# Patient Record
Sex: MALE | Race: WHITE | NOT HISPANIC OR LATINO | Employment: FULL TIME | URBAN - METROPOLITAN AREA
[De-identification: names, ages, dates, MRNs, and addresses within clinical notes are randomized per-mention and may not be internally consistent; named-entity substitution may affect disease eponyms.]

---

## 2017-10-02 ENCOUNTER — TRANSCRIBE ORDERS (OUTPATIENT)
Dept: ADMINISTRATIVE | Facility: HOSPITAL | Age: 62
End: 2017-10-02

## 2017-10-02 DIAGNOSIS — R31.1 BENIGN ESSENTIAL MICROSCOPIC HEMATURIA: Primary | ICD-10-CM

## 2017-10-04 ENCOUNTER — HOSPITAL ENCOUNTER (OUTPATIENT)
Dept: ULTRASOUND IMAGING | Facility: HOSPITAL | Age: 62
Discharge: HOME | End: 2017-10-04
Payer: COMMERCIAL

## 2017-10-04 DIAGNOSIS — R31.1 BENIGN ESSENTIAL MICROSCOPIC HEMATURIA: ICD-10-CM

## 2017-10-04 PROCEDURE — 76770 US EXAM ABDO BACK WALL COMP: CPT

## 2017-10-31 ENCOUNTER — ANESTHESIA EVENT (OUTPATIENT)
Dept: PERIOP | Facility: HOSPITAL | Age: 62
End: 2017-10-31
Payer: COMMERCIAL

## 2017-11-09 RX ORDER — TADALAFIL 5 MG/1
5 TABLET ORAL DAILY PRN
COMMUNITY

## 2017-11-09 RX ORDER — AMMONIUM LACTATE 12 G/100G
LOTION TOPICAL 2 TIMES DAILY PRN
COMMUNITY

## 2017-11-09 RX ORDER — FUROSEMIDE 20 MG/1
40 TABLET ORAL 2 TIMES DAILY
COMMUNITY
End: 2021-01-22

## 2017-11-09 RX ORDER — FERROUS SULFATE 325(65) MG
325 TABLET ORAL EVERY OTHER DAY
COMMUNITY
End: 2021-06-15

## 2017-11-09 NOTE — PRE-PROCEDURE INSTRUCTIONS
Pre-Surgery Instructions:   Medication Instructions    ammonium lactate (AMLACTIN) 12 % lotion Patient was instructed by Physician and understands   ferrous sulfate 325 (65 Fe) mg tablet Patient was instructed by Physician and understands   furosemide (LASIX) 20 mg tablet Patient was instructed by Physician and understands   GARCINIA CAMBOGIA-CHROMIUM PO Patient was instructed by Physician and understands   Green Coffee Silva-Yerba Mate (GREEN COFFEE BEAN EXTRACT PO) Patient was instructed by Physician and understands   L-ARGININE-500 PO Patient was instructed by Physician and understands   metoprolol tartrate (LOPRESSOR) 12 5 mg tablet Patient was instructed by Physician and understands   Multiple Vitamins-Minerals (OCUVITE EXTRA PO) Patient was instructed by Physician and understands   patient supplied medication Patient was instructed by Physician and understands   patient supplied medication Patient was instructed by Physician and understands   RASPBERRY KETONES PO Patient was instructed by Physician and understands   rivaroxaban (XARELTO) 20 mg tablet Patient was instructed by Physician and understands   tadalafil (CIALIS) 5 MG tablet Patient was instructed by Physician and understands   Turmeric Curcumin 500 MG CAPS Patient was instructed by Physician and understands  Pt instructed to take metoprolol with a small sip of water the morning of surgery  [facility] preop instructions reviewed with pt  Pt to buy dial antibacterial soap

## 2017-11-13 ENCOUNTER — ANESTHESIA (OUTPATIENT)
Dept: PERIOP | Facility: HOSPITAL | Age: 62
End: 2017-11-13
Payer: COMMERCIAL

## 2017-11-13 ENCOUNTER — HOSPITAL ENCOUNTER (OUTPATIENT)
Facility: HOSPITAL | Age: 62
Setting detail: OUTPATIENT SURGERY
Discharge: HOME | End: 2017-11-13
Attending: UROLOGY | Admitting: UROLOGY
Payer: COMMERCIAL

## 2017-11-13 VITALS
DIASTOLIC BLOOD PRESSURE: 70 MMHG | SYSTOLIC BLOOD PRESSURE: 142 MMHG | TEMPERATURE: 97.7 F | RESPIRATION RATE: 16 BRPM | HEIGHT: 69 IN | WEIGHT: 280 LBS | BODY MASS INDEX: 41.47 KG/M2 | HEART RATE: 65 BPM | OXYGEN SATURATION: 100 %

## 2017-11-13 PROCEDURE — L8699 PROSTHETIC IMPLANT NOS: HCPCS | Performed by: UROLOGY

## 2017-11-13 DEVICE — IMPLANT URO PROSTATE UROLIFT: Type: IMPLANTABLE DEVICE | Site: PROSTATE | Status: FUNCTIONAL

## 2017-11-13 RX ORDER — SODIUM CHLORIDE 9 MG/ML
125 INJECTION, SOLUTION INTRAVENOUS CONTINUOUS
Status: DISCONTINUED | OUTPATIENT
Start: 2017-11-13 | End: 2017-11-13 | Stop reason: HOSPADM

## 2017-11-13 RX ORDER — CIPROFLOXACIN 500 MG/1
500 TABLET, FILM COATED ORAL ONCE
Status: COMPLETED | OUTPATIENT
Start: 2017-11-13 | End: 2017-11-13

## 2017-11-13 RX ORDER — FENTANYL CITRATE/PF 50 MCG/ML
50 SYRINGE (ML) INJECTION
Status: DISCONTINUED | OUTPATIENT
Start: 2017-11-13 | End: 2017-11-13 | Stop reason: HOSPADM

## 2017-11-13 RX ORDER — FENTANYL CITRATE 50 UG/ML
INJECTION, SOLUTION INTRAMUSCULAR; INTRAVENOUS AS NEEDED
Status: DISCONTINUED | OUTPATIENT
Start: 2017-11-13 | End: 2017-11-13 | Stop reason: SURG

## 2017-11-13 RX ORDER — PHENAZOPYRIDINE HYDROCHLORIDE 200 MG/1
200 TABLET, FILM COATED ORAL ONCE
Status: COMPLETED | OUTPATIENT
Start: 2017-11-13 | End: 2017-11-13

## 2017-11-13 RX ORDER — ACETAMINOPHEN 325 MG/1
650 TABLET ORAL EVERY 6 HOURS PRN
Status: DISCONTINUED | OUTPATIENT
Start: 2017-11-13 | End: 2017-11-13 | Stop reason: HOSPADM

## 2017-11-13 RX ORDER — ONDANSETRON 2 MG/ML
INJECTION INTRAMUSCULAR; INTRAVENOUS AS NEEDED
Status: DISCONTINUED | OUTPATIENT
Start: 2017-11-13 | End: 2017-11-13 | Stop reason: SURG

## 2017-11-13 RX ORDER — MEPERIDINE HYDROCHLORIDE 50 MG/ML
12.5 INJECTION INTRAMUSCULAR; INTRAVENOUS; SUBCUTANEOUS AS NEEDED
Status: DISCONTINUED | OUTPATIENT
Start: 2017-11-13 | End: 2017-11-13 | Stop reason: HOSPADM

## 2017-11-13 RX ORDER — CIPROFLOXACIN 2 MG/ML
400 INJECTION, SOLUTION INTRAVENOUS ONCE
Status: COMPLETED | OUTPATIENT
Start: 2017-11-13 | End: 2017-11-13

## 2017-11-13 RX ORDER — MIDAZOLAM HYDROCHLORIDE 1 MG/ML
INJECTION INTRAMUSCULAR; INTRAVENOUS AS NEEDED
Status: DISCONTINUED | OUTPATIENT
Start: 2017-11-13 | End: 2017-11-13 | Stop reason: SURG

## 2017-11-13 RX ORDER — OXYCODONE HYDROCHLORIDE AND ACETAMINOPHEN 5; 325 MG/1; MG/1
1 TABLET ORAL EVERY 4 HOURS PRN
Status: DISCONTINUED | OUTPATIENT
Start: 2017-11-13 | End: 2017-11-13 | Stop reason: HOSPADM

## 2017-11-13 RX ORDER — PROPOFOL 10 MG/ML
INJECTION, EMULSION INTRAVENOUS AS NEEDED
Status: DISCONTINUED | OUTPATIENT
Start: 2017-11-13 | End: 2017-11-13 | Stop reason: SURG

## 2017-11-13 RX ORDER — CIPROFLOXACIN 500 MG/1
500 TABLET, FILM COATED ORAL EVERY 12 HOURS SCHEDULED
Qty: 20 TABLET | Refills: 0 | Status: SHIPPED | OUTPATIENT
Start: 2017-11-13 | End: 2017-11-23

## 2017-11-13 RX ADMIN — PROPOFOL 200 MG: 10 INJECTION, EMULSION INTRAVENOUS at 09:47

## 2017-11-13 RX ADMIN — DEXAMETHASONE SODIUM PHOSPHATE 8 MG: 10 INJECTION INTRAMUSCULAR; INTRAVENOUS at 09:56

## 2017-11-13 RX ADMIN — SODIUM CHLORIDE: 0.9 INJECTION, SOLUTION INTRAVENOUS at 10:29

## 2017-11-13 RX ADMIN — MIDAZOLAM HYDROCHLORIDE 2 MG: 1 INJECTION, SOLUTION INTRAMUSCULAR; INTRAVENOUS at 09:43

## 2017-11-13 RX ADMIN — CIPROFLOXACIN: 2 INJECTION INTRAVENOUS at 09:43

## 2017-11-13 RX ADMIN — SODIUM CHLORIDE 125 ML/HR: 0.9 INJECTION, SOLUTION INTRAVENOUS at 08:24

## 2017-11-13 RX ADMIN — FENTANYL CITRATE 100 MCG: 50 INJECTION INTRAMUSCULAR; INTRAVENOUS at 10:16

## 2017-11-13 RX ADMIN — ONDANSETRON HYDROCHLORIDE 4 MG: 2 INJECTION, SOLUTION INTRAVENOUS at 10:11

## 2017-11-13 RX ADMIN — CIPROFLOXACIN 500 MG: 500 TABLET, FILM COATED ORAL at 11:41

## 2017-11-13 RX ADMIN — ACETAMINOPHEN 650 MG: 325 TABLET ORAL at 13:10

## 2017-11-13 RX ADMIN — PHENAZOPYRIDINE HYDROCHLORIDE 200 MG: 200 TABLET ORAL at 11:42

## 2017-11-13 NOTE — ANESTHESIA POSTPROCEDURE EVALUATION
Post-Op Assessment Note      CV Status:  Stable    Mental Status:  Alert and awake    Hydration Status:  Euvolemic    PONV Controlled:  Controlled    Airway Patency:  Patent    Post Op Vitals Reviewed: Yes          Staff: Anesthesiologist           /87 (11/13/17 1050)    Temp      Pulse 66 (11/13/17 1050)   Resp 21 (11/13/17 1050)    SpO2 98 % (11/13/17 1050)

## 2017-11-13 NOTE — ANESTHESIA PREPROCEDURE EVALUATION
Review of Systems/Medical History  Patient summary reviewed        Cardiovascular  Negative cardio ROS Hyperlipidemia, Hypertension , Dysrhythmias, atrial fibrillation,    Pulmonary  Negative pulmonary ROS Sleep apnea , ,        GI/Hepatic  Negative GI/hepatic ROS          Negative  ROS        Endo/Other  Negative endo/other ROS Arthritis     GYN  Negative gynecology ROS          Hematology  Negative hematology ROS      Musculoskeletal  Negative musculoskeletal ROS Obesity  morbid obesity,        Neurology  Negative neurology ROS      Psychology   Negative psychology ROS            Physical Exam    Airway    Mallampati score: III  TM Distance: >3 FB  Neck ROM: full     Dental   No notable dental hx     Cardiovascular  Comment: Negative ROS, Rhythm: regular, Rate: normal, Cardiovascular exam normal    Pulmonary  Pulmonary exam normal Breath sounds clear to auscultation,     Other Findings        Anesthesia Plan  ASA Score- 3       Anesthesia Type- general with ASA Monitors  Additional Monitors:   Airway Plan: LMA  Induction- intravenous  Informed Consent- Anesthetic plan and risks discussed with patient

## 2017-11-13 NOTE — OP NOTE
OPERATIVE REPORT    PATIENT NAME: Bertha Rodrigues    :  1955  MRN: [id number]  Pt Location: [facility] OR ROOM 01    SURGERY DATE:   2017  Surgeon(s) and Role:   Ana Brito DO - Primary    Preop Diagnosis:  Enlarged prostate with lower urinary tract symptoms (LUTS) [N40 1]  Post-Op Diagnosis Codes:    Enlarged prostate with lower urinary tract symptoms (LUTS) [N40 1]  Procedure(s):  CYSTOSCOPY WITH INSERTION UROLIFT    Specimen(s):  None    Estimated Blood Loss:   Minimal    Drains:  20 Mongolian Villafuerte catheter       Anesthesia Type:   General    Operative Indications:  BPH with lower urinary tract symptoms    Operative Findings:  Lateral lobe enlargement bilaterally    Complications:   None    Procedure and Technique:  Patient was identified  General anesthesia was administered  Time-out was taken  Genitals were prepped and draped  Rigid cystoscopy was performed  Enlarged prostate was noted  Bladder was slightly trabeculated  Urolift implants were placed bilaterally 1 5 cm distal to the bladder neck  Implants were placed proximally to the verumontanum bilaterally on the left and right sides  Cystoscopic examination was done  It was felt that there was need for an additional Urolift implant on the left side  This site was slightly proximal to the left bladder neck implant  This 5th implant was uneventfully placed with excellent result being accomplished thereafter  Patient tolerated the procedure well with good surgical outcome (cystoscopically )    [de-identified] Mongolian Villafuerte catheter was placed with 30 cc in the balloon  Patient went to recovery room in good postoperative condition having tolerated the procedure well  He is to follow up at the office tomorrow for Villafuerte catheter removal  In the interim he will remain on outpatient oral antibiotics       I was present for the entire procedure    Patient Disposition:  PACU     SIGNATURE: Ana Brito DO  DATE: 2017  TIME: 10:28 AM

## 2017-11-13 NOTE — DISCHARGE INSTRUCTIONS
Villafuerte Catheter Placement and Care   WHAT YOU NEED TO KNOW:   A Villafuerte catheter is a sterile tube that is inserted into your bladder to drain urine  It is also called an indwelling urinary catheter  The tip of the catheter has a small balloon filled with solution that holds the catheter inside your bladder  DISCHARGE INSTRUCTIONS:   Return to the emergency department if:   · Your catheter comes out  · You suddenly have material that looks like sand in the tubing or drainage bag  · No urine is draining into the bag and you have checked the system  · You have pain in your hip, back, pelvis, or lower abdomen  · You are confused or cannot think clearly  Contact your healthcare provider if:   · You have a fever  · You have bladder spasms for more than 1 day after the catheter is placed  · You see blood in the tubing or drainage bag  · You have a rash or itching where the catheter tube is secured to your skin  · Urine leaks from or around the catheter, tubing, or drainage bag  · The closed drainage system has accidently come open or apart  · You see a layer of crystals inside the tubing  · You have questions or concerns about your condition or care  Care for your Villafuerte catheter:   · Clean your genital area 2 times every day  Clean your catheter and the area around where it was inserted  Use soap and water  Clean your anal opening and catheter area after every bowel movement  · Secure the catheter tube  so you do not pull or move the catheter  This helps prevent pain and bladder spasms  Healthcare providers will show you how to use medical tape or a strap to secure the catheter tube to your body  · Keep a closed drainage system  Your Villafuerte catheter should always be attached to the drainage bag to form a closed system  Do not disconnect any part of the closed system unless you need to change the bag    Care for your drainage bag:   · Ask if a leg bag is right for you   A leg bag can be worn under your clothes  Ask your healthcare provider for more information about a leg bag  · Keep the drainage bag below the level of your waist   This helps stop urine from moving back up the tubing and into your bladder  Do not loop or kink the tubing  This can cause urine to back up and collect in your bladder  Do not let the drainage bag touch or lie on the floor  · Empty the drainage bag when needed  The weight of a full drainage bag can be painful  Empty the drainage bag every 3 to 6 hours or when it is ? full  · Clean and change the drainage bag as directed  Ask your healthcare provider how often you should change the drainage bag and what cleaning solution to use  Wear disposable gloves when you change the bag  Do not allow the end of the catheter or tubing to touch anything  Clean the ends with an alcohol pad before you reconnect them  What to do if problems develop:   · No urine is draining into the bag:      ¨ Check for kinks in the tubing and straighten them out  ¨ Check the tape or strap used to secure the catheter tube to your skin  Make sure it is not blocking the tube  ¨ Make sure you are not sitting or lying on the tubing  ¨ Make sure the urine bag is hanging below the level of your waist     · Urine leaks from or around the catheter, tubing, or drainage bag:  Check if the closed drainage system has accidently come open or apart  Clean the catheter and tubing ends with a new alcohol pad and reconnect them  Prevent an infection:   · Wash your hands often  Wash before and after you touch your catheter, tubing, or drainage bag  Use soap and water  Wear clean disposable gloves when you care for your catheter or disconnect the drainage bag  Wash your hands before you prepare or eat food  · Drink liquids as directed  Ask your healthcare provider how much liquid to drink each day and which liquids are best for you   Liquids will help flush your kidneys and bladder to help prevent infection  Follow up with your healthcare provider as directed:  Write down your questions so you remember to ask them during your visits  © 2017 [employer] Information is for End User's use only and may not be sold, redistributed or otherwise used for commercial purposes  All illustrations and images included in CareNotes® are the copyrighted property of [employer]  or [employer]  The above information is an  only  It is not intended as medical advice for individual conditions or treatments  Talk to your doctor, nurse or pharmacist before following any medical regimen to see if it is safe and effective for you  Urinary Leg Bag   WHAT YOU NEED TO KNOW:   What is a urinary leg bag? A urinary leg bag holds urine that drains from your catheter  It fits under your clothes and allows you to do your normal daily activities  How do I use a urinary leg bag? · Wash your hands  before and after you touch your catheter, tubing, or drainage bag  Use soap and water  This reduces the risk of infection  · Strap your leg bag to your thigh or calf  Make sure the straps are comfortable  The straps can cause problems with blood flow in your leg if they are too tight  · Clean the tip of the drainage tube with alcohol  before attaching it to your catheter  This helps prevent bacteria from getting into your catheter  · The connecting tube should not pull on your catheter  Skin breakdown can occur if there is constant pulling on the catheter  · Check the tube often to make sure it is not kinked or twisted  Blockage in the tube can cause urine to back up into your bladder  Your urine must flow straight through the tube into your leg bag  · Always keep the leg bag below your bladder  This prevents urine from the bag going back into your bladder, which may cause an infection      · Empty your leg bag when it is ½ full, or every 3 hours  A full bag may break or disconnect from the catheter  · Change to your bedside bag before you go to bed  Your bedside bag can hold more urine  Do not use your leg bag at night because it could become too full or break  · Clean your leg bag after every use  Fill the bag with 2 parts vinegar and 3 parts water  Let it soak for 20 minutes, then rinse and let dry  Follow your healthcare provider's instruction on replacing your leg bag with a new one  CARE AGREEMENT:   You have the right to help plan your care  Learn about your health condition and how it may be treated  Discuss treatment options with your caregivers to decide what care you want to receive  You always have the right to refuse treatment  The above information is an  only  It is not intended as medical advice for individual conditions or treatments  Talk to your doctor, nurse or pharmacist before following any medical regimen to see if it is safe and effective for you  © 2017 [employer]  Information is for End User's use only and may not be sold, redistributed or otherwise used for commercial purposes  All illustrations and images included in CareNotes® are the copyrighted property of A [employer]  or [employer]

## 2018-04-02 ENCOUNTER — HOSPITAL ENCOUNTER (OUTPATIENT)
Dept: RADIOLOGY | Facility: HOSPITAL | Age: 63
Discharge: HOME | End: 2018-04-02
Payer: COMMERCIAL

## 2018-04-02 ENCOUNTER — TRANSCRIBE ORDERS (OUTPATIENT)
Dept: ADMINISTRATIVE | Facility: HOSPITAL | Age: 63
End: 2018-04-02

## 2018-04-02 DIAGNOSIS — M25.552 LEFT HIP PAIN: ICD-10-CM

## 2018-04-02 DIAGNOSIS — M54.5 LOW BACK PAIN, UNSPECIFIED BACK PAIN LATERALITY, UNSPECIFIED CHRONICITY, WITH SCIATICA PRESENCE UNSPECIFIED: Primary | ICD-10-CM

## 2018-04-02 DIAGNOSIS — M54.5 LOW BACK PAIN, UNSPECIFIED BACK PAIN LATERALITY, UNSPECIFIED CHRONICITY, WITH SCIATICA PRESENCE UNSPECIFIED: ICD-10-CM

## 2018-04-02 PROCEDURE — 73502 X-RAY EXAM HIP UNI 2-3 VIEWS: CPT

## 2018-04-02 PROCEDURE — 72110 X-RAY EXAM L-2 SPINE 4/>VWS: CPT

## 2019-01-25 ENCOUNTER — TRANSCRIBE ORDERS (OUTPATIENT)
Dept: ADMINISTRATIVE | Facility: HOSPITAL | Age: 64
End: 2019-01-25

## 2019-01-25 ENCOUNTER — HOSPITAL ENCOUNTER (OUTPATIENT)
Dept: RADIOLOGY | Facility: HOSPITAL | Age: 64
Discharge: HOME | End: 2019-01-25
Payer: COMMERCIAL

## 2019-01-25 DIAGNOSIS — M65.321 TRIGGER INDEX FINGER OF RIGHT HAND: Primary | ICD-10-CM

## 2019-01-25 DIAGNOSIS — M65.321 TRIGGER INDEX FINGER OF RIGHT HAND: ICD-10-CM

## 2019-01-25 PROCEDURE — 73140 X-RAY EXAM OF FINGER(S): CPT

## 2019-03-19 ENCOUNTER — TRANSCRIBE ORDERS (OUTPATIENT)
Dept: ADMINISTRATIVE | Facility: HOSPITAL | Age: 64
End: 2019-03-19

## 2019-03-19 ENCOUNTER — HOSPITAL ENCOUNTER (OUTPATIENT)
Dept: RADIOLOGY | Facility: HOSPITAL | Age: 64
Discharge: HOME | End: 2019-03-19
Payer: COMMERCIAL

## 2019-03-19 DIAGNOSIS — M10.041 ACUTE IDIOPATHIC GOUT OF RIGHT HAND: Primary | ICD-10-CM

## 2019-03-19 DIAGNOSIS — M10.041 ACUTE IDIOPATHIC GOUT OF RIGHT HAND: ICD-10-CM

## 2019-03-19 PROCEDURE — 73130 X-RAY EXAM OF HAND: CPT

## 2019-04-09 ENCOUNTER — TRANSCRIBE ORDERS (OUTPATIENT)
Dept: PHYSICAL THERAPY | Facility: CLINIC | Age: 64
End: 2019-04-09

## 2019-04-09 ENCOUNTER — EVALUATION (OUTPATIENT)
Dept: PHYSICAL THERAPY | Facility: CLINIC | Age: 64
End: 2019-04-09
Payer: COMMERCIAL

## 2019-04-09 DIAGNOSIS — Z96.642 HISTORY OF TOTAL LEFT HIP ARTHROPLASTY: Primary | ICD-10-CM

## 2019-04-09 DIAGNOSIS — Z96.642 H/O TOTAL HIP ARTHROPLASTY, LEFT: Primary | ICD-10-CM

## 2019-04-09 PROCEDURE — 97116 GAIT TRAINING THERAPY: CPT | Performed by: PHYSICAL THERAPIST

## 2019-04-09 PROCEDURE — 97161 PT EVAL LOW COMPLEX 20 MIN: CPT | Performed by: PHYSICAL THERAPIST

## 2019-04-09 PROCEDURE — 97110 THERAPEUTIC EXERCISES: CPT | Performed by: PHYSICAL THERAPIST

## 2019-04-11 ENCOUNTER — OFFICE VISIT (OUTPATIENT)
Dept: PHYSICAL THERAPY | Facility: CLINIC | Age: 64
End: 2019-04-11
Payer: COMMERCIAL

## 2019-04-11 DIAGNOSIS — Z96.642 HISTORY OF TOTAL LEFT HIP ARTHROPLASTY: Primary | ICD-10-CM

## 2019-04-11 PROCEDURE — 97110 THERAPEUTIC EXERCISES: CPT

## 2019-04-11 PROCEDURE — 97530 THERAPEUTIC ACTIVITIES: CPT

## 2019-04-16 ENCOUNTER — OFFICE VISIT (OUTPATIENT)
Dept: PHYSICAL THERAPY | Facility: CLINIC | Age: 64
End: 2019-04-16
Payer: COMMERCIAL

## 2019-04-16 DIAGNOSIS — Z96.642 HISTORY OF TOTAL LEFT HIP ARTHROPLASTY: Primary | ICD-10-CM

## 2019-04-16 PROCEDURE — 97110 THERAPEUTIC EXERCISES: CPT | Performed by: PHYSICAL THERAPIST

## 2019-04-18 ENCOUNTER — OFFICE VISIT (OUTPATIENT)
Dept: PHYSICAL THERAPY | Facility: CLINIC | Age: 64
End: 2019-04-18
Payer: COMMERCIAL

## 2019-04-18 DIAGNOSIS — Z96.642 HISTORY OF TOTAL LEFT HIP ARTHROPLASTY: Primary | ICD-10-CM

## 2019-04-18 PROCEDURE — 97110 THERAPEUTIC EXERCISES: CPT

## 2019-04-23 ENCOUNTER — OFFICE VISIT (OUTPATIENT)
Dept: PHYSICAL THERAPY | Facility: CLINIC | Age: 64
End: 2019-04-23
Payer: COMMERCIAL

## 2019-04-23 DIAGNOSIS — Z96.642 HISTORY OF TOTAL LEFT HIP ARTHROPLASTY: Primary | ICD-10-CM

## 2019-04-23 PROCEDURE — 97110 THERAPEUTIC EXERCISES: CPT | Performed by: PHYSICAL THERAPIST

## 2019-04-25 ENCOUNTER — OFFICE VISIT (OUTPATIENT)
Dept: PHYSICAL THERAPY | Facility: CLINIC | Age: 64
End: 2019-04-25
Payer: COMMERCIAL

## 2019-04-25 DIAGNOSIS — Z96.642 HISTORY OF TOTAL LEFT HIP ARTHROPLASTY: Primary | ICD-10-CM

## 2019-04-25 PROCEDURE — 97110 THERAPEUTIC EXERCISES: CPT | Performed by: PHYSICAL THERAPIST

## 2019-04-30 ENCOUNTER — OFFICE VISIT (OUTPATIENT)
Dept: PHYSICAL THERAPY | Facility: CLINIC | Age: 64
End: 2019-04-30
Payer: COMMERCIAL

## 2019-04-30 DIAGNOSIS — Z96.642 HISTORY OF TOTAL LEFT HIP ARTHROPLASTY: Primary | ICD-10-CM

## 2019-04-30 PROCEDURE — 97110 THERAPEUTIC EXERCISES: CPT

## 2019-05-02 ENCOUNTER — APPOINTMENT (OUTPATIENT)
Dept: PHYSICAL THERAPY | Facility: CLINIC | Age: 64
End: 2019-05-02
Payer: COMMERCIAL

## 2019-05-06 ENCOUNTER — OFFICE VISIT (OUTPATIENT)
Dept: PHYSICAL THERAPY | Facility: CLINIC | Age: 64
End: 2019-05-06
Payer: COMMERCIAL

## 2019-05-06 DIAGNOSIS — Z96.642 HISTORY OF TOTAL LEFT HIP ARTHROPLASTY: Primary | ICD-10-CM

## 2019-05-06 PROCEDURE — 97110 THERAPEUTIC EXERCISES: CPT

## 2019-05-08 ENCOUNTER — APPOINTMENT (OUTPATIENT)
Dept: PHYSICAL THERAPY | Facility: CLINIC | Age: 64
End: 2019-05-08
Payer: COMMERCIAL

## 2019-05-13 ENCOUNTER — OFFICE VISIT (OUTPATIENT)
Dept: PHYSICAL THERAPY | Facility: CLINIC | Age: 64
End: 2019-05-13
Payer: COMMERCIAL

## 2019-05-13 DIAGNOSIS — Z96.642 HISTORY OF TOTAL LEFT HIP ARTHROPLASTY: Primary | ICD-10-CM

## 2019-05-13 PROCEDURE — 97110 THERAPEUTIC EXERCISES: CPT | Performed by: PHYSICAL THERAPIST

## 2019-05-15 ENCOUNTER — APPOINTMENT (OUTPATIENT)
Dept: PHYSICAL THERAPY | Facility: CLINIC | Age: 64
End: 2019-05-15
Payer: COMMERCIAL

## 2019-05-21 ENCOUNTER — OFFICE VISIT (OUTPATIENT)
Dept: PHYSICAL THERAPY | Facility: CLINIC | Age: 64
End: 2019-05-21
Payer: COMMERCIAL

## 2019-05-21 DIAGNOSIS — Z96.642 HISTORY OF TOTAL LEFT HIP ARTHROPLASTY: Primary | ICD-10-CM

## 2019-05-21 PROCEDURE — 97110 THERAPEUTIC EXERCISES: CPT

## 2019-05-21 PROCEDURE — 97112 NEUROMUSCULAR REEDUCATION: CPT

## 2019-05-22 ENCOUNTER — TRANSCRIBE ORDERS (OUTPATIENT)
Dept: PHYSICAL THERAPY | Facility: CLINIC | Age: 64
End: 2019-05-22

## 2019-05-22 DIAGNOSIS — Z96.642 PRESENCE OF LEFT ARTIFICIAL HIP JOINT: Primary | ICD-10-CM

## 2019-05-23 ENCOUNTER — APPOINTMENT (OUTPATIENT)
Dept: PHYSICAL THERAPY | Facility: CLINIC | Age: 64
End: 2019-05-23
Payer: COMMERCIAL

## 2019-05-28 ENCOUNTER — APPOINTMENT (OUTPATIENT)
Dept: PHYSICAL THERAPY | Facility: CLINIC | Age: 64
End: 2019-05-28
Payer: COMMERCIAL

## 2019-05-30 ENCOUNTER — APPOINTMENT (OUTPATIENT)
Dept: PHYSICAL THERAPY | Facility: CLINIC | Age: 64
End: 2019-05-30
Payer: COMMERCIAL

## 2019-06-07 ENCOUNTER — EVALUATION (OUTPATIENT)
Dept: PHYSICAL THERAPY | Facility: CLINIC | Age: 64
End: 2019-06-07
Payer: COMMERCIAL

## 2019-06-07 DIAGNOSIS — I89.0 LYMPHEDEMA: Primary | ICD-10-CM

## 2019-06-07 PROCEDURE — 97162 PT EVAL MOD COMPLEX 30 MIN: CPT | Performed by: PHYSICAL THERAPIST

## 2019-06-07 PROCEDURE — 97140 MANUAL THERAPY 1/> REGIONS: CPT | Performed by: PHYSICAL THERAPIST

## 2019-06-12 ENCOUNTER — TRANSCRIBE ORDERS (OUTPATIENT)
Dept: PHYSICAL THERAPY | Facility: CLINIC | Age: 64
End: 2019-06-12

## 2019-06-12 DIAGNOSIS — I89.0 OBLITERATION OF LYMPHATIC VESSEL: Primary | ICD-10-CM

## 2019-06-17 ENCOUNTER — OFFICE VISIT (OUTPATIENT)
Dept: PHYSICAL THERAPY | Facility: CLINIC | Age: 64
End: 2019-06-17
Payer: COMMERCIAL

## 2019-06-17 DIAGNOSIS — I89.0 LYMPHEDEMA: Primary | ICD-10-CM

## 2019-06-17 PROCEDURE — 97140 MANUAL THERAPY 1/> REGIONS: CPT | Performed by: PHYSICAL THERAPIST

## 2019-06-20 ENCOUNTER — OFFICE VISIT (OUTPATIENT)
Dept: PHYSICAL THERAPY | Facility: CLINIC | Age: 64
End: 2019-06-20
Payer: COMMERCIAL

## 2019-06-20 DIAGNOSIS — Z96.642 HISTORY OF TOTAL LEFT HIP ARTHROPLASTY: ICD-10-CM

## 2019-06-20 DIAGNOSIS — I89.0 LYMPHEDEMA: Primary | ICD-10-CM

## 2019-06-20 PROCEDURE — 97140 MANUAL THERAPY 1/> REGIONS: CPT | Performed by: PHYSICAL THERAPIST

## 2019-06-25 ENCOUNTER — APPOINTMENT (OUTPATIENT)
Dept: PHYSICAL THERAPY | Facility: CLINIC | Age: 64
End: 2019-06-25
Payer: COMMERCIAL

## 2019-06-25 ENCOUNTER — OFFICE VISIT (OUTPATIENT)
Dept: PHYSICAL THERAPY | Facility: CLINIC | Age: 64
End: 2019-06-25
Payer: COMMERCIAL

## 2019-06-25 DIAGNOSIS — I89.0 LYMPHEDEMA: Primary | ICD-10-CM

## 2019-06-25 PROCEDURE — 97110 THERAPEUTIC EXERCISES: CPT

## 2019-06-25 PROCEDURE — 97140 MANUAL THERAPY 1/> REGIONS: CPT | Performed by: PHYSICAL THERAPIST

## 2019-06-27 ENCOUNTER — OFFICE VISIT (OUTPATIENT)
Dept: PHYSICAL THERAPY | Facility: CLINIC | Age: 64
End: 2019-06-27
Payer: COMMERCIAL

## 2019-06-27 DIAGNOSIS — Z96.642 HISTORY OF TOTAL LEFT HIP ARTHROPLASTY: ICD-10-CM

## 2019-06-27 DIAGNOSIS — I89.0 LYMPHEDEMA: Primary | ICD-10-CM

## 2019-06-27 PROCEDURE — 97140 MANUAL THERAPY 1/> REGIONS: CPT | Performed by: PHYSICAL THERAPIST

## 2019-07-01 ENCOUNTER — OFFICE VISIT (OUTPATIENT)
Dept: PHYSICAL THERAPY | Facility: CLINIC | Age: 64
End: 2019-07-01
Payer: COMMERCIAL

## 2019-07-01 DIAGNOSIS — Z96.642 HISTORY OF TOTAL LEFT HIP ARTHROPLASTY: ICD-10-CM

## 2019-07-01 DIAGNOSIS — I89.0 LYMPHEDEMA: Primary | ICD-10-CM

## 2019-07-01 PROCEDURE — 97110 THERAPEUTIC EXERCISES: CPT | Performed by: PHYSICAL THERAPIST

## 2019-07-01 PROCEDURE — 97140 MANUAL THERAPY 1/> REGIONS: CPT | Performed by: PHYSICAL THERAPIST

## 2019-07-02 NOTE — PROGRESS NOTES
Daily Note     Today's date: 2019  Patient name: Emerson Huerta  : 1955  MRN: [id number]  Referring provider: Mariela Solis PA-C  Dx:   Encounter Diagnosis     ICD-10-CM    1  Lymphedema I89 0    2  History of total left hip arthroplasty Z96 642      Precautions: Falls, Recent THPs  Re-eval Date: 19    Date        Visit Count 6       FOTO        Pain In 0       Pain Out 0         Subjective: Patient reports he is feeling better, not doing his HEP for his THPs  Objective: See treatment diary below      Assessment: Tolerated treatment well  Patient demonstrated fatigue post treatment and would benefit from continued PT  Skin texture improved  No open areas noted at this time  Encouraged increased skin care left distal calf region  Plan: Continue per plan of care  Manual              MLD 25 mins 55 mins 55 mins 45 mins 55 mins 30 mins                      Manual Therapy:  MLD (Manual lymphatic drainage) to left LE with position modified for patient tolerance  Compression placed to bilateral LEs using short stretch bandages, white foam roll, 6 cm, 8 cm, 10 cm  Gait WFL with good foot clearance and no concerns for increased fall risk related to compression placement  Was noted with increased sway in static stand, no identified fall risk on initial observation, however, sway is increased  Exercise Diary              Aerobic    10 mins  Nustep L6  15 mins  Nustep  L6                    HEP      15 mins                                                           HEP: Black T-band exercises: Hip flexion, hip ABd, hip Extn, Mini squats, HR, TR  All completed 2 x 10      Modalities

## 2019-07-08 ENCOUNTER — OFFICE VISIT (OUTPATIENT)
Dept: PHYSICAL THERAPY | Facility: CLINIC | Age: 64
End: 2019-07-08
Payer: COMMERCIAL

## 2019-07-08 DIAGNOSIS — I89.0 LYMPHEDEMA: Primary | ICD-10-CM

## 2019-07-08 DIAGNOSIS — Z96.642 HISTORY OF TOTAL LEFT HIP ARTHROPLASTY: ICD-10-CM

## 2019-07-08 PROCEDURE — 97140 MANUAL THERAPY 1/> REGIONS: CPT | Performed by: PHYSICAL THERAPIST

## 2019-07-08 PROCEDURE — 97110 THERAPEUTIC EXERCISES: CPT | Performed by: PHYSICAL THERAPIST

## 2019-07-08 NOTE — PROGRESS NOTES
Daily Note     Today's date: 2019  Patient name: Daniel Lane  : 1955  MRN: [id number]  Referring provider: Carl Chavez PA-C  Dx:   Encounter Diagnosis     ICD-10-CM    1  Lymphedema I89 0    2  History of total left hip arthroplasty Z96 642      Precautions: Falls, Recent THPs  Re-eval Date: 19    Date       Visit Count 6 7      FOTO   *RE     Pain In 0 0      Pain Out 0 0        Subjective: I really don't feel good today  Let's keep it easy  Objective: See treatment diary below      Assessment: Tolerated treatment fair  Patient demonstrated fatigue post treatment and would benefit from continued PT   (+) cough during session  Plan: Continue per plan of care  Progress note during next visit  Manual              MLD 25 mins 55 mins 55 mins 45 mins 55 mins 30 mins 45 mins                     Manual Therapy:  MLD (Manual lymphatic drainage) to left LE with position modified for patient tolerance  Compression placed to bilateral LEs using short stretch bandages, white foam roll, 6 cm, 8 cm, 10 cm  Gait WFL with good foot clearance and no concerns for increased fall risk related to compression placement  Was noted with increased sway in static stand, no identified fall risk on initial observation, however, sway is increased  Exercise Diary              Aerobic    10 mins  Nustep L6  15 mins  Nustep  L6 15 mins  Nustep  L3                   HEP      15 mins                                                           HEP: Black T-band exercises: Hip flexion, hip ABd, hip Extn, Mini squats, HR, TR  All completed 2 x 10      Modalities

## 2019-07-10 ENCOUNTER — OFFICE VISIT (OUTPATIENT)
Dept: PHYSICAL THERAPY | Facility: CLINIC | Age: 64
End: 2019-07-10
Payer: COMMERCIAL

## 2019-07-10 DIAGNOSIS — Z96.642 HISTORY OF TOTAL LEFT HIP ARTHROPLASTY: ICD-10-CM

## 2019-07-10 DIAGNOSIS — I89.0 LYMPHEDEMA: Primary | ICD-10-CM

## 2019-07-10 PROCEDURE — 97140 MANUAL THERAPY 1/> REGIONS: CPT | Performed by: PHYSICAL THERAPIST

## 2019-07-10 NOTE — PROGRESS NOTES
Daily Note     Today's date: 7/10/2019  Patient name: Doylene Gitelman  : 1955  MRN: [id number]  Referring provider: Lorena Rodriguez PA-C  Dx:   Encounter Diagnosis     ICD-10-CM    1  Lymphedema I89 0    2  History of total left hip arthroplasty Z96 642                   Subjective: Patient reports he is feeling better, legs not as bad  Do still have itchy spots on left lateral ankle region,      Objective: See treatment diary below      Assessment: Tolerated treatment well  Patient demonstrated fatigue post treatment and would benefit from continued PT  Skin texture improved  Plan: Continue per plan of care  Manual              MLD 25 mins 55 mins 55 mins 45 mins 55 mins 30 mins 45 mins 55 mins                    Manual Therapy:  MLD (Manual lymphatic drainage) to left LE with position modified for patient tolerance  Compression placed to bilateral LEs using short stretch bandages, white foam roll, 6 cm, 8 cm, 10 cm  Gait WFL with good foot clearance and no concerns for increased fall risk related to compression placement  Was noted with increased sway in static stand, no identified fall risk on initial observation, however, sway is increased  Exercise Diary         Hold not feeling well     Aerobic    10 mins  Nustep L6  15 mins  Nustep  L6 15 mins  Nustep  L3                   HEP      15 mins                                                           HEP: Black T-band exercises: Hip flexion, hip ABd, hip Extn, Mini squats, HR, TR  All completed 2 x 10      Modalities

## 2019-07-16 ENCOUNTER — OFFICE VISIT (OUTPATIENT)
Dept: PHYSICAL THERAPY | Facility: CLINIC | Age: 64
End: 2019-07-16
Payer: COMMERCIAL

## 2019-07-16 DIAGNOSIS — I89.0 LYMPHEDEMA: Primary | ICD-10-CM

## 2019-07-16 DIAGNOSIS — Z96.642 HISTORY OF TOTAL LEFT HIP ARTHROPLASTY: ICD-10-CM

## 2019-07-16 PROCEDURE — 97140 MANUAL THERAPY 1/> REGIONS: CPT | Performed by: PHYSICAL THERAPIST

## 2019-07-16 PROCEDURE — 97110 THERAPEUTIC EXERCISES: CPT | Performed by: PHYSICAL THERAPIST

## 2019-07-16 NOTE — PROGRESS NOTES
Daily Note     Today's date: 2019  Patient name: Lissette Schultz  : 1955  MRN: [id number]  Referring provider: John Saucedo PA-C  Dx:   Encounter Diagnosis     ICD-10-CM    1  Lymphedema I89 0    2  History of total left hip arthroplasty Z96 642                   Subjective: Feeling okay, feeling better  Objective: See treatment diary below      Assessment: Tolerated treatment well  Patient demonstrated fatigue post treatment and would benefit from continued PT      Plan: Continue per plan of care  Precautions: Falls, Recent THPs  Re-eval Date: 19    Date 7/1 7/8 7/10 7/16    Visit Count 6 7 8 9    FOTO        Pain In 0 0  0    Pain Out 0 0  0      Manual              MLD 25 mins 55 mins 55 mins 45 mins 55 mins 30 mins 45 mins 55 mins 45 mins                   Manual Therapy:  MLD (Manual lymphatic drainage) to left LE with position modified for patient tolerance  Compression placed to bilateral LEs using short stretch bandages, white foam roll, 6 cm, 8 cm, 10 cm  Gait WFL with good foot clearance and no concerns for increased fall risk related to compression placement  Was noted with increased sway in static stand, no identified fall risk on initial observation, however, sway is increased  Exercise Diary         Hold not feeling well     Aerobic    10 mins  Nustep L6  15 mins  Nustep  L6 15 mins  Nustep  L3  15 mins  Nustep  L3                 HEP      15 mins                                                           HEP: Black T-band exercises: Hip flexion, hip ABd, hip Extn, Mini squats, HR, TR  All completed 2 x 10      Modalities

## 2019-08-13 NOTE — PROGRESS NOTES
Lydia Juarez will be discharged from outpatient physical therapy care due to expiration of plan of care  No objective measures updated, Lydia Juarez is not present at time of discharge

## 2019-10-26 ENCOUNTER — APPOINTMENT (EMERGENCY)
Dept: RADIOLOGY | Facility: HOSPITAL | Age: 64
DRG: 552 | End: 2019-10-26
Payer: COMMERCIAL

## 2019-10-26 ENCOUNTER — APPOINTMENT (EMERGENCY)
Dept: CT IMAGING | Facility: HOSPITAL | Age: 64
DRG: 552 | End: 2019-10-26
Payer: COMMERCIAL

## 2019-10-26 ENCOUNTER — HOSPITAL ENCOUNTER (INPATIENT)
Facility: HOSPITAL | Age: 64
LOS: 3 days | Discharge: HOME | DRG: 552 | End: 2019-10-29
Attending: EMERGENCY MEDICINE | Admitting: INTERNAL MEDICINE
Payer: COMMERCIAL

## 2019-10-26 DIAGNOSIS — M54.2 CERVICAL PAIN (NECK): ICD-10-CM

## 2019-10-26 DIAGNOSIS — I63.9 ACUTE CVA (CEREBROVASCULAR ACCIDENT) (HCC): Primary | ICD-10-CM

## 2019-10-26 DIAGNOSIS — R20.2 PARESTHESIAS: ICD-10-CM

## 2019-10-26 DIAGNOSIS — R29.898 WEAKNESS OF RIGHT UPPER EXTREMITY: ICD-10-CM

## 2019-10-26 PROBLEM — I48.91 ATRIAL FIBRILLATION (HCC): Status: ACTIVE | Noted: 2019-10-26

## 2019-10-26 PROBLEM — Z99.89 OBSTRUCTIVE SLEEP APNEA ON CPAP: Status: ACTIVE | Noted: 2019-10-26

## 2019-10-26 PROBLEM — G47.33 OBSTRUCTIVE SLEEP APNEA ON CPAP: Status: ACTIVE | Noted: 2019-10-26

## 2019-10-26 PROBLEM — E66.01 MORBID OBESITY WITH BMI OF 40.0-44.9, ADULT (HCC): Status: ACTIVE | Noted: 2019-10-26

## 2019-10-26 PROBLEM — D72.819 LEUKOPENIA: Status: ACTIVE | Noted: 2019-10-26

## 2019-10-26 PROBLEM — D69.6 THROMBOCYTOPENIA (HCC): Status: ACTIVE | Noted: 2019-10-26

## 2019-10-26 LAB
ANION GAP SERPL CALCULATED.3IONS-SCNC: 8 MMOL/L (ref 4–13)
APTT PPP: 32 SECONDS (ref 23–37)
BUN SERPL-MCNC: 30 MG/DL (ref 5–25)
CALCIUM SERPL-MCNC: 9.2 MG/DL (ref 8.3–10.1)
CHLORIDE SERPL-SCNC: 105 MMOL/L (ref 100–108)
CO2 SERPL-SCNC: 26 MMOL/L (ref 21–32)
CREAT SERPL-MCNC: 0.75 MG/DL (ref 0.6–1.3)
ERYTHROCYTE [DISTWIDTH] IN BLOOD BY AUTOMATED COUNT: 14.6 % (ref 11.6–15.1)
GFR SERPL CREATININE-BSD FRML MDRD: 96 ML/MIN/1.73SQ M
GLUCOSE SERPL-MCNC: 102 MG/DL (ref 65–140)
GLUCOSE SERPL-MCNC: 105 MG/DL (ref 65–140)
HCT VFR BLD AUTO: 42.3 % (ref 36.5–49.3)
HGB BLD-MCNC: 13.5 G/DL (ref 12–17)
INR PPP: 1.18 (ref 0.84–1.19)
MCH RBC QN AUTO: 30.3 PG (ref 26.8–34.3)
MCHC RBC AUTO-ENTMCNC: 31.9 G/DL (ref 31.4–37.4)
MCV RBC AUTO: 95 FL (ref 82–98)
PLATELET # BLD AUTO: 133 THOUSANDS/UL (ref 149–390)
PMV BLD AUTO: 10.6 FL (ref 8.9–12.7)
POTASSIUM SERPL-SCNC: 4.3 MMOL/L (ref 3.5–5.3)
PROTHROMBIN TIME: 15.1 SECONDS (ref 11.6–14.5)
RBC # BLD AUTO: 4.45 MILLION/UL (ref 3.88–5.62)
SODIUM SERPL-SCNC: 139 MMOL/L (ref 136–145)
TROPONIN I SERPL-MCNC: <0.02 NG/ML
WBC # BLD AUTO: 4.11 THOUSAND/UL (ref 4.31–10.16)

## 2019-10-26 PROCEDURE — 36415 COLL VENOUS BLD VENIPUNCTURE: CPT | Performed by: EMERGENCY MEDICINE

## 2019-10-26 PROCEDURE — 99285 EMERGENCY DEPT VISIT HI MDM: CPT

## 2019-10-26 PROCEDURE — 80048 BASIC METABOLIC PNL TOTAL CA: CPT | Performed by: EMERGENCY MEDICINE

## 2019-10-26 PROCEDURE — 70496 CT ANGIOGRAPHY HEAD: CPT

## 2019-10-26 PROCEDURE — 85730 THROMBOPLASTIN TIME PARTIAL: CPT | Performed by: EMERGENCY MEDICINE

## 2019-10-26 PROCEDURE — 93005 ELECTROCARDIOGRAM TRACING: CPT

## 2019-10-26 PROCEDURE — 82948 REAGENT STRIP/BLOOD GLUCOSE: CPT

## 2019-10-26 PROCEDURE — 85610 PROTHROMBIN TIME: CPT | Performed by: EMERGENCY MEDICINE

## 2019-10-26 PROCEDURE — 99222 1ST HOSP IP/OBS MODERATE 55: CPT | Performed by: INTERNAL MEDICINE

## 2019-10-26 PROCEDURE — 70498 CT ANGIOGRAPHY NECK: CPT

## 2019-10-26 PROCEDURE — 71045 X-RAY EXAM CHEST 1 VIEW: CPT

## 2019-10-26 PROCEDURE — 99291 CRITICAL CARE FIRST HOUR: CPT | Performed by: EMERGENCY MEDICINE

## 2019-10-26 PROCEDURE — 84484 ASSAY OF TROPONIN QUANT: CPT | Performed by: EMERGENCY MEDICINE

## 2019-10-26 PROCEDURE — 85027 COMPLETE CBC AUTOMATED: CPT | Performed by: EMERGENCY MEDICINE

## 2019-10-26 RX ORDER — GABAPENTIN 100 MG/1
100 CAPSULE ORAL 2 TIMES DAILY
COMMUNITY
Start: 2019-10-10 | End: 2020-12-15 | Stop reason: ALTCHOICE

## 2019-10-26 RX ORDER — OXYCODONE HYDROCHLORIDE 5 MG/1
5 TABLET ORAL EVERY 4 HOURS PRN
Status: DISCONTINUED | OUTPATIENT
Start: 2019-10-26 | End: 2019-10-29 | Stop reason: HOSPADM

## 2019-10-26 RX ORDER — FERROUS SULFATE 325(65) MG
325 TABLET ORAL
Status: DISCONTINUED | OUTPATIENT
Start: 2019-10-27 | End: 2019-10-29 | Stop reason: HOSPADM

## 2019-10-26 RX ORDER — ALLOPURINOL 100 MG/1
100 TABLET ORAL DAILY
Status: DISCONTINUED | OUTPATIENT
Start: 2019-10-27 | End: 2019-10-29 | Stop reason: HOSPADM

## 2019-10-26 RX ORDER — ACETAMINOPHEN 325 MG/1
650 TABLET ORAL EVERY 6 HOURS PRN
Status: DISCONTINUED | OUTPATIENT
Start: 2019-10-26 | End: 2019-10-29 | Stop reason: HOSPADM

## 2019-10-26 RX ORDER — OXYCODONE HYDROCHLORIDE 10 MG/1
10 TABLET ORAL EVERY 4 HOURS PRN
Status: DISCONTINUED | OUTPATIENT
Start: 2019-10-26 | End: 2019-10-29 | Stop reason: HOSPADM

## 2019-10-26 RX ORDER — ALLOPURINOL 100 MG/1
100 TABLET ORAL DAILY
Status: ON HOLD | COMMUNITY
Start: 2019-07-22 | End: 2021-01-29

## 2019-10-26 RX ORDER — GABAPENTIN 100 MG/1
100 CAPSULE ORAL 2 TIMES DAILY
Status: DISCONTINUED | OUTPATIENT
Start: 2019-10-26 | End: 2019-10-29 | Stop reason: HOSPADM

## 2019-10-26 RX ORDER — FENTANYL CITRATE 50 UG/ML
50 INJECTION, SOLUTION INTRAMUSCULAR; INTRAVENOUS ONCE
Status: COMPLETED | OUTPATIENT
Start: 2019-10-26 | End: 2019-10-26

## 2019-10-26 RX ORDER — ATORVASTATIN CALCIUM 40 MG/1
40 TABLET, FILM COATED ORAL EVERY EVENING
Status: DISCONTINUED | OUTPATIENT
Start: 2019-10-26 | End: 2019-10-29 | Stop reason: HOSPADM

## 2019-10-26 RX ADMIN — GABAPENTIN 100 MG: 100 CAPSULE ORAL at 20:55

## 2019-10-26 RX ADMIN — ATORVASTATIN CALCIUM 40 MG: 40 TABLET, FILM COATED ORAL at 20:55

## 2019-10-26 RX ADMIN — METOPROLOL TARTRATE 12.5 MG: 25 TABLET ORAL at 20:55

## 2019-10-26 RX ADMIN — FENTANYL CITRATE 50 MCG: 50 INJECTION INTRAMUSCULAR; INTRAVENOUS at 16:29

## 2019-10-26 NOTE — QUICK NOTE
Stroke Alert:  Time of neurology call: 15:06    Patient developed left sided weakness while in the bathroom  He was brought by EMS to ED, where his symptoms improved mildly  Patient has history of A-fib and is on Xeralto  tPA could not be given due to anticoagulation  CT and CTA showed no acute abnormality BP is 140/80  Plan is for admission with stroke work-up

## 2019-10-26 NOTE — LETTER
[facility] UNIT  [address] [place] [address]  Dept: [phone]    October 29, 2019     Patient: Venkat Donald   YOB: 1955   Date of Visit: 10/26/2019       To Whom it May Concern:    Melo Rodas is under my professional care  He was seen in the hospital from 10/26/2019   to 10/29/19  He may return to work on 10/30/19 without limitations  If you have any questions or concerns, please don't hesitate to call           Sincerely,          Damaris Izaguirre PA-C

## 2019-10-26 NOTE — ED PROCEDURE NOTE
PROCEDURE  CriticalCare Time  Performed by: Marisel Alicea DO  Authorized by:  Marisel Alicea DO     Critical care provider statement:     Critical care time (minutes):  33    Critical care time was exclusive of:  Separately billable procedures and treating other patients    Critical care was necessary to treat or prevent imminent or life-threatening deterioration of the following conditions:  CNS failure or compromise    Critical care was time spent personally by me on the following activities:  Obtaining history from patient or surrogate, development of treatment plan with patient or surrogate, discussions with consultants, evaluation of patient's response to treatment, examination of patient, ordering and performing treatments and interventions, ordering and review of laboratory studies, ordering and review of radiographic studies, re-evaluation of patient's condition and review of old charts    I assumed direction of critical care for this patient from another provider in my specialty: no           Marisel Alicea DO  10/26/19 4273

## 2019-10-27 ENCOUNTER — APPOINTMENT (INPATIENT)
Dept: NON INVASIVE DIAGNOSTICS | Facility: HOSPITAL | Age: 64
DRG: 552 | End: 2019-10-27
Payer: COMMERCIAL

## 2019-10-27 PROBLEM — R60.0 BILATERAL LOWER EXTREMITY EDEMA: Status: ACTIVE | Noted: 2019-10-27

## 2019-10-27 LAB
ALBUMIN SERPL BCP-MCNC: 3.1 G/DL (ref 3.5–5)
ALP SERPL-CCNC: 59 U/L (ref 46–116)
ALT SERPL W P-5'-P-CCNC: 19 U/L (ref 12–78)
ANION GAP SERPL CALCULATED.3IONS-SCNC: 7 MMOL/L (ref 4–13)
AST SERPL W P-5'-P-CCNC: 19 U/L (ref 5–45)
BASOPHILS # BLD AUTO: 0.03 THOUSANDS/ΜL (ref 0–0.1)
BASOPHILS NFR BLD AUTO: 1 % (ref 0–1)
BILIRUB SERPL-MCNC: 0.6 MG/DL (ref 0.2–1)
BUN SERPL-MCNC: 16 MG/DL (ref 5–25)
CALCIUM SERPL-MCNC: 8.6 MG/DL (ref 8.3–10.1)
CHLORIDE SERPL-SCNC: 107 MMOL/L (ref 100–108)
CHOLEST SERPL-MCNC: 164 MG/DL (ref 50–200)
CO2 SERPL-SCNC: 28 MMOL/L (ref 21–32)
CREAT SERPL-MCNC: 0.67 MG/DL (ref 0.6–1.3)
CRP SERPL HS-MCNC: 2.85 MG/L
EOSINOPHIL # BLD AUTO: 0.12 THOUSAND/ΜL (ref 0–0.61)
EOSINOPHIL NFR BLD AUTO: 2 % (ref 0–6)
ERYTHROCYTE [DISTWIDTH] IN BLOOD BY AUTOMATED COUNT: 14.6 % (ref 11.6–15.1)
EST. AVERAGE GLUCOSE BLD GHB EST-MCNC: 117 MG/DL
GFR SERPL CREATININE-BSD FRML MDRD: 101 ML/MIN/1.73SQ M
GLUCOSE SERPL-MCNC: 91 MG/DL (ref 65–140)
HBA1C MFR BLD: 5.7 % (ref 4.2–6.3)
HCT VFR BLD AUTO: 36.3 % (ref 36.5–49.3)
HCV AB SER QL: NORMAL
HCYS SERPL-SCNC: 6.8 UMOL/L (ref 5.3–14.2)
HDLC SERPL-MCNC: 55 MG/DL
HGB BLD-MCNC: 12 G/DL (ref 12–17)
IMM GRANULOCYTES # BLD AUTO: 0.01 THOUSAND/UL (ref 0–0.2)
IMM GRANULOCYTES NFR BLD AUTO: 0 % (ref 0–2)
LACTATE SERPL-SCNC: 0.8 MMOL/L (ref 0.5–2)
LDLC SERPL CALC-MCNC: 90 MG/DL (ref 0–100)
LYMPHOCYTES # BLD AUTO: 1.07 THOUSANDS/ΜL (ref 0.6–4.47)
LYMPHOCYTES NFR BLD AUTO: 20 % (ref 14–44)
MAGNESIUM SERPL-MCNC: 2 MG/DL (ref 1.6–2.6)
MCH RBC QN AUTO: 30.5 PG (ref 26.8–34.3)
MCHC RBC AUTO-ENTMCNC: 33.1 G/DL (ref 31.4–37.4)
MCV RBC AUTO: 92 FL (ref 82–98)
MONOCYTES # BLD AUTO: 0.37 THOUSAND/ΜL (ref 0.17–1.22)
MONOCYTES NFR BLD AUTO: 7 % (ref 4–12)
NEUTROPHILS # BLD AUTO: 3.79 THOUSANDS/ΜL (ref 1.85–7.62)
NEUTS SEG NFR BLD AUTO: 70 % (ref 43–75)
NRBC BLD AUTO-RTO: 0 /100 WBCS
PHOSPHATE SERPL-MCNC: 3 MG/DL (ref 2.3–4.1)
PLATELET # BLD AUTO: 133 THOUSANDS/UL (ref 149–390)
PMV BLD AUTO: 9.8 FL (ref 8.9–12.7)
POTASSIUM SERPL-SCNC: 4.1 MMOL/L (ref 3.5–5.3)
PROCALCITONIN SERPL-MCNC: <0.05 NG/ML
PROT SERPL-MCNC: 6.6 G/DL (ref 6.4–8.2)
RBC # BLD AUTO: 3.93 MILLION/UL (ref 3.88–5.62)
SODIUM SERPL-SCNC: 142 MMOL/L (ref 136–145)
TRIGL SERPL-MCNC: 93 MG/DL
TROPONIN I SERPL-MCNC: <0.02 NG/ML
TSH SERPL DL<=0.05 MIU/L-ACNC: 3.18 UIU/ML (ref 0.36–3.74)
WBC # BLD AUTO: 5.39 THOUSAND/UL (ref 4.31–10.16)

## 2019-10-27 PROCEDURE — 86803 HEPATITIS C AB TEST: CPT | Performed by: INTERNAL MEDICINE

## 2019-10-27 PROCEDURE — 93970 EXTREMITY STUDY: CPT

## 2019-10-27 PROCEDURE — 80061 LIPID PANEL: CPT | Performed by: INTERNAL MEDICINE

## 2019-10-27 PROCEDURE — G8980 MOBILITY D/C STATUS: HCPCS | Performed by: PHYSICAL THERAPIST

## 2019-10-27 PROCEDURE — 99232 SBSQ HOSP IP/OBS MODERATE 35: CPT | Performed by: INTERNAL MEDICINE

## 2019-10-27 PROCEDURE — 80053 COMPREHEN METABOLIC PANEL: CPT | Performed by: INTERNAL MEDICINE

## 2019-10-27 PROCEDURE — 86141 C-REACTIVE PROTEIN HS: CPT | Performed by: INTERNAL MEDICINE

## 2019-10-27 PROCEDURE — 85025 COMPLETE CBC W/AUTO DIFF WBC: CPT | Performed by: INTERNAL MEDICINE

## 2019-10-27 PROCEDURE — 83090 ASSAY OF HOMOCYSTEINE: CPT | Performed by: INTERNAL MEDICINE

## 2019-10-27 PROCEDURE — G8979 MOBILITY GOAL STATUS: HCPCS | Performed by: PHYSICAL THERAPIST

## 2019-10-27 PROCEDURE — 83735 ASSAY OF MAGNESIUM: CPT | Performed by: INTERNAL MEDICINE

## 2019-10-27 PROCEDURE — 84484 ASSAY OF TROPONIN QUANT: CPT | Performed by: INTERNAL MEDICINE

## 2019-10-27 PROCEDURE — 97162 PT EVAL MOD COMPLEX 30 MIN: CPT | Performed by: PHYSICAL THERAPIST

## 2019-10-27 PROCEDURE — G8978 MOBILITY CURRENT STATUS: HCPCS | Performed by: PHYSICAL THERAPIST

## 2019-10-27 PROCEDURE — 92610 EVALUATE SWALLOWING FUNCTION: CPT

## 2019-10-27 PROCEDURE — 83036 HEMOGLOBIN GLYCOSYLATED A1C: CPT | Performed by: INTERNAL MEDICINE

## 2019-10-27 PROCEDURE — 83605 ASSAY OF LACTIC ACID: CPT | Performed by: INTERNAL MEDICINE

## 2019-10-27 PROCEDURE — 84145 PROCALCITONIN (PCT): CPT | Performed by: INTERNAL MEDICINE

## 2019-10-27 PROCEDURE — 84443 ASSAY THYROID STIM HORMONE: CPT | Performed by: INTERNAL MEDICINE

## 2019-10-27 PROCEDURE — 84100 ASSAY OF PHOSPHORUS: CPT | Performed by: INTERNAL MEDICINE

## 2019-10-27 RX ADMIN — RIVAROXABAN 20 MG: 10 TABLET, FILM COATED ORAL at 09:38

## 2019-10-27 RX ADMIN — METOPROLOL TARTRATE 12.5 MG: 25 TABLET ORAL at 09:39

## 2019-10-27 RX ADMIN — GABAPENTIN 100 MG: 100 CAPSULE ORAL at 09:38

## 2019-10-27 RX ADMIN — ATORVASTATIN CALCIUM 40 MG: 40 TABLET, FILM COATED ORAL at 17:43

## 2019-10-27 RX ADMIN — ALLOPURINOL 100 MG: 100 TABLET ORAL at 09:38

## 2019-10-27 RX ADMIN — MULTIPLE VITAMINS W/ MINERALS TAB 1 TABLET: TAB at 09:39

## 2019-10-27 RX ADMIN — METOPROLOL TARTRATE 12.5 MG: 25 TABLET ORAL at 17:44

## 2019-10-27 RX ADMIN — FERROUS SULFATE TAB 325 MG (65 MG ELEMENTAL FE) 325 MG: 325 (65 FE) TAB at 07:52

## 2019-10-27 RX ADMIN — GABAPENTIN 100 MG: 100 CAPSULE ORAL at 17:44

## 2019-10-27 NOTE — PHYSICAL THERAPY NOTE
PHYSICAL THERAPY NOTE         9:36-9:55    Patient Name: Lynn TREVIÑO Date: 10/27/2019       10/27/19 1300   Home Living   Type of [address] Two level   Prior Function   Level of [place] Independent with ADLs and functional mobility   Lives With Spouse   ADL Assistance Independent   IADLs Independent   Falls in the last 6 months 0   Vocational Full time employment   Comments Works at retirement in [employer]   Family/Caregiver Present No   Cognition   Overall Cognitive Status WFL   Arousal/Participation Alert   Orientation Level Oriented X4   RUE Assessment   RUE Assessment WFL   LUE Assessment   LUE Assessment WFL   RLE Assessment   RLE Assessment WFL   LLE Assessment   LLE Assessment WFL   Coordination   Movements are Fluid and Coordinated 1   Sensation WFL   Bed Mobility   Supine to Sit 7  Independent   Sit to Supine 7  Independent   Transfers   Sit to Stand 7  Independent   Stand to Sit 7  Independent   Stand pivot 7  Independent   Ambulation/Elevation   Gait pattern WNL   Gait Assistance 7  Independent   Distance 300 ft   Balance   Static Sitting Normal   Dynamic Sitting Normal   Static Standing Normal   Dynamic Standing Normal   Ambulatory Normal   Endurance Deficit   Endurance Deficit No   Activity Tolerance   Activity Tolerance Patient tolerated treatment well   Assessment   Prognosis Excellent   Assessment Pt is 71 y o  male seen for PT evaluation on 10/27/19 s/p admit to [facility] on 10/26/19 w/ weakness or right UE  PT consulted to assess pt's functional mobility and d/c needs  Order placed for PT eval and tx  Performed at least 2 patient identifiers during session: Name and wristband  Comorbidities affecting pt's physical performance at time of assessment include: neck pain and UE weakness  LOF prior to admission was independent and working full time   Personal factors affecting pt at time of IE include: bilateral LE lymphedema, a-fib, ROBERT  Cary Ordonez Please find objective findings from PT assessment regarding body systems outlined above with impairments and limitations including weakness, impaired balance, decreased endurance, pain, decreased activity tolerance and fall risk, as well as mobility assessment  Pt's clinical presentation is currently unstable/unpredictable seen in pt's presentation of ongoing medical assessment  Given co-morbidities and presented presentation, mod level eval was completed  Pt to benefit from continued PT tx to address deficits as defined above and maximize level of functional independent mobility and consistency  From PT/mobility standpoint, recommendation at time of d/c would be outpatient PT in order to promote return to PLOF and independence  Goals   Patient Goals To go home and get back to work   Plan   Treatment/Interventions Endurance training; Therapeutic exercise   PT Frequency One time visit   Recommendation   Recommendation Outpatient PT   PT - OK to Discharge Yes  (When medically stable)     Pt without SCD's on when PT entered room  No SCD's applied, patient declines, patient prepping for shower  RN and PCA made aware, call bell in reach

## 2019-10-27 NOTE — ASSESSMENT & PLAN NOTE
· Continue anticoagulation with xarelto  · Continue PO metoprolol for heart rate control  · Outpatient follow-up with Cardiology

## 2019-10-27 NOTE — ASSESSMENT & PLAN NOTE
· Lifestyle modifications are recommended including weight loss, improving his diet, and increasing his amount of activity

## 2019-10-27 NOTE — ASSESSMENT & PLAN NOTE
· Mild thrombocytopenia  · Monitor for any signs of bleed  · Follow the platelet count  · Outpatient Hematology/Oncology evaluation

## 2019-10-27 NOTE — ASSESSMENT & PLAN NOTE
· No signs of infection  · Check a CBC with differential on 10/27/2019 to evaluate the neutrophil count

## 2019-10-27 NOTE — ASSESSMENT & PLAN NOTE
· Initiate the stroke pathway  · No evidence of hemodynamic significant stenosis, aneurysm, or dissection on CTA of the head and neck with and without contrast  · Concern for cervical spine pathology  · Check an MRI of the brain without contrast and MRI of the cervical spine without contrast  · Check a transthoracic echocardiogram  · Continue xarelto 20 mg PO Qdaily  · Initiate high-intensity statin therapy with atorvastatin 40 mg PO Qdaily with dinner  · Consult Neurology  · PT/OT

## 2019-10-27 NOTE — ASSESSMENT & PLAN NOTE
· Check an MRI of the cervical spine without contrast in the setting of bilateral upper extremity weakness and paresthesias  · PT/OT

## 2019-10-27 NOTE — PROGRESS NOTES
Progress Note - Ibis Gave 1955, 71 y o  male MRN: [id number]    Unit/Bed#: 418-01 Encounter: [id number]    Primary Care Provider: Francisco Marie MD   Date and time admitted to hospital: 10/26/2019  3:16 PM        * Weakness of right upper extremity  Assessment & Plan  · Continue the stroke pathway  · No evidence of hemodynamic significant stenosis, aneurysm, or dissection on CTA of the head and neck with and without contrast  · Concern for cervical spine pathology with the feeling of almost total body paralysis initially after the onset of severe cervical neck pain  · Check an MRI of the brain without contrast and MRI of the cervical spine without contrast  · Check a transthoracic echocardiogram  · Continue xarelto 20 mg PO Qdaily  · The patient was initiated on high-intensity statin therapy with atorvastatin 40 mg PO Qdaily with dinner  · Consult Neurology  · PT/OT    Cervical pain (neck)  Assessment & Plan  · Check an MRI of the cervical spine without contrast in the setting of bilateral upper extremity weakness and paresthesias  · PT/OT    Paresthesias  Assessment & Plan  · Continue the stroke pathway  · No evidence of hemodynamic significant stenosis, aneurysm, or dissection on CTA of the head and neck with and without contrast  · Concern for cervical spine pathology with the feeling of almost total body paralysis initially after the onset of severe cervical neck pain  · Check an MRI of the brain without contrast and MRI of the cervical spine without contrast  · Check a transthoracic echocardiogram  · Continue xarelto 20 mg PO Qdaily  · The patient was initiated on high-intensity statin therapy with atorvastatin 40 mg PO Qdaily with dinner  · Consult Neurology  · PT/OT    Bilateral lower extremity edema  Assessment & Plan  · Chronic lymphedema, which has worsened last 1-2 weeks  · Check a transthoracic echocardiogram  · Check a venous duplex of the bilateral lower extremities  · Continue outpatient treatment with the lymphedema clinic    Thrombocytopenia ([facility] [address] )  Assessment & Plan  · Mild thrombocytopenia  · Monitor for any signs of bleed  · Follow the platelet count  · Outpatient Hematology/Oncology evaluation    Morbid obesity with BMI of 40 0-44 9, adult (HCC)  Assessment & Plan  · Lifestyle modifications are recommended including weight loss, improving his diet, and increasing his amount of activity  Leukopenia  Assessment & Plan  · The leukopenia has resolved  · No signs of infection  · Follow the CBC    Obstructive sleep apnea on CPAP  Assessment & Plan  · Continue CPAP QHS and anytime while sleeping      Atrial fibrillation (HCC)  Assessment & Plan  · Continue anticoagulation with xarelto  · Continue PO metoprolol for heart rate control  · Outpatient follow-up with Cardiology      VTE Pharmacologic Prophylaxis:   Pharmacologic: Rivaroxaban (Xarelto)  Mechanical VTE Prophylaxis in Place: Yes    Patient Centered Rounds: I have performed bedside rounds with nursing staff today  Time Spent for Care: 20 minutes  More than 50% of total time spent on counseling and coordination of care as described above  Current Length of Stay: 1 day(s)    Current Patient Status: Inpatient   Certification Statement: The patient will continue to require additional inpatient hospital stay due to the need for an MRI of the brain, for an MRI of the cervical spine, for a transthoracic echocardiogram, and for a Neurology consultation  Code Status: Level 1 - Full Code      Subjective: The patient was seen and examined  The patient is doing better  The patient still complains of mild right upper extremity weakness  The cervical neck pain has resolved  The paresthesias have resolved  No chest pain  No shortness of breath  No abdominal pain  No nausea or vomiting        Objective:     Vitals:   Temp (24hrs), Av 3 °F (36 8 °C), Min:98 °F (36 7 °C), Max:98 6 °F (37 °C)    Temp:  [98 °F (36 7 °C)-98 6 °F (37 °C)] 98 3 °F (36 8 °C)  HR:  [] 84  Resp:  [13-22] 18  BP: (116-161)/() 131/81  SpO2:  [93 %-100 %] 96 %  Body mass index is 44 7 kg/m²  Input and Output Summary (last 24 hours): Intake/Output Summary (Last 24 hours) at 10/27/2019 1203  Last data filed at 10/27/2019 0957  Gross per 24 hour   Intake 480 ml   Output    Net 480 ml       Physical Exam:     Physical Exam  General:  NAD, awake, alert, follows commands  HEENT:  NC/AT, mucous membranes moist  Neck:  Supple, No JVP elevation  CV:  + S1, + S2, RRR  Pulm:  Lung fields are CTA bilaterally  Abd:  Soft, Non-tender, Non-distended  Ext:  No clubbing/cyanosis/edema  Skin:  No rashes  Neuro:  Decreased muscle strength involving the right upper extremity      Additional Data:    Labs:    Results from last 7 days   Lab Units 10/27/19  0649   WBC Thousand/uL 5 39   HEMOGLOBIN g/dL 12 0   HEMATOCRIT % 36 3*   PLATELETS Thousands/uL 133*   NEUTROS PCT % 70   LYMPHS PCT % 20   MONOS PCT % 7   EOS PCT % 2     Results from last 7 days   Lab Units 10/27/19  0649   SODIUM mmol/L 142   POTASSIUM mmol/L 4 1   CHLORIDE mmol/L 107   CO2 mmol/L 28   BUN mg/dL 16   CREATININE mg/dL 0 67   ANION GAP mmol/L 7   CALCIUM mg/dL 8 6   ALBUMIN g/dL 3 1*   TOTAL BILIRUBIN mg/dL 0 60   ALK PHOS U/L 59   ALT U/L 19   AST U/L 19   GLUCOSE RANDOM mg/dL 91     Results from last 7 days   Lab Units 10/26/19  1522   INR  1 18     Results from last 7 days   Lab Units 10/26/19  1617   POC GLUCOSE mg/dl 102         Results from last 7 days   Lab Units 10/27/19  0649   LACTIC ACID mmol/L 0 8           * I Have Reviewed All Lab Data Listed Above  * Additional Pertinent Lab Tests Reviewed:  [address] Admission Reviewed      Recent Cultures (last 7 days):           Last 24 Hours Medication List:     Current Facility-Administered Medications:  acetaminophen 650 mg Oral Q6H PRN Hernandez Ort, DO   allopurinol 100 mg Oral Daily Hernandez Ort, DO atorvastatin 40 mg Oral QPM [facility], [facility]   ferrous sulfate 325 mg Oral Daily With Breakfast Baptist Keith, DO   gabapentin 100 mg Oral BID Temple Ledyard, DO   iohexol 100 mL Intravenous Once in [address], DO   metoprolol tartrate 12 5 mg Oral BID Temple Keith, DO   multivitamin-minerals 1 tablet Oral Daily [facility], DO   oxyCODONE 5 mg Oral Q4H PRN Temple Ledyard, DO   Or       oxyCODONE 10 mg Oral Q4H PRN Temple Keith, DO   pneumococcal 23-valent polysaccharide vaccine 0 5 mL Subcutaneous Prior to discharge Baptist Keith, DO   rivaroxaban 20 mg Oral Daily Temple Keith, DO        Today, Patient Was Seen By: Emerosn Parker,     ** Please Note: Dictation voice to text software may have been used in the creation of this document   **

## 2019-10-27 NOTE — RESPIRATORY THERAPY NOTE
Patient stated he does not use his PAP therapy at home and he is declining to use it here at the hospital, his nurse was informed

## 2019-10-27 NOTE — ASSESSMENT & PLAN NOTE
· Admit to med/surg level of care  · Initiate the stroke pathway  · No evidence of hemodynamic significant stenosis, aneurysm, or dissection on CTA of the head and neck with and without contrast  · Concern for cervical spine pathology  · Check an MRI of the brain without contrast and MRI of the cervical spine without contrast  · Check a transthoracic echocardiogram  · Continue xarelto 20 mg PO Qdaily  · Initiate high-intensity statin therapy with atorvastatin 40 mg PO Qdaily with dinner  · Consult Neurology  · PT/OT

## 2019-10-27 NOTE — SPEECH THERAPY NOTE
Speech Language/Pathology  Speech/Language Pathology  Assessment    Patient Name: Chirag STERN Date: 10/27/2019     Problem List  Principal Problem:    Weakness of right upper extremity  Active Problems:    Atrial fibrillation (HCC)    Obstructive sleep apnea on CPAP    Leukopenia    Paresthesias    Cervical pain (neck)    Morbid obesity with BMI of 40 0-44 9, adult (HCC)    Thrombocytopenia (HCC)    Bilateral lower extremity edema    Past Medical History  Past Medical History:   Diagnosis Date    A-fib ([address] )     Arthritis     CPAP (continuous positive airway pressure) dependence     Irregular heart beat     Lymphedema     Sleep apnea     Urinary frequency     Wears glasses     Wears partial dentures     lower partial     Past Surgical History  Past Surgical History:   Procedure Laterality Date    ABLATION SAPHENOUS VEIN W/ RFA      COLONOSCOPY      MN CYSTOURETHRO W/IMPLANT N/A 11/13/2017    Procedure: CYSTOSCOPY WITH INSERTION UROLIFT;  Surgeon: Homer Whitlock DO;  Location: [facility];  Service: Urology    TONSILLECTOMY              Bedside Swallow Evaluation:    Summary:  Pt presents w/ WFL oral and pharyngeal stages of swallowing  No overt s/s of aspiration were noted across all trials of solids and liquids provided  No oral residuals, mastication is noted to be WFL, and overall strength and coordination of swallowing is felt to be [place]  No further dysphagia tx is warranted at this time  Reviewed s/s of aspiration to continue to monitor for as well as general safe swallowing strategies  Dysphagia evaluation orders were placed after pt admitted to acute care facility due to weakness      Per MD notes on 10/27/19- * Weakness of right upper extremity  Assessment & Plan  · Continue the stroke pathway  · No evidence of hemodynamic significant stenosis, aneurysm, or dissection on CTA of the head and neck with and without contrast  · Concern for cervical spine pathology with the feeling of almost total body paralysis initially after the onset of severe cervical neck pain  · Check an MRI of the brain without contrast and MRI of the cervical spine without contrast  · Check a transthoracic echocardiogram  · Continue xarelto 20 mg PO Qdaily  · The patient was initiated on high-intensity statin therapy with atorvastatin 40 mg PO Qdaily with dinner  · Consult Neurology  PT/OT    Recommendations:  Diet: Regular texture  Liquid:thin liquids  Meds:As best tolerated  Supervision:Distant  Positioning:Upright  Strategies: Pt to take PO/Meds only when fully alert and upright  Aspiration precautions      Therapy Prognosis: Excellent  Frequency:No further dysphagia therapy is warranted  Eval only, No f/u tx indicated  Patient's goal: None stated  Reason for consult:  R/o aspiration  Determine safest and least restrictive diet  New neuro event      Current diet:Regular/thin liquids    Premorbid diet: Regular texture/thin liquids    Previous VBS:None noted in EPIC    O2 requirement:N/A    Voice/Speech:WFL    Social:WFL    Follows commands:WFL                          Cognitive Status:WFL    Oral [facility] exam:  Dentition:upper and lower partial noted, worn without difficulty  Labial strength and ROM:WFL  Lingual strength and ROM:WFL  Mandibular strength and ROM:WFL  Velum:WFL  Secretion management:WFL  Volitional cough:WFL  Volitional swallow:WFL      Items administered:  Puree, hard solid, thin liquids  Liquids were taken by straw  Oral stage:  Lip closure:WFL  Mastication:WFL  Bolus formation:WFL  Bolus control:WFL  Transfer:WFL, timely  Oral residue:None noted  Pocketing:None noted  Pharyngeal stage:  Swallow promptness:WFL  Laryngeal rise:WFL  Wet voice:Not noted  Throat clear:not noted  Cough:not noted  Secondary swallows:none noted  Audible swallows:none noted    No overt s/s aspiration    Esophageal stage:  No s/s reported      Results d/w:  Pt, nursing    Goal(s):  Pt will tolerate least restrictive diet w/out s/s aspiration or oral/pharyngeal difficulties

## 2019-10-27 NOTE — ED PROCEDURE NOTE
PROCEDURE  ECG 12 Lead Documentation Only  Date/Time: 10/26/2019 3:48 PM  Performed by: Smita Deras DO  Authorized by:  Smita Deras DO     Indications / Diagnosis:  Stroke  ECG reviewed by me, the ED Provider: yes    Patient location:  ED  Interpretation:     Interpretation: abnormal    Rate:     ECG rate:  95    ECG rate assessment: normal    Rhythm:     Rhythm: sinus rhythm    Ectopy:     Ectopy: none    Conduction:     Conduction: abnormal      Abnormal conduction: bifascicular block    ST segments:     ST segments:  Non-specific  T waves:     T waves: non-specific           Smita Dersa DO  10/26/19 2039

## 2019-10-27 NOTE — ED PROVIDER NOTES
History  Chief Complaint   Patient presents with    CVA/TIA-like Symptoms     Patient went to the bathroom and while on the toilet he started having bilateral weakness, espcially on the left  He couldnt use his hand or his leg on that side  Patient presents via EMS as a pre-hospital stroke alert after he had sudden onset posterior neck and bilateral shoulder pain leading to left-sided paralysis while he was sitting on a toilet in the bathroom at a restaurant prior to arrival   On route to the ED, he began to regain some movement of his finger tips and toes but still had significant weakness  Upon arrival to the ED, continued to complain of neck pain  He had no speech deficits or other focal deficits upon arrival   He denies h/o stroke or similar symptoms  He went straight to CT for CT/CTA of the head and neck  He denies any other preceding symptoms  He was feeling in his normal state of health at the restaurant  Denies preceding f/c, HA, LH/dizziness, diaphoresis, CP, SOB, abdominal pain, n/v/d  12 system ROS o/w negative                   History provided by:  Medical records, EMS personnel and patient  CVA/TIA-like Symptoms   Presenting symptoms: weakness (LUE, LLE)    Presenting symptoms: no change in level of consciousness, no confusion, no headaches, no disturbances in coordination, no language symptoms, no loss of balance, no focal sensory loss and no visual change    Last known well instant: immediately prior to symptim onset, prior to arrival   Onset quality:  Sudden  Duration:  30 minutes  Timing:  Constant  Progression:  Improving  Similar to previous episodes: no    Associated symptoms: neck pain (posterior)    Associated symptoms: no chest pain, no trouble swallowing, no dizziness, no facial pain, no fall, no fever, no hearing loss, no bladder incontinence, no nausea, no paresthesias, no seizures, no tinnitus, no vertigo and no vomiting        Prior to Admission Medications   Prescriptions Last Dose Informant Patient Reported? Taking?    CoenzymeQ10-Isoleucine-Glycine (CO Q-10) 100-50-25 MG TB24 10/26/2019 at 0330  Yes Yes   Sig: Co Q-10   GARCINIA CAMBOGIA-CHROMIUM PO 10/26/2019 at 0330 Self Yes Yes   Sig: Take 750 mg by mouth 2 (two) times a day    Green Coffee Silva-Yerba Mate (GREEN COFFEE BEAN EXTRACT PO) 10/26/2019 at 0330 Self Yes Yes   Sig: Take 800 mg by mouth daily    L-ARGININE-500 PO 10/26/2019 at 0330 Self Yes Yes   Sig: Take 500 mg by mouth 2 (two) times a day    Multiple Vitamins-Minerals (OCUVITE EXTRA PO) 10/26/2019 at 0330 Self Yes Yes   Sig: Take 1 tablet by mouth daily     RASPBERRY KETONES PO 10/26/2019 at 0330 Self Yes Yes   Sig: Take 100 mg by mouth 2 (two) times a day   Turmeric Curcumin 500 MG CAPS 10/26/2019 at 0330 Self Yes Yes   Sig: Take 500 mg by mouth daily   allopurinol (ZYLOPRIM) 100 mg tablet 10/26/2019 at 0330  Yes Yes   Sig: Take 100 mg by mouth daily   ammonium lactate (AMLACTIN) 12 % lotion 10/26/2019 at 0300 Self Yes Yes   Sig: Apply topically 2 (two) times a day as needed for dry skin   ferrous sulfate 325 (65 Fe) mg tablet 10/26/2019 at 0330 Self Yes Yes   Sig: Take 325 mg by mouth every other day   furosemide (LASIX) 20 mg tablet 10/26/2019 at 0400 Self Yes Yes   Sig: Take 20 mg by mouth daily   gabapentin (NEURONTIN) 100 mg capsule 10/26/2019 at 0330  Yes Yes   Sig: Take 100 mg by mouth 2 (two) times a day   metoprolol tartrate (LOPRESSOR) 12 5 mg tablet 10/26/2019 at 0330 Self Yes Yes   Sig: Take 12 5 mg by mouth 2 (two) times a day   patient supplied medication 10/26/2019 at 0330 Self Yes Yes   Sig: Take 1 each by mouth 2 (two) times a day   patient supplied medication 10/25/2019 at 2100 Self Yes Yes   Sig: Take 1 each by mouth 2 (two) times a day   rivaroxaban (XARELTO) 20 mg tablet 10/25/2019 at 2100 Self Yes Yes   Sig: Take 20 mg by mouth daily   tadalafil (CIALIS) 5 MG tablet 10/25/2019 at 2100 Self Yes Yes   Sig: Take 5 mg by mouth daily as needed for erectile dysfunction      Facility-Administered Medications: None       Past Medical History:   Diagnosis Date    A-fib ([address] )     Arthritis     CPAP (continuous positive airway pressure) dependence     Irregular heart beat     Lymphedema     Sleep apnea     Urinary frequency     Wears glasses     Wears partial dentures     lower partial       Past Surgical History:   Procedure Laterality Date    ABLATION SAPHENOUS VEIN W/ RFA      COLONOSCOPY      DC CYSTOURETHRO W/IMPLANT N/A 11/13/2017    Procedure: CYSTOSCOPY WITH INSERTION UROLIFT;  Surgeon: Dayanna Bautista DO;  Location: [facility];  Service: Urology    TONSILLECTOMY         History reviewed  No pertinent family history  I have reviewed and agree with the history as documented  Social History     Tobacco Use    Smoking status: Never Smoker    Smokeless tobacco: Never Used   Substance Use Topics    Alcohol use: Yes     Alcohol/week: 4 0 standard drinks     Types: 4 Cans of beer per week     Frequency: 2-3 times a week     Binge frequency: Never     Comment: socially    Drug use: No        Review of Systems   Constitutional: Negative for chills, diaphoresis and fever  HENT: Negative for hearing loss, rhinorrhea, sore throat, tinnitus and trouble swallowing  Respiratory: Negative for cough, chest tightness, shortness of breath and wheezing  Cardiovascular: Negative for chest pain, palpitations and leg swelling  Gastrointestinal: Negative for abdominal distention, abdominal pain, constipation, diarrhea, nausea and vomiting  Genitourinary: Negative for bladder incontinence, difficulty urinating, dysuria, flank pain, frequency, hematuria and urgency  Musculoskeletal: Positive for arthralgias (bilateral posterior shoulder pain), gait problem (due to sudden left sided paralysis) and neck pain (posterior)  Negative for back pain, myalgias and neck stiffness  Skin: Negative for pallor and rash     Neurological: Positive for weakness (LUE, LLE)  Negative for dizziness, vertigo, tremors, seizures, syncope, facial asymmetry, speech difficulty, light-headedness, numbness, headaches, disturbances in coordination, paresthesias and loss of balance  Hematological: Negative for adenopathy  Psychiatric/Behavioral: Negative for confusion  The patient is not nervous/anxious  All other systems reviewed and are negative  Physical Exam  Physical Exam   Constitutional: He is oriented to person, place, and time  He appears well-developed and well-nourished  No distress  HENT:   Head: Normocephalic and atraumatic  Right Ear: External ear normal    Left Ear: External ear normal    Nose: Nose normal    Mouth/Throat: Oropharynx is clear and moist  No oropharyngeal exudate  Eyes: Pupils are equal, round, and reactive to light  Conjunctivae and EOM are normal  No scleral icterus  Neck: Normal range of motion  Neck supple  Cardiovascular: Normal rate and regular rhythm  No murmur heard  Pulmonary/Chest: Effort normal and breath sounds normal  No respiratory distress  He exhibits no tenderness  Abdominal: Soft  Bowel sounds are normal  He exhibits no distension  There is no tenderness  Musculoskeletal: He exhibits no edema or tenderness  Able to wiggle left fingers and toes and able to contract larger, proximal muscles but unable to raise LUE or LLE off of bed  Lymphadenopathy:     He has no cervical adenopathy  Neurological: He is alert and oriented to person, place, and time  He has normal reflexes  He displays normal reflexes  No cranial nerve deficit or sensory deficit  He exhibits abnormal muscle tone (LUE, LLE)  Coordination (LUE, LLE) abnormal    Skin: Skin is warm and dry  No rash noted  He is not diaphoretic  No erythema  No pallor  Psychiatric: He has a normal mood and affect  His behavior is normal  Thought content normal    Vitals reviewed        Vital Signs  ED Triage Vitals   Temperature Pulse Respirations Blood Pressure SpO2   10/26/19 1734 10/26/19 1530 10/26/19 1530 10/26/19 1530 10/26/19 1500   98 6 °F (37 °C) 103 22 142/68 100 %      Temp Source Heart Rate Source Patient Position - Orthostatic VS BP Location FiO2 (%)   10/26/19 1734 10/26/19 1530 10/26/19 1530 10/26/19 1542 --   Oral Monitor Sitting Left arm       Pain Score       10/26/19 1542       8           Vitals:    10/27/19 0028 10/27/19 0223 10/27/19 0735 10/27/19 1528   BP: 124/74 135/85 131/81 137/83   Pulse: 89 86 84 89   Patient Position - Orthostatic VS: Lying Lying  Lying         Visual Acuity  Visual Acuity      Most Recent Value   L Pupil Size (mm)  3   R Pupil Size (mm)  3   L Pupil Shape  Round   R Pupil Shape  Round          ED Medications  Medications   iohexol (OMNIPAQUE) 350 MG/ML injection (SINGLE-DOSE) 100 mL (has no administration in time range)   pneumococcal 23-valent polysaccharide vaccine (PNEUMOVAX-23) injection 0 5 mL (has no administration in time range)   atorvastatin (LIPITOR) tablet 40 mg (40 mg Oral Given 10/26/19 2055)   allopurinol (ZYLOPRIM) tablet 100 mg (100 mg Oral Given 10/27/19 0938)   ferrous sulfate tablet 325 mg (325 mg Oral Given 10/27/19 0752)   gabapentin (NEURONTIN) capsule 100 mg (100 mg Oral Given 10/27/19 0938)   metoprolol tartrate (LOPRESSOR) partial tablet 12 5 mg (12 5 mg Oral Given 10/27/19 0939)   multivitamin-minerals (CENTRUM) tablet 1 tablet (1 tablet Oral Given 10/27/19 0939)   rivaroxaban (XARELTO) tablet 20 mg (20 mg Oral Given 10/27/19 9824)   acetaminophen (TYLENOL) tablet 650 mg (has no administration in time range)   oxyCODONE (ROXICODONE) IR tablet 5 mg (has no administration in time range)     Or   oxyCODONE (ROXICODONE) immediate release tablet 10 mg (has no administration in time range)   fentanyl citrate (PF) 100 MCG/2ML 50 mcg (50 mcg Intravenous Given 10/26/19 1629)       Diagnostic Studies  Results Reviewed     Procedure Component Value Units Date/Time    Fingerstick Glucose (POCT) [[id number]]  (Normal) Collected:  10/26/19 1617    Lab Status:  Final result Updated:  10/26/19 1619     POC Glucose 102 mg/dl     Troponin I [[id number]]  (Normal) Collected:  10/26/19 1522    Lab Status:  Final result Specimen:  Blood from Arm, Right Updated:  10/26/19 1547     Troponin I <0 02 ng/mL     Basic metabolic panel [[id number]]  (Abnormal) Collected:  10/26/19 1522    Lab Status:  Final result Specimen:  Blood from Arm, Right Updated:  10/26/19 1543     Sodium 139 mmol/L      Potassium 4 3 mmol/L      Chloride 105 mmol/L      CO2 26 mmol/L      ANION GAP 8 mmol/L      BUN 30 mg/dL      Creatinine 0 75 mg/dL      Glucose 105 mg/dL      Calcium 9 2 mg/dL      eGFR 96 ml/min/1 73sq m     Narrative:       [facility] guidelines for Chronic Kidney Disease (CKD):     Stage 1 with normal or high GFR (GFR > 90 mL/min/1 73 square meters)    Stage 2 Mild CKD (GFR = 60-89 mL/min/1 73 square meters)    Stage 3A Moderate CKD (GFR = 45-59 mL/min/1 73 square meters)    Stage 3B Moderate CKD (GFR = 30-44 mL/min/1 73 square meters)    Stage 4 Severe CKD (GFR = 15-29 mL/min/1 73 square meters)    Stage 5 End Stage CKD (GFR <15 mL/min/1 73 square meters)  Note: GFR calculation is accurate only with a steady state creatinine    Protime-INR [[id number]]  (Abnormal) Collected:  10/26/19 1522    Lab Status:  Final result Specimen:  Blood from Arm, Right Updated:  10/26/19 1540     Protime 15 1 seconds      INR 1 18    APTT [[id number]]  (Normal) Collected:  10/26/19 1522    Lab Status:  Final result Specimen:  Blood from Arm, Right Updated:  10/26/19 1540     PTT 32 seconds     CBC and Platelet [[id number]]  (Abnormal) Collected:  10/26/19 1522    Lab Status:  Final result Specimen:  Blood from Arm, Right Updated:  10/26/19 1529     WBC 4 11 Thousand/uL      RBC 4 45 Million/uL      Hemoglobin 13 5 g/dL      Hematocrit 42 3 %      MCV 95 fL      MCH 30 3 pg      MCHC 31 9 g/dL      RDW 14 6 % Platelets 563 Thousands/uL      MPV 10 6 fL                  X-ray chest 1 view portable   Final Result by Dominick Mckeon MD (10/27 1010)      No acute cardiopulmonary disease  Findings are stable            Workstation performed: [id number]         CTA stroke alert (head/neck)   Final Result by Frank Cornejo MD (10/26 9954)      No evidence of hemodynamic significant stenosis, aneurysm or dissection  Findings were directly discussed with DUSTIN RIOJAS on 10/26/2019 3:36 PM                      Workstation performed: [id number]         CT stroke alert brain   Final Result by Frank Cornejo MD (10/26 8285)      No acute intracranial abnormality  Microangiopathic changes  Findings were directly discussed with DUSTIN RIOJAS on 10/26/2019 3:28 PM       Workstation performed: [id number]         MRI Inpatient Order    (Results Pending)   VAS lower limb venous duplex study, complete bilateral    (Results Pending)              Procedures  CriticalCare Time  Performed by: Ranjit Aguillon DO  Authorized by:  Ranjit Aguillon DO     Critical care provider statement:     Critical care time (minutes):  35    Critical care time was exclusive of:  Separately billable procedures and treating other patients    Critical care was necessary to treat or prevent imminent or life-threatening deterioration of the following conditions:  CNS failure or compromise    Critical care was time spent personally by me on the following activities:  Obtaining history from patient or surrogate, development of treatment plan with patient or surrogate, discussions with consultants, evaluation of patient's response to treatment, examination of patient, ordering and performing treatments and interventions, ordering and review of laboratory studies, ordering and review of radiographic studies, re-evaluation of patient's condition and review of old charts    I assumed direction of critical care for this patient from another provider in my specialty: no             ED Course  ED Course as of Oct 27 1735   Sat Oct 26, 2019   1510 Case d/w Dr Ozzy Mascorro  Awaiting patient arrival  Will discuss after CT/CTA is read by radiology  1535 Case d/w Radiology - CT/CTA are negative for ischemia, hemorrhage as well as soft-tissue and osseous causes of patient's symptoms and pain  1544 Case d/w Dr Ozzy Mascorro - not a tPA condidate  Would control BP as needed, admit to stroke pathway for MRI  HEART Risk Score      Most Recent Value   History  0 Filed at: 10/26/2019 1600   ECG  1 Filed at: 10/26/2019 1600   Age  2 Filed at: 10/26/2019 1600   Risk Factors  1 Filed at: 10/26/2019 1600   Troponin  0 Filed at: 10/26/2019 1600   Heart Score Risk Calculator   History  0 Filed at: 10/26/2019 1600   ECG  1 Filed at: 10/26/2019 1600   Age  2 Filed at: 10/26/2019 1600   Risk Factors  1 Filed at: 10/26/2019 1600   Troponin  0 Filed at: 10/26/2019 1600   HEART Score  4 Filed at: 10/26/2019 1600   HEART Score  4 Filed at: 10/26/2019 1600          Stroke Assessment     Row Name 10/26/19 1515 10/26/19 1546          NIH Stroke Scale    Interval     Repeat for symptoms change     Level of Consciousness (1a )  0  0     LOC Questions (1b )  0  0     LOC Commands (1c )  0  0     Best Gaze (2 )  0  0     Visual (3 )  0  0     Facial Palsy (4 )  0  0     Motor Arm, Left (5a )  2  0     Motor Arm, Right (5b )  0  0     Motor Leg, Left (6a )  2  1     Motor Leg, Right (6b )  0  0     Limb Ataxia (7 )  2  0     Sensory (8 )  0  0     Best Language (9 )  0  0     Dysarthria (10 )  0  0     Extinction and Inattention (11 ) (Formerly Neglect)  0  0     Total  6  1         First Filed Value   TPA Decision  Patient not a TPA candidate     Patient is not a candidate options  -- [Symptoms improving spontaneously ]                        MDM  Number of Diagnoses or Management Options  Acute CVA (cerebrovascular accident) [facility]):   Diagnosis management comments: DDx:  Left upper and lower extremity weakness - TIA/CVA, metabolic derangement, less likely complex migraine  A/P: Will check CT/CTA head and neck, cardiac w/u, consult Neurology, reeval for deterioration, admit  Patient is not a tPA candidate due to improving symptoms  Amount and/or Complexity of Data Reviewed  Clinical lab tests: reviewed and ordered  Tests in the radiology section of CPT®: reviewed and ordered  Obtain history from someone other than the patient: yes (EMS)  Review and summarize past medical records: yes        Disposition  Final diagnoses:   Acute CVA (cerebrovascular accident) [facility])     Time reflects when diagnosis was documented in both MDM as applicable and the Disposition within this note     Time User Action Codes Description Comment    10/26/2019  4:13 PM Wale Womack, [address] [I63 9] Acute CVA (cerebrovascular accident) ([facility] [address] )     10/26/2019  7:05 PM La Durbin Add [R29 898] Weakness of right upper extremity     10/26/2019  7:05 PM Jeffrey Wisdom Add [M54 2] Cervical pain (neck)     10/26/2019  7:05 PM Jeffrey Wisdom Add [R20 2] Paresthesias       ED Disposition     ED Disposition Condition Date/Time Comment    Admit Stable Sat Oct 26, 2019  4:13 PM Case was discussed with Dr Sherri Plascencia and the patient's admission status was agreed to be Admission Status: inpatient status to the service of Dr Sherri Plascencia            Follow-up Information    None         Current Discharge Medication List      CONTINUE these medications which have NOT CHANGED    Details   allopurinol (ZYLOPRIM) 100 mg tablet Take 100 mg by mouth daily      ammonium lactate (AMLACTIN) 12 % lotion Apply topically 2 (two) times a day as needed for dry skin      CoenzymeQ10-Isoleucine-Glycine (CO Q-10) 100-50-25 MG TB24 Co Q-10      ferrous sulfate 325 (65 Fe) mg tablet Take 325 mg by mouth every other day      furosemide (LASIX) 20 mg tablet Take 20 mg by mouth daily      gabapentin (NEURONTIN) 100 mg capsule Take 100 mg by mouth 2 (two) times a day GARCINIA CAMBOGIA-CHROMIUM PO Take 750 mg by mouth 2 (two) times a day       Green Coffee Silva-Yerba Mate (GREEN COFFEE BEAN EXTRACT PO) Take 800 mg by mouth daily       L-ARGININE-500 PO Take 500 mg by mouth 2 (two) times a day       metoprolol tartrate (LOPRESSOR) 12 5 mg tablet Take 12 5 mg by mouth 2 (two) times a day      Multiple Vitamins-Minerals (OCUVITE EXTRA PO) Take 1 tablet by mouth daily        ! ! patient supplied medication Take 1 each by mouth 2 (two) times a day      !! patient supplied medication Take 1 each by mouth 2 (two) times a day      RASPBERRY KETONES PO Take 100 mg by mouth 2 (two) times a day      rivaroxaban (XARELTO) 20 mg tablet Take 20 mg by mouth daily      tadalafil (CIALIS) 5 MG tablet Take 5 mg by mouth daily as needed for erectile dysfunction      Turmeric Curcumin 500 MG CAPS Take 500 mg by mouth daily       ! ! - Potential duplicate medications found  Please discuss with provider  No discharge procedures on file      ED Provider  Electronically Signed by           Rajesh Corona DO  10/27/19 2511

## 2019-10-27 NOTE — H&P
H&P- [facility] 1955, 71 y o  male MRN: [id number]    Unit/Bed#: 418-01 Encounter: [id number]    Primary Care Provider: Moisés Chaparro MD   Date and time admitted to hospital: 10/26/2019  3:16 PM        * Weakness of right upper extremity  Assessment & Plan  · Admit to med/surg level of care  · Initiate the stroke pathway  · No evidence of hemodynamic significant stenosis, aneurysm, or dissection on CTA of the head and neck with and without contrast  · Concern for cervical spine pathology  · Check an MRI of the brain without contrast and MRI of the cervical spine without contrast  · Check a transthoracic echocardiogram  · Continue xarelto 20 mg PO Qdaily  · Initiate high-intensity statin therapy with atorvastatin 40 mg PO Qdaily with dinner  · Consult Neurology  · PT/OT    Cervical pain (neck)  Assessment & Plan  · Check an MRI of the cervical spine without contrast in the setting of bilateral upper extremity weakness and paresthesias  · PT/OT    Paresthesias  Assessment & Plan  · Initiate the stroke pathway  · No evidence of hemodynamic significant stenosis, aneurysm, or dissection on CTA of the head and neck with and without contrast  · Concern for cervical spine pathology  · Check an MRI of the brain without contrast and MRI of the cervical spine without contrast  · Check a transthoracic echocardiogram  · Continue xarelto 20 mg PO Qdaily  · Initiate high-intensity statin therapy with atorvastatin 40 mg PO Qdaily with dinner  · Consult Neurology  · PT/OT    Thrombocytopenia ([address] )  Assessment & Plan  · Monitor for any signs of bleed  · Follow the platelet count    Morbid obesity with BMI of 40 0-44 9, adult (HCC)  Assessment & Plan  · Lifestyle modifications are recommended including weight loss, improving his diet, and increasing his amount of activity        Leukopenia  Assessment & Plan  · No signs of infection  · Check a CBC with differential on 10/27/2019 to evaluate the neutrophil count    Obstructive sleep apnea on CPAP  Assessment & Plan  · Continue CPAP QHS and anytime while sleeping      Atrial fibrillation (HCC)  Assessment & Plan  · Continue anticoagulation with xarelto  · Continue PO metoprolol for heart rate control  · Outpatient follow-up with Cardiology        VTE Prophylaxis: Rivaroxaban (Xarelto)  / sequential compression device   Code Status: Level 1-Full Code    Anticipated Length of Stay:  Patient will be admitted on an Inpatient basis with an anticipated length of stay of greater than 2 midnights  Justification for Hospital Stay:  The patient requires the stroke pathway, an MRI of the brain, an MRI of the cervical spine, and a Neurology consultation  Total Time for Visit, including Counseling / Coordination of Care: 45 minutes  Greater than 50% of this total time spent on direct patient counseling and coordination of care  Chief Complaint:  Cervical neck pain, right upper extremity weakness, and paresthesias    History of Present Illness:    Araseli Hampton is a 71 y o  male who presents to the emergency department with the complaint of severe cervical neck pain, right upper extremity weakness, and generalized paresthesias  The patient was having a bowel movement this morning and developed severe cervical neck pain  He then developed generalized paresthesias involving all four extremities  The patient described the feeling of almost total body paralysis after the onset of the severe cervical neck pain  The total body paralysis then resolved spontaneously  The patient then experienced right upper extremity weakness  Nothing seemed to improve his symptoms  No fevers or chills  No chest pain  No shortness of breath  No bowel or bladder incontinence  Review of Systems:    Review of Systems:  Per HPI, all other systems have been reviewed and were negative      Past Medical and Surgical History:     Past Medical History:   Diagnosis Date    [facility]     Arthritis     CPAP (continuous positive airway pressure) dependence     Irregular heart beat     Lymphedema     Sleep apnea     Urinary frequency     Wears glasses     Wears partial dentures     lower partial       Past Surgical History:   Procedure Laterality Date    ABLATION SAPHENOUS VEIN W/ RFA      COLONOSCOPY      NM CYSTOURETHRO W/IMPLANT N/A 11/13/2017    Procedure: CYSTOSCOPY WITH INSERTION UROLIFT;  Surgeon: Alejandra Quiroz DO;  Location: [facility];  Service: Urology    TONSILLECTOMY         Meds/Allergies:    Prior to Admission medications    Medication Sig Start Date End Date Taking?  Authorizing Provider   allopurinol (ZYLOPRIM) 100 mg tablet Take 100 mg by mouth daily 7/22/19 7/21/20 Yes Historical Provider, MD   ammonium lactate (AMLACTIN) 12 % lotion Apply topically 2 (two) times a day as needed for dry skin   Yes Historical Provider, MD   CoenzymeQ10-Isoleucine-Glycine (CO Q-10) 100-50-25 MG TB24 Co Q-10   Yes Historical Provider, MD   ferrous sulfate 325 (65 Fe) mg tablet Take 325 mg by mouth every other day   Yes Historical Provider, MD   furosemide (LASIX) 20 mg tablet Take 20 mg by mouth daily   Yes Historical Provider, MD   gabapentin (NEURONTIN) 100 mg capsule Take 100 mg by mouth 2 (two) times a day 10/10/19 1/8/20 Yes Historical Provider, MD   GARCINIA CAMBOGIA-CHROMIUM PO Take 750 mg by mouth 2 (two) times a day    Yes Historical Provider, MD Karissa Harden Coffee Silva-Yerba Mate (GREEN COFFEE BEAN EXTRACT PO) Take 800 mg by mouth daily    Yes Historical Provider, MD   L-ARGININE-500 PO Take 500 mg by mouth 2 (two) times a day    Yes Historical Provider, MD   metoprolol tartrate (LOPRESSOR) 12 5 mg tablet Take 12 5 mg by mouth 2 (two) times a day   Yes Historical Provider, MD   Multiple Vitamins-Minerals (OCUVITE EXTRA PO) Take 1 tablet by mouth daily     Yes Historical Provider, MD   patient supplied medication Take 1 each by mouth 2 (two) times a day   Yes Historical Provider, MD patient supplied medication Take 1 each by mouth 2 (two) times a day   Yes Historical Provider, MD   RASPBERRY KETONES PO Take 100 mg by mouth 2 (two) times a day   Yes Historical Provider, MD   rivaroxaban (XARELTO) 20 mg tablet Take 20 mg by mouth daily   Yes Historical Provider, MD   tadalafil (CIALIS) 5 MG tablet Take 5 mg by mouth daily as needed for erectile dysfunction   Yes Historical Provider, MD   Turmeric Curcumin 500 MG CAPS Take 500 mg by mouth daily   Yes Historical Provider, MD     I have reviewed home medications with patient personally  Allergies:    Allergies   Allergen Reactions    Penicillins Anaphylaxis    Sulfa Antibiotics Anaphylaxis       Social History:     Marital Status: /Civil Union     Substance Use History:   Social History     Substance and Sexual Activity   Alcohol Use Yes    Alcohol/week: 4 0 standard drinks    Types: 4 Cans of beer per week    Frequency: 2-3 times a week    Binge frequency: Never    Comment: socially     Social History     Tobacco Use   Smoking Status Never Smoker   Smokeless Tobacco Never Used     Social History     Substance and Sexual Activity   Drug Use No       Family History:    non-contributory    Physical Exam:     Vitals:   Blood Pressure: 137/79 (10/26/19 2053)  Pulse: 103 (10/26/19 2053)  Temperature: 98 3 °F (36 8 °C) (10/26/19 1931)  Temp Source: Oral (10/26/19 1931)  Respirations: 18 (10/26/19 2025)  Height: 5' 9" (175 3 cm) (10/26/19 1734)  Weight - Scale: (!) 137 kg (302 lb 11 1 oz) (10/26/19 1734)  SpO2: 95 % (10/26/19 2053)    Physical Exam  General:  NAD, awake, alert, follows commands  HEENT:  NC/AT, mucous membranes moist  Neck:  Supple, No JVP elevation  CV:  + S1, + S2, RRR  Pulm:  Lung fields are CTA bilaterally  Abd:  Soft, Non-tender, Non-distended  Ext:  No clubbing/cyanosis/edema  Skin:  No rashes  Neuro:  + Decreased muscle strength of the right upper extremity      Additional Data:     Lab Results: I have personally reviewed pertinent reports  Results from last 7 days   Lab Units 10/26/19  1522   WBC Thousand/uL 4 11*   HEMOGLOBIN g/dL 13 5   HEMATOCRIT % 42 3   PLATELETS Thousands/uL 133*     Results from last 7 days   Lab Units 10/26/19  1522   SODIUM mmol/L 139   POTASSIUM mmol/L 4 3   CHLORIDE mmol/L 105   CO2 mmol/L 26   BUN mg/dL 30*   CREATININE mg/dL 0 75   ANION GAP mmol/L 8   CALCIUM mg/dL 9 2   GLUCOSE RANDOM mg/dL 105     Results from last 7 days   Lab Units 10/26/19  1522   INR  1 18     Results from last 7 days   Lab Units 10/26/19  1617   POC GLUCOSE mg/dl 102               Imaging: I have personally reviewed pertinent reports  CTA stroke alert (head/neck)   Final Result by Julien Chu MD (10/26 8511)      No evidence of hemodynamic significant stenosis, aneurysm or dissection  Findings were directly discussed with DUSTIN RIOJAS on 10/26/2019 3:36 PM                      Workstation performed: [id number]         CT stroke alert brain   Final Result by Julien Chu MD (10/26 2898)      No acute intracranial abnormality  Microangiopathic changes  Findings were directly discussed with DUSTIN RIOJAS on 10/26/2019 3:28 PM       Workstation performed: [id number]         X-ray chest 1 view portable    (Results Pending)   MRI Inpatient Order    (Results Pending)       EKG, Pathology, and Other Studies Reviewed on Admission:   · EKG (10/26/2019):  Normal sinus rhythm with a heart rate of 95 bpm    Allscripts / Epic Records Reviewed: Yes     ** Please Note: This note has been constructed using a voice recognition system   **

## 2019-10-27 NOTE — UTILIZATION REVIEW
Initial Clinical Review    Admission: Date/Time/Statement: Inpatient Admission Orders (From admission, onward)     Ordered        10/26/19 1614  Inpatient Admission  Once                   Orders Placed This Encounter   Procedures    Inpatient Admission     Standing Status:   Standing     Number of Occurrences:   1     Order Specific Question:   Admitting Physician     Answer:   Nikhil Hernandez     Order Specific Question:   Level of Care     Answer:   Med Surg [16]     Order Specific Question:   Estimated length of stay     Answer:   More than 2 Midnights     Order Specific Question:   Certification     Answer:   I certify that inpatient services are medically necessary for this patient for a duration of greater than two midnights  See H&P and MD Progress Notes for additional information about the patient's course of treatment  ED Arrival Information     Expected Arrival Acuity Means of Arrival Escorted By Service Admission Type    - 10/26/2019 15:16 Emergent [address] [facility] General Medicine Emergency    Arrival Complaint    stroke like symptoms        Chief Complaint   Patient presents with    CVA/TIA-like Symptoms     Patient went to the bathroom and while on the toilet he started having bilateral weakness, espcially on the left  He couldnt use his hand or his leg on that side  Assessment/Plan: 71year old male to the ED from home via EMS with complaints left sided weakness prior to his arrival to the ED  ADmitted to inpatient for  Upper extremity weakness, cervical neck pain  Prior to calling 911, patient had sudden onset posterior neck and bilateral shoulder pain, leading to left sided paralysis  In the ambulance, symptoms subside a little and is able to move fingers tips and toes, but continues with significant weakness  Stroke alert was called on arrival to the ED  NIHSS in ED is 6 down to 1   THe paralysis has subsided, he continues with right sided upper extremity weakness  Per neuro call:  Patient has history of A-fib and is on Xeralto  tPA could not be given due to anticoagulation  CT and CTA showed no acute abnormality BP is 140/80  Neuro consult  PT/OT, continue with xarelto  Check MRI of cervical neck  ED Triage Vitals   Temperature Pulse Respirations Blood Pressure SpO2   10/26/19 1734 10/26/19 1530 10/26/19 1530 10/26/19 1530 10/26/19 1500   98 6 °F (37 °C) 103 22 142/68 100 %      Temp Source Heart Rate Source Patient Position - Orthostatic VS BP Location FiO2 (%)   10/26/19 1734 10/26/19 1530 10/26/19 1530 10/26/19 1542 --   Oral Monitor Sitting Left arm       Pain Score       10/26/19 1542       8        Wt Readings from Last 1 Encounters:   10/26/19 (!) 137 kg (302 lb 11 1 oz)     Additional Vital Signs:  Date/Time Temp Pulse Resp BP MAP (mmHg) SpO2 O2 Device Patient Position - Orthostatic VS   10/27/19 0753       None (Room air)    10/27/19 07:35:10 98 3 °F (36 8 °C) 84 18 131/81 98 96 %     10/27/19 02:23:53  86 18 135/85 102 93 % None (Room air) Lying   10/27/19 00:28:30  89 18 124/74 91 95 % None (Room air) Lying   10/26/19 22:26:40 98 °F (36 7 °C) 84 18 119/71 87 96 % None (Room air) Lying   10/26/19 20:53:21  103  137/79 98 95 %     10/26/19 20:25:06  103 18 125/73 90 96 % None (Room air) Lying   10/26/19 19:31:54 98 3 °F (36 8 °C) 97 18 148/85 106 94 % None (Room air) Lying   10/26/19 18:37:20  93 18 147/83 104 96 % None (Room air) Lying   10/26/19 17:34:18 98 6 °F (37 °C) 97 18 161/107Abnormal  125 96 % None (Room air) Lying   10/26/19 1700  96 17 141/70  99 %  Sitting   10/26/19 1630  96 17 116/70  100 %  Sitting   10/26/19 1615  98 22 154/122Abnormal   100 %  Sitting   10/26/19 1600  95 22 138/63  99 % None (Room air) Sitting   10/26/19 15:53:47  95 13 157/70         PErtinent tests:      CXR 10/27:No acute cardiopulmonary disease    CTA stroke alert 10/26:No evidence of hemodynamic significant stenosis, aneurysm or dissection    EKG 10/26 @1548:  Interpretation:     Interpretation: abnormal    Rate:     ECG rate:  95    ECG rate assessment: normal    Rhythm:     Rhythm: sinus rhythm    Ectopy:     Ectopy: none    Conduction:     Conduction: abnormal      Abnormal conduction: bifascicular block    ST segments:     ST segments:  Non-specific  T waves:     T waves: non-specific    Results from last 7 days   Lab Units 10/27/19  0649 10/26/19  1522   WBC Thousand/uL 5 39 4 11*   HEMOGLOBIN g/dL 12 0 13 5   HEMATOCRIT % 36 3* 42 3   PLATELETS Thousands/uL 133* 133*   NEUTROS ABS Thousands/µL 3 79  --          Results from last 7 days   Lab Units 10/27/19  0649 10/26/19  1522   SODIUM mmol/L 142 139   POTASSIUM mmol/L 4 1 4 3   CHLORIDE mmol/L 107 105   CO2 mmol/L 28 26   ANION GAP mmol/L 7 8   BUN mg/dL 16 30*   CREATININE mg/dL 0 67 0 75   EGFR ml/min/1 73sq m 101 96   CALCIUM mg/dL 8 6 9 2   MAGNESIUM mg/dL 2 0  --    PHOSPHORUS mg/dL 3 0  --      Results from last 7 days   Lab Units 10/27/19  0649   AST U/L 19   ALT U/L 19   ALK PHOS U/L 59   TOTAL PROTEIN g/dL 6 6   ALBUMIN g/dL 3 1*   TOTAL BILIRUBIN mg/dL 0 60     Results from last 7 days   Lab Units 10/26/19  1617   POC GLUCOSE mg/dl 102     Results from last 7 days   Lab Units 10/27/19  0649 10/26/19  1522   GLUCOSE RANDOM mg/dL 91 105     Results from last 7 days   Lab Units 10/27/19  0654 10/26/19  1522   TROPONIN I ng/mL <0 02 <0 02         Results from last 7 days   Lab Units 10/26/19  1522   PROTIME seconds 15 1*   INR  1 18   PTT seconds 32     Results from last 7 days   Lab Units 10/27/19  0649   TSH 3RD GENERATON uIU/mL 3 181         Results from last 7 days   Lab Units 10/27/19  0649   LACTIC ACID mmol/L 0 8     ED Treatment:   Medication Administration from 10/26/2019 1502 to 10/26/2019 1725       Date/Time Order Dose Route Action     10/26/2019 1629 fentanyl citrate (PF) 100 MCG/2ML 50 mcg 50 mcg Intravenous Given        Past Medical History:   Diagnosis Date    A-fib (HCC)     Arthritis     CPAP (continuous positive airway pressure) dependence     Irregular heart beat     Lymphedema     Sleep apnea     Urinary frequency     Wears glasses     Wears partial dentures     lower partial     Admitting Diagnosis: Stroke-like symptoms [R29 90]  [facility] [I63 9]  Age/Sex: 71 y o  male  Admission Orders:  NIH SS every 24 hours  Neuro checks Every 1 hour x 4 hours, then every 2 hours x 8 hours, then every 4 hours x 72 hours  MRI  BMP, cbc, hepatitis c, hgb a1c, high sensitivity crp, homocysteine, procal  ECHO  Medications:  allopurinol 100 mg Oral Daily   atorvastatin 40 mg Oral QPM   ferrous sulfate 325 mg Oral Daily With Breakfast   gabapentin 100 mg Oral BID   metoprolol tartrate 12 5 mg Oral BID   multivitamin-minerals 1 tablet Oral Daily   rivaroxaban 20 mg Oral Daily          acetaminophen 650 mg Oral Q6H PRN   iohexol 100 mL Intravenous Once in imaging   oxyCODONE 5 mg Oral Q4H PRN   Or      oxyCODONE 10 mg Oral Q4H PRN   pneumococcal 23-valent polysaccharide vaccine 0 5 mL Subcutaneous Prior to discharge       IP CONSULT TO NEUROLOGY  IP CONSULT TO NEUROLOGY  IP CONSULT TO CASE MANAGEMENT  IP CONSULT TO NUTRITION SERVICES    Network Utilization Review Department  [email or web address]  ATTENTION: Please call with any questions or concerns to [phone] and carefully listen to the prompts so that you are directed to the right person  All voicemails are confidential   Kye Carver all requests for admission clinical reviews, approved or denied determinations and any other requests to dedicated fax number below belonging to the campus where the patient is receiving treatment    FACILITY NAME UR FAX NUMBER   ADMISSION DENIALS (Administrative/Medical Necessity) [address] [facility] (Maternity/NICU/Pediatrics) [facility] [address] [phone] [facility] [phone]   [address] [facility] [phone]   [facility] [address] [phone]

## 2019-10-27 NOTE — ASSESSMENT & PLAN NOTE
· Continue the stroke pathway  · No evidence of hemodynamic significant stenosis, aneurysm, or dissection on CTA of the head and neck with and without contrast  · Concern for cervical spine pathology with the feeling of almost total body paralysis initially after the onset of severe cervical neck pain  · Check an MRI of the brain without contrast and MRI of the cervical spine without contrast  · Check a transthoracic echocardiogram  · Continue xarelto 20 mg PO Qdaily  · The patient was initiated on high-intensity statin therapy with atorvastatin 40 mg PO Qdaily with dinner  · Consult Neurology  · PT/OT

## 2019-10-27 NOTE — ASSESSMENT & PLAN NOTE
· Chronic lymphedema, which has worsened last 1-2 weeks  · Check a transthoracic echocardiogram  · Check a venous duplex of the bilateral lower extremities  · Continue outpatient treatment with the lymphedema clinic

## 2019-10-28 ENCOUNTER — APPOINTMENT (INPATIENT)
Dept: MRI IMAGING | Facility: HOSPITAL | Age: 64
DRG: 552 | End: 2019-10-28
Payer: COMMERCIAL

## 2019-10-28 ENCOUNTER — APPOINTMENT (INPATIENT)
Dept: NON INVASIVE DIAGNOSTICS | Facility: HOSPITAL | Age: 64
DRG: 552 | End: 2019-10-28
Payer: COMMERCIAL

## 2019-10-28 LAB
ANION GAP SERPL CALCULATED.3IONS-SCNC: 7 MMOL/L (ref 4–13)
ATRIAL RATE: 94 BPM
BASOPHILS # BLD AUTO: 0.02 THOUSANDS/ΜL (ref 0–0.1)
BASOPHILS NFR BLD AUTO: 0 % (ref 0–1)
BUN SERPL-MCNC: 14 MG/DL (ref 5–25)
CALCIUM SERPL-MCNC: 8.5 MG/DL (ref 8.3–10.1)
CHLORIDE SERPL-SCNC: 107 MMOL/L (ref 100–108)
CO2 SERPL-SCNC: 26 MMOL/L (ref 21–32)
CREAT SERPL-MCNC: 0.7 MG/DL (ref 0.6–1.3)
EOSINOPHIL # BLD AUTO: 0.11 THOUSAND/ΜL (ref 0–0.61)
EOSINOPHIL NFR BLD AUTO: 2 % (ref 0–6)
ERYTHROCYTE [DISTWIDTH] IN BLOOD BY AUTOMATED COUNT: 14.7 % (ref 11.6–15.1)
GFR SERPL CREATININE-BSD FRML MDRD: 99 ML/MIN/1.73SQ M
GLUCOSE SERPL-MCNC: 90 MG/DL (ref 65–140)
HCT VFR BLD AUTO: 36 % (ref 36.5–49.3)
HGB BLD-MCNC: 11.9 G/DL (ref 12–17)
IMM GRANULOCYTES # BLD AUTO: 0.01 THOUSAND/UL (ref 0–0.2)
IMM GRANULOCYTES NFR BLD AUTO: 0 % (ref 0–2)
LYMPHOCYTES # BLD AUTO: 1.06 THOUSANDS/ΜL (ref 0.6–4.47)
LYMPHOCYTES NFR BLD AUTO: 23 % (ref 14–44)
MCH RBC QN AUTO: 30.8 PG (ref 26.8–34.3)
MCHC RBC AUTO-ENTMCNC: 33.1 G/DL (ref 31.4–37.4)
MCV RBC AUTO: 93 FL (ref 82–98)
MONOCYTES # BLD AUTO: 0.42 THOUSAND/ΜL (ref 0.17–1.22)
MONOCYTES NFR BLD AUTO: 9 % (ref 4–12)
NEUTROPHILS # BLD AUTO: 2.92 THOUSANDS/ΜL (ref 1.85–7.62)
NEUTS SEG NFR BLD AUTO: 66 % (ref 43–75)
NRBC BLD AUTO-RTO: 0 /100 WBCS
PLATELET # BLD AUTO: 126 THOUSANDS/UL (ref 149–390)
PMV BLD AUTO: 10.4 FL (ref 8.9–12.7)
POTASSIUM SERPL-SCNC: 4 MMOL/L (ref 3.5–5.3)
QRS AXIS: -65 DEGREES
QRSD INTERVAL: 150 MS
QT INTERVAL: 340 MS
QTC INTERVAL: 427 MS
RBC # BLD AUTO: 3.86 MILLION/UL (ref 3.88–5.62)
SODIUM SERPL-SCNC: 140 MMOL/L (ref 136–145)
T WAVE AXIS: 0 DEGREES
VENTRICULAR RATE: 95 BPM
WBC # BLD AUTO: 4.54 THOUSAND/UL (ref 4.31–10.16)

## 2019-10-28 PROCEDURE — 99232 SBSQ HOSP IP/OBS MODERATE 35: CPT | Performed by: PHYSICIAN ASSISTANT

## 2019-10-28 PROCEDURE — 93306 TTE W/DOPPLER COMPLETE: CPT

## 2019-10-28 PROCEDURE — 80048 BASIC METABOLIC PNL TOTAL CA: CPT | Performed by: INTERNAL MEDICINE

## 2019-10-28 PROCEDURE — G0425 INPT/ED TELECONSULT30: HCPCS | Performed by: PSYCHIATRY & NEUROLOGY

## 2019-10-28 PROCEDURE — 70551 MRI BRAIN STEM W/O DYE: CPT

## 2019-10-28 PROCEDURE — 85025 COMPLETE CBC W/AUTO DIFF WBC: CPT | Performed by: INTERNAL MEDICINE

## 2019-10-28 PROCEDURE — 93010 ELECTROCARDIOGRAM REPORT: CPT | Performed by: INTERNAL MEDICINE

## 2019-10-28 PROCEDURE — 94760 N-INVAS EAR/PLS OXIMETRY 1: CPT

## 2019-10-28 PROCEDURE — 93306 TTE W/DOPPLER COMPLETE: CPT | Performed by: INTERNAL MEDICINE

## 2019-10-28 RX ADMIN — GABAPENTIN 100 MG: 100 CAPSULE ORAL at 08:13

## 2019-10-28 RX ADMIN — RIVAROXABAN 20 MG: 10 TABLET, FILM COATED ORAL at 08:13

## 2019-10-28 RX ADMIN — METOPROLOL TARTRATE 12.5 MG: 25 TABLET ORAL at 08:12

## 2019-10-28 RX ADMIN — ALLOPURINOL 100 MG: 100 TABLET ORAL at 08:13

## 2019-10-28 RX ADMIN — MULTIPLE VITAMINS W/ MINERALS TAB 1 TABLET: TAB at 08:13

## 2019-10-28 RX ADMIN — FERROUS SULFATE TAB 325 MG (65 MG ELEMENTAL FE) 325 MG: 325 (65 FE) TAB at 08:13

## 2019-10-28 RX ADMIN — PERFLUTREN 3 ML/MIN: 6.52 INJECTION, SUSPENSION INTRAVENOUS at 09:28

## 2019-10-28 RX ADMIN — ATORVASTATIN CALCIUM 40 MG: 40 TABLET, FILM COATED ORAL at 17:17

## 2019-10-28 RX ADMIN — GABAPENTIN 100 MG: 100 CAPSULE ORAL at 17:17

## 2019-10-28 RX ADMIN — METOPROLOL TARTRATE 12.5 MG: 25 TABLET ORAL at 17:17

## 2019-10-28 NOTE — PLAN OF CARE
Patient presents with 45#/18% weight gain since 4/22/19 and 26#/9% weight gain since 8/29/19  Recommend sodium restricted diet  RD provided Low Sodium diet education  Problem: Nutrition/Hydration-ADULT  Goal: Nutrient/Hydration intake appropriate for improving, restoring or maintaining nutritional needs  Description  Monitor and assess patient's nutrition/hydration status for malnutrition  Collaborate with interdisciplinary team and initiate plan and interventions as ordered  Monitor patient's weight and dietary intake as ordered or per policy  Utilize nutrition screening tool and intervene as necessary  Determine patient's food preferences and provide high-protein, high-caloric foods as appropriate       INTERVENTIONS:  - Monitor oral intake, urinary output, labs, and treatment plans  - Assess nutrition and hydration status and recommend course of action  - Evaluate amount of meals eaten  - Assist patient with eating if necessary   - Allow adequate time for meals  - Recommend/ encourage appropriate diets, oral nutritional supplements, and vitamin/mineral supplements  - Order, calculate, and assess calorie counts as needed  - Recommend, monitor, and adjust tube feedings and TPN/PPN based on assessed needs  - Assess need for intravenous fluids  - Provide specific nutrition/hydration education as appropriate  - Include patient/family/caregiver in decisions related to nutrition  Outcome: Progressing

## 2019-10-28 NOTE — SOCIAL WORK
I measured the patients abd Girth and it is 60 cm at the biggest point  The MRI tech at [facility] is going to check to see if the patient is able to fit into the MRI and call me back

## 2019-10-28 NOTE — OCCUPATIONAL THERAPY NOTE
Occupational Therapy         Patient Name: Rolf Marsh  CDGSZ'S Date: 10/28/2019    Order received and chart review performed; per PT evaluation, pt is performing at Independent level with self-care tasks as well as functional mobility with no device; pt does not require skilled OT needs at this time; will d/c OT orders at this time

## 2019-10-28 NOTE — RESPIRATORY THERAPY NOTE
oxygen therapy started     Patient is a known cpap user that is not willing to use the cpap therapy, he does not use it at home either  The nurse called stating that patient's oxygen level keeps dropping  I placed patient on 2 lpm oxygen therapy via nasal cannula   PA has been notified via tiger text

## 2019-10-28 NOTE — SOCIAL WORK
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, pt has a rx plan at [facility], pt denies smoking and states he drinks alcohol on occasion, pt has a walker, cane and cpap , pt drives and is independent, pt's wife will transport the pt pt home, pt not for d/c today as discussed at care coordination rounds today, pt needs mri and we need to find a facility that has a mri that can accomondate the pt's size, cm will continue to follow , pt dept is recommending outpt therapy,  Patient/caregiver received discharge checklist   Content reviewed  Patient/caregiver encouraged to participate in discharge plan of care prior to discharge home  CM reviewed d/c planning process including the following: identifying help at home, patient preference for d/c planning needs, availability of treatment team to discuss questions or concerns patient and/or family may have regarding understanding medications and recognizing signs and symptoms once discharged  CM also encouraged patient to follow up with all recommended appointments after discharge  Patient advised of importance for patient and family to participate in managing patients medical well being

## 2019-10-28 NOTE — TELEMEDICINE
TeleConsultation - Neurology   Armand uYng 71 y o  male MRN: [id number]   Encounter: [id number]      REQUIRED DOCUMENTATION:     1  This service was provided via Telemedicine  2  Provider located at home  3  TeleMed provider: Zeinab Sheppard MD   4  Identify all parties in room with patient during tele consult:  RN  5  After connecting through [employer], patient was identified by name and date of birth and assistant checked wristband  Patient was then informed that this was a Telemedicine visit and that the exam was being conducted confidentially over secure lines  My office door was closed  No one else was in the room  Patient acknowledged consent and understanding of privacy and security of the Telemedicine visit, and gave us permission to have the assistant stay in the room in order to assist with the history and to conduct the exam   I informed the patient that I have reviewed their record in Epic and presented the opportunity for them to ask any questions regarding the visit today  The patient agreed to participate  Assessment/Plan   Assessment:  Sudden onset neck pain followed by transient neurological deficits:  No clear etiology, but cervical disc herniation, vascular disease (such as stroke or spasm) and muscle spasms were considered and worked-up    Plan:  Patient is currently back to baseline  CTA is normal without evidence of dissection  MRI brain done, and is normal  Agree with obtaining MRI cervical spine without contrast    If patient is cleared by PT, then imaging can be done outpatient as he appears to have no neuro deficits at this time ,      History of Present Illness     Reason for Consult / Principal Problem: Neck pain, weakness  Hx and PE limited by: Exam done through a tablet and not through a tele-monitor, which limits the examination  HPI: Armand Yung is a 71 y o  male who was in his normal state of health on Saturday   He was having a bowel movement and as he leaned forward to grab a tissue he felt a sudden and severe neck pain radiating to shoulders  He managed to walk to the living room  He opened his front door, and called 911  Immediately after, he developed complete weakness of arms and legs  He said he was not able to move any extremity  Symptoms improved spontaneously but he continued with right arm weakness for sometime  At this time, he only has mild discomfort in the neck, but there is no weakness, numbness, incontinence or any other neuro deficits  Patient never had similar symptoms  He has history of A-fib and is on xeralto  Inpatient consult to Neurology  Consult performed by: Jovita Miller MD  Consult ordered by: Ryan Zavaleta DO          Review of Systems  Complete ROS was done and is negative other than what is mentioned in HPI    Historical Information   Past Medical History:   Diagnosis Date    A-fib ([facility] [address] )     Arthritis     CPAP (continuous positive airway pressure) dependence     Irregular heart beat     Lymphedema     Sleep apnea     Urinary frequency     Wears glasses     Wears partial dentures     lower partial     Past Surgical History:   Procedure Laterality Date    ABLATION SAPHENOUS VEIN W/ RFA      COLONOSCOPY      LA CYSTOURETHRO W/IMPLANT N/A 11/13/2017    Procedure: CYSTOSCOPY WITH INSERTION UROLIFT;  Surgeon: Ti Go DO;  Location: [facility];  Service: Urology    TONSILLECTOMY       Social History   Social History     Substance and Sexual Activity   Alcohol Use Yes    Alcohol/week: 4 0 standard drinks    Types: 4 Cans of beer per week    Frequency: 2-3 times a week    Binge frequency: Never    Comment: socially     Social History     Substance and Sexual Activity   Drug Use No     Social History     Tobacco Use   Smoking Status Never Smoker   Smokeless Tobacco Never Used     Family History: non-contributory    Review of previous medical records was completed       Meds/Allergies   current meds:   Current Facility-Administered Medications   Medication Dose Route Frequency    acetaminophen (TYLENOL) tablet 650 mg  650 mg Oral Q6H PRN    allopurinol (ZYLOPRIM) tablet 100 mg  100 mg Oral Daily    atorvastatin (LIPITOR) tablet 40 mg  40 mg Oral QPM    ferrous sulfate tablet 325 mg  325 mg Oral Daily With Breakfast    gabapentin (NEURONTIN) capsule 100 mg  100 mg Oral BID    iohexol (OMNIPAQUE) 350 MG/ML injection (SINGLE-DOSE) 100 mL  100 mL Intravenous Once in imaging    metoprolol tartrate (LOPRESSOR) partial tablet 12 5 mg  12 5 mg Oral BID    multivitamin-minerals (CENTRUM) tablet 1 tablet  1 tablet Oral Daily    oxyCODONE (ROXICODONE) IR tablet 5 mg  5 mg Oral Q4H PRN    Or    oxyCODONE (ROXICODONE) immediate release tablet 10 mg  10 mg Oral Q4H PRN    pneumococcal 23-valent polysaccharide vaccine (PNEUMOVAX-23) injection 0 5 mL  0 5 mL Subcutaneous Prior to discharge    rivaroxaban (XARELTO) tablet 20 mg  20 mg Oral Daily       Allergies   Allergen Reactions    Penicillins Anaphylaxis    Sulfa Antibiotics Anaphylaxis       Objective   Vitals:Blood pressure 140/84, pulse 89, temperature 98 1 °F (36 7 °C), temperature source Oral, resp  rate 18, height 5' 9" (1 753 m), weight (!) 137 kg (302 lb 11 1 oz), SpO2 97 %  ,Body mass index is 44 7 kg/m²  Intake/Output Summary (Last 24 hours) at 10/28/2019 1119  Last data filed at 10/27/2019 1723  Gross per 24 hour   Intake 180 ml   Output    Net 180 ml       Invasive Devices: Invasive Devices     Peripheral Intravenous Line            Peripheral IV 10/26/19 Left Hand 1 day    Peripheral IV 10/26/19 Right Antecubital 1 day                Physical Exam NAD  Skin: lower extremities edema with venous stasis  HEENT: ATNC  Chest: breathing comfortably on room air  GI: no apparent distension  Neurologic Exam Alert and oriented for self, place and time  Memory is intact to recent and remote events  Language is intact to comprehension and expression  No neglect  Speech is normal  EOMI  Pupils are symmetric  Visual field appears intact  Face is symmetric  Tongue is midline  Able to move all extremities against gravity  No dysmetria noted  Negative Babinski bilaterally  Lab Results:   CBC:   Results from last 7 days   Lab Units 10/28/19  0435 10/27/19  0649 10/26/19  1522   WBC Thousand/uL 4 54 5 39 4 11*   RBC Million/uL 3 86* 3 93 4 45   HEMOGLOBIN g/dL 11 9* 12 0 13 5   HEMATOCRIT % 36 0* 36 3* 42 3   MCV fL 93 92 95   PLATELETS Thousands/uL 126* 133* 133*   , BMP/CMP:   Results from last 7 days   Lab Units 10/28/19  0435 10/27/19  0649 10/26/19  1522   SODIUM mmol/L 140 142 139   POTASSIUM mmol/L 4 0 4 1 4 3   CHLORIDE mmol/L 107 107 105   CO2 mmol/L 26 28 26   BUN mg/dL 14 16 30*   CREATININE mg/dL 0 70 0 67 0 75   CALCIUM mg/dL 8 5 8 6 9 2   AST U/L  --  19  --    ALT U/L  --  19  --    ALK PHOS U/L  --  59  --    EGFR ml/min/1 73sq m 99 101 96     Imaging Studies: I have personally reviewed pertinent films in PACS  EKG, Pathology, and Other Studies: I have personally reviewed pertinent reports

## 2019-10-28 NOTE — SOCIAL WORK
Pt needs a cervical MRI   I will have the aide measure is abd girth to see if he can fit in the larger MRI at [facility]  There are no open MRI's in network

## 2019-10-28 NOTE — NURSING NOTE
Patient noted to be destating to 72% on room air  Patient was woken up and oxygen came up to 95%  Patient refused to be have oxygen applied via NC  Respiratory notified

## 2019-10-29 ENCOUNTER — APPOINTMENT (INPATIENT)
Dept: MRI IMAGING | Facility: HOSPITAL | Age: 64
DRG: 552 | End: 2019-10-29
Payer: COMMERCIAL

## 2019-10-29 VITALS
WEIGHT: 302 LBS | OXYGEN SATURATION: 94 % | TEMPERATURE: 98.7 F | HEART RATE: 88 BPM | HEIGHT: 69 IN | DIASTOLIC BLOOD PRESSURE: 82 MMHG | SYSTOLIC BLOOD PRESSURE: 123 MMHG | RESPIRATION RATE: 18 BRPM | BODY MASS INDEX: 44.73 KG/M2

## 2019-10-29 PROCEDURE — 72141 MRI NECK SPINE W/O DYE: CPT

## 2019-10-29 PROCEDURE — 99239 HOSP IP/OBS DSCHRG MGMT >30: CPT | Performed by: PHYSICIAN ASSISTANT

## 2019-10-29 PROCEDURE — 93970 EXTREMITY STUDY: CPT | Performed by: SURGERY

## 2019-10-29 RX ADMIN — GABAPENTIN 100 MG: 100 CAPSULE ORAL at 17:17

## 2019-10-29 RX ADMIN — METOPROLOL TARTRATE 12.5 MG: 25 TABLET ORAL at 08:28

## 2019-10-29 RX ADMIN — MULTIPLE VITAMINS W/ MINERALS TAB 1 TABLET: TAB at 08:28

## 2019-10-29 RX ADMIN — ALLOPURINOL 100 MG: 100 TABLET ORAL at 08:28

## 2019-10-29 RX ADMIN — ATORVASTATIN CALCIUM 40 MG: 40 TABLET, FILM COATED ORAL at 17:17

## 2019-10-29 RX ADMIN — OXYCODONE HYDROCHLORIDE 5 MG: 5 TABLET ORAL at 10:24

## 2019-10-29 RX ADMIN — FERROUS SULFATE TAB 325 MG (65 MG ELEMENTAL FE) 325 MG: 325 (65 FE) TAB at 08:28

## 2019-10-29 RX ADMIN — RIVAROXABAN 20 MG: 10 TABLET, FILM COATED ORAL at 08:28

## 2019-10-29 RX ADMIN — METOPROLOL TARTRATE 12.5 MG: 25 TABLET ORAL at 17:18

## 2019-10-29 RX ADMIN — GABAPENTIN 100 MG: 100 CAPSULE ORAL at 08:28

## 2019-10-29 NOTE — ASSESSMENT & PLAN NOTE
· Chronic lymphedema, which has worsened last 1-2 weeks  ·  echocardiogram - unremarkable  · Venous duplex of the bilateral lower extremities - enlarged lymph node of the Left groin    · Continue outpatient treatment with the lymphedema clinic

## 2019-10-29 NOTE — SOCIAL WORK
Pt is being discharged today  Follow up appointments reviewed with good understanding  Case Management reviewed discharge planning process including the following: identifying help that is needed at home, pt's preference for discharge needs and Meds at [facility]  Reviewed with Pt that any member of the healthcare team can answer questions regarding : medications, jmportance of recognizing  Signs and symptoms of any  medical problems  Case Management also encouraged pt to follow up with all recommended appointments after discharge

## 2019-10-29 NOTE — ASSESSMENT & PLAN NOTE
· Continue the stroke pathway  · No evidence of hemodynamic significant stenosis, aneurysm, or dissection on CTA of the head and neck with and without contrast  · Concern for cervical spine pathology with the feeling of almost total body paralysis initially after the onset of severe cervical neck pain  · MRI of the brain without contrast - unremarkable  · MRI of the cervical spine without contrast - unable to obtain at this campus due to abdominal girth  Currently attempting to obtain at other campuses  · Echocardiogram - unremarkable  · Continue xarelto 20 mg PO Qdaily  · The patient was initiated on high-intensity statin therapy with atorvastatin 40 mg PO Qdaily with dinner  · Neurology consult pending    · PT/OT

## 2019-10-29 NOTE — UTILIZATION REVIEW
Continued Stay Review    Date:  10/28/2019                       Current Patient Class: IP Current Level of Care: [place]    HPI:64 y o  male initially admitted on 10/26/2019  To IP LOC     Assessment/Plan: Admitted with sudden neck pain followed by transient neurological deficits:  No clear etiology, but cervical disc herniation, vascular disease (such as stroke or spasm) and muscle spasms were considered and worked-up  CTA w/o dissection and MRI Brain  Nl  Concern for cervical spine pathology with the feeling of almost total body paralysis initially after the onset of severe cervical neck pain  Neuro recommended  MRI cervical spine  Unable to be done @ [facility] due to abdominal girth     [facility] but will return to [facility] after study    Pertinent Labs/Diagnostic Results:   Results from last 7 days   Lab Units 10/28/19  0435 10/27/19  0649 10/26/19  1522   WBC Thousand/uL 4 54 5 39 4 11*   HEMOGLOBIN g/dL 11 9* 12 0 13 5   HEMATOCRIT % 36 0* 36 3* 42 3   PLATELETS Thousands/uL 126* 133* 133*   NEUTROS ABS Thousands/µL 2 92 3 79  --      Results from last 7 days   Lab Units 10/28/19  0435 10/27/19  0649 10/26/19  1522   SODIUM mmol/L 140 142 139   POTASSIUM mmol/L 4 0 4 1 4 3   CHLORIDE mmol/L 107 107 105   CO2 mmol/L 26 28 26   ANION GAP mmol/L 7 7 8   BUN mg/dL 14 16 30*   CREATININE mg/dL 0 70 0 67 0 75   EGFR ml/min/1 73sq m 99 101 96   CALCIUM mg/dL 8 5 8 6 9 2   MAGNESIUM mg/dL  --  2 0  --    PHOSPHORUS mg/dL  --  3 0  --      Results from last 7 days   Lab Units 10/27/19  0649   AST U/L 19   ALT U/L 19   ALK PHOS U/L 59   TOTAL PROTEIN g/dL 6 6   ALBUMIN g/dL 3 1*   TOTAL BILIRUBIN mg/dL 0 60     Results from last 7 days   Lab Units 10/26/19  1617   POC GLUCOSE mg/dl 102     Results from last 7 days   Lab Units 10/28/19  0435 10/27/19  0649 10/26/19  1522   GLUCOSE RANDOM mg/dL 90 91 105     Results from last 7 days   Lab Units 10/27/19  0649   HEMOGLOBIN A1C % 5 7   EAG mg/dl 117     Results from last 7 days   Lab Units 10/27/19  0654 10/26/19  1522   TROPONIN I ng/mL <0 02 <0 02     Results from last 7 days   Lab Units 10/26/19  1522   PROTIME seconds 15 1*   INR  1 18   PTT seconds 32     Results from last 7 days   Lab Units 10/27/19  0649   TSH 3RD GENERATON uIU/mL 3 181     Results from last 7 days   Lab Units 10/27/19  0649   PROCALCITONIN ng/ml <0 05     Results from last 7 days   Lab Units 10/27/19  0649   LACTIC ACID mmol/L 0 8     Results from last 7 days   Lab Units 10/27/19  0649   HEP C AB  Non-reactive     Vital Signs:   10/29/19 07:58:16  98 7 °F (37 1 °C) 88 18 173/101 94 %     10/29/19 03:51:30  98 7 °F (37 1 °C) 83  124/76 93 %     10/28/19 23:11:31  98 3 °F (36 8 °C) 81 16 118/74 97 % None (Room air) Lying       GCS = 15    Medications:   Medications:  allopurinol 100 mg Oral Daily   atorvastatin 40 mg Oral QPM   ferrous sulfate 325 mg Oral Daily With Breakfast   gabapentin 100 mg Oral BID   metoprolol tartrate 12 5 mg Oral BID   multivitamin-minerals 1 tablet Oral Daily   rivaroxaban 20 mg Oral Daily       acetaminophen 650 mg Oral Q6H PRN   iohexol 100 mL Intravenous Once in imaging   oxyCODONE 5 mg Oral Q4H PRN   X 1  10/29   Or      oxyCODONE 10 mg Oral Q4H PRN   pneumococcal 23-valent polysaccharide vaccine 0 5 mL Subcutaneous Prior to discharge       Discharge Plan: TBD    Network Utilization Review Department  [email or web address]  ATTENTION: Please call with any questions or concerns to [phone] and carefully listen to the prompts so that you are directed to the right person  All voicemails are confidential   Elis Falcon all requests for admission clinical reviews, approved or denied determinations and any other requests to dedicated fax number below belonging to the campus where the patient is receiving treatment    FACILITY NAME UR FAX NUMBER   ADMISSION DENIALS (Administrative/Medical Necessity) [phone]   PARENT CHILD HEALTH (Maternity/NICU/Pediatrics) Dg [phone]   Eulalia Fox [phone]   Jeff Cobb [phone]   [address] [address] [phone]

## 2019-10-29 NOTE — ASSESSMENT & PLAN NOTE
· Continue the stroke pathway  · No evidence of hemodynamic significant stenosis, aneurysm, or dissection on CTA of the head and neck with and without contrast  · Concern for cervical spine pathology with the feeling of almost total body paralysis initially after the onset of severe cervical neck pain  · MRI of the brain without contrast - unremarkable  · MRI of the cervical spine without contrast - essentially unremarkable with no acute findings to suggest cord compression, although evaluation of cord was limited  · Echocardiogram - unremarkable  · Discussed with neurology  Outpatient follow up appropriate  · Continue xarelto 20 mg PO Qdaily  · The patient was initiated on high-intensity statin therapy with atorvastatin 40 mg PO Qdaily with dinner  · PT/OT - stable

## 2019-10-29 NOTE — DISCHARGE SUMMARY
Discharge- Cornelio Plummer 1955, 71 y o  male MRN: [id number]    Unit/Bed#: 956-67 Encounter: [id number]    Primary Care Provider: Gus Castañeda MD   Date and time admitted to hospital: 10/26/2019  3:16 PM        * Weakness of right upper extremity  Assessment & Plan  · Continue the stroke pathway  · No evidence of hemodynamic significant stenosis, aneurysm, or dissection on CTA of the head and neck with and without contrast  · Concern for cervical spine pathology with the feeling of almost total body paralysis initially after the onset of severe cervical neck pain  · MRI of the brain without contrast - unremarkable  · MRI of the cervical spine without contrast - essentially unremarkable with no acute findings to suggest cord compression, although evaluation of cord was limited  · Echocardiogram - unremarkable  · Discussed with neurology  Outpatient follow up appropriate  · Continue xarelto 20 mg PO Qdaily  · The patient was initiated on high-intensity statin therapy with atorvastatin 40 mg PO Qdaily with dinner  · PT/OT - stable  Bilateral lower extremity edema  Assessment & Plan  · Chronic lymphedema, which has worsened last 1-2 weeks  ·  echocardiogram - unremarkable  · Venous duplex of the bilateral lower extremities - enlarged lymph node of the Left groin  · Continue outpatient treatment with the lymphedema clinic    Thrombocytopenia ([facility] [address] )  Assessment & Plan  · Mild thrombocytopenia  · Monitor for any signs of bleed  · Follow the platelet count  · Outpatient Hematology/Oncology evaluation    Morbid obesity with BMI of 40 0-44 9, adult (HCC)  Assessment & Plan  · Lifestyle modifications are recommended including weight loss, improving his diet, and increasing his amount of activity  Cervical pain (neck)  Assessment & Plan  · MRI of the c-spine unremarkable  · PT/OT    Paresthesias  Assessment & Plan  · See plan for "weakness of the RUE"      Leukopenia  Assessment & Plan  · The leukopenia has resolved  · No signs of infection  · Follow the CBC    Obstructive sleep apnea on CPAP  Assessment & Plan  · Continue CPAP QHS and anytime while sleeping      Atrial fibrillation (HCC)  Assessment & Plan  · Continue anticoagulation with xarelto  · Continue PO metoprolol for heart rate control  · Outpatient follow-up with Cardiology      Discharging Physician / Practitioner: Mignon Lobato PA-C  PCP: Michael Strickland MD  Admission Date:   Admission Orders (From admission, onward)     Ordered        10/26/19 1614  Inpatient Admission  Once                   Discharge Date: 10/29/19    Resolved Problems  Date Reviewed: 10/29/2019    None          Consultations During Hospital Stay:  · neurology    Procedures Performed:   · none    Significant Findings / Test Results:   X-ray Chest 1 View Portable  Result Date: 10/27/2019  Impression: No acute cardiopulmonary disease  Findings are stable Workstation performed: [id number]     Mri Brain Wo Contrast  Result Date: 10/28/2019  Impression: Mild chronic microvascular ischemic disease No acute ischemic disease Age-related atrophy Workstation performed: [id number]     Mri Cervical Spine Wo Contrast  Result Date: 10/29/2019  Impression: Straightening of the cervical spine and lower cervical degenerative discogenic disease with endplate osteophytic ridging and uncovertebral hypertrophy resulting in mild spinal stenosis and foraminal narrowing at C5-C6 and C6-C7  Limited evaluation of the cervical cord  No cord compression or cord signal abnormality  Workstation performed: [id number]     Ct Stroke Alert Brain  Result Date: 10/26/2019  Impression: No acute intracranial abnormality  Microangiopathic changes  Findings were directly discussed with DUSTIN RIOJAS on 10/26/2019 3:28 PM  Workstation performed: [id number]     Cta Stroke Alert (head/neck)  Result Date: 10/26/2019  Impression: No evidence of hemodynamic significant stenosis, aneurysm or dissection   Findings were directly discussed with DUSTIN RIOJAS on 10/26/2019 3:36 PM  Workstation performed: [id number]         Incidental Findings:   · none    Test Results Pending at Discharge (will require follow up):   · none     Outpatient Tests Requested:  · none    Complications:  Patient's abdominal girth was too excessive to support MRI of the cervical spine at this campus, which delayed his hospital discharge by 1 day due to need for inpatient MRI at another campus  Reason for Admission: sudden "paralysis" of all 4 extremities with severe neck pain  Hospital Course: Pito Ocasio is a 71 y o  male patient who originally presented to the hospital on 10/26/2019 due to CVA/TIA-like Symptoms (Patient went to the bathroom and while on the toilet he started having bilateral weakness, espcially on the left  He couldnt use his hand or his leg on that side )    Essentially the patient was admitted in the stroke pathway  He had a CTA of the head and neck, MRI of the brain and MRI of the c-spine performed, all negative for acute stroke or cord compression  He also had a neurology consultation performed  Overall his symptoms resolved at the time of discharge  He will need close outpatient follow up with neurology, however, no further outpatient testing is recommended at this time  Please see above list of diagnoses and related plan for additional information  Condition at Discharge: good       Discharge Day Visit / Exam:   Subjective:    Vitals: Blood Pressure: 123/82 (10/29/19 1716)  Pulse: 88 (10/29/19 0758)  Temperature: 98 7 °F (37 1 °C) (10/29/19 0758)  Temp Source: Oral (10/28/19 2311)  Respirations: 18 (10/29/19 0758)  Height: 5' 9" (175 3 cm) (10/28/19 1255)  Weight - Scale: (!) 137 kg (302 lb) (10/28/19 1254)  SpO2: 94 % (10/29/19 0758)  Exam:   Physical Exam   Constitutional: He is oriented to person, place, and time  He appears well-developed and well-nourished  No distress  HENT:   Head: Normocephalic  Mouth/Throat: Oropharynx is clear and moist    Neck: Neck supple  Cardiovascular: Normal rate and regular rhythm  No murmur heard  Pulmonary/Chest: Effort normal and breath sounds normal    Abdominal: Soft  Bowel sounds are normal    Musculoskeletal: He exhibits no edema  Neurological: He is alert and oriented to person, place, and time  He displays normal reflexes  No cranial nerve deficit  Coordination normal    Skin: Skin is warm and dry  He is not diaphoretic  Psychiatric: He has a normal mood and affect  His behavior is normal  Thought content normal    Nursing note and vitals reviewed  Discharge instructions/Information to patient and family:   See after visit summary for information provided to patient and family  Provisions for Follow-Up Care:  See after visit summary for information related to follow-up care and any pertinent home health orders  Disposition:   Home        Planned Readmission: no     Discharge Statement:  I spent 45 minutes discharging the patient  This time was spent on the day of discharge  I had direct contact with the patient on the day of discharge  Greater than 50% of the total time was spent examining patient, answering all patient questions, arranging and discussing plan of care with patient as well as directly providing post-discharge instructions  Additional time then spent on discharge activities  Discharge Medications:  See after visit summary for reconciled discharge medications provided to patient and family        ** Please Note: This note has been constructed using a voice recognition system **

## 2019-10-29 NOTE — PLAN OF CARE
Problem: Potential for Falls  Goal: Patient will remain free of falls  Description  INTERVENTIONS:  - Assess patient frequently for physical needs  -  Identify cognitive and physical deficits and behaviors that affect risk of falls    -  [place] fall precautions as indicated by assessment   - Educate patient/family on patient safety including physical limitations  - Instruct patient to call for assistance with activity based on assessment  - Modify environment to reduce risk of injury  - Consider OT/PT consult to assist with strengthening/mobility  Outcome: Progressing     Problem: PAIN - ADULT  Goal: Verbalizes/displays adequate comfort level or baseline comfort level  Description  Interventions:  - Encourage patient to monitor pain and request assistance  - Assess pain using appropriate pain scale  - Administer analgesics based on type and severity of pain and evaluate response  - Implement non-pharmacological measures as appropriate and evaluate response  - Consider cultural and social influences on pain and pain management  - Notify physician/advanced practitioner if interventions unsuccessful or patient reports new pain  Outcome: Progressing     Problem: SAFETY ADULT  Goal: Maintain or return to baseline ADL function  Description  INTERVENTIONS:  -  Assess patient's ability to carry out ADLs; assess patient's baseline for ADL function and identify physical deficits which impact ability to perform ADLs (bathing, care of mouth/teeth, toileting, grooming, dressing, etc )  - Assess/evaluate cause of self-care deficits   - Assess range of motion  - Assess patient's mobility; develop plan if impaired  - Assess patient's need for assistive devices and provide as appropriate  - Encourage maximum independence but intervene and supervise when necessary  - Involve family in performance of ADLs  - Assess for home care needs following discharge   - Consider OT consult to assist with ADL evaluation and planning for discharge  - Provide patient education as appropriate  Outcome: Progressing  Goal: Maintain or return mobility status to optimal level  Description  INTERVENTIONS:  - Assess patient's baseline mobility status (ambulation, transfers, stairs, etc )    - Identify cognitive and physical deficits and behaviors that affect mobility  - Identify mobility aids required to assist with transfers and/or ambulation (gait belt, sit-to-stand, lift, walker, cane, etc )  - [place] fall precautions as indicated by assessment  - Record patient progress and toleration of activity level on Mobility SBAR; progress patient to next Phase/Stage  - Instruct patient to call for assistance with activity based on assessment  - Consider rehabilitation consult to assist with strengthening/weightbearing, etc   Outcome: Progressing     Problem: DISCHARGE PLANNING  Goal: Discharge to home or other facility with appropriate resources  Description  INTERVENTIONS:  - Identify barriers to discharge w/patient and caregiver  - Arrange for needed discharge resources and transportation as appropriate  - Identify discharge learning needs (meds, wound care, etc )    - Refer to Case Management Department for coordinating discharge planning if the patient needs post-hospital services based on physician/advanced practitioner order or complex needs related to functional status, cognitive ability, or social support system   Outcome: Progressing     Problem: Knowledge Deficit  Goal: Patient/family/caregiver demonstrates understanding of disease process, treatment plan, medications, and discharge instructions  Description  Complete learning assessment and assess knowledge base    Interventions:  - Provide teaching at level of understanding  - Provide teaching via preferred learning methods  Outcome: Progressing     Problem: Neurological Deficit  Goal: Neurological status is stable or improving  Description  Interventions:  - Monitor and assess patient's level of consciousness, motor function, sensory function, and level of assistance needed for ADLs  - Monitor and report changes from baseline  Collaborate with interdisciplinary team to initiate plan and implement interventions as ordered  - Provide and maintain a safe environment  - Consider seizure precautions  - Consider fall precautions  - Consider aspiration precautions  - Consider bleeding precautions  Outcome: Progressing     Problem: Nutrition/Hydration-ADULT  Goal: Nutrient/Hydration intake appropriate for improving, restoring or maintaining nutritional needs  Description  Monitor and assess patient's nutrition/hydration status for malnutrition  Collaborate with interdisciplinary team and initiate plan and interventions as ordered  Monitor patient's weight and dietary intake as ordered or per policy  Utilize nutrition screening tool and intervene as necessary  Determine patient's food preferences and provide high-protein, high-caloric foods as appropriate       INTERVENTIONS:  - Monitor oral intake, urinary output, labs, and treatment plans  - Assess nutrition and hydration status and recommend course of action  - Evaluate amount of meals eaten  - Assist patient with eating if necessary   - Allow adequate time for meals  - Recommend/ encourage appropriate diets, oral nutritional supplements, and vitamin/mineral supplements  - Order, calculate, and assess calorie counts as needed  - Recommend, monitor, and adjust tube feedings and TPN/PPN based on assessed needs  - Assess need for intravenous fluids  - Provide specific nutrition/hydration education as appropriate  - Include patient/family/caregiver in decisions related to nutrition  Outcome: Progressing     Problem: DISCHARGE PLANNING - CARE MANAGEMENT  Goal: Discharge to post-acute care or home with appropriate resources  Description  INTERVENTIONS:  - Conduct assessment to determine patient/family and health care team treatment goals, and need for post-acute services based on payer coverage, community resources, and patient preferences, and barriers to discharge  - Address psychosocial, clinical, and financial barriers to discharge as identified in assessment in conjunction with the patient/family and health care team  - Arrange appropriate level of post-acute services according to patient's   needs and preference and payer coverage in collaboration with the physician and health care team  - Communicate with and update the patient/family, physician, and health care team regarding progress on the discharge plan  - Arrange appropriate transportation to post-acute venues    Pt's goal is to return home   Outcome: Progressing

## 2019-10-29 NOTE — SOCIAL WORK
Pt is going to [facility] for a MRI of cervical spine  Pt is scheduled for MRI of Cervical spine at 1230 pm  [facility] will pick the patient at 11:30am to transport BLS to [facility]  [facility] ambulance understand they have to bring the patient back after procedure which will take approx 30 minutes

## 2019-10-29 NOTE — PROGRESS NOTES
Progress Note - Ibis Gave 1955, 71 y o  male MRN: [id number]    Unit/Bed#: 497-72 Encounter: [id number]    Primary Care Provider: Francisco Marie MD   Date and time admitted to hospital: 10/26/2019  3:16 PM        Bilateral lower extremity edema  Assessment & Plan  · Chronic lymphedema, which has worsened last 1-2 weeks  ·  echocardiogram - unremarkable  · Venous duplex of the bilateral lower extremities - enlarged lymph node of the Left groin  · Continue outpatient treatment with the lymphedema clinic    Thrombocytopenia ([facility] [address] )  Assessment & Plan  · Mild thrombocytopenia  · Monitor for any signs of bleed  · Follow the platelet count  · Outpatient Hematology/Oncology evaluation    Morbid obesity with BMI of 40 0-44 9, adult (HCC)  Assessment & Plan  · Lifestyle modifications are recommended including weight loss, improving his diet, and increasing his amount of activity  Cervical pain (neck)  Assessment & Plan  · Check an MRI of the cervical spine without contrast in the setting of bilateral upper extremity weakness and paresthesias  · PT/OT    Paresthesias  Assessment & Plan  · See plan for "weakness of the RUE"      Leukopenia  Assessment & Plan  · The leukopenia has resolved  · No signs of infection  · Follow the CBC    Obstructive sleep apnea on CPAP  Assessment & Plan  · Continue CPAP QHS and anytime while sleeping      Atrial fibrillation (HCC)  Assessment & Plan  · Continue anticoagulation with xarelto  · Continue PO metoprolol for heart rate control  · Outpatient follow-up with Cardiology    * Weakness of right upper extremity  Assessment & Plan  · Continue the stroke pathway  · No evidence of hemodynamic significant stenosis, aneurysm, or dissection on CTA of the head and neck with and without contrast  · Concern for cervical spine pathology with the feeling of almost total body paralysis initially after the onset of severe cervical neck pain  · MRI of the brain without contrast - unremarkable  · MRI of the cervical spine without contrast - unable to obtain at this campus due to abdominal girth  Currently attempting to obtain at other campuses  · Echocardiogram - unremarkable  · Continue xarelto 20 mg PO Qdaily  · The patient was initiated on high-intensity statin therapy with atorvastatin 40 mg PO Qdaily with dinner  · Neurology consult pending  · PT/OT        VTE Pharmacologic Prophylaxis:   Pharmacologic: Rivaroxaban (Xarelto)  Mechanical VTE Prophylaxis in Place: Yes    Time Spent for Care: 30 minutes  More than 50% of total time spent on counseling and coordination of care as described above  Current Length of Stay: 2 day(s)    Current Patient Status: Inpatient   Certification Statement: The patient will continue to require additional inpatient hospital stay due to need for MRI c-spine  Discharge Plan / Estimated Discharge Date: TBD      Code Status: Level 1 - Full Code      Subjective:   Patient feels well today  No HA or neck pain  No further UE or LE weakness  Objective:   Vitals:   Temp (24hrs), Av 4 °F (36 9 °C), Min:98 1 °F (36 7 °C), Max:98 5 °F (36 9 °C)    Temp:  [98 1 °F (36 7 °C)-98 5 °F (36 9 °C)] 98 3 °F (36 8 °C)  HR:  [88-99] 90  Resp:  [16-18] 18  BP: (122-166)/(55-95) 122/55  SpO2:  [72 %-98 %] 94 %  Body mass index is 44 6 kg/m²  Input and Output Summary (last 24 hours): Intake/Output Summary (Last 24 hours) at 10/28/2019 2041  Last data filed at 10/28/2019 1811  Gross per 24 hour   Intake 480 ml   Output    Net 480 ml         Physical Exam:   Physical Exam   Constitutional: He is oriented to person, place, and time  He appears well-developed and well-nourished  Obese  HENT:   Head: Normocephalic  Mouth/Throat: Oropharynx is clear and moist    Neck: Neck supple  Cardiovascular: Normal rate and regular rhythm  No murmur heard  Pulmonary/Chest: Effort normal and breath sounds normal  No respiratory distress  He has no wheezes  Abdominal: Soft  Bowel sounds are normal  There is no tenderness  Musculoskeletal: He exhibits edema (1+ BLE)  Neurological: He is alert and oriented to person, place, and time  No cranial nerve deficit or sensory deficit  He exhibits normal muscle tone  Coordination normal    Skin: Skin is warm and dry  Psychiatric: He has a normal mood and affect  His behavior is normal    Nursing note and vitals reviewed  Additional Data:   Labs:    Results from last 7 days   Lab Units 10/28/19  0435   WBC Thousand/uL 4 54   HEMOGLOBIN g/dL 11 9*   HEMATOCRIT % 36 0*   PLATELETS Thousands/uL 126*   NEUTROS PCT % 66   LYMPHS PCT % 23   MONOS PCT % 9   EOS PCT % 2     Results from last 7 days   Lab Units 10/28/19  0435 10/27/19  0649   POTASSIUM mmol/L 4 0 4 1   CHLORIDE mmol/L 107 107   CO2 mmol/L 26 28   BUN mg/dL 14 16   CREATININE mg/dL 0 70 0 67   CALCIUM mg/dL 8 5 8 6   ALK PHOS U/L  --  59   ALT U/L  --  19   AST U/L  --  19     Results from last 7 days   Lab Units 10/26/19  1522   INR  1 18       * I Have Reviewed All Lab Data Listed Above  * Additional Pertinent Lab Tests Reviewed: All Labs Within Last 24 Hours Reviewed    Imaging:  Imaging Reports Reviewed Today Include: no new imaging to review          Recent Cultures (last 7 days):           Last 24 Hours Medication List:     Current Facility-Administered Medications:  acetaminophen 650 mg Oral Q6H PRN Marin Rolls, DO   allopurinol 100 mg Oral Daily Marin Rolls, DO   atorvastatin 40 mg Oral QPM Tremayne Durbin, DO   ferrous sulfate 325 mg Oral Daily With Breakfast Marin Rolls, DO   gabapentin 100 mg Oral BID Marin Rolls, DO   iohexol 100 mL Intravenous Once in [address], DO   metoprolol tartrate 12 5 mg Oral BID Marin Rolls, DO   multivitamin-minerals 1 tablet Oral Daily Marin Rolls, DO   oxyCODONE 5 mg Oral Q4H PRN Marin Rolls, DO   Or       oxyCODONE 10 mg Oral Q4H PRN Marin Rolls, DO pneumococcal 23-valent polysaccharide vaccine 0 5 mL Subcutaneous Prior to discharge Rodrigue Hill DO   rivaroxaban 20 mg Oral Daily Rodrigue Hill,         Today, Patient Was Seen By: Vane Nieves PA-C    ** Please Note: Dragon 360 Dictation voice to text software may have been used in the creation of this document   **

## 2019-10-30 NOTE — UTILIZATION REVIEW
Notification of Discharge  This is a Notification of Discharge from our facility [address]  Please be advised that this patient has been discharge from our facility  Below you will find the admission and discharge date and time including the patients disposition  PRESENTATION DATE: 10/26/2019  3:16 PM  OBS ADMISSION DATE:   IP ADMISSION DATE: 10/26/19 1614   DISCHARGE DATE: 10/29/2019  6:00 PM  DISPOSITION: Home/Self Care Home/Self Care   Admission Orders listed below:  Admission Orders (From admission, onward)     Ordered        10/26/19 1614  Inpatient Admission  Once                   Please contact the UR Department if additional information is required to close this patient's authorization/case  Network Utilization Review Department  [email or web address]  Main: [phone]  ATTENTION: Please call with any questions or concerns to [phone] and carefully listen to the prompts so that you are directed to the right person  All voicemails are confidential   Cheryl Mcdonald all requests for admission clinical reviews, approved or denied determinations and any other requests to dedicated fax number below belonging to the campus where the patient is receiving treatment    FACILITY NAME UR FAX NUMBER   ADMISSION DENIALS (Administrative/Medical Necessity) [address] [facility] (Maternity/NICU/Pediatrics) [phone]   [facility] [address] [facility] [address] [facility] [phone]   [address] [facility] [address] [facility] [phone]   Marlen Shutter [address] [phone]

## 2019-11-05 ENCOUNTER — EVALUATION (OUTPATIENT)
Dept: PHYSICAL THERAPY | Facility: CLINIC | Age: 64
End: 2019-11-05
Payer: COMMERCIAL

## 2019-11-05 DIAGNOSIS — I89.0 LYMPHEDEMA: Primary | ICD-10-CM

## 2019-11-05 PROCEDURE — 97162 PT EVAL MOD COMPLEX 30 MIN: CPT | Performed by: PHYSICAL THERAPIST

## 2019-11-05 PROCEDURE — 97140 MANUAL THERAPY 1/> REGIONS: CPT | Performed by: PHYSICAL THERAPIST

## 2019-11-05 NOTE — LETTER
2019    Ever Ingram MD  [address]    Patient: Prabhu Peraza   YOB: 1955   Date of Visit: 2019     Encounter Diagnosis     ICD-10-CM    1  Lymphedema I89 0        Dear Dr Tarah Nixon: Thank you for your recent referral of Prabhu Peraza  Please review the attached evaluation summary from [facility] recent visit  Please verify that you agree with the plan of care by signing the attached order  If you have any questions or concerns, please do not hesitate to call  I sincerely appreciate the opportunity to share in the care of one of your patients and hope to have another opportunity to work with you in the near future  Sincerely,    Kateryna Palm      Referring Provider:      I certify that I have read the below Plan of Care and certify the need for these services furnished under this plan of treatment while under my care  Ever Ingram MD  [address]  VIA Facsimile: [phone]          PT Evaluation     Today's date: 2019  Patient name: Prabhu Peraza  : 1955  MRN: [id number]  Referring provider: Fadia Samayoa MD  Dx:   Encounter Diagnosis     ICD-10-CM    1  Lymphedema I89 0        Assessment  Assessment details: Patient presents to OPPT with s/s consistent with bilateral LE lymphedema  PT interventions to include CDT (complete decongestive therapy) including MLD (manual lymphatic drainage) and compression using short stretch bandages as able  PT to further provide extensive patient education on self bandaging, self MLD techniques, and skin care  Patient will transition patient to long term maintenance with compression plan for both morning and evening wear once optimal decongestive and volume reduction of limb has been achieved  Thank you for this referral and the opportunity to participate in the care of this patient      Impairments: abnormal gait, abnormal or restricted ROM, abnormal movement and activity intolerance  Understanding of Dx/Px/POC: good   Prognosis: good    Goals  STGs to be achieved in 2 - 4 weeks  Demonstrate at least 75% compliance with self MLD  Demonstrate at least 75% compliance with self compression  Demonstrate at least 75% compliance with self skin and nail inspection  Free from s/s of infection  Demonstrate understanding of importance of compliance with POC    LTGs to be achieved in 4 - 6 weeks  Demonstrate at least 100% compliance with self MLD  Demonstrate at least 100% compliance with self compression  Demonstrate at least 100% compliance with self skin and nail inspection  Free from s/s of infection  Demonstrate understanding of day/night compression wearing schedule  Obtain alternative compression to ensure independence with self management      Plan  Plan details: Diagnosis of lymphedema - prior patient, likely resolve well  Anticipate heavy manual requirements given diagnosis  Requesting 8 sessions with units as follows:  20 manual  12 there ex  Patient would benefit from: skilled physical therapy  Other planned modality interventions: Modalities prn for symptom management  Planned therapy interventions: manual therapy, orthotic fitting/training, therapeutic exercise and home exercise program  Frequency: 2x week  Duration in weeks: 4  Plan of Care beginning date: 11/5/2019  Plan of Care expiration date: 12/5/2019  Treatment plan discussed with: patient        Subjective Evaluation    History of Present Illness  Date of onset: 10/26/2019  Date of surgery: 4/2/2019  Mechanism of injury: surgery  Mechanism of injury: Patient reports he had a recent increased in swelling since hospital admission for a suspected CVA  CVA was ruled out and it was thought instead that his symptoms were related to his cervical spine  Presents with increased bilateral LEs edema since admission and discharge    Quality of life: good    Pain  No pain reported      Diagnostic Tests  MRI studies: normal  Treatments  Previous treatment: physical therapy  Current treatment: physical therapy  Patient Goals  Patient goals for therapy: decreased edema and independence with ADLs/IADLs          Objective     Lymphedema Evaluation     Medical Considerations:  NEG Cardiac  NEG Pulmonary  NEG Kidney  NEG Liver  NEG Current Fever/Infection  NEG Current/Recent Wounds  NEG Current/Recent Treatments/Interventions/Port Access/PICC Line  NEG Current/Recent/History of DVT or Clotting issues    ROM Considerations:  WFL to allow for tolerating of short stretch bandage    Strength Considerations:  WFL to allow for tolerating of short stretch bandage    Gait Considerations:  WFL to allow for tolerating of short stretch bandage  Steady with no SPC      Skin Considerations/Scars:  Patient presents with skin inspection as follows:  Hemosiderin staining/skin discoloration - POS  Finger/Toe Nails - NEG  Open Wounds - NEG  S/S infection - NEG  Firm/Sponge/Hard - POS  [place]au [place] - NEG  Weeping - NEG    Girth:    LE girth measurements (cm)      Right           Left   Forefoot             25             26          Metatarsals             26  28  Malleolus              30  31 5  +8 cm                           32 5  32  +16 cm                          36 5  36  +24 cm                                   46  38  +32                                          51 5                 53   Knee joint line              56  52 5    Malleoli to Fib head length - 40 cm

## 2019-11-05 NOTE — PROGRESS NOTES
PT Evaluation     Today's date: 2019  Patient name: Prabhu Peraza  : 1955  MRN: [id number]  Referring provider: Fadia Samayoa MD  Dx:   Encounter Diagnosis     ICD-10-CM    1  Lymphedema I89 0        Assessment  Assessment details: Patient presents to OPPT with s/s consistent with bilateral LE lymphedema  PT interventions to include CDT (complete decongestive therapy) including MLD (manual lymphatic drainage) and compression using short stretch bandages as able  PT to further provide extensive patient education on self bandaging, self MLD techniques, and skin care  Patient will transition patient to long term maintenance with compression plan for both morning and evening wear once optimal decongestive and volume reduction of limb has been achieved  Thank you for this referral and the opportunity to participate in the care of this patient  Impairments: abnormal gait, abnormal or restricted ROM, abnormal movement and activity intolerance  Understanding of Dx/Px/POC: good   Prognosis: good    Goals  STGs to be achieved in 2 - 4 weeks  Demonstrate at least 75% compliance with self MLD  Demonstrate at least 75% compliance with self compression  Demonstrate at least 75% compliance with self skin and nail inspection  Free from s/s of infection  Demonstrate understanding of importance of compliance with POC    LTGs to be achieved in 4 - 6 weeks  Demonstrate at least 100% compliance with self MLD  Demonstrate at least 100% compliance with self compression  Demonstrate at least 100% compliance with self skin and nail inspection  Free from s/s of infection  Demonstrate understanding of day/night compression wearing schedule  Obtain alternative compression to ensure independence with self management      Plan  Plan details: Diagnosis of lymphedema - prior patient, likely resolve well    Anticipate heavy manual requirements given diagnosis  Requesting 8 sessions with units as follows:  20 manual  12 there ex  Patient would benefit from: skilled physical therapy  Other planned modality interventions: Modalities prn for symptom management  Planned therapy interventions: manual therapy, orthotic fitting/training, therapeutic exercise and home exercise program  Frequency: 2x week  Duration in weeks: 4  Plan of Care beginning date: 11/5/2019  Plan of Care expiration date: 12/5/2019  Treatment plan discussed with: patient        Subjective Evaluation    History of Present Illness  Date of onset: 10/26/2019  Date of surgery: 4/2/2019  Mechanism of injury: surgery  Mechanism of injury: Patient reports he had a recent increased in swelling since hospital admission for a suspected CVA  CVA was ruled out and it was thought instead that his symptoms were related to his cervical spine  Presents with increased bilateral LEs edema since admission and discharge  Quality of life: good    Pain  No pain reported      Diagnostic Tests  MRI studies: normal  Treatments  Previous treatment: physical therapy  Current treatment: physical therapy  Patient Goals  Patient goals for therapy: decreased edema and independence with ADLs/IADLs          Objective     Lymphedema Evaluation     Medical Considerations:  NEG Cardiac  NEG Pulmonary  NEG Kidney  NEG Liver  NEG Current Fever/Infection  NEG Current/Recent Wounds  NEG Current/Recent Treatments/Interventions/Port Access/PICC Line  NEG Current/Recent/History of DVT or Clotting issues    ROM Considerations:  WFL to allow for tolerating of short stretch bandage    Strength Considerations:  WFL to allow for tolerating of short stretch bandage    Gait Considerations:  WFL to allow for tolerating of short stretch bandage  Steady with no SPC      Skin Considerations/Scars:  Patient presents with skin inspection as follows:  Hemosiderin staining/skin discoloration - POS  Finger/Toe Nails - NEG  Open Wounds - NEG  S/S infection - NEG  Firm/Sponge/Hard - POS  Peau D' Orange - NEG  Weeping - NEG    Girth:    LE girth measurements (cm)      Right           Left   Forefoot             25             26          Metatarsals             26  28  Malleolus              30  31 5  +8 cm                           32 5  32  +16 cm                          36 5  36  +24 cm                                   46  38  +32                                          51 5                 53   Knee joint line              56  52 5    Malleoli to Fib head length - 40 cm

## 2019-11-05 NOTE — LETTER
2019    Marco Jimenez MD  [address]    Patient: Domi January   YOB: 1955   Date of Visit: 2019     Encounter Diagnosis     ICD-10-CM    1  Lymphedema I89 0        Dear Dr Constance Cervantes: Thank you for your recent referral of Domi Rubin  Please review the attached evaluation summary from [facility] recent visit  Please verify that you agree with the plan of care by signing the attached order  If you have any questions or concerns, please do not hesitate to call  I sincerely appreciate the opportunity to share in the care of one of your patients and hope to have another opportunity to work with you in the near future  Sincerely,    Zhang Mccain      Referring Provider:      I certify that I have read the below Plan of Care and certify the need for these services furnished under this plan of treatment while under my care  MD Martin Chinchilla Dr  VIA Facsimile: [phone]          PT Evaluation     Today's date: 2019  Patient name: Domi January  : 1955  MRN: [id number]  Referring provider: Salvador Ford MD  Dx:   Encounter Diagnosis     ICD-10-CM    1  Lymphedema I89 0        Assessment  Assessment details: Patient presents to OPPT with s/s consistent with bilateral LE lymphedema  PT interventions to include CDT (complete decongestive therapy) including MLD (manual lymphatic drainage) and compression using short stretch bandages as able  PT to further provide extensive patient education on self bandaging, self MLD techniques, and skin care  Patient will transition patient to long term maintenance with compression plan for both morning and evening wear once optimal decongestive and volume reduction of limb has been achieved  Thank you for this referral and the opportunity to participate in the care of this patient      Impairments: abnormal gait, abnormal or restricted ROM, abnormal movement and activity intolerance  Understanding of Dx/Px/POC: good   Prognosis: good    Goals  STGs to be achieved in 2 - 4 weeks  Demonstrate at least 75% compliance with self MLD  Demonstrate at least 75% compliance with self compression  Demonstrate at least 75% compliance with self skin and nail inspection  Free from s/s of infection  Demonstrate understanding of importance of compliance with POC    LTGs to be achieved in 4 - 6 weeks  Demonstrate at least 100% compliance with self MLD  Demonstrate at least 100% compliance with self compression  Demonstrate at least 100% compliance with self skin and nail inspection  Free from s/s of infection  Demonstrate understanding of day/night compression wearing schedule  Obtain alternative compression to ensure independence with self management      Plan  Patient would benefit from: skilled physical therapy  Other planned modality interventions: Modalities prn for symptom management  Planned therapy interventions: manual therapy, orthotic fitting/training, therapeutic exercise and home exercise program  Frequency: 2x week  Duration in weeks: 4  Plan of Care beginning date: 11/5/2019  Plan of Care expiration date: 12/5/2019  Treatment plan discussed with: patient        Subjective Evaluation    History of Present Illness  Date of onset: 10/26/2019  Date of surgery: 4/2/2019  Mechanism of injury: surgery  Mechanism of injury: Patient reports he had a recent increased in swelling since hospital admission for a suspected CVA  CVA was ruled out and it was thought instead that his symptoms were related to his cervical spine  Presents with increased bilateral LEs edema since admission and discharge    Quality of life: good    Pain  No pain reported      Diagnostic Tests  MRI studies: normal  Treatments  Previous treatment: physical therapy  Current treatment: physical therapy  Patient Goals  Patient goals for therapy: decreased edema and independence with ADLs/IADLs          Objective     Lymphedema Evaluation     Medical Considerations:  NEG Cardiac  NEG Pulmonary  NEG Kidney  NEG Liver  NEG Current Fever/Infection  NEG Current/Recent Wounds  NEG Current/Recent Treatments/Interventions/Port Access/PICC Line  NEG Current/Recent/History of DVT or Clotting issues    ROM Considerations:  WFL to allow for tolerating of short stretch bandage    Strength Considerations:  WFL to allow for tolerating of short stretch bandage    Gait Considerations:  WFL to allow for tolerating of short stretch bandage  Steady with no SPC      Skin Considerations/Scars:  Patient presents with skin inspection as follows:  Hemosiderin staining/skin discoloration - POS  Finger/Toe Nails - NEG  Open Wounds - NEG  S/S infection - NEG  Firm/Sponge/Hard - POS  Peau D' Orange - NEG  Weeping - NEG    Girth:    LE girth measurements (cm)      Right           Left   Forefoot             25             26          Metatarsals             26  28  Malleolus              30  31 5  +8 cm                           32 5  32  +16 cm                          36 5  36  +24 cm                                   46  38  +32                                          51 5                 53   Knee joint line              56  52 5    Malleoli to Fib head length - 40 cm

## 2019-11-07 ENCOUNTER — OFFICE VISIT (OUTPATIENT)
Dept: PHYSICAL THERAPY | Facility: CLINIC | Age: 64
End: 2019-11-07
Payer: COMMERCIAL

## 2019-11-07 DIAGNOSIS — I89.0 LYMPHEDEMA: Primary | ICD-10-CM

## 2019-11-07 PROCEDURE — 97140 MANUAL THERAPY 1/> REGIONS: CPT | Performed by: PHYSICAL THERAPIST

## 2019-11-07 PROCEDURE — 97110 THERAPEUTIC EXERCISES: CPT | Performed by: PHYSICAL THERAPIST

## 2019-11-07 NOTE — PROGRESS NOTES
Daily Note     Today's date: 2019  Patient name: Teresita Fan  : 1955  MRN: [id number]  Referring provider: Arlene Juarez MD  Dx:   Encounter Diagnosis     ICD-10-CM    1  Lymphedema I89 0                   Subjective: I'm doing great  My leg isn't as itchy, but it's still dry  Objective: See treatment diary below      Assessment: Tolerated treatment well  Patient demonstrated fatigue post treatment and would benefit from continued PT  Ongoing redness left LE, improved right LE coloration noted this date  Exudate noted on inner liner, no clear location of open wound upon visual inspection  Plan: Continue per plan of care           Precautions:  Falls, THR   Re-eval Date: 19    Date       Visit Count 1 2      FOTO        Pain In 0 0      Pain Out 0 0              Manual         MLD  45 mins                  Exercise Diary         Aerobic  10 mins   Nustep  L3      LE lymphedema TE   *                                                 Modalities

## 2019-11-11 ENCOUNTER — TRANSCRIBE ORDERS (OUTPATIENT)
Dept: PHYSICAL THERAPY | Facility: CLINIC | Age: 64
End: 2019-11-11

## 2019-11-11 DIAGNOSIS — I89.0 LYMPHEDEMA PRAECOX: Primary | ICD-10-CM

## 2019-11-14 ENCOUNTER — OFFICE VISIT (OUTPATIENT)
Dept: PHYSICAL THERAPY | Facility: CLINIC | Age: 64
End: 2019-11-14
Payer: COMMERCIAL

## 2019-11-14 DIAGNOSIS — I89.0 LYMPHEDEMA: Primary | ICD-10-CM

## 2019-11-14 PROCEDURE — 97140 MANUAL THERAPY 1/> REGIONS: CPT | Performed by: PHYSICAL THERAPIST

## 2019-11-14 NOTE — PROGRESS NOTES
Daily Note     Today's date: 2019  Patient name: Jennifer Burr  : 1955  MRN: [id number]  Referring provider: Chiqui Gotti MD  Dx:   Encounter Diagnosis     ICD-10-CM    1  Lymphedema I89 0                   Subjective: My legs are feeling better, but my left leg still has itchy skin  Also, I'm seeing the podiatrist for a possible injection  Objective: See treatment diary below      Assessment: Tolerated treatment well  Patient would benefit from continued PT and has improving tissue texture and volume in right LE, left remains limited with ongoing increased volume  Plan: Continue per plan of care           Precautions:  Falls, THR   Re-eval Date: 19    Date      Visit Count 1 2 3     FOTO        Pain In 0 0 0     Pain Out 0 0 0             Manual         MLD  45 mins 55 mins                 Exercise Diary         Aerobic  10 mins   Nustep  L3      LE lymphedema TE    Reviewed verbally, encouraged return to wellness program/gym                                                 Modalities

## 2019-11-21 ENCOUNTER — APPOINTMENT (OUTPATIENT)
Dept: PHYSICAL THERAPY | Facility: CLINIC | Age: 64
End: 2019-11-21
Payer: COMMERCIAL

## 2019-11-22 ENCOUNTER — OFFICE VISIT (OUTPATIENT)
Dept: PHYSICAL THERAPY | Facility: CLINIC | Age: 64
End: 2019-11-22
Payer: COMMERCIAL

## 2019-11-22 DIAGNOSIS — I89.0 LYMPHEDEMA: Primary | ICD-10-CM

## 2019-11-22 PROCEDURE — 97140 MANUAL THERAPY 1/> REGIONS: CPT | Performed by: PHYSICAL THERAPIST

## 2019-11-22 NOTE — PROGRESS NOTES
Daily Note     Today's date: 2019  Patient name: Asa Norwood  : 1955  MRN: [id number]  Referring provider: Ancelmo Fleming MD  Dx:   Encounter Diagnosis     ICD-10-CM    1  Lymphedema I89 0                   Subjective: Patient presents with much improved sensation in LEs  Reports "I got a shot and it's feeling better "  (Referring to foot)  Objective: See treatment diary below      Assessment: Tolerated treatment well  Patient demonstrated fatigue post treatment and would benefit from continued PT  Patient presents with much improved tissue texture and edema  No weeping noted at this point  Healed areas noted  Plan: Continue per plan of care  Adjusted shower/hot tub/lotion use at home  Patient now uses hot tub, shower, and then applies topical lotion  Plan for follow up appointment in 3 weeks to ensure proper fit of compression and good reduction  At this point, patient will be due to updated compression garments          Precautions:  Falls, THR   Re-eval Date: 19    Date     Visit Count 1 2 3 4    FOTO        Pain In 0 0 0 0    Pain Out 0 0 0 0            Manual         MLD  45 mins 55 mins 35 mins                Exercise Diary         Aerobic  10 mins   Nustep  L3      LE lymphedema TE    Reviewed verbally, encouraged return to wellness program/gym                                                 Modalities

## 2019-12-09 ENCOUNTER — OFFICE VISIT (OUTPATIENT)
Dept: PHYSICAL THERAPY | Facility: CLINIC | Age: 64
End: 2019-12-09
Payer: COMMERCIAL

## 2019-12-09 DIAGNOSIS — I89.0 LYMPHEDEMA: Primary | ICD-10-CM

## 2019-12-09 PROCEDURE — 97110 THERAPEUTIC EXERCISES: CPT | Performed by: PHYSICAL THERAPIST

## 2019-12-09 PROCEDURE — 97140 MANUAL THERAPY 1/> REGIONS: CPT | Performed by: PHYSICAL THERAPIST

## 2019-12-09 NOTE — LETTER
2019    Kameron Chavez MD  [address]    Patient: Jennifer Burr   YOB: 1955   Date of Visit: 2019     Encounter Diagnosis     ICD-10-CM    1  Lymphedema I89 0        Dear Dr Dahlia Ross: Thank you for your recent referral of Jennifer Burr  Please review the attached evaluation summary from [facility] recent visit  Please verify that you agree with the plan of care by signing the attached order  If you have any questions or concerns, please do not hesitate to call  I sincerely appreciate the opportunity to share in the care of one of your patients and hope to have another opportunity to work with you in the near future  Sincerely,    Manuel Lindseymsted      Referring Provider:      I certify that I have read the below Plan of Care and certify the need for these services furnished under this plan of treatment while under my care  Kameron Chavez MD  [address] [facility]: [phone]          PT Re-Evaluation     Today's date: 2019  Patient name: Jennifer Burr  : 1955  MRN: [id number]  Referring provider: Chiqui Gotti MD  Dx:   Encounter Diagnosis     ICD-10-CM    1  Lymphedema I89 0        Assessment  Assessment details: Patient presents to OPPT with s/s consistent with bilateral LE lymphedema  Patient has made good progress thus far  Good volume reduction, improved skin texture  Patient overall doing well with maintenance of volume  Upcoming contact with [employer] to order new compression on patient's behalf  Patient to call with details for call to DME company  Discharge at this time    Impairments: abnormal gait, abnormal or restricted ROM, abnormal movement and activity intolerance  Understanding of Dx/Px/POC: good   Prognosis: good    Goals  STGs to be achieved in 2 - 4 weeks - MET as noted  Demonstrate at least 75% compliance with self MLD  Demonstrate at least 75% compliance with self compression  Demonstrate at least 75% compliance with self skin and nail inspection  Free from s/s of infection  Demonstrate understanding of importance of compliance with POC    LTGs to be achieved in 4 - 6 weeks - MET as noted  Demonstrate at least 100% compliance with self MLD  Demonstrate at least 100% compliance with self compression  Demonstrate at least 100% compliance with self skin and nail inspection  Free from s/s of infection  Demonstrate understanding of day/night compression wearing schedule  Obtain alternative compression to ensure independence with self management      Plan  Plan details: Discharge this date, 2 MT, 1 TE review for self stretch required for today's session  Patient would benefit from: skilled physical therapy  Other planned modality interventions: Modalities prn for symptom management  Frequency: 1x week  Duration in visits: 1  Duration in weeks: 1  Plan of Care beginning date: 12/9/2019  Plan of Care expiration date: 12/9/2019  Treatment plan discussed with: patient        Subjective Evaluation    History of Present Illness  Date of onset: 10/26/2019  Date of surgery: 4/2/2019  Mechanism of injury: surgery  Mechanism of injury: UPDATE:    Patient reports doing well, feet still uncomfortable, but doing okay with volume and skin texture  Patient reports he had a recent increased in swelling since hospital admission for a suspected CVA  CVA was ruled out and it was thought instead that his symptoms were related to his cervical spine  Presents with increased bilateral LEs edema since admission and discharge    Quality of life: good    Pain  No pain reported      Diagnostic Tests  MRI studies: normal  Treatments  Previous treatment: physical therapy  Current treatment: physical therapy  Patient Goals  Patient goals for therapy: decreased edema and independence with ADLs/IADLs          Objective     Lymphedema Evaluation     Medical Considerations:  NEG Cardiac  NEG Pulmonary  NEG Kidney  NEG Liver  NEG Current Fever/Infection  NEG Current/Recent Wounds  NEG Current/Recent Treatments/Interventions/Port Access/PICC Line  NEG Current/Recent/History of DVT or Clotting issues    ROM Considerations:  WFL to allow for tolerating of short stretch bandage    Strength Considerations:  WFL to allow for tolerating of short stretch bandage    Gait Considerations:  WFL to allow for tolerating of short stretch bandage  Steady with no SPC      Skin Considerations/Scars:  Patient presents with skin inspection as follows:  Hemosiderin staining/skin discoloration - POS  Finger/Toe Nails - NEG  Open Wounds - NEG  S/S infection - NEG  Firm/Sponge/Hard - POS  Peau D' Orange - NEG  Weeping - NEG    Girth:    LE girth measurements (cm)      Right           Left   Forefoot             25             25          Metatarsals             26  27  Malleolus              30  31  +8 cm                           32  32  +16 cm                          36  36  +24 cm                                   44  38  +32                                          51                 52   Knee joint line              54  52    Malleoli to Fib head length - 40 cm

## 2019-12-09 NOTE — PROGRESS NOTES
PT Re-Evaluation     Today's date: 2019  Patient name: Elena Lucero  : 1955  MRN: [id number]  Referring provider: Jaclyn Villatoro MD  Dx:   Encounter Diagnosis     ICD-10-CM    1  Lymphedema I89 0        Assessment  Assessment details: Patient presents to OPPT with s/s consistent with bilateral LE lymphedema  Patient has made good progress thus far  Good volume reduction, improved skin texture  Patient overall doing well with maintenance of volume  Upcoming contact with [facility] to order new compression on patient's behalf  Patient to call with details for call to DME company  Discharge at this time    Impairments: abnormal gait, abnormal or restricted ROM, abnormal movement and activity intolerance  Understanding of Dx/Px/POC: good   Prognosis: good    Goals  STGs to be achieved in 2 - 4 weeks - MET as noted  Demonstrate at least 75% compliance with self MLD  Demonstrate at least 75% compliance with self compression  Demonstrate at least 75% compliance with self skin and nail inspection  Free from s/s of infection  Demonstrate understanding of importance of compliance with POC    LTGs to be achieved in 4 - 6 weeks - MET as noted  Demonstrate at least 100% compliance with self MLD  Demonstrate at least 100% compliance with self compression  Demonstrate at least 100% compliance with self skin and nail inspection  Free from s/s of infection  Demonstrate understanding of day/night compression wearing schedule  Obtain alternative compression to ensure independence with self management      Plan  Plan details: Discharge this date, 2 MT, 1 TE review for self stretch required for today's session  Patient would benefit from: skilled physical therapy  Other planned modality interventions: Modalities prn for symptom management  Frequency: 1x week  Duration in visits: 1  Duration in weeks: 1  Plan of Care beginning date: 2019  Plan of Care expiration date: 2019  Treatment plan discussed with: patient        Subjective Evaluation    History of Present Illness  Date of onset: 10/26/2019  Date of surgery: 4/2/2019  Mechanism of injury: surgery  Mechanism of injury: UPDATE:    Patient reports doing well, feet still uncomfortable, but doing okay with volume and skin texture  Patient reports he had a recent increased in swelling since hospital admission for a suspected CVA  CVA was ruled out and it was thought instead that his symptoms were related to his cervical spine  Presents with increased bilateral LEs edema since admission and discharge  Quality of life: good    Pain  No pain reported      Diagnostic Tests  MRI studies: normal  Treatments  Previous treatment: physical therapy  Current treatment: physical therapy  Patient Goals  Patient goals for therapy: decreased edema and independence with ADLs/IADLs          Objective     Lymphedema Evaluation     Medical Considerations:  NEG Cardiac  NEG Pulmonary  NEG Kidney  NEG Liver  NEG Current Fever/Infection  NEG Current/Recent Wounds  NEG Current/Recent Treatments/Interventions/Port Access/PICC Line  NEG Current/Recent/History of DVT or Clotting issues    ROM Considerations:  WFL to allow for tolerating of short stretch bandage    Strength Considerations:  WFL to allow for tolerating of short stretch bandage    Gait Considerations:  WFL to allow for tolerating of short stretch bandage  Steady with no SPC      Skin Considerations/Scars:  Patient presents with skin inspection as follows:  Hemosiderin staining/skin discoloration - POS  Finger/Toe Nails - NEG  Open Wounds - NEG  S/S infection - NEG  Firm/Sponge/Hard - POS  Peau D' Orange - NEG  Weeping - NEG    Girth:    LE girth measurements (cm)      Right           Left   Forefoot             25             25          Metatarsals             26  27  Malleolus              30  31  +8 cm                           32  32  +16 cm                          36  36  +24 cm 44  38  +32                                          51                 52   Knee joint line              54  52    Malleoli to Fib head length - 40 cm

## 2019-12-18 ENCOUNTER — TRANSCRIBE ORDERS (OUTPATIENT)
Dept: ADMINISTRATIVE | Facility: HOSPITAL | Age: 64
End: 2019-12-18

## 2019-12-18 ENCOUNTER — HOSPITAL ENCOUNTER (OUTPATIENT)
Dept: RADIOLOGY | Facility: HOSPITAL | Age: 64
Discharge: HOME | End: 2019-12-18
Attending: PODIATRIST
Payer: COMMERCIAL

## 2019-12-18 ENCOUNTER — TRANSCRIBE ORDERS (OUTPATIENT)
Dept: PHYSICAL THERAPY | Facility: CLINIC | Age: 64
End: 2019-12-18

## 2019-12-18 DIAGNOSIS — M54.30 SCIATICA, UNSPECIFIED LATERALITY: ICD-10-CM

## 2019-12-18 DIAGNOSIS — I89.0 OBLITERATION OF LYMPHATIC VESSEL: Primary | ICD-10-CM

## 2019-12-18 DIAGNOSIS — M54.30 SCIATICA, UNSPECIFIED LATERALITY: Primary | ICD-10-CM

## 2019-12-18 PROCEDURE — 72110 X-RAY EXAM L-2 SPINE 4/>VWS: CPT

## 2020-03-19 ENCOUNTER — EVALUATION (OUTPATIENT)
Dept: PHYSICAL THERAPY | Facility: CLINIC | Age: 65
End: 2020-03-19
Payer: COMMERCIAL

## 2020-03-19 DIAGNOSIS — I89.0 LYMPHEDEMA: Primary | ICD-10-CM

## 2020-03-19 PROCEDURE — 97162 PT EVAL MOD COMPLEX 30 MIN: CPT | Performed by: PHYSICAL THERAPIST

## 2020-03-19 PROCEDURE — 97140 MANUAL THERAPY 1/> REGIONS: CPT | Performed by: PHYSICAL THERAPIST

## 2020-03-19 NOTE — LETTER
2020    Ozzy Hardy MD  [address] [place] [address]    Patient: Pito Ocasio   YOB: 1955   Date of Visit: 3/19/2020     Encounter Diagnosis     ICD-10-CM    1  Lymphedema I89 0        Dear Dr hSola Choi: Thank you for your recent referral of Pito Ocasio  Please review the attached evaluation summary from [facility] recent visit  Please verify that you agree with the plan of care by signing the attached order  If you have any questions or concerns, please do not hesitate to call  I sincerely appreciate the opportunity to share in the care of one of your patients and hope to have another opportunity to work with you in the near future  Sincerely,    Elias Nicholson      Referring Provider:      I certify that I have read the below Plan of Care and certify the need for these services furnished under this plan of treatment while under my care  Ozzy Hardy MD  [address]: [phone]          PT Evaluation     Today's date: 3/19/2020  Patient name: Pito Ocasio  : 1955  MRN: [id number]  Referring provider: Austin Linares MD  Dx:   Encounter Diagnosis     ICD-10-CM    1  Lymphedema I89 0                   Assessment  Assessment details: Patient presents to OPPT with s/s consistent with bilateral LE lymphedema  PT interventions to include CDT (complete decongestive therapy) including MLD (manual lymphatic drainage) and compression using short stretch bandages as able  PT to further provide extensive patient education on self bandaging, self MLD techniques, and skin care  Patient will transition patient to long term maintenance with compression plan for both morning and evening wear once optimal decongestive and volume reduction of limb has been achieved  Thank you for this referral and the opportunity to participate in the care of this patient      Impairments: abnormal or restricted ROM, activity intolerance, impaired balance, impaired physical strength and safety issue  Understanding of Dx/Px/POC: good   Prognosis: good    Goals  STGs to be achieved in 2 - 4 weeks  Demonstrate at least 75% compliance with self MLD  Demonstrate at least 75% compliance with self compression  Demonstrate at least 75% compliance with self skin and nail inspection  Free from s/s of infection  Demonstrate understanding of importance of compliance with POC    LTGs to be achieved in 4 - 6 weeks  Demonstrate at least 100% compliance with self MLD  Demonstrate at least 100% compliance with self compression  Demonstrate at least 100% compliance with self skin and nail inspection  Free from s/s of infection  Demonstrate understanding of day/night compression wearing schedule  Obtain alternative compression to ensure independence with self management      Plan  Patient would benefit from: skilled physical therapy  Other planned modality interventions: modalities prn for symptom management  Planned therapy interventions: manual therapy, neuromuscular re-education, therapeutic exercise and home exercise program  Frequency: 2x week  Duration in visits: 8  Duration in weeks: 4  Plan of Care beginning date: 3/19/2020  Plan of Care expiration date: 4/18/2020  Treatment plan discussed with: patient and PTA        Subjective Evaluation    History of Present Illness  Date of onset: 3/6/2020  Mechanism of injury: Patient reports bilateral foot swelling, saw podiatrist and was referred to PCP  Patient saw PCP and had bilateral foot swelling  Also with increased thigh edema since last episode of care  She increased diuretic and patient has follow up upcoming  He presents with ongoing pain in feet, however, improved since last episode of care with addition of gabapentin and tramadol  Of note:  Patient has started with diet in the last few weeks to include more fruit and vegetables, intermittent protein bars    Limiting salt intake    Quality of life: good    Pain  Current pain ratin  At best pain ratin  At worst pain ratin  Location: Bilateral feet  Quality: burning  Relieving factors: medications  Aggravating factors: standing, walking and stair climbing  Progression: improved    Treatments  Previous treatment: physical therapy and injection treatment  Current treatment: medication and physical therapy  Patient Goals  Patient goals for therapy: decreased edema, increased motion, independence with ADLs/IADLs and increased strength          Objective  Lymphedema Evaluation    Medical Considerations:  NEG Cardiac  NEG Pulmonary  NEG Kidney  NEG Liver  NEG Current Fever/Infection  NEG Current/Recent Wounds  NEG Current/Recent Treatments/Interventions/Port Access/PICC Line  NEG Current/Recent/History of DVT or Clotting issues    ROM Considerations:  ROM WFL, h/o THR    Strength Considerations:  Grossly WFL bilateral LEs    Gait Considerations:  WFL bilateral LEs    Skin Considerations/Scars:  Patient presents with skin inspection as follows:  Hemosiderin staining/skin discoloration - POS  Finger/Toe Nails - NEG  Open Wounds - NEG  S/S infection - NEG  Firm/Sponge/Hard - POS  Peau D' Orange - POS  Weeping - POS    Girth:    LE girth measurements (cm)      Right           Left   Forefoot             25 5  28                           Metatarsals             25  30  Malleolus   26  31 5  +4               28 5  29  +8 cm    32  32  +12                           34         35  +16 cm    38  36  +20                           41 5  41  +24 cm    45  46  +28                                   46 5  50  +32                                          51 5                 53 5              Knee joint line              60  58  +36    52 5  52  +40    64  62  +44    68 5  68  +48    70 5  69  +52    74  71  +56    75 5  74 5  +60    75  77  +64    76  79  +68    79  80    Malleoli to Fib head length - 36 cm  To trochanter - 80 cm Precautions: Falls, peripheral neuropathy  Re-eval Date: 4/18/2020    Date 3/19       Visit Count 1       FOTO        Pain In See IE       Pain Out See IE           Manual                             Exercise Diary         Lymph TE LE                                            Modalities

## 2020-03-19 NOTE — PROGRESS NOTES
PT Evaluation     Today's date: 3/19/2020  Patient name: Gia Dumont  : 1955  MRN: [id number]  Referring provider: Macario Parry MD  Dx:   Encounter Diagnosis     ICD-10-CM    1  Lymphedema I89 0                   Assessment  Assessment details: Patient presents to OPPT with s/s consistent with bilateral LE lymphedema  PT interventions to include CDT (complete decongestive therapy) including MLD (manual lymphatic drainage) and compression using short stretch bandages as able  PT to further provide extensive patient education on self bandaging, self MLD techniques, and skin care  Patient will transition patient to long term maintenance with compression plan for both morning and evening wear once optimal decongestive and volume reduction of limb has been achieved  Thank you for this referral and the opportunity to participate in the care of this patient      Impairments: abnormal or restricted ROM, activity intolerance, impaired balance, impaired physical strength and safety issue  Understanding of Dx/Px/POC: good   Prognosis: good    Goals  STGs to be achieved in 2 - 4 weeks  Demonstrate at least 75% compliance with self MLD  Demonstrate at least 75% compliance with self compression  Demonstrate at least 75% compliance with self skin and nail inspection  Free from s/s of infection  Demonstrate understanding of importance of compliance with POC    LTGs to be achieved in 4 - 6 weeks  Demonstrate at least 100% compliance with self MLD  Demonstrate at least 100% compliance with self compression  Demonstrate at least 100% compliance with self skin and nail inspection  Free from s/s of infection  Demonstrate understanding of day/night compression wearing schedule  Obtain alternative compression to ensure independence with self management      Plan  Patient would benefit from: skilled physical therapy  Other planned modality interventions: modalities prn for symptom management  Planned therapy interventions: manual therapy, neuromuscular re-education, therapeutic exercise and home exercise program  Frequency: 2x week  Duration in visits: 8  Duration in weeks: 4  Plan of Care beginning date: 3/19/2020  Plan of Care expiration date: 2020  Treatment plan discussed with: patient and PTA        Subjective Evaluation    History of Present Illness  Date of onset: 3/6/2020  Mechanism of injury: Patient reports bilateral foot swelling, saw podiatrist and was referred to PCP  Patient saw PCP and had bilateral foot swelling  Also with increased thigh edema since last episode of care  She increased diuretic and patient has follow up upcoming  He presents with ongoing pain in feet, however, improved since last episode of care with addition of gabapentin and tramadol  Of note:  Patient has started with diet in the last few weeks to include more fruit and vegetables, intermittent protein bars  Limiting salt intake    Quality of life: good    Pain  Current pain ratin  At best pain ratin  At worst pain ratin  Location: Bilateral feet  Quality: burning  Relieving factors: medications  Aggravating factors: standing, walking and stair climbing  Progression: improved    Treatments  Previous treatment: physical therapy and injection treatment  Current treatment: medication and physical therapy  Patient Goals  Patient goals for therapy: decreased edema, increased motion, independence with ADLs/IADLs and increased strength          Objective  Lymphedema Evaluation    Medical Considerations:  NEG Cardiac  NEG Pulmonary  NEG Kidney  NEG Liver  NEG Current Fever/Infection  NEG Current/Recent Wounds  NEG Current/Recent Treatments/Interventions/Port Access/PICC Line  NEG Current/Recent/History of DVT or Clotting issues    ROM Considerations:  ROM WFL, h/o THR    Strength Considerations:  Grossly WFL bilateral LEs    Gait Considerations:  WFL bilateral LEs    Skin Considerations/Scars:  Patient presents with skin inspection as follows:  Hemosiderin staining/skin discoloration - POS  Finger/Toe Nails - NEG  Open Wounds - NEG  S/S infection - NEG  Firm/Sponge/Hard - POS  Peau D' Orange - POS  Weeping - POS    Girth:    LE girth measurements (cm)      Right           Left   Forefoot             25 5  28                           Metatarsals             25  30  Malleolus   26  31 5  +4               28 5  29  +8 cm    32  32  +12                           34         35  +16 cm    38  36  +20                           41 5  41  +24 cm    45  46  +28                                   46 5  50  +32                                          51 5                 53 5              Knee joint line              60  58  +36    52 5  52  +40    64  62  +44    68 5  68  +48    70 5  69  +52    74  71  +56    75 5  74 5  +60    75  77  +64    76  79  +68    79  80    Malleoli to Fib head length - 36 cm  To trochanter - 80 cm         Precautions: Falls, peripheral neuropathy  Re-eval Date: 4/18/2020    Date 3/19       Visit Count 1       FOTO        Pain In See IE       Pain Out See IE           Manual                             Exercise Diary         Lymph TE LE                                            Modalities

## 2020-03-24 ENCOUNTER — APPOINTMENT (OUTPATIENT)
Dept: PHYSICAL THERAPY | Facility: CLINIC | Age: 65
End: 2020-03-24
Payer: COMMERCIAL

## 2020-03-26 ENCOUNTER — EVALUATION (OUTPATIENT)
Dept: PHYSICAL THERAPY | Facility: CLINIC | Age: 65
End: 2020-03-26
Payer: COMMERCIAL

## 2020-03-26 ENCOUNTER — APPOINTMENT (OUTPATIENT)
Dept: PHYSICAL THERAPY | Facility: CLINIC | Age: 65
End: 2020-03-26
Payer: COMMERCIAL

## 2020-03-26 DIAGNOSIS — I89.0 LYMPHEDEMA: Primary | ICD-10-CM

## 2020-03-26 PROCEDURE — 97140 MANUAL THERAPY 1/> REGIONS: CPT | Performed by: PHYSICAL THERAPIST

## 2020-03-27 ENCOUNTER — TRANSCRIBE ORDERS (OUTPATIENT)
Dept: PHYSICAL THERAPY | Facility: CLINIC | Age: 65
End: 2020-03-27

## 2020-03-27 DIAGNOSIS — I89.0 OBLITERATION OF LYMPHATIC VESSEL: Primary | ICD-10-CM

## 2020-03-27 NOTE — PROGRESS NOTES
Daily Note     Today's date: 3/26/2020  Patient name: Unk Mcburney  : 1955  MRN: [id number]  Referring provider: Franklin Jain MD  Dx:   Encounter Diagnosis     ICD-10-CM    1  Lymphedema I89 0                   Subjective: Patient reports he was tested for covid-19 as a precaution  PT spoke with primary care physician's office and he was indeed negative on his testing  Clerical staff provided with information to forward negative results  Objective: See treatment diary below      Assessment: Tolerated treatment well  Patient demonstrated fatigue post treatment and would benefit from continued PT  Discussed options for upper thigh compression  Plan: Continue per plan of care  Precautions: Falls, peripheral neuropathy  Re-eval Date: 2020    Date 3/19 3/26      Visit Count 1 2      FOTO        Pain In See IE 0      Pain Out See IE 0          Manual           55 mins              Compression placed using bilateral lower LE circaid, defers full leg compression due to knee/hip discomfort and needing to drive      Exercise Diary         Lymph TE LE                                            Modalities

## 2020-03-31 ENCOUNTER — APPOINTMENT (OUTPATIENT)
Dept: PHYSICAL THERAPY | Facility: CLINIC | Age: 65
End: 2020-03-31
Payer: COMMERCIAL

## 2020-04-02 ENCOUNTER — APPOINTMENT (OUTPATIENT)
Dept: PHYSICAL THERAPY | Facility: CLINIC | Age: 65
End: 2020-04-02
Payer: COMMERCIAL

## 2020-04-06 ENCOUNTER — OFFICE VISIT (OUTPATIENT)
Dept: PHYSICAL THERAPY | Facility: CLINIC | Age: 65
End: 2020-04-06
Payer: COMMERCIAL

## 2020-04-06 DIAGNOSIS — I89.0 LYMPHEDEMA: Primary | ICD-10-CM

## 2020-04-06 PROCEDURE — 97140 MANUAL THERAPY 1/> REGIONS: CPT | Performed by: PHYSICAL THERAPIST

## 2020-04-09 ENCOUNTER — APPOINTMENT (OUTPATIENT)
Dept: PHYSICAL THERAPY | Facility: CLINIC | Age: 65
End: 2020-04-09
Payer: COMMERCIAL

## 2020-04-13 ENCOUNTER — APPOINTMENT (OUTPATIENT)
Dept: PHYSICAL THERAPY | Facility: CLINIC | Age: 65
End: 2020-04-13
Payer: COMMERCIAL

## 2020-04-14 ENCOUNTER — OFFICE VISIT (OUTPATIENT)
Dept: PHYSICAL THERAPY | Facility: CLINIC | Age: 65
End: 2020-04-14
Payer: COMMERCIAL

## 2020-04-14 DIAGNOSIS — I89.0 LYMPHEDEMA: Primary | ICD-10-CM

## 2020-04-14 PROCEDURE — 97140 MANUAL THERAPY 1/> REGIONS: CPT | Performed by: PHYSICAL THERAPIST

## 2020-04-21 ENCOUNTER — APPOINTMENT (OUTPATIENT)
Dept: PHYSICAL THERAPY | Facility: CLINIC | Age: 65
End: 2020-04-21
Payer: COMMERCIAL

## 2020-04-22 ENCOUNTER — APPOINTMENT (OUTPATIENT)
Dept: PHYSICAL THERAPY | Facility: CLINIC | Age: 65
End: 2020-04-22
Payer: COMMERCIAL

## 2020-04-23 ENCOUNTER — OFFICE VISIT (OUTPATIENT)
Dept: PHYSICAL THERAPY | Facility: CLINIC | Age: 65
End: 2020-04-23
Payer: COMMERCIAL

## 2020-04-23 DIAGNOSIS — I89.0 LYMPHEDEMA: Primary | ICD-10-CM

## 2020-04-23 PROCEDURE — 97140 MANUAL THERAPY 1/> REGIONS: CPT | Performed by: PHYSICAL THERAPIST

## 2020-04-24 ENCOUNTER — TRANSCRIBE ORDERS (OUTPATIENT)
Dept: PHYSICAL THERAPY | Facility: CLINIC | Age: 65
End: 2020-04-24

## 2020-04-24 DIAGNOSIS — I89.0 LYMPHEDEMA: Primary | ICD-10-CM

## 2020-05-04 ENCOUNTER — APPOINTMENT (OUTPATIENT)
Dept: PHYSICAL THERAPY | Facility: CLINIC | Age: 65
End: 2020-05-04
Payer: COMMERCIAL

## 2020-05-07 ENCOUNTER — OFFICE VISIT (OUTPATIENT)
Dept: PHYSICAL THERAPY | Facility: CLINIC | Age: 65
End: 2020-05-07
Payer: COMMERCIAL

## 2020-05-07 DIAGNOSIS — I89.0 LYMPHEDEMA: Primary | ICD-10-CM

## 2020-05-07 PROCEDURE — 97140 MANUAL THERAPY 1/> REGIONS: CPT | Performed by: PHYSICAL THERAPIST

## 2020-05-19 ENCOUNTER — OFFICE VISIT (OUTPATIENT)
Dept: PHYSICAL THERAPY | Facility: CLINIC | Age: 65
End: 2020-05-19
Payer: COMMERCIAL

## 2020-05-19 DIAGNOSIS — I89.0 LYMPHEDEMA: Primary | ICD-10-CM

## 2020-05-19 PROCEDURE — 97140 MANUAL THERAPY 1/> REGIONS: CPT | Performed by: PHYSICAL THERAPIST

## 2020-05-26 ENCOUNTER — OFFICE VISIT (OUTPATIENT)
Dept: PHYSICAL THERAPY | Facility: CLINIC | Age: 65
End: 2020-05-26
Payer: COMMERCIAL

## 2020-05-26 DIAGNOSIS — I89.0 LYMPHEDEMA: Primary | ICD-10-CM

## 2020-05-26 PROCEDURE — 97140 MANUAL THERAPY 1/> REGIONS: CPT | Performed by: PHYSICAL THERAPIST

## 2020-05-29 ENCOUNTER — TRANSCRIBE ORDERS (OUTPATIENT)
Dept: PHYSICAL THERAPY | Facility: CLINIC | Age: 65
End: 2020-05-29

## 2020-05-29 DIAGNOSIS — I89.0 LYMPHEDEMA: Primary | ICD-10-CM

## 2020-06-04 ENCOUNTER — OFFICE VISIT (OUTPATIENT)
Dept: PHYSICAL THERAPY | Facility: CLINIC | Age: 65
End: 2020-06-04
Payer: COMMERCIAL

## 2020-06-04 DIAGNOSIS — I89.0 LYMPHEDEMA: Primary | ICD-10-CM

## 2020-06-04 PROCEDURE — 97140 MANUAL THERAPY 1/> REGIONS: CPT | Performed by: PHYSICAL THERAPIST

## 2020-06-12 ENCOUNTER — OFFICE VISIT (OUTPATIENT)
Dept: PHYSICAL THERAPY | Facility: CLINIC | Age: 65
End: 2020-06-12
Payer: COMMERCIAL

## 2020-06-12 DIAGNOSIS — I89.0 LYMPHEDEMA: Primary | ICD-10-CM

## 2020-06-12 PROCEDURE — 97140 MANUAL THERAPY 1/> REGIONS: CPT | Performed by: PHYSICAL THERAPIST

## 2020-06-18 ENCOUNTER — APPOINTMENT (OUTPATIENT)
Dept: PHYSICAL THERAPY | Facility: CLINIC | Age: 65
End: 2020-06-18
Payer: COMMERCIAL

## 2020-06-25 ENCOUNTER — TRANSCRIBE ORDERS (OUTPATIENT)
Dept: PHYSICAL THERAPY | Facility: CLINIC | Age: 65
End: 2020-06-25

## 2020-06-25 ENCOUNTER — OFFICE VISIT (OUTPATIENT)
Dept: PHYSICAL THERAPY | Facility: CLINIC | Age: 65
End: 2020-06-25
Payer: COMMERCIAL

## 2020-06-25 DIAGNOSIS — I89.0 LYMPHEDEMA: Primary | ICD-10-CM

## 2020-06-25 PROCEDURE — 97140 MANUAL THERAPY 1/> REGIONS: CPT | Performed by: PHYSICAL THERAPIST

## 2020-07-01 ENCOUNTER — APPOINTMENT (OUTPATIENT)
Dept: PHYSICAL THERAPY | Facility: CLINIC | Age: 65
End: 2020-07-01
Payer: COMMERCIAL

## 2020-07-07 ENCOUNTER — OFFICE VISIT (OUTPATIENT)
Dept: PHYSICAL THERAPY | Facility: CLINIC | Age: 65
End: 2020-07-07
Payer: COMMERCIAL

## 2020-07-07 DIAGNOSIS — I89.0 LYMPHEDEMA: Primary | ICD-10-CM

## 2020-07-07 PROCEDURE — 97140 MANUAL THERAPY 1/> REGIONS: CPT | Performed by: PHYSICAL THERAPIST

## 2020-07-07 NOTE — PROGRESS NOTES
Daily Note     Today's date: 2020  Patient name: Niurka Lagunas  : 1955  MRN: [id number]  Referring provider: Demi Rodrigez MD  Dx:   Encounter Diagnosis     ICD-10-CM    1  Lymphedema I89 0                   Subjective:  Patient reports he got a call from the DME rep, pending return call regarding available upper leg compression  Objective: See treatment diary below      Assessment: Tolerated treatment well  Patient would benefit from continued PT and continues with increased limb volume after prolonged working shifts and long duration of shifts  Patient reports having very few days off per month and working up to 16 hour shifts  Has not been able to get DME rep to provide upper leg compression options available through his company at this point  PT will contact rep on patient's behalf for further information  If no options are available, will return to OTC options as a possiblility  Will continue to follow  At this point, no weeping, no open areas noted  Does have small (quarter sized) abrasion on medial left calf  Pt unclear of etiology  At present, no s/s of infection  Does continue with difficulty maintaining volumes with present work shifts  Will continue to follow  Plan: Continue per plan of care  Precautions: Falls, peripheral neuropathy  Re-eval Date:2020    Date       Visit Count 11 12      FOTO        Pain In 0 3      Pain Out 0 0          Manual          55 mins 55 mins              Compression placed using bilateral lower LE circaid, defers full leg compression due to knee/hip discomfort and needing to drive  Exercise Diary         Lymph TE LE                HEP 5 mins                       General LE strengthening - blue t-band for ankle TE, general hip strengthening included  Gentle self stretch      Modalities

## 2020-07-14 ENCOUNTER — OFFICE VISIT (OUTPATIENT)
Dept: PHYSICAL THERAPY | Facility: CLINIC | Age: 65
End: 2020-07-14
Payer: COMMERCIAL

## 2020-07-14 DIAGNOSIS — I89.0 LYMPHEDEMA: Primary | ICD-10-CM

## 2020-07-14 PROCEDURE — 97140 MANUAL THERAPY 1/> REGIONS: CPT | Performed by: PHYSICAL THERAPIST

## 2020-07-14 NOTE — PROGRESS NOTES
Daily Note     Today's date: 2020  Patient name: Akira Davila  : 1955  MRN: [id number]  Referring provider: Garfield Pleitez MD  Dx:   Encounter Diagnosis     ICD-10-CM    1  Lymphedema I89 0                   Subjective: Patient reports his right leg is a little sore  Increased TTP with area of abrasion noted posterior right calf  Patient does use hot tub regularly  Recently had new steps installed for ease of entering  Previously, patient reports he was having difficulty entering and exiting  Does have appointment with vascular tomorrow for follow up  Patient notes he does take anticoagulation med daily  Objective: See treatment diary below      Assessment: Tolerated treatment well  Patient would benefit from continued PT  Patient continues with ongoing edema from increased standing  Notes increased difficulty with bending right knee to get in/out of car, tightness and discomfort experienced  Also notes he feels like his thigh is more congested  Patient's DME rep contacted regarded compression and is "looking into options that may be covered"  Rep educated regarding specific recommendations as patient will not be able to tolerate or effectively perform job requirements using thigh length velcro alternative  May need custom compression, however, DME rep to contact either patient or PT with updates regarding coverage  Plan: Continue per plan of care  Precautions: Falls, peripheral neuropathy  Re-eval Date:2020    Date      Visit Count 11 12 13     FOTO        Pain In 0 3 7     Pain Out 0 0 3         Manual          55 mins 55 mins 55 mins             Compression placed using bilateral lower LE circaid, defers full leg compression due to knee/hip discomfort and needing to drive  Exercise Diary         Lymph TE LE                HEP 5 mins                       General LE strengthening - blue t-band for ankle TE, general hip strengthening included    Gentle self stretch      Modalities

## 2020-07-23 ENCOUNTER — APPOINTMENT (OUTPATIENT)
Dept: PHYSICAL THERAPY | Facility: CLINIC | Age: 65
End: 2020-07-23
Payer: COMMERCIAL

## 2020-07-28 ENCOUNTER — OFFICE VISIT (OUTPATIENT)
Dept: PHYSICAL THERAPY | Facility: CLINIC | Age: 65
End: 2020-07-28
Payer: COMMERCIAL

## 2020-07-28 DIAGNOSIS — I89.0 LYMPHEDEMA: Primary | ICD-10-CM

## 2020-07-28 PROCEDURE — 97140 MANUAL THERAPY 1/> REGIONS: CPT | Performed by: PHYSICAL THERAPIST

## 2020-07-28 NOTE — LETTER
2020    Cordelia Walker MD  [address] [facility] 1  [address] [facility] [address]    Patient: Casa Gomez   YOB: 1955   Date of Visit: 2020     Encounter Diagnosis     ICD-10-CM    1  Lymphedema I89 0        Dear Dr Harry Saeed: Thank you for your recent referral of Duwaine Pool  Please review the attached evaluation summary from [facility] recent visit  Please verify that you agree with the plan of care by signing the attached order  If you have any questions or concerns, please do not hesitate to call  I sincerely appreciate the opportunity to share in the care of one of your patients and hope to have another opportunity to work with you in the near future  Sincerely,    Srinivasan Moore      Referring Provider:      I certify that I have read the below Plan of Care and certify the need for these services furnished under this plan of treatment while under my care  Cordelia Walker MD  [address] [address]  [address] [place] [address] Rd: [phone]          PT Re-Evaluation     Today's date: 2020  Patient name: Casa Gomez  : 1955  MRN: [id number]  Referring provider: Therese Mcallister MD  Dx:   Encounter Diagnosis     ICD-10-CM    1  Lymphedema I89 0                   Assessment  Assessment details: Patient presents to OPPT with s/s consistent with bilateral LE lymphedema  PT interventions to included CDT (complete decongestive therapy) including MLD (manual lymphatic drainage) and compression using short stretch bandages as able  PT further provided extensive patient education on self bandaging, self MLD techniques, and skin care  Patient has been having difficulty with independence with compression with onset of back, hip, and knee pain  Patient with known orthopedic issues that have been exacerbated by prolonged standing at work (up to 16 hour shifts) that has continued since last assessment period    He has had new onset of right calf pain and was seen by his cardiologist/vascular who recommended a non-urgent doppler given patient is anti-coagulated  With increased hours during the covid pandemic, he has had very few days off allowing attendance to PT sessions  He did receive his newest set of velcro compression garments and measurements for knee length compression pants have been taken and sent to DME rep  Ultimately, patient will transition patient to long term maintenance with compression plan for both morning and evening wear once optimal decongestive and volume reduction of limb has been achieved  Prolonged duration of sessions due to increased time of standing/walking at work  Patient continues to struggle maintaining during these grueling days  Thank you for this referral and the opportunity to participate in the care of this patient      Impairments: abnormal or restricted ROM, activity intolerance, impaired balance, impaired physical strength and safety issue  Understanding of Dx/Px/POC: good   Prognosis: good    Goals  STGs to be achieved in 2 - 4 weeks  Demonstrate at least 75% compliance with self MLD - MET  Demonstrate at least 75% compliance with self compression - MET  Demonstrate at least 75% compliance with self skin and nail inspection - MET  Free from s/s of infection - MET  Demonstrate understanding of importance of compliance with POC - MET    LTGs to be achieved in 4 - 6 weeks - ONGOING  Demonstrate at least 100% compliance with self MLD  Demonstrate at least 100% compliance with self compression  Demonstrate at least 100% compliance with self skin and nail inspection - MET  Free from s/s of infection - MET  Demonstrate understanding of day/night compression wearing schedule - MET  Obtain alternative compression to ensure independence with self management - Partially MET      Plan  Patient would benefit from: skilled physical therapy  Other planned modality interventions: modalities prn for symptom management  Planned therapy interventions: manual therapy, neuromuscular re-education, therapeutic exercise and home exercise program  Frequency: 2x week  Duration in visits: 8  Duration in weeks: 4  Plan of Care beginning date: 2020  Plan of Care expiration date: 2020  Treatment plan discussed with: patient and PTA        Subjective Evaluation    History of Present Illness  Date of onset: 3/6/2020  Mechanism of injury: UPDATE:  Patient reports he has been working long hours  Reports ongoing overtime work  He has been standing for long periods of time  LEs getting tired/sore  Swelling ongoing, foot pain improving overall  Feels like swelling is continuing to progress in right LE since last week  Patient reports bilateral foot swelling, saw podiatrist and was referred to PCP  Patient saw PCP and had bilateral foot swelling  Also with increased thigh edema since last episode of care  She increased diuretic and patient has follow up upcoming  He presents with ongoing pain in feet, however, improved since last episode of care with addition of gabapentin and tramadol  Of note:  Patient has started with diet in the last few weeks to include more fruit and vegetables, intermittent protein bars  Limiting salt intake    Quality of life: good    Pain  Current pain ratin  At best pain ratin  At worst pain ratin  Location: Bilateral feet  Quality: burning  Relieving factors: medications  Aggravating factors: standing, walking and stair climbing  Progression: improved    Treatments  Previous treatment: physical therapy and injection treatment  Current treatment: medication and physical therapy  Patient Goals  Patient goals for therapy: decreased edema, increased motion, independence with ADLs/IADLs and increased strength          Objective  Lymphedema Evaluation    Medical Considerations:  NEG Cardiac  NEG Pulmonary  NEG Kidney  NEG Liver  NEG Current Fever/Infection  NEG Current/Recent Wounds  NEG Current/Recent Treatments/Interventions/Port Access/PICC Line  NEG Current/Recent/History of DVT or Clotting issues    ROM Considerations:  ROM WFL, h/o THR    Strength Considerations:  Grossly WFL bilateral LEs    Gait Considerations:  WFL bilateral LEs    Skin Considerations/Scars:  Patient presents with skin inspection as follows:  Hemosiderin staining/skin discoloration - POS  Finger/Toe Nails - NEG  Open Wounds - NEG  S/S infection - NEG  Firm/Sponge/Hard - POS, but improving from [facility]  [place] - NEG  Weeping - POS - resolved from [facility]    Girth:    LE girth measurements (cm) - Current      Right           Left   Forefoot             26  26                           Metatarsals             28  28  Malleolus   32  32  +4               29  30  +8 cm    31 5  30  +12                           34         32  +16 cm   36  37 5  +20                           41 5  45  +24 cm   45  46  +28                                   48  49  +32                                          52                    53              Knee joint line              57  59  +36    52 5  53  +40    62  56  +44    68  67  +48    71 5  69 5  +52    72  74  +56    74  74  +60    75  78  +64    75 5  79  +68    79  80    Malleoli to Fib head length - 36 cm  To trochanter - 80 cm    Compression measurements for knee length compreshort - [facility] from [address] - contact Jennifer Derianas - [phone]      LE girth measurements (cm)      Right           Left   Forefoot             26  26                           Metatarsals             25  28  Malleolus   32  29 5  +4               30  30  +8 cm    31 5  32  +12                           34         35  +16 cm   36  36  +20                           40  40  +24 cm   44  44  +28                                   46  48  +32                                          51                 52 5             Knee joint line 60  57 5  +36    52  52  +40    64  62  +44    68 5  68  +48    70  69  +52    73  70 5  +56    73  73  +60    75  75  +64    75 5  77  +68    79  79           Daily Note     Today's date: 2020  Patient name: Lola Hoffman  : 1955  MRN: [id number]  Referring provider: Jose Rose MD  Dx:   Encounter Diagnosis     ICD-10-CM    1  Lymphedema I89 0        Start Time: 1430  Stop Time: 1530  Total time in clinic (min): 60 minutes    Subjective: See RE      Objective: See treatment diary below      Assessment: See RE      Plan: See RE     Precautions: Falls, peripheral neuropathy  Re-eval Date:2020    Date     Visit Count 11 12 13 14    FOTO        Pain In 0 3 7 See RE    Pain Out 0 0 3 See RE        Manual          55 mins 55 mins 55 mins 55 mins            Compression placed using bilateral lower LE circaid, defers full leg compression due to knee/hip discomfort and needing to drive  Exercise Diary         Lymph TE LE                HEP 5 mins                       General LE strengthening - blue t-band for ankle TE, general hip strengthening included  Gentle self stretch      Modalities

## 2020-07-28 NOTE — PROGRESS NOTES
PT Re-Evaluation     Today's date: 2020  Patient name: Norma Olvera  : 1955  MRN: [id number]  Referring provider: Carl Fiore MD  Dx:   Encounter Diagnosis     ICD-10-CM    1  Lymphedema I89 0                   Assessment  Assessment details: Patient presents to OPPT with s/s consistent with bilateral LE lymphedema  PT interventions to included CDT (complete decongestive therapy) including MLD (manual lymphatic drainage) and compression using short stretch bandages as able  PT further provided extensive patient education on self bandaging, self MLD techniques, and skin care  Patient has been having difficulty with independence with compression with onset of back, hip, and knee pain  Patient with known orthopedic issues that have been exacerbated by prolonged standing at work (up to 16 hour shifts) that has continued since last assessment period  He has had new onset of right calf pain and was seen by his cardiologist/vascular who recommended a non-urgent doppler given patient is anti-coagulated  With increased hours during the covid pandemic, he has had very few days off allowing attendance to PT sessions  He did receive his newest set of velcro compression garments and measurements for knee length compression pants have been taken and sent to DME rep  Ultimately, patient will transition patient to long term maintenance with compression plan for both morning and evening wear once optimal decongestive and volume reduction of limb has been achieved  Prolonged duration of sessions due to increased time of standing/walking at work  Patient continues to struggle maintaining during these grueling days  Thank you for this referral and the opportunity to participate in the care of this patient      Impairments: abnormal or restricted ROM, activity intolerance, impaired balance, impaired physical strength and safety issue  Understanding of Dx/Px/POC: good   Prognosis: good    Goals  STGs to be achieved in 2 - 4 weeks  Demonstrate at least 75% compliance with self MLD - MET  Demonstrate at least 75% compliance with self compression - MET  Demonstrate at least 75% compliance with self skin and nail inspection - MET  Free from s/s of infection - MET  Demonstrate understanding of importance of compliance with POC - MET    LTGs to be achieved in 4 - 6 weeks - ONGOING  Demonstrate at least 100% compliance with self MLD  Demonstrate at least 100% compliance with self compression  Demonstrate at least 100% compliance with self skin and nail inspection - MET  Free from s/s of infection - MET  Demonstrate understanding of day/night compression wearing schedule - MET  Obtain alternative compression to ensure independence with self management - Partially MET      Plan  Patient would benefit from: skilled physical therapy  Other planned modality interventions: modalities prn for symptom management  Planned therapy interventions: manual therapy, neuromuscular re-education, therapeutic exercise and home exercise program  Frequency: 2x week  Duration in visits: 8  Duration in weeks: 4  Plan of Care beginning date: 7/29/2020  Plan of Care expiration date: 8/28/2020  Treatment plan discussed with: patient and PTA        Subjective Evaluation    History of Present Illness  Date of onset: 3/6/2020  Mechanism of injury: UPDATE:  Patient reports he has been working long hours  Reports ongoing overtime work  He has been standing for long periods of time  LEs getting tired/sore  Swelling ongoing, foot pain improving overall  Feels like swelling is continuing to progress in right LE since last week  Patient reports bilateral foot swelling, saw podiatrist and was referred to PCP  Patient saw PCP and had bilateral foot swelling  Also with increased thigh edema since last episode of care  She increased diuretic and patient has follow up upcoming    He presents with ongoing pain in feet, however, improved since last episode of care with addition of gabapentin and tramadol  Of note:  Patient has started with diet in the last few weeks to include more fruit and vegetables, intermittent protein bars  Limiting salt intake    Quality of life: good    Pain  Current pain ratin  At best pain ratin  At worst pain ratin  Location: Bilateral feet  Quality: burning  Relieving factors: medications  Aggravating factors: standing, walking and stair climbing  Progression: improved    Treatments  Previous treatment: physical therapy and injection treatment  Current treatment: medication and physical therapy  Patient Goals  Patient goals for therapy: decreased edema, increased motion, independence with ADLs/IADLs and increased strength          Objective  Lymphedema Evaluation    Medical Considerations:  NEG Cardiac  NEG Pulmonary  NEG Kidney  NEG Liver  NEG Current Fever/Infection  NEG Current/Recent Wounds  NEG Current/Recent Treatments/Interventions/Port Access/PICC Line  NEG Current/Recent/History of DVT or Clotting issues    ROM Considerations:  ROM WFL, h/o THR    Strength Considerations:  Grossly WFL bilateral LEs    Gait Considerations:  WFL bilateral LEs    Skin Considerations/Scars:  Patient presents with skin inspection as follows:  Hemosiderin staining/skin discoloration - POS  Finger/Toe Nails - NEG  Open Wounds - NEG  S/S infection - NEG  Firm/Sponge/Hard - POS, but improving from [facility]  [place] - NEG  Weeping - POS - resolved from [facility]    Girth:    LE girth measurements (cm) - Current      Right           Left   Forefoot             26  26                           Metatarsals             28  28  Malleolus   32  32  +4               29  30  +8 cm    31 5  30  +12                           34         32  +16 cm   36  37 5  +20                           41 5  45  +24 cm   45  46  +28                                   48  49  +32                                          52                    53              Knee joint line              57  59  +36    52 5  53  +40    62  56  +44    68  67  +48    71 5  69 5  +52    72  74  +56    74  74  +60    75  78  +64    75 5  79  +68    79  80    Malleoli to Fib head length - 36 cm  To trochanter - 80 cm    Compression measurements for knee length compreshort - [facility] from [address] - contact Kelly Magallanes - [phone]      LE girth measurements (cm)      Right           Left   Forefoot             26  26                           Metatarsals             25  28  Malleolus   32  29 5  +4               30  30  +8 cm    31 5  32  +12                           34         35  +16 cm   36  36  +20                           40  40  +24 cm   44  44  +28                                   46  48  +32                                          51                 52 5             Knee joint line              60  57 5  +36    52  52  +40    64  62  +44    68 5  68  +48    70  69  +52    73  70 5  +56    73  73  +60    75  75  +64    75 5  77  +68    79  79

## 2020-07-29 NOTE — PROGRESS NOTES
Daily Note     Today's date: 2020  Patient name: Tae Fang  : 1955  MRN: [id number]  Referring provider: Martha Mcclain MD  Dx:   Encounter Diagnosis     ICD-10-CM    1  Lymphedema I89 0        Start Time: 1430  Stop Time: 1530  Total time in clinic (min): 60 minutes    Subjective: See RE      Objective: See treatment diary below      Assessment: See RE      Plan: See RE     Precautions: Falls, peripheral neuropathy  Re-eval Date:2020    Date     Visit Count 11 12 13 14    FOTO        Pain In 0 3 7 See RE    Pain Out 0 0 3 See RE        Manual          55 mins 55 mins 55 mins 55 mins            Compression placed using bilateral lower LE circaid, defers full leg compression due to knee/hip discomfort and needing to drive  Exercise Diary         Lymph TE LE                HEP 5 mins                       General LE strengthening - blue t-band for ankle TE, general hip strengthening included  Gentle self stretch      Modalities

## 2020-08-04 ENCOUNTER — OFFICE VISIT (OUTPATIENT)
Dept: PHYSICAL THERAPY | Facility: CLINIC | Age: 65
End: 2020-08-04
Payer: COMMERCIAL

## 2020-08-04 DIAGNOSIS — I89.0 LYMPHEDEMA: Primary | ICD-10-CM

## 2020-08-04 PROCEDURE — 97140 MANUAL THERAPY 1/> REGIONS: CPT

## 2020-08-04 NOTE — PROGRESS NOTES
Daily Note     Today's date: 2020  Patient name: Karo Mckeon  : 1955  MRN: [id number]  Referring provider: Fermin Case MD  Dx:   Encounter Diagnosis     ICD-10-CM    1  Lymphedema  I89 0                   Subjective:  I am going tmrw for doppler to R/O blood clots BL LE  No pain today in my legs  Dr r/o 2* increase edema BL LE      Objective: See treatment diary below      Assessment: Tolerated treatment well  Pt demonstrates BL LE edema  L>R today  Good tolerance with MLD/with circaid application to BL LE  Patient would benefit from continued PT      Plan: Continue per plan of care  Precautions: Falls, peripheral neuropathy  Re-eval Date:2020    Date    Visit Count 11 12 13 14 15   FOTO        Pain In 0 3 7 See RE    Pain Out 0 0 3 See RE        Manual          55 mins 55 mins 55 mins 55 mins 55 mins           Compression placed using bilateral lower LE circaid, defers full leg compression due to knee/hip discomfort and needing to drive  Exercise Diary         Lymph TE LE                HEP 5 mins                       General LE strengthening - blue t-band for ankle TE, general hip strengthening included  Gentle self stretch      Modalities

## 2020-08-05 ENCOUNTER — TRANSCRIBE ORDERS (OUTPATIENT)
Dept: VASCULAR SURGERY | Facility: CLINIC | Age: 65
End: 2020-08-05

## 2020-08-05 DIAGNOSIS — R60.0 BILATERAL LOWER EXTREMITY EDEMA: Primary | ICD-10-CM

## 2020-08-06 ENCOUNTER — TRANSCRIBE ORDERS (OUTPATIENT)
Dept: PHYSICAL THERAPY | Facility: CLINIC | Age: 65
End: 2020-08-06

## 2020-08-06 DIAGNOSIS — I89.0 OBLITERATION OF LYMPHATIC VESSEL: Primary | ICD-10-CM

## 2020-08-07 ENCOUNTER — APPOINTMENT (OUTPATIENT)
Dept: PHYSICAL THERAPY | Facility: CLINIC | Age: 65
End: 2020-08-07
Payer: COMMERCIAL

## 2020-08-10 ENCOUNTER — OFFICE VISIT (OUTPATIENT)
Dept: PHYSICAL THERAPY | Facility: CLINIC | Age: 65
End: 2020-08-10
Payer: COMMERCIAL

## 2020-08-10 DIAGNOSIS — I89.0 LYMPHEDEMA: Primary | ICD-10-CM

## 2020-08-10 PROCEDURE — 97140 MANUAL THERAPY 1/> REGIONS: CPT | Performed by: PHYSICAL THERAPIST

## 2020-08-10 NOTE — PROGRESS NOTES
Daily Note     Today's date: 8/10/2020  Patient name: Tae Fang  : 1955  MRN: [id number]  Referring provider: Martha Mcclain MD  Dx:   Encounter Diagnosis     ICD-10-CM    1  Lymphedema  I89 0                   Subjective: Reports he is doing okay  Had off today, legs are feeling better not being on them for >10 hours  Objective: See treatment diary below      Assessment: Tolerated treatment well  Patient demonstrated fatigue post treatment and would benefit from continued PT  Patient with improved pain in right LE  Ongoing increase in volume proximally  Awaiting further information from DME rep for compression shorts  PT reached out this date  Plan: Continue per plan of care  Precautions: Falls, peripheral neuropathy  Re-eval Date:2020    Date 8/10       Visit Count 16       FOTO        Pain In 0       Pain Out 0           Manual          55 mins               Compression placed using bilateral lower LE circaid, defers full leg compression due to knee/hip discomfort and needing to drive  Exercise Diary         Lymph TE LE                HEP 5 mins                       General LE strengthening - blue t-band for ankle TE, general hip strengthening included  Gentle self stretch      Modalities

## 2020-08-11 ENCOUNTER — APPOINTMENT (OUTPATIENT)
Dept: PHYSICAL THERAPY | Facility: CLINIC | Age: 65
End: 2020-08-11
Payer: COMMERCIAL

## 2020-08-13 ENCOUNTER — OFFICE VISIT (OUTPATIENT)
Dept: PHYSICAL THERAPY | Facility: CLINIC | Age: 65
End: 2020-08-13
Payer: COMMERCIAL

## 2020-08-13 DIAGNOSIS — I89.0 LYMPHEDEMA: Primary | ICD-10-CM

## 2020-08-13 PROCEDURE — 97140 MANUAL THERAPY 1/> REGIONS: CPT | Performed by: PHYSICAL THERAPIST

## 2020-08-13 NOTE — PROGRESS NOTES
Daily Note     Today's date: 2020  Patient name: Michelle Good  : 1955  MRN: [id number]  Referring provider: Albert Perea MD  Dx:   Encounter Diagnosis     ICD-10-CM    1  Lymphedema  I89 0                   Subjective: I think I'm doing better being off my feet a little more, but when I work long shifts, I'm still really swollen up my legs  I see the new vascular mabel this week  Objective: See treatment diary below      Assessment: Tolerated treatment well  Patient demonstrated fatigue post treatment and would benefit from continued PT  Patient with ongoing edema proximally, improving distally  Open areas resolving  Plan: Continue per plan of care  Precautions: Falls, peripheral neuropathy  Re-eval Date:2020    Date 8/10 8/13      Visit Count 16 17      FOTO        Pain In 0 0      Pain Out 0 0          Manual          55 mins 55 mins              Compression placed using bilateral lower LE circaid, defers full leg compression due to knee/hip discomfort and needing to drive  Exercise Diary         Lymph TE LE                HEP 5 mins                       General LE strengthening - blue t-band for ankle TE, general hip strengthening included  Gentle self stretch      Modalities

## 2020-08-14 ENCOUNTER — CONSULT (OUTPATIENT)
Dept: VASCULAR SURGERY | Facility: CLINIC | Age: 65
End: 2020-08-14
Payer: COMMERCIAL

## 2020-08-14 VITALS
BODY MASS INDEX: 43.84 KG/M2 | HEART RATE: 83 BPM | HEIGHT: 69 IN | DIASTOLIC BLOOD PRESSURE: 76 MMHG | WEIGHT: 296 LBS | SYSTOLIC BLOOD PRESSURE: 118 MMHG | TEMPERATURE: 98.3 F | RESPIRATION RATE: 20 BRPM

## 2020-08-14 DIAGNOSIS — I48.91 ATRIAL FIBRILLATION, UNSPECIFIED TYPE (HCC): ICD-10-CM

## 2020-08-14 DIAGNOSIS — E66.01 MORBID OBESITY WITH BMI OF 40.0-44.9, ADULT (HCC): ICD-10-CM

## 2020-08-14 DIAGNOSIS — I89.0 LYMPHEDEMA: Primary | ICD-10-CM

## 2020-08-14 DIAGNOSIS — R60.0 BILATERAL LOWER EXTREMITY EDEMA: ICD-10-CM

## 2020-08-14 DIAGNOSIS — I87.2 VENOUS INSUFFICIENCY OF BOTH LOWER EXTREMITIES: ICD-10-CM

## 2020-08-14 PROCEDURE — 99204 OFFICE O/P NEW MOD 45 MIN: CPT | Performed by: PHYSICIAN ASSISTANT

## 2020-08-14 NOTE — ASSESSMENT & PLAN NOTE
-continue to encourage weight management, lifestyle modifications and diet modifications for prevention of vascular disease and adjuvant therapy of chronic venous insufficiency and lymphedema  -continue follow-up and management with PCP

## 2020-08-14 NOTE — ASSESSMENT & PLAN NOTE
-stable, rate controlled  -continue current medical regimen with medication modifications per Cardiology  -continue anticoagulation therapy with Xarelto  -management and follow-up per Cardiology

## 2020-08-14 NOTE — PATIENT INSTRUCTIONS
Lymphedema   WHAT YOU NEED TO KNOW:   What do I need to know about lymphedema? The lymphatic system contains fluid, vessels, tissue, and organs  This system removes and carries fluid throughout the body  It also helps the body fight infection  Lymphedema is the buildup of lymph fluid in fat tissue under your skin  The buildup causes the area to swell  Lymphedema can happen any time that lymphatic vessels are blocked or damaged  What increases your risk for lymphedema? · Surgery to remove lymph nodes in the underarm, groin, pelvis, or neck    · Radiation to lymph nodes in the underarm, groin, pelvis, or neck    · Surgery to the chest, head, neck, or pelvis    · Being overweight or obese    · A history of venous insufficiency    · Infection or injury    · A history of diseases that affect the lymphatic system such as [place] disease  What are the signs and symptoms of lymphedema? Signs and symptoms may happen where lymph nodes were removed, or in the arm, leg, chest, or underarm  · Swelling or itching    · Pain, burning, or aching    · Tight, hard, or red skin    · Hair loss    · Heaviness or fullness    · Numbness or tingling    · Stiffness  How is lymphedema diagnosed? Your healthcare provider will examine areas of swelling and ask about your symptoms  Tell him if you have ever had cancer, radiation treatment, or surgery  You may need an ultrasound, MRI, CT, or nuclear scan  These tests take pictures of the lymphatic system  They may show areas of fluid buildup  You may be given dye to help the lymphatic system show up better in pictures  Tell the healthcare provider if you have ever had an allergic reaction to contrast liquid  Do not enter the MRI room with anything metal  Metal can cause serious injury  Tell the healthcare provider if you have any metal in or on your body  You may need other tests to check for other causes of swelling  How is lymphedema treated? There is no cure for lymphedema   Treatment can relieve symptoms and prevent lymphedema from getting worse  You may need any of the following:  · Therapeutic massage  helps move lymphatic fluid through your body  It is done by a specialist, who may also teach you how to perform the massage yourself  · Compression devices  are sleeves, gloves, boots, or bandages  These devices use pressure to help move lymphatic fluid and prevent fluid buildup  · Physical therapy  may help decrease swelling and pain  It may also help improve strength  · Surgery  may be needed if other treatments do not work  Surgery may be done to remove tissue, drain fluid, or create a path for lymphatic fluid to flow  How can I manage my symptoms? · Elevate  your arm or leg above the level of your heart as often as you can  This will help decrease swelling and pain  Prop your arm or leg on pillows or blankets to keep it elevated comfortably  · Wear compression socks, sleeves, or bandages  as directed  Compression devices must be fitted by a healthcare provider  Compression devices may need to be replaced every 3 to 6 months  · Exercise  can help you maintain or regain function of your arm or leg  Ask your healthcare provider what type of exercise to do and how often to do it  Start slow, take breaks, and gradually do more each day  Do not do vigorous, repeated exercises  Watch for changes in your arm or leg during exercise  Stop and rest if you have swelling, increased pain, or heaviness  Elevate your arm or leg above the level of your heart  · Change your position often  to help move lymphatic fluid through your body  Do not sit or  one position for more than 30 minutes  Do not cross your legs when you sit  These actions can cause lymphatic fluid to buildup  · Maintain a healthy weight  Ask your healthcare provider what you should weigh  Weight loss may improve your symptoms   If you need to lose weight, your healthcare provider can help you create a weight loss program   How do I care for my skin and help prevent infection? A skin infection can make lymphedema worse  Do the following to decrease your risk for a skin infection in your arm or leg with lymphedema:  · Wash your skin gently and dry it well  Use a mild soap to wash your skin  Gently pat your skin dry after you bathe  Apply a mild cream or lotion to moisturize your skin and prevent dryness or cracking  Keep your feet clean and dry  · Protect your skin from injury  Wear gloves when you garden and wash dishes  Cut your nails straight across to prevent injury to your fingers and toes  Use sunscreen and insect repellant to avoid burns and punctures  Wear slippers in the house  Wear shoes when you go outside  · Check your skin every day for signs of infection  Signs of infection include redness, swelling, increased heat, or pus  You may also have a fever or chills  · Care for cuts, scratches or burns  Apply antibiotic ointment to cuts and other small breaks in the skin  Apply a cold pack or cold water to a burn for 15 minutes  Then wash it with soap and water  Cover scratches, cuts, or burns with a clean, dry gauze or bandage as directed  Keep the area clean and dry  · Tell healthcare providers that you have lymphedema  Tell them not to do, IVs, blood draws, and blood pressure readings in the arm or leg with lymphedema  If there is lymphedema in both arms, ask them to take your blood pressure on your leg  Do not allow flu shots or vaccinations in your arm with lymphedema  When should I contact my healthcare provider? · You have a fever or chills  · You have an open area of skin that looks red or swollen, or drains pus  · Your symptoms, such as swelling or pain, get worse  · Your arms or legs feel heavy, or you cannot move them  · Your skin becomes hard, thick, or rough       · You have a skin wound that will not heal      · Your shoes, clothes, or jewelry feel tighter  · You have questions or concerns about your condition or care  CARE AGREEMENT:   You have the right to help plan your care  Learn about your health condition and how it may be treated  Discuss treatment options with your caregivers to decide what care you want to receive  You always have the right to refuse treatment  The above information is an  only  It is not intended as medical advice for individual conditions or treatments  Talk to your doctor, nurse or pharmacist before following any medical regimen to see if it is safe and effective for you  © 2017 [facility] Information is for End User's use only and may not be sold, redistributed or otherwise used for commercial purposes  All illustrations and images included in CareNotes® are the copyrighted property of A D A M , Inc  or Simone Scott  -recommend continuing conservative therapy to include daily use of compression stockings, leg elevation, low-salt diet, continued aerobic activity, weight management and lotion to her skin to promote healthy skin  -place your compression stockings on in the morning and remove prior to bed  -recommend using her compression pumps twice a day for swelling relief  -continue physical therapy and treatment modalities as directed for lymphedema management  -will obtain repeat right lower extremity venous reflux study to re-evaluate your previously treated veins to see if they have opened back up   -return to office with surgeon after venous reflux study for re-evaluation  -please contact the office with new symptoms, worsening edema or development of leg wounds

## 2020-08-14 NOTE — ASSESSMENT & PLAN NOTE
Secondary BLE lymphedema  -continue lymphedema therapy with physical therapy  -treatment modalities as directed by physical therapy  -continue compression, elevation and prevention of skin breakdown  -see plan as outlined

## 2020-08-14 NOTE — PROGRESS NOTES
Assessment/Plan:    Venous insufficiency of both lower extremities  69-year-old male nonsmoker with morbid obesity, ROBERT, PAF on Xarelto and chronic venous insufficiency with venous hypertension and hx severe stasis dermatitis, s/p bilateral GSV, SSV and accessory vein ablation (5/21/18, 11/15/18,12/14/18) and  foam sclerotherapy by Dr Patricia Cunningham @ [facility] who presents the office on referral from his PCP and request for evaluation related to his chronic venous insufficiency with secondary lymphedema  +recurrent right lower extremity symptoms but no recurrent venous ulcerations  No vascular imaging for review  -given recurrent RLE symptoms, will obtain updated LEVDR for re-evaluation  However, it appears the patient has had ablation to all his superficial veins on several separate occasions    Will reassess for recanalization   -continue conservative management to include daily use of compression, lower extremity elevation, low-sodium diet, weight management, aerobic activity, skin moisturization of compression problems  -continue lymphedema therapy with physical therapy including treatment modalities  -return to office with surgeon with [facility] for reassessment  -instructed to contact the office with new questions, concerns or new wounds    Lymphedema  Secondary BLE lymphedema  -continue lymphedema therapy with physical therapy  -treatment modalities as directed by physical therapy  -continue compression, elevation and prevention of skin breakdown  -see plan as outlined    Morbid obesity with BMI of 40 0-44 9, adult ([address] )  -continue to encourage weight management, lifestyle modifications and diet modifications for prevention of vascular disease and adjuvant therapy of chronic venous insufficiency and lymphedema  -continue follow-up and management with PCP    Atrial fibrillation ([address] )  -stable, rate controlled  -continue current medical regimen with medication modifications per Cardiology  -continue anticoagulation therapy with Xarelto  -management and follow-up per Cardiology       Diagnoses and all orders for this visit:    Lymphedema  -     VAS reflux lower limb venous duplex study with reflux assesment, unilateral; Future    Venous insufficiency of both lower extremities  -     VAS reflux lower limb venous duplex study with reflux assesment, unilateral; Future    At[facility] fibrillation, unspecified type [facility] )    Morbid obesity with BMI of 40 0-44 9, adult (HCC)    Bilateral lower extremity edema  -     Ambulatory referral to Vascular Surgery          Subjective:      Patient ID: Katina Stuart is a 71 y o  male  Pateint is new to our practice and was referred by Dr Yves Casanova for lymphedemia  Pt states that he has BLE swelling for years  Pt denies pain, weeping legs, or wounds  Pt uses compression wraps and goes to the Lymphedema clinic twice a week  Pt uses a compression pump about once a week  74-year-old male nonsmoker with morbid obesity, ROBERT, PAF on Xarelto and chronic venous insufficiency with venous hypertension and hx severe stasis dermatitis, s/p bilateral GSV, SSV and accessory vein ablation (5/21/18, 11/15/18,12/14/18) and  foam sclerotherapy by Dr Gogo Steinberg @ [facility] THE [facility] who presents the office on referral from his PCP and request for evaluation related to his chronic venous insufficiency with secondary lymphedema  As noted, the patient has undergone multiple venous interventions of bilateral lower extremity on several occasions for superficial venous incompetence with resultant severe stasis dermatitis no ulcerations  Patient denies recurrent venous ulcerations or wounds and all his prior wounds had healed after surgical intervention  He denies prior history DVT, PE, hypercoagulable disorder, superficial thrombophlebitis or bleeding varicosities  Recently, the patient had worked 14+ hour days at the present secondary to lock down related to COVID-19 pandemic    He reports developing recurrent right lower extremity heaviness, aching and bilateral lower extremity edema despite utilizing his circade wraps  The patient has been now going to lymphedema therapy with significant improvement in his symptoms  Patient does have compression pumps at home but fails to utilize them on a regular basis secondary to his work schedule  He utilizes pumps approximately once a week  Patient is requesting to transfer his vascular care to our practice  Due to his increasing symptoms, LEV was obtained at Dr Dotty Brothers office on 8/5/2020 but no report available for review despite attempts to obtain report  The following portions of the patient's history were reviewed and updated as appropriate: allergies, current medications, past family history, past medical history, past social history, past surgical history and problem list     Review of Systems   Constitutional: Negative  HENT: Negative  Eyes: Negative  Respiratory: Negative  Cardiovascular: Positive for leg swelling  Gastrointestinal: Negative  Endocrine: Negative  Genitourinary: Negative  Musculoskeletal: Negative  Skin: Positive for color change  Allergic/Immunologic: Negative  Neurological: Negative  Hematological: Negative  Psychiatric/Behavioral: Negative  I have reviewed and made appropriate changes to the review of systems input by the medical assistant      Vitals:    08/14/20 1531   BP: 118/76   BP Location: Left arm   Patient Position: Sitting   Pulse: 83   Resp: 20   Temp: 98 3 °F (36 8 °C)   TempSrc: Temporal   Weight: 134 kg (296 lb)   Height: 5' 9" (1 753 m)       Patient Active Problem List   Diagnosis    Atrial fibrillation (HCC)    Obstructive sleep apnea on CPAP    Leukopenia    Weakness of right upper extremity    Paresthesias    Cervical pain (neck)    Morbid obesity with BMI of 40 0-44 9, adult (HCC)    Thrombocytopenia (HCC)    Bilateral lower extremity edema    Lymphedema    Obesity, morbid ([facility] )    Venous insufficiency of both lower extremities       Past Surgical History:   Procedure Laterality Date    ABLATION SAPHENOUS VEIN W/ RFA      COLONOSCOPY      MN CYSTOURETHRO W/IMPLANT N/A 11/13/2017    Procedure: CYSTOSCOPY WITH INSERTION UROLIFT;  Surgeon: Mamta Infante DO;  Location: [facility];  Service: Urology    TONSILLECTOMY         History reviewed  No pertinent family history  Social History     Socioeconomic History    Marital status: /Civil Union     Spouse name: Not on file    Number of children: Not on file    Years of education: Not on file    Highest education level: Not on file   Occupational History    Not on file   Social Needs    Financial resource strain: Not on file    Food insecurity     Worry: Not on file     Inability: Not on file   [employer] needs     Medical: Not on file     Non-medical: Not on file   Tobacco Use    Smoking status: Never Smoker    Smokeless tobacco: Never Used   Substance and Sexual Activity    Alcohol use:  Yes     Alcohol/week: 4 0 standard drinks     Types: 4 Cans of beer per week     Frequency: 2-3 times a week     Binge frequency: Never     Comment: socially    Drug use: No    Sexual activity: Not on file   Lifestyle    Physical activity     Days per week: Not on file     Minutes per session: Not on file    Stress: Not on file   Relationships    Social connections     Talks on phone: Not on file     Gets together: Not on file     Attends Restorationism service: Not on file     Active member of club or organization: Not on file     Attends meetings of clubs or organizations: Not on file     Relationship status: Not on file    Intimate partner violence     Fear of current or ex partner: Not on file     Emotionally abused: Not on file     Physically abused: Not on file     Forced sexual activity: Not on file   Other Topics Concern    Not on file   Social History Narrative    Not on file       Allergies   Allergen Reactions  Penicillins Anaphylaxis    Sulfa Antibiotics Anaphylaxis         Current Outpatient Medications:     allopurinol (ZYLOPRIM) 100 mg tablet, Take 100 mg by mouth daily, Disp: , Rfl:     ammonium lactate (AMLACTIN) 12 % lotion, Apply topically 2 (two) times a day as needed for dry skin, Disp: , Rfl:     CoenzymeQ10-Isoleucine-Glycine (CO Q-10) 100-50-25 MG TB24, Co Q-10, Disp: , Rfl:     ferrous sulfate 325 (65 Fe) mg tablet, Take 325 mg by mouth every other day, Disp: , Rfl:     furosemide (LASIX) 20 mg tablet, Take 20 mg by mouth daily, Disp: , Rfl:     gabapentin (NEURONTIN) 100 mg capsule, Take 100 mg by mouth 2 (two) times a day, Disp: , Rfl:     GARCINIA CAMBOGIA-CHROMIUM PO, Take 750 mg by mouth 2 (two) times a day , Disp: , Rfl:     Green Coffee Silva-Yerba Mate (GREEN COFFEE BEAN EXTRACT PO), Take 800 mg by mouth daily , Disp: , Rfl:     L-ARGININE-500 PO, Take 500 mg by mouth 2 (two) times a day , Disp: , Rfl:     metoprolol tartrate (LOPRESSOR) 12 5 mg tablet, Take 12 5 mg by mouth 2 (two) times a day, Disp: , Rfl:     Multiple Vitamins-Minerals (OCUVITE EXTRA PO), Take 1 tablet by mouth daily  , Disp: , Rfl:     patient supplied medication, Take 1 each by mouth 2 (two) times a day, Disp: , Rfl:     patient supplied medication, Take 1 each by mouth 2 (two) times a day, Disp: , Rfl:     RASPBERRY KETONES PO, Take 100 mg by mouth 2 (two) times a day, Disp: , Rfl:     rivaroxaban (XARELTO) 20 mg tablet, Take 20 mg by mouth daily, Disp: , Rfl:     tadalafil (CIALIS) 5 MG tablet, Take 5 mg by mouth daily as needed for erectile dysfunction, Disp: , Rfl:     Turmeric Curcumin 500 MG CAPS, Take 500 mg by mouth daily, Disp: , Rfl:     Objective:  Imaging studies:  No recent vascular imaging studies available for review    Venous reflux performed at Dr Ari Steward office 8/5/2020 not available for review    /76 (BP Location: Left arm, Patient Position: Sitting)   Pulse 83   Temp 98 3 °F (36 8 °C) (Temporal)   Resp 20   Ht 5' 9" (1 753 m)   Wt 134 kg (296 lb)   BMI 43 71 kg/m²          Physical Exam  Vitals signs and nursing note reviewed  Constitutional:       General: He is not in acute distress  Appearance: He is well-developed  He is obese  He is not ill-appearing or toxic-appearing  HENT:      Head: Normocephalic and atraumatic  Eyes:      General: No scleral icterus  Conjunctiva/sclera: Conjunctivae normal       Pupils: Pupils are equal, round, and reactive to light  Neck:      Musculoskeletal: Normal range of motion and neck supple  Thyroid: No thyromegaly  Vascular: No carotid bruit or JVD  Trachea: No tracheal deviation  Cardiovascular:      Rate and Rhythm: Normal rate and regular rhythm  Pulses:           Carotid pulses are 2+ on the right side and 2+ on the left side  Radial pulses are 2+ on the right side and 2+ on the left side  Dorsalis pedis pulses are 2+ on the right side and 2+ on the left side  Heart sounds: S1 normal and S2 normal  No murmur  No friction rub  No gallop  No S3 sounds  Comments: Bilateral lower extremities warm, pink, motor and sensory intact and well perfused without cyanosis, pallor, rubor or tissue loss  Hemosiderin staining and scarring of bilateral lower legs without venous ulcerations  +lipodermatosclerosis  Pulmonary:      Effort: No respiratory distress  Breath sounds: Normal breath sounds  No stridor  No wheezing, rhonchi or rales  Abdominal:      General: Bowel sounds are normal  There is no distension or abdominal bruit  Palpations: Abdomen is soft  There is no mass or pulsatile mass  Tenderness: There is no abdominal tenderness  There is no rebound  Musculoskeletal: Normal range of motion  General: No deformity  Right lower le+ Edema present  Left lower le+ Edema present  Skin:     General: Skin is warm and dry  Coloration: Skin is not pale  Findings: No erythema or lesion  Neurological:      General: No focal deficit present  Mental Status: He is alert and oriented to person, place, and time  Psychiatric:         Mood and Affect: Mood normal          Thought Content:  Thought content normal

## 2020-08-17 ENCOUNTER — OFFICE VISIT (OUTPATIENT)
Dept: PHYSICAL THERAPY | Facility: CLINIC | Age: 65
End: 2020-08-17
Payer: COMMERCIAL

## 2020-08-17 DIAGNOSIS — I89.0 LYMPHEDEMA: Primary | ICD-10-CM

## 2020-08-17 PROCEDURE — 97140 MANUAL THERAPY 1/> REGIONS: CPT | Performed by: PHYSICAL THERAPIST

## 2020-08-17 NOTE — PROGRESS NOTES
Daily Note     Today's date: 2020  Patient name: Jennifer Augustin  : 1955  MRN: [id number]  Referring provider: Derrell Herrera MD  Dx:   Encounter Diagnosis     ICD-10-CM    1  Lymphedema  I89 0                   Subjective: Patient reports decreased work hours are doing his legs well  A little less soreness, tightness  Objective: See treatment diary below      Assessment: Tolerated treatment well  Patient demonstrated fatigue post treatment and would benefit from continued PT  Follow up with vascular, more testing  Plan: Continue per plan of care  Precautions: Falls, peripheral neuropathy  Re-eval Date:2020    Date 8/10 8/13 8/17     Visit Count 16 17 18     FOTO        Pain In 0 0 0     Pain Out 0 0 0         Manual          55 mins 55 mins 55 mins             Compression placed using bilateral lower LE circaid, defers full leg compression due to knee/hip discomfort and needing to drive  Exercise Diary         Lymph TE LE                HEP 5 mins                       General LE strengthening - blue t-band for ankle TE, general hip strengthening included  Gentle self stretch      Modalities

## 2020-08-21 ENCOUNTER — OFFICE VISIT (OUTPATIENT)
Dept: PHYSICAL THERAPY | Facility: CLINIC | Age: 65
End: 2020-08-21
Payer: COMMERCIAL

## 2020-08-21 DIAGNOSIS — I89.0 LYMPHEDEMA: Primary | ICD-10-CM

## 2020-08-21 PROCEDURE — 97140 MANUAL THERAPY 1/> REGIONS: CPT

## 2020-08-21 NOTE — PROGRESS NOTES
Daily Note     Today's date: 2020  Patient name: Mary Monroe  : 1955  MRN: [id number]  Referring provider: Preston August MD  Dx:   Encounter Diagnosis     ICD-10-CM    1  Lymphedema  I89 0                   Subjective: My legs are more swollen today because I didn't have my compression on yesterday as I had surgery on my left hand      Objective: See treatment diary below      Assessment: Tolerated treatment well  Demonstrates increase edema today 1* mid anterior shin region and R b/l malleoli area today  Good tolerance/response with MLD  BL circaids applied 2 layer end rx session  No visible drainage this date  Patient would benefit from continued PT      Plan: Continue per plan of care  Precautions: Falls, peripheral neuropathy  Re-eval Date:2020    Date 8/10 8/13 8/17 8/21    Visit Count 16 17 18 19    FOTO        Pain In 0 0 0 0    Pain Out 0 0 0 0        Manual          55 mins 55 mins 55 mins 55 min            Compression placed using bilateral lower LE circaid, defers full leg compression due to knee/hip discomfort and needing to drive  Exercise Diary         Lymph TE LE                HEP 5 mins                       General LE strengthening - blue t-band for ankle TE, general hip strengthening included  Gentle self stretch      Modalities

## 2020-08-25 ENCOUNTER — OFFICE VISIT (OUTPATIENT)
Dept: CARDIOLOGY CLINIC | Facility: CLINIC | Age: 65
End: 2020-08-25
Payer: COMMERCIAL

## 2020-08-25 VITALS
WEIGHT: 294 LBS | SYSTOLIC BLOOD PRESSURE: 118 MMHG | DIASTOLIC BLOOD PRESSURE: 70 MMHG | TEMPERATURE: 98.2 F | HEIGHT: 69 IN | BODY MASS INDEX: 43.55 KG/M2

## 2020-08-25 DIAGNOSIS — I48.11 LONGSTANDING PERSISTENT ATRIAL FIBRILLATION (HCC): Primary | ICD-10-CM

## 2020-08-25 DIAGNOSIS — I50.32 CHRONIC DIASTOLIC HEART FAILURE (HCC): ICD-10-CM

## 2020-08-25 DIAGNOSIS — R94.31 ABNORMAL ECG: ICD-10-CM

## 2020-08-25 DIAGNOSIS — E66.01 OBESITY, MORBID (HCC): ICD-10-CM

## 2020-08-25 DIAGNOSIS — Z99.89 OBSTRUCTIVE SLEEP APNEA ON CPAP: ICD-10-CM

## 2020-08-25 DIAGNOSIS — G47.33 OBSTRUCTIVE SLEEP APNEA ON CPAP: ICD-10-CM

## 2020-08-25 PROCEDURE — 99204 OFFICE O/P NEW MOD 45 MIN: CPT | Performed by: INTERNAL MEDICINE

## 2020-08-25 PROCEDURE — 93000 ELECTROCARDIOGRAM COMPLETE: CPT | Performed by: INTERNAL MEDICINE

## 2020-08-25 RX ORDER — TRAMADOL HYDROCHLORIDE 50 MG/1
50 TABLET ORAL EVERY 6 HOURS PRN
COMMUNITY

## 2020-08-25 RX ORDER — HYDROCORTISONE ACETATE 0.5 %
CREAM (GRAM) TOPICAL
COMMUNITY

## 2020-08-25 NOTE — PROGRESS NOTES
Cardiology Office Note  MD Lesly Lima MD Leandro Combs, DO, MD Rajan Justin DO, Tawny Dai DO, [facility] - [place]  ----------------------------------------------------------------  [address] [place] He [age] y o  male MRN: [id number]  Unit/Bed#:  Encounter: [id number]      History of Present Illness: It was a pleasure to see Christin Hogan in the office today for initial CV evaluation  He is here to establish care with regard to his atrial fibrillation, morbid obesity and pulmonary hypertension with chronic venous insufficiency  The patient has a known history of obstructive sleep apnea was noncompliant with his CPAP therapy  Over the course of many years he has been having lower extremity edema which has been believe secondary to lymphedema with chronic venous insufficiency  He has had venous ablation is in the past for his reflux disease  Recently in October 2019, he was found have pulmonary hypertension with pulmonary artery systolic pressure is estimated at 50 mm Hg  He denies any chest pain, pressure, tightness or squeezing  But has noted that he has had increased fatigue and some dyspnea on exertion  He notes that doing basic activity sometimes he becomes more dyspneic  Does admit to some weight gain, but does not know exactly how much she gain in the recent past   He denies lightheadedness, dizziness or palpitations  Denies orthopnea or paroxysmal nocturnal dyspnea  Previously, he has followed up with [facility] for his atrial fibrillation  He reports that many years ago, he underwent ELAINE guided cardioversion, but was unable to restore sinus rhythm  Review of Systems:  Review of Systems   Constitution: Positive for malaise/fatigue  Negative for decreased appetite, fever, weight gain and weight loss  HENT: Negative for congestion and sore throat      Eyes: Negative for visual disturbance  Cardiovascular: Positive for dyspnea on exertion  Negative for chest pain, leg swelling, near-syncope and palpitations  Respiratory: Positive for shortness of breath  Negative for cough  Hematologic/Lymphatic: Negative for bleeding problem  Skin: Negative for rash  Musculoskeletal: Negative for myalgias and neck pain  Gastrointestinal: Negative for abdominal pain and nausea  Neurological: Negative for light-headedness and weakness  Psychiatric/Behavioral: Negative for depression  Past Medical History:   Diagnosis Date    A-fib ([address] )     Arthritis     CPAP (continuous positive airway pressure) dependence     Irregular heart beat     Lymphedema     Sleep apnea     Urinary frequency     Wears glasses     Wears partial dentures     lower partial       Past Surgical History:   Procedure Laterality Date    ABLATION SAPHENOUS VEIN W/ RFA      COLONOSCOPY      CA CYSTOURETHRO W/IMPLANT N/A 11/13/2017    Procedure: CYSTOSCOPY WITH INSERTION UROLIFT;  Surgeon: Sury Todd DO;  Location: [facility];  Service: Urology    TONSILLECTOMY         Social History     Socioeconomic History    Marital status: /Civil Union     Spouse name: None    Number of children: None    Years of education: None    Highest education level: None   Occupational History    None   Social Needs    Financial resource strain: None    Food insecurity     Worry: None     Inability: None    Transportation needs     Medical: None     Non-medical: None   Tobacco Use    Smoking status: Never Smoker    Smokeless tobacco: Never Used   Substance and Sexual Activity    Alcohol use:  Yes     Alcohol/week: 4 0 standard drinks     Types: 4 Cans of beer per week     Frequency: 2-3 times a week     Binge frequency: Never     Comment: socially    Drug use: No    Sexual activity: None   Lifestyle    Physical activity     Days per week: None     Minutes per session: None    Stress: None Relationships    Social connections     Talks on phone: None     Gets together: None     Attends Anabaptism service: None     Active member of club or organization: None     Attends meetings of clubs or organizations: None     Relationship status: None    Intimate partner violence     Fear of current or ex partner: None     Emotionally abused: None     Physically abused: None     Forced sexual activity: None   Other Topics Concern    None   Social History Narrative    None       History reviewed  No pertinent family history      Allergies   Allergen Reactions    Penicillins Anaphylaxis    Sulfa Antibiotics Anaphylaxis         Current Outpatient Medications:     Acetaminophen (TYLENOL EXTRA STRENGTH PO), Take by mouth, Disp: , Rfl:     allopurinol (ZYLOPRIM) 100 mg tablet, Take 100 mg by mouth daily, Disp: , Rfl:     ammonium lactate (AMLACTIN) 12 % lotion, Apply topically 2 (two) times a day as needed for dry skin, Disp: , Rfl:     CoenzymeQ10-Isoleucine-Glycine (CO Q-10) 100-50-25 MG TB24, Co Q-10, Disp: , Rfl:     ferrous sulfate 325 (65 Fe) mg tablet, Take 325 mg by mouth every other day, Disp: , Rfl:     furosemide (LASIX) 20 mg tablet, Take 20 mg by mouth daily, Disp: , Rfl:     gabapentin (NEURONTIN) 100 mg capsule, Take 100 mg by mouth 2 (two) times a day, Disp: , Rfl:     GARCINIA CAMBOGIA-CHROMIUM PO, Take 750 mg by mouth 2 (two) times a day , Disp: , Rfl:     Glucosamine-Chondroit-Vit C-Mn (Glucosamine 1500 Complex) CAPS, Take by mouth, Disp: , Rfl:     Green Coffee Silva-Yerba Mate (GREEN COFFEE BEAN EXTRACT PO), Take 800 mg by mouth daily , Disp: , Rfl:     L-ARGININE-500 PO, Take 500 mg by mouth 2 (two) times a day , Disp: , Rfl:     metoprolol tartrate (LOPRESSOR) 12 5 mg tablet, Take 12 5 mg by mouth 2 (two) times a day, Disp: , Rfl:     Multiple Vitamins-Minerals (OCUVITE EXTRA PO), Take 1 tablet by mouth daily  , Disp: , Rfl:     patient supplied medication, Take 1 each by mouth 2 (two) times a day, Disp: , Rfl:     patient supplied medication, Take 1 each by mouth 2 (two) times a day, Disp: , Rfl:     RASPBERRY KETONES PO, Take 100 mg by mouth 2 (two) times a day, Disp: , Rfl:     rivaroxaban (XARELTO) 20 mg tablet, Take 20 mg by mouth daily, Disp: , Rfl:     tadalafil (CIALIS) 5 MG tablet, Take 5 mg by mouth daily as needed for erectile dysfunction, Disp: , Rfl:     traMADol (ULTRAM) 50 mg tablet, Take 50 mg by mouth every 6 (six) hours as needed for moderate pain, Disp: , Rfl:     Turmeric Curcumin 500 MG CAPS, Take 500 mg by mouth daily, Disp: , Rfl:     VITAMIN D PO, Take by mouth, Disp: , Rfl:     Vitals:    08/25/20 1516   BP: 118/70   BP Location: Right arm   Patient Position: Sitting   Cuff Size: Large   Temp: 98 2 °F (36 8 °C)   Weight: 133 kg (294 lb)   Height: 5' 9" (1 753 m)       PHYSICAL EXAMINATION:  Gen: Awake, Alert, NAD    HEENT: AT/NC, Anicteric, mmm  Neck: Supple, No elevated JVP  Resp: CTA bilaterally no w/r/r  CV:  Irregularly irregular +S1, S2, No m/r/g  Abd: Soft, NT/ND + BS  Ext: warm, bilateral lower extremities wrapped with +2 pitting edema bilaterally/lymphedema  Neuro: Follows commands, moves all extermities  Psych: Appropriate affect    --------------------------------------------------------------------------------  TREADMILL STRESS  No results found for this or any previous visit    --------------------------------------------------------------------------------  NUCLEAR STRESS TEST: No results found for this or any previous visit    No results found for this or any previous visit     --------------------------------------------------------------------------------  CATH:  No results found for this or any previous visit   --------------------------------------------------------------------------------  ECHO:   Results for orders placed during the hospital encounter of 10/26/19   Echo complete with contrast if indicated    [facility] [address] [facility] [address]  [phone]    Transthoracic Echocardiogram  2D, M-mode, Doppler, and Color Doppler    Study date:  28-Oct-2019    Patient: Joelle Bettencourt  MR number: [id number]  Account number: [de-identified]  : 1955  Age: 71 years  Gender: Male  Status: Inpatient  Location: Bedside  Height: 69 in  Weight: 302 lb  BP: 141/ 88 mmHg    Indications: left sided paralysis    Diagnoses: 56 - CVA    Sonographer:  Geeta Queen RDCS, CCT  Referring Physician:  Yamileth Diane DO  Group:  [facility]  Interpreting Physician:  Samm Yi DO    SUMMARY    LEFT VENTRICLE:  Systolic function was normal  Ejection fraction was estimated to be 55 %  This study was inadequate for the evaluation of regional wall motion  Wall thickness was mildly increased  RIGHT VENTRICLE:  The ventricle was dilated  Systolic function was normal     MITRAL VALVE:  There was mild regurgitation  TRICUSPID VALVE:  There was mild regurgitation  Estimated peak PA pressure was 50 mmHg  HISTORY: PRIOR HISTORY: Patient has no history of cardiovascular disease  PROCEDURE: The procedure was performed at the bedside  This was a routine study  The transthoracic approach was used  The study included complete 2D imaging, M-mode, complete spectral Doppler, and color Doppler  The heart rate was 80 bpm,  at the start of the study  Intravenous contrast (Definity solution [1 3 ml Definity/8 7ml normal saline solution], 3 ml) was administered to opacify the left ventricle  Echocardiographic views were limited due to poor acoustic window  availability  This was a technically difficult study  LEFT VENTRICLE: Size was normal  Systolic function was normal  Ejection fraction was estimated to be 55 %  This study was inadequate for the evaluation of regional wall motion  Wall thickness was mildly increased   DOPPLER: The study was  not technically sufficient to allow evaluation of LV diastolic function  RIGHT VENTRICLE: The ventricle was dilated  Systolic function was normal     LEFT ATRIUM: Size was normal     RIGHT ATRIUM: Size was normal     MITRAL VALVE: Valve structure was normal  There was normal leaflet separation  DOPPLER: The transmitral velocity was within the normal range  There was no evidence for stenosis  There was mild regurgitation  AORTIC VALVE: The valve was trileaflet  Leaflets exhibited normal thickness and normal cuspal separation  DOPPLER: Transaortic velocity was within the normal range  There was no evidence for stenosis  There was no regurgitation  TRICUSPID VALVE: The valve structure was normal  There was normal leaflet separation  DOPPLER: The transtricuspid velocity was within the normal range  There was no evidence for stenosis  There was mild regurgitation  Estimated peak PA  pressure was 50 mmHg  PULMONIC VALVE: Leaflets exhibited normal thickness, no calcification, and normal cuspal separation  DOPPLER: The transpulmonic velocity was within the normal range  There was no regurgitation  PERICARDIUM: There was no pericardial effusion  The pericardium was normal in appearance  AORTA: The root exhibited normal size  SYSTEMIC VEINS: IVC: The inferior vena cava was not well visualized      SYSTEM MEASUREMENT TABLES    2D  %FS: 34 72 %  Ao Diam: 2 87 cm  EDV(Teich): 143 23 ml  EF(Teich): 63 36 %  ESV(Teich): 52 47 ml  IVSd: 1 15 cm  LA Diam: 4 16 cm  LVIDd: 5 43 cm  LVIDs: 3 55 cm  LVPWd: 1 04 cm  RWT: 0 38  SV(Teich): 90 75 ml    CW  AV Vmax: 1 34 m/s  AV maxP 13 mmHg  RAP: 0 mmHg  TR Vmax: 3 26 m/s  TR maxP 5 mmHg    PW  E' Sept: 0 09 m/s  MV E Terrance: 1 03 m/s  RVSP: 42 5 mmHg    [employer] Accredited Echocardiography Laboratory    Prepared and electronically signed by    Spencer Lane DO  Signed 28-Oct-2019 10:37:38       No results found for this or any previous visit   --------------------------------------------------------------------------------  HOLTER  No results found for this or any previous visit  No results found for this or any previous visit   --------------------------------------------------------------------------------  CAROTIDS  No results found for this or any previous visit    --------------------------------------------------------------------------------  ECGs:  Results for orders placed or performed in visit on 08/25/20   POCT ECG    Impression    Atrial fibrillation 91 beats per minute RBBB with left anterior fascicular block        Lab Results   Component Value Date    WBC 4 54 10/28/2019    HGB 11 9 (L) 10/28/2019    HCT 36 0 (L) 10/28/2019    MCV 93 10/28/2019     (L) 10/28/2019      Lab Results   Component Value Date    SODIUM 140 10/28/2019    K 4 0 10/28/2019     10/28/2019    CO2 26 10/28/2019    BUN 14 10/28/2019    CREATININE 0 70 10/28/2019    GLUC 90 10/28/2019    CALCIUM 8 5 10/28/2019      Lab Results   Component Value Date    HGBA1C 5 7 10/27/2019      No results found for: CHOL  Lab Results   Component Value Date    HDL 55 10/27/2019     Lab Results   Component Value Date    LDLCALC 90 10/27/2019     Lab Results   Component Value Date    TRIG 93 10/27/2019     No results found for: [place], [place]   Lab Results   Component Value Date    INR 1 18 10/26/2019    PROTIME 15 1 (H) 10/26/2019        1  Longstanding persistent atrial fibrillation (HCC)  -     NM myocardial perfusion spect (rx stress and/or rest); Future; Expected date: 08/25/2020  -     Holter monitor - 48 hour; Future; Expected date: 08/25/2020  -     NT-BNP PRO; Future  -     POCT ECG    2  Chronic [facility] )    3  Abnormal ECG  -     NM myocardial perfusion spect (rx stress and/or rest); Future; Expected date: 08/25/2020  -     Basic metabolic panel    4  Obesity, morbid ([facility] )    5   Obstructive sleep apnea on CPAP        IMPRESSION:  Persistent atrial fibrillation on Xarelto  LVEF 55%, mild LVH, mild RV dilatation, mild MR/TR with PASP 50 mmHg, October 2019  History of bilateral venous insufficiency status post vein ablation  Chronic diastolic heart failure  Abnormal ECG with Bifascicular block  Moderate pulmonary hypertension  Lymphedema  Morbid obesity  ROBERT refusing CPAP therapy    PLAN:  It was a pleasure to see Kristie Elliott in the office today for initial CV evaluation  He is here today due to progressive shortness of breath and his history of persistent atrial fibrillation  At this time, I believe that his symptoms of fatigue and shortness of breath or related to his pulmonary hypertension and diastolic dysfunction that is likely in part from his atrial fibrillation  Currently, he is rate controlled in the office today  He has bilateral lower extremity edema with some mild symptoms of heart failure, but his lung exam is clear  He has plan for echocardiogram within the next week  Blood pressure is currently stable in the office today  He has established care with vascular surgery regarding his chronic venous insufficiency  Based on his clinical presentation, I have the following recommendations:    1  Regarding his fatigue with dyspnea on exertion and abnormal ECG in the face of atrial fibrillation, I would like to check a pharmacologic nuclear stress test to assess for any evidence of underlying myocardial ischemia  Would not perform this as an exercise test due to his atrial fibrillation to avoid sending him into a rapid ventricular response  2  Check repeat 2D echocardiogram to assess his cardiac structure and function to assess for any abnormality in his left and right ventricular function or progression of his pulmonary hypertension  3  We will obtain a 48 hour Holter monitor to assess his heart rates on his current AV starla regimen  4  Continue full anticoagulation with Xarelto  5   With his symptoms of shortness of breath, I have increased his Lasix to 40 mg in the morning and 20 mg at night for the next 3 days to see if this helps with the symptoms  6  We will check repeat labs within the week including an NT proBNP and BMP  7  Based on the results of his testing, will discuss additional therapies for his atrial fibrillation and pulmonary hypertension including modification of his blood pressure medications  8  As always, I have recommended a heart healthy diet low in sodium and exercise regimen  9  We will follow up with him after testing  As always, please do not hesitate to call with any questions  Portions of the record may have been created with voice recognition software  Occasional wrong word or "sound a like" substitutions may have occurred due to the inherent limitations of voice recognition software  Read the chart carefully and recognize, using context, where substitutions have occurred          Signed: Jacoby Rubalcava DO, [facility] - [place]

## 2020-08-26 ENCOUNTER — OFFICE VISIT (OUTPATIENT)
Dept: PHYSICAL THERAPY | Facility: CLINIC | Age: 65
End: 2020-08-26
Payer: COMMERCIAL

## 2020-08-26 DIAGNOSIS — I89.0 LYMPHEDEMA: Primary | ICD-10-CM

## 2020-08-26 PROCEDURE — 97140 MANUAL THERAPY 1/> REGIONS: CPT | Performed by: PHYSICAL THERAPIST

## 2020-08-26 NOTE — PROGRESS NOTES
Daily Note     Today's date: 2020  Patient name: Sheyla Morales  : 1955  MRN: [id number]  Referring provider: Gurmeet Enriquez MD  Dx:   Encounter Diagnosis     ICD-10-CM    1  Lymphedema  I89 0                   Subjective: Patient reports his legs are feeling better, but he is having trouble with donning compression with recent left wrist surgery  He has followed up with vascular, no clots  He does have follow up testing for cardiology as well  Pending PT for his wrist upcoming  Objective: See treatment diary below      Assessment: Tolerated treatment well  Patient would benefit from continued PT  Plan for re-evaluation and possible discharge next session  Plan: Continue per plan of care  Precautions: Falls, peripheral neuropathy  Re-eval Date:2020    Date 8/10 8/13 8/17 8/21 8/26   Visit Count 16 17 18 19 20   FOTO        Pain In 0 0 0 0 0   Pain Out 0 0 0 0 0       Manual          55 mins 55 mins 55 mins 55 min 55 mins           Compression placed using bilateral lower LE circaid, defers full leg compression due to knee/hip discomfort and needing to drive  Exercise Diary         Lymph TE LE                HEP 5 mins                       General LE strengthening - blue t-band for ankle TE, general hip strengthening included  Gentle self stretch      Modalities

## 2020-09-01 ENCOUNTER — OFFICE VISIT (OUTPATIENT)
Dept: PHYSICAL THERAPY | Facility: CLINIC | Age: 65
End: 2020-09-01
Payer: COMMERCIAL

## 2020-09-01 DIAGNOSIS — I89.0 LYMPHEDEMA: Primary | ICD-10-CM

## 2020-09-01 PROCEDURE — 97140 MANUAL THERAPY 1/> REGIONS: CPT | Performed by: PHYSICAL THERAPIST

## 2020-09-01 NOTE — LETTER
2020    Chandrakant Vargas MD  [address] [facility] 1  [address] [place] [address]    Patient: Ahsan Rogers   YOB: 1955   Date of Visit: 2020     Encounter Diagnosis     ICD-10-CM    1  Lymphedema  I89 0        Dear Dr Hilton Garcia: Thank you for your recent referral of Ahsan Rogers  Please review the attached evaluation summary from [facility] recent visit  Please verify that you agree with the plan of care by signing the attached order  If you have any questions or concerns, please do not hesitate to call  I sincerely appreciate the opportunity to share in the care of one of your patients and hope to have another opportunity to work with you in the near future  Sincerely,    Coby Baker      Referring Provider:      I certify that I have read the below Plan of Care and certify the need for these services furnished under this plan of treatment while under my care  Chandrakant Vargas MD  [address] [address]  [address] [place] [address] Rd: [phone]          PT Discharge    Today's date: 2020  Patient name: Ahsan Rogers  : 1955  MRN: [id number]  Referring provider: Carlos A Dave MD  Dx:   Encounter Diagnosis     ICD-10-CM    1  Lymphedema  I89 0                   Assessment  Assessment details: Patient presents to OPPT with s/s consistent with bilateral LE lymphedema  PT interventions to included CDT (complete decongestive therapy) including MLD (manual lymphatic drainage) and compression using short stretch bandages as able  He has made good progress with PT thus far  Decrease in hours has significantly improved his ability to return to his normal maintenance program   Additional compression obtained to promote improved LE volume control during  Patient will be placed on hold with improved maintenance carryover as well as improved compression schedule, improved compression gradient    Volume reduced and overall compliance to program improved  No further appointments scheduled at this time  Impairments: abnormal or restricted ROM, activity intolerance, impaired balance, impaired physical strength and safety issue  Understanding of Dx/Px/POC: good   Prognosis: good    Goals  STGs to be achieved in 2 - 4 weeks  Demonstrate at least 75% compliance with self MLD - MET  Demonstrate at least 75% compliance with self compression - MET  Demonstrate at least 75% compliance with self skin and nail inspection - MET  Free from s/s of infection - MET  Demonstrate understanding of importance of compliance with POC - MET    LTGs to be achieved in 4 - 6 weeks - ONGOING  Demonstrate at least 100% compliance with self MLD - MET  Demonstrate at least 100% compliance with self compression - MET  Demonstrate at least 100% compliance with self skin and nail inspection - MET  Free from s/s of infection - MET  Demonstrate understanding of day/night compression wearing schedule - MET  Obtain alternative compression to ensure independence with self management - MET      Plan  Frequency: 1x week  Duration in visits: 1  Duration in weeks: 1  Plan of Care beginning date: 9/1/2020  Plan of Care expiration date: 9/1/2020  Treatment plan discussed with: patient        Subjective Evaluation    History of Present Illness  Date of onset: 3/6/2020  Mechanism of injury: UPDATE:  Patient reports he has been better with decreased working hours, shorts came in and he was able to don them despite wrist surgery  Pending follow up and treatment for hand/wrist   Patient remains out of work at this point  No open wounds noted at this point  Compression gradient appropriate this date  Good initial response to compression shorts for daily wear  Patient reports bilateral foot swelling, saw podiatrist and was referred to PCP  Patient saw PCP and had bilateral foot swelling  Also with increased thigh edema since last episode of care    She increased diuretic and patient has follow up upcoming  He presents with ongoing pain in feet, however, improved since last episode of care with addition of gabapentin and tramadol  Of note:  Patient has started with diet in the last few weeks to include more fruit and vegetables, intermittent protein bars  Limiting salt intake    Quality of life: good    Pain  Current pain ratin  At best pain ratin  At worst pain ratin  Location: Bilateral feet  Quality: burning  Relieving factors: medications  Aggravating factors: standing, walking and stair climbing  Progression: improved    Treatments  Previous treatment: physical therapy and injection treatment  Current treatment: medication and physical therapy  Patient Goals  Patient goals for therapy: decreased edema and increased strength          Objective  Lymphedema Evaluation    Medical Considerations:  NEG Cardiac - following with new cardiologist 2/2 [facility]  NEG Pulmonary  NEG Kidney  NEG Liver  NEG Current Fever/Infection  NEG Current/Recent Wounds  NEG Current/Recent Treatments/Interventions/Port Access/PICC Line  NEG Current/Recent/History of DVT or Clotting issues    ROM Considerations:  ROM WFL, h/o THR    Strength Considerations:  Grossly WFL bilateral LEs    Gait Considerations:  WFL bilateral LEs    Skin Considerations/Scars:  Patient presents with skin inspection as follows:  Hemosiderin staining/skin discoloration - POS  Finger/Toe Nails - NEG  Open Wounds - NEG  S/S infection - NEG  Firm/Sponge/Hard - POS, but improving from [facility]  [place] - NEG  Weeping - POS - resolved from [facility]    Girth:    LE girth measurements (cm) - Current      Right           Left   Forefoot         25  26                         Metatarsals             27  28  Malleolus   28  30  +4               28  29  +8 cm    30  30  +12                           31         32  +16 cm    36  37 5  +20                           40  41  +24 cm    43  43  +28 47  48  +32                                          51                    51              Knee joint line              51  54  +36    52 5  53  +40    62  56  +44    68  67  +48    71 5  69 5  +52    72  74  +56    74  74  +60    75  78  +64    75 5  79  +68    79  80    Malleoli to Fib head length - 36 cm  To trochanter - 80 cm    Compression measurements for knee length compreshort - [facility] from [address] - contact Carter Se - [phone]      LE girth measurements (cm)      Right           Left   Forefoot             26  26                           Metatarsals             25  28  Malleolus   32  29 5  +4               30  30  +8 cm    31 5  32  +12                           34         35  +16 cm    36  36  +20                           40  40  +24 cm    44  44  +28                                   46  48  +32                                          51                 52 5             Knee joint line              60  57 5  +36    52  52  +40    64  62  +44    68 5  68  +48    70  69  +52    73  70 5  +56    73  73  +60    75  75  +64    75 5  77  +68    79  79     Precautions: Falls, peripheral neuropathy  Re-eval Date:9/1/2020    Date 9/1       Visit Count 21       FOTO        Pain In 0       Pain Out 0           Manual          55 mins               Compression placed using bilateral lower LE circaid, defers full leg compression due to knee/hip discomfort and needing to drive  Exercise Diary         Lymph TE LE                HEP 5 mins                       General LE strengthening - blue t-band for ankle TE, general hip strengthening included  Gentle self stretch      Modalities

## 2020-09-01 NOTE — PROGRESS NOTES
PT Discharge    Today's date: 2020  Patient name: Karo Mckeon  : 1955  MRN: [id number]  Referring provider: Fermin Case MD  Dx:   Encounter Diagnosis     ICD-10-CM    1  Lymphedema  I89 0                   Assessment  Assessment details: Patient presents to OPPT with s/s consistent with bilateral LE lymphedema  PT interventions to included CDT (complete decongestive therapy) including MLD (manual lymphatic drainage) and compression using short stretch bandages as able  He has made good progress with PT thus far  Decrease in hours has significantly improved his ability to return to his normal maintenance program   Additional compression obtained to promote improved LE volume control during  Patient will be placed on hold with improved maintenance carryover as well as improved compression schedule, improved compression gradient  Volume reduced and overall compliance to program improved  No further appointments scheduled at this time        Impairments: abnormal or restricted ROM, activity intolerance, impaired balance, impaired physical strength and safety issue  Understanding of Dx/Px/POC: good   Prognosis: good    Goals  STGs to be achieved in 2 - 4 weeks  Demonstrate at least 75% compliance with self MLD - MET  Demonstrate at least 75% compliance with self compression - MET  Demonstrate at least 75% compliance with self skin and nail inspection - MET  Free from s/s of infection - MET  Demonstrate understanding of importance of compliance with POC - MET    LTGs to be achieved in 4 - 6 weeks - ONGOING  Demonstrate at least 100% compliance with self MLD - MET  Demonstrate at least 100% compliance with self compression - MET  Demonstrate at least 100% compliance with self skin and nail inspection - MET  Free from s/s of infection - MET  Demonstrate understanding of day/night compression wearing schedule - MET  Obtain alternative compression to ensure independence with self management - MET      Plan  Frequency: 1x week  Duration in visits: 1  Duration in weeks: 1  Plan of Care beginning date: 2020  Plan of Care expiration date: 2020  Treatment plan discussed with: patient        Subjective Evaluation    History of Present Illness  Date of onset: 3/6/2020  Mechanism of injury: UPDATE:  Patient reports he has been better with decreased working hours, shorts came in and he was able to don them despite wrist surgery  Pending follow up and treatment for hand/wrist   Patient remains out of work at this point  No open wounds noted at this point  Compression gradient appropriate this date  Good initial response to compression shorts for daily wear  Patient reports bilateral foot swelling, saw podiatrist and was referred to PCP  Patient saw PCP and had bilateral foot swelling  Also with increased thigh edema since last episode of care  She increased diuretic and patient has follow up upcoming  He presents with ongoing pain in feet, however, improved since last episode of care with addition of gabapentin and tramadol  Of note:  Patient has started with diet in the last few weeks to include more fruit and vegetables, intermittent protein bars  Limiting salt intake    Quality of life: good    Pain  Current pain ratin  At best pain ratin  At worst pain ratin  Location: Bilateral feet  Quality: burning  Relieving factors: medications  Aggravating factors: standing, walking and stair climbing  Progression: improved    Treatments  Previous treatment: physical therapy and injection treatment  Current treatment: medication and physical therapy  Patient Goals  Patient goals for therapy: decreased edema and increased strength          Objective  Lymphedema Evaluation    Medical Considerations:  NEG Cardiac - following with new cardiologist 2/2 [facility]  NEG Pulmonary  NEG Kidney  NEG Liver  NEG Current Fever/Infection  NEG Current/Recent Wounds  NEG Current/Recent Treatments/Interventions/Port Access/PICC Line  NEG Current/Recent/History of DVT or Clotting issues    ROM Considerations:  ROM WFL, h/o THR    Strength Considerations:  Grossly WFL bilateral LEs    Gait Considerations:  WFL bilateral LEs    Skin Considerations/Scars:  Patient presents with skin inspection as follows:  Hemosiderin staining/skin discoloration - POS  Finger/Toe Nails - NEG  Open Wounds - NEG  S/S infection - NEG  Firm/Sponge/Hard - POS, but improving from [facility]  [place] - NEG  Weeping - POS - resolved from [facility]    Girth:    LE girth measurements (cm) - Current      Right           Left   Forefoot         25  26                         Metatarsals             27  28  Malleolus   28  30  +4               28  29  +8 cm    30  30  +12                           31         32  +16 cm    36  37 5  +20                           40  41  +24 cm    43  43  +28                                   47  48  +32                                          51                    51              Knee joint line              51  54  +36    52 5  53  +40    62  56  +44    68  67  +48    71 5  69 5  +52    72  74  +56    74  74  +60    75  78  +64    75 5  79  +68    79  80    Malleoli to Fib head length - 36 cm  To trochanter - 80 cm    Compression measurements for knee length compreshort - [facility] from [address] - contact Flora Crawley - [phone]      LE girth measurements (cm)      Right           Left   Forefoot             26  26                           Metatarsals             25  28  Malleolus   32  29 5  +4               30  30  +8 cm    31 5  32  +12                           34         35  +16 cm    36  36  +20                           40  40  +24 cm    44  44  +28                                   46  48  +32                                          51                 52 5             Knee joint line 60  57 5  +36    52  52  +40    64  62  +44    68 5  68  +48    70  69  +52    73  70 5  +56    73  73  +60    75  75  +64    75 5  77  +68    79  79     Precautions: Falls, peripheral neuropathy  Re-eval Date:9/1/2020    Date 9/1       Visit Count 21       FOTO        Pain In 0       Pain Out 0           Manual          55 mins               Compression placed using bilateral lower LE circaid, defers full leg compression due to knee/hip discomfort and needing to drive  Exercise Diary         Lymph TE LE                HEP 5 mins                       General LE strengthening - blue t-band for ankle TE, general hip strengthening included  Gentle self stretch      Modalities

## 2020-09-02 ENCOUNTER — TRANSCRIBE ORDERS (OUTPATIENT)
Dept: PHYSICAL THERAPY | Facility: CLINIC | Age: 65
End: 2020-09-02

## 2020-09-02 DIAGNOSIS — I89.0 OBLITERATION OF LYMPHATIC VESSEL: Primary | ICD-10-CM

## 2020-09-03 ENCOUNTER — TRANSCRIBE ORDERS (OUTPATIENT)
Dept: PHYSICAL THERAPY | Facility: CLINIC | Age: 65
End: 2020-09-03

## 2020-09-03 ENCOUNTER — EVALUATION (OUTPATIENT)
Dept: PHYSICAL THERAPY | Facility: CLINIC | Age: 65
End: 2020-09-03
Payer: COMMERCIAL

## 2020-09-03 DIAGNOSIS — G56.02 LEFT CARPAL TUNNEL SYNDROME: Primary | ICD-10-CM

## 2020-09-03 PROCEDURE — 97140 MANUAL THERAPY 1/> REGIONS: CPT | Performed by: PHYSICAL THERAPIST

## 2020-09-03 PROCEDURE — 97161 PT EVAL LOW COMPLEX 20 MIN: CPT | Performed by: PHYSICAL THERAPIST

## 2020-09-03 PROCEDURE — 97035 APP MDLTY 1+ULTRASOUND EA 15: CPT | Performed by: PHYSICAL THERAPIST

## 2020-09-03 PROCEDURE — 97112 NEUROMUSCULAR REEDUCATION: CPT | Performed by: PHYSICAL THERAPIST

## 2020-09-03 NOTE — PROGRESS NOTES
PT Evaluation     Today's date: 9/3/2020  Patient name: Kalpana El  : 1955  MRN: [id number]  Referring provider: Prince Strickland DO  Dx:   Encounter Diagnosis     ICD-10-CM    1  Left carpal tunnel syndrome  G56 02                   Assessment  Assessment details: Pt is a 75 YO male presenting to PT with pain, decreased AROM, strength and tolerance to activity  Pt would benefit from skilled intervention to address these issues and maximize overall function  Occupation- - pt will RTW  Dominant- Right; Involved- Left    Goals  ST  Decrease pain to 3-4 in 4 weeks            2  Decrease swelling in hand and wrist            3  Increase AROM to composite fist and extension in 4 weeks            4  Maintain clean wound and promote healing            5   Provide orthotic for protection, compression for swelling  LT  Increase functional motion and strength for independence with ADL and self care by DC            2   Ability to RTW and recreational activity by DC    Plan  Patient would benefit from: skilled speech therapy  Planned modality interventions: thermotherapy: hydrocollator packs, cryotherapy and ultrasound  Planned therapy interventions: manual therapy, neuromuscular re-education, therapeutic activities, stretching, strengthening, therapeutic exercise, home exercise program, orthotic management and training and self care  Frequency: 2x week  Duration in weeks: 4  Treatment plan discussed with: patient        Subjective Evaluation    History of Present Illness  Date of surgery: 2020  Mechanism of injury: Several months of loss of sensation, strength in left hand and fingers  Pain  Current pain ratin  At best pain ratin  At worst pain rating: 3  Location: left wrist    Hand dominance: right    Treatments  Current treatment: physical therapy  Patient Goals  Patient goals for therapy: decreased edema, decreased pain, increased motion, increased strength, independence with ADLs/IADLs, return to sport/leisure activities and return to work          Objective     General Comments:      Wrist/Hand Comments  AROM left S/P- 60/90;  Wrist E/F- 70/50                   Digits-Loose composite fist/extension                   Thumb MP- 0/50; IP 0/60  Circumference at Wrist- 18 0 cm; MPs- 22 0 cm; LF P1- 7 2 cm  Sensation- 3 61 T, IF, MF 1/2 RF             Precautions: no lifting, heavy grasp 4 weeks post surgery      Manuals 9/3            STM 15                         tubigrip 2                         Neuro Re-Ed             HP/Pulsed Biph 15            MNGE 2/3                                                                             Ther Ex                                                                                                                     Ther Activity                                                                              Modalities              12            CP 15

## 2020-09-04 LAB
BNP SERPL-MCNC: 120 PG/ML
BUN SERPL-MCNC: 24 MG/DL (ref 7–25)
BUN/CREAT SERPL: NORMAL (CALC) (ref 6–22)
CALCIUM SERPL-MCNC: 9.2 MG/DL (ref 8.6–10.3)
CHLORIDE SERPL-SCNC: 103 MMOL/L (ref 98–110)
CO2 SERPL-SCNC: 30 MMOL/L (ref 20–32)
CREAT SERPL-MCNC: 0.89 MG/DL (ref 0.7–1.25)
GLUCOSE SERPL-MCNC: 87 MG/DL (ref 65–99)
POTASSIUM SERPL-SCNC: 4.6 MMOL/L (ref 3.5–5.3)
SL AMB EGFR AFRICAN AMERICAN: 103 ML/MIN/1.73M2
SL AMB EGFR NON AFRICAN AMERICAN: 89 ML/MIN/1.73M2
SODIUM SERPL-SCNC: 139 MMOL/L (ref 135–146)

## 2020-09-08 ENCOUNTER — OFFICE VISIT (OUTPATIENT)
Dept: PHYSICAL THERAPY | Facility: CLINIC | Age: 65
End: 2020-09-08
Payer: COMMERCIAL

## 2020-09-08 DIAGNOSIS — G56.02 LEFT CARPAL TUNNEL SYNDROME: Primary | ICD-10-CM

## 2020-09-08 PROCEDURE — 97140 MANUAL THERAPY 1/> REGIONS: CPT | Performed by: PHYSICAL THERAPIST

## 2020-09-08 PROCEDURE — 97112 NEUROMUSCULAR REEDUCATION: CPT | Performed by: PHYSICAL THERAPIST

## 2020-09-08 PROCEDURE — 97035 APP MDLTY 1+ULTRASOUND EA 15: CPT | Performed by: PHYSICAL THERAPIST

## 2020-09-08 NOTE — PROGRESS NOTES
Daily Note     Today's date: 2020  Patient name: Lola Hoffman  : 1955  MRN: [id number]  Referring provider: Margot Marcelino DO  Dx:   Encounter Diagnosis     ICD-10-CM    1  Left carpal tunnel syndrome  G56 02                   Subjective: pt notes his hand is feeling better; he remains careful with activity      Objective: See treatment diary below    AROM left S/P- 60/90; Wrist E/F- 70/50                   Digits-Loose composite fist/extension                   Thumb MP- 0/50; IP 0/60  Circumference at Wrist- 18 0 cm; MPs- 22 0 cm; LF P1- 7 2 cm  Sensation- 3 61 T, IF, MF 1/2 RF    Assessment: Tolerated treatment well  Patient would benefit from continued PT      Plan: Continue per plan of care        Precautions: no lifting, heavy grasp 4 weeks post surgery      Manuals 9/3 9/8           STM 15 15                        tubigrip 2 2                        Neuro Re-Ed             HP/Pulsed Biph 15 15           MNGE /3 2/3                                                                            Ther Ex                                                                                                                     Ther Activity                                                                              Modalities             US 12 12           CP 15 15

## 2020-09-10 ENCOUNTER — OFFICE VISIT (OUTPATIENT)
Dept: PHYSICAL THERAPY | Facility: CLINIC | Age: 65
End: 2020-09-10
Payer: COMMERCIAL

## 2020-09-10 ENCOUNTER — HOSPITAL ENCOUNTER (OUTPATIENT)
Dept: NON INVASIVE DIAGNOSTICS | Facility: CLINIC | Age: 65
Discharge: HOME | End: 2020-09-10
Payer: COMMERCIAL

## 2020-09-10 DIAGNOSIS — I87.2 VENOUS INSUFFICIENCY OF BOTH LOWER EXTREMITIES: ICD-10-CM

## 2020-09-10 DIAGNOSIS — G56.02 LEFT CARPAL TUNNEL SYNDROME: Primary | ICD-10-CM

## 2020-09-10 DIAGNOSIS — I89.0 LYMPHEDEMA: ICD-10-CM

## 2020-09-10 PROCEDURE — 93971 EXTREMITY STUDY: CPT | Performed by: SURGERY

## 2020-09-10 PROCEDURE — 97035 APP MDLTY 1+ULTRASOUND EA 15: CPT

## 2020-09-10 PROCEDURE — 93971 EXTREMITY STUDY: CPT

## 2020-09-10 PROCEDURE — 97140 MANUAL THERAPY 1/> REGIONS: CPT

## 2020-09-10 PROCEDURE — 97110 THERAPEUTIC EXERCISES: CPT

## 2020-09-10 PROCEDURE — 97112 NEUROMUSCULAR REEDUCATION: CPT

## 2020-09-10 NOTE — PROGRESS NOTES
Daily Note     Today's date: 9/10/2020  Patient name: Deepika Melchor  : 1955  MRN: [id number]  Referring provider: Audrey Blum DO  Dx:   Encounter Diagnosis     ICD-10-CM    1  Left carpal tunnel syndrome  G56 02                   Subjective: Pt reports improved symptoms with his CT hand  Pt using it at work and being very cautious  Objective: See treatment diary below    ROM left S/P- 60/90; Wrist E/F- 70/50                   Digits-Loose composite fist/extension                   Thumb MP- 0/50; IP 0/60  Circumference at Wrist- 18 0 cm; MPs- 22 0 cm; LF P1- 7 2 cm  Sensation- 3 61 T, IF, MF 1/2 RF      Assessment: Tolerated treatment well today  Pt compliant with HEP  Wound incision looking good  Plan: Progress treatment as tolerated         Precautions: no lifting, heavy grasp 4 weeks post surgery      Manuals 9/3 9/8 9/9          STM 15 15 15                       tubigrip 2 2                        Neuro Re-Ed             HP/Pulsed Biph 15 15 15          MNGE /3 2/3 2/3                                                                           Ther Ex                                                                                                                     Ther Activity                                                                              Modalities             US 12 12 12          CP 15 15 15

## 2020-09-14 ENCOUNTER — APPOINTMENT (OUTPATIENT)
Dept: PHYSICAL THERAPY | Facility: CLINIC | Age: 65
End: 2020-09-14
Payer: COMMERCIAL

## 2020-09-16 ENCOUNTER — APPOINTMENT (OUTPATIENT)
Dept: PHYSICAL THERAPY | Facility: CLINIC | Age: 65
End: 2020-09-16
Payer: COMMERCIAL

## 2020-09-17 ENCOUNTER — OFFICE VISIT (OUTPATIENT)
Dept: PHYSICAL THERAPY | Facility: CLINIC | Age: 65
End: 2020-09-17
Payer: COMMERCIAL

## 2020-09-17 ENCOUNTER — TELEPHONE (OUTPATIENT)
Dept: ADMINISTRATIVE | Facility: HOSPITAL | Age: 65
End: 2020-09-17

## 2020-09-17 DIAGNOSIS — G56.02 LEFT CARPAL TUNNEL SYNDROME: Primary | ICD-10-CM

## 2020-09-17 PROCEDURE — 97140 MANUAL THERAPY 1/> REGIONS: CPT

## 2020-09-17 PROCEDURE — 97112 NEUROMUSCULAR REEDUCATION: CPT

## 2020-09-17 PROCEDURE — 97110 THERAPEUTIC EXERCISES: CPT

## 2020-09-17 PROCEDURE — 97035 APP MDLTY 1+ULTRASOUND EA 15: CPT

## 2020-09-17 NOTE — PROGRESS NOTES
Daily Note     Today's date: 2020  Patient name: Casa Gomez  : 1955  MRN: [id number]  Referring provider: Gema Gonzalez DO  Dx:   Encounter Diagnosis     ICD-10-CM    1  Left carpal tunnel syndrome  G56 02                   Subjective: Pt reports his hand has been improving with his MNGE  Objective: See treatment diary below    ROM left S/P- 60/90; Wrist E/F- 70/50                   Digits-Loose composite fist/extension                   Thumb MP- 0/50; IP 0/60  Circumference at Wrist- 18 0 cm; MPs- 22 0 cm; LF P1- 7 2 cm  Sensation- 3 61 T, IF, MF 1/2 RF    Assessment: Tolerated treatment well today  Continue with modalities  Pt to begin gentle  and pinch next week if symptoms dissipate  Plan: Progress treatment as tolerated         Precautions: no lifting, heavy grasp 4 weeks post surgery      Manuals 9/3 9/8 9/9 9/17         STM 15 15 15 15                      tubigrip 2 2                        Neuro Re-Ed             HP/Pulsed Biph 15 15 15 15         MNGE 2/3 2/3 2/3 2/3                                                                          Ther Ex                                                                                                                     Ther Activity                                                                              Modalities             US 12 12 12 12         CP 15 15 15 15

## 2020-09-18 ENCOUNTER — OFFICE VISIT (OUTPATIENT)
Dept: VASCULAR SURGERY | Facility: CLINIC | Age: 65
End: 2020-09-18
Payer: COMMERCIAL

## 2020-09-18 VITALS
HEART RATE: 86 BPM | TEMPERATURE: 97.6 F | DIASTOLIC BLOOD PRESSURE: 70 MMHG | WEIGHT: 308 LBS | BODY MASS INDEX: 45.62 KG/M2 | SYSTOLIC BLOOD PRESSURE: 108 MMHG | HEIGHT: 69 IN

## 2020-09-18 DIAGNOSIS — I89.0 LYMPHEDEMA: ICD-10-CM

## 2020-09-18 DIAGNOSIS — I87.2 VENOUS INSUFFICIENCY OF BOTH LOWER EXTREMITIES: Primary | ICD-10-CM

## 2020-09-18 DIAGNOSIS — R60.0 BILATERAL LOWER EXTREMITY EDEMA: ICD-10-CM

## 2020-09-18 PROCEDURE — 99214 OFFICE O/P EST MOD 30 MIN: CPT | Performed by: SURGERY

## 2020-09-18 NOTE — ASSESSMENT & PLAN NOTE
Unfortunate patient not only has lymphedema but he has superimposed chronic venous insufficiency  He has had both greater saphenous veins as well as small saphenous veins intervened upon by Dr Colt Rios at [facility]  His most recent venous reflux study does not suggest any deep venous incompetence  The previously ablated veins appear close  He does have bilateral deep venous insufficiency  Unfortunately there does not exist a reasonable/durable intervention with respect to the deep venous system and compression therapy is the cornerstone of treatment  We discussed the importance of weight loss as excess central weight will further impede venous return and lead to worsening edema

## 2020-09-18 NOTE — PROGRESS NOTES
Assessment/Plan:    Lymphedema  We discussed the pathophysiology of lymphedema  Patient on chronic diuretic therapy  If diuretics were initially prescribed for his lymphedema may be prudent to discontinue as there is some dated that suggests diuretics may worsen lymphedema  We discussed the importance of meticulous skin hygiene and compression therapy  He should resume his lymphedema treatments whenever feasible  Venous insufficiency of both lower extremities  Unfortunate patient not only has lymphedema but he has superimposed chronic venous insufficiency  He has had both greater saphenous veins as well as small saphenous veins intervened upon by Dr Hernando Whittington at [facility]  His most recent venous reflux study does not suggest any deep venous incompetence  The previously ablated veins appear close  He does have bilateral deep venous insufficiency  Unfortunately there does not exist a reasonable/durable intervention with respect to the deep venous system and compression therapy is the cornerstone of treatment  We discussed the importance of weight loss as excess central weight will further impede venous return and lead to worsening edema  Diagnoses and all orders for this visit:    Venous insufficiency of both lower extremities    Bilateral lower extremity edema    Lymphedema          Subjective:      Patient ID: Christin Hogan is a 76 y o  male  Patient is here to Pj done on 9/10/20  Patient has Hx of chronic venous insufficiency, Bilateral GSV, SSV and ablation (5/21/18, 11/15/18, 2/14/18) and Foam sclerotherapy by Dr Hernando Whittington @[facility]  Patient C/o of BL LE lymphedema  R>L  Patient does have some darkening on BL calves  Patient does wear compression and patient did go to lymphedema therapy however is no longer going  Patient currently takes Ifeanyi Mcallister returns to the office to review results of his venous reflux study  He has chronic venous insufficiency as well as lymphedema    He works in a [employer] and is on his feet most of the day  During this [place] pandemic he has worked longer hours and has noted that bilateral leg swelling has worsened (with right leg worse than left leg)  The following portions of the patient's history were reviewed and updated as appropriate: allergies, current medications, past family history, past medical history, past social history, past surgical history and problem list     Review of Systems   Constitutional: Negative  Negative for chills and fever  HENT: Negative  Eyes: Negative  Respiratory: Negative  Negative for chest tightness and shortness of breath  Cardiovascular: Positive for leg swelling (R>L)  Negative for chest pain  Gastrointestinal: Negative  Endocrine: Negative  Genitourinary: Negative  Musculoskeletal: Negative  Skin: Positive for color change (Darkening of the BL calves )  Negative for wound  Allergic/Immunologic: Negative  Neurological: Negative  Hematological: Negative  Psychiatric/Behavioral: Negative  I have personally reviewed the ROS entered by MA and agree as documented  Objective:      /70 (BP Location: Right arm, Patient Position: Sitting, Cuff Size: Standard)   Pulse 86   Temp 97 6 °F (36 4 °C) (Tympanic)   Ht 5' 9" (1 753 m)   Wt (!) 140 kg (308 lb)   BMI 45 48 kg/m²          Physical Exam  Constitutional:       Appearance: Normal appearance  He is obese  He is not ill-appearing or diaphoretic  HENT:      Head: Normocephalic and atraumatic  Right Ear: External ear normal       Left Ear: External ear normal    Eyes:      Extraocular Movements: Extraocular movements intact  Neck:      Musculoskeletal: Normal range of motion  Cardiovascular:      Rate and Rhythm: Normal rate  Pulmonary:      Effort: Pulmonary effort is normal    Musculoskeletal:         General: Swelling present  Skin:     Comments: Chronic venous stasis skin changes    There is scattered varicosities along bilateral lower legs  Neurological:      Mental Status: He is alert  Psychiatric:         Mood and Affect: Mood normal          Behavior: Behavior normal          Thought Content: Thought content normal          Judgment: Judgment normal        venous reflux study:CONCLUSION:     Impression:  RIGHT LIMB:  Deep venous incompetence is noted in the common femoral artery, superficial  femoral artery and popliteal arteries  The great saphenous vein is ablated and residually incompetent distally  Reflux noted in the ablated AASV communicating with varicosities in the lower  extremity  The small saphenous vein is ablated and incompetent residually  There is no evidence of incompetent perforators in the thigh or calf  There is no evidence of deep vein thrombosis in the CFV, the proximal PFV, the  femoral vein and the popliteal vein  LEFT LIMB:  Deep venous incompetence is noted in the common femoral artery, superficial  femoral artery and popliteal arteries  The great saphenous vein is ablated, reflux noted in the tortuous ablated AASV  communicating with varicosities in the thigh and calf  The great saphenous vein remains within the saphenous compartment in the thigh  The small saphenous vein is ablated and residually incompetent communicating  with posterior varicosities in the calf  There is an incompetent  in the calf  There is no evidence of deep vein thrombosis in the CFV, the proximal PFV, the  femoral vein and the popliteal vein

## 2020-09-18 NOTE — ASSESSMENT & PLAN NOTE
We discussed the pathophysiology of lymphedema  Patient on chronic diuretic therapy  If diuretics were initially prescribed for his lymphedema may be prudent to discontinue as there is some dated that suggests diuretics may worsen lymphedema  We discussed the importance of meticulous skin hygiene and compression therapy  He should resume his lymphedema treatments whenever feasible

## 2020-09-21 ENCOUNTER — HOSPITAL ENCOUNTER (OUTPATIENT)
Dept: NUCLEAR MEDICINE | Facility: HOSPITAL | Age: 65
Discharge: HOME | End: 2020-09-21
Payer: COMMERCIAL

## 2020-09-21 ENCOUNTER — HOSPITAL ENCOUNTER (OUTPATIENT)
Dept: NON INVASIVE DIAGNOSTICS | Facility: HOSPITAL | Age: 65
Discharge: HOME | End: 2020-09-21
Payer: COMMERCIAL

## 2020-09-21 DIAGNOSIS — R94.31 ABNORMAL ECG: ICD-10-CM

## 2020-09-21 DIAGNOSIS — I48.11 LONGSTANDING PERSISTENT ATRIAL FIBRILLATION (HCC): ICD-10-CM

## 2020-09-21 LAB
CHEST PAIN STATEMENT: NORMAL
MAX DIASTOLIC BP: 80 MMHG
MAX HEART RATE: 125 BPM
MAX PREDICTED HEART RATE: 154 BPM
MAX. SYSTOLIC BP: 130 MMHG
PROTOCOL NAME: NORMAL
REASON FOR TERMINATION: NORMAL
TARGET HR FORMULA: NORMAL
TEST INDICATION: NORMAL
TIME IN EXERCISE PHASE: NORMAL

## 2020-09-21 PROCEDURE — 78452 HT MUSCLE IMAGE SPECT MULT: CPT | Performed by: INTERNAL MEDICINE

## 2020-09-21 PROCEDURE — 93017 CV STRESS TEST TRACING ONLY: CPT

## 2020-09-21 PROCEDURE — 93226 XTRNL ECG REC<48 HR SCAN A/R: CPT

## 2020-09-21 PROCEDURE — 93225 XTRNL ECG REC<48 HRS REC: CPT

## 2020-09-21 PROCEDURE — 93018 CV STRESS TEST I&R ONLY: CPT | Performed by: INTERNAL MEDICINE

## 2020-09-21 PROCEDURE — A9502 TC99M TETROFOSMIN: HCPCS

## 2020-09-21 PROCEDURE — 93016 CV STRESS TEST SUPVJ ONLY: CPT | Performed by: INTERNAL MEDICINE

## 2020-09-21 PROCEDURE — 78452 HT MUSCLE IMAGE SPECT MULT: CPT

## 2020-09-21 PROCEDURE — 93224 XTRNL ECG REC UP TO 48 HRS: CPT | Performed by: INTERNAL MEDICINE

## 2020-09-21 PROCEDURE — G1004 CDSM NDSC: HCPCS

## 2020-09-21 RX ADMIN — REGADENOSON 0.4 MG: 0.08 INJECTION, SOLUTION INTRAVENOUS at 09:40

## 2020-09-22 ENCOUNTER — APPOINTMENT (OUTPATIENT)
Dept: PHYSICAL THERAPY | Facility: CLINIC | Age: 65
End: 2020-09-22
Payer: COMMERCIAL

## 2020-09-23 ENCOUNTER — OFFICE VISIT (OUTPATIENT)
Dept: PHYSICAL THERAPY | Facility: CLINIC | Age: 65
End: 2020-09-23
Payer: COMMERCIAL

## 2020-09-24 ENCOUNTER — APPOINTMENT (OUTPATIENT)
Dept: PHYSICAL THERAPY | Facility: CLINIC | Age: 65
End: 2020-09-24
Payer: COMMERCIAL

## 2020-09-25 ENCOUNTER — OFFICE VISIT (OUTPATIENT)
Dept: PHYSICAL THERAPY | Facility: CLINIC | Age: 65
End: 2020-09-25
Payer: COMMERCIAL

## 2020-09-25 DIAGNOSIS — G56.02 LEFT CARPAL TUNNEL SYNDROME: Primary | ICD-10-CM

## 2020-09-25 PROCEDURE — 97112 NEUROMUSCULAR REEDUCATION: CPT

## 2020-09-25 PROCEDURE — 97035 APP MDLTY 1+ULTRASOUND EA 15: CPT

## 2020-09-25 PROCEDURE — 97140 MANUAL THERAPY 1/> REGIONS: CPT

## 2020-09-25 PROCEDURE — 97110 THERAPEUTIC EXERCISES: CPT

## 2020-09-25 NOTE — PROGRESS NOTES
Daily Note     Today's date: 2020  Patient name: Alexis Agarwal  : 1955  MRN: [id number]  Referring provider: Cornelia Garcia DO  Dx:   Encounter Diagnosis     ICD-10-CM    1  Left carpal tunnel syndrome  G56 02                   Subjective: Pt reports compliancy with HEP  Minimal complaints      Objective: See treatment diary below    ROM left S/P- 60/90; Wrist E/F- 75/65                   Digits-Loose composite fist/extension                   Thumb MP- 0/50; IP 0/60    Strength 30/40  3jc  key   Circumference at Wrist- 17 9  cm; MPs- 22 0 cm; LF P1- 7 2 cm  Sensation- 3 61 T, IF, MF 1/2 RF      Assessment: Tolerated treatment well today  Pt initiated with  and pinch activities today        Plan: Continue with current program     Precautions: no lifting, heavy grasp 4 weeks post surgery      Manuals 9/3 9/8 9/9 9/17 9/25        STM 15 15 15 15 15                     tubigrip 2 2                        Neuro Re-Ed             HP/Pulsed Biph 15 15 15 15 15        MNGE 2/3 2/3 2/3 2/3 2/3                                                                         Ther Ex             TP      Y 2'          5finge     Y 2/10         Chris     20 2/10        Wily     III 2/10        DB E/F     4 2/10        flexbar     R 2/10        S/P     1# 2/10                     Ther Activity                                                                              Modalities              12 12 12 12 12        CP 15 15 15 15 15

## 2020-09-28 ENCOUNTER — OFFICE VISIT (OUTPATIENT)
Dept: PHYSICAL THERAPY | Facility: CLINIC | Age: 65
End: 2020-09-28
Payer: COMMERCIAL

## 2020-09-28 ENCOUNTER — TRANSCRIBE ORDERS (OUTPATIENT)
Dept: PHYSICAL THERAPY | Facility: CLINIC | Age: 65
End: 2020-09-28

## 2020-09-28 DIAGNOSIS — G56.02 CARPAL TUNNEL SYNDROME ON LEFT: Primary | ICD-10-CM

## 2020-09-28 DIAGNOSIS — G56.02 LEFT CARPAL TUNNEL SYNDROME: Primary | ICD-10-CM

## 2020-09-28 PROCEDURE — 97110 THERAPEUTIC EXERCISES: CPT | Performed by: PHYSICAL THERAPIST

## 2020-09-28 PROCEDURE — 97035 APP MDLTY 1+ULTRASOUND EA 15: CPT | Performed by: PHYSICAL THERAPIST

## 2020-09-28 PROCEDURE — 97112 NEUROMUSCULAR REEDUCATION: CPT | Performed by: PHYSICAL THERAPIST

## 2020-09-28 PROCEDURE — 97140 MANUAL THERAPY 1/> REGIONS: CPT | Performed by: PHYSICAL THERAPIST

## 2020-09-28 NOTE — PROGRESS NOTES
PT Re-Evaluation     Today's date: 2020  Patient name: Karo Mckeon  : 1955  MRN: [id number]  Referring provider: Ashish Lopez DO  Dx:   Encounter Diagnosis     ICD-10-CM    1  Left carpal tunnel syndrome  G56 02                   Assessment  Assessment details: Pt is a 77 YO male presenting to PT with pain, decreased AROM, strength and tolerance to activity  Pt would benefit from skilled intervention to address these issues and maximize overall function  Occupation- - pt has RTW  Dominant- Right; Involved- Left  Pt progressing with increased motion and strength and decreased scar and swelling  Goals  ST  Decrease pain to 3-4 in 4 weeks            2  Decrease swelling in hand and wrist            3  Increase AROM to composite fist and extension in 4 weeks            4  Maintain clean wound and promote healing            5   Provide orthotic for protection, compression for swelling  LT  Increase functional motion and strength for independence with ADL and self care by DC            2   Ability to RTW and recreational activity by DC    Plan  Patient would benefit from: skilled speech therapy  Planned modality interventions: thermotherapy: hydrocollator packs, cryotherapy and ultrasound  Planned therapy interventions: manual therapy, neuromuscular re-education, therapeutic activities, stretching, strengthening, therapeutic exercise, home exercise program, orthotic management and training and self care  Frequency: 2x week  Duration in weeks: 4  Treatment plan discussed with: patient        Subjective Evaluation    History of Present Illness  Date of surgery: 2020  Mechanism of injury: Several months of loss of sensation, strength in left hand and fingers  Pain  Current pain ratin  At best pain ratin  At worst pain ratin  Location: left wrist    Hand dominance: right    Treatments  Current treatment: physical therapy  Patient Goals  Patient goals for therapy: decreased edema, decreased pain, increased motion, increased strength, independence with ADLs/IADLs, return to sport/leisure activities and return to work          Objective     General Comments:      Wrist/Hand Comments  ROM left S/P- 60/90;  Wrist E/F- 75/65                   Digits-Loose composite fist/extension                   Thumb MP- 0/50; IP 0/60  Strength-  II- 30/40; 3JC-  9/9; Key- 12/12  Circumference at Wrist- 17 9  cm; MPs- 22 0 cm; LF P1- 7 2 cm  Sensation- 3 61 T, IF, MF 1/2 RF                Precautions: progress within safe lifting ability    Manuals 9/3 9/8 9/9 9/17 9/25  9/28           STM 15 15 15 15 15 15                                   tubigrip 2 2                                           Neuro Re-Ed                       HP/Pulsed Biph 15 15 15 15 15  15           MNGE 2/3 2/3 2/3 2/3 2/3  2/3                                                                                                                                   Ther Ex                       TP          Y 2'  Y 2'             5finge         Y 2/10  Y 2/10            Chris         20 2/10  20 2/10           Wily         III 2/10  III 2/10           DB E/F         4 2/10  4 2/10           flexbar         R 2/10  R 2/10           S/P         1# 2/10  1 2/10           Zottman's       4 2/10                                                                     Ther Activity                                                                                                                                               Modalities                       US 12 12 12 12 12  12           CP 15 15 15 15 15  15

## 2020-09-29 ENCOUNTER — OFFICE VISIT (OUTPATIENT)
Dept: CARDIOLOGY CLINIC | Facility: CLINIC | Age: 65
End: 2020-09-29
Payer: COMMERCIAL

## 2020-09-29 VITALS
DIASTOLIC BLOOD PRESSURE: 60 MMHG | WEIGHT: 303.4 LBS | HEART RATE: 83 BPM | TEMPERATURE: 98.5 F | BODY MASS INDEX: 44.94 KG/M2 | HEIGHT: 69 IN | SYSTOLIC BLOOD PRESSURE: 114 MMHG

## 2020-09-29 DIAGNOSIS — R94.31 ABNORMAL ECG: ICD-10-CM

## 2020-09-29 DIAGNOSIS — G47.33 OBSTRUCTIVE SLEEP APNEA ON CPAP: ICD-10-CM

## 2020-09-29 DIAGNOSIS — E66.01 OBESITY, MORBID (HCC): ICD-10-CM

## 2020-09-29 DIAGNOSIS — Z99.89 OBSTRUCTIVE SLEEP APNEA ON CPAP: ICD-10-CM

## 2020-09-29 DIAGNOSIS — I50.32 CHRONIC DIASTOLIC HEART FAILURE (HCC): ICD-10-CM

## 2020-09-29 DIAGNOSIS — I48.11 LONGSTANDING PERSISTENT ATRIAL FIBRILLATION (HCC): Primary | ICD-10-CM

## 2020-09-29 PROCEDURE — 99214 OFFICE O/P EST MOD 30 MIN: CPT | Performed by: INTERNAL MEDICINE

## 2020-09-29 RX ORDER — CEPHALEXIN 500 MG/1
500 CAPSULE ORAL EVERY 6 HOURS SCHEDULED
COMMUNITY
End: 2020-12-15 | Stop reason: ALTCHOICE

## 2020-09-29 RX ORDER — PREGABALIN 100 MG/1
100 CAPSULE ORAL 3 TIMES DAILY
Status: ON HOLD | COMMUNITY
End: 2021-01-29 | Stop reason: SDUPTHER

## 2020-09-29 NOTE — PROGRESS NOTES
Cardiology Office Note  MD Janelle Guzmán MD Susette Andes, DO, [address] [facility] MD Ayed Sr DO, Phoebe Henriquez DO, [facility] [place]  ----------------------------------------------------------------  [address] [place] He [age] y o  male MRN: [id number]  Unit/Bed#:  Encounter: [id number]      History of Present Illness: It was a pleasure to see Elizabeth Rodriguez in the office today for follow-up CV evaluation  He has a longstanding history of atrial fibrillation with prior failed ELAINE guided cardioversion, history of morbid obesity and pulmonary hypertension with chronic venous insufficiency/lymphedema  The patient has a known history of obstructive sleep apnea was noncompliant with his CPAP therapy  Over the course of many years he has been having lower extremity edema which has been believe secondary to lymphedema with chronic venous insufficiency  He has had venous ablation is in the past for his reflux disease  Recently in October 2019, he was found have pulmonary hypertension with pulmonary artery systolic pressure is estimated at 50 mm Hg  We had initially seen him in late August 2020 due to increased fatigue and dyspnea on exertion  He has become somewhat short of breath with basic activity  Previously, he had been managed for his atrial fibrillation at [facility]  At our last encounter, I ordered initial testing to assess his left ventricular function with a 2D echocardiogram and pulmonary pressures  We had also checked a pharmacologic nuclear stress test and 48 hour Holter monitor  Denies any chest pain, pressure, tightness or squeezing  Denies lightheadedness, dizziness or palpitations  Admits to chronic lower extremity swelling with lymphedema  Denies orthopnea or paroxysmal nocturnal dyspnea  Review of Systems:  Review of Systems   Constitution: Positive for malaise/fatigue  Negative for decreased appetite, fever, weight gain and weight loss  HENT: Negative for congestion and sore throat  Eyes: Negative for visual disturbance  Cardiovascular: Positive for dyspnea on exertion  Negative for chest pain, leg swelling, near-syncope and palpitations  Respiratory: Positive for shortness of breath  Negative for cough  Hematologic/Lymphatic: Negative for bleeding problem  Skin: Negative for rash  Musculoskeletal: Negative for myalgias and neck pain  Gastrointestinal: Negative for abdominal pain and nausea  Neurological: Negative for light-headedness and weakness  Psychiatric/Behavioral: Negative for depression  Past Medical History:   Diagnosis Date    A-fib ([address] )     Arthritis     CPAP (continuous positive airway pressure) dependence     Irregular heart beat     Lymphedema     Sleep apnea     Urinary frequency     Wears glasses     Wears partial dentures     lower partial       Past Surgical History:   Procedure Laterality Date    ABLATION SAPHENOUS VEIN W/ RFA      COLONOSCOPY      IA CYSTOURETHRO W/IMPLANT N/A 11/13/2017    Procedure: CYSTOSCOPY WITH INSERTION UROLIFT;  Surgeon: Saranya Arellano DO;  Location: [facility] OR;  Service: Urology    TONSILLECTOMY         Social History     Socioeconomic History    Marital status: /Civil Union     Spouse name: None    Number of children: None    Years of education: None    Highest education level: None   Occupational History    None   Social Needs    Financial resource strain: None    Food insecurity     Worry: None     Inability: None    Transportation needs     Medical: None     Non-medical: None   Tobacco Use    Smoking status: Never Smoker    Smokeless tobacco: Never Used   Substance and Sexual Activity    Alcohol use:  Yes     Alcohol/week: 4 0 standard drinks     Types: 4 Cans of beer per week     Frequency: 2-3 times a week     Binge frequency: Never     Comment: socially    Drug use: No    Sexual activity: None   Lifestyle    Physical activity     Days per week: None     Minutes per session: None    Stress: None   Relationships    Social connections     Talks on phone: None     Gets together: None     Attends Christian service: None     Active member of club or organization: None     Attends meetings of clubs or organizations: None     Relationship status: None    Intimate partner violence     Fear of current or ex partner: None     Emotionally abused: None     Physically abused: None     Forced sexual activity: None   Other Topics Concern    None   Social History Narrative    None       History reviewed  No pertinent family history      Allergies   Allergen Reactions    Penicillins Anaphylaxis    Sulfa Antibiotics Anaphylaxis         Current Outpatient Medications:     Acetaminophen (TYLENOL EXTRA STRENGTH PO), Take by mouth, Disp: , Rfl:     allopurinol (ZYLOPRIM) 100 mg tablet, Take 100 mg by mouth daily, Disp: , Rfl:     ammonium lactate (AMLACTIN) 12 % lotion, Apply topically 2 (two) times a day as needed for dry skin, Disp: , Rfl:     cephalexin (KEFLEX) 500 mg capsule, Take 500 mg by mouth every 6 (six) hours, Disp: , Rfl:     CoenzymeQ10-Isoleucine-Glycine (CO Q-10) 100-50-25 MG TB24, Co Q-10, Disp: , Rfl:     ferrous sulfate 325 (65 Fe) mg tablet, Take 325 mg by mouth every other day, Disp: , Rfl:     furosemide (LASIX) 20 mg tablet, Take 20 mg by mouth daily, Disp: , Rfl:     gabapentin (NEURONTIN) 100 mg capsule, Take 100 mg by mouth 2 (two) times a day, Disp: , Rfl:     GARCINIA CAMBOGIA-CHROMIUM PO, Take 750 mg by mouth 2 (two) times a day , Disp: , Rfl:     Glucosamine-Chondroit-Vit C-Mn (Glucosamine 1500 Complex) CAPS, Take by mouth, Disp: , Rfl:     Green Coffee Silva-Yerba Mate (GREEN COFFEE BEAN EXTRACT PO), Take 800 mg by mouth daily , Disp: , Rfl:     L-ARGININE-500 PO, Take 500 mg by mouth 2 (two) times a day , Disp: , Rfl:     Multiple Vitamins-Minerals (OCUVITE EXTRA PO), Take 1 tablet by mouth daily  , Disp: , Rfl:     patient supplied medication, Take 1 each by mouth 2 (two) times a day, Disp: , Rfl:     patient supplied medication, Take 1 each by mouth 2 (two) times a day, Disp: , Rfl:     pregabalin (LYRICA) 100 mg capsule, Take 100 mg by mouth 3 (three) times a day, Disp: , Rfl:     RASPBERRY KETONES PO, Take 100 mg by mouth 2 (two) times a day, Disp: , Rfl:     rivaroxaban (XARELTO) 20 mg tablet, Take 20 mg by mouth daily, Disp: , Rfl:     tadalafil (CIALIS) 5 MG tablet, Take 5 mg by mouth daily as needed for erectile dysfunction, Disp: , Rfl:     traMADol (ULTRAM) 50 mg tablet, Take 50 mg by mouth every 6 (six) hours as needed for moderate pain, Disp: , Rfl:     Turmeric Curcumin 500 MG CAPS, Take 500 mg by mouth daily, Disp: , Rfl:     VITAMIN D PO, Take by mouth, Disp: , Rfl:     metoprolol tartrate (LOPRESSOR) 25 mg tablet, Take 1 tablet (25 mg total) by mouth every 12 (twelve) hours, Disp: 180 tablet, Rfl: 2    Vitals:    09/29/20 1138   BP: 114/60   Pulse: 83   Temp: 98 5 °F (36 9 °C)   Weight: (!) 138 kg (303 lb 6 4 oz)   Height: 5' 9" (1 753 m)       PHYSICAL EXAMINATION:  Gen: Awake, Alert, NAD    HEENT: AT/NC, Anicteric, mmm  Neck: Supple, No elevated JVP  Resp: CTA bilaterally no w/r/r  CV:  Irregularly irregular +S1, S2, No m/r/g  Abd: Soft, NT/ND + BS  Ext: warm, bilateral lower extremities wrapped with +2 pitting edema bilaterally/lymphedema  Neuro: Follows commands, moves all extermities  Psych: Appropriate affect    --------------------------------------------------------------------------------  TREADMILL STRESS  No results found for this or any previous visit    --------------------------------------------------------------------------------  NUCLEAR STRESS TEST: No results found for this or any previous visit    No results found for this or any previous visit     --------------------------------------------------------------------------------  CATH:  No results found for this or any previous visit   --------------------------------------------------------------------------------  ECHO:   Results for orders placed during the hospital encounter of 10/26/19   Echo complete with contrast if indicated    [facility] [address] [facility] [address] [facility] [address] [place] [address]  [phone]    Transthoracic Echocardiogram  2D, M-mode, Doppler, and Color Doppler    Study date:  28-Oct-2019    Patient: Zhang Bradford  MR number: [id number]  Account number: [de-identified]  : 1955  Age: 71 years  Gender: Male  Status: Inpatient  Location: Bedside  Height: 69 in  Weight: 302 lb  BP: 141/ 88 mmHg    Indications: left sided paralysis    Diagnoses: 56 - CVA    Sonographer:  Julio Cesar Burton RD, CCT  Referring Physician:  Giovanny Pendleton DO  Group:  [facility]  Interpreting Physician:  Miriam Navarro DO    SUMMARY    LEFT VENTRICLE:  Systolic function was normal  Ejection fraction was estimated to be 55 %  This study was inadequate for the evaluation of regional wall motion  Wall thickness was mildly increased  RIGHT VENTRICLE:  The ventricle was dilated  Systolic function was normal     MITRAL VALVE:  There was mild regurgitation  TRICUSPID VALVE:  There was mild regurgitation  Estimated peak PA pressure was 50 mmHg  HISTORY: PRIOR HISTORY: Patient has no history of cardiovascular disease  PROCEDURE: The procedure was performed at the bedside  This was a routine study  The transthoracic approach was used  The study included complete 2D imaging, M-mode, complete spectral Doppler, and color Doppler  The heart rate was 80 bpm,  at the start of the study  Intravenous contrast (Definity solution [1 3 ml Definity/8 7ml normal saline solution], 3 ml) was administered to opacify the left ventricle   Echocardiographic views were limited due to poor acoustic window  availability  This was a technically difficult study  LEFT VENTRICLE: Size was normal  Systolic function was normal  Ejection fraction was estimated to be 55 %  This study was inadequate for the evaluation of regional wall motion  Wall thickness was mildly increased  DOPPLER: The study was  not technically sufficient to allow evaluation of LV diastolic function  RIGHT VENTRICLE: The ventricle was dilated  Systolic function was normal     LEFT ATRIUM: Size was normal     RIGHT ATRIUM: Size was normal     MITRAL VALVE: Valve structure was normal  There was normal leaflet separation  DOPPLER: The transmitral velocity was within the normal range  There was no evidence for stenosis  There was mild regurgitation  AORTIC VALVE: The valve was trileaflet  Leaflets exhibited normal thickness and normal cuspal separation  DOPPLER: Transaortic velocity was within the normal range  There was no evidence for stenosis  There was no regurgitation  TRICUSPID VALVE: The valve structure was normal  There was normal leaflet separation  DOPPLER: The transtricuspid velocity was within the normal range  There was no evidence for stenosis  There was mild regurgitation  Estimated peak PA  pressure was 50 mmHg  PULMONIC VALVE: Leaflets exhibited normal thickness, no calcification, and normal cuspal separation  DOPPLER: The transpulmonic velocity was within the normal range  There was no regurgitation  PERICARDIUM: There was no pericardial effusion  The pericardium was normal in appearance  AORTA: The root exhibited normal size  SYSTEMIC VEINS: IVC: The inferior vena cava was not well visualized      SYSTEM MEASUREMENT TABLES    2D  %FS: 34 72 %  Ao Diam: 2 87 cm  EDV(Teich): 143 23 ml  EF(Teich): 63 36 %  ESV(Teich): 52 47 ml  IVSd: 1 15 cm  LA Diam: 4 16 cm  LVIDd: 5 43 cm  LVIDs: 3 55 cm  LVPWd: 1 04 cm  RWT: 0 38  SV(Teich): 90 75 ml    CW  AV Vmax: 1 34 m/s  AV maxPG: 7 13 mmHg  RAP: 0 mmHg  TR Vmax: 3 26 m/s  TR maxP 5 mmHg    PW  E' Sept: 0 09 m/s  MV E Terrance: 1 03 m/s  RVSP: 42 5 mmHg    [facility]    Prepared and electronically signed by    Hakeem Bar DO  Signed 28-Oct-2019 10:37:38       No results found for this or any previous visit   --------------------------------------------------------------------------------  HOLTER  No results found for this or any previous visit  No results found for this or any previous visit   --------------------------------------------------------------------------------  CAROTIDS  No results found for this or any previous visit    --------------------------------------------------------------------------------  ECGs:  No results found for this visit on 20  Lab Results   Component Value Date    WBC 4 54 10/28/2019    HGB 11 9 (L) 10/28/2019    HCT 36 0 (L) 10/28/2019    MCV 93 10/28/2019     (L) 10/28/2019      Lab Results   Component Value Date    SODIUM 139 2020    K 4 6 2020     2020    CO2 30 2020    BUN 24 2020    CREATININE 0 89 2020    GLUC 87 2020    CALCIUM 9 2 2020      Lab Results   Component Value Date    HGBA1C 5 7 10/27/2019      No results found for: CHOL  Lab Results   Component Value Date    HDL 55 10/27/2019     Lab Results   Component Value Date    LDLCALC 90 10/27/2019     Lab Results   Component Value Date    TRIG 93 10/27/2019     No results found for: [place], [place]   Lab Results   Component Value Date    INR 1 18 10/26/2019    PROTIME 15 1 (H) 10/26/2019        1  Longstanding persistent atrial fibrillation (HCC)  -     metoprolol tartrate (LOPRESSOR) 25 mg tablet; Take 1 tablet (25 mg total) by mouth every 12 (twelve) hours  -     Ambulatory referral to Cardiac Electrophysiology;  Future        IMPRESSION:  · Persistent atrial fibrillation on Xarelto  · LVEF 55%, mild LVH, mild RV dilatation, mild MR/TR with PASP 50 mmHg, October 2019  · History of bilateral venous insufficiency status post LE vein ablation  · Pharmacologic nuclear stress test negative for myocardial ischemia with gated EF 50%, September 2020  · Holter with AF, avg HR 92 bpm, rare VPCs, September 2020  · Chronic diastolic heart failure  · Abnormal ECG with Bifascicular block  · Moderate pulmonary hypertension  · Lymphedema  · Morbid obesity  · ROBERT refusing CPAP therapy    PLAN:  It was a pleasure to see Henok Mitchell in the office today for follow-up CV evaluation  He is here today for follow-up regarding his shortness of breath  He has undergone an echocardiogram, nuclear stress test and Holter monitor  The echocardiogram demonstrated normal left ventricular function EF 55% with mild pulmonary hypertension and a pulmonary artery systolic pressure of 38 mm Hg  His nuclear stress test was found to be negative for myocardial ischemia  Holter monitor demonstrated average heart rate in the 90s with rare VPCs  His blood pressure and heart rate have been stable in the office today  Despite his chronic lower extremity swelling, a he has clear lung exam in his pulmonary pressures are improved from prior  NT proBNP is minimally elevated  I do not believe that he is clinically in heart failure at this time  He has no symptoms concerning for angina  Based on his clinical presentation, I have the following recommendations:    1  Given his elevated heart rates with an average heart rate in the 90s on Holter monitor, I believe that he needs improved rate control  For this reason, I will increase his metoprolol to 25 mg b i d  And may need further uptitration of his metoprolol  2  Given his dyspnea on exertion, have discussed rate versus rhythm control methods  I have recommended that he see electrophysiology to discuss potential antiarrhythmic drug options versus ablation versus continued rate control    3  Continue Xarelto for anticoagulation  4  Maintain Lasix at 40 mg daily dosing  I have reviewed vascular is notes regarding his lymphedema and would like to keep his pulmonary pressures only mildly elevated  May consider further down titration of diuretic in the future  5  Patient needs improvement in weights to better help with his pulmonary pressures and swelling  6  As always, I have recommended a heart healthy diet low in sodium and exercise regimen  7  We will follow up with him in 3 months  As always, please do not hesitate to call with any questions  Portions of the record may have been created with voice recognition software  Occasional wrong word or "sound a like" substitutions may have occurred due to the inherent limitations of voice recognition software  Read the chart carefully and recognize, using context, where substitutions have occurred          Signed: Sierra Thompson DO, [facility] [place]

## 2020-10-01 ENCOUNTER — OFFICE VISIT (OUTPATIENT)
Dept: PHYSICAL THERAPY | Facility: CLINIC | Age: 65
End: 2020-10-01
Payer: COMMERCIAL

## 2020-10-01 DIAGNOSIS — G56.02 LEFT CARPAL TUNNEL SYNDROME: Primary | ICD-10-CM

## 2020-10-01 PROCEDURE — 97035 APP MDLTY 1+ULTRASOUND EA 15: CPT | Performed by: PHYSICAL THERAPIST

## 2020-10-01 PROCEDURE — 97140 MANUAL THERAPY 1/> REGIONS: CPT | Performed by: PHYSICAL THERAPIST

## 2020-10-01 PROCEDURE — 97112 NEUROMUSCULAR REEDUCATION: CPT | Performed by: PHYSICAL THERAPIST

## 2020-10-01 PROCEDURE — 97110 THERAPEUTIC EXERCISES: CPT | Performed by: PHYSICAL THERAPIST

## 2020-10-05 ENCOUNTER — OFFICE VISIT (OUTPATIENT)
Dept: PHYSICAL THERAPY | Facility: CLINIC | Age: 65
End: 2020-10-05
Payer: COMMERCIAL

## 2020-10-05 DIAGNOSIS — G56.02 LEFT CARPAL TUNNEL SYNDROME: Primary | ICD-10-CM

## 2020-10-05 PROCEDURE — 97140 MANUAL THERAPY 1/> REGIONS: CPT

## 2020-10-05 PROCEDURE — 97112 NEUROMUSCULAR REEDUCATION: CPT

## 2020-10-05 PROCEDURE — 97110 THERAPEUTIC EXERCISES: CPT

## 2020-10-08 ENCOUNTER — APPOINTMENT (OUTPATIENT)
Dept: PHYSICAL THERAPY | Facility: CLINIC | Age: 65
End: 2020-10-08
Payer: COMMERCIAL

## 2020-10-12 ENCOUNTER — OFFICE VISIT (OUTPATIENT)
Dept: PHYSICAL THERAPY | Facility: CLINIC | Age: 65
End: 2020-10-12
Payer: COMMERCIAL

## 2020-10-12 DIAGNOSIS — G56.02 LEFT CARPAL TUNNEL SYNDROME: Primary | ICD-10-CM

## 2020-10-12 PROCEDURE — 97112 NEUROMUSCULAR REEDUCATION: CPT

## 2020-10-12 PROCEDURE — 97110 THERAPEUTIC EXERCISES: CPT

## 2020-10-12 PROCEDURE — 97140 MANUAL THERAPY 1/> REGIONS: CPT

## 2020-10-15 ENCOUNTER — OFFICE VISIT (OUTPATIENT)
Dept: PHYSICAL THERAPY | Facility: CLINIC | Age: 65
End: 2020-10-15
Payer: COMMERCIAL

## 2020-10-15 DIAGNOSIS — G56.02 LEFT CARPAL TUNNEL SYNDROME: Primary | ICD-10-CM

## 2020-10-15 PROCEDURE — 97112 NEUROMUSCULAR REEDUCATION: CPT

## 2020-10-15 PROCEDURE — 97140 MANUAL THERAPY 1/> REGIONS: CPT

## 2020-10-15 PROCEDURE — 97110 THERAPEUTIC EXERCISES: CPT

## 2020-10-19 ENCOUNTER — OFFICE VISIT (OUTPATIENT)
Dept: PHYSICAL THERAPY | Facility: CLINIC | Age: 65
End: 2020-10-19
Payer: COMMERCIAL

## 2020-10-19 ENCOUNTER — TRANSCRIBE ORDERS (OUTPATIENT)
Dept: PHYSICAL THERAPY | Facility: CLINIC | Age: 65
End: 2020-10-19

## 2020-10-19 DIAGNOSIS — G56.02 CARPAL TUNNEL SYNDROME ON LEFT: Primary | ICD-10-CM

## 2020-10-19 DIAGNOSIS — G56.02 LEFT CARPAL TUNNEL SYNDROME: Primary | ICD-10-CM

## 2020-10-19 PROCEDURE — 97140 MANUAL THERAPY 1/> REGIONS: CPT | Performed by: PHYSICAL THERAPIST

## 2020-10-19 PROCEDURE — 97535 SELF CARE MNGMENT TRAINING: CPT | Performed by: PHYSICAL THERAPIST

## 2020-10-19 PROCEDURE — 97112 NEUROMUSCULAR REEDUCATION: CPT | Performed by: PHYSICAL THERAPIST

## 2020-10-19 PROCEDURE — 97110 THERAPEUTIC EXERCISES: CPT | Performed by: PHYSICAL THERAPIST

## 2020-10-22 ENCOUNTER — OFFICE VISIT (OUTPATIENT)
Dept: PHYSICAL THERAPY | Facility: CLINIC | Age: 65
End: 2020-10-22
Payer: COMMERCIAL

## 2020-10-22 DIAGNOSIS — G56.02 LEFT CARPAL TUNNEL SYNDROME: Primary | ICD-10-CM

## 2020-10-22 PROCEDURE — 97112 NEUROMUSCULAR REEDUCATION: CPT

## 2020-10-22 PROCEDURE — 97110 THERAPEUTIC EXERCISES: CPT

## 2020-10-22 PROCEDURE — 97140 MANUAL THERAPY 1/> REGIONS: CPT

## 2020-10-26 ENCOUNTER — OFFICE VISIT (OUTPATIENT)
Dept: PHYSICAL THERAPY | Facility: CLINIC | Age: 65
End: 2020-10-26
Payer: COMMERCIAL

## 2020-10-26 DIAGNOSIS — G56.02 LEFT CARPAL TUNNEL SYNDROME: Primary | ICD-10-CM

## 2020-10-26 PROCEDURE — 97112 NEUROMUSCULAR REEDUCATION: CPT | Performed by: PHYSICAL THERAPIST

## 2020-10-26 PROCEDURE — 97110 THERAPEUTIC EXERCISES: CPT | Performed by: PHYSICAL THERAPIST

## 2020-10-26 PROCEDURE — 97140 MANUAL THERAPY 1/> REGIONS: CPT | Performed by: PHYSICAL THERAPIST

## 2020-10-28 ENCOUNTER — OFFICE VISIT (OUTPATIENT)
Dept: PHYSICAL THERAPY | Facility: CLINIC | Age: 65
End: 2020-10-28
Payer: COMMERCIAL

## 2020-10-28 DIAGNOSIS — G56.02 LEFT CARPAL TUNNEL SYNDROME: Primary | ICD-10-CM

## 2020-10-28 PROCEDURE — 97110 THERAPEUTIC EXERCISES: CPT

## 2020-10-28 PROCEDURE — 97112 NEUROMUSCULAR REEDUCATION: CPT

## 2020-10-28 PROCEDURE — 97140 MANUAL THERAPY 1/> REGIONS: CPT

## 2020-10-29 ENCOUNTER — CONSULT (OUTPATIENT)
Dept: CARDIOLOGY CLINIC | Facility: CLINIC | Age: 65
End: 2020-10-29
Payer: COMMERCIAL

## 2020-10-29 VITALS
WEIGHT: 315 LBS | TEMPERATURE: 97.4 F | HEART RATE: 88 BPM | DIASTOLIC BLOOD PRESSURE: 70 MMHG | SYSTOLIC BLOOD PRESSURE: 120 MMHG | BODY MASS INDEX: 47.31 KG/M2

## 2020-10-29 DIAGNOSIS — I89.0 LYMPHEDEMA: ICD-10-CM

## 2020-10-29 DIAGNOSIS — I27.20 PULMONARY HYPERTENSION (HCC): ICD-10-CM

## 2020-10-29 DIAGNOSIS — R60.0 BILATERAL LOWER EXTREMITY EDEMA: ICD-10-CM

## 2020-10-29 DIAGNOSIS — I87.2 VENOUS INSUFFICIENCY OF BOTH LOWER EXTREMITIES: ICD-10-CM

## 2020-10-29 DIAGNOSIS — Z99.89 OBSTRUCTIVE SLEEP APNEA ON CPAP: ICD-10-CM

## 2020-10-29 DIAGNOSIS — E66.01 MORBID OBESITY WITH BMI OF 40.0-44.9, ADULT (HCC): ICD-10-CM

## 2020-10-29 DIAGNOSIS — I48.11 LONGSTANDING PERSISTENT ATRIAL FIBRILLATION (HCC): ICD-10-CM

## 2020-10-29 DIAGNOSIS — E66.01 OBESITY, MORBID (HCC): ICD-10-CM

## 2020-10-29 DIAGNOSIS — G47.33 OBSTRUCTIVE SLEEP APNEA ON CPAP: ICD-10-CM

## 2020-10-29 PROCEDURE — 93000 ELECTROCARDIOGRAM COMPLETE: CPT | Performed by: INTERNAL MEDICINE

## 2020-10-29 PROCEDURE — 99245 OFF/OP CONSLTJ NEW/EST HI 55: CPT | Performed by: INTERNAL MEDICINE

## 2020-10-29 RX ORDER — A/SINGAPORE/GP1908/2015 IVR-180 (AN A/MICHIGAN/45/2015 (H1N1)PDM09-LIKE VIRUS, A/HONG KONG/4801/2014, NYMC X-263B (H3N2) (AN A/HONG KONG/4801/2014-LIKE VIRUS), AND B/BRISBANE/60/2008, WILD TYPE (A B/BRISBANE/60/2008-LIKE VIRUS) 15; 15; 15 UG/.5ML; UG/.5ML; UG/.5ML
INJECTION, SUSPENSION INTRAMUSCULAR
COMMUNITY
Start: 2020-09-27 | End: 2020-12-15 | Stop reason: ALTCHOICE

## 2020-10-29 RX ORDER — HALOBETASOL PROPIONATE 0.05 %
OINTMENT (GRAM) TOPICAL
COMMUNITY
Start: 2020-09-22

## 2020-11-02 ENCOUNTER — APPOINTMENT (OUTPATIENT)
Dept: PHYSICAL THERAPY | Facility: CLINIC | Age: 65
End: 2020-11-02
Payer: COMMERCIAL

## 2020-11-02 ENCOUNTER — EVALUATION (OUTPATIENT)
Dept: PHYSICAL THERAPY | Facility: CLINIC | Age: 65
End: 2020-11-02
Payer: COMMERCIAL

## 2020-11-02 ENCOUNTER — TRANSCRIBE ORDERS (OUTPATIENT)
Dept: PHYSICAL THERAPY | Facility: CLINIC | Age: 65
End: 2020-11-02

## 2020-11-02 DIAGNOSIS — I89.0 LYMPHEDEMA: Primary | ICD-10-CM

## 2020-11-02 PROBLEM — I27.20 PULMONARY HYPERTENSION (HCC): Status: ACTIVE | Noted: 2020-11-02

## 2020-11-02 PROCEDURE — 97140 MANUAL THERAPY 1/> REGIONS: CPT | Performed by: PHYSICAL THERAPIST

## 2020-11-03 ENCOUNTER — APPOINTMENT (OUTPATIENT)
Dept: PHYSICAL THERAPY | Facility: CLINIC | Age: 65
End: 2020-11-03
Payer: COMMERCIAL

## 2020-11-05 ENCOUNTER — OFFICE VISIT (OUTPATIENT)
Dept: PHYSICAL THERAPY | Facility: CLINIC | Age: 65
End: 2020-11-05
Payer: COMMERCIAL

## 2020-11-05 DIAGNOSIS — G56.02 LEFT CARPAL TUNNEL SYNDROME: Primary | ICD-10-CM

## 2020-11-05 PROCEDURE — 97112 NEUROMUSCULAR REEDUCATION: CPT

## 2020-11-05 PROCEDURE — 97110 THERAPEUTIC EXERCISES: CPT

## 2020-11-05 PROCEDURE — 97140 MANUAL THERAPY 1/> REGIONS: CPT

## 2020-11-09 ENCOUNTER — EVALUATION (OUTPATIENT)
Dept: PHYSICAL THERAPY | Facility: CLINIC | Age: 65
End: 2020-11-09
Payer: COMMERCIAL

## 2020-11-09 DIAGNOSIS — I89.0 LYMPHEDEMA: Primary | ICD-10-CM

## 2020-11-09 PROCEDURE — 97140 MANUAL THERAPY 1/> REGIONS: CPT | Performed by: PHYSICAL THERAPIST

## 2020-11-10 ENCOUNTER — OFFICE VISIT (OUTPATIENT)
Dept: PHYSICAL THERAPY | Facility: CLINIC | Age: 65
End: 2020-11-10
Payer: COMMERCIAL

## 2020-11-10 DIAGNOSIS — G56.02 LEFT CARPAL TUNNEL SYNDROME: Primary | ICD-10-CM

## 2020-11-10 PROCEDURE — 97110 THERAPEUTIC EXERCISES: CPT

## 2020-11-10 PROCEDURE — 97140 MANUAL THERAPY 1/> REGIONS: CPT

## 2020-11-10 PROCEDURE — 97112 NEUROMUSCULAR REEDUCATION: CPT

## 2020-11-12 ENCOUNTER — EVALUATION (OUTPATIENT)
Dept: PHYSICAL THERAPY | Facility: CLINIC | Age: 65
End: 2020-11-12
Payer: COMMERCIAL

## 2020-11-12 DIAGNOSIS — I89.0 LYMPHEDEMA: Primary | ICD-10-CM

## 2020-11-12 PROCEDURE — 97140 MANUAL THERAPY 1/> REGIONS: CPT | Performed by: PHYSICAL THERAPIST

## 2020-11-17 ENCOUNTER — APPOINTMENT (OUTPATIENT)
Dept: PHYSICAL THERAPY | Facility: CLINIC | Age: 65
End: 2020-11-17
Payer: COMMERCIAL

## 2020-11-18 ENCOUNTER — APPOINTMENT (OUTPATIENT)
Dept: PHYSICAL THERAPY | Facility: CLINIC | Age: 65
End: 2020-11-18
Payer: COMMERCIAL

## 2020-11-19 ENCOUNTER — APPOINTMENT (OUTPATIENT)
Dept: PHYSICAL THERAPY | Facility: CLINIC | Age: 65
End: 2020-11-19
Payer: COMMERCIAL

## 2020-11-24 ENCOUNTER — APPOINTMENT (OUTPATIENT)
Dept: PHYSICAL THERAPY | Facility: CLINIC | Age: 65
End: 2020-11-24
Payer: COMMERCIAL

## 2020-12-01 ENCOUNTER — EVALUATION (OUTPATIENT)
Dept: PHYSICAL THERAPY | Facility: CLINIC | Age: 65
End: 2020-12-01
Payer: COMMERCIAL

## 2020-12-01 ENCOUNTER — OFFICE VISIT (OUTPATIENT)
Dept: PHYSICAL THERAPY | Facility: CLINIC | Age: 65
End: 2020-12-01
Payer: COMMERCIAL

## 2020-12-01 ENCOUNTER — TRANSCRIBE ORDERS (OUTPATIENT)
Dept: PHYSICAL THERAPY | Facility: CLINIC | Age: 65
End: 2020-12-01

## 2020-12-01 DIAGNOSIS — I89.0 LYMPHEDEMA: Primary | ICD-10-CM

## 2020-12-01 DIAGNOSIS — G56.02 LEFT CARPAL TUNNEL SYNDROME: Primary | ICD-10-CM

## 2020-12-01 DIAGNOSIS — G56.02 CARPAL TUNNEL SYNDROME ON LEFT: Primary | ICD-10-CM

## 2020-12-01 PROCEDURE — 97140 MANUAL THERAPY 1/> REGIONS: CPT | Performed by: PHYSICAL THERAPIST

## 2020-12-01 PROCEDURE — 97110 THERAPEUTIC EXERCISES: CPT | Performed by: PHYSICAL THERAPIST

## 2020-12-01 PROCEDURE — 97112 NEUROMUSCULAR REEDUCATION: CPT | Performed by: PHYSICAL THERAPIST

## 2020-12-01 PROCEDURE — 97535 SELF CARE MNGMENT TRAINING: CPT | Performed by: PHYSICAL THERAPIST

## 2020-12-03 ENCOUNTER — OFFICE VISIT (OUTPATIENT)
Dept: PHYSICAL THERAPY | Facility: CLINIC | Age: 65
End: 2020-12-03
Payer: COMMERCIAL

## 2020-12-03 DIAGNOSIS — G56.02 LEFT CARPAL TUNNEL SYNDROME: Primary | ICD-10-CM

## 2020-12-03 PROCEDURE — 97140 MANUAL THERAPY 1/> REGIONS: CPT

## 2020-12-03 PROCEDURE — 97110 THERAPEUTIC EXERCISES: CPT

## 2020-12-03 PROCEDURE — 97112 NEUROMUSCULAR REEDUCATION: CPT

## 2020-12-03 RX ORDER — COLCHICINE 0.6 MG/1
TABLET ORAL AS NEEDED
COMMUNITY
Start: 2020-11-21

## 2020-12-03 RX ORDER — ALFUZOSIN HYDROCHLORIDE 10 MG/1
TABLET, EXTENDED RELEASE ORAL
Status: ON HOLD | COMMUNITY
Start: 2020-11-25 | End: 2021-01-29 | Stop reason: CLARIF

## 2020-12-04 ENCOUNTER — TELEPHONE (OUTPATIENT)
Dept: BARIATRICS | Facility: CLINIC | Age: 65
End: 2020-12-04

## 2020-12-04 ENCOUNTER — OFFICE VISIT (OUTPATIENT)
Dept: PHYSICAL THERAPY | Facility: CLINIC | Age: 65
End: 2020-12-04
Payer: COMMERCIAL

## 2020-12-04 DIAGNOSIS — I89.0 LYMPHEDEMA: Primary | ICD-10-CM

## 2020-12-04 PROCEDURE — 97140 MANUAL THERAPY 1/> REGIONS: CPT | Performed by: PHYSICAL THERAPIST

## 2020-12-07 ENCOUNTER — TRANSCRIBE ORDERS (OUTPATIENT)
Dept: PHYSICAL THERAPY | Facility: CLINIC | Age: 65
End: 2020-12-07

## 2020-12-07 DIAGNOSIS — I89.0 LYMPHEDEMA: Primary | ICD-10-CM

## 2020-12-08 ENCOUNTER — APPOINTMENT (OUTPATIENT)
Dept: PHYSICAL THERAPY | Facility: CLINIC | Age: 65
End: 2020-12-08
Payer: COMMERCIAL

## 2020-12-09 ENCOUNTER — OFFICE VISIT (OUTPATIENT)
Dept: PHYSICAL THERAPY | Facility: CLINIC | Age: 65
End: 2020-12-09
Payer: COMMERCIAL

## 2020-12-09 ENCOUNTER — APPOINTMENT (OUTPATIENT)
Dept: PHYSICAL THERAPY | Facility: CLINIC | Age: 65
End: 2020-12-09
Payer: COMMERCIAL

## 2020-12-09 DIAGNOSIS — I89.0 LYMPHEDEMA: Primary | ICD-10-CM

## 2020-12-09 PROCEDURE — 97140 MANUAL THERAPY 1/> REGIONS: CPT | Performed by: PHYSICAL THERAPIST

## 2020-12-10 ENCOUNTER — OFFICE VISIT (OUTPATIENT)
Dept: PHYSICAL THERAPY | Facility: CLINIC | Age: 65
End: 2020-12-10
Payer: COMMERCIAL

## 2020-12-10 DIAGNOSIS — G56.02 LEFT CARPAL TUNNEL SYNDROME: Primary | ICD-10-CM

## 2020-12-10 PROCEDURE — 97140 MANUAL THERAPY 1/> REGIONS: CPT

## 2020-12-10 PROCEDURE — 97110 THERAPEUTIC EXERCISES: CPT

## 2020-12-10 PROCEDURE — 97112 NEUROMUSCULAR REEDUCATION: CPT

## 2020-12-15 ENCOUNTER — CONSULT (OUTPATIENT)
Dept: BARIATRICS | Facility: CLINIC | Age: 65
End: 2020-12-15
Payer: COMMERCIAL

## 2020-12-15 ENCOUNTER — OFFICE VISIT (OUTPATIENT)
Dept: PHYSICAL THERAPY | Facility: CLINIC | Age: 65
End: 2020-12-15
Payer: COMMERCIAL

## 2020-12-15 VITALS
WEIGHT: 307.9 LBS | SYSTOLIC BLOOD PRESSURE: 138 MMHG | RESPIRATION RATE: 18 BRPM | HEIGHT: 69 IN | BODY MASS INDEX: 45.6 KG/M2 | DIASTOLIC BLOOD PRESSURE: 78 MMHG | HEART RATE: 85 BPM | TEMPERATURE: 98.2 F

## 2020-12-15 DIAGNOSIS — G47.33 OBSTRUCTIVE SLEEP APNEA ON CPAP: ICD-10-CM

## 2020-12-15 DIAGNOSIS — I48.91 ATRIAL FIBRILLATION (HCC): ICD-10-CM

## 2020-12-15 DIAGNOSIS — E66.01 OBESITY, CLASS III, BMI 40-49.9 (MORBID OBESITY) (HCC): Primary | ICD-10-CM

## 2020-12-15 DIAGNOSIS — G56.02 LEFT CARPAL TUNNEL SYNDROME: Primary | ICD-10-CM

## 2020-12-15 DIAGNOSIS — Z99.89 OBSTRUCTIVE SLEEP APNEA ON CPAP: ICD-10-CM

## 2020-12-15 DIAGNOSIS — R63.5 ABNORMAL WEIGHT GAIN: ICD-10-CM

## 2020-12-15 PROBLEM — E66.813 OBESITY, CLASS III, BMI 40-49.9 (MORBID OBESITY): Status: ACTIVE | Noted: 2019-10-26

## 2020-12-15 PROCEDURE — 97110 THERAPEUTIC EXERCISES: CPT

## 2020-12-15 PROCEDURE — 99244 OFF/OP CNSLTJ NEW/EST MOD 40: CPT | Performed by: PHYSICIAN ASSISTANT

## 2020-12-15 PROCEDURE — 97112 NEUROMUSCULAR REEDUCATION: CPT

## 2020-12-15 PROCEDURE — 97140 MANUAL THERAPY 1/> REGIONS: CPT

## 2020-12-15 RX ORDER — CLINDAMYCIN HYDROCHLORIDE 300 MG/1
CAPSULE ORAL
Status: ON HOLD | COMMUNITY
Start: 2020-12-08 | End: 2021-01-29 | Stop reason: CLARIF

## 2020-12-16 ENCOUNTER — OFFICE VISIT (OUTPATIENT)
Dept: PHYSICAL THERAPY | Facility: CLINIC | Age: 65
End: 2020-12-16
Payer: COMMERCIAL

## 2020-12-16 DIAGNOSIS — I89.0 LYMPHEDEMA: Primary | ICD-10-CM

## 2020-12-16 PROCEDURE — 97140 MANUAL THERAPY 1/> REGIONS: CPT | Performed by: PHYSICAL THERAPIST

## 2020-12-17 ENCOUNTER — APPOINTMENT (OUTPATIENT)
Dept: PHYSICAL THERAPY | Facility: CLINIC | Age: 65
End: 2020-12-17
Payer: COMMERCIAL

## 2020-12-21 ENCOUNTER — APPOINTMENT (OUTPATIENT)
Dept: PHYSICAL THERAPY | Facility: CLINIC | Age: 65
End: 2020-12-21
Payer: COMMERCIAL

## 2020-12-22 ENCOUNTER — OFFICE VISIT (OUTPATIENT)
Dept: PHYSICAL THERAPY | Facility: CLINIC | Age: 65
End: 2020-12-22
Payer: COMMERCIAL

## 2020-12-22 DIAGNOSIS — I89.0 LYMPHEDEMA: Primary | ICD-10-CM

## 2020-12-22 PROCEDURE — 97140 MANUAL THERAPY 1/> REGIONS: CPT | Performed by: PHYSICAL THERAPIST

## 2020-12-29 ENCOUNTER — OFFICE VISIT (OUTPATIENT)
Dept: PHYSICAL THERAPY | Facility: CLINIC | Age: 65
End: 2020-12-29
Payer: COMMERCIAL

## 2020-12-29 ENCOUNTER — OFFICE VISIT (OUTPATIENT)
Dept: CARDIOLOGY CLINIC | Facility: CLINIC | Age: 65
End: 2020-12-29
Payer: COMMERCIAL

## 2020-12-29 VITALS
SYSTOLIC BLOOD PRESSURE: 130 MMHG | BODY MASS INDEX: 48.22 KG/M2 | HEART RATE: 92 BPM | WEIGHT: 315 LBS | DIASTOLIC BLOOD PRESSURE: 70 MMHG | TEMPERATURE: 98 F

## 2020-12-29 DIAGNOSIS — G47.33 OBSTRUCTIVE SLEEP APNEA ON CPAP: ICD-10-CM

## 2020-12-29 DIAGNOSIS — Z99.89 OBSTRUCTIVE SLEEP APNEA ON CPAP: ICD-10-CM

## 2020-12-29 DIAGNOSIS — I89.0 LYMPHEDEMA: ICD-10-CM

## 2020-12-29 DIAGNOSIS — E66.01 OBESITY, MORBID (HCC): ICD-10-CM

## 2020-12-29 DIAGNOSIS — I50.32 CHRONIC DIASTOLIC HEART FAILURE (HCC): ICD-10-CM

## 2020-12-29 DIAGNOSIS — I48.11 LONGSTANDING PERSISTENT ATRIAL FIBRILLATION (HCC): Primary | ICD-10-CM

## 2020-12-29 DIAGNOSIS — I89.0 LYMPHEDEMA: Primary | ICD-10-CM

## 2020-12-29 PROCEDURE — 99214 OFFICE O/P EST MOD 30 MIN: CPT | Performed by: INTERNAL MEDICINE

## 2020-12-29 PROCEDURE — 97140 MANUAL THERAPY 1/> REGIONS: CPT | Performed by: PHYSICAL THERAPIST

## 2020-12-29 PROCEDURE — 93000 ELECTROCARDIOGRAM COMPLETE: CPT | Performed by: INTERNAL MEDICINE

## 2020-12-30 ENCOUNTER — APPOINTMENT (OUTPATIENT)
Dept: PHYSICAL THERAPY | Facility: CLINIC | Age: 65
End: 2020-12-30
Payer: COMMERCIAL

## 2021-01-05 ENCOUNTER — TELEPHONE (OUTPATIENT)
Dept: SLEEP CENTER | Facility: CLINIC | Age: 66
End: 2021-01-05

## 2021-01-05 ENCOUNTER — APPOINTMENT (OUTPATIENT)
Dept: PHYSICAL THERAPY | Facility: CLINIC | Age: 66
End: 2021-01-05
Payer: COMMERCIAL

## 2021-01-05 NOTE — TELEPHONE ENCOUNTER
Received voice message from patient regarding referral to sleep medicine  Left message for patient  Advised that he can schedule consult by calling central scheduling at [phone]

## 2021-01-06 ENCOUNTER — OFFICE VISIT (OUTPATIENT)
Dept: PHYSICAL THERAPY | Facility: CLINIC | Age: 66
End: 2021-01-06
Payer: COMMERCIAL

## 2021-01-06 DIAGNOSIS — G56.02 LEFT CARPAL TUNNEL SYNDROME: Primary | ICD-10-CM

## 2021-01-06 PROCEDURE — 97110 THERAPEUTIC EXERCISES: CPT

## 2021-01-06 PROCEDURE — 97112 NEUROMUSCULAR REEDUCATION: CPT

## 2021-01-06 PROCEDURE — 97140 MANUAL THERAPY 1/> REGIONS: CPT

## 2021-01-06 NOTE — PROGRESS NOTES
Daily Note     Today's date: 2021  Patient name: Nilesh Fernandez  : 1955  MRN: [id number]  Referring provider: Jeovany Dolan DO  Dx:   Encounter Diagnosis     ICD-10-CM    1  Left carpal tunnel syndrome  G56 02                   Subjective: Pt reports some soreness/sensitivity on ulnar side of the wrist as CC      Objective: See treatment diary below    Wrist/Hand Comments  ROM left S/P- 60/90; Wrist E/F- 75/65                   Digits-Loose composite fist/extension                   Thumb MP- 0/50; IP 0/60  Strength-  II- 50/62; 3JC- 9/10; Key- 11/15  Circumference at Wrist- 17 9  cm; MPs- 22 0 cm; LF P1- 7 2 cm  Sensation- 3 61 T, IF, MF 1/2 RF  Pt provided isotoner for nighttime, silicone sheeting for palm    Assessment: Tolerated treatment well today  Pt to transition to HEP as goals have been met  Pt with sensitivity along CT and PT issued HEP sheet  Plan: Pt to transition to HEP    Precautions: progress within safe lifting ability    Manuals 10/19 10/22 10/26 10/28 11/5 11/10  12/1  12/3  12/15  1/6   STM 15 15 15 15 15 15 15  15  15  15    Isotoner  med                     tubigrip                                               Neuro Re-Ed                       HP/Pulsed Biph 15 15 15 15 15  15  15  15  15  15   MNGE 2/3 2/3 2/3 2/3 2/3  2/3  2/3  2/3  2/3  2/3                                                                                                                           Ther Ex                       TP   O 3'  O 3'  O 3'  O 3' O 2'  O 2'  O 2'  O 3'  O 3'  O 3'     5finge  O 2/10  O  2/10  O 2/10  O  2/10 O 2/10  O 2/10  O 2/10  O 2/10  O 2/10  O  2/10    Chris  35 2/10  35  2/10  35 2/10  35  2/10 35 2/10  35 2/10  35 2/10  35  2/10  35 2/10  35  2/10   [place]yl  V 2/10  V  2/10  V 2/10  V  2/10 V 2/10  V 2/10  V 2/10  V  2/10  V 2/10  V  2/10   DB E/F  4 2/10  5  2/10  5 2/10  5  2/10 5 2/10  6 2/10  6 2/10  6  2/10  7 2/10  7  2/10   flexbar  G 2/10  G  2/10  G 2/10  G  2/10 G 2/10 G 2/10  G 2/10  B  2/10  B 2/10  G  2/10   S/P 1 5 2/10  2  2/10  2 2/10  2  2/10 2# 2/10  2 2/10  2 2/10 2#  2/10  3# 2/10  3#  2/10   Zottman's  4 2/10  5  2/10 5 2/10  5  2/10  5  2/10  5 2/10 5 2/10 6  2/10 6 2/10 6  2/10   FF/Scapt  4 2/10  4  2/10  4 2/10  5  2/10  5  2/10  5 2/10  5 2/10  5/6  2/10 5/6# 2/10 6#  2/10                                                                           Ther Activity                        Comp   Y 3'  Y 3'  Y 3'  Y 3'  Y 3'  Y 3'  Y 3'  Y 3'  Y 3'  Y 3'                                                                                                   Modalities                       US                    CP 15 15 15 15 15  15  15  15  15  15

## 2021-01-14 ENCOUNTER — OFFICE VISIT (OUTPATIENT)
Dept: SLEEP CENTER | Facility: CLINIC | Age: 66
End: 2021-01-14
Payer: COMMERCIAL

## 2021-01-14 ENCOUNTER — EVALUATION (OUTPATIENT)
Dept: PHYSICAL THERAPY | Facility: CLINIC | Age: 66
End: 2021-01-14
Payer: COMMERCIAL

## 2021-01-14 VITALS
WEIGHT: 315 LBS | BODY MASS INDEX: 46.65 KG/M2 | SYSTOLIC BLOOD PRESSURE: 112 MMHG | HEART RATE: 81 BPM | DIASTOLIC BLOOD PRESSURE: 68 MMHG | HEIGHT: 69 IN

## 2021-01-14 DIAGNOSIS — I89.0 LYMPHEDEMA: Primary | ICD-10-CM

## 2021-01-14 DIAGNOSIS — I48.11 LONGSTANDING PERSISTENT ATRIAL FIBRILLATION (HCC): ICD-10-CM

## 2021-01-14 DIAGNOSIS — E66.01 MORBID OBESITY WITH BMI OF 40.0-44.9, ADULT (HCC): ICD-10-CM

## 2021-01-14 DIAGNOSIS — Z99.89 OBSTRUCTIVE SLEEP APNEA ON CPAP: ICD-10-CM

## 2021-01-14 DIAGNOSIS — G47.33 OBSTRUCTIVE SLEEP APNEA ON CPAP: ICD-10-CM

## 2021-01-14 PROCEDURE — 99204 OFFICE O/P NEW MOD 45 MIN: CPT | Performed by: INTERNAL MEDICINE

## 2021-01-14 PROCEDURE — 97140 MANUAL THERAPY 1/> REGIONS: CPT | Performed by: PHYSICAL THERAPIST

## 2021-01-14 NOTE — PROGRESS NOTES
PT Re-Evaluation     Today's date: 2021  Patient name: Venkat Donald  : 1955  MRN: [id number]  Referring provider: Mike Mckeon MD  Dx:   Encounter Diagnosis     ICD-10-CM    1  Lymphedema  I89 0                   Assessment  Assessment details: Patient presents to OPPT with s/s consistent with bilateral LE lymphedema  PT interventions to include CDT (complete decongestive therapy) including MLD (manual lymphatic drainage) and compression using short stretch bandages as able  PT to further provide extensive patient education on self bandaging, self MLD techniques, and skin care  Patient will transition patient to long term maintenance with compression plan for both morning and evening wear once optimal decongestive and volume reduction of limb has been achieved  Continues to struggle with volume maintenance and reduction to baseline with prolonged standing/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  Thank you for this referral and the opportunity to participate in the care of this patient      Impairments: abnormal gait, abnormal or restricted ROM, activity intolerance, impaired balance, impaired physical strength, pain with function and safety issue  Understanding of Dx/Px/POC: good   Prognosis: good    Goals  STGs to be achieved in 2 - 4 weeks  Demonstrate at least 75% compliance with self MLD - Continues  Demonstrate at least 75% compliance with self compression - Continues  Demonstrate at least 75% compliance with self skin and nail inspection - Continues  Free from s/s of infection - Continues  Demonstrate understanding of importance of compliance with POC - Continues    LTGs to be achieved in 4 - 6 weeks   Demonstrate at least 100% compliance with self MLD   Demonstrate at least 100% compliance with self compression   Demonstrate at least 100% compliance with self skin and nail inspection  Free from s/s of infection   Demonstrate understanding of day/night compression wearing schedule   Obtain alternative compression to ensure independence with self management       Plan  Patient would benefit from: skilled physical therapy  Other planned modality interventions: Modalities prn for symptom management  Planned therapy interventions: manual therapy, neuromuscular re-education, therapeutic exercise, therapeutic training and home exercise program  Frequency: 2x week  Duration in visits: 8  Duration in weeks: 4  Plan of Care beginning date: 2021  Plan of Care expiration date: 2021  Treatment plan discussed with: patient        Subjective Evaluation    History of Present Illness  Mechanism of injury: UPDATE:  Patient reports he continues to struggle with LE volume maintenance with increased standing/walking at work  Hours at work continue to be increased  Decreased ability to perform self maintenance program and regular exercise  Is trying to work on weight reduction  Addition of antibiotics, increased diuretics  Patient reports bilateral foot swelling, saw podiatrist and was referred to PCP  Patient saw PCP and had bilateral foot swelling  Also with increased thigh edema since last episode of care  She increased diuretic and patient has follow up upcoming  He presents with ongoing pain in feet, however, improved since last episode of care with addition of gabapentin and tramadol  Of note:  Patient has started with diet in the last few weeks to include more fruit and vegetables, intermittent protein bars  Limiting salt intake    Quality of life: good    Pain  Current pain ratin  At best pain ratin  At worst pain ratin  Location: Bilateral feet  Quality: burning  Relieving factors: medications  Aggravating factors: standing, walking and stair climbing  Progression: improved    Treatments  Previous treatment: physical therapy and injection treatment  Current treatment: medication and physical therapy  Patient Goals  Patient goals for therapy: decreased edema, increased strength and independence with ADLs/IADLs          Objective  Lymphedema Evaluation    Medical Considerations:  NEG Cardiac - following with new cardiologist 2/2 JUAN CHU-fib  NEG Pulmonary  NEG Kidney  NEG Liver  NEG Current Fever/Infection  POS Current/Recent Wounds - started on antibiotics recently  NEG Current/Recent Treatments/Interventions/Port Access/PICC Line  NEG Current/Recent/History of DVT or Clotting issues    ROM Considerations:  ROM WFL, h/o THR    Strength Considerations:  Grossly WFL bilateral LEs    Gait Considerations:  WFL bilateral LEs    Skin Considerations/Scars:  Patient presents with skin inspection as follows:  Hemosiderin staining/skin discoloration - POS  Finger/Toe Nails - NEG  Open Wounds - NEG  S/S infection - POS  Firm/Sponge/Hard - POS  [place] - NEG  Weeping - POS, area of irritation noted bilateral posterior calf, scan drainage noted    Girth:    LE girth measurements (cm) - Current      Right           Left   Forefoot         27  27                        Metatarsals             27 5  28  Malleolus   32 5  34  +4               32 5  33  +8 cm    33  34  +12                           38 5     33 5  +16 cm    44 5  38  +20                           47 5  45  +24 cm    53 5  54 5  +28                                   56  52  +32                                          56 5                 56 5  +36        55  58          Knee joint line              64 5  60 5      Malleoli to Fib head length - 36 cm  To trochanter - 80 cm    Compression measurements for knee length compreshort - [facility] from [address] - contact Sherryle Finger - [phone]       Precautions: Falls, peripheral neuropathy  Re-eval Date:2/13/2021    Date 1/14       Visit Count 1       FOTO        Pain In        Pain Out            Manual          55 mins               Compression placed using bilateral lower LE circaid, defers full leg compression due to knee/hip discomfort and needing to drive  Exercise Diary         Lymph TE LE                HEP                        General LE strengthening - blue t-band for ankle TE, general hip strengthening included  Gentle self stretch      Modalities

## 2021-01-14 NOTE — PROGRESS NOTES
Consultation - Analia 31 : 1955  MRN: [id number]      Assessment:  The patient has very severe obstructive sleep apnea diagnosed in   He underwent polysomnography at [facility] which showed AHI = 70 0  He used his BiPAP 20/14 cm for only 1 to 2 months before stopping  He was uncomfortable with the mask and did not feel motivated to continue  He continues to experience daytime sleepiness  He has chronic atrial fibrillation for which his cardiologist has recommended treatment of his ROBERT  Plan:  Restart BiPAP 20/14 cm  Repeat positive airway pressure titration may be necessary  Follow up:  Compliance check    History of Present Illness:   76 y o male with history of very severe obstructive sleep apnea was seen by his cardiologist for atrial fibrillation  He had stopped using his positive airway pressure device due to lack of interest   It was recommended that he treat his ROBERT, which may have a negative impact on his atrial fibrillation  The patient also has excessive daytime sleepiness  He has chronic lower extremity edema  Hypoxia secondary to ROBERT may be in part responsible        Review of Systems      Genitourinary difficulty with erection   Cardiology ankle/leg swelling   Gastrointestinal none   Neurology muscle weakness   Constitutional fatigue and weight change   Integumentary rash or dry skin   Psychiatry none   Musculoskeletal joint pain, back pain and leg cramps   Pulmonary shortness of breath with activity   ENT none   Endocrine frequent urination   Hematological none         I have reviewed and updated the review of systems as necessary    Historical Information    Past Medical History:  Atrial fibrillation, venous insufficiency, pulmonary hypertension    Family History: non-contributory    Social History     Socioeconomic History    Marital status: /Civil Union     Spouse name: None    Number of children: None    Years of education: None    Highest education level: None   Occupational History    None   Social Needs    Financial resource strain: None    Food insecurity     Worry: None     Inability: None    Transportation needs     Medical: None     Non-medical: None   Tobacco Use    Smoking status: Never Smoker    Smokeless tobacco: Never Used   Substance and Sexual Activity    Alcohol use:  Yes     Alcohol/week: 4 0 standard drinks     Types: 4 Cans of beer per week     Frequency: 2-3 times a week     Binge frequency: Never     Comment: socially    Drug use: No    Sexual activity: None   Lifestyle    Physical activity     Days per week: None     Minutes per session: None    Stress: None   Relationships    Social connections     Talks on phone: None     Gets together: None     Attends Synagogue service: None     Active member of club or organization: None     Attends meetings of clubs or organizations: None     Relationship status: None    Intimate partner violence     Fear of current or ex partner: None     Emotionally abused: None     Physically abused: None     Forced sexual activity: None   Other Topics Concern    None   Social History Narrative    None         Sleep Schedule: unremarkable    Snoring:  Yes    Witnessed Apnea:  Yes    Medications/Allergies:    Current Outpatient Medications:     Acetaminophen (TYLENOL EXTRA STRENGTH PO), Take by mouth, Disp: , Rfl:     alfuzosin (UROXATRAL) 10 mg 24 hr tablet, , Disp: , Rfl:     ammonium lactate (AMLACTIN) 12 % lotion, Apply topically 2 (two) times a day as needed for dry skin, Disp: , Rfl:     clindamycin (CLEOCIN) 300 MG capsule, TAKE 2 CAPSULES BY MOUTH 1 HOUR PRIOR TO APPOINTMENT, Disp: , Rfl:     CoenzymeQ10-Isoleucine-Glycine (CO Q-10) 100-50-25 MG TB24, Co Q-10, Disp: , Rfl:     colchicine (COLCRYS) 0 6 mg tablet, , Disp: , Rfl:     ferrous sulfate 325 (65 Fe) mg tablet, Take 325 mg by mouth every other day, Disp: , Rfl:     furosemide (LASIX) 20 mg tablet, Take 20 mg by mouth daily, Disp: , Rfl:     GARCINIA CAMBOGIA-CHROMIUM PO, Take 750 mg by mouth 2 (two) times a day , Disp: , Rfl:     Glucosamine-Chondroit-Vit C-Mn (Glucosamine 1500 Complex) CAPS, Take by mouth, Disp: , Rfl:     Green Coffee Silva-Yerba Mate (GREEN COFFEE BEAN EXTRACT PO), Take 800 mg by mouth daily , Disp: , Rfl:     halobetasol (ULTRAVATE) 0 05 % ointment, APPLY TO AFFECTED AREA ON LEG TWICE DAILY, Disp: , Rfl:     L-ARGININE-500 PO, Take 500 mg by mouth 2 (two) times a day , Disp: , Rfl:     Multiple Vitamins-Minerals (OCUVITE EXTRA PO), Take 1 tablet by mouth daily  , Disp: , Rfl:     patient supplied medication, Take 1 each by mouth 2 (two) times a day, Disp: , Rfl:     patient supplied medication, Take 1 each by mouth 2 (two) times a day, Disp: , Rfl:     pregabalin (LYRICA) 100 mg capsule, Take 100 mg by mouth 3 (three) times a day, Disp: , Rfl:     RASPBERRY KETONES PO, Take 100 mg by mouth 2 (two) times a day, Disp: , Rfl:     rivaroxaban (XARELTO) 20 mg tablet, Take 20 mg by mouth daily, Disp: , Rfl:     tadalafil (CIALIS) 5 MG tablet, Take 5 mg by mouth daily as needed for erectile dysfunction, Disp: , Rfl:     traMADol (ULTRAM) 50 mg tablet, Take 50 mg by mouth every 6 (six) hours as needed for moderate pain, Disp: , Rfl:     Turmeric Curcumin 500 MG CAPS, Take 500 mg by mouth daily, Disp: , Rfl:     VITAMIN D PO, Take by mouth, Disp: , Rfl:     allopurinol (ZYLOPRIM) 100 mg tablet, Take 100 mg by mouth daily, Disp: , Rfl:     metoprolol tartrate (LOPRESSOR) 25 mg tablet, Take 1 tablet (25 mg total) by mouth every 12 (twelve) hours, Disp: 180 tablet, Rfl: 2        No notes on file                  Objective:    Vital Signs:   Vitals:    01/14/21 0937   BP: 112/68   Pulse: 81   Weight: (!) 146 kg (321 lb)   Height: 5' 8 5" (1 74 m)     Neck Circumference: 18      [place] Sleepiness Scale:  Total score: 16    Physical Exam:    General: Alert, appropriate, cooperative, overweight    Head: NC/AT, no retrognathia    Nose: No septal deviationl    Throat: Airway diminished, tongue base thickened, no tonsils visualized    Neck: Normal, no thyromegaly or lymphadenopathy, no JVD    Heart: RR, normal S1 and S2, no murmurs    Chest: Clear bilaterally    Extremity: No clubbing, cyanosis, severe edema    Skin: Warm, dry    Neuro: No motor abnormalities, cranial nerves appear intact    Sleep Study Results:   AHI = 70 0  PAP Pressure: BiPAP: 20/14 cm  DME Provider: [employer] [facility]    Counseling / Coordination of Care  A description of the counseling / coordination of care: We discussed the pathophysiology of obstructive sleep apnea as well as the possible treatment options  We also discussed the rationale for positive airway pressure therapy  Board Certified Sleep Specialist    Portions of the record may have been created with voice recognition software  Occasional wrong word or "sound a like" substitutions may have occurred due to the inherent limitations of voice recognition software  Read the chart carefully and recognize, using context, where substitutions have occurred

## 2021-01-22 ENCOUNTER — OFFICE VISIT (OUTPATIENT)
Dept: CARDIOLOGY CLINIC | Facility: CLINIC | Age: 66
End: 2021-01-22
Payer: COMMERCIAL

## 2021-01-22 ENCOUNTER — HOSPITAL ENCOUNTER (INPATIENT)
Facility: HOSPITAL | Age: 66
LOS: 8 days | Discharge: HOME | DRG: 292 | End: 2021-01-30
Attending: EMERGENCY MEDICINE | Admitting: FAMILY MEDICINE
Payer: COMMERCIAL

## 2021-01-22 ENCOUNTER — OFFICE VISIT (OUTPATIENT)
Dept: PHYSICAL THERAPY | Facility: CLINIC | Age: 66
End: 2021-01-22
Payer: COMMERCIAL

## 2021-01-22 ENCOUNTER — APPOINTMENT (EMERGENCY)
Dept: RADIOLOGY | Facility: HOSPITAL | Age: 66
DRG: 292 | End: 2021-01-22
Payer: COMMERCIAL

## 2021-01-22 VITALS
HEART RATE: 89 BPM | DIASTOLIC BLOOD PRESSURE: 74 MMHG | HEIGHT: 69 IN | WEIGHT: 315 LBS | SYSTOLIC BLOOD PRESSURE: 128 MMHG | TEMPERATURE: 97.3 F | BODY MASS INDEX: 46.65 KG/M2

## 2021-01-22 DIAGNOSIS — I50.33 ACUTE ON CHRONIC DIASTOLIC HEART FAILURE (HCC): Primary | ICD-10-CM

## 2021-01-22 DIAGNOSIS — G47.33 OBSTRUCTIVE SLEEP APNEA ON CPAP: ICD-10-CM

## 2021-01-22 DIAGNOSIS — I48.11 LONGSTANDING PERSISTENT ATRIAL FIBRILLATION (HCC): ICD-10-CM

## 2021-01-22 DIAGNOSIS — I27.20 PULMONARY HYPERTENSION (HCC): ICD-10-CM

## 2021-01-22 DIAGNOSIS — I50.33 ACUTE ON CHRONIC DIASTOLIC CONGESTIVE HEART FAILURE (HCC): Primary | ICD-10-CM

## 2021-01-22 DIAGNOSIS — Z99.89 OBSTRUCTIVE SLEEP APNEA ON CPAP: ICD-10-CM

## 2021-01-22 DIAGNOSIS — D72.819 LEUKOPENIA, UNSPECIFIED TYPE: ICD-10-CM

## 2021-01-22 DIAGNOSIS — I89.0 LYMPHEDEMA: Primary | ICD-10-CM

## 2021-01-22 DIAGNOSIS — R94.31 ABNORMAL ECG: ICD-10-CM

## 2021-01-22 DIAGNOSIS — E66.01 OBESITY, MORBID (HCC): ICD-10-CM

## 2021-01-22 PROBLEM — Z20.822 SHORTNESS OF BREATH WITH EXPOSURE TO COVID-19 VIRUS: Status: ACTIVE | Noted: 2021-01-22

## 2021-01-22 PROBLEM — R06.02 SHORTNESS OF BREATH WITH EXPOSURE TO COVID-19 VIRUS: Status: ACTIVE | Noted: 2021-01-22

## 2021-01-22 PROBLEM — I50.31 ACUTE DIASTOLIC HEART FAILURE (HCC): Status: ACTIVE | Noted: 2021-01-22

## 2021-01-22 LAB
ALBUMIN SERPL BCP-MCNC: 3.3 G/DL (ref 3.5–5)
ALP SERPL-CCNC: 73 U/L (ref 46–116)
ALT SERPL W P-5'-P-CCNC: 28 U/L (ref 12–78)
ANION GAP SERPL CALCULATED.3IONS-SCNC: 6 MMOL/L (ref 4–13)
APTT PPP: 41 SECONDS (ref 23–37)
AST SERPL W P-5'-P-CCNC: 22 U/L (ref 5–45)
ATRIAL RATE: 258 BPM
BASOPHILS # BLD AUTO: 0.03 THOUSANDS/ΜL (ref 0–0.1)
BASOPHILS NFR BLD AUTO: 1 % (ref 0–1)
BILIRUB SERPL-MCNC: 0.46 MG/DL (ref 0.2–1)
BUN SERPL-MCNC: 19 MG/DL (ref 5–25)
CALCIUM ALBUM COR SERPL-MCNC: 9.6 MG/DL (ref 8.3–10.1)
CALCIUM SERPL-MCNC: 9 MG/DL (ref 8.3–10.1)
CHLORIDE SERPL-SCNC: 105 MMOL/L (ref 100–108)
CO2 SERPL-SCNC: 31 MMOL/L (ref 21–32)
CREAT SERPL-MCNC: 0.77 MG/DL (ref 0.6–1.3)
EOSINOPHIL # BLD AUTO: 0.1 THOUSAND/ΜL (ref 0–0.61)
EOSINOPHIL NFR BLD AUTO: 2 % (ref 0–6)
ERYTHROCYTE [DISTWIDTH] IN BLOOD BY AUTOMATED COUNT: 14.5 % (ref 11.6–15.1)
FLUAV RNA RESP QL NAA+PROBE: NEGATIVE
FLUBV RNA RESP QL NAA+PROBE: NEGATIVE
GFR SERPL CREATININE-BSD FRML MDRD: 95 ML/MIN/1.73SQ M
GLUCOSE SERPL-MCNC: 92 MG/DL (ref 65–140)
HCT VFR BLD AUTO: 37.2 % (ref 36.5–49.3)
HGB BLD-MCNC: 12.1 G/DL (ref 12–17)
IMM GRANULOCYTES # BLD AUTO: 0.01 THOUSAND/UL (ref 0–0.2)
IMM GRANULOCYTES NFR BLD AUTO: 0 % (ref 0–2)
INR PPP: 1.59 (ref 0.84–1.19)
LYMPHOCYTES # BLD AUTO: 1.15 THOUSANDS/ΜL (ref 0.6–4.47)
LYMPHOCYTES NFR BLD AUTO: 23 % (ref 14–44)
MCH RBC QN AUTO: 30 PG (ref 26.8–34.3)
MCHC RBC AUTO-ENTMCNC: 32.5 G/DL (ref 31.4–37.4)
MCV RBC AUTO: 92 FL (ref 82–98)
MONOCYTES # BLD AUTO: 0.46 THOUSAND/ΜL (ref 0.17–1.22)
MONOCYTES NFR BLD AUTO: 9 % (ref 4–12)
NEUTROPHILS # BLD AUTO: 3.3 THOUSANDS/ΜL (ref 1.85–7.62)
NEUTS SEG NFR BLD AUTO: 65 % (ref 43–75)
NRBC BLD AUTO-RTO: 0 /100 WBCS
NT-PROBNP SERPL-MCNC: 1536 PG/ML
PLATELET # BLD AUTO: 164 THOUSANDS/UL (ref 149–390)
PMV BLD AUTO: 10.2 FL (ref 8.9–12.7)
POTASSIUM SERPL-SCNC: 3.5 MMOL/L (ref 3.5–5.3)
PROT SERPL-MCNC: 7.6 G/DL (ref 6.4–8.2)
PROTHROMBIN TIME: 18.7 SECONDS (ref 11.6–14.5)
QRS AXIS: 256 DEGREES
QRSD INTERVAL: 154 MS
QT INTERVAL: 402 MS
QTC INTERVAL: 483 MS
RBC # BLD AUTO: 4.04 MILLION/UL (ref 3.88–5.62)
RSV RNA RESP QL NAA+PROBE: NEGATIVE
SARS-COV-2 RNA RESP QL NAA+PROBE: NEGATIVE
SODIUM SERPL-SCNC: 142 MMOL/L (ref 136–145)
T WAVE AXIS: 22 DEGREES
TROPONIN I SERPL-MCNC: <0.02 NG/ML
VENTRICULAR RATE: 87 BPM
WBC # BLD AUTO: 5.05 THOUSAND/UL (ref 4.31–10.16)

## 2021-01-22 PROCEDURE — 99285 EMERGENCY DEPT VISIT HI MDM: CPT

## 2021-01-22 PROCEDURE — 85610 PROTHROMBIN TIME: CPT | Performed by: EMERGENCY MEDICINE

## 2021-01-22 PROCEDURE — 83880 ASSAY OF NATRIURETIC PEPTIDE: CPT | Performed by: EMERGENCY MEDICINE

## 2021-01-22 PROCEDURE — 84484 ASSAY OF TROPONIN QUANT: CPT | Performed by: EMERGENCY MEDICINE

## 2021-01-22 PROCEDURE — 99223 1ST HOSP IP/OBS HIGH 75: CPT | Performed by: INTERNAL MEDICINE

## 2021-01-22 PROCEDURE — 93005 ELECTROCARDIOGRAM TRACING: CPT

## 2021-01-22 PROCEDURE — 85730 THROMBOPLASTIN TIME PARTIAL: CPT | Performed by: EMERGENCY MEDICINE

## 2021-01-22 PROCEDURE — 0241U HB NFCT DS VIR RESP RNA 4 TRGT: CPT | Performed by: EMERGENCY MEDICINE

## 2021-01-22 PROCEDURE — 93010 ELECTROCARDIOGRAM REPORT: CPT | Performed by: INTERNAL MEDICINE

## 2021-01-22 PROCEDURE — 80053 COMPREHEN METABOLIC PANEL: CPT | Performed by: EMERGENCY MEDICINE

## 2021-01-22 PROCEDURE — 36415 COLL VENOUS BLD VENIPUNCTURE: CPT | Performed by: EMERGENCY MEDICINE

## 2021-01-22 PROCEDURE — 97140 MANUAL THERAPY 1/> REGIONS: CPT | Performed by: PHYSICAL THERAPIST

## 2021-01-22 PROCEDURE — 84484 ASSAY OF TROPONIN QUANT: CPT | Performed by: INTERNAL MEDICINE

## 2021-01-22 PROCEDURE — 99214 OFFICE O/P EST MOD 30 MIN: CPT | Performed by: INTERNAL MEDICINE

## 2021-01-22 PROCEDURE — 99285 EMERGENCY DEPT VISIT HI MDM: CPT | Performed by: EMERGENCY MEDICINE

## 2021-01-22 PROCEDURE — 71045 X-RAY EXAM CHEST 1 VIEW: CPT

## 2021-01-22 PROCEDURE — 85025 COMPLETE CBC W/AUTO DIFF WBC: CPT | Performed by: EMERGENCY MEDICINE

## 2021-01-22 RX ORDER — ASCORBIC ACID 500 MG
1000 TABLET ORAL EVERY 12 HOURS SCHEDULED
Status: DISCONTINUED | OUTPATIENT
Start: 2021-01-22 | End: 2021-01-28

## 2021-01-22 RX ORDER — FUROSEMIDE 10 MG/ML
60 INJECTION INTRAMUSCULAR; INTRAVENOUS 2 TIMES DAILY
Status: DISCONTINUED | OUTPATIENT
Start: 2021-01-22 | End: 2021-01-22

## 2021-01-22 RX ORDER — FUROSEMIDE 10 MG/ML
40 INJECTION INTRAMUSCULAR; INTRAVENOUS 2 TIMES DAILY
Status: DISCONTINUED | OUTPATIENT
Start: 2021-01-22 | End: 2021-01-22

## 2021-01-22 RX ORDER — TRAMADOL HYDROCHLORIDE 50 MG/1
50 TABLET ORAL EVERY 6 HOURS PRN
Status: DISCONTINUED | OUTPATIENT
Start: 2021-01-22 | End: 2021-01-30 | Stop reason: HOSPADM

## 2021-01-22 RX ORDER — ALLOPURINOL 100 MG/1
100 TABLET ORAL DAILY
Status: DISCONTINUED | OUTPATIENT
Start: 2021-01-23 | End: 2021-01-30 | Stop reason: HOSPADM

## 2021-01-22 RX ORDER — TORSEMIDE 20 MG/1
20 TABLET ORAL 2 TIMES DAILY
Qty: 180 TABLET | Refills: 2 | Status: SHIPPED | OUTPATIENT
Start: 2021-01-22 | End: 2021-01-30 | Stop reason: HOSPADM

## 2021-01-22 RX ORDER — MULTIVITAMIN/IRON/FOLIC ACID 18MG-0.4MG
1 TABLET ORAL DAILY
Status: DISCONTINUED | OUTPATIENT
Start: 2021-01-30 | End: 2021-01-30 | Stop reason: HOSPADM

## 2021-01-22 RX ORDER — DOCUSATE SODIUM 100 MG/1
100 CAPSULE, LIQUID FILLED ORAL 2 TIMES DAILY
Status: DISCONTINUED | OUTPATIENT
Start: 2021-01-22 | End: 2021-01-30 | Stop reason: HOSPADM

## 2021-01-22 RX ORDER — MELATONIN
2000 DAILY
Status: DISCONTINUED | OUTPATIENT
Start: 2021-01-23 | End: 2021-01-30 | Stop reason: HOSPADM

## 2021-01-22 RX ORDER — FUROSEMIDE 10 MG/ML
60 INJECTION INTRAMUSCULAR; INTRAVENOUS ONCE
Status: COMPLETED | OUTPATIENT
Start: 2021-01-22 | End: 2021-01-22

## 2021-01-22 RX ORDER — ZINC SULFATE 50(220)MG
220 CAPSULE ORAL DAILY
Status: COMPLETED | OUTPATIENT
Start: 2021-01-23 | End: 2021-01-29

## 2021-01-22 RX ORDER — ACETAMINOPHEN 325 MG/1
650 TABLET ORAL EVERY 4 HOURS PRN
Status: DISCONTINUED | OUTPATIENT
Start: 2021-01-22 | End: 2021-01-30 | Stop reason: HOSPADM

## 2021-01-22 RX ORDER — PREGABALIN 100 MG/1
100 CAPSULE ORAL 3 TIMES DAILY
Status: DISCONTINUED | OUTPATIENT
Start: 2021-01-22 | End: 2021-01-30 | Stop reason: HOSPADM

## 2021-01-22 RX ORDER — FUROSEMIDE 10 MG/ML
60 INJECTION INTRAMUSCULAR; INTRAVENOUS
Status: DISCONTINUED | OUTPATIENT
Start: 2021-01-23 | End: 2021-01-26

## 2021-01-22 RX ORDER — ONDANSETRON 2 MG/ML
4 INJECTION INTRAMUSCULAR; INTRAVENOUS EVERY 6 HOURS PRN
Status: DISCONTINUED | OUTPATIENT
Start: 2021-01-22 | End: 2021-01-30 | Stop reason: HOSPADM

## 2021-01-22 RX ORDER — POTASSIUM CHLORIDE 20 MEQ/1
40 TABLET, EXTENDED RELEASE ORAL DAILY
Status: DISCONTINUED | OUTPATIENT
Start: 2021-01-23 | End: 2021-01-27

## 2021-01-22 RX ORDER — OXYCODONE HYDROCHLORIDE 5 MG/1
5 TABLET ORAL EVERY 4 HOURS PRN
Status: DISCONTINUED | OUTPATIENT
Start: 2021-01-22 | End: 2021-01-30 | Stop reason: HOSPADM

## 2021-01-22 RX ORDER — TAMSULOSIN HYDROCHLORIDE 0.4 MG/1
0.4 CAPSULE ORAL
Status: DISCONTINUED | OUTPATIENT
Start: 2021-01-22 | End: 2021-01-30 | Stop reason: HOSPADM

## 2021-01-22 RX ADMIN — TAMSULOSIN HYDROCHLORIDE 0.4 MG: 0.4 CAPSULE ORAL at 20:49

## 2021-01-22 RX ADMIN — OXYCODONE HYDROCHLORIDE AND ACETAMINOPHEN 1000 MG: 500 TABLET ORAL at 20:49

## 2021-01-22 RX ADMIN — PREGABALIN 100 MG: 100 CAPSULE ORAL at 20:51

## 2021-01-22 RX ADMIN — METOPROLOL TARTRATE 25 MG: 25 TABLET, FILM COATED ORAL at 20:51

## 2021-01-22 RX ADMIN — FUROSEMIDE 60 MG: 10 INJECTION, SOLUTION INTRAMUSCULAR; INTRAVENOUS at 18:06

## 2021-01-22 NOTE — PROGRESS NOTES
Cardiology Office Note  MD Darlyn Ashley MD Ettie Shaw, DO, [facility] MD Tanika Sr DO, Ela Soto DO, [facility] [place]  ----------------------------------------------------------------  [address] [place] He [age] y o  male MRN: [id number]  Unit/Bed#:  Encounter: [id number]      History of Present Illness: It was a pleasure to see Christopher Hall in the office today for follow-up CV evaluation  He has a longstanding history of atrial fibrillation with prior failed ELAINE guided cardioversion, history of morbid obesity and pulmonary hypertension with chronic venous insufficiency/lymphedema  The patient has a known history of obstructive sleep apnea was noncompliant with his CPAP therapy  Over the course of many years he has been having lower extremity edema which has been believe secondary to lymphedema with chronic venous insufficiency  He has had venous ablation is in the past for his reflux disease  Recently in October 2019, he was found have pulmonary hypertension with pulmonary artery systolic pressure is estimated at 50 mm Hg  We had initially seen him in late August 2020 due to increased fatigue and dyspnea on exertion  He has become somewhat short of breath with basic activity  Previously, he had been managed for his atrial fibrillation at [facility]  At a prior encounter, I ordered testing to assess his left ventricular function with a 2D echocardiogram and pulmonary pressures  We had also checked a pharmacologic nuclear stress test and 48 hour Holter monitor  Patient states that his weights have been going up and down since her last encounter  He has had fluctuations in his weight between the mid 300 pound and 320 pound range  There seemed to be increased weight gain when he has a diet that is higher in salt        He has met with electrophysiology and been recommended for follow-up with sleep medicine as well as evaluation by advanced heart failure  He is also seeing bariatric surgery and discussed surgical versus conservative options  He decided to undergo dietary modification  When he was previously seen, he was increased on his Lasix medication to 40 mg b i d  for several days to see if there was any diuretic effect  There is no significant benefit does diuretic  Patient states that he felt better for period time and then weight gain continue to increase  He is now up approximately 30 lb from his dry weight which seems to be around 294 lb  He has had progressive dyspnea on exertion and worsening lower extremity swelling  Denies lightheadedness, dizziness or palpitations  Denies chest pain, pressure, tightness or squeezing  Review of Systems:  Review of Systems   Constitution: Positive for malaise/fatigue  Negative for decreased appetite, fever, weight gain and weight loss  HENT: Negative for congestion and sore throat  Eyes: Negative for visual disturbance  Cardiovascular: Positive for dyspnea on exertion  Negative for chest pain, leg swelling, near-syncope and palpitations  Respiratory: Positive for shortness of breath  Negative for cough  Hematologic/Lymphatic: Negative for bleeding problem  Skin: Negative for rash  Musculoskeletal: Negative for myalgias and neck pain  Gastrointestinal: Negative for abdominal pain and nausea  Neurological: Negative for light-headedness and weakness  Psychiatric/Behavioral: Negative for depression         Past Medical History:   Diagnosis Date    [facility]     Arthritis     CPAP (continuous positive airway pressure) dependence     Irregular heart beat     Lymphedema     Sleep apnea     Urinary frequency     Wears glasses     Wears partial dentures     lower partial       Past Surgical History:   Procedure Laterality Date    ABLATION SAPHENOUS VEIN W/ RFA      COLONOSCOPY      RI CYSTOURETHRO W/IMPLANT N/A 11/13/2017    Procedure: CYSTOSCOPY WITH INSERTION UROLIFT;  Surgeon: Travis Freeman DO;  Location: [facility];  Service: Urology    TONSILLECTOMY         Social History     Socioeconomic History    Marital status: /Civil Union     Spouse name: Not on file    Number of children: Not on file    Years of education: Not on file    Highest education level: Not on file   Occupational History    Not on file   Social Needs    Financial resource strain: Not on file    Food insecurity     Worry: Not on file     Inability: Not on file    Transportation needs     Medical: Not on file     Non-medical: Not on file   Tobacco Use    Smoking status: Never Smoker    Smokeless tobacco: Never Used   Substance and Sexual Activity    Alcohol use:  Yes     Alcohol/week: 4 0 standard drinks     Types: 4 Cans of beer per week     Frequency: 2-3 times a week     Binge frequency: Never     Comment: socially    Drug use: No    Sexual activity: Not on file   Lifestyle    Physical activity     Days per week: Not on file     Minutes per session: Not on file    Stress: Not on file   Relationships    Social connections     Talks on phone: Not on file     Gets together: Not on file     Attends Church service: Not on file     Active member of club or organization: Not on file     Attends meetings of clubs or organizations: Not on file     Relationship status: Not on file    Intimate partner violence     Fear of current or ex partner: Not on file     Emotionally abused: Not on file     Physically abused: Not on file     Forced sexual activity: Not on file   Other Topics Concern    Not on file   Social History Narrative    Not on file       Family History   Problem Relation Age of Onset    Heart disease Mother     Lymphoma Father     Cancer Father     Heart disease Sister     Hypertension Brother     Diabetes Neg Hx     Thyroid disease Neg Hx     Stroke Neg Hx        Allergies   Allergen Reactions  Penicillins Anaphylaxis    Sulfa Antibiotics Anaphylaxis         Current Outpatient Medications:     Acetaminophen (TYLENOL EXTRA STRENGTH PO), Take by mouth, Disp: , Rfl:     alfuzosin (UROXATRAL) 10 mg 24 hr tablet, , Disp: , Rfl:     allopurinol (ZYLOPRIM) 100 mg tablet, Take 100 mg by mouth daily, Disp: , Rfl:     ammonium lactate (AMLACTIN) 12 % lotion, Apply topically 2 (two) times a day as needed for dry skin, Disp: , Rfl:     clindamycin (CLEOCIN) 300 MG capsule, TAKE 2 CAPSULES BY MOUTH 1 HOUR PRIOR TO APPOINTMENT, Disp: , Rfl:     CoenzymeQ10-Isoleucine-Glycine (CO Q-10) 100-50-25 MG TB24, Co Q-10, Disp: , Rfl:     colchicine (COLCRYS) 0 6 mg tablet, , Disp: , Rfl:     ferrous sulfate 325 (65 Fe) mg tablet, Take 325 mg by mouth every other day, Disp: , Rfl:     furosemide (LASIX) 20 mg tablet, Take 20 mg by mouth daily, Disp: , Rfl:     GARCINIA CAMBOGIA-CHROMIUM PO, Take 750 mg by mouth 2 (two) times a day , Disp: , Rfl:     Glucosamine-Chondroit-Vit C-Mn (Glucosamine 1500 Complex) CAPS, Take by mouth, Disp: , Rfl:     Green Coffee Silva-Yerba Mate (GREEN COFFEE BEAN EXTRACT PO), Take 800 mg by mouth daily , Disp: , Rfl:     halobetasol (ULTRAVATE) 0 05 % ointment, APPLY TO AFFECTED AREA ON LEG TWICE DAILY, Disp: , Rfl:     L-ARGININE-500 PO, Take 500 mg by mouth 2 (two) times a day , Disp: , Rfl:     metoprolol tartrate (LOPRESSOR) 25 mg tablet, Take 1 tablet (25 mg total) by mouth every 12 (twelve) hours, Disp: 180 tablet, Rfl: 2    Multiple Vitamins-Minerals (OCUVITE EXTRA PO), Take 1 tablet by mouth daily  , Disp: , Rfl:     patient supplied medication, Take 1 each by mouth 2 (two) times a day, Disp: , Rfl:     patient supplied medication, Take 1 each by mouth 2 (two) times a day, Disp: , Rfl:     pregabalin (LYRICA) 100 mg capsule, Take 100 mg by mouth 3 (three) times a day, Disp: , Rfl:     RASPBERRY KETONES PO, Take 100 mg by mouth 2 (two) times a day, Disp: , Rfl:    rivaroxaban (XARELTO) 20 mg tablet, Take 20 mg by mouth daily, Disp: , Rfl:     tadalafil (CIALIS) 5 MG tablet, Take 5 mg by mouth daily as needed for erectile dysfunction, Disp: , Rfl:     traMADol (ULTRAM) 50 mg tablet, Take 50 mg by mouth every 6 (six) hours as needed for moderate pain, Disp: , Rfl:     Turmeric Curcumin 500 MG CAPS, Take 500 mg by mouth daily, Disp: , Rfl:     VITAMIN D PO, Take by mouth, Disp: , Rfl:     Vitals:    21 1509   Temp: (!) 97 3 °F (36 3 °C)   Weight: (!) 147 kg (324 lb)   Height: 5' 8 5" (1 74 m)       PHYSICAL EXAMINATION:  Gen: Awake, Alert, NAD    HEENT: AT/NC, Anicteric, mmm  Neck: Supple, +JVP  Resp: CTA bilaterally no w/r/r  CV:  Irregularly irregular +S1, S2, No m/r/g  Abd: Soft, NT/ND + BS  Ext: warm, bilateral lower extremities wrapped with +2 pitting edema bilaterally/lymphedema  Neuro: Follows commands, moves all extermities  Psych: Appropriate affect    --------------------------------------------------------------------------------  TREADMILL STRESS  No results found for this or any previous visit    --------------------------------------------------------------------------------  NUCLEAR STRESS TEST: No results found for this or any previous visit    No results found for this or any previous visit     --------------------------------------------------------------------------------  CATH:  No results found for this or any previous visit   --------------------------------------------------------------------------------  ECHO:   Results for orders placed during the hospital encounter of 10/26/19   Echo complete with contrast if indicated    Narrative [address]  [facility] [place] [address] [place] [address]  [phone]    Transthoracic Echocardiogram  2D, M-mode, Doppler, and Color Doppler    Study date:  28-Oct-2019    Patient: Shani Lopez  MR number: [id number]  Account number: [de-identified]  : 1955  Age: 71 years  Gender: Male  Status: Inpatient  Location: Bedside  Height: 69 in  Weight: 302 lb  BP: 141/ 88 mmHg    Indications: left sided paralysis    Diagnoses: 436 - CVA    Sonographer:  Marti Vasquez RDCS, CCT  Referring Physician:  Coralee Ganser, DO  Group:  [facility]  Interpreting Physician:  Emily Resendez DO    SUMMARY    LEFT VENTRICLE:  Systolic function was normal  Ejection fraction was estimated to be 55 %  This study was inadequate for the evaluation of regional wall motion  Wall thickness was mildly increased  RIGHT VENTRICLE:  The ventricle was dilated  Systolic function was normal     MITRAL VALVE:  There was mild regurgitation  TRICUSPID VALVE:  There was mild regurgitation  Estimated peak PA pressure was 50 mmHg  HISTORY: PRIOR HISTORY: Patient has no history of cardiovascular disease  PROCEDURE: The procedure was performed at the bedside  This was a routine study  The transthoracic approach was used  The study included complete 2D imaging, M-mode, complete spectral Doppler, and color Doppler  The heart rate was 80 bpm,  at the start of the study  Intravenous contrast (Definity solution [1 3 ml Definity/8 7ml normal saline solution], 3 ml) was administered to opacify the left ventricle  Echocardiographic views were limited due to poor acoustic window  availability  This was a technically difficult study  LEFT VENTRICLE: Size was normal  Systolic function was normal  Ejection fraction was estimated to be 55 %  This study was inadequate for the evaluation of regional wall motion  Wall thickness was mildly increased  DOPPLER: The study was  not technically sufficient to allow evaluation of LV diastolic function  RIGHT VENTRICLE: The ventricle was dilated  Systolic function was normal     LEFT ATRIUM: Size was normal     RIGHT ATRIUM: Size was normal     MITRAL VALVE: Valve structure was normal  There was normal leaflet separation   DOPPLER: The transmitral velocity was within the normal range  There was no evidence for stenosis  There was mild regurgitation  AORTIC VALVE: The valve was trileaflet  Leaflets exhibited normal thickness and normal cuspal separation  DOPPLER: Transaortic velocity was within the normal range  There was no evidence for stenosis  There was no regurgitation  TRICUSPID VALVE: The valve structure was normal  There was normal leaflet separation  DOPPLER: The transtricuspid velocity was within the normal range  There was no evidence for stenosis  There was mild regurgitation  Estimated peak PA  pressure was 50 mmHg  PULMONIC VALVE: Leaflets exhibited normal thickness, no calcification, and normal cuspal separation  DOPPLER: The transpulmonic velocity was within the normal range  There was no regurgitation  PERICARDIUM: There was no pericardial effusion  The pericardium was normal in appearance  AORTA: The root exhibited normal size  SYSTEMIC VEINS: IVC: The inferior vena cava was not well visualized  SYSTEM MEASUREMENT TABLES    2D  %FS: 34 72 %  Ao Diam: 2 87 cm  EDV(Teich): 143 23 ml  EF(Teich): 63 36 %  ESV(Teich): 52 47 ml  IVSd: 1 15 cm  LA Diam: 4 16 cm  LVIDd: 5 43 cm  LVIDs: 3 55 cm  LVPWd: 1 04 cm  RWT: 0 38  SV(Teich): 90 75 ml    CW  AV Vmax: 1 34 m/s  AV maxP 13 mmHg  RAP: 0 mmHg  TR Vmax: 3 26 m/s  TR maxP 5 mmHg    PW  E' Sept: 0 09 m/s  MV E Terrance: 1 03 m/s  RVSP: 42 5 mmHg    [facility]    Prepared and electronically signed by    Dar Marquez DO  Signed 28-Oct-2019 10:37:38       No results found for this or any previous visit   --------------------------------------------------------------------------------  HOLTER  No results found for this or any previous visit  No results found for this or any previous visit   --------------------------------------------------------------------------------  CAROTIDS  No results found for this or any previous visit  --------------------------------------------------------------------------------  ECGs:  No results found for this visit on 01/22/21  Lab Results   Component Value Date    WBC 4 54 10/28/2019    HGB 11 9 (L) 10/28/2019    HCT 36 0 (L) 10/28/2019    MCV 93 10/28/2019     (L) 10/28/2019      Lab Results   Component Value Date    SODIUM 139 09/03/2020    K 4 6 09/03/2020     09/03/2020    CO2 30 09/03/2020    BUN 24 09/03/2020    CREATININE 0 89 09/03/2020    GLUC 87 09/03/2020    CALCIUM 9 2 09/03/2020      Lab Results   Component Value Date    HGBA1C 5 7 10/27/2019      No results found for: CHOL  Lab Results   Component Value Date    HDL 55 10/27/2019     Lab Results   Component Value Date    LDLCALC 90 10/27/2019     Lab Results   Component Value Date    TRIG 93 10/27/2019     No results found for: [place], [place]   Lab Results   Component Value Date    INR 1 18 10/26/2019    PROTIME 15 1 (H) 10/26/2019        There are no diagnoses linked to this encounter  IMPRESSION:  · Acute on chronic diastolic heart failure  · Persistent atrial fibrillation (long standing since before October 2019) on Xarelto  · LVEF 55%, mild LVH, mild RV dilatation, mild MR/TR with PASP 50 mmHg, October 2019  · History of bilateral venous insufficiency status post LE vein ablation  · Pharmacologic nuclear stress test negative for myocardial ischemia with gated EF 50%, September 2020  · Holter with AF, avg HR 92 bpm, rare VPCs, September 2020  · Abnormal ECG with Bifascicular block  · Moderate pulmonary hypertension  · Lymphedema  · Morbid obesity  · ROBERT on 20/14 cm BiPAP    PLAN:  It was a pleasure to see Katiuska Jeffery in the office today for follow-up CV evaluation  Patient is here today for follow-up after his prior evaluation in the office  When he was seen previously, he had a he had short period of increased diuretic with no significant benefit  He did not report these findings to the office    He has progressive lower extremity edema and is up 30 lb from his dry weight which appears to be 294 lb  He was previously seen by electrophysiology who recommended rate control and that he only undergo rhythm control should he lose enough weight, improve his pulmonary hypertension and treat his sleep apnea  He has been seen by sleep medicine and recommended to start 20/14 cm BiPAP  He has been trying to improve his diet and decrease his salt intake  Despite this, he continues to gain weight  He is a symptoms concerning for angina  With ambulation, his oxygen saturation dropped to 86%  Based on his clinical presentation, I have the following recommendations:    1  Due to the patient's worsening heart failure, increased NT proBNP as an outpatient, and dropping oxygen saturation with ambulation, I believe the patient should be admitted to [facility] in [facility] for treatment of his acute heart failure  2  Recommend checking urinalysis to look for protein  3  Start Lasix at at least 60 mg IV b i d    4  Repeat his 2D echocardiogram to reassess cardiac structure and function  5  Placed on CPAP nocturnally  Recommend continued compliance  6  I believe that he would benefit from evaluation and treatment by nutrition while he is inpatient  7  Salt restriction has been heavily encouraged  Patient will continue with dietary modifications  8  As always, I have recommended a heart healthy diet low in sodium, weight loss and exercise regimen  9  We will follow up with him after his hospitalization  As always, please do not hesitate to call with any questions  Portions of the record may have been created with voice recognition software  Occasional wrong word or "sound a like" substitutions may have occurred due to the inherent limitations of voice recognition software  Read the chart carefully and recognize, using context, where substitutions have occurred          Signed: Meagan Sinclair DO, [facility] [place]

## 2021-01-22 NOTE — ED NOTES
Pt states wife tested positive to Covid today  At this time pt denies any covid related symptoms          Maria Victoria Faria RN  01/22/21 [id number]

## 2021-01-22 NOTE — H&P
H&P- Srinivas Tobar 1955, 76 y o  male MRN: [id number]    Unit/Bed#: ED 06 Encounter: [id number]    Primary Care Provider: Lemuel Donald MD   Date and time admitted to hospital: 1/22/2021  4:22 PM        * Acute diastolic heart failure [facility])  Assessment & Plan  Wt Readings from Last 3 Encounters:   01/22/21 (!) 145 kg (319 lb 10 7 oz)   01/22/21 (!) 147 kg (324 lb)   01/14/21 (!) 146 kg (321 lb)       Patient presenting with acute diastolic heart failure  He was seen as cardiologist's office and is significantly volume overloaded on physical exam, and approximately 30 lb weight gain  He will be placed on furosemide 60 mg twice a day  Left ventricular ejection fraction of 55% with mild LVH and mild right ventricular dilatation from October of 2019  He has a pharmacologic stress test which was negative for myocardial ischemia with an EF of 50% in September 2020  He is approximately 30 lb from his dry weight which appears to be 294 lb from his cardiologist note  Will fluid restrict  Nutrition consult to discuss dietary modification restriction      Shortness of breath with exposure to COVID-19 virus  Assessment & Plan  Patient with shortness of breath which is felt to be likely to heart failure  He is otherwise asymptomatic  Hold on Remdesivir for now, low threshold to initiate  COVID negative, likely in the  seronegative conversion window as wife is positive  Hold on corticosteroids  Started on vitamin-C, vitamin-D and zinc  He is already anticoagulated  Check inflammatory markers in the morning      Pulmonary hypertension ([facility] [address] )  Assessment & Plan  History of pulmonary hypertension - monitor for now    Lymphedema  Assessment & Plan  History of lymphedema, significantly limits his current ambulatory status  Continue diuretics for now  Will need follow-up in the lymphedema Clinic    Thrombocytopenia [facility])  Assessment & Plan  Monitor platelets while admitted      Obesity, Class III, BMI 40-49 9 (morbid obesity) ([facility] [address] )  Assessment & Plan  Encouraged outpatient weight loss    Leukopenia  Assessment & Plan  History of leukopenia without any evidence of neutropenia    Recent Labs     01/22/21  1659   WBC 5 05         Obstructive sleep apnea on CPAP  Assessment & Plan  Unfortunately cannot receive CPAP due to recent COVID exposure and risk of aerosolization    Atrial fibrillation (HCC)  Assessment & Plan  Ventricular rate control  Continue Xarelto    VTE Prophylaxis: Rivaroxaban (Xarelto)  / sequential compression device   Code Status:  Full code  POLST: There is no POLST form on file for this patient (pre-hospital)  Discussion with family:  Wife contacted at 6:41 p m  Anticipated Length of Stay:  Patient will be admitted on an Inpatient basis with an anticipated length of stay of  greater than 2 midnights  Justification for Hospital Stay:  Acute diastolic heart failure    Total Time for Visit, including Counseling / Coordination of Care: 30 minutes  Greater than 50% of this total time spent on direct patient counseling and coordination of care  Chief Complaint:   Sent by Cardiology for volume overload    History of Present Illness:    Shasha Brothers is a 76 y o  male who presents with lower extremity swelling and weight gain of 30 lb     The patient has a history of longstanding atrial fibrillation as well as morbid obesity and pulmonary hypertension with chronic venous insufficiency and lymphedema  He also has a history of sleep apnea for which he is not compliant  The patient presented to his primary cardiologist's office for evaluation, at that time he was found to be 30 lb over his dry weight  He had increased weight due to high salt diet  His weight is had significantly increased, and he had progressive dyspnea on exertion as well as lower extremity swelling  At the time examination he endorses no chest pain, no nausea or vomiting  He has no fevers or chills    He does report having malaise and fatigue  His wife was recently tested positive for COVID-19    Review of Systems:    A complete and comprehensive 14 point organ system review was performed and all other systems are negative other than stated above in the HPI    Past Medical and Surgical History:     Past Medical History:   Diagnosis Date    A-fib ([address] )     Arthritis     CPAP (continuous positive airway pressure) dependence     Irregular heart beat     Lymphedema     Sleep apnea     Urinary frequency     Wears glasses     Wears partial dentures     lower partial       Past Surgical History:   Procedure Laterality Date    ABLATION SAPHENOUS VEIN W/ RFA      COLONOSCOPY      FL CYSTOURETHRO W/IMPLANT N/A 11/13/2017    Procedure: CYSTOSCOPY WITH INSERTION UROLIFT;  Surgeon: Az Maxwell DO;  Location: [facility];  Service: Urology    TONSILLECTOMY         Meds/Allergies:    Prior to Admission medications    Medication Sig Start Date End Date Taking?  Authorizing Provider   allopurinol (ZYLOPRIM) 100 mg tablet Take 100 mg by mouth daily 7/22/19 1/22/21 Yes Historical Provider, MD   ammonium lactate (AMLACTIN) 12 % lotion Apply topically 2 (two) times a day as needed for dry skin   Yes Historical Provider, MD   clindamycin (CLEOCIN) 300 MG capsule TAKE 2 CAPSULES BY MOUTH 1 HOUR PRIOR TO APPOINTMENT 12/8/20  Yes Historical Provider, MD   CoenzymeQ10-Isoleucine-Glycine (CO Q-10) 100-50-25 MG TB24 Co Q-10   Yes Historical Provider, MD   colchicine (COLCRYS) 0 6 mg tablet  11/21/20  Yes Historical Provider, MD   ferrous sulfate 325 (65 Fe) mg tablet Take 325 mg by mouth every other day   Yes Historical Provider, MD   GARCINIA CAMBOGIA-CHROMIUM PO Take 750 mg by mouth 2 (two) times a day    Yes Historical Provider, MD   Glucosamine-Chondroit-Vit C-Mn (Glucosamine 1500 Complex) CAPS Take by mouth   Yes Historical Provider, MD [employer] Silva-Yerba Mate (GREEN COFFEE BEAN EXTRACT PO) Take 800 mg by mouth daily    Yes Historical Provider, MD   halobetasol (ULTRAVATE) 0 05 % ointment APPLY TO AFFECTED AREA ON LEG TWICE DAILY 9/22/20  Yes Historical Provider, MD   metoprolol tartrate (LOPRESSOR) 25 mg tablet Take 1 tablet (25 mg total) by mouth every 12 (twelve) hours 9/29/20 1/22/21 Yes Garrel Corners, DO   patient supplied medication Take 1 each by mouth 2 (two) times a day   Yes Historical Provider, MD   patient supplied medication Take 1 each by mouth 2 (two) times a day   Yes Historical Provider, MD   pregabalin (LYRICA) 100 mg capsule Take 100 mg by mouth 3 (three) times a day   Yes Historical Provider, MD   rivaroxaban (XARELTO) 20 mg tablet Take 20 mg by mouth daily   Yes Historical Provider, MD   torsemide (DEMADEX) 20 mg tablet Take 1 tablet (20 mg total) by mouth 2 (two) times a day 1/22/21 4/22/21 Yes Garrel Corners, DO   traMADol (ULTRAM) 50 mg tablet Take 50 mg by mouth every 6 (six) hours as needed for moderate pain   Yes Historical Provider, MD   Turmeric Curcumin 500 MG CAPS Take 500 mg by mouth daily   Yes Historical Provider, MD   VITAMIN D PO Take by mouth   Yes Historical Provider, MD   Acetaminophen (TYLENOL EXTRA STRENGTH PO) Take by mouth    Historical Provider, MD   alfuzosin (UROXATRAL) 10 mg 24 hr tablet  11/25/20   Historical Provider, MD   L-ARGININE-500 PO Take 500 mg by mouth 2 (two) times a day     Historical Provider, MD   Multiple Vitamins-Minerals (OCUVITE EXTRA PO) Take 1 tablet by mouth daily      Historical Provider, MD   RASPBERRY KETONES PO Take 100 mg by mouth 2 (two) times a day    Historical Provider, MD   tadalafil (CIALIS) 5 MG tablet Take 5 mg by mouth daily as needed for erectile dysfunction    Historical Provider, MD   furosemide (LASIX) 20 mg tablet Take 40 mg by mouth 2 (two) times a day   1/22/21  Historical Provider, MD     I have reviewed home medications with patient personally  Allergies:    Allergies   Allergen Reactions    Penicillins Anaphylaxis    Sulfa Antibiotics Anaphylaxis       Social History:     Marital Status: /Civil Union   Occupation:  Disabled    Substance Use History:   Social History     Substance and Sexual Activity   Alcohol Use Yes    Alcohol/week: 4 0 standard drinks    Types: 4 Cans of beer per week    Frequency: 2-3 times a week    Binge frequency: Never    Comment: socially     Social History     Tobacco Use   Smoking Status Never Smoker   Smokeless Tobacco Never Used     Social History     Substance and Sexual Activity   Drug Use No       Family History:    Family History   Problem Relation Age of Onset    Heart disease Mother     Lymphoma Father     Cancer Father     Heart disease Sister     Hypertension Brother     Diabetes Neg Hx     Thyroid disease Neg Hx     Stroke Neg Hx        Physical Exam:     Vitals:   Blood Pressure: 142/84 (01/22/21 1806)  Pulse: 81 (01/22/21 1806)  Temperature: 98 7 °F (37 1 °C) (01/22/21 1617)  Temp Source: Oral (01/22/21 1617)  Respirations: 22 (01/22/21 1806)  Weight - Scale: (!) 145 kg (319 lb 10 7 oz) (01/22/21 1614)  SpO2: 94 % (01/22/21 1806)      General: well appearing, no acute distress, morbidly obese  HEENT: atraumatic, PERRLA, moist mucosa, normal pharynx, normal tonsils and adenoids, normal tongue, no fluid in sinuses  Neck: Trachea midline, no carotid bruit, no masses  Respiratory: normal chest wall expansion, CTA B, no r/r/w, no rubs  Cardiovascular: RRR, no m/r/g, Normal S1 and S2  Abdomen: Soft, non-tender, non-distended, normal bowel sounds in all quadrants, no hepatosplenomegaly, no tympany  Rectal: deferred  Musculoskeletal: normal ROM in upper and lower extremities, 4+ edema to the thigh  Integumentary: warm, dry, and pink, with no rash, purpura, or petechia  Heme/Lymph: no lymphadenopathy, no bruises  Neurological: Cranial Nerves II-XII grossly intact  Psychiatric: cooperative with normal mood, affect, and cognition    Additional Data:     Lab Results: I have personally reviewed pertinent reports        Results from last 7 days   Lab Units 01/22/21  1659   WBC Thousand/uL 5 05   HEMOGLOBIN g/dL 12 1   HEMATOCRIT % 37 2   PLATELETS Thousands/uL 164   NEUTROS PCT % 65   LYMPHS PCT % 23   MONOS PCT % 9   EOS PCT % 2     Results from last 7 days   Lab Units 01/22/21  1659   SODIUM mmol/L 142   POTASSIUM mmol/L 3 5   CHLORIDE mmol/L 105   CO2 mmol/L 31   BUN mg/dL 19   CREATININE mg/dL 0 77   ANION GAP mmol/L 6   CALCIUM mg/dL 9 0   ALBUMIN g/dL 3 3*   TOTAL BILIRUBIN mg/dL 0 46   ALK PHOS U/L 73   ALT U/L 28   AST U/L 22   GLUCOSE RANDOM mg/dL 92     Results from last 7 days   Lab Units 01/22/21  1659   INR  1 59*                   Imaging: I have personally reviewed pertinent reports  XR chest 1 view portable    (Results Pending)       EKG, Pathology, and Other Studies Reviewed on Admission:     Chest x-ray via reviewed, cardiomegaly with increased vascular marking  Per my view there is no ground-glass opacity    Allscripts / Epic Records Reviewed: Yes     ** Please Note: This note has been constructed using a voice recognition system   **

## 2021-01-22 NOTE — ASSESSMENT & PLAN NOTE
History of lymphedema, significantly limits his current ambulatory status  Continue diuretics for now    Will need follow-up in the lymphedema Clinic

## 2021-01-22 NOTE — ASSESSMENT & PLAN NOTE
History of leukopenia without any evidence of neutropenia    Recent Labs     01/22/21  1659   WBC 5 05

## 2021-01-22 NOTE — ASSESSMENT & PLAN NOTE
Wt Readings from Last 3 Encounters:   01/22/21 (!) 145 kg (319 lb 10 7 oz)   01/22/21 (!) 147 kg (324 lb)   01/14/21 (!) 146 kg (321 lb)       Patient presenting with acute diastolic heart failure  He was seen as cardiologist's office and is significantly volume overloaded on physical exam, and approximately 30 lb weight gain  He will be placed on furosemide 60 mg twice a day  Left ventricular ejection fraction of 55% with mild LVH and mild right ventricular dilatation from October of 2019  He has a pharmacologic stress test which was negative for myocardial ischemia with an EF of 50% in September 2020     He is approximately 30 lb from his dry weight which appears to be 294 lb from his cardiologist note  Will fluid restrict  Nutrition consult to discuss dietary modification restriction

## 2021-01-22 NOTE — PROGRESS NOTES
Daily Note     Today's date: 2021  Patient name: Venkat Donald  : 1955  MRN: [id number]  Referring provider: Mike Mckeon MD  Dx:   Encounter Diagnosis     ICD-10-CM    1  Lymphedema  I89 0                   Subjective: Reports he has been taking meds, starting to feel a little better, but legs feel full and tight  Objective: See treatment diary below    Assessment: Tolerated treatment well  Patient would benefit from continued PT  Edema remains, presents this date with increased weeping left > right  Plan: Continue per plan of care  Precautions: Falls, peripheral neuropathy  Re-eval Date:2021    Date       Visit Count 1 2      FOTO        Pain In        Pain Out            Manual          55 mins 55 mins              Compression placed using bilateral lower LE circaid, defers full leg compression due to knee/hip discomfort and needing to drive  Exercise Diary         Lymph TE LE                HEP                        General LE strengthening - blue t-band for ankle TE, general hip strengthening included  Gentle self stretch      Modalities

## 2021-01-22 NOTE — ED PROVIDER NOTES
Pt Name: Araseli Hampton  MRN: [id number]  [place] 1955  Age/Sex: 76 y o  male  Date of evaluation: 1/22/2021  PCP: Aron Kirk MD    CHIEF COMPLAINT    Chief Complaint   Patient presents with    Shortness of Breath     Pt reports increase B/L lower extremity swelling x1 month and SOB  Pt denies CP  Pt sent to ER by Mariano Rutherford presents to the Emergency Department complaining of increasing LE edema  He was sent here from cardiology for admission  He has been increasing his diuretics and still his swelling is increasing weight  He does not have significant SOB worse than baseline but does have exertional dyspnea  HPI      Past Medical and Surgical History    Past Medical History:   Diagnosis Date    A-fib ([address] )     Arthritis     CPAP (continuous positive airway pressure) dependence     Irregular heart beat     Lymphedema     Sleep apnea     Urinary frequency     Wears glasses     Wears partial dentures     lower partial       Past Surgical History:   Procedure Laterality Date    ABLATION SAPHENOUS VEIN W/ RFA      COLONOSCOPY      RI CYSTOURETHRO W/IMPLANT N/A 11/13/2017    Procedure: CYSTOSCOPY WITH INSERTION UROLIFT;  Surgeon: Kashmir Bassett DO;  Location: [facility];  Service: Urology    TONSILLECTOMY         Family History   Problem Relation Age of Onset    Heart disease Mother     Lymphoma Father     Cancer Father     Heart disease Sister     Hypertension Brother     Diabetes Neg Hx     Thyroid disease Neg Hx     Stroke Neg Hx        Social History     Tobacco Use    Smoking status: Never Smoker    Smokeless tobacco: Never Used   Substance Use Topics    Alcohol use:  Yes     Alcohol/week: 4 0 standard drinks     Types: 4 Cans of beer per week     Frequency: 2-3 times a week     Binge frequency: Never     Comment: socially    Drug use: No              Allergies    Allergies   Allergen Reactions    Penicillins Anaphylaxis    Sulfa Antibiotics Anaphylaxis       Home Medications    Prior to Admission medications    Medication Sig Start Date End Date Taking?  Authorizing Provider   Acetaminophen (TYLENOL EXTRA STRENGTH PO) Take by mouth    Historical Provider, MD   alfuzosin (UROXATRAL) 10 mg 24 hr tablet  11/25/20   Historical Provider, MD   allopurinol (ZYLOPRIM) 100 mg tablet Take 100 mg by mouth daily 7/22/19 1/22/21  Historical Provider, MD   ammonium lactate (AMLACTIN) 12 % lotion Apply topically 2 (two) times a day as needed for dry skin    Historical Provider, MD   clindamycin (CLEOCIN) 300 MG capsule TAKE 2 CAPSULES BY MOUTH 1 HOUR PRIOR TO APPOINTMENT 12/8/20   Historical Provider, MD   CoenzymeQ10-Isoleucine-Glycine (CO Q-10) 100-50-25 MG TB24 Co Q-10    Historical Provider, MD   colchicine (COLCRYS) 0 6 mg tablet  11/21/20   Historical Provider, MD   ferrous sulfate 325 (65 Fe) mg tablet Take 325 mg by mouth every other day    Historical Provider, MD   GARCINIA CAMBOGIA-CHROMIUM PO Take 750 mg by mouth 2 (two) times a day     Historical Provider, MD   Glucosamine-Chondroit-Vit C-Mn (Glucosamine 1500 Complex) CAPS Take by mouth    Historical Provider, MD Carly Croft Coffee Silva-Yerba Mate (GREEN COFFEE BEAN EXTRACT PO) Take 800 mg by mouth daily     Historical Provider, MD   halobetasol (ULTRAVATE) 0 05 % ointment APPLY TO AFFECTED AREA ON LEG TWICE DAILY 9/22/20   Historical Provider, MD   L-ARGININE-500 PO Take 500 mg by mouth 2 (two) times a day     Historical Provider, MD   metoprolol tartrate (LOPRESSOR) 25 mg tablet Take 1 tablet (25 mg total) by mouth every 12 (twelve) hours 9/29/20 1/22/21  Bk Meo, DO   Multiple Vitamins-Minerals (OCUVITE EXTRA PO) Take 1 tablet by mouth daily      Historical Provider, MD   patient supplied medication Take 1 each by mouth 2 (two) times a day    Historical Provider, MD   patient supplied medication Take 1 each by mouth 2 (two) times a day    Historical Provider, MD   pregabalin (LYRICA) 100 mg capsule Take 100 mg by mouth 3 (three) times a day    Historical Provider, MD   RASPBERRY KETONES PO Take 100 mg by mouth 2 (two) times a day    Historical Provider, MD   rivaroxaban (XARELTO) 20 mg tablet Take 20 mg by mouth daily    Historical Provider, MD   tadalafil (CIALIS) 5 MG tablet Take 5 mg by mouth daily as needed for erectile dysfunction    Historical Provider, MD   torsemide (DEMADEX) 20 mg tablet Take 1 tablet (20 mg total) by mouth 2 (two) times a day 1/22/21 4/22/21  Shawn Hargrove DO   traMADol (ULTRAM) 50 mg tablet Take 50 mg by mouth every 6 (six) hours as needed for moderate pain    Historical Provider, MD   Turmeric Curcumin 500 MG CAPS Take 500 mg by mouth daily    Historical Provider, MD   VITAMIN D PO Take by mouth    Historical Provider, MD   furosemide (LASIX) 20 mg tablet Take 40 mg by mouth 2 (two) times a day   1/22/21  Historical Provider, MD           Review of Systems    Review of Systems   Constitutional: Negative for chills and fever  HENT: Negative for ear pain and sore throat  Eyes: Negative for pain and visual disturbance  Respiratory: Positive for shortness of breath  Negative for cough  Cardiovascular: Positive for leg swelling  Negative for chest pain and palpitations  Gastrointestinal: Negative for abdominal pain and vomiting  Genitourinary: Negative for dysuria and hematuria  Musculoskeletal: Negative for arthralgias and back pain  Skin: Negative for color change and rash  Neurological: Negative for seizures and syncope  All other systems reviewed and are negative  Physical Exam      ED Triage Vitals [01/22/21 1617]   Temperature Pulse Respirations Blood Pressure SpO2   98 7 °F (37 1 °C) 89 22 147/79 97 %      Temp Source Heart Rate Source Patient Position - Orthostatic VS BP Location FiO2 (%)   Oral Monitor Sitting Right arm --      Pain Score       --               Physical Exam  Vitals signs and nursing note reviewed     Constitutional:       General: He is not in acute distress  Appearance: He is well-developed  He is not diaphoretic  HENT:      Head: Normocephalic and atraumatic  Nose: Nose normal    Eyes:      Conjunctiva/sclera: Conjunctivae normal       Pupils: Pupils are equal, round, and reactive to light  Neck:      Musculoskeletal: Normal range of motion and neck supple  Cardiovascular:      Rate and Rhythm: Normal rate and regular rhythm  Heart sounds: Normal heart sounds  No murmur  No friction rub  No gallop  Pulmonary:      Effort: Pulmonary effort is normal  No respiratory distress  Breath sounds: Normal breath sounds  No wheezing or rales  Abdominal:      General: Bowel sounds are normal       Palpations: Abdomen is soft  Tenderness: There is no abdominal tenderness  There is no guarding or rebound  Musculoskeletal: Normal range of motion  Right lower leg: Edema present  Left lower leg: Edema present  Skin:     General: Skin is warm and dry  Neurological:      Mental Status: He is alert and oriented to person, place, and time  Psychiatric:         Behavior: Behavior normal                 Assessment and Plan    Roro Strickland is a 76 y o  male who presents with increased LE swelling  Physical examination remarkable for same  Differential diagnosis (not completely inclusive) includes heart failure/ lymphedema  Plan will be to perform diagnostic testing and treat symptomatically  MDM    Diagnostic Results      EKG INTERPRETATION  EKG Interpretation    EKG interpreted by me  Interpretation by Maribel Vu DO  EKG reviewed and interpreted independently      Labs:    Results for orders placed or performed during the hospital encounter of 01/22/21   CBC and differential   Result Value Ref Range    WBC 5 05 4 31 - 10 16 Thousand/uL    RBC 4 04 3 88 - 5 62 Million/uL    Hemoglobin 12 1 12 0 - 17 0 g/dL    Hematocrit 37 2 36 5 - 49 3 %    MCV 92 82 - 98 fL    MCH 30 0 26 8 - 34 3 pg MCHC 32 5 31 4 - 37 4 g/dL    RDW 14 5 11 6 - 15 1 %    MPV 10 2 8 9 - 12 7 fL    Platelets 218 639 - 222 Thousands/uL    nRBC 0 /100 WBCs    Neutrophils Relative 65 43 - 75 %    Immat GRANS % 0 0 - 2 %    Lymphocytes Relative 23 14 - 44 %    Monocytes Relative 9 4 - 12 %    Eosinophils Relative 2 0 - 6 %    Basophils Relative 1 0 - 1 %    Neutrophils Absolute 3 30 1 85 - 7 62 Thousands/µL    Immature Grans Absolute 0 01 0 00 - 0 20 Thousand/uL    Lymphocytes Absolute 1 15 0 60 - 4 47 Thousands/µL    Monocytes Absolute 0 46 0 17 - 1 22 Thousand/µL    Eosinophils Absolute 0 10 0 00 - 0 61 Thousand/µL    Basophils Absolute 0 03 0 00 - 0 10 Thousands/µL   Comprehensive metabolic panel   Result Value Ref Range    Sodium 142 136 - 145 mmol/L    Potassium 3 5 3 5 - 5 3 mmol/L    Chloride 105 100 - 108 mmol/L    CO2 31 21 - 32 mmol/L    ANION GAP 6 4 - 13 mmol/L    BUN 19 5 - 25 mg/dL    Creatinine 0 77 0 60 - 1 30 mg/dL    Glucose 92 65 - 140 mg/dL    Calcium 9 0 8 3 - 10 1 mg/dL    Corrected Calcium 9 6 8 3 - 10 1 mg/dL    AST 22 5 - 45 U/L    ALT 28 12 - 78 U/L    Alkaline Phosphatase 73 46 - 116 U/L    Total Protein 7 6 6 4 - 8 2 g/dL    Albumin 3 3 (L) 3 5 - 5 0 g/dL    Total Bilirubin 0 46 0 20 - 1 00 mg/dL    eGFR 95 ml/min/1 73sq m   Protime-INR   Result Value Ref Range    Protime 18 7 (H) 11 6 - 14 5 seconds    INR 1 59 (H) 0 84 - 1 19   APTT   Result Value Ref Range    PTT 41 (H) 23 - 37 seconds   Troponin I   Result Value Ref Range    Troponin I <0 02 <=0 04 ng/mL   NT-BNP PRO   Result Value Ref Range    NT-proBNP 1,536 (H) <125 pg/mL   ECG 12 lead   Result Value Ref Range    Ventricular Rate 87 BPM    Atrial Rate 258 BPM    ME Interval  ms    QRSD Interval 154 ms    QT Interval 402 ms    QTC Interval 483 ms    P Axis  degrees    QRS Axis 256 degrees    T Wave Axis 22 degrees       All labs reviewed and utilized in the medical decision making process    Radiology:    XR chest 1 view portable    (Results Pending)       All radiology studies independently viewed by me and interpreted by the radiologist     Procedure    Procedures      ED Course of Care and Re-Assessments      Medications   furosemide (LASIX) injection 60 mg (has no administration in time range)     Cardiology note from today specified plan  Add covid test as his wife is positive and he has been exposed  Patient was discussed with SLIM and will be admitted  FINAL IMPRESSION    Final diagnoses:   Acute on chronic diastolic congestive heart failure (HCC)         DISPOSITION/PLAN      Time reflects when diagnosis was documented in both MDM as applicable and the Disposition within this note     Time User Action Codes Description Comment    1/22/2021  5:50 PM Arturo GARCIA Add [I50 33] Acute on chronic diastolic congestive heart failure [facility])       ED Disposition     ED Disposition Condition Date/Time Comment    Admit Stable Fri Jan 22, 2021  5:50 PM Case was discussed with Slim and the patient's admission status was agreed to be Admission Status: inpatient status to the service of Dr Zoila Lizarraga   Follow-up Information    None           PATIENT REFERRED TO:    No follow-up provider specified  DISCHARGE MEDICATIONS:    Patient's Medications   Discharge Prescriptions    No medications on file       No discharge procedures on file           Litzy Dahl, [address] [place] [address],   01/25/21 0444

## 2021-01-22 NOTE — ASSESSMENT & PLAN NOTE
Patient with shortness of breath which is felt to be likely to heart failure  He is otherwise asymptomatic  Hold on Remdesivir for now, low threshold to initiate  COVID negative, likely in the  seronegative conversion window as wife is positive    Hold on corticosteroids  Started on vitamin-C, vitamin-D and zinc  He is already anticoagulated  Check inflammatory markers in the morning

## 2021-01-23 LAB
ANION GAP SERPL CALCULATED.3IONS-SCNC: 2 MMOL/L (ref 4–13)
ATRIAL RATE: 102 BPM
BUN SERPL-MCNC: 13 MG/DL (ref 5–25)
CALCIUM SERPL-MCNC: 8.8 MG/DL (ref 8.3–10.1)
CHLORIDE SERPL-SCNC: 107 MMOL/L (ref 100–108)
CO2 SERPL-SCNC: 33 MMOL/L (ref 21–32)
CREAT SERPL-MCNC: 0.73 MG/DL (ref 0.6–1.3)
CRP SERPL QL: 11.1 MG/L
ERYTHROCYTE [DISTWIDTH] IN BLOOD BY AUTOMATED COUNT: 14.2 % (ref 11.6–15.1)
FERRITIN SERPL-MCNC: 51 NG/ML (ref 8–388)
GFR SERPL CREATININE-BSD FRML MDRD: 97 ML/MIN/1.73SQ M
GLUCOSE SERPL-MCNC: 98 MG/DL (ref 65–140)
HCT VFR BLD AUTO: 36.8 % (ref 36.5–49.3)
HGB BLD-MCNC: 11.7 G/DL (ref 12–17)
MAGNESIUM SERPL-MCNC: 2.1 MG/DL (ref 1.6–2.6)
MCH RBC QN AUTO: 29.6 PG (ref 26.8–34.3)
MCHC RBC AUTO-ENTMCNC: 31.8 G/DL (ref 31.4–37.4)
MCV RBC AUTO: 93 FL (ref 82–98)
PLATELET # BLD AUTO: 138 THOUSANDS/UL (ref 149–390)
PMV BLD AUTO: 10.9 FL (ref 8.9–12.7)
POTASSIUM SERPL-SCNC: 3.3 MMOL/L (ref 3.5–5.3)
QRS AXIS: 232 DEGREES
QRSD INTERVAL: 156 MS
QT INTERVAL: 406 MS
QTC INTERVAL: 491 MS
RBC # BLD AUTO: 3.95 MILLION/UL (ref 3.88–5.62)
SODIUM SERPL-SCNC: 142 MMOL/L (ref 136–145)
T WAVE AXIS: 14 DEGREES
TROPONIN I SERPL-MCNC: <0.02 NG/ML
VENTRICULAR RATE: 88 BPM
WBC # BLD AUTO: 3.23 THOUSAND/UL (ref 4.31–10.16)

## 2021-01-23 PROCEDURE — 83735 ASSAY OF MAGNESIUM: CPT | Performed by: INTERNAL MEDICINE

## 2021-01-23 PROCEDURE — 99223 1ST HOSP IP/OBS HIGH 75: CPT | Performed by: INTERNAL MEDICINE

## 2021-01-23 PROCEDURE — 80048 BASIC METABOLIC PNL TOTAL CA: CPT | Performed by: INTERNAL MEDICINE

## 2021-01-23 PROCEDURE — 99232 SBSQ HOSP IP/OBS MODERATE 35: CPT | Performed by: FAMILY MEDICINE

## 2021-01-23 PROCEDURE — 93010 ELECTROCARDIOGRAM REPORT: CPT

## 2021-01-23 PROCEDURE — 82728 ASSAY OF FERRITIN: CPT | Performed by: INTERNAL MEDICINE

## 2021-01-23 PROCEDURE — 84484 ASSAY OF TROPONIN QUANT: CPT | Performed by: INTERNAL MEDICINE

## 2021-01-23 PROCEDURE — 85027 COMPLETE CBC AUTOMATED: CPT | Performed by: INTERNAL MEDICINE

## 2021-01-23 PROCEDURE — 86140 C-REACTIVE PROTEIN: CPT | Performed by: INTERNAL MEDICINE

## 2021-01-23 RX ORDER — AMMONIUM LACTATE 12 G/100G
LOTION TOPICAL 2 TIMES DAILY PRN
Status: DISCONTINUED | OUTPATIENT
Start: 2021-01-23 | End: 2021-01-30 | Stop reason: HOSPADM

## 2021-01-23 RX ADMIN — OXYCODONE HYDROCHLORIDE AND ACETAMINOPHEN 1000 MG: 500 TABLET ORAL at 21:49

## 2021-01-23 RX ADMIN — TAMSULOSIN HYDROCHLORIDE 0.4 MG: 0.4 CAPSULE ORAL at 16:06

## 2021-01-23 RX ADMIN — METOPROLOL TARTRATE 25 MG: 25 TABLET, FILM COATED ORAL at 09:36

## 2021-01-23 RX ADMIN — FUROSEMIDE 60 MG: 10 INJECTION, SOLUTION INTRAMUSCULAR; INTRAVENOUS at 16:06

## 2021-01-23 RX ADMIN — RIVAROXABAN 20 MG: 20 TABLET, FILM COATED ORAL at 09:36

## 2021-01-23 RX ADMIN — DOCUSATE SODIUM 100 MG: 100 CAPSULE, LIQUID FILLED ORAL at 09:36

## 2021-01-23 RX ADMIN — ALLOPURINOL 100 MG: 100 TABLET ORAL at 09:36

## 2021-01-23 RX ADMIN — FUROSEMIDE 60 MG: 10 INJECTION, SOLUTION INTRAMUSCULAR; INTRAVENOUS at 09:37

## 2021-01-23 RX ADMIN — POTASSIUM CHLORIDE 40 MEQ: 1500 TABLET, EXTENDED RELEASE ORAL at 09:36

## 2021-01-23 RX ADMIN — PREGABALIN 100 MG: 100 CAPSULE ORAL at 21:49

## 2021-01-23 RX ADMIN — Medication 2000 UNITS: at 09:36

## 2021-01-23 RX ADMIN — PREGABALIN 100 MG: 100 CAPSULE ORAL at 16:08

## 2021-01-23 RX ADMIN — ZINC SULFATE 220 MG (50 MG) CAPSULE 220 MG: CAPSULE at 11:07

## 2021-01-23 RX ADMIN — OXYCODONE HYDROCHLORIDE AND ACETAMINOPHEN 1000 MG: 500 TABLET ORAL at 09:36

## 2021-01-23 RX ADMIN — PREGABALIN 100 MG: 100 CAPSULE ORAL at 09:36

## 2021-01-23 NOTE — UTILIZATION REVIEW
Initial Clinical Review    Admission: Date/Time/Statement:   Admission Orders (From admission, onward)     Ordered        01/22/21 1751  Inpatient Admission  Once                   Orders Placed This Encounter   Procedures    Inpatient Admission     Standing Status:   Standing     Number of Occurrences:   1     Order Specific Question:   Level of Care     Answer:   Med Surg [16]     Order Specific Question:   Estimated length of stay     Answer:   More than 2 Midnights     Order Specific Question:   Certification     Answer:   I certify that inpatient services are medically necessary for this patient for a duration of greater than two midnights  See H&P and MD Progress Notes for additional information about the patient's course of treatment  ED Arrival Information     Expected Arrival Acuity Means of Arrival Escorted By Service Admission Type    - 1/22/2021 16:00 Urgent Walk-In Family Member General Medicine Urgent    Arrival Complaint    sent by         Chief Complaint   Patient presents with    Shortness of Breath     Pt reports increase B/L lower extremity swelling x1 month and SOB  Pt denies CP  Pt sent to ER by cariologist       Assessment/Plan: 75 yo male w/longstanding atrial fibrillation as well as morbid obesity and pulmonary hypertension with chronic venous insufficiency and lymphedema, to ED from home admitted Inpatient d/t acute diastolic CHF  Presented with sob w/ exposure to COVID 19 virus lower extremity swelling  COVID 19 -  likely in the  seronegative conversion window as wife is positive  seen as cardiologist's office and is significantly volume overloaded on physical exam, and approximately 30 lb weight gain  dry weight  294 lb  Left ventricular ejection fraction of 55% with mild LVH and mild right ventricular dilatation from October of 2019  pharmacologic stress test which was negative for myocardial ischemia with an EF of 50% in September 2020  IV diuretic  fluid restrict  Hold on corticosteroids  vitamin-C, vitamin-D and zinc  Nutrition consult  Cardiology consult  1/23 CARDIOLOGY CONSULT:Acute on chronic diastolic chf, EF 72% and pulm HTN >50mmHg, neg nuclear stress - had 30 pound weight gain and was not diuresing at home on lasix   severe edema and noted he was hypoxic w ambulation to 83% so sent to hospital for diuresis echo ordered  Xarelto  IV diuresis  1/23:continue to require additional inpatient hospital stay due to diuresis  xarelto  Increase metoprolol        ED Triage Vitals   Temperature Pulse Respirations Blood Pressure SpO2   01/22/21 1617 01/22/21 1617 01/22/21 1617 01/22/21 1617 01/22/21 1617   98 7 °F (37 1 °C) 89 22 147/79 97 %      Temp Source Heart Rate Source Patient Position - Orthostatic VS BP Location FiO2 (%)   01/22/21 1617 01/22/21 1617 01/22/21 1617 01/22/21 1617 --   Oral Monitor Sitting Right arm       Pain Score       01/22/21 2016       No Pain          Wt Readings from Last 1 Encounters:   01/23/21 (!) 138 kg (305 lb 5 4 oz)     Additional Vital Signs:   01/23/21 1152  97 6 °F (36 4 °C)  78  18  129/60  86  97 %  None (Room air)  Lying   01/23/21 0827  97 2 °F (36 2 °C)Abnormal   76  20  108/56  80  93 %  None (Room air)  Lying   01/23/21 0314  97 5 °F (36 4 °C)  78  20  116/63    96 %  None (Room air)  Lying   01/23/21 0038  97 5 °F (36 4 °C)  71  20  119/66    92 %  None (Room air)  Lying   01/22/21 2017  97 4 °F (36 3 °C)Abnormal   87  20  128/99  107  98 %  None (Room air)  Sitting   01/22/21 2000    86  21      92 %  None (Room air)     01/22/21 1806    81  22  142/84    94 %  None (Room air)  Sitting   01/22/21 1617  98 7 °F (37 1 °C)  89  22  147/79    97 %  None (Room air)  Sitting       Pertinent Labs/Diagnostic Test Results:   Results from last 7 days   Lab Units 01/22/21  1659   SARS-COV-2  Negative     Results from last 7 days   Lab Units 01/23/21  0458 01/22/21  1659   WBC Thousand/uL 3 23* 5 05   HEMOGLOBIN g/dL 11 7* 12 1   HEMATOCRIT % 36 8 37 2   PLATELETS Thousands/uL 138* 164   NEUTROS ABS Thousands/µL  --  3 30         Results from last 7 days   Lab Units 01/23/21  0458 01/22/21  1659   SODIUM mmol/L 142 142   POTASSIUM mmol/L 3 3* 3 5   CHLORIDE mmol/L 107 105   CO2 mmol/L 33* 31   ANION GAP mmol/L 2* 6   BUN mg/dL 13 19   CREATININE mg/dL 0 73 0 77   EGFR ml/min/1 73sq m 97 95   CALCIUM mg/dL 8 8 9 0   MAGNESIUM mg/dL 2 1  --      Results from last 7 days   Lab Units 01/22/21  1659   AST U/L 22   ALT U/L 28   ALK PHOS U/L 73   TOTAL PROTEIN g/dL 7 6   ALBUMIN g/dL 3 3*   TOTAL BILIRUBIN mg/dL 0 46         Results from last 7 days   Lab Units 01/23/21  0458 01/22/21  1659   GLUCOSE RANDOM mg/dL 98 92       Results from last 7 days   Lab Units 01/23/21  0037 01/22/21  2146 01/22/21  1851 01/22/21  1659   TROPONIN I ng/mL <0 02 <0 02 <0 02 <0 02         Results from last 7 days   Lab Units 01/22/21  1659   PROTIME seconds 18 7*   INR  1 59*   PTT seconds 41*       Results from last 7 days   Lab Units 01/22/21  1659   NT-PRO BNP pg/mL 1,536*     Results from last 7 days   Lab Units 01/23/21  0458   FERRITIN ng/mL 51       Results from last 7 days   Lab Units 01/23/21  0458   CRP mg/L 11 1*       Results from last 7 days   Lab Units 01/22/21  1659   INFLUENZA A PCR  Negative   INFLUENZA B PCR  Negative   RSV PCR  Negative     1/22 CXR:  Cardiomegaly  No acute cardiopulmonary disease  1/22 ECHO:   LEFT VENTRICLE:  Systolic function was normal by visual assessment  Ejection fraction was estimated to be 55 %  There were no regional wall motion abnormalities  Wall thickness was mildly increased  There was mild concentric hypertrophy      VENTRICULAR SEPTUM:  There was paradoxical motion  These changes are consistent with a conduction abnormality or paced rhythm      RIGHT VENTRICLE:  The ventricle was mildly dilated      LEFT ATRIUM:  The atrium was mildly dilated   LA volume index 40 mL/m2      RIGHT ATRIUM:  The atrium was mildly to moderately dilated      MITRAL VALVE:  There was trace regurgitation      TRICUSPID VALVE:  There was trace regurgitation      1/22 EKG:Atrial fibrillation  Right bundle branch block  Abnormal ECG  When compared with ECG of 26-OCT-2019 15:41,  QT has lengthened  Confirmed by Yogi Dial ([id number]) on 1/22/2021 5:20:30 PM    ED Treatment:   Medication Administration from 01/22/2021 1600 to 01/22/2021 2014       Date/Time Order Dose Route Action Action by Comments     01/22/2021 1806 furosemide (LASIX) injection 60 mg 60 mg Intravenous Given                     Past Medical History:   Diagnosis Date    [facility]     Arthritis     CPAP (continuous positive airway pressure) dependence     Irregular heart beat     Lymphedema     Sleep apnea     Urinary frequency     Wears glasses     Wears partial dentures     lower partial     Present on Admission:   Atrial fibrillation (HCC)   Leukopenia   Obesity, Class III, BMI 40-49 9 (morbid obesity) (HCC)   Thrombocytopenia (HCC)   Lymphedema   Pulmonary hypertension (HCC)      Admitting Diagnosis: Shortness of breath [R06 02]  Acute on chronic diastolic congestive heart failure (HCC) [I50 33]  Obstructive sleep apnea on CPAP [G47 33, Z99 89]  Longstanding persistent atrial fibrillation (HCC) [I48 11]  Leukopenia, unspecified type [D72 819]  Age/Sex: 76 y o  male  Admission Orders:  Scheduled Medications:  allopurinol, 100 mg, Oral, Daily  ascorbic acid, 1,000 mg, Oral, Q12H [facility]  cholecalciferol, 2,000 Units, Oral, Daily  docusate sodium, 100 mg, Oral, BID  furosemide, 60 mg, Intravenous, BID (diuretic)  metoprolol tartrate, 25 mg, Oral, Q12H [facility]  zinc sulfate, 220 mg, Oral, Daily    Followed by  Denver Beal ON 1/30/2021] multivitamin-minerals, 1 tablet, Oral, Daily  potassium chloride, 40 mEq, Oral, Daily  pregabalin, 100 mg, Oral, TID  rivaroxaban, 20 mg, Oral, Daily With Breakfast  tamsulosin, 0 4 mg, Oral, Daily With Dinner      PRN Meds:  acetaminophen, 650 mg, Oral, Q4H PRN  morphine injection, 2 mg, Intravenous, Q1H PRN  ondansetron, 4 mg, Intravenous, Q6H PRN  oxyCODONE, 5 mg, Oral, Q4H PRN  traMADol, 50 mg, Oral, Q6H PRN    DAILY WEIGHTS  I&O  AMBULATE  SCD  TELE  CONTACT AND AIRBORNE ISOLATION  OOB    IP CONSULT TO NUTRITION SERVICES  IP CONSULT TO CARDIOLOGY    Network Utilization Review Department  ATTENTION: Please call with any questions or concerns to [phone] and carefully listen to the prompts so that you are directed to the right person  All voicemails are confidential   Darleen Mead all requests for admission clinical reviews, approved or denied determinations and any other requests to dedicated fax number below belonging to the campus where the patient is receiving treatment   List of dedicated fax numbers for the Facilities:  [address] DENIALS (Administrative/Medical Necessity) [phone]   [address] (Maternity/NICU/Pediatrics) [phone]   [address] [facility] [address] Vincenzo Lim 2032 (Tanika Thompson "Angela" 103) [address] [place] [address] [place] [address] [place] [address] [phone]

## 2021-01-23 NOTE — PROGRESS NOTES
Progress Note - Rolf Marsh 1955, 76 y o  male MRN: [id number]    Unit/Bed#: E4 -01 Encounter: [id number]    Primary Care Provider: Becky Yi MD   Date and time admitted to hospital: 1/22/2021  4:22 PM        Shortness of breath with exposure to COVID-19 virus  Assessment & Plan  Patient with shortness of breath which is felt to be likely to heart failure  He is otherwise asymptomatic  Hold on Remdesivir for now, low threshold to initiate  COVID negative, likely in the  seronegative conversion window as wife is positive  Hold on corticosteroids  Started on vitamin-C, vitamin-D and zinc  He is already anticoagulated  Noted elevated inflammatory markers at 11 1      Pulmonary hypertension ([facility] [address] )  Assessment & Plan  History of pulmonary hypertension - monitor for now    Lymphedema  Assessment & Plan  History of lymphedema, significantly limits his current ambulatory status  Continue diuretics for now  Will need follow-up in the lymphedema Clinic    Thrombocytopenia [facility])  Assessment & Plan  Monitor platelets while admitted      Obesity, Class III, BMI 40-49 9 (morbid obesity) ([facility] [address] )  Assessment & Plan  Patient would benefit from weight loss     Leukopenia  Assessment & Plan  History of leukopenia without any evidence of neutropenia    Recent Labs     01/22/21  1659 01/23/21  0458   WBC 5 05 3 23*         Obstructive sleep apnea on CPAP  Assessment & Plan  Unfortunately cannot receive CPAP due to recent COVID exposure and risk of aerosolization    Atrial fibrillation [facility])  Assessment & Plan  Ventricular rate control  Continue Xarelto    * Acute diastolic heart failure (HCC)  Assessment & Plan  Wt Readings from Last 3 Encounters:   01/23/21 (!) 138 kg (305 lb 5 4 oz)   01/22/21 (!) 147 kg (324 lb)   01/14/21 (!) 146 kg (321 lb)       Patient presenting with acute diastolic heart failure  He was seen as cardiologist's office and is significantly volume overloaded on physical exam, and approximately 30 lb weight gain  Continue on furosemide 60 mg twice a day  Left ventricular ejection fraction of 55% with mild LVH and mild right ventricular dilatation from 2019  He has a pharmacologic stress test which was negative for myocardial ischemia with an EF of 50% in 2020  He is approximately 30 lb from his dry weight which appears to be 294 lb from his cardiologist note    Nutrition consult to discuss dietary modification restriction          VTE Pharmacologic Prophylaxis:   Pharmacologic: Rivaroxaban (Xarelto)  Mechanical VTE Prophylaxis in Place: Yes    Patient Centered Rounds: I have performed bedside rounds with nursing staff today  Discussions with Specialists or Other Care Team Provider:  Cardiology    Education and Discussions with Family / Patient:  Patient  I was unable to reach his wife Tristen Wesley I tried calling two numbers, but her busy sound    Time Spent for Care: 20 minutes  More than 50% of total time spent on counseling and coordination of care as described above  Current Length of Stay: 1 day(s)    Current Patient Status: Inpatient   Certification Statement: The patient will continue to require additional inpatient hospital stay due to Diuresis    Discharge Plan: 48hrs    Code Status: Level 1 - Full Code      Subjective:   Patient was seen and examined  He reports some improvement in his shortness of breath  Objective:     Vitals:   Temp (24hrs), Av 7 °F (36 5 °C), Min:97 2 °F (36 2 °C), Max:98 7 °F (37 1 °C)    Temp:  [97 2 °F (36 2 °C)-98 7 °F (37 1 °C)] 97 6 °F (36 4 °C)  HR:  [71-89] 78  Resp:  [18-22] 18  BP: (108-147)/(56-99) 129/60  SpO2:  [92 %-98 %] 97 %  Body mass index is 45 09 kg/m²  Input and Output Summary (last 24 hours):     No intake or output data in the 24 hours ending 21 1609    Physical Exam:     Physical Exam  Constitutional:       Appearance: He is well-developed     Cardiovascular:      Rate and Rhythm: Normal rate and regular rhythm  Heart sounds: Normal heart sounds  Pulmonary:      Effort: Pulmonary effort is normal  No respiratory distress  Abdominal:      Palpations: Abdomen is soft  Musculoskeletal:      Right lower leg: Edema (Bilateral lymphedema with skin pigmentation) present  Left lower leg: Edema present  Skin:     General: Skin is warm  Findings: No erythema or rash  Neurological:      Mental Status: He is alert  Additional Data:     Labs:    Results from last 7 days   Lab Units 01/23/21  0458 01/22/21  1659   WBC Thousand/uL 3 23* 5 05   HEMOGLOBIN g/dL 11 7* 12 1   HEMATOCRIT % 36 8 37 2   PLATELETS Thousands/uL 138* 164   NEUTROS PCT %  --  65   LYMPHS PCT %  --  23   MONOS PCT %  --  9   EOS PCT %  --  2     Results from last 7 days   Lab Units 01/23/21  0458 01/22/21  1659   SODIUM mmol/L 142 142   POTASSIUM mmol/L 3 3* 3 5   CHLORIDE mmol/L 107 105   CO2 mmol/L 33* 31   BUN mg/dL 13 19   CREATININE mg/dL 0 73 0 77   ANION GAP mmol/L 2* 6   CALCIUM mg/dL 8 8 9 0   ALBUMIN g/dL  --  3 3*   TOTAL BILIRUBIN mg/dL  --  0 46   ALK PHOS U/L  --  73   ALT U/L  --  28   AST U/L  --  22   GLUCOSE RANDOM mg/dL 98 92     Results from last 7 days   Lab Units 01/22/21  1659   INR  1 59*                       * I Have Reviewed All Lab Data Listed Above  * Additional Pertinent Lab Tests Reviewed:  Luis Izaguirre Admission Reviewed    Imaging:    Chest x-ray  Recent Cultures (last 7 days):           Last 24 Hours Medication List:   Current Facility-Administered Medications   Medication Dose Route Frequency Provider Last Rate    acetaminophen  650 mg Oral Q4H PRN Latisha Spencer,       allopurinol  100 mg Oral Daily Denys Viveros,       ascorbic acid  1,000 mg Oral Q12H [address] Denys Stoner, DO      cholecalciferol  2,000 Units Oral Daily Denys Viveros, DO      docusate sodium  100 mg Oral BID Denys Viveros, DO      furosemide  60 mg Intravenous BID (diuretic) Denys Tuttle DO      metoprolol tartrate  25 mg Oral Q12H [address] Denys Doran, DO      morphine injection  2 mg Intravenous Q1H PRN Mónica Apple, DO      zinc sulfate  220 mg Oral Daily Denys Viveros, DO      Followed by   Jordy Marinelli ON 1/30/2021] multivitamin-minerals  1 tablet Oral Daily Denys Viveros, DO      ondansetron  4 mg Intravenous Q6H PRN Mónica Apple, DO      oxyCODONE  5 mg Oral Q4H PRN Mónica Apple, DO      potassium chloride  40 mEq Oral Daily Denys Viveros, DO      pregabalin  100 mg Oral TID Denys Viveros, DO      rivaroxaban  20 mg Oral Daily With Breakfast Denys Viveros, DO      tamsulosin  0 4 mg Oral Daily With Dinner Denys Viveros, DO      traMADol  50 mg Oral Q6H PRN Mónica Miles, DO          Today, Patient Was Seen By: Hubert Hawthorne MD    ** Please Note: Dictation voice to text software may have been used in the creation of this document   **

## 2021-01-23 NOTE — ASSESSMENT & PLAN NOTE
History of leukopenia without any evidence of neutropenia    Recent Labs     01/22/21  1659 01/23/21  0458   WBC 5 05 3 23*

## 2021-01-23 NOTE — CONSULTS
Consulted for CHF diet education  Provided verbal and written Heart Failure Nutrition Therapy diet education  Reviewed foods high Na to limit, provided alternate suggestions, reviewed tips to flavor versus salt  Discussed current fluid restriction  Encouraged to monitor weight consistently at home  PT was receptive and answered teach back appropriately   Continue cardiac diet and 1500mL fluid restriction per MD

## 2021-01-23 NOTE — ASSESSMENT & PLAN NOTE
Patient with shortness of breath which is felt to be likely to heart failure  He is otherwise asymptomatic  Hold on Remdesivir for now, low threshold to initiate  COVID negative, likely in the  seronegative conversion window as wife is positive    Hold on corticosteroids  Started on vitamin-C, vitamin-D and zinc  He is already anticoagulated  Noted elevated inflammatory markers at 11 1

## 2021-01-23 NOTE — ASSESSMENT & PLAN NOTE
Wt Readings from Last 3 Encounters:   01/23/21 (!) 138 kg (305 lb 5 4 oz)   01/22/21 (!) 147 kg (324 lb)   01/14/21 (!) 146 kg (321 lb)       Patient presenting with acute diastolic heart failure  He was seen as cardiologist's office and is significantly volume overloaded on physical exam, and approximately 30 lb weight gain  Continue on furosemide 60 mg twice a day  Left ventricular ejection fraction of 55% with mild LVH and mild right ventricular dilatation from October of 2019  He has a pharmacologic stress test which was negative for myocardial ischemia with an EF of 50% in September 2020     He is approximately 30 lb from his dry weight which appears to be 294 lb from his cardiologist note    Nutrition consult to discuss dietary modification restriction

## 2021-01-23 NOTE — CONSULTS
Electrophysiology-Cardiology (EP)   Roxanne Claude 76 y o  male MRN: [id number]  Unit/Bed#: E4 -01 Encounter: [id number]        IMPRESSION:    75 yo male  1) Acute on chronic diastolic chf, EF 29% and pulm HTN >50mmHg, neg nuclear stress - had 30 pound weight gain and was not diuresing at home on lasix 40mg PO BID< saw Dr Khang Silvestre who saw severe edema and noted he was hypoxic w ambulation to 83% so sent to hospital for diuresis  2) Persistent afib at least >1 year of afib, saw DR Grace Dukes who recommended rate control until he can lose weight and start CPAP and may consider rhythm control after that  CHADS2Vasc=3 on xarelto  3) Severe moribd obesity  4) Weeping lower extremity edema for chf  5) HTN well controlled  6) COVID exposure- wife is COVID pos yesterday- he was covid neg yesterday, no symptoms of fever,myalgia  He is short of breath w hypoxia but more likely due to acute chf  7) ROBERT unable to get CPAP due to COVID exposure    PLAN:  1  Continue IV Lasix 60mg bid , strict I/O's and daily weights    2  Check bmp daily  3  Continue xarelto and metoprolol for afib  4  F/u echo tomorrow  5  Would repeat Covid test again in a couple of days as exposure was yesterday w wife testing positive  Referring Physian: Hai Judge MD    Chief Complain/Reason for Referal: Acute chf  HPI:William S Suzanne Galeazzi is a 76 y o     Patient Active Problem List    Diagnosis Date Noted    Shortness of breath with exposure to COVID-19 virus 01/22/2021     Priority: Low    Acute diastolic heart failure ([address] ) 01/22/2021     Priority: Low    Pulmonary hypertension ([address] ) 11/02/2020     Priority: Low    Bilateral lower extremity edema 10/27/2019     Priority: Low    Atrial fibrillation ([address] ) 10/26/2019     Priority: Low    Obstructive sleep apnea on CPAP 10/26/2019     Priority: Low    Leukopenia 10/26/2019     Priority: Low    Weakness of right upper extremity 10/26/2019     Priority: Low    Paresthesias 10/26/2019     Priority: Low    Cervical pain (neck) 10/26/2019     Priority: Low    Obesity, Class III, BMI 40-49 9 (morbid obesity) ([place] [address] ) 10/26/2019     Priority: Low    Thrombocytopenia ([place] [address] ) 10/26/2019     Priority: Low    Lymphedema 07/22/2019     Priority: Low    Obesity, morbid ([place] [address] ) 04/16/2015     Priority: Low    Venous insufficiency of both lower extremities 02/24/2015     Priority: Low       MR Toñito Nair saw Dr Pam Tay yesterday, was havign shortness of breath and 30 pound weight gain, was hypoxic w ambulation to 80s, also noted Covid exposure wife tested positive yesterday  DR Pam Tay had him come to hospital by ambulance  He has weeping edema worse than baseline  He is starting CPAP recently  He saw Dr Tien Ryan for EP who recommended that and weight loss before ablation          Past Medical History:   Diagnosis Date    A-fib ([place] [address] )     Arthritis     CPAP (continuous positive airway pressure) dependence     Irregular heart beat     Lymphedema     Sleep apnea     Urinary frequency     Wears glasses     Wears partial dentures     lower partial       Medications Prior to Admission   Medication    allopurinol (ZYLOPRIM) 100 mg tablet    ammonium lactate (AMLACTIN) 12 % lotion    clindamycin (CLEOCIN) 300 MG capsule    CoenzymeQ10-Isoleucine-Glycine (CO Q-10) 100-50-25 MG TB24    colchicine (COLCRYS) 0 6 mg tablet    ferrous sulfate 325 (65 Fe) mg tablet    GARCINIA CAMBOGIA-CHROMIUM PO    Glucosamine-Chondroit-Vit C-Mn (Glucosamine 1500 Complex) CAPS    Green Coffee Silva-Yerba Mate (GREEN COFFEE BEAN EXTRACT PO)    halobetasol (ULTRAVATE) 0 05 % ointment    metoprolol tartrate (LOPRESSOR) 25 mg tablet    patient supplied medication    patient supplied medication    pregabalin (LYRICA) 100 mg capsule    rivaroxaban (XARELTO) 20 mg tablet    torsemide (DEMADEX) 20 mg tablet    traMADol (ULTRAM) 50 mg tablet    Turmeric Curcumin 500 MG CAPS    VITAMIN D PO    Acetaminophen (TYLENOL EXTRA STRENGTH PO)    alfuzosin (UROXATRAL) 10 mg 24 hr tablet    L-ARGININE-500 PO    Multiple Vitamins-Minerals (OCUVITE EXTRA PO)    RASPBERRY KETONES PO    tadalafil (CIALIS) 5 MG tablet       Scheduled Meds:  Current Facility-Administered Medications   Medication Dose Route Frequency Provider Last Rate    acetaminophen  650 mg Oral Q4H PRN Rakan Tidwell, DO      allopurinol  100 mg Oral Daily Denys Viveros, DO      ascorbic acid  1,000 mg Oral Q12H [address] Rakan Favors, DO      cholecalciferol  2,000 Units Oral Daily Denys Viveros, DO      docusate sodium  100 mg Oral BID Denys Viveros, DO      furosemide  60 mg Intravenous BID (diuretic) Denys Viveors, DO      metoprolol tartrate  25 mg Oral Q12H [address] Denys Vieira Channel, DO      morphine injection  2 mg Intravenous Q1H PRN Rakan Tidwell, DO      zinc sulfate  220 mg Oral Daily Denys Viveros, DO      Followed by   [place] [facility] 1/30/2021] multivitamin-minerals  1 tablet Oral Daily Denys Viveros, DO      ondansetron  4 mg Intravenous Q6H PRN Rakan Tidwell, DO      oxyCODONE  5 mg Oral Q4H PRN Rakan Tidwell, DO      potassium chloride  40 mEq Oral Daily Denys Woodall, DO      pregabalin  100 mg Oral TID Denys Viveros, DO      rivaroxaban  20 mg Oral Daily With Breakfast Denys Viveros, DO      tamsulosin  0 4 mg Oral Daily With Dinner Denys Viveros, DO      traMADol  50 mg Oral Q6H PRN Denys Parada, DO       Continuous Infusions:   PRN Meds:   acetaminophen    morphine injection    ondansetron    oxyCODONE    traMADol  Allergies   Allergen Reactions    Penicillins Anaphylaxis    Sulfa Antibiotics Anaphylaxis     I reviewed the Home Medication list in the chart       Family History   Problem Relation Age of Onset    Heart disease Mother     Lymphoma Father     Cancer Father     Heart disease Sister     Hypertension Brother     Diabetes Neg Hx     Thyroid disease Neg Hx     Stroke Neg Hx        Social History     Socioeconomic History    Marital status: /[employer]     Spouse name: Not on file    Number of children: Not on file    Years of education: Not on file    Highest education level: Not on file   Occupational History    Not on file   Social Needs    Financial resource strain: Not on file    Food insecurity     Worry: Not on file     Inability: Not on file    Transportation needs     Medical: Not on file     Non-medical: Not on file   Tobacco Use    Smoking status: Never Smoker    Smokeless tobacco: Never Used   Substance and Sexual Activity    Alcohol use: Yes     Alcohol/week: 4 0 standard drinks     Types: 4 Cans of beer per week     Frequency: 2-3 times a week     Binge frequency: Never     Comment: socially    Drug use: No    Sexual activity: Not on file   Lifestyle    Physical activity     Days per week: Not on file     Minutes per session: Not on file    Stress: Not on file   Relationships    Social connections     Talks on phone: Not on file     Gets together: Not on file     Attends Zoroastrian service: Not on file     Active member of club or organization: Not on file     Attends meetings of clubs or organizations: Not on file     Relationship status: Not on file    Intimate partner violence     Fear of current or ex partner: Not on file     Emotionally abused: Not on file     Physically abused: Not on file     Forced sexual activity: Not on file   Other Topics Concern    Not on file   Social History Narrative    Not on file       Review of Systems -12 Point ROS reviewed and are negative or noted in chart except for Pertinent Positives Pertaining to Cardiovascular and Respiratory in HPI above       Vitals:    01/23/21 1152   BP: 129/60   Pulse: 78   Resp: 18   Temp: 97 6 °F (36 4 °C)   SpO2: 97%     Vitals:    01/22/21 2017 01/23/21 0600   Weight: (!) 140 kg (307 lb 15 7 oz) (!) 138 kg (305 lb 5 4 oz)   No intake or output data in the 24 hours ending 01/23/21 1343      GEN: Now acute distress, Alert and oriented, well appearing morbidly obese  HEENT:Head, neck, ears, oral pharynx: Mucus membranes moist, oral pharynx clear, nares clear  External ears normal  EYES: Pupils equal, sclera anicteric, midline, normal conjuctiva  NECK: + JVD, supple, no obvious masses or thryomegaly or goiter  CARDIOVASCULAR: irreg irreg, No murmur, rub, gallops S1,S2  LUNGS: Clear To auscultation bilaterally, normal effort, no rales, rhonchi, crackles  ABDOMEN: Soft, nondistended, nontender, without obvious organomegaly or ascites  EXTREMITIES/VASCULAR:3+ edema, wheeping,   Radial pulses intact, pedal pulses difficult to palpate, warm an well perfused  PSYCH: Normal Affect, no overt suicidal ideation, linear speech pattern without evidence of psychosis     NEURO: Grossly intact, moving all extremiteis equal, face symmetric, alert and responsive, no obvious focal defecits  HEME: No bleeding, bruising, petechia, purpura  SKIN: Brawny skin changes in Lower extermities w wheeping exoriations  Lab Results:     CBC with diff:   Results from last 7 days   Lab Units 01/23/21  0458 01/22/21  1659   WBC Thousand/uL 3 23* 5 05   HEMOGLOBIN g/dL 11 7* 12 1   HEMATOCRIT % 36 8 37 2   MCV fL 93 92   PLATELETS Thousands/uL 138* 164   MCH pg 29 6 30 0   MCHC g/dL 31 8 32 5   RDW % 14 2 14 5   MPV fL 10 9 10 2   NRBC AUTO /100 WBCs  --  0         CMP:  Results from last 7 days   Lab Units 01/23/21  0458 01/22/21  1659   POTASSIUM mmol/L 3 3* 3 5   CHLORIDE mmol/L 107 105   CO2 mmol/L 33* 31   BUN mg/dL 13 19   CREATININE mg/dL 0 73 0 77   CALCIUM mg/dL 8 8 9 0   AST U/L  --  22   ALT U/L  --  28   ALK PHOS U/L  --  73   EGFR ml/min/1 73sq m 97 95         BMP:  Results from last 7 days   Lab Units 01/23/21  0458 01/22/21  1659   POTASSIUM mmol/L 3 3* 3 5   CHLORIDE mmol/L 107 105   CO2 mmol/L 33* 31   BUN mg/dL 13 19   CREATININE mg/dL 0 73 0 77   CALCIUM mg/dL 8 8 9 0       BNP:   Results Reviewed     Procedure Component Value Units Date/Time    Ferritin [[id number]] (Normal) Collected: 01/23/21 0458    Lab Status: Final result Specimen: Blood from Arm, Right Updated: 01/23/21 1212     Ferritin 51 ng/mL     Basic metabolic panel [[id number]]  (Abnormal) Collected: 01/23/21 0458    Lab Status: Final result Specimen: Blood from Arm, Right Updated: 01/23/21 0656     Sodium 142 mmol/L      Potassium 3 3 mmol/L      Chloride 107 mmol/L      CO2 33 mmol/L      ANION GAP 2 mmol/L      BUN 13 mg/dL      Creatinine 0 73 mg/dL      Glucose 98 mg/dL      Calcium 8 8 mg/dL      eGFR 97 ml/min/1 73sq m     Narrative:      [place] guidelines for Chronic Kidney Disease (CKD):     Stage 1 with normal or high GFR (GFR > 90 mL/min/1 73 square meters)    Stage 2 Mild CKD (GFR = 60-89 mL/min/1 73 square meters)    Stage 3A Moderate CKD (GFR = 45-59 mL/min/1 73 square meters)    Stage 3B Moderate CKD (GFR = 30-44 mL/min/1 73 square meters)    Stage 4 Severe CKD (GFR = 15-29 mL/min/1 73 square meters)    Stage 5 End Stage CKD (GFR <15 mL/min/1 73 square meters)  Note: GFR calculation is accurate only with a steady state creatinine    C-reactive protein [[id number]]  (Abnormal) Collected: 01/23/21 0458    Lab Status: Final result Specimen: Blood from Arm, Right Updated: 01/23/21 0656     CRP 11 1 mg/L     Magnesium [[id number]]  (Normal) Collected: 01/23/21 0458    Lab Status: Final result Specimen: Blood from Arm, Right Updated: 01/23/21 0656     Magnesium 2 1 mg/dL     CBC (With Platelets) [[id number]]  (Abnormal) Collected: 01/23/21 0458    Lab Status: Final result Specimen: Blood from Arm, Right Updated: 01/23/21 0548     WBC 3 23 Thousand/uL      RBC 3 95 Million/uL      Hemoglobin 11 7 g/dL      Hematocrit 36 8 %      MCV 93 fL      MCH 29 6 pg      MCHC 31 8 g/dL      RDW 14 2 %      Platelets 250 Thousands/uL      MPV 10 9 fL     Troponin I [[id number]]  (Normal) Collected: 01/23/21 0037    Lab Status: Final result Specimen: Blood from Hand, Right Updated: 01/23/21 0127     Troponin I <0 02 ng/mL     Troponin I [[id number]]  (Normal) Collected: 01/22/21 2146    Lab Status: Final result Specimen: Blood from Arm, Right Updated: 01/22/21 2218     Troponin I <0 02 ng/mL     Troponin I [[id number]]  (Normal) Collected: 01/22/21 1851    Lab Status: Final result Specimen: Blood from Arm, Left Updated: 01/22/21 1918     Troponin I <0 02 ng/mL     COVID19, Influenza A/B, RSV PCR, [facility] [[id number]]  (Normal) Collected: 01/22/21 1659    Lab Status: Final result Specimen: Nares from Nasopharyngeal Swab Updated: 01/22/21 1803     SARS-CoV-2 Negative     INFLUENZA A PCR Negative     INFLUENZA B PCR Negative     RSV PCR Negative    Narrative: This test has been authorized by FDA under an EUA (Emergency Use Assay) for use by authorized laboratories  Clinical caution and judgement should be used with the interpretation of these results with consideration of the clinical impression and other laboratory testing  Testing reported as "Positive" or "Negative" has been proven to be accurate according to standard laboratory validation requirements  All testing is performed with control materials showing appropriate reactivity at standard intervals      NT-BNP PRO [[id number]]  (Abnormal) Collected: 01/22/21 1659    Lab Status: Final result Specimen: Blood from Arm, Left Updated: 01/22/21 1740     NT-proBNP 1,536 pg/mL     Troponin I [[id number]]  (Normal) Collected: 01/22/21 1659    Lab Status: Final result Specimen: Blood from Arm, Left Updated: 01/22/21 1736     Troponin I <0 02 ng/mL     Comprehensive metabolic panel [[id number]]  (Abnormal) Collected: 01/22/21 1659    Lab Status: Final result Specimen: Blood from Arm, Left Updated: 01/22/21 1734     Sodium 142 mmol/L      Potassium 3 5 mmol/L      Chloride 105 mmol/L      CO2 31 mmol/L      ANION GAP 6 mmol/L      BUN 19 mg/dL      Creatinine 0 77 mg/dL      Glucose 92 mg/dL      Calcium 9 0 mg/dL      Corrected Calcium 9 6 mg/dL      AST 22 U/L      ALT 28 U/L      Alkaline Phosphatase 73 U/L      Total Protein 7 6 g/dL      Albumin 3 3 g/dL      Total Bilirubin 0 46 mg/dL      eGFR 95 ml/min/1 73sq m     Narrative:      [place] guidelines for Chronic Kidney Disease (CKD):     Stage 1 with normal or high GFR (GFR > 90 mL/min/1 73 square meters)    Stage 2 Mild CKD (GFR = 60-89 mL/min/1 73 square meters)    Stage 3A Moderate CKD (GFR = 45-59 mL/min/1 73 square meters)    Stage 3B Moderate CKD (GFR = 30-44 mL/min/1 73 square meters)    Stage 4 Severe CKD (GFR = 15-29 mL/min/1 73 square meters)    Stage 5 End Stage CKD (GFR <15 mL/min/1 73 square meters)  Note: GFR calculation is accurate only with a steady state creatinine    Protime-INR [[id number]]  (Abnormal) Collected: 01/22/21 1659    Lab Status: Final result Specimen: Blood from Arm, Left Updated: 01/22/21 1732     Protime 18 7 seconds      INR 1 59    APTT [[id number]]  (Abnormal) Collected: 01/22/21 1659    Lab Status: Final result Specimen: Blood from Arm, Left Updated: 01/22/21 1732     PTT 41 seconds     CBC and differential [[id number]] Collected: 01/22/21 1659    Lab Status: Final result Specimen: Blood from Arm, Left Updated: 01/22/21 1715     WBC 5 05 Thousand/uL      RBC 4 04 Million/uL      Hemoglobin 12 1 g/dL      Hematocrit 37 2 %      MCV 92 fL      MCH 30 0 pg      MCHC 32 5 g/dL      RDW 14 5 %      MPV 10 2 fL      Platelets 686 Thousands/uL      nRBC 0 /100 WBCs      Neutrophils Relative 65 %      Immat GRANS % 0 %      Lymphocytes Relative 23 %      Monocytes Relative 9 %      Eosinophils Relative 2 %      Basophils Relative 1 %      Neutrophils Absolute 3 30 Thousands/µL      Immature Grans Absolute 0 01 Thousand/uL      Lymphocytes Absolute 1 15 Thousands/µL      Monocytes Absolute 0 46 Thousand/µL      Eosinophils Absolute 0 10 Thousand/µL      Basophils Absolute 0 03 Thousands/µL         No results for input(s): BNP in the last 72 hours  Results from last 7 days   Lab Units 21  0037 21  2146 21  1851   TROPONIN I ng/mL <0 02 <0 02 <0 02         Magnesium:   Results from last 7 days   Lab Units 21  0458   MAGNESIUM mg/dL 2 1       Coags:   Results from last 7 days   Lab Units 21  1659   PTT seconds 41*   INR  1 59*       TSH:       Lipid Profile:         Cardiac testing:   Results for orders placed during the hospital encounter of 10/26/19   Echo complete with contrast if indicated    Narrative [address]  [address] [place] [address]  [phone]    Transthoracic Echocardiogram  2D, M-mode, Doppler, and Color Doppler    Study date:  28-Oct-2019    Patient: Tahmina Gallegos  MR number: [id number]  Account number: [de-identified]  : 1955  Age: 71 years  Gender: Male  Status: Inpatient  Location: Bedside  Height: 69 in  Weight: 302 lb  BP: 141/ 88 mmHg    Indications: left sided paralysis    Diagnoses: 56 - CVA    Sonographer:  Ezio Florez RDCS, CCT  Referring Physician:  Shantell Linares DO  Group:  [facility]  Interpreting Physician:  Rey Pelayo DO    SUMMARY    LEFT VENTRICLE:  Systolic function was normal  Ejection fraction was estimated to be 55 %  This study was inadequate for the evaluation of regional wall motion  Wall thickness was mildly increased  RIGHT VENTRICLE:  The ventricle was dilated  Systolic function was normal     MITRAL VALVE:  There was mild regurgitation  TRICUSPID VALVE:  There was mild regurgitation  Estimated peak PA pressure was 50 mmHg  HISTORY: PRIOR HISTORY: Patient has no history of cardiovascular disease  PROCEDURE: The procedure was performed at the bedside  This was a routine study  The transthoracic approach was used  The study included complete 2D imaging, M-mode, complete spectral Doppler, and color Doppler  The heart rate was 80 bpm,  at the start of the study   Intravenous contrast (Definity solution [1 3 ml Definity/8 7ml normal saline solution], 3 ml) was administered to opacify the left ventricle  Echocardiographic views were limited due to poor acoustic window  availability  This was a technically difficult study  LEFT VENTRICLE: Size was normal  Systolic function was normal  Ejection fraction was estimated to be 55 %  This study was inadequate for the evaluation of regional wall motion  Wall thickness was mildly increased  DOPPLER: The study was  not technically sufficient to allow evaluation of LV diastolic function  RIGHT VENTRICLE: The ventricle was dilated  Systolic function was normal     LEFT ATRIUM: Size was normal     RIGHT ATRIUM: Size was normal     MITRAL VALVE: Valve structure was normal  There was normal leaflet separation  DOPPLER: The transmitral velocity was within the normal range  There was no evidence for stenosis  There was mild regurgitation  AORTIC VALVE: The valve was trileaflet  Leaflets exhibited normal thickness and normal cuspal separation  DOPPLER: Transaortic velocity was within the normal range  There was no evidence for stenosis  There was no regurgitation  TRICUSPID VALVE: The valve structure was normal  There was normal leaflet separation  DOPPLER: The transtricuspid velocity was within the normal range  There was no evidence for stenosis  There was mild regurgitation  Estimated peak PA  pressure was 50 mmHg  PULMONIC VALVE: Leaflets exhibited normal thickness, no calcification, and normal cuspal separation  DOPPLER: The transpulmonic velocity was within the normal range  There was no regurgitation  PERICARDIUM: There was no pericardial effusion  The pericardium was normal in appearance  AORTA: The root exhibited normal size  SYSTEMIC VEINS: IVC: The inferior vena cava was not well visualized      SYSTEM MEASUREMENT TABLES    2D  %FS: 34 72 %  Ao Diam: 2 87 cm  EDV(Teich): 143 23 ml  EF(Teich): 63 36 %  ESV(Teich): 52 47 ml  IVSd: 1 15 cm  LA Diam: 4 16 cm  LVIDd: 5 43 cm  LVIDs: 3 55 cm  LVPWd: 1 04 cm  RWT: 0 38  SV(Teich): 90 75 ml    CW  AV Vmax: 1 34 m/s  AV maxP 13 mmHg  RAP: 0 mmHg  TR Vmax: 3 26 m/s  TR maxP 5 mmHg    PW  E' Sept: 0 09 m/s  MV E Terrance: 1 03 m/s  RVSP: 42 5 mmHg    [facility]    Prepared and electronically signed by    Lonnie Santos DO  Signed 28-Oct-2019 10:37:38       No results found for this or any previous visit  No results found for this or any previous visit  No results found for this or any previous visit  CXR:  There is pulm vascular congestion and cardiomegaly    EKG/TELE: rate controlled afib      I have personally reviewed the EKG,  CXR and Telemetry images directly and the above is my interpretation

## 2021-01-23 NOTE — PLAN OF CARE
Problem: PAIN - ADULT  Goal: Verbalizes/displays adequate comfort level or baseline comfort level  Description: Interventions:  - Encourage patient to monitor pain and request assistance  - Assess pain using appropriate pain scale  - Administer analgesics based on type and severity of pain and evaluate response  - Implement non-pharmacological measures as appropriate and evaluate response  - Consider cultural and social influences on pain and pain management  - Notify physician/advanced practitioner if interventions unsuccessful or patient reports new pain  Outcome: Progressing     Problem: INFECTION - ADULT  Goal: Absence or prevention of progression during hospitalization  Description: INTERVENTIONS:  - Assess and monitor for signs and symptoms of infection  - Monitor lab/diagnostic results  - Monitor all insertion sites, i e  indwelling lines, tubes, and drains  - Monitor endotracheal if appropriate and nasal secretions for changes in amount and color  - [place] appropriate cooling/warming therapies per order  - Administer medications as ordered  - Instruct and encourage patient and family to use good hand hygiene technique  - Identify and instruct in appropriate isolation precautions for identified infection/condition  Outcome: Progressing     Problem: SAFETY ADULT  Goal: Patient will remain free of falls  Description: INTERVENTIONS:  - Assess patient frequently for physical needs  -  Identify cognitive and physical deficits and behaviors that affect risk of falls    -  [place] fall precautions as indicated by assessment   - Educate patient/family on patient safety including physical limitations  - Instruct patient to call for assistance with activity based on assessment  - Modify environment to reduce risk of injury  - Consider OT/PT consult to assist with strengthening/mobility  Outcome: Progressing  Goal: Maintain or return to baseline ADL function  Description: INTERVENTIONS:  -  Assess patient's ability to carry out ADLs; assess patient's baseline for ADL function and identify physical deficits which impact ability to perform ADLs (bathing, care of mouth/teeth, toileting, grooming, dressing, etc )  - Assess/evaluate cause of self-care deficits   - Assess range of motion  - Assess patient's mobility; develop plan if impaired  - Assess patient's need for assistive devices and provide as appropriate  - Encourage maximum independence but intervene and supervise when necessary  - Involve family in performance of ADLs  - Assess for home care needs following discharge   - Consider OT consult to assist with ADL evaluation and planning for discharge  - Provide patient education as appropriate  Outcome: Progressing  Goal: Maintain or return mobility status to optimal level  Description: INTERVENTIONS:  - Assess patient's baseline mobility status (ambulation, transfers, stairs, etc )    - Identify cognitive and physical deficits and behaviors that affect mobility  - Identify mobility aids required to assist with transfers and/or ambulation (gait belt, sit-to-stand, lift, walker, cane, etc )  - [place] fall precautions as indicated by assessment  - Record patient progress and toleration of activity level on Mobility SBAR; progress patient to next Phase/Stage  - Instruct patient to call for assistance with activity based on assessment  - Consider rehabilitation consult to assist with strengthening/weightbearing, etc   Outcome: Progressing     Problem: DISCHARGE PLANNING  Goal: Discharge to home or other facility with appropriate resources  Description: INTERVENTIONS:  - Identify barriers to discharge w/patient and caregiver  - Arrange for needed discharge resources and transportation as appropriate  - Identify discharge learning needs (meds, wound care, etc )  - Arrange for interpretive services to assist at discharge as needed  - Refer to Case Management Department for coordinating discharge planning if the patient needs post-hospital services based on physician/advanced practitioner order or complex needs related to functional status, cognitive ability, or social support system  Outcome: Progressing     Problem: Knowledge Deficit  Goal: Patient/family/caregiver demonstrates understanding of disease process, treatment plan, medications, and discharge instructions  Description: Complete learning assessment and assess knowledge base    Interventions:  - Provide teaching at level of understanding  - Provide teaching via preferred learning methods  Outcome: Progressing

## 2021-01-24 ENCOUNTER — APPOINTMENT (INPATIENT)
Dept: NON INVASIVE DIAGNOSTICS | Facility: HOSPITAL | Age: 66
DRG: 292 | End: 2021-01-24
Payer: COMMERCIAL

## 2021-01-24 LAB
ANION GAP SERPL CALCULATED.3IONS-SCNC: 5 MMOL/L (ref 4–13)
BUN SERPL-MCNC: 16 MG/DL (ref 5–25)
CALCIUM SERPL-MCNC: 9.2 MG/DL (ref 8.3–10.1)
CHLORIDE SERPL-SCNC: 106 MMOL/L (ref 100–108)
CO2 SERPL-SCNC: 33 MMOL/L (ref 21–32)
CREAT SERPL-MCNC: 0.85 MG/DL (ref 0.6–1.3)
ERYTHROCYTE [DISTWIDTH] IN BLOOD BY AUTOMATED COUNT: 14.3 % (ref 11.6–15.1)
FLUAV RNA RESP QL NAA+PROBE: NEGATIVE
FLUBV RNA RESP QL NAA+PROBE: NEGATIVE
GFR SERPL CREATININE-BSD FRML MDRD: 91 ML/MIN/1.73SQ M
GLUCOSE SERPL-MCNC: 94 MG/DL (ref 65–140)
HCT VFR BLD AUTO: 40.4 % (ref 36.5–49.3)
HGB BLD-MCNC: 12.6 G/DL (ref 12–17)
MAGNESIUM SERPL-MCNC: 2.3 MG/DL (ref 1.6–2.6)
MCH RBC QN AUTO: 29.4 PG (ref 26.8–34.3)
MCHC RBC AUTO-ENTMCNC: 31.2 G/DL (ref 31.4–37.4)
MCV RBC AUTO: 94 FL (ref 82–98)
PLATELET # BLD AUTO: 154 THOUSANDS/UL (ref 149–390)
PMV BLD AUTO: 10.5 FL (ref 8.9–12.7)
POTASSIUM SERPL-SCNC: 3.8 MMOL/L (ref 3.5–5.3)
RBC # BLD AUTO: 4.29 MILLION/UL (ref 3.88–5.62)
RSV RNA RESP QL NAA+PROBE: NEGATIVE
SARS-COV-2 RNA RESP QL NAA+PROBE: NEGATIVE
SODIUM SERPL-SCNC: 144 MMOL/L (ref 136–145)
WBC # BLD AUTO: 3.96 THOUSAND/UL (ref 4.31–10.16)

## 2021-01-24 PROCEDURE — 83735 ASSAY OF MAGNESIUM: CPT | Performed by: INTERNAL MEDICINE

## 2021-01-24 PROCEDURE — 99233 SBSQ HOSP IP/OBS HIGH 50: CPT | Performed by: INTERNAL MEDICINE

## 2021-01-24 PROCEDURE — 93306 TTE W/DOPPLER COMPLETE: CPT

## 2021-01-24 PROCEDURE — 80048 BASIC METABOLIC PNL TOTAL CA: CPT | Performed by: INTERNAL MEDICINE

## 2021-01-24 PROCEDURE — 85027 COMPLETE CBC AUTOMATED: CPT | Performed by: INTERNAL MEDICINE

## 2021-01-24 PROCEDURE — 0241U HB NFCT DS VIR RESP RNA 4 TRGT: CPT | Performed by: FAMILY MEDICINE

## 2021-01-24 PROCEDURE — 97162 PT EVAL MOD COMPLEX 30 MIN: CPT

## 2021-01-24 PROCEDURE — 99232 SBSQ HOSP IP/OBS MODERATE 35: CPT | Performed by: FAMILY MEDICINE

## 2021-01-24 RX ORDER — METOPROLOL SUCCINATE 50 MG/1
50 TABLET, EXTENDED RELEASE ORAL 2 TIMES DAILY
Status: DISCONTINUED | OUTPATIENT
Start: 2021-01-24 | End: 2021-01-30 | Stop reason: HOSPADM

## 2021-01-24 RX ADMIN — PREGABALIN 100 MG: 100 CAPSULE ORAL at 21:41

## 2021-01-24 RX ADMIN — PREGABALIN 100 MG: 100 CAPSULE ORAL at 09:32

## 2021-01-24 RX ADMIN — POTASSIUM CHLORIDE 40 MEQ: 1500 TABLET, EXTENDED RELEASE ORAL at 09:32

## 2021-01-24 RX ADMIN — METOPROLOL SUCCINATE 50 MG: 50 TABLET, EXTENDED RELEASE ORAL at 09:32

## 2021-01-24 RX ADMIN — OXYCODONE HYDROCHLORIDE AND ACETAMINOPHEN 1000 MG: 500 TABLET ORAL at 21:41

## 2021-01-24 RX ADMIN — FUROSEMIDE 60 MG: 10 INJECTION, SOLUTION INTRAMUSCULAR; INTRAVENOUS at 16:55

## 2021-01-24 RX ADMIN — FUROSEMIDE 60 MG: 10 INJECTION, SOLUTION INTRAMUSCULAR; INTRAVENOUS at 09:32

## 2021-01-24 RX ADMIN — ALLOPURINOL 100 MG: 100 TABLET ORAL at 09:32

## 2021-01-24 RX ADMIN — OXYCODONE HYDROCHLORIDE AND ACETAMINOPHEN 1000 MG: 500 TABLET ORAL at 09:32

## 2021-01-24 RX ADMIN — TAMSULOSIN HYDROCHLORIDE 0.4 MG: 0.4 CAPSULE ORAL at 16:55

## 2021-01-24 RX ADMIN — PREGABALIN 100 MG: 100 CAPSULE ORAL at 16:55

## 2021-01-24 RX ADMIN — PERFLUTREN 0.4 ML/MIN: 6.52 INJECTION, SUSPENSION INTRAVENOUS at 10:36

## 2021-01-24 RX ADMIN — Medication: at 13:28

## 2021-01-24 RX ADMIN — ZINC SULFATE 220 MG (50 MG) CAPSULE 220 MG: CAPSULE at 11:01

## 2021-01-24 RX ADMIN — Medication 2000 UNITS: at 09:32

## 2021-01-24 RX ADMIN — RIVAROXABAN 20 MG: 20 TABLET, FILM COATED ORAL at 07:44

## 2021-01-24 NOTE — ASSESSMENT & PLAN NOTE
Wt Readings from Last 3 Encounters:   01/24/21 134 kg (295 lb 6 7 oz)   01/22/21 (!) 147 kg (324 lb)   01/14/21 (!) 146 kg (321 lb)       Patient presenting with acute diastolic heart failure  He was seen as cardiologist's office and is significantly volume overloaded on physical exam, and approximately 30 lb weight gain  Continue on furosemide  Iv 60 mg twice a day  Left ventricular ejection fraction of 55% with mild LVH and mild right ventricular dilatation from October of 2019  He has a pharmacologic stress test which was negative for myocardial ischemia with an EF of 50% in September 2020     He is approximately 30 lb from his dry weight which appears to be 294 lb from his cardiologist note    Nutrition consult to discuss dietary modification restriction

## 2021-01-24 NOTE — PROGRESS NOTES
Progress Note - Electrophysiology-Cardiology (EP)   Roxanna Roldan 76 y o  male MRN: [id number]  Unit/Bed#: E4 -01 Encounter: [id number]      Principal Problem:    Acute diastolic heart failure (HCC)  Active Problems:    Atrial fibrillation (HCC)    Obstructive sleep apnea on CPAP    Leukopenia    Obesity, Class III, BMI 40-49 9 (morbid obesity) (HCC)    Thrombocytopenia (HCC)    Lymphedema    Pulmonary hypertension (HCC)    Shortness of breath with exposure to COVID-19 virus  75 yo male  1) Acute on chronic diastolic chf, EF 19% and pulm HTN >50mmHg, neg nuclear stress - had 30 pound weight gain and was not diuresing at home on lasix 40mg PO BID< saw Dr Jolie Granados who saw severe edema and noted he was hypoxic w ambulation to 83% so sent to hospital for diuresis  2) Persistent afib at least >1 year of afib, saw DR Halina Verma who recommended rate control until he can lose weight and start CPAP and may consider rhythm control after that  CHADS2Vasc=3 on xarelto  3) Severe moribd obesity  4) Weeping lower extremity edema for chf  5) HTN well controlled  6) COVID exposure- wife is COVID pos yesterday- he was covid neg yesterday, no symptoms of fever,myalgia  He is short of breath w hypoxia but more likely due to acute chf  7) ROBERT unable to get CPAP due to COVID exposure   8) RBBBB  PLAN:  1  Continue IV Lasix 60mg bid , strict I/O's and daily weights   2  Check bmp daily  3  Continue xarelto  4  Increase metoprolol for better rate control  Change to Toprol XL 50mg bid  5  F/u echo today  6   Would repeat Covid test again in a couple of days as exposure was yesterday w wife testing positive           Chief Complaint:  F/u afib, chf  Subjective: Feels ok, less sob, Still has significant edema    Scheduled Meds:  Current Facility-Administered Medications   Medication Dose Route Frequency Provider Last Rate    acetaminophen  650 mg Oral Q4H PRN Sharifa Duarte DO      allopurinol  100 mg Oral Daily Denys Kelley DO      ammonium lactate   Topical BID PRN Radha Bradley MD      ascorbic acid  1,000 mg Oral Q12H [facility] Lisapanda Gonzales, DO      cholecalciferol  2,000 Units Oral Daily Denys Viveros, DO      docusate sodium  100 mg Oral BID Denys Viveros, DO      furosemide  60 mg Intravenous BID (diuretic) Denys Dennison, DO      metoprolol succinate  50 mg Oral BID Pepper Nunes MD      morphine injection  2 mg Intravenous Q1H PRN Lisa Gonzales, DO      zinc sulfate  220 mg Oral Daily Denys Viveros, DO      Followed by   Gia Kenney ON 1/30/2021] multivitamin-minerals  1 tablet Oral Daily Denys Viveros, DO      ondansetron  4 mg Intravenous Q6H PRN Lisa Gonzales, DO      oxyCODONE  5 mg Oral Q4H PRN Lisapanda Gonzales, DO      perflutren lipid microsphere  0 4 mL/min Intravenous Once in imaging Denys Viveros, DO      potassium chloride  40 mEq Oral Daily Denys Viveros, DO      pregabalin  100 mg Oral TID Denys Viveros, DO      rivaroxaban  20 mg Oral Daily With Breakfast Denys Viveros, DO      tamsulosin  0 4 mg Oral Daily With Dinner Denys Viveros, DO      traMADol  50 mg Oral Q6H PRN Lisa Gonzales, DO       Continuous Infusions:   PRN Meds:   acetaminophen    ammonium lactate    morphine injection    ondansetron    oxyCODONE    perflutren lipid microsphere    traMADol      Vitals: /83 (BP Location: Left arm)   Pulse 74   Temp (!) 97 3 °F (36 3 °C) (Temporal)   Resp 18   Ht 5' 9" (1 753 m)   Wt 134 kg (295 lb 6 7 oz)   SpO2 92%   BMI 43 63 kg/m²   Vitals:    01/23/21 0600 01/24/21 0600   Weight: (!) 138 kg (305 lb 5 4 oz) 134 kg (295 lb 6 7 oz)       Intake/Output Summary (Last 24 hours) at 1/24/2021 0926  Last data filed at 1/24/2021 0750  Gross per 24 hour   Intake 120 ml   Output 975 ml   Net -855 ml       GEN: No acute distress, Alert and oriented, well appearing, morbidly obese  HEENT:Head, neck, ears, oral pharynx: Mucus membranes moist, oral pharynx clear, nares clear   External ears normal  EYES: Pupils equal, sclera anicteric, midline, normal conjuctiva  NECK: + JVD, supple, no obvious masses or thryomegaly or goiter  CARDIOVASCULAR: irreg irreg  LUNGS:norml resp rate and effort, no wheezing  ABDOMEN: obese  EXTREMITIES/VASCULAR: 3+ weeping adema,   PSYCH: Normal Affect, no overt suicidal ideation, linear speech pattern without evidence of psychosis     NEURO: Grossly intact, moving all extremiteis equal, face symmetric, alert and responsive, no obvious focal defecits  HEME: No bleeding, bruising, petechia, purpura  SKIN: brawny skin changes LE from edema  Lab Results:   Lab Results:     CBC with diff:   Results from last 7 days   Lab Units 01/24/21  0601 01/23/21  0458 01/22/21  1659   WBC Thousand/uL 3 96* 3 23* 5 05   HEMOGLOBIN g/dL 12 6 11 7* 12 1   HEMATOCRIT % 40 4 36 8 37 2   MCV fL 94 93 92   PLATELETS Thousands/uL 154 138* 164   MCH pg 29 4 29 6 30 0   MCHC g/dL 31 2* 31 8 32 5   RDW % 14 3 14 2 14 5   MPV fL 10 5 10 9 10 2   NRBC AUTO /100 WBCs  --   --  0         CMP:  Results from last 7 days   Lab Units 01/24/21  0601 01/23/21  0458 01/22/21  1659   POTASSIUM mmol/L 3 8 3 3* 3 5   CHLORIDE mmol/L 106 107 105   CO2 mmol/L 33* 33* 31   BUN mg/dL 16 13 19   CREATININE mg/dL 0 85 0 73 0 77   CALCIUM mg/dL 9 2 8 8 9 0   AST U/L  --   --  22   ALT U/L  --   --  28   ALK PHOS U/L  --   --  73   EGFR ml/min/1 73sq m 91 97 95         BMP:  Results from last 7 days   Lab Units 01/24/21  0601 01/23/21  0458 01/22/21  1659   POTASSIUM mmol/L 3 8 3 3* 3 5   CHLORIDE mmol/L 106 107 105   CO2 mmol/L 33* 33* 31   BUN mg/dL 16 13 19   CREATININE mg/dL 0 85 0 73 0 77   CALCIUM mg/dL 9 2 8 8 9 0       BNP:   Results Reviewed     Procedure Component Value Units Date/Time    Basic metabolic panel [[id number]]  (Abnormal) Collected: 01/24/21 0601    Lab Status: Final result Specimen: Blood from Arm, Right Updated: 01/24/21 0739     Sodium 144 mmol/L      Potassium 3 8 mmol/L      Chloride 106 mmol/L      CO2 33 mmol/L      ANION GAP 5 mmol/L      BUN 16 mg/dL      Creatinine 0 85 mg/dL      Glucose 94 mg/dL      Calcium 9 2 mg/dL      eGFR 91 ml/min/1 73sq m     Narrative:      [place] guidelines for Chronic Kidney Disease (CKD):     Stage 1 with normal or high GFR (GFR > 90 mL/min/1 73 square meters)    Stage 2 Mild CKD (GFR = 60-89 mL/min/1 73 square meters)    Stage 3A Moderate CKD (GFR = 45-59 mL/min/1 73 square meters)    Stage 3B Moderate CKD (GFR = 30-44 mL/min/1 73 square meters)    Stage 4 Severe CKD (GFR = 15-29 mL/min/1 73 square meters)    Stage 5 End Stage CKD (GFR <15 mL/min/1 73 square meters)  Note: GFR calculation is accurate only with a steady state creatinine    Magnesium [[id number]]  (Normal) Collected: 01/24/21 0601    Lab Status: Final result Specimen: Blood from Arm, Right Updated: 01/24/21 0738     Magnesium 2 3 mg/dL     CBC (With Platelets) [[id number]]  (Abnormal) Collected: 01/24/21 0601    Lab Status: Final result Specimen: Blood from Arm, Right Updated: 01/24/21 0644     WBC 3 96 Thousand/uL      RBC 4 29 Million/uL      Hemoglobin 12 6 g/dL      Hematocrit 40 4 %      MCV 94 fL      MCH 29 4 pg      MCHC 31 2 g/dL      RDW 14 3 %      Platelets 261 Thousands/uL      MPV 10 5 fL     Ferritin [[id number]]  (Normal) Collected: 01/23/21 0458    Lab Status: Final result Specimen: Blood from Arm, Right Updated: 01/23/21 1212     Ferritin 51 ng/mL     Basic metabolic panel [[id number]]  (Abnormal) Collected: 01/23/21 0458    Lab Status: Final result Specimen: Blood from Arm, Right Updated: 01/23/21 0656     Sodium 142 mmol/L      Potassium 3 3 mmol/L      Chloride 107 mmol/L      CO2 33 mmol/L      ANION GAP 2 mmol/L      BUN 13 mg/dL      Creatinine 0 73 mg/dL      Glucose 98 mg/dL      Calcium 8 8 mg/dL      eGFR 97 ml/min/1 73sq m     Narrative:      [place] guidelines for Chronic Kidney Disease (CKD):     Stage 1 with normal or high GFR (GFR > 90 mL/min/1 73 square meters)    Stage 2 Mild CKD (GFR = 60-89 mL/min/1 73 square meters)    Stage 3A Moderate CKD (GFR = 45-59 mL/min/1 73 square meters)    Stage 3B Moderate CKD (GFR = 30-44 mL/min/1 73 square meters)    Stage 4 Severe CKD (GFR = 15-29 mL/min/1 73 square meters)    Stage 5 End Stage CKD (GFR <15 mL/min/1 73 square meters)  Note: GFR calculation is accurate only with a steady state creatinine    C-reactive protein [[id number]]  (Abnormal) Collected: 01/23/21 0458    Lab Status: Final result Specimen: Blood from Arm, Right Updated: 01/23/21 0656     CRP 11 1 mg/L     Magnesium [[id number]]  (Normal) Collected: 01/23/21 0458    Lab Status: Final result Specimen: Blood from Arm, Right Updated: 01/23/21 0656     Magnesium 2 1 mg/dL     CBC (With Platelets) [[id number]]  (Abnormal) Collected: 01/23/21 0458    Lab Status: Final result Specimen: Blood from Arm, Right Updated: 01/23/21 0548     WBC 3 23 Thousand/uL      RBC 3 95 Million/uL      Hemoglobin 11 7 g/dL      Hematocrit 36 8 %      MCV 93 fL      MCH 29 6 pg      MCHC 31 8 g/dL      RDW 14 2 %      Platelets 741 Thousands/uL      MPV 10 9 fL     Troponin I [[id number]]  (Normal) Collected: 01/23/21 0037    Lab Status: Final result Specimen: Blood from Hand, Right Updated: 01/23/21 0127     Troponin I <0 02 ng/mL     Troponin I [[id number]]  (Normal) Collected: 01/22/21 2146    Lab Status: Final result Specimen: Blood from Arm, Right Updated: 01/22/21 2218     Troponin I <0 02 ng/mL     Troponin I [[id number]]  (Normal) Collected: 01/22/21 1851    Lab Status: Final result Specimen: Blood from Arm, Left Updated: 01/22/21 1918     Troponin I <0 02 ng/mL     COVID19, Influenza A/B, RSV PCR, [facility] [[id number]]  (Normal) Collected: 01/22/21 1659    Lab Status: Final result Specimen: Nares from Nasopharyngeal Swab Updated: 01/22/21 1803     SARS-CoV-2 Negative     INFLUENZA A PCR Negative     INFLUENZA B PCR Negative     RSV PCR Negative    Narrative:        This test has been authorized by FDA under an EUA (Emergency Use Assay) for use by authorized laboratories  Clinical caution and judgement should be used with the interpretation of these results with consideration of the clinical impression and other laboratory testing  Testing reported as "Positive" or "Negative" has been proven to be accurate according to standard laboratory validation requirements  All testing is performed with control materials showing appropriate reactivity at standard intervals      NT-BNP PRO [[id number]]  (Abnormal) Collected: 01/22/21 1659    Lab Status: Final result Specimen: Blood from Arm, Left Updated: 01/22/21 1740     NT-proBNP 1,536 pg/mL     Troponin I [[id number]]  (Normal) Collected: 01/22/21 1659    Lab Status: Final result Specimen: Blood from Arm, Left Updated: 01/22/21 1736     Troponin I <0 02 ng/mL     Comprehensive metabolic panel [[id number]]  (Abnormal) Collected: 01/22/21 1659    Lab Status: Final result Specimen: Blood from Arm, Left Updated: 01/22/21 1734     Sodium 142 mmol/L      Potassium 3 5 mmol/L      Chloride 105 mmol/L      CO2 31 mmol/L      ANION GAP 6 mmol/L      BUN 19 mg/dL      Creatinine 0 77 mg/dL      Glucose 92 mg/dL      Calcium 9 0 mg/dL      Corrected Calcium 9 6 mg/dL      AST 22 U/L      ALT 28 U/L      Alkaline Phosphatase 73 U/L      Total Protein 7 6 g/dL      Albumin 3 3 g/dL      Total Bilirubin 0 46 mg/dL      eGFR 95 ml/min/1 73sq m     Narrative:      [place] guidelines for Chronic Kidney Disease (CKD):     Stage 1 with normal or high GFR (GFR > 90 mL/min/1 73 square meters)    Stage 2 Mild CKD (GFR = 60-89 mL/min/1 73 square meters)    Stage 3A Moderate CKD (GFR = 45-59 mL/min/1 73 square meters)    Stage 3B Moderate CKD (GFR = 30-44 mL/min/1 73 square meters)    Stage 4 Severe CKD (GFR = 15-29 mL/min/1 73 square meters)    Stage 5 End Stage CKD (GFR <15 mL/min/1 73 square meters)  Note: GFR calculation is accurate only with a steady state creatinine    Protime-INR [[id number]]  (Abnormal) Collected: 01/22/21 1659    Lab Status: Final result Specimen: Blood from Arm, Left Updated: 01/22/21 1732     Protime 18 7 seconds      INR 1 59    APTT [[id number]]  (Abnormal) Collected: 01/22/21 1659    Lab Status: Final result Specimen: Blood from Arm, Left Updated: 01/22/21 1732     PTT 41 seconds     CBC and differential [[id number]] Collected: 01/22/21 1659    Lab Status: Final result Specimen: Blood from Arm, Left Updated: 01/22/21 1715     WBC 5 05 Thousand/uL      RBC 4 04 Million/uL      Hemoglobin 12 1 g/dL      Hematocrit 37 2 %      MCV 92 fL      MCH 30 0 pg      MCHC 32 5 g/dL      RDW 14 5 %      MPV 10 2 fL      Platelets 043 Thousands/uL      nRBC 0 /100 WBCs      Neutrophils Relative 65 %      Immat GRANS % 0 %      Lymphocytes Relative 23 %      Monocytes Relative 9 %      Eosinophils Relative 2 %      Basophils Relative 1 %      Neutrophils Absolute 3 30 Thousands/µL      Immature Grans Absolute 0 01 Thousand/uL      Lymphocytes Absolute 1 15 Thousands/µL      Monocytes Absolute 0 46 Thousand/µL      Eosinophils Absolute 0 10 Thousand/µL      Basophils Absolute 0 03 Thousands/µL         No results for input(s): BNP in the last 72 hours       Results from last 7 days   Lab Units 01/23/21  0037 01/22/21  2146 01/22/21  1851   TROPONIN I ng/mL <0 02 <0 02 <0 02         Magnesium:   Results from last 7 days   Lab Units 01/24/21  0601 01/23/21  0458   MAGNESIUM mg/dL 2 3 2 1       Coags:   Results from last 7 days   Lab Units 01/22/21  1659   PTT seconds 41*   INR  1 59*       TSH:       Lipid Profile:         Cardiac testing:   Results for orders placed during the hospital encounter of 10/26/19   Echo complete with contrast if indicated    Narrative [address] [facility] [address] [facility] [address] [place] [address]  [phone]    Transthoracic Echocardiogram  2D, M-mode, Doppler, and Color Doppler    Study date: 28-Oct-2019    Patient: Wiliam Coy  MR number: [id number]  Account number: [de-identified]  : 1955  Age: 71 years  Gender: Male  Status: Inpatient  Location: Bedside  Height: 69 in  Weight: 302 lb  BP: 141/ 88 mmHg    Indications: left sided paralysis    Diagnoses: 56 - CVA    Sonographer:  Fátima Rothman RDCS, CCT  Referring Physician:  King Rod DO  Group:  [facility]  Interpreting Physician:  Nemo Dewitt DO    SUMMARY    LEFT VENTRICLE:  Systolic function was normal  Ejection fraction was estimated to be 55 %  This study was inadequate for the evaluation of regional wall motion  Wall thickness was mildly increased  RIGHT VENTRICLE:  The ventricle was dilated  Systolic function was normal     MITRAL VALVE:  There was mild regurgitation  TRICUSPID VALVE:  There was mild regurgitation  Estimated peak PA pressure was 50 mmHg  HISTORY: PRIOR HISTORY: Patient has no history of cardiovascular disease  PROCEDURE: The procedure was performed at the bedside  This was a routine study  The transthoracic approach was used  The study included complete 2D imaging, M-mode, complete spectral Doppler, and color Doppler  The heart rate was 80 bpm,  at the start of the study  Intravenous contrast (Definity solution [1 3 ml Definity/8 7ml normal saline solution], 3 ml) was administered to opacify the left ventricle  Echocardiographic views were limited due to poor acoustic window  availability  This was a technically difficult study  LEFT VENTRICLE: Size was normal  Systolic function was normal  Ejection fraction was estimated to be 55 %  This study was inadequate for the evaluation of regional wall motion  Wall thickness was mildly increased  DOPPLER: The study was  not technically sufficient to allow evaluation of LV diastolic function  RIGHT VENTRICLE: The ventricle was dilated   Systolic function was normal     LEFT ATRIUM: Size was normal     RIGHT ATRIUM: Size was normal     MITRAL VALVE: Valve structure was normal  There was normal leaflet separation  DOPPLER: The transmitral velocity was within the normal range  There was no evidence for stenosis  There was mild regurgitation  AORTIC VALVE: The valve was trileaflet  Leaflets exhibited normal thickness and normal cuspal separation  DOPPLER: Transaortic velocity was within the normal range  There was no evidence for stenosis  There was no regurgitation  TRICUSPID VALVE: The valve structure was normal  There was normal leaflet separation  DOPPLER: The transtricuspid velocity was within the normal range  There was no evidence for stenosis  There was mild regurgitation  Estimated peak PA  pressure was 50 mmHg  PULMONIC VALVE: Leaflets exhibited normal thickness, no calcification, and normal cuspal separation  DOPPLER: The transpulmonic velocity was within the normal range  There was no regurgitation  PERICARDIUM: There was no pericardial effusion  The pericardium was normal in appearance  AORTA: The root exhibited normal size  SYSTEMIC VEINS: IVC: The inferior vena cava was not well visualized  SYSTEM MEASUREMENT TABLES    2D  %FS: 34 72 %  Ao Diam: 2 87 cm  EDV(Teich): 143 23 ml  EF(Teich): 63 36 %  ESV(Teich): 52 47 ml  IVSd: 1 15 cm  LA Diam: 4 16 cm  LVIDd: 5 43 cm  LVIDs: 3 55 cm  LVPWd: 1 04 cm  RWT: 0 38  SV(Teich): 90 75 ml    CW  AV Vmax: 1 34 m/s  AV maxP 13 mmHg  RAP: 0 mmHg  TR Vmax: 3 26 m/s  TR maxP 5 mmHg    PW  E' Sept: 0 09 m/s  MV E Terrance: 1 03 m/s  RVSP: 42 5 mmHg    [facility] Accredited Echocardiography Laboratory    Prepared and electronically signed by    Anitra Perez DO  Signed 28-Oct-2019 10:37:38       No results found for this or any previous visit  No results found for this or any previous visit  No results found for this or any previous visit      EKG/TELE: afib w rapid response at times HR up to 130bpm, does not drop below 80bpm  rbbb

## 2021-01-24 NOTE — PLAN OF CARE
Problem: PAIN - ADULT  Goal: Verbalizes/displays adequate comfort level or baseline comfort level  Description: Interventions:  - Encourage patient to monitor pain and request assistance  - Assess pain using appropriate pain scale  - Administer analgesics based on type and severity of pain and evaluate response  - Implement non-pharmacological measures as appropriate and evaluate response  - Consider cultural and social influences on pain and pain management  - Notify physician/advanced practitioner if interventions unsuccessful or patient reports new pain  Outcome: Progressing     Problem: INFECTION - ADULT  Goal: Absence or prevention of progression during hospitalization  Description: INTERVENTIONS:  - Assess and monitor for signs and symptoms of infection  - Monitor lab/diagnostic results  - Monitor all insertion sites, i e  indwelling lines, tubes, and drains  - Monitor endotracheal if appropriate and nasal secretions for changes in amount and color  - [place] appropriate cooling/warming therapies per order  - Administer medications as ordered  - Instruct and encourage patient and family to use good hand hygiene technique  - Identify and instruct in appropriate isolation precautions for identified infection/condition  Outcome: Progressing     Problem: SAFETY ADULT  Goal: Patient will remain free of falls  Description: INTERVENTIONS:  - Assess patient frequently for physical needs  -  Identify cognitive and physical deficits and behaviors that affect risk of falls    -  [place] fall precautions as indicated by assessment   - Educate patient/family on patient safety including physical limitations  - Instruct patient to call for assistance with activity based on assessment  - Modify environment to reduce risk of injury  - Consider OT/PT consult to assist with strengthening/mobility  Outcome: Progressing  Goal: Maintain or return to baseline ADL function  Description: INTERVENTIONS:  -  Assess patient's ability to carry out ADLs; assess patient's baseline for ADL function and identify physical deficits which impact ability to perform ADLs (bathing, care of mouth/teeth, toileting, grooming, dressing, etc )  - Assess/evaluate cause of self-care deficits   - Assess range of motion  - Assess patient's mobility; develop plan if impaired  - Assess patient's need for assistive devices and provide as appropriate  - Encourage maximum independence but intervene and supervise when necessary  - Involve family in performance of ADLs  - Assess for home care needs following discharge   - Consider OT consult to assist with ADL evaluation and planning for discharge  - Provide patient education as appropriate  Outcome: Progressing  Goal: Maintain or return mobility status to optimal level  Description: INTERVENTIONS:  - Assess patient's baseline mobility status (ambulation, transfers, stairs, etc )    - Identify cognitive and physical deficits and behaviors that affect mobility  - Identify mobility aids required to assist with transfers and/or ambulation (gait belt, sit-to-stand, lift, walker, cane, etc )  - [place] fall precautions as indicated by assessment  - Record patient progress and toleration of activity level on Mobility SBAR; progress patient to next Phase/Stage  - Instruct patient to call for assistance with activity based on assessment  - Consider rehabilitation consult to assist with strengthening/weightbearing, etc   Outcome: Progressing     Problem: DISCHARGE PLANNING  Goal: Discharge to home or other facility with appropriate resources  Description: INTERVENTIONS:  - Identify barriers to discharge w/patient and caregiver  - Arrange for needed discharge resources and transportation as appropriate  - Identify discharge learning needs (meds, wound care, etc )  - Arrange for interpretive services to assist at discharge as needed  - Refer to Case Management Department for coordinating discharge planning if the patient needs post-hospital services based on physician/advanced practitioner order or complex needs related to functional status, cognitive ability, or social support system  Outcome: Progressing     Problem: Knowledge Deficit  Goal: Patient/family/caregiver demonstrates understanding of disease process, treatment plan, medications, and discharge instructions  Description: Complete learning assessment and assess knowledge base  Interventions:  - Provide teaching at level of understanding  - Provide teaching via preferred learning methods  Outcome: Progressing     Problem: Potential for Falls  Goal: Patient will remain free of falls  Description: INTERVENTIONS:  - Assess patient frequently for physical needs  -  Identify cognitive and physical deficits and behaviors that affect risk of falls    -  [place] fall precautions as indicated by assessment   - Educate patient/family on patient safety including physical limitations  - Instruct patient to call for assistance with activity based on assessment  - Modify environment to reduce risk of injury  - Consider OT/PT consult to assist with strengthening/mobility  Outcome: Progressing

## 2021-01-24 NOTE — ASSESSMENT & PLAN NOTE
History of leukopenia without any evidence of neutropenia    Recent Labs     01/22/21  1659 01/23/21  0458 01/24/21  0601   WBC 5 05 3 23* 3 96*

## 2021-01-24 NOTE — PROGRESS NOTES
Progress Note - Shasha Brothers 1955, 76 y o  male MRN: [id number]    Unit/Bed#: E4 -01 Encounter: [id number]    Primary Care Provider: Becky Canseco MD   Date and time admitted to hospital: 1/22/2021  4:22 PM        Shortness of breath with exposure to COVID-19 virus  Assessment & Plan  Patient with shortness of breath which is felt to be likely to heart failure  He is otherwise asymptomatic  Hold on Remdesivir for now, low threshold to initiate  COVID negative, likely in the  seronegative conversion window as wife is positive  Hold on corticosteroids  Started on vitamin-C, vitamin-D and zinc  He is already anticoagulated  Noted elevated inflammatory markers at 11 1      Lymphedema  Assessment & Plan  History of lymphedema, significantly limits his current ambulatory status  Continue diuretics for now  Will need follow-up in the lymphedema Clinic    Thrombocytopenia [facility])  Assessment & Plan  Monitor platelets while admitted      Obesity, Class III, BMI 40-49 9 (morbid obesity) ([facility] [address] )  Assessment & Plan  Patient would benefit from weight loss     Leukopenia  Assessment & Plan  History of leukopenia without any evidence of neutropenia    Recent Labs     01/22/21  1659 01/23/21  0458 01/24/21  0601   WBC 5 05 3 23* 3 96*         Obstructive sleep apnea on CPAP  Assessment & Plan  Unfortunately cannot receive CPAP due to recent COVID exposure and risk of aerosolization    Atrial fibrillation [facility])  Assessment & Plan  Ventricular rate control  Continue Xarelto    * Acute diastolic heart failure (HCC)  Assessment & Plan  Wt Readings from Last 3 Encounters:   01/24/21 134 kg (295 lb 6 7 oz)   01/22/21 (!) 147 kg (324 lb)   01/14/21 (!) 146 kg (321 lb)       Patient presenting with acute diastolic heart failure  He was seen as cardiologist's office and is significantly volume overloaded on physical exam, and approximately 30 lb weight gain  Continue on furosemide  Iv 60 mg twice a day  Left ventricular ejection fraction of 55% with mild LVH and mild right ventricular dilatation from 2019  He has a pharmacologic stress test which was negative for myocardial ischemia with an EF of 50% in 2020  He is approximately 30 lb from his dry weight which appears to be 294 lb from his cardiologist note    Nutrition consult to discuss dietary modification restriction          VTE Pharmacologic Prophylaxis:   Pharmacologic: Rivaroxaban (Xarelto)  Mechanical VTE Prophylaxis in Place: Yes    Patient Centered Rounds: I have performed bedside rounds with nursing staff today  Discussions with Specialists or Other Care Team Provider: cardiology    Education and Discussions with Family / Patient: patient    Time Spent for Care: 20 minutes  More than 50% of total time spent on counseling and coordination of care as described above  Current Length of Stay: 2 day(s)    Current Patient Status: Inpatient   Certification Statement: The patient will continue to require additional inpatient hospital stay due to diuresis    Discharge Plan: 48 hrs    Code Status: Level 1 - Full Code      Subjective:   Patient was seen and examined  He reports feeling better  He is having breakfast now  Objective:     Vitals:   Temp (24hrs), Av 1 °F (36 2 °C), Min:96 4 °F (35 8 °C), Max:97 5 °F (36 4 °C)    Temp:  [96 4 °F (35 8 °C)-97 5 °F (36 4 °C)] 97 3 °F (36 3 °C)  HR:  [70-91] 74  Resp:  [18-20] 18  BP: ()/(52-83) 140/83  SpO2:  [90 %-96 %] 92 %  Body mass index is 43 63 kg/m²  Input and Output Summary (last 24 hours): Intake/Output Summary (Last 24 hours) at 2021 1431  Last data filed at 2021 0750  Gross per 24 hour   Intake 120 ml   Output 975 ml   Net -855 ml       Physical Exam:     Physical Exam  Constitutional:       Appearance: He is well-developed  He is obese  Cardiovascular:      Rate and Rhythm: Normal rate and regular rhythm  Heart sounds: Normal heart sounds     Pulmonary: Effort: Pulmonary effort is normal  No respiratory distress  Breath sounds: Normal breath sounds  Abdominal:      Palpations: Abdomen is soft  Musculoskeletal:         General: No deformity  Right lower leg: Edema present  Left lower leg: Edema present  Skin:     General: Skin is warm  Findings: No erythema or rash  Neurological:      Mental Status: He is alert  Additional Data:     Labs:    Results from last 7 days   Lab Units 01/24/21  0601  01/22/21  1659   WBC Thousand/uL 3 96*   < > 5 05   HEMOGLOBIN g/dL 12 6   < > 12 1   HEMATOCRIT % 40 4   < > 37 2   PLATELETS Thousands/uL 154   < > 164   NEUTROS PCT %  --   --  65   LYMPHS PCT %  --   --  23   MONOS PCT %  --   --  9   EOS PCT %  --   --  2    < > = values in this interval not displayed  Results from last 7 days   Lab Units 01/24/21  0601  01/22/21  1659   SODIUM mmol/L 144   < > 142   POTASSIUM mmol/L 3 8   < > 3 5   CHLORIDE mmol/L 106   < > 105   CO2 mmol/L 33*   < > 31   BUN mg/dL 16   < > 19   CREATININE mg/dL 0 85   < > 0 77   ANION GAP mmol/L 5   < > 6   CALCIUM mg/dL 9 2   < > 9 0   ALBUMIN g/dL  --   --  3 3*   TOTAL BILIRUBIN mg/dL  --   --  0 46   ALK PHOS U/L  --   --  73   ALT U/L  --   --  28   AST U/L  --   --  22   GLUCOSE RANDOM mg/dL 94   < > 92    < > = values in this interval not displayed  Results from last 7 days   Lab Units 01/22/21  1659   INR  1 59*                       * I Have Reviewed All Lab Data Listed Above  * Additional Pertinent Lab Tests Reviewed:  All Labs Within Last 24 Hours Reviewed    Imaging:      Recent Cultures (last 7 days):           Last 24 Hours Medication List:   Current Facility-Administered Medications   Medication Dose Route Frequency Provider Last Rate    acetaminophen  650 mg Oral Q4H PRN Sanchez Mcghee DO      allopurinol  100 mg Oral Daily Denys Viveros DO      ammonium lactate   Topical BID PRN Erika Jimenez MD      ascorbic acid  1,000 mg Oral Q12H [address] [facility], DO      cholecalciferol  2,000 Units Oral Daily Denys Viveros, DO      docusate sodium  100 mg Oral BID Denys Viveros, DO      furosemide  60 mg Intravenous BID (diuretic) Denys Viveros, DO      metoprolol succinate  50 mg Oral BID Jose Antonio Guevara MD      morphine injection  2 mg Intravenous Q1H PRN Paloma Clipper, DO      zinc sulfate  220 mg Oral Daily Denys Viveros, DO      Followed by   Toro Deluca ON 1/30/2021] multivitamin-minerals  1 tablet Oral Daily Denys Viveros, DO      ondansetron  4 mg Intravenous Q6H PRN Paloma Clipper, DO      oxyCODONE  5 mg Oral Q4H PRN Paloma Clipper, DO      potassium chloride  40 mEq Oral Daily Denys Viveros, DO      pregabalin  100 mg Oral TID Denys Viveros, DO      rivaroxaban  20 mg Oral Daily With Breakfast Denys Viveros, DO      tamsulosin  0 4 mg Oral Daily With Dinner Denys Viveros, DO      traMADol  50 mg Oral Q6H PRN Paloma Clipper, DO          Today, Patient Was Seen By: Danica Tadeo MD    ** Please Note: Dictation voice to text software may have been used in the creation of this document   **

## 2021-01-24 NOTE — PLAN OF CARE
Problem: PAIN - ADULT  Goal: Verbalizes/displays adequate comfort level or baseline comfort level  Description: Interventions:  - Encourage patient to monitor pain and request assistance  - Assess pain using appropriate pain scale  - Administer analgesics based on type and severity of pain and evaluate response  - Implement non-pharmacological measures as appropriate and evaluate response  - Consider cultural and social influences on pain and pain management  - Notify physician/advanced practitioner if interventions unsuccessful or patient reports new pain  Outcome: Progressing     Problem: INFECTION - ADULT  Goal: Absence or prevention of progression during hospitalization  Description: INTERVENTIONS:  - Assess and monitor for signs and symptoms of infection  - Monitor lab/diagnostic results  - Monitor all insertion sites, i e  indwelling lines, tubes, and drains  - Monitor endotracheal if appropriate and nasal secretions for changes in amount and color  - [place] appropriate cooling/warming therapies per order  - Administer medications as ordered  - Instruct and encourage patient and family to use good hand hygiene technique  - Identify and instruct in appropriate isolation precautions for identified infection/condition  Outcome: Progressing     Problem: SAFETY ADULT  Goal: Patient will remain free of falls  Description: INTERVENTIONS:  - Assess patient frequently for physical needs  -  Identify cognitive and physical deficits and behaviors that affect risk of falls    -  [place] fall precautions as indicated by assessment   - Educate patient/family on patient safety including physical limitations  - Instruct patient to call for assistance with activity based on assessment  - Modify environment to reduce risk of injury  - Consider OT/PT consult to assist with strengthening/mobility  Outcome: Progressing  Goal: Maintain or return to baseline ADL function  Description: INTERVENTIONS:  -  Assess patient's ability to carry out ADLs; assess patient's baseline for ADL function and identify physical deficits which impact ability to perform ADLs (bathing, care of mouth/teeth, toileting, grooming, dressing, etc )  - Assess/evaluate cause of self-care deficits   - Assess range of motion  - Assess patient's mobility; develop plan if impaired  - Assess patient's need for assistive devices and provide as appropriate  - Encourage maximum independence but intervene and supervise when necessary  - Involve family in performance of ADLs  - Assess for home care needs following discharge   - Consider OT consult to assist with ADL evaluation and planning for discharge  - Provide patient education as appropriate  Outcome: Progressing  Goal: Maintain or return mobility status to optimal level  Description: INTERVENTIONS:  - Assess patient's baseline mobility status (ambulation, transfers, stairs, etc )    - Identify cognitive and physical deficits and behaviors that affect mobility  - Identify mobility aids required to assist with transfers and/or ambulation (gait belt, sit-to-stand, lift, walker, cane, etc )  - [place] fall precautions as indicated by assessment  - Record patient progress and toleration of activity level on Mobility SBAR; progress patient to next Phase/Stage  - Instruct patient to call for assistance with activity based on assessment  - Consider rehabilitation consult to assist with strengthening/weightbearing, etc   Outcome: Progressing     Problem: DISCHARGE PLANNING  Goal: Discharge to home or other facility with appropriate resources  Description: INTERVENTIONS:  - Identify barriers to discharge w/patient and caregiver  - Arrange for needed discharge resources and transportation as appropriate  - Identify discharge learning needs (meds, wound care, etc )  - Arrange for interpretive services to assist at discharge as needed  - Refer to Case Management Department for coordinating discharge planning if the patient needs post-hospital services based on physician/advanced practitioner order or complex needs related to functional status, cognitive ability, or social support system  Outcome: Progressing     Problem: Knowledge Deficit  Goal: Patient/family/caregiver demonstrates understanding of disease process, treatment plan, medications, and discharge instructions  Description: Complete learning assessment and assess knowledge base  Interventions:  - Provide teaching at level of understanding  - Provide teaching via preferred learning methods  Outcome: Progressing     Problem: Potential for Falls  Goal: Patient will remain free of falls  Description: INTERVENTIONS:  - Assess patient frequently for physical needs  -  Identify cognitive and physical deficits and behaviors that affect risk of falls    -  [place] fall precautions as indicated by assessment   - Educate patient/family on patient safety including physical limitations  - Instruct patient to call for assistance with activity based on assessment  - Modify environment to reduce risk of injury  - Consider OT/PT consult to assist with strengthening/mobility  Outcome: Progressing

## 2021-01-24 NOTE — PHYSICAL THERAPY NOTE
Physical Therapy Evaluation:    2 forms of pt ID verified:name,birthdate and pt ID ayleen    Patient's Name: Jackson Holcomb    Admitting Diagnosis  Shortness of breath [R06 02]  Acute on chronic diastolic congestive heart failure ([facility] ) [I50 33]  Obstructive sleep apnea on CPAP [G47 33, Z99 89]  Longstanding persistent atrial fibrillation (HCC) [I48 11]  Leukopenia, unspecified type [D72 819]    Problem List  Patient Active Problem List   Diagnosis    Atrial fibrillation ([facility] )    Obstructive sleep apnea on CPAP    Leukopenia    Weakness of right upper extremity    Paresthesias    Cervical pain (neck)    Obesity, Class III, BMI 40-49 9 (morbid obesity) ([place] [address] )    Thrombocytopenia (HCC)    Bilateral lower extremity edema    Lymphedema    Obesity, morbid ([place] [address] )    Venous insufficiency of both lower extremities    Pulmonary hypertension (HCC)    Shortness of breath with exposure to COVID-19 virus    Acute diastolic heart failure ([place] [address] )       Past Medical History  Past Medical History:   Diagnosis Date    A-fib ([place] [address] )     Arthritis     CPAP (continuous positive airway pressure) dependence     Irregular heart beat     Lymphedema     Sleep apnea     Urinary frequency     Wears glasses     Wears partial dentures     lower partial       Past Surgical History  Past Surgical History:   Procedure Laterality Date    ABLATION SAPHENOUS VEIN W/ RFA      COLONOSCOPY      GA CYSTOURETHRO W/IMPLANT N/A 11/13/2017    Procedure: CYSTOSCOPY WITH INSERTION UROLIFT;  Surgeon: Sabine Olvera DO;  Location: [facility];  Service: Urology    TONSILLECTOMY          01/24/21 0958   PT Last Visit   PT Visit Date 01/24/21   Note Type   Note type Evaluation   Pain Assessment   Pain Assessment Tool Pain Assessment not indicated - pt denies pain   Pain Score No Pain   Home Living   Type of [address] [place] [address] One level;Stairs to enter without rails  (1 BRANDON)   Home Equipment Other (Comment)  (no use of DME PTA) Additional Comments pt lives with wife in 4 SH,4 [place],completely (I) PTA,no recent falls and pt was working   Prior Function   Level of [place] Independent with ADLs and functional mobility   Lives With Spouse  (pts wife currently is diagnosed with (+)COVID)   Receives Help From Family  (as needed per pt PTA)   ADL Assistance Independent   IADLs Independent   Falls in the last 6 months 0   Vocational Full time employment   Restrictions/Precautions   Other Precautions Impulsive;Multiple lines   General   Additional Pertinent History acute on chronic diastolic CHF,ROBERT on CPAP,longstanding persistent Afib,BLE edema,lymphedema,noncompliant with ROBERT and 30 pound weight gain   Family/Caregiver Present No   Cognition   Overall Cognitive Status WFL   Arousal/Participation Alert   Orientation Level Oriented X4   Following Commands Follows one step commands without difficulty   RLE Assessment   RLE Assessment   (4/5 grossly throughout)   LLE Assessment   LLE Assessment   (4/5 grossly throughout)   Coordination   Movements are Fluid and Coordinated 1   Sensation WFL   Light Touch   RLE Light Touch Grossly intact   LLE Light Touch Grossly intact   Bed Mobility   Supine to Sit 7  Independent   Sit to Supine 7  Independent   Transfers   Sit to Stand 7  Independent   Stand to Sit 7  Independent   Toilet transfer 7  Independent   Ambulation/Elevation   Gait pattern   (B lateral sway )   Gait Assistance 6  Modified independent   Assistive Device None   Distance 30 feet x4 within the pts room and no use of DME  No LOB noted and or observed   Pt reports ambulating at baseline   Balance   Static Sitting Good   Dynamic Sitting Good   Static Standing Fair +   Dynamic Standing Fair +   Ambulatory Fair +   Endurance Deficit   Endurance Deficit Yes   Endurance Deficit Description pt continues to report SOB during mobility   Activity Tolerance   Activity Tolerance   (good)   Nurse Made Aware yes   Assessment   Prognosis Fair Assessment Pt is a 75 yo male admitted to [address] on chronic diastolic CHF,ROBERT and [id number],58 pound weight gain,lymphedema  Pt lives with wife in 4 SH,4 [place],no use of DME PTA,no reports of recent falls and reports being completely (I) PTA  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  Independent with ADLs,(+)drive and independent with IADLs  During PT eval, pt is independent with BM and transfers,mod (I) for gait without use of DME  No LOB noted and or observed during upright mobility  Pt reports being at functional mobility baseline  NO further skilled inpt PT services needed at this time,DC from skilled inpt PT  Barriers to Discharge Decreased caregiver support  (pts wife currently is diagnosed with (+)COVID at home)   Goals   Patient Goals to return home   Short Term Goal #1 no goals at this time 2* DC from skilled inpt PT   PT Treatment Day 0   Plan   Treatment/Interventions Spoke to nursing   Recommendation   PT Discharge Recommendation Return to previous environment with no needs; Return to previous environment with social support           Izabela Brasher, PT, DPT@

## 2021-01-24 NOTE — PLAN OF CARE
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  Independent with ADLs,(+)drive and independent with IADLs  During PT eval, pt is independent with BM and transfers,mod (I) for gait without use of DME  No LOB noted and or observed during upright mobility  Pt reports being at functional mobility baseline  NO further skilled inpt PT services needed at this time,DC from skilled inpt PT  Barriers to Discharge: Decreased caregiver support(pts wife currently is diagnosed with (+)COVID at home)     PT Discharge Recommendation: Return to previous environment with no needs, Return to previous environment with social support          See flowsheet documentation for full assessment

## 2021-01-25 LAB
ANION GAP SERPL CALCULATED.3IONS-SCNC: 6 MMOL/L (ref 4–13)
BUN SERPL-MCNC: 18 MG/DL (ref 5–25)
CALCIUM SERPL-MCNC: 9.2 MG/DL (ref 8.3–10.1)
CHLORIDE SERPL-SCNC: 104 MMOL/L (ref 100–108)
CO2 SERPL-SCNC: 32 MMOL/L (ref 21–32)
CREAT SERPL-MCNC: 0.78 MG/DL (ref 0.6–1.3)
ERYTHROCYTE [DISTWIDTH] IN BLOOD BY AUTOMATED COUNT: 14.1 % (ref 11.6–15.1)
GFR SERPL CREATININE-BSD FRML MDRD: 94 ML/MIN/1.73SQ M
GLUCOSE SERPL-MCNC: 92 MG/DL (ref 65–140)
HCT VFR BLD AUTO: 41 % (ref 36.5–49.3)
HGB BLD-MCNC: 13.1 G/DL (ref 12–17)
MAGNESIUM SERPL-MCNC: 2.1 MG/DL (ref 1.6–2.6)
MCH RBC QN AUTO: 29.3 PG (ref 26.8–34.3)
MCHC RBC AUTO-ENTMCNC: 32 G/DL (ref 31.4–37.4)
MCV RBC AUTO: 92 FL (ref 82–98)
PLATELET # BLD AUTO: 167 THOUSANDS/UL (ref 149–390)
PMV BLD AUTO: 10.3 FL (ref 8.9–12.7)
POTASSIUM SERPL-SCNC: 3.7 MMOL/L (ref 3.5–5.3)
RBC # BLD AUTO: 4.47 MILLION/UL (ref 3.88–5.62)
SODIUM SERPL-SCNC: 142 MMOL/L (ref 136–145)
WBC # BLD AUTO: 4.51 THOUSAND/UL (ref 4.31–10.16)

## 2021-01-25 PROCEDURE — 80048 BASIC METABOLIC PNL TOTAL CA: CPT | Performed by: INTERNAL MEDICINE

## 2021-01-25 PROCEDURE — 99232 SBSQ HOSP IP/OBS MODERATE 35: CPT | Performed by: INTERNAL MEDICINE

## 2021-01-25 PROCEDURE — 85027 COMPLETE CBC AUTOMATED: CPT | Performed by: INTERNAL MEDICINE

## 2021-01-25 PROCEDURE — 83735 ASSAY OF MAGNESIUM: CPT | Performed by: INTERNAL MEDICINE

## 2021-01-25 RX ORDER — FUROSEMIDE 10 MG/ML
60 INJECTION INTRAMUSCULAR; INTRAVENOUS ONCE
Status: COMPLETED | OUTPATIENT
Start: 2021-01-25 | End: 2021-01-25

## 2021-01-25 RX ADMIN — ZINC SULFATE 220 MG (50 MG) CAPSULE 220 MG: CAPSULE at 08:05

## 2021-01-25 RX ADMIN — FUROSEMIDE 60 MG: 10 INJECTION, SOLUTION INTRAMUSCULAR; INTRAVENOUS at 13:56

## 2021-01-25 RX ADMIN — OXYCODONE HYDROCHLORIDE AND ACETAMINOPHEN 1000 MG: 500 TABLET ORAL at 08:02

## 2021-01-25 RX ADMIN — PREGABALIN 100 MG: 100 CAPSULE ORAL at 15:56

## 2021-01-25 RX ADMIN — METOPROLOL SUCCINATE 50 MG: 50 TABLET, EXTENDED RELEASE ORAL at 08:02

## 2021-01-25 RX ADMIN — METOPROLOL SUCCINATE 50 MG: 50 TABLET, EXTENDED RELEASE ORAL at 22:53

## 2021-01-25 RX ADMIN — OXYCODONE HYDROCHLORIDE AND ACETAMINOPHEN 1000 MG: 500 TABLET ORAL at 22:53

## 2021-01-25 RX ADMIN — DOCUSATE SODIUM 100 MG: 100 CAPSULE, LIQUID FILLED ORAL at 08:03

## 2021-01-25 RX ADMIN — ALLOPURINOL 100 MG: 100 TABLET ORAL at 08:03

## 2021-01-25 RX ADMIN — FUROSEMIDE 60 MG: 10 INJECTION, SOLUTION INTRAMUSCULAR; INTRAVENOUS at 08:03

## 2021-01-25 RX ADMIN — RIVAROXABAN 20 MG: 20 TABLET, FILM COATED ORAL at 08:02

## 2021-01-25 RX ADMIN — FUROSEMIDE 60 MG: 10 INJECTION, SOLUTION INTRAMUSCULAR; INTRAVENOUS at 15:56

## 2021-01-25 RX ADMIN — PREGABALIN 100 MG: 100 CAPSULE ORAL at 22:53

## 2021-01-25 RX ADMIN — Medication 2000 UNITS: at 08:02

## 2021-01-25 RX ADMIN — DOCUSATE SODIUM 100 MG: 100 CAPSULE, LIQUID FILLED ORAL at 17:32

## 2021-01-25 RX ADMIN — PREGABALIN 100 MG: 100 CAPSULE ORAL at 08:02

## 2021-01-25 RX ADMIN — TAMSULOSIN HYDROCHLORIDE 0.4 MG: 0.4 CAPSULE ORAL at 15:56

## 2021-01-25 RX ADMIN — POTASSIUM CHLORIDE 40 MEQ: 1500 TABLET, EXTENDED RELEASE ORAL at 08:02

## 2021-01-25 RX ADMIN — Medication: at 15:40

## 2021-01-25 NOTE — PROGRESS NOTES
Cardiology Progress Note   Diannah Hampshire, MD Janifer Moritz, MD Agapito Laura, DO, MD Kelly Angel DO, Christy Cummings DO, [facility]  ----------------------------------------------------------------  [address], [facility] [age] y o  male MRN: [id number]  Unit/Bed#: E4 -01 Encounter: [id number]      ASSESSMENT:   · Acute on chronic diastolic heart failure  · Persistent atrial fibrillation (long standing since before October 2019) on Xarelto  · LVEF 55%, mild LVH, mild RV dilatation, mild MR/TR with PASP 50 mmHg, October 2019  · History of bilateral venous insufficiency status post LE vein ablation  · Pharmacologic nuclear stress test negative for myocardial ischemia with gated EF 50%, September 2020  · Holter with AF, avg HR 92 bpm, rare VPCs, September 2020  · Abnormal ECG with Bifascicular block  · Moderate pulmonary hypertension  · Lymphedema  · Morbid obesity  · ROBERT on 20/14 cm BiPAP    PLAN:  Patient's weight trends continue to improve and his lower extremity swelling has markedly improved  No change in his renal function, but still is mildly edematous  Strict I/Os and daily weights  Lasix 60 mg IV t i d  Today  Possible transition to oral diuretic in the next 24-48 hours  2D echocardiogram pending to assess cardiac structure and function  Continue Xarelto and Toprol-XL for atrial fibrillation  May benefit from eventual ablation as outpatient  Salt restriction and his weight control as well as treatment of sleep apnea has been heavily encouraged    Signed: Luis E Ramirez DO, [facility]      History of Present Illness:  Patient seen and examined  Denies chest pain, pressure, tightness or squeezing  Admits to improved shortness of breath and lower extremity swelling  Denies orthopnea or paroxysmal nocturnal dyspnea  Denies lightheadedness or dizziness        Review of Systems:  Review of Systems   Constitution: Negative for decreased appetite, fever, weight gain and weight loss  HENT: Negative for congestion and sore throat  Eyes: Negative for visual disturbance  Cardiovascular: Negative for chest pain, dyspnea on exertion, leg swelling, near-syncope and palpitations  Respiratory: Negative for cough and shortness of breath  Hematologic/Lymphatic: Negative for bleeding problem  Skin: Negative for rash  Musculoskeletal: Negative for myalgias and neck pain  Gastrointestinal: Negative for abdominal pain and nausea  Neurological: Negative for light-headedness and weakness  Psychiatric/Behavioral: Negative for depression  Allergies   Allergen Reactions    Penicillins Anaphylaxis    Sulfa Antibiotics Anaphylaxis       No current facility-administered medications on file prior to encounter        Current Outpatient Medications on File Prior to Encounter   Medication Sig    allopurinol (ZYLOPRIM) 100 mg tablet Take 100 mg by mouth daily    ammonium lactate (AMLACTIN) 12 % lotion Apply topically 2 (two) times a day as needed for dry skin    clindamycin (CLEOCIN) 300 MG capsule TAKE 2 CAPSULES BY MOUTH 1 HOUR PRIOR TO APPOINTMENT    CoenzymeQ10-Isoleucine-Glycine (CO Q-10) 100-50-25 MG TB24 Co Q-10    colchicine (COLCRYS) 0 6 mg tablet     ferrous sulfate 325 (65 Fe) mg tablet Take 325 mg by mouth every other day    GARCINIA CAMBOGIA-CHROMIUM PO Take 750 mg by mouth 2 (two) times a day     Glucosamine-Chondroit-Vit C-Mn (Glucosamine 1500 Complex) CAPS Take by mouth    Green Coffee Silva-Yerba Mate (GREEN COFFEE BEAN EXTRACT PO) Take 800 mg by mouth daily     halobetasol (ULTRAVATE) 0 05 % ointment APPLY TO AFFECTED AREA ON LEG TWICE DAILY    metoprolol tartrate (LOPRESSOR) 25 mg tablet Take 1 tablet (25 mg total) by mouth every 12 (twelve) hours    patient supplied medication Take 1 each by mouth 2 (two) times a day    patient supplied medication Take 1 each by mouth 2 (two) times a day    pregabalin (LYRICA) 100 mg capsule Take 100 mg by mouth 3 (three) times a day    rivaroxaban (XARELTO) 20 mg tablet Take 20 mg by mouth daily    torsemide (DEMADEX) 20 mg tablet Take 1 tablet (20 mg total) by mouth 2 (two) times a day    traMADol (ULTRAM) 50 mg tablet Take 50 mg by mouth every 6 (six) hours as needed for moderate pain    Turmeric Curcumin 500 MG CAPS Take 500 mg by mouth daily    VITAMIN D PO Take by mouth    Acetaminophen (TYLENOL EXTRA STRENGTH PO) Take by mouth    alfuzosin (UROXATRAL) 10 mg 24 hr tablet     L-ARGININE-500 PO Take 500 mg by mouth 2 (two) times a day     Multiple Vitamins-Minerals (OCUVITE EXTRA PO) Take 1 tablet by mouth daily      RASPBERRY KETONES PO Take 100 mg by mouth 2 (two) times a day    tadalafil (CIALIS) 5 MG tablet Take 5 mg by mouth daily as needed for erectile dysfunction        Current Facility-Administered Medications   Medication Dose Route Frequency Provider Last Rate    acetaminophen  650 mg Oral Q4H PRN [facility], DO      allopurinol  100 mg Oral Daily Denys Viveros, DO      ammonium lactate   Topical BID PRN Nick Dodge MD      ascorbic acid  1,000 mg Oral Q12H [facility], DO      cholecalciferol  2,000 Units Oral Daily Denys Viveros, DO      docusate sodium  100 mg Oral BID Denys Viveros, DO      furosemide  60 mg Intravenous BID (diuretic) Denys Viveros, DO      metoprolol succinate  50 mg Oral BID Louis Mendiola MD      morphine injection  2 mg Intravenous Q1H PRN [facility], DO      zinc sulfate  220 mg Oral Daily Denys Viveros DO      Followed by   Aliica Cabrera ON 1/30/2021] multivitamin-minerals  1 tablet Oral Daily Denys Viveros, DO      ondansetron  4 mg Intravenous Q6H PRN Caridad Martin, DO      oxyCODONE  5 mg Oral Q4H PRN [facility], DO      potassium chloride  40 mEq Oral Daily Denys Viveros, DO      pregabalin  100 mg Oral TID Denys Viveros, DO      rivaroxaban  20 mg Oral Daily With Breakfast Caridad Martin, DO      tamsulosin 0 4 mg Oral Daily With Dinner Denys Viveros DO      traMADol  50 mg Oral Q6H PRN Sanchez Mcghee DO              Vitals:    01/24/21 2300 01/25/21 0600 01/25/21 0802 01/25/21 0803   BP: 92/55  137/84 137/84   BP Location: Right arm   Left arm   Pulse: 75  88 88   Resp: 18   16   Temp: (!) 97 °F (36 1 °C)   (!) 97 1 °F (36 2 °C)   TempSrc: Temporal   Temporal   SpO2:    98%   Weight:  132 kg (292 lb 1 8 oz)     Height:             Intake/Output Summary (Last 24 hours) at 1/25/2021 0934  Last data filed at 1/25/2021 0801  Gross per 24 hour   Intake    Output 1800 ml   Net -1800 ml       Weight change: -1 5 kg (-3 lb 4 9 oz)    PHYSICAL EXAMINATION:  Gen: Awake, Alert, NAD  HEENT: AT/NC, Anicteric, mmm  Neck: Supple, No elevated JVP  Resp: CTA bilaterally no w/r/r  CV: Irreg irreg +S1, S2, No m/r/g  Abd: Soft, obese, NT/ND + BS  Ext: warm, +1 pitting LE edema bilaterally w/ lymphedema  Neuro: Follows commands, moves all extermities  Psych: Appropriate affect    Lab Results:  Results from last 7 days   Lab Units 01/25/21  0556   WBC Thousand/uL 4 51   HEMOGLOBIN g/dL 13 1   HEMATOCRIT % 41 0   PLATELETS Thousands/uL 167     Results from last 7 days   Lab Units 01/25/21  0556  01/22/21  1659   POTASSIUM mmol/L 3 7   < > 3 5   CHLORIDE mmol/L 104   < > 105   CO2 mmol/L 32   < > 31   BUN mg/dL 18   < > 19   CREATININE mg/dL 0 78   < > 0 77   CALCIUM mg/dL 9 2   < > 9 0   ALK PHOS U/L  --   --  73   ALT U/L  --   --  28   AST U/L  --   --  22    < > = values in this interval not displayed  No results found for: Renetta Balderaslaume      Results from last 7 days   Lab Units 01/23/21  0037 01/22/21  2146 01/22/21  1851   TROPONIN I ng/mL <0 02 <0 02 <0 02     Results from last 7 days   Lab Units 01/22/21  1659   INR  1 59*       Tele: AF w/ CVR 70 to 90s bpm    This note was completed in part utilizing M-Modal Fluency Direct Software    Grammatical errors, random word insertions, spelling mistakes, and incomplete sentences may be an occasional consequence of this system secondary to software limitations, ambient noise, and hardware issues  If you have any questions or concerns about the content, text, or information contained within the body of this dictation, please contact the provider for clarification

## 2021-01-25 NOTE — PLAN OF CARE
Problem: PAIN - ADULT  Goal: Verbalizes/displays adequate comfort level or baseline comfort level  Description: Interventions:  - Encourage patient to monitor pain and request assistance  - Assess pain using appropriate pain scale  - Administer analgesics based on type and severity of pain and evaluate response  - Implement non-pharmacological measures as appropriate and evaluate response  - Consider cultural and social influences on pain and pain management  - Notify physician/advanced practitioner if interventions unsuccessful or patient reports new pain  Outcome: Progressing     Problem: INFECTION - ADULT  Goal: Absence or prevention of progression during hospitalization  Description: INTERVENTIONS:  - Assess and monitor for signs and symptoms of infection  - Monitor lab/diagnostic results  - Monitor all insertion sites, i e  indwelling lines, tubes, and drains  - Monitor endotracheal if appropriate and nasal secretions for changes in amount and color  - [place] appropriate cooling/warming therapies per order  - Administer medications as ordered  - Instruct and encourage patient and family to use good hand hygiene technique  - Identify and instruct in appropriate isolation precautions for identified infection/condition  Outcome: Progressing     Problem: SAFETY ADULT  Goal: Patient will remain free of falls  Description: INTERVENTIONS:  - Assess patient frequently for physical needs  -  Identify cognitive and physical deficits and behaviors that affect risk of falls    -  [place] fall precautions as indicated by assessment   - Educate patient/family on patient safety including physical limitations  - Instruct patient to call for assistance with activity based on assessment  - Modify environment to reduce risk of injury  - Consider OT/PT consult to assist with strengthening/mobility  Outcome: Progressing  Goal: Maintain or return to baseline ADL function  Description: INTERVENTIONS:  -  Assess patient's ability to carry out ADLs; assess patient's baseline for ADL function and identify physical deficits which impact ability to perform ADLs (bathing, care of mouth/teeth, toileting, grooming, dressing, etc )  - Assess/evaluate cause of self-care deficits   - Assess range of motion  - Assess patient's mobility; develop plan if impaired  - Assess patient's need for assistive devices and provide as appropriate  - Encourage maximum independence but intervene and supervise when necessary  - Involve family in performance of ADLs  - Assess for home care needs following discharge   - Consider OT consult to assist with ADL evaluation and planning for discharge  - Provide patient education as appropriate  Outcome: Progressing  Goal: Maintain or return mobility status to optimal level  Description: INTERVENTIONS:  - Assess patient's baseline mobility status (ambulation, transfers, stairs, etc )    - Identify cognitive and physical deficits and behaviors that affect mobility  - Identify mobility aids required to assist with transfers and/or ambulation (gait belt, sit-to-stand, lift, walker, cane, etc )  - [place] fall precautions as indicated by assessment  - Record patient progress and toleration of activity level on Mobility SBAR; progress patient to next Phase/Stage  - Instruct patient to call for assistance with activity based on assessment  - Consider rehabilitation consult to assist with strengthening/weightbearing, etc   Outcome: Progressing     Problem: DISCHARGE PLANNING  Goal: Discharge to home or other facility with appropriate resources  Description: INTERVENTIONS:  - Identify barriers to discharge w/patient and caregiver  - Arrange for needed discharge resources and transportation as appropriate  - Identify discharge learning needs (meds, wound care, etc )  - Arrange for interpretive services to assist at discharge as needed  - Refer to Case Management Department for coordinating discharge planning if the patient needs post-hospital services based on physician/advanced practitioner order or complex needs related to functional status, cognitive ability, or social support system  Outcome: Progressing     Problem: Knowledge Deficit  Goal: Patient/family/caregiver demonstrates understanding of disease process, treatment plan, medications, and discharge instructions  Description: Complete learning assessment and assess knowledge base  Interventions:  - Provide teaching at level of understanding  - Provide teaching via preferred learning methods  Outcome: Progressing     Problem: Potential for Falls  Goal: Patient will remain free of falls  Description: INTERVENTIONS:  - Assess patient frequently for physical needs  -  Identify cognitive and physical deficits and behaviors that affect risk of falls    -  [place] fall precautions as indicated by assessment   - Educate patient/family on patient safety including physical limitations  - Instruct patient to call for assistance with activity based on assessment  - Modify environment to reduce risk of injury  - Consider OT/PT consult to assist with strengthening/mobility  Outcome: Progressing

## 2021-01-25 NOTE — CASE MANAGEMENT
LOS: 2 DAYS  BUNDLE: NO  READMISSION: NO  UNPLANNED READMISSION SCORE: 14 AND GREEN    CM met with patient at bedside  Explained role of CM to pt  CM obtained the following information from the patient  HOME: [place] W/ [address] to enter home  LIVES WITH: wife and daughter   ADLs:  Independent with all ADL's  MEALS: Independent  DME: None   HHC/VNA: No history  STR: No history  MH concerns: Denies history  D&A concerns: Denies history  INCOME SOURCE: Works Full time  PCP: Rosalie Long  PHARMACY: [facility] in [place]  POA/LW: No    DRIVES: Yes  TRANSPORT at DC: Pt will drives self when d/c    CM reviewed d/c planning process including the following: identifying help at home, patient preference for d/c planning needs, Discharge Lounge, [facility] Meds to Bed program, availability of treatment team to discuss questions or concerns patient and/or family may have regarding understanding medications and recognizing signs and symptoms once discharged  CM also encouraged patient to follow up with all recommended appointments after discharge  Patient advised of importance for patient and family to participate in managing patients medical well being  Cm reviewed pt care coordination rounds with Dr Nydia Aranda  Pt is not medically cleared for d/c  Anticipating d/c in 48 hrs  Cm department will continue to follow patient through discharge

## 2021-01-25 NOTE — OCCUPATIONAL THERAPY NOTE
Occupational Therapy Screen        Patient Name: Gia Dumont  CWRKG'S Date: 1/25/2021      OT consult received  Per NSG and PT eval pt is independent w/ ADLS ,functional transfers and mobility w/ no inpt OT needs warranted at this time  D/C OT      Magdalena Brooks MS, OTR/L

## 2021-01-26 LAB
ANION GAP SERPL CALCULATED.3IONS-SCNC: 5 MMOL/L (ref 4–13)
BASOPHILS # BLD AUTO: 0.02 THOUSANDS/ΜL (ref 0–0.1)
BASOPHILS NFR BLD AUTO: 0 % (ref 0–1)
BUN SERPL-MCNC: 22 MG/DL (ref 5–25)
CALCIUM SERPL-MCNC: 9.5 MG/DL (ref 8.3–10.1)
CHLORIDE SERPL-SCNC: 102 MMOL/L (ref 100–108)
CO2 SERPL-SCNC: 34 MMOL/L (ref 21–32)
CREAT SERPL-MCNC: 0.85 MG/DL (ref 0.6–1.3)
EOSINOPHIL # BLD AUTO: 0.09 THOUSAND/ΜL (ref 0–0.61)
EOSINOPHIL NFR BLD AUTO: 2 % (ref 0–6)
ERYTHROCYTE [DISTWIDTH] IN BLOOD BY AUTOMATED COUNT: 14 % (ref 11.6–15.1)
GFR SERPL CREATININE-BSD FRML MDRD: 91 ML/MIN/1.73SQ M
GLUCOSE SERPL-MCNC: 126 MG/DL (ref 65–140)
HCT VFR BLD AUTO: 45.7 % (ref 36.5–49.3)
HGB BLD-MCNC: 14.9 G/DL (ref 12–17)
IMM GRANULOCYTES # BLD AUTO: 0.01 THOUSAND/UL (ref 0–0.2)
IMM GRANULOCYTES NFR BLD AUTO: 0 % (ref 0–2)
LYMPHOCYTES # BLD AUTO: 1.2 THOUSANDS/ΜL (ref 0.6–4.47)
LYMPHOCYTES NFR BLD AUTO: 26 % (ref 14–44)
MAGNESIUM SERPL-MCNC: 2.2 MG/DL (ref 1.6–2.6)
MCH RBC QN AUTO: 29.8 PG (ref 26.8–34.3)
MCHC RBC AUTO-ENTMCNC: 32.6 G/DL (ref 31.4–37.4)
MCV RBC AUTO: 91 FL (ref 82–98)
MONOCYTES # BLD AUTO: 0.39 THOUSAND/ΜL (ref 0.17–1.22)
MONOCYTES NFR BLD AUTO: 9 % (ref 4–12)
NEUTROPHILS # BLD AUTO: 2.86 THOUSANDS/ΜL (ref 1.85–7.62)
NEUTS SEG NFR BLD AUTO: 63 % (ref 43–75)
NRBC BLD AUTO-RTO: 0 /100 WBCS
PLATELET # BLD AUTO: 195 THOUSANDS/UL (ref 149–390)
PMV BLD AUTO: 9.9 FL (ref 8.9–12.7)
POTASSIUM SERPL-SCNC: 3.7 MMOL/L (ref 3.5–5.3)
RBC # BLD AUTO: 5 MILLION/UL (ref 3.88–5.62)
SODIUM SERPL-SCNC: 141 MMOL/L (ref 136–145)
WBC # BLD AUTO: 4.57 THOUSAND/UL (ref 4.31–10.16)

## 2021-01-26 PROCEDURE — 99232 SBSQ HOSP IP/OBS MODERATE 35: CPT | Performed by: INTERNAL MEDICINE

## 2021-01-26 PROCEDURE — 85025 COMPLETE CBC W/AUTO DIFF WBC: CPT | Performed by: INTERNAL MEDICINE

## 2021-01-26 PROCEDURE — 94002 VENT MGMT INPAT INIT DAY: CPT

## 2021-01-26 PROCEDURE — 83735 ASSAY OF MAGNESIUM: CPT | Performed by: INTERNAL MEDICINE

## 2021-01-26 PROCEDURE — 80048 BASIC METABOLIC PNL TOTAL CA: CPT | Performed by: INTERNAL MEDICINE

## 2021-01-26 RX ORDER — FUROSEMIDE 10 MG/ML
60 INJECTION INTRAMUSCULAR; INTRAVENOUS
Status: COMPLETED | OUTPATIENT
Start: 2021-01-26 | End: 2021-01-26

## 2021-01-26 RX ORDER — FUROSEMIDE 10 MG/ML
60 INJECTION INTRAMUSCULAR; INTRAVENOUS
Status: DISCONTINUED | OUTPATIENT
Start: 2021-01-26 | End: 2021-01-26

## 2021-01-26 RX ADMIN — Medication 2000 UNITS: at 09:33

## 2021-01-26 RX ADMIN — DOCUSATE SODIUM 100 MG: 100 CAPSULE, LIQUID FILLED ORAL at 09:33

## 2021-01-26 RX ADMIN — OXYCODONE HYDROCHLORIDE AND ACETAMINOPHEN 1000 MG: 500 TABLET ORAL at 20:08

## 2021-01-26 RX ADMIN — ZINC SULFATE 220 MG (50 MG) CAPSULE 220 MG: CAPSULE at 09:34

## 2021-01-26 RX ADMIN — PREGABALIN 100 MG: 100 CAPSULE ORAL at 20:09

## 2021-01-26 RX ADMIN — PREGABALIN 100 MG: 100 CAPSULE ORAL at 09:33

## 2021-01-26 RX ADMIN — OXYCODONE HYDROCHLORIDE AND ACETAMINOPHEN 1000 MG: 500 TABLET ORAL at 09:33

## 2021-01-26 RX ADMIN — TAMSULOSIN HYDROCHLORIDE 0.4 MG: 0.4 CAPSULE ORAL at 17:12

## 2021-01-26 RX ADMIN — FUROSEMIDE 60 MG: 10 INJECTION, SOLUTION INTRAMUSCULAR; INTRAVENOUS at 14:40

## 2021-01-26 RX ADMIN — ALLOPURINOL 100 MG: 100 TABLET ORAL at 09:34

## 2021-01-26 RX ADMIN — POTASSIUM CHLORIDE 40 MEQ: 1500 TABLET, EXTENDED RELEASE ORAL at 09:33

## 2021-01-26 RX ADMIN — FUROSEMIDE 60 MG: 10 INJECTION, SOLUTION INTRAMUSCULAR; INTRAVENOUS at 17:12

## 2021-01-26 RX ADMIN — METOPROLOL SUCCINATE 50 MG: 50 TABLET, EXTENDED RELEASE ORAL at 20:09

## 2021-01-26 RX ADMIN — DOCUSATE SODIUM 100 MG: 100 CAPSULE, LIQUID FILLED ORAL at 17:12

## 2021-01-26 RX ADMIN — RIVAROXABAN 20 MG: 20 TABLET, FILM COATED ORAL at 09:33

## 2021-01-26 RX ADMIN — PREGABALIN 100 MG: 100 CAPSULE ORAL at 17:19

## 2021-01-26 NOTE — PLAN OF CARE
Problem: PAIN - ADULT  Goal: Verbalizes/displays adequate comfort level or baseline comfort level  Description: Interventions:  - Encourage patient to monitor pain and request assistance  - Assess pain using appropriate pain scale  - Administer analgesics based on type and severity of pain and evaluate response  - Implement non-pharmacological measures as appropriate and evaluate response  - Consider cultural and social influences on pain and pain management  - Notify physician/advanced practitioner if interventions unsuccessful or patient reports new pain  Outcome: Progressing     Problem: INFECTION - ADULT  Goal: Absence or prevention of progression during hospitalization  Description: INTERVENTIONS:  - Assess and monitor for signs and symptoms of infection  - Monitor lab/diagnostic results  - Monitor all insertion sites, i e  indwelling lines, tubes, and drains  - Monitor endotracheal if appropriate and nasal secretions for changes in amount and color  - [place] appropriate cooling/warming therapies per order  - Administer medications as ordered  - Instruct and encourage patient and family to use good hand hygiene technique  - Identify and instruct in appropriate isolation precautions for identified infection/condition  Outcome: Progressing     Problem: SAFETY ADULT  Goal: Patient will remain free of falls  Description: INTERVENTIONS:  - Assess patient frequently for physical needs  -  Identify cognitive and physical deficits and behaviors that affect risk of falls    -  [place] fall precautions as indicated by assessment   - Educate patient/family on patient safety including physical limitations  - Instruct patient to call for assistance with activity based on assessment  - Modify environment to reduce risk of injury  - Consider OT/PT consult to assist with strengthening/mobility  Outcome: Progressing  Goal: Maintain or return to baseline ADL function  Description: INTERVENTIONS:  -  Assess patient's ability to carry out ADLs; assess patient's baseline for ADL function and identify physical deficits which impact ability to perform ADLs (bathing, care of mouth/teeth, toileting, grooming, dressing, etc )  - Assess/evaluate cause of self-care deficits   - Assess range of motion  - Assess patient's mobility; develop plan if impaired  - Assess patient's need for assistive devices and provide as appropriate  - Encourage maximum independence but intervene and supervise when necessary  - Involve family in performance of ADLs  - Assess for home care needs following discharge   - Consider OT consult to assist with ADL evaluation and planning for discharge  - Provide patient education as appropriate  Outcome: Progressing  Goal: Maintain or return mobility status to optimal level  Description: INTERVENTIONS:  - Assess patient's baseline mobility status (ambulation, transfers, stairs, etc )    - Identify cognitive and physical deficits and behaviors that affect mobility  - Identify mobility aids required to assist with transfers and/or ambulation (gait belt, sit-to-stand, lift, walker, cane, etc )  - [place] fall precautions as indicated by assessment  - Record patient progress and toleration of activity level on Mobility SBAR; progress patient to next Phase/Stage  - Instruct patient to call for assistance with activity based on assessment  - Consider rehabilitation consult to assist with strengthening/weightbearing, etc   Outcome: Progressing     Problem: DISCHARGE PLANNING  Goal: Discharge to home or other facility with appropriate resources  Description: INTERVENTIONS:  - Identify barriers to discharge w/patient and caregiver  - Arrange for needed discharge resources and transportation as appropriate  - Identify discharge learning needs (meds, wound care, etc )  - Arrange for interpretive services to assist at discharge as needed  - Refer to Case Management Department for coordinating discharge planning if the patient needs post-hospital services based on physician/advanced practitioner order or complex needs related to functional status, cognitive ability, or social support system  Outcome: Progressing     Problem: Knowledge Deficit  Goal: Patient/family/caregiver demonstrates understanding of disease process, treatment plan, medications, and discharge instructions  Description: Complete learning assessment and assess knowledge base  Interventions:  - Provide teaching at level of understanding  - Provide teaching via preferred learning methods  Outcome: Progressing     Problem: Potential for Falls  Goal: Patient will remain free of falls  Description: INTERVENTIONS:  - Assess patient frequently for physical needs  -  Identify cognitive and physical deficits and behaviors that affect risk of falls    -  [place] fall precautions as indicated by assessment   - Educate patient/family on patient safety including physical limitations  - Instruct patient to call for assistance with activity based on assessment  - Modify environment to reduce risk of injury  - Consider OT/PT consult to assist with strengthening/mobility  Outcome: Progressing

## 2021-01-26 NOTE — ASSESSMENT & PLAN NOTE
Wt Readings from Last 3 Encounters:   01/25/21 132 kg (292 lb 1 8 oz)   01/22/21 (!) 147 kg (324 lb)   01/14/21 (!) 146 kg (321 lb)     · Acute on chronic diastolic CHF currently on IV furosemide 60 mg b i d    · Cardiology following

## 2021-01-26 NOTE — ASSESSMENT & PLAN NOTE
Wt Readings from Last 3 Encounters:   01/26/21 129 kg (284 lb)   01/22/21 (!) 147 kg (324 lb)   01/14/21 (!) 146 kg (321 lb)     · Acute on chronic diastolic CHF currently on IV furosemide 60 mg but increase to t i d  cy cardiology

## 2021-01-26 NOTE — PROGRESS NOTES
Progress Note - Rolf Marsh 1955, 76 y o  male MRN: [id number]    Unit/Bed#: E4 -01 Encounter: [id number]    Primary Care Provider: Becky Yi MD   Date and time admitted to hospital: 1/22/2021  4:22 PM        * Acute diastolic heart failure [facility])  Assessment & Plan  Wt Readings from Last 3 Encounters:   01/25/21 132 kg (292 lb 1 8 oz)   01/22/21 (!) 147 kg (324 lb)   01/14/21 (!) 146 kg (321 lb)     · Acute on chronic diastolic CHF currently on IV furosemide 60 mg b i d  · Cardiology following    Shortness of breath with exposure to COVID-19 virus  Assessment & Plan  · Shortness of breath with exposure to COVID-19  · Current symptoms appears secondary to decompensated CHF  COVID 19 remains negative  Lab Results   Component Value Date    SARSCOV2 Negative 01/24/2021    SARSCOV2 Negative 01/22/2021       Obesity, Class III, BMI 40-49 9 (morbid obesity) ([facility] [address] )  Assessment & Plan  · Body mass index is 43 14 kg/m²  Atrial fibrillation (HCC)  Assessment & Plan  · Chronic atrial fibrillation rate controlled on metoprolol  · Continue Xarelto for anticoagulation      VTE Pharmacologic Prophylaxis:   High Risk (Score >/= 5) - Pharmacological DVT Prophylaxis Ordered: Rivaroxaban (Xarelto)  Sequential Compression Devices Ordered  Patient Centered Rounds: I have performed bedside rounds with nursing staff today  Discussions with Specialists or Other Care Team Provider:  Case management    Education and Discussions with Family / Patient:     Time Spent for Care: 25 mins  More than 50% of total time spent on counseling and coordination of care as described above      Current Length of Stay: 3 day(s)  Current Patient Status: Inpatient   Certification Statement: The patient will continue to require additional inpatient hospital stay due to decompensated CHF  Discharge Plan / Estimated Discharge Date: Anticipate discharge in 48-72 hrs to TBD    Code Status: Level 1 - Full Code      Subjective: Patient seen and examined  Leg still swollen  Otherwise independent  Doing okay    Objective:   Vitals: Blood pressure 131/78, pulse 72, temperature 97 7 °F (36 5 °C), temperature source Temporal, resp  rate 18, height 5' 9" (1 753 m), weight 132 kg (292 lb 1 8 oz), SpO2 96 %  Physical Exam  Vitals signs reviewed  Constitutional:       General: He is not in acute distress  HENT:      Head: Atraumatic  Cardiovascular:      Rate and Rhythm: Regular rhythm  Heart sounds: Normal heart sounds  Pulmonary:      Effort: Pulmonary effort is normal       Breath sounds: Decreased breath sounds present  No wheezing  Abdominal:      General: Bowel sounds are normal       Palpations: Abdomen is soft  Tenderness: There is no abdominal tenderness  There is no rebound  Musculoskeletal:         General: Swelling (2-3+ lower extremities bilaterally) present  No tenderness  Skin:     General: Skin is warm and dry  Neurological:      General: No focal deficit present  Mental Status: He is alert  Cranial Nerves: No cranial nerve deficit     Psychiatric:         Mood and Affect: Mood normal        Additional Data:   Labs:  Results from last 7 days   Lab Units 01/25/21  0556 01/24/21  0601 01/23/21  0458 01/22/21  1659   WBC Thousand/uL 4 51 3 96* 3 23* 5 05   HEMOGLOBIN g/dL 13 1 12 6 11 7* 12 1   HEMATOCRIT % 41 0 40 4 36 8 37 2   MCV fL 92 94 93 92   PLATELETS Thousands/uL 167 154 138* 164   INR   --   --   --  1 59*     Results from last 7 days   Lab Units 01/25/21  0556 01/24/21  0601 01/23/21 0458 01/22/21  1659   SODIUM mmol/L 142 144 142 142   POTASSIUM mmol/L 3 7 3 8 3 3* 3 5   CHLORIDE mmol/L 104 106 107 105   CO2 mmol/L 32 33* 33* 31   ANION GAP mmol/L 6 5 2* 6   BUN mg/dL 18 16 13 19   CREATININE mg/dL 0 78 0 85 0 73 0 77   CALCIUM mg/dL 9 2 9 2 8 8 9 0   ALBUMIN g/dL  --   --   --  3 3*   TOTAL BILIRUBIN mg/dL  --   --   --  0 46   ALK PHOS U/L  --   --   --  73   ALT U/L  --   -- --  28   AST U/L  --   --   --  22   EGFR ml/min/1 73sq m 94 91 97 95   GLUCOSE RANDOM mg/dL 92 94 98 92     Results from last 7 days   Lab Units 01/25/21  0556 01/24/21  0601 01/23/21  0458   MAGNESIUM mg/dL 2 1 2 3 2 1     Results from last 7 days   Lab Units 01/23/21  0037 01/22/21  2146 01/22/21  1851   TROPONIN I ng/mL <0 02 <0 02 <0 02     Results from last 7 days   Lab Units 01/22/21  1659   NT-PRO BNP pg/mL 1,536*                      * I Have Reviewed All Lab Data Listed Above  Cultures:   Results from last 7 days   Lab Units 01/24/21  1324 01/22/21  1659   INFLUENZA A PCR  Negative Negative       Results from last 7 days   Lab Units 01/24/21  1324 01/22/21  1659   SARS-COV-2  Negative Negative   INFLUENZA A PCR  Negative Negative   INFLUENZA B PCR  Negative Negative   RSV PCR  Negative Negative           Lines/Drains:  Invasive Devices     Peripheral Intravenous Line            Peripheral IV 01/24/21 Dorsal (posterior); Left Forearm 1 day              Telemetry:   Telemetry Orders (From admission, onward)             48 Hour Telemetry Monitoring  Continuous x 48 hours     Question:  Reason for 48 Hour Telemetry  Answer:  Acute Decompensated CHF (continuous diuretic infusion or total diuretic dose > 200 mg daily, associated electrolyte derangement, ionotropic drip, history of ventricular arrhythmia, or new EF <35%)                  Imaging:  Imaging Reports Reviewed Today Include:   Xr Chest 1 View Portable    Result Date: 1/23/2021  Impression: Cardiomegaly  No acute cardiopulmonary disease   Workstation performed: [id number]     Scheduled Meds:  Current Facility-Administered Medications   Medication Dose Route Frequency Provider Last Rate    acetaminophen  650 mg Oral Q4H PRN Maida Makenna, DO      allopurinol  100 mg Oral Daily Denys Viveros DO      ammonium lactate   Topical BID PRN Flavio Juares MD      ascorbic acid  1,000 mg Oral Q12H [facility] Denys Dozier,       cholecalciferol  2,000 Units Oral Daily Denys Haywood, DO      docusate sodium  100 mg Oral BID Denys Viveros, DO      furosemide  60 mg Intravenous BID (diuretic) Denys Haywood, DO      metoprolol succinate  50 mg Oral BID Carlton Meyer MD      morphine injection  2 mg Intravenous Q1H PRN Rosa Viverosis, DO      zinc sulfate  220 mg Oral Daily Denys Viveros,       Followed by   César Quiles ON 1/30/2021] multivitamin-minerals  1 tablet Oral Daily Denys Viveros, DO      ondansetron  4 mg Intravenous Q6H PRN Rosa Brasher, DO      oxyCODONE  5 mg Oral Q4H PRN Rosa Brasher, DO      potassium chloride  40 mEq Oral Daily Denys Viveros, DO      pregabalin  100 mg Oral TID Denys Viveros, DO      rivaroxaban  20 mg Oral Daily With Breakfast Denys Viveros, DO      tamsulosin  0 4 mg Oral Daily With Dinner Denys Viveros, DO      traMADol  50 mg Oral Q6H PRN Denys Rivera, DO         Ricardo Riding, DO  [facility]  Hospitalist    ** Please Note: This note has been constructed using a voice recognition system   **

## 2021-01-26 NOTE — ASSESSMENT & PLAN NOTE
· Shortness of breath with exposure to COVID-19  · Current symptoms appears secondary to decompensated CHF  · COVID 19 remains negative      Lab Results   Component Value Date    SARSCOV2 Negative 01/24/2021    SARSCOV2 Negative 01/22/2021

## 2021-01-26 NOTE — ASSESSMENT & PLAN NOTE
· Shortness of breath with exposure to COVID-19  · Current symptoms appears secondary to decompensated CHF  COVID 19 remains negative      Lab Results   Component Value Date    SARSCOV2 Negative 01/24/2021    SARSCOV2 Negative 01/22/2021

## 2021-01-26 NOTE — PROGRESS NOTES
Cardiology Progress Note   MD Darlyn Ashley MD Ettie Shaw, DO, [facility] MD Tanika Sr DO, Ela Soto DO, [facility]  ----------------------------------------------------------------  [address] PrésMaco Saeed [age] y o  male MRN: [id number]  Unit/Bed#: E4 -01 Encounter: [id number]      ASSESSMENT:   · Acute on chronic diastolic heart failure  · Persistent atrial fibrillation (long standing since before October 2019) on Xarelto  · LVEF 55%, mild LVH, mild RV dilatation, mild LA and mild to moderate RA dilatation, trace MR/TR, January 2021  · History of bilateral venous insufficiency status post LE vein ablation  · Pharmacologic nuclear stress test negative for myocardial ischemia with gated EF 50%, September 2020  · Holter with AF, avg HR 92 bpm, rare VPCs, September 2020  · Abnormal ECG with Bifascicular block  · Moderate pulmonary hypertension  · Lymphedema  · Morbid obesity  · ROBERT on 20/14 cm BiPAP    PLAN:  Patient still has +1 pedal edema with trace pitting pretibial edema  Shortness of breath is markedly improved  He is down over 35 lb from admission  Would try to additional doses of IV diuretic with probable transition to oral diuretic tomorrow  Morning labs remain stable  Continue strict I/Os and daily weights  Echocardiogram demonstrated normal left ventricular function without significant valvular disease  Continue Xarelto and Toprol-XL for atrial fibrillation  Heart rate appears to be well controlled today  May benefit from eventual outpatient ablation  Salt restriction has been counseled and patient will follow-up for treatment of sleep apnea  Weight loss has been encouraged  Would consider repeat EP evaluation in the near future to discuss potential ablation     Signed: Igor Simental DO, [facility]      History of Present Illness:  Patient seen and examined    Breathing and swelling has significantly improved, but he still has some lower extremity edema  Denies chest pain, pressure, tightness or squeezing  Denies orthopnea or paroxysmal nocturnal dyspnea  Denies lightheadedness or dizziness  Review of Systems:  Review of Systems   Constitution: Negative for decreased appetite, fever, weight gain and weight loss  HENT: Negative for congestion and sore throat  Eyes: Negative for visual disturbance  Cardiovascular: Negative for chest pain, dyspnea on exertion, leg swelling, near-syncope and palpitations  Respiratory: Negative for cough and shortness of breath  Hematologic/Lymphatic: Negative for bleeding problem  Skin: Negative for rash  Musculoskeletal: Negative for myalgias and neck pain  Gout:    Gastrointestinal: Negative for abdominal pain and nausea  Neurological: Negative for light-headedness and weakness  Psychiatric/Behavioral: Negative for depression  Allergies   Allergen Reactions    Penicillins Anaphylaxis    Sulfa Antibiotics Anaphylaxis       No current facility-administered medications on file prior to encounter        Current Outpatient Medications on File Prior to Encounter   Medication Sig    allopurinol (ZYLOPRIM) 100 mg tablet Take 100 mg by mouth daily    ammonium lactate (AMLACTIN) 12 % lotion Apply topically 2 (two) times a day as needed for dry skin    clindamycin (CLEOCIN) 300 MG capsule TAKE 2 CAPSULES BY MOUTH 1 HOUR PRIOR TO APPOINTMENT    CoenzymeQ10-Isoleucine-Glycine (CO Q-10) 100-50-25 MG TB24 Co Q-10    colchicine (COLCRYS) 0 6 mg tablet     ferrous sulfate 325 (65 Fe) mg tablet Take 325 mg by mouth every other day    GARCINIA CAMBOGIA-CHROMIUM PO Take 750 mg by mouth 2 (two) times a day     Glucosamine-Chondroit-Vit C-Mn (Glucosamine 1500 Complex) CAPS Take by mouth    Green Coffee Silva-Yerba Mate (GREEN COFFEE BEAN EXTRACT PO) Take 800 mg by mouth daily     halobetasol (ULTRAVATE) 0 05 % ointment APPLY TO AFFECTED AREA ON LEG TWICE DAILY    metoprolol tartrate (LOPRESSOR) 25 mg tablet Take 1 tablet (25 mg total) by mouth every 12 (twelve) hours    patient supplied medication Take 1 each by mouth 2 (two) times a day    patient supplied medication Take 1 each by mouth 2 (two) times a day    pregabalin (LYRICA) 100 mg capsule Take 100 mg by mouth 3 (three) times a day    rivaroxaban (XARELTO) 20 mg tablet Take 20 mg by mouth daily    torsemide (DEMADEX) 20 mg tablet Take 1 tablet (20 mg total) by mouth 2 (two) times a day    traMADol (ULTRAM) 50 mg tablet Take 50 mg by mouth every 6 (six) hours as needed for moderate pain    Turmeric Curcumin 500 MG CAPS Take 500 mg by mouth daily    VITAMIN D PO Take by mouth    Acetaminophen (TYLENOL EXTRA STRENGTH PO) Take by mouth    alfuzosin (UROXATRAL) 10 mg 24 hr tablet     L-ARGININE-500 PO Take 500 mg by mouth 2 (two) times a day     Multiple Vitamins-Minerals (OCUVITE EXTRA PO) Take 1 tablet by mouth daily      RASPBERRY KETONES PO Take 100 mg by mouth 2 (two) times a day    tadalafil (CIALIS) 5 MG tablet Take 5 mg by mouth daily as needed for erectile dysfunction        Current Facility-Administered Medications   Medication Dose Route Frequency Provider Last Rate    acetaminophen  650 mg Oral Q4H PRN Paloma Clipper, DO      allopurinol  100 mg Oral Daily Denys Viveros, DO      ammonium lactate   Topical BID PRN Danica Tadeo MD      ascorbic acid  1,000 mg Oral Q12H [address] Paloma Clipper, DO      cholecalciferol  2,000 Units Oral Daily Denys Viveros DO      docusate sodium  100 mg Oral BID Denys Viveros, DO      metoprolol succinate  50 mg Oral BID Jose Antonio Guevara MD      morphine injection  2 mg Intravenous Q1H PRN Paloma Clipper, DO      zinc sulfate  220 mg Oral Daily Denys Viveros DO      Followed by   Toro Deluca ON 1/30/2021] multivitamin-minerals  1 tablet Oral Daily Denys Viveros,       ondansetron  4 mg Intravenous Q6H PRN Paloma Clipper, DO      oxyCODONE  5 mg Oral Q4H PRN Jenet Sero, DO      potassium chloride  40 mEq Oral Daily Denysbritney Viveros, DO      pregabalin  100 mg Oral TID Denysbritney Viveros, DO      rivaroxaban  20 mg Oral Daily With Breakfast Denys Viveros, DO      tamsulosin  0 4 mg Oral Daily With Dinner Denys Viveros, DO      traMADol  50 mg Oral Q6H PRN Jenet Sero, DO              Vitals:    01/25/21 2253 01/25/21 2341 01/26/21 0500 01/26/21 0600   BP: 112/70 93/61     BP Location:  Right arm     Pulse: 82 81     Resp:  18     Temp:  98 °F (36 7 °C)     TempSrc:  Temporal     SpO2:  92%     Weight:   129 kg (284 lb) 129 kg (284 lb)   Height:             Intake/Output Summary (Last 24 hours) at 1/26/2021 0841  Last data filed at 1/26/2021 5913  Gross per 24 hour   Intake 50 ml   Output 3000 ml   Net -2950 ml       Weight change: -3 678 kg (-8 lb 1 8 oz)    PHYSICAL EXAMINATION:  Gen: Awake, Alert, NAD  HEENT: AT/NC, Anicteric, mmm  Neck: Supple, No elevated JVP  Resp: CTA bilaterally no w/r/r  CV: Irreg irreg +S1, S2, No m/r/g  Abd: Soft, obese, NT/ND + BS  Ext: warm, +1 pitting pedal and trace LE edema bilaterally w/ lymphedema  Neuro: Follows commands, moves all extermities  Psych: Appropriate affect    Lab Results:  Results from last 7 days   Lab Units 01/25/21  0556   WBC Thousand/uL 4 51   HEMOGLOBIN g/dL 13 1   HEMATOCRIT % 41 0   PLATELETS Thousands/uL 167     Results from last 7 days   Lab Units 01/25/21  0556  01/22/21  1659   POTASSIUM mmol/L 3 7   < > 3 5   CHLORIDE mmol/L 104   < > 105   CO2 mmol/L 32   < > 31   BUN mg/dL 18   < > 19   CREATININE mg/dL 0 78   < > 0 77   CALCIUM mg/dL 9 2   < > 9 0   ALK PHOS U/L  --   --  73   ALT U/L  --   --  28   AST U/L  --   --  22    < > = values in this interval not displayed       No results found for: TROPONINT      Results from last 7 days   Lab Units 01/23/21  0037 01/22/21  2146 01/22/21  1851   TROPONIN I ng/mL <0 02 <0 02 <0 02     Results from last 7 days   Lab Units 01/22/21  1659   INR  1 59*       Tele: AF w/ CVR 60s to 100s    This note was completed in part utilizing M-Modal Fluency Direct Software  Grammatical errors, random word insertions, spelling mistakes, and incomplete sentences may be an occasional consequence of this system secondary to software limitations, ambient noise, and hardware issues  If you have any questions or concerns about the content, text, or information contained within the body of this dictation, please contact the provider for clarification

## 2021-01-26 NOTE — PLAN OF CARE
Problem: PAIN - ADULT  Goal: Verbalizes/displays adequate comfort level or baseline comfort level  Description: Interventions:  - Encourage patient to monitor pain and request assistance  - Assess pain using appropriate pain scale  - Administer analgesics based on type and severity of pain and evaluate response  - Implement non-pharmacological measures as appropriate and evaluate response  - Consider cultural and social influences on pain and pain management  - Notify physician/advanced practitioner if interventions unsuccessful or patient reports new pain  Outcome: Progressing     Problem: INFECTION - ADULT  Goal: Absence or prevention of progression during hospitalization  Description: INTERVENTIONS:  - Assess and monitor for signs and symptoms of infection  - Monitor lab/diagnostic results  - Monitor all insertion sites, i e  indwelling lines, tubes, and drains  - Monitor endotracheal if appropriate and nasal secretions for changes in amount and color  - [place] appropriate cooling/warming therapies per order  - Administer medications as ordered  - Instruct and encourage patient and family to use good hand hygiene technique  - Identify and instruct in appropriate isolation precautions for identified infection/condition  Outcome: Progressing     Problem: SAFETY ADULT  Goal: Patient will remain free of falls  Description: INTERVENTIONS:  - Assess patient frequently for physical needs  -  Identify cognitive and physical deficits and behaviors that affect risk of falls    -  [place] fall precautions as indicated by assessment   - Educate patient/family on patient safety including physical limitations  - Instruct patient to call for assistance with activity based on assessment  - Modify environment to reduce risk of injury  - Consider OT/PT consult to assist with strengthening/mobility  Outcome: Progressing  Goal: Maintain or return to baseline ADL function  Description: INTERVENTIONS:  -  Assess patient's ability to carry out ADLs; assess patient's baseline for ADL function and identify physical deficits which impact ability to perform ADLs (bathing, care of mouth/teeth, toileting, grooming, dressing, etc )  - Assess/evaluate cause of self-care deficits   - Assess range of motion  - Assess patient's mobility; develop plan if impaired  - Assess patient's need for assistive devices and provide as appropriate  - Encourage maximum independence but intervene and supervise when necessary  - Involve family in performance of ADLs  - Assess for home care needs following discharge   - Consider OT consult to assist with ADL evaluation and planning for discharge  - Provide patient education as appropriate  Outcome: Progressing  Goal: Maintain or return mobility status to optimal level  Description: INTERVENTIONS:  - Assess patient's baseline mobility status (ambulation, transfers, stairs, etc )    - Identify cognitive and physical deficits and behaviors that affect mobility  - Identify mobility aids required to assist with transfers and/or ambulation (gait belt, sit-to-stand, lift, walker, cane, etc )  - [place] fall precautions as indicated by assessment  - Record patient progress and toleration of activity level on Mobility SBAR; progress patient to next Phase/Stage  - Instruct patient to call for assistance with activity based on assessment  - Consider rehabilitation consult to assist with strengthening/weightbearing, etc   Outcome: Progressing     Problem: DISCHARGE PLANNING  Goal: Discharge to home or other facility with appropriate resources  Description: INTERVENTIONS:  - Identify barriers to discharge w/patient and caregiver  - Arrange for needed discharge resources and transportation as appropriate  - Identify discharge learning needs (meds, wound care, etc )  - Arrange for interpretive services to assist at discharge as needed  - Refer to Case Management Department for coordinating discharge planning if the patient needs post-hospital services based on physician/advanced practitioner order or complex needs related to functional status, cognitive ability, or social support system  Outcome: Progressing     Problem: Knowledge Deficit  Goal: Patient/family/caregiver demonstrates understanding of disease process, treatment plan, medications, and discharge instructions  Description: Complete learning assessment and assess knowledge base  Interventions:  - Provide teaching at level of understanding  - Provide teaching via preferred learning methods  Outcome: Progressing     Problem: Potential for Falls  Goal: Patient will remain free of falls  Description: INTERVENTIONS:  - Assess patient frequently for physical needs  -  Identify cognitive and physical deficits and behaviors that affect risk of falls    -  [place] fall precautions as indicated by assessment   - Educate patient/family on patient safety including physical limitations  - Instruct patient to call for assistance with activity based on assessment  - Modify environment to reduce risk of injury  - Consider OT/PT consult to assist with strengthening/mobility  Outcome: Progressing

## 2021-01-26 NOTE — PROGRESS NOTES
Progress Note - Edilberto Hutchinson 1955, 76 y o  male MRN: [id number]    Unit/Bed#: E4 -01 Encounter: [id number]    Primary Care Provider: Elidia Stoll MD   Date and time admitted to hospital: 1/22/2021  4:22 PM        * Acute diastolic heart failure [facility])  Assessment & Plan  Wt Readings from Last 3 Encounters:   01/26/21 129 kg (284 lb)   01/22/21 (!) 147 kg (324 lb)   01/14/21 (!) 146 kg (321 lb)     · Acute on chronic diastolic CHF currently on IV furosemide 60 mg but increase to t i d  cy cardiology    Shortness of breath with exposure to COVID-19 virus  Assessment & Plan  · Shortness of breath with exposure to COVID-19  · Current symptoms appears secondary to decompensated CHF  · COVID 19 remains negative  Lab Results   Component Value Date    SARSCOV2 Negative 01/24/2021    SARSCOV2 Negative 01/22/2021       Obesity, Class III, BMI 40-49 9 (morbid obesity) ([facility] [address] )  Assessment & Plan  · Body mass index is 41 94 kg/m²  Obstructive sleep apnea on CPAP  Assessment & Plan  · ROBERT on CPAP q h s     Will order given negative COVID x2    Atrial fibrillation (HCC)  Assessment & Plan  · Chronic atrial fibrillation rate controlled on metoprolol  · Continue Xarelto for anticoagulation      VTE Pharmacologic Prophylaxis:   High Risk (Score >/= 5) - Pharmacological DVT Prophylaxis Ordered: Rivaroxaban (Xarelto)  Sequential Compression Devices Ordered  Patient Centered Rounds: I have performed bedside rounds with nursing staff today  Discussions with Specialists or Other Care Team Provider:  Cardiology    Education and Discussions with Family / Patient:  Spouse on telephone    Time Spent for Care: 25 mins  More than 50% of total time spent on counseling and coordination of care as described above      Current Length of Stay: 4 day(s)  Current Patient Status: Inpatient   Certification Statement: The patient will continue to require additional inpatient hospital stay due to CHF  Discharge Plan / Estimated Discharge Date: 24-48 hours    Code Status: Level 1 - Full Code      Subjective:   Patient seen and examined  Feeling better, no shortness of breath but legs still swollen    Objective:   Vitals: Blood pressure 114/72, pulse 74, temperature (!) 97 1 °F (36 2 °C), temperature source Temporal, resp  rate 16, height 5' 9" (1 753 m), weight 129 kg (284 lb), SpO2 95 %  Physical Exam  Vitals signs reviewed  Constitutional:       General: He is not in acute distress  HENT:      Head: Atraumatic  Cardiovascular:      Rate and Rhythm: Regular rhythm  Heart sounds: Normal heart sounds  Pulmonary:      Effort: Pulmonary effort is normal       Breath sounds: Decreased breath sounds present  No wheezing  Abdominal:      General: Bowel sounds are normal       Palpations: Abdomen is soft  Tenderness: There is no abdominal tenderness  There is no rebound  Musculoskeletal:         General: No swelling (L>R LE) or tenderness  Skin:     General: Skin is warm and dry  Neurological:      General: No focal deficit present  Mental Status: He is alert and oriented to person, place, and time  Cranial Nerves: No cranial nerve deficit     Psychiatric:         Mood and Affect: Mood normal        Additional Data:   Labs:  Results from last 7 days   Lab Units 01/26/21  1047 01/25/21  0556 01/24/21  0601 01/23/21  0458 01/22/21  1659   WBC Thousand/uL 4 57 4 51 3 96* 3 23* 5 05   HEMOGLOBIN g/dL 14 9 13 1 12 6 11 7* 12 1   HEMATOCRIT % 45 7 41 0 40 4 36 8 37 2   MCV fL 91 92 94 93 92   PLATELETS Thousands/uL 195 167 154 138* 164   INR   --   --   --   --  1 59*     Results from last 7 days   Lab Units 01/26/21  1047 01/25/21  0556 01/24/21  0601 01/23/21  0458 01/22/21  1659   SODIUM mmol/L 141 142 144 142 142   POTASSIUM mmol/L 3 7 3 7 3 8 3 3* 3 5   CHLORIDE mmol/L 102 104 106 107 105   CO2 mmol/L 34* 32 33* 33* 31   ANION GAP mmol/L 5 6 5 2* 6   BUN mg/dL 22 18 16 13 19   CREATININE mg/dL 0 85 0  78 0 85 0 73 0 77   CALCIUM mg/dL 9 5 9 2 9 2 8 8 9 0   ALBUMIN g/dL  --   --   --   --  3 3*   TOTAL BILIRUBIN mg/dL  --   --   --   --  0 46   ALK PHOS U/L  --   --   --   --  73   ALT U/L  --   --   --   --  28   AST U/L  --   --   --   --  22   EGFR ml/min/1 73sq m 91 94 91 97 95   GLUCOSE RANDOM mg/dL 126 92 94 98 92     Results from last 7 days   Lab Units 21  1047 21  0556 21  0601 21  0458   MAGNESIUM mg/dL 2 2 2 1 2 3 2 1     Results from last 7 days   Lab Units 21  0037 21  2146 21  1851   TROPONIN I ng/mL <0 02 <0 02 <0 02     Results from last 7 days   Lab Units 21  1659   NT-PRO BNP pg/mL 1,536*                      * I Have Reviewed All Lab Data Listed Above  Cultures:   Results from last 7 days   Lab Units 21  1324 21  1659   INFLUENZA A PCR  Negative Negative       Results from last 7 days   Lab Units 21  1324 21  1659   SARS-COV-2  Negative Negative   INFLUENZA A PCR  Negative Negative   INFLUENZA B PCR  Negative Negative   RSV PCR  Negative Negative           Lines/Drains:  Invasive Devices     Peripheral Intravenous Line            Peripheral IV 21 Dorsal (posterior); Left Forearm 1 day              Telemetry:   Telemetry Orders (From admission, onward)             48 Hour Telemetry Monitoring  Continuous x 48 hours        Question:  Reason for 48 Hour Telemetry  Answer:  Acute Decompensated CHF (continuous diuretic infusion or total diuretic dose > 200 mg daily, associated electrolyte derangement, ionotropic drip, history of ventricular arrhythmia, or new EF <35%)                  Imaging:  Imaging Reports Reviewed Today Include:   Xr Chest 1 View Portable    Result Date: 2021  Impression: Cardiomegaly  No acute cardiopulmonary disease   Workstation performed: [id number]     Scheduled Meds:  Current Facility-Administered Medications   Medication Dose Route Frequency Provider Last Rate    acetaminophen 650 mg Oral Q4H PRN Awanda Jeremiah, DO      allopurinol  100 mg Oral Daily Denys Viveros, DO      ammonium lactate   Topical BID PRN Naima Ambrocio MD      ascorbic acid  1,000 mg Oral Q12H [address] Awanda Jeremiah, DO      cholecalciferol  2,000 Units Oral Daily Denys Viveros, DO      docusate sodium  100 mg Oral BID Denys Viveros, DO      furosemide  60 mg Intravenous TID (diuretic) Shawn Hargrove, DO      metoprolol succinate  50 mg Oral BID Ani Suh MD      morphine injection  2 mg Intravenous Q1H PRN Awanda Jeremiah, DO      zinc sulfate  220 mg Oral Daily Denys Viveros, DO      Followed by   Radha Ramirez ON 1/30/2021] multivitamin-minerals  1 tablet Oral Daily Denys Viveros, DO      ondansetron  4 mg Intravenous Q6H PRN Awanda Jeremiah, DO      oxyCODONE  5 mg Oral Q4H PRN Awanda Jeremiah, DO      potassium chloride  40 mEq Oral Daily Denys Viveros, DO      pregabalin  100 mg Oral TID Denys Viveros, DO      rivaroxaban  20 mg Oral Daily With Breakfast Denys Viveros, DO      tamsulosin  0 4 mg Oral Daily With Dinner Denys Viveros, DO      traMADol  50 mg Oral Q6H PRN Denys Silver, DO         Izaiah Mcnair, DO  [facility] Internal Medicine  Hospitalist    ** Please Note: This note has been constructed using a voice recognition system   **

## 2021-01-27 LAB
ALBUMIN SERPL BCP-MCNC: 3.5 G/DL (ref 3.5–5)
ALP SERPL-CCNC: 71 U/L (ref 46–116)
ALT SERPL W P-5'-P-CCNC: 25 U/L (ref 12–78)
ANION GAP SERPL CALCULATED.3IONS-SCNC: 4 MMOL/L (ref 4–13)
AST SERPL W P-5'-P-CCNC: 32 U/L (ref 5–45)
BILIRUB SERPL-MCNC: 0.82 MG/DL (ref 0.2–1)
BUN SERPL-MCNC: 24 MG/DL (ref 5–25)
CALCIUM SERPL-MCNC: 9.4 MG/DL (ref 8.3–10.1)
CHLORIDE SERPL-SCNC: 101 MMOL/L (ref 100–108)
CO2 SERPL-SCNC: 34 MMOL/L (ref 21–32)
CREAT SERPL-MCNC: 0.73 MG/DL (ref 0.6–1.3)
GFR SERPL CREATININE-BSD FRML MDRD: 97 ML/MIN/1.73SQ M
GLUCOSE SERPL-MCNC: 78 MG/DL (ref 65–140)
GLUCOSE SERPL-MCNC: 99 MG/DL (ref 65–140)
POTASSIUM SERPL-SCNC: 4.2 MMOL/L (ref 3.5–5.3)
PROT SERPL-MCNC: 8.8 G/DL (ref 6.4–8.2)
SODIUM SERPL-SCNC: 139 MMOL/L (ref 136–145)

## 2021-01-27 PROCEDURE — 94660 CPAP INITIATION&MGMT: CPT

## 2021-01-27 PROCEDURE — 99232 SBSQ HOSP IP/OBS MODERATE 35: CPT | Performed by: INTERNAL MEDICINE

## 2021-01-27 PROCEDURE — 82948 REAGENT STRIP/BLOOD GLUCOSE: CPT

## 2021-01-27 PROCEDURE — 80053 COMPREHEN METABOLIC PANEL: CPT | Performed by: INTERNAL MEDICINE

## 2021-01-27 RX ORDER — SPIRONOLACTONE 25 MG/1
25 TABLET ORAL DAILY
Status: DISCONTINUED | OUTPATIENT
Start: 2021-01-27 | End: 2021-01-28

## 2021-01-27 RX ORDER — FUROSEMIDE 80 MG
80 TABLET ORAL DAILY
Status: DISCONTINUED | OUTPATIENT
Start: 2021-01-27 | End: 2021-01-28

## 2021-01-27 RX ADMIN — ZINC SULFATE 220 MG (50 MG) CAPSULE 220 MG: CAPSULE at 09:02

## 2021-01-27 RX ADMIN — ALLOPURINOL 100 MG: 100 TABLET ORAL at 09:03

## 2021-01-27 RX ADMIN — TAMSULOSIN HYDROCHLORIDE 0.4 MG: 0.4 CAPSULE ORAL at 16:30

## 2021-01-27 RX ADMIN — POTASSIUM CHLORIDE 40 MEQ: 1500 TABLET, EXTENDED RELEASE ORAL at 09:03

## 2021-01-27 RX ADMIN — DOCUSATE SODIUM 100 MG: 100 CAPSULE, LIQUID FILLED ORAL at 09:03

## 2021-01-27 RX ADMIN — PREGABALIN 100 MG: 100 CAPSULE ORAL at 20:13

## 2021-01-27 RX ADMIN — Medication: at 10:44

## 2021-01-27 RX ADMIN — DOCUSATE SODIUM 100 MG: 100 CAPSULE, LIQUID FILLED ORAL at 16:31

## 2021-01-27 RX ADMIN — PREGABALIN 100 MG: 100 CAPSULE ORAL at 09:03

## 2021-01-27 RX ADMIN — OXYCODONE HYDROCHLORIDE AND ACETAMINOPHEN 1000 MG: 500 TABLET ORAL at 20:13

## 2021-01-27 RX ADMIN — METOPROLOL SUCCINATE 50 MG: 50 TABLET, EXTENDED RELEASE ORAL at 20:12

## 2021-01-27 RX ADMIN — PREGABALIN 100 MG: 100 CAPSULE ORAL at 16:30

## 2021-01-27 RX ADMIN — RIVAROXABAN 20 MG: 20 TABLET, FILM COATED ORAL at 09:03

## 2021-01-27 RX ADMIN — SPIRONOLACTONE 25 MG: 25 TABLET, FILM COATED ORAL at 11:58

## 2021-01-27 RX ADMIN — OXYCODONE HYDROCHLORIDE AND ACETAMINOPHEN 1000 MG: 500 TABLET ORAL at 09:03

## 2021-01-27 RX ADMIN — FUROSEMIDE 80 MG: 80 TABLET ORAL at 11:58

## 2021-01-27 RX ADMIN — Medication 2000 UNITS: at 09:03

## 2021-01-27 NOTE — ASSESSMENT & PLAN NOTE
Wt Readings from Last 3 Encounters:   01/27/21 130 kg (285 lb 11 5 oz)   01/22/21 (!) 147 kg (324 lb)   01/14/21 (!) 146 kg (321 lb)     · Acute on chronic diastolic CHF currently on IV furosemide 60 mg up to t i d  by cardiology  · Transitioned to oral furosemide 80 mg today

## 2021-01-27 NOTE — PROGRESS NOTES
Progress Note - Edilberto Hutchinson 1955, 76 y o  male MRN: [id number]    Unit/Bed#: E4 -01 Encounter: [id number]    Primary Care Provider: Elidia Stoll MD   Date and time admitted to hospital: 1/22/2021  4:22 PM        * Acute diastolic heart failure [facility])  Assessment & Plan  Wt Readings from Last 3 Encounters:   01/27/21 130 kg (285 lb 11 5 oz)   01/22/21 (!) 147 kg (324 lb)   01/14/21 (!) 146 kg (321 lb)     · Acute on chronic diastolic CHF currently on IV furosemide 60 mg up to t i d  by cardiology  · Transitioned to oral furosemide 80 mg today    Shortness of breath with exposure to COVID-19 virus  Assessment & Plan  · Shortness of breath with exposure to COVID-19  · Current symptoms appears secondary to decompensated CHF  · COVID 19 remains negative  Lab Results   Component Value Date    SARSCOV2 Negative 01/24/2021    SARSCOV2 Negative 01/22/2021       Obesity, Class III, BMI 40-49 9 (morbid obesity) ([facility] [address] )  Assessment & Plan  · Body mass index is 42 19 kg/m²  Obstructive sleep apnea on CPAP  Assessment & Plan  · ROBERT on CPAP q h s  Atrial fibrillation (HCC)  Assessment & Plan  · Chronic atrial fibrillation rate controlled on metoprolol  · Continue Xarelto for anticoagulation      VTE Pharmacologic Prophylaxis:   High Risk (Score >/= 5) - Pharmacological DVT Prophylaxis Ordered: Rivaroxaban (Xarelto)  Sequential Compression Devices Ordered  Patient Centered Rounds: I have performed bedside rounds with nursing staff today  Discussions with Specialists or Other Care Team Provider:     Education and Discussions with Family / Patient:  Could not call patient's spouse and she is in the ER getting checked out for COVID    Time Spent for Care: 25 mins  More than 50% of total time spent on counseling and coordination of care as described above      Current Length of Stay: 5 day(s)  Current Patient Status: Inpatient   Certification Statement: The patient will continue to require additional inpatient hospital stay due to CHF  Discharge Plan / Estimated Discharge Date: Anticipate discharge tomorrow to home  Code Status: Level 1 - Full Code      Subjective:   Patient seen and examined  Feeling pretty good, unfortunately disappointed that his wife is in the ER getting checked out for COVID  Objective:   Vitals: Blood pressure 131/58, pulse 83, temperature (!) 97 1 °F (36 2 °C), resp  rate (!) 24, height 5' 9" (1 753 m), weight 130 kg (285 lb 11 5 oz), SpO2 96 %  Physical Exam  Vitals signs reviewed  Constitutional:       General: He is not in acute distress  HENT:      Head: Atraumatic  Eyes:      Extraocular Movements: Extraocular movements intact  Pupils: Pupils are equal, round, and reactive to light  Cardiovascular:      Rate and Rhythm: Regular rhythm  Heart sounds: Normal heart sounds  Pulmonary:      Effort: Pulmonary effort is normal       Breath sounds: Decreased breath sounds present  No wheezing  Abdominal:      General: Bowel sounds are normal       Palpations: Abdomen is soft  Tenderness: There is no abdominal tenderness  There is no rebound  Musculoskeletal:         General: Swelling (2+ L>R LE) present  No tenderness  Skin:     General: Skin is warm and dry  Neurological:      General: No focal deficit present  Mental Status: He is alert and oriented to person, place, and time  Cranial Nerves: No cranial nerve deficit     Psychiatric:         Mood and Affect: Mood normal        Additional Data:   Labs:  Results from last 7 days   Lab Units 01/26/21  1047 01/25/21  0556 01/24/21  0601 01/23/21  0458 01/22/21  1659   WBC Thousand/uL 4 57 4 51 3 96* 3 23* 5 05   HEMOGLOBIN g/dL 14 9 13 1 12 6 11 7* 12 1   HEMATOCRIT % 45 7 41 0 40 4 36 8 37 2   MCV fL 91 92 94 93 92   PLATELETS Thousands/uL 195 167 154 138* 164   INR   --   --   --   --  1 59*     Results from last 7 days   Lab Units 01/27/21  8623 01/26/21  1047 01/25/21  0556 01/24/21  0601 01/23/21  0458 01/22/21  1659   SODIUM mmol/L 139 141 142 144 142 142   POTASSIUM mmol/L 4 2 3 7 3 7 3 8 3 3* 3 5   CHLORIDE mmol/L 101 102 104 106 107 105   CO2 mmol/L 34* 34* 32 33* 33* 31   ANION GAP mmol/L 4 5 6 5 2* 6   BUN mg/dL 24 22 18 16 13 19   CREATININE mg/dL 0 73 0 85 0 78 0 85 0 73 0 77   CALCIUM mg/dL 9 4 9 5 9 2 9 2 8 8 9 0   ALBUMIN g/dL 3 5  --   --   --   --  3 3*   TOTAL BILIRUBIN mg/dL 0 82  --   --   --   --  0 46   ALK PHOS U/L 71  --   --   --   --  73   ALT U/L 25  --   --   --   --  28   AST U/L 32  --   --   --   --  22   EGFR ml/min/1 73sq m 97 91 94 91 97 95   GLUCOSE RANDOM mg/dL 99 126 92 94 98 92     Results from last 7 days   Lab Units 01/26/21  1047 01/25/21  0556 01/24/21  0601 01/23/21  0458   MAGNESIUM mg/dL 2 2 2 1 2 3 2 1     Results from last 7 days   Lab Units 01/23/21  0037 01/22/21  2146 01/22/21  1851   TROPONIN I ng/mL <0 02 <0 02 <0 02     Results from last 7 days   Lab Units 01/22/21  1659   NT-PRO BNP pg/mL 1,536*          Results from last 7 days   Lab Units 01/27/21  0740   POC GLUCOSE mg/dl 78             * I Have Reviewed All Lab Data Listed Above  Cultures:   Results from last 7 days   Lab Units 01/24/21  1324 01/22/21  1659   INFLUENZA A PCR  Negative Negative       Results from last 7 days   Lab Units 01/24/21  1324 01/22/21  1659   SARS-COV-2  Negative Negative   INFLUENZA A PCR  Negative Negative   INFLUENZA B PCR  Negative Negative   RSV PCR  Negative Negative           Lines/Drains:  Invasive Devices     Peripheral Intravenous Line            Peripheral IV 01/24/21 Dorsal (posterior); Left Forearm 3 days              Telemetry:   Telemetry Orders (From admission, onward)             48 Hour Telemetry Monitoring  Continuous x 48 hours     Question:  Reason for 48 Hour Telemetry  Answer:  Acute Decompensated CHF (continuous diuretic infusion or total diuretic dose > 200 mg daily, associated electrolyte derangement, ionotropic drip, history of ventricular arrhythmia, or new EF <35%)                  Imaging:  Imaging Reports Reviewed Today Include:   Xr Chest 1 View Portable    Result Date: 1/23/2021  Impression: Cardiomegaly  No acute cardiopulmonary disease  Workstation performed: [id number]     Scheduled Meds:  Current Facility-Administered Medications   Medication Dose Route Frequency Provider Last Rate    acetaminophen  650 mg Oral Q4H PRN Theola Gist, DO      allopurinol  100 mg Oral Daily Denys Viveros, DO      ammonium lactate   Topical BID PRN Chevy Reed MD      ascorbic acid  1,000 mg Oral Q12H [address] Theola Gist, DO      cholecalciferol  2,000 Units Oral Daily Denys Viveros, DO      docusate sodium  100 mg Oral BID Denys Viveros, DO      furosemide  80 mg Oral Daily Shanae Evans MD      metoprolol succinate  50 mg Oral BID Francisco Valenzuela MD      morphine injection  2 mg Intravenous Q1H PRN Theola Gist, DO      zinc sulfate  220 mg Oral Daily Denys Viveros, DO      Followed by   Jorge Bishop ON 1/30/2021] multivitamin-minerals  1 tablet Oral Daily Denys Viveros, DO      ondansetron  4 mg Intravenous Q6H PRN Theola Gist, DO      oxyCODONE  5 mg Oral Q4H PRN Theola Gist, DO      pregabalin  100 mg Oral TID Denys Viveros, DO      rivaroxaban  20 mg Oral Daily With Breakfast Denys Viveros, DO      spironolactone  25 mg Oral Daily Shanae Evans MD      tamsulosin  0 4 mg Oral Daily With Dinner Denys Viveros, DO      traMADol  50 mg Oral Q6H PRN Theola Gist, DO         [place] L[place], DO  [facility] Internal Medicine  Hospitalist    ** Please Note: This note has been constructed using a voice recognition system   **

## 2021-01-27 NOTE — PLAN OF CARE
Problem: PAIN - ADULT  Goal: Verbalizes/displays adequate comfort level or baseline comfort level  Description: Interventions:  - Encourage patient to monitor pain and request assistance  - Assess pain using appropriate pain scale  - Administer analgesics based on type and severity of pain and evaluate response  - Implement non-pharmacological measures as appropriate and evaluate response  - Consider cultural and social influences on pain and pain management  - Notify physician/advanced practitioner if interventions unsuccessful or patient reports new pain  Outcome: Progressing     Problem: INFECTION - ADULT  Goal: Absence or prevention of progression during hospitalization  Description: INTERVENTIONS:  - Assess and monitor for signs and symptoms of infection  - Monitor lab/diagnostic results  - Monitor all insertion sites, i e  indwelling lines, tubes, and drains  - Monitor endotracheal if appropriate and nasal secretions for changes in amount and color  - [place] appropriate cooling/warming therapies per order  - Administer medications as ordered  - Instruct and encourage patient and family to use good hand hygiene technique  - Identify and instruct in appropriate isolation precautions for identified infection/condition  Outcome: Progressing     Problem: SAFETY ADULT  Goal: Patient will remain free of falls  Description: INTERVENTIONS:  - Assess patient frequently for physical needs  -  Identify cognitive and physical deficits and behaviors that affect risk of falls    -  [place] fall precautions as indicated by assessment   - Educate patient/family on patient safety including physical limitations  - Instruct patient to call for assistance with activity based on assessment  - Modify environment to reduce risk of injury  - Consider OT/PT consult to assist with strengthening/mobility  Outcome: Progressing  Goal: Maintain or return to baseline ADL function  Description: INTERVENTIONS:  -  Assess patient's ability to carry out ADLs; assess patient's baseline for ADL function and identify physical deficits which impact ability to perform ADLs (bathing, care of mouth/teeth, toileting, grooming, dressing, etc )  - Assess/evaluate cause of self-care deficits   - Assess range of motion  - Assess patient's mobility; develop plan if impaired  - Assess patient's need for assistive devices and provide as appropriate  - Encourage maximum independence but intervene and supervise when necessary  - Involve family in performance of ADLs  - Assess for home care needs following discharge   - Consider OT consult to assist with ADL evaluation and planning for discharge  - Provide patient education as appropriate  Outcome: Progressing  Goal: Maintain or return mobility status to optimal level  Description: INTERVENTIONS:  - Assess patient's baseline mobility status (ambulation, transfers, stairs, etc )    - Identify cognitive and physical deficits and behaviors that affect mobility  - Identify mobility aids required to assist with transfers and/or ambulation (gait belt, sit-to-stand, lift, walker, cane, etc )  - [place] fall precautions as indicated by assessment  - Record patient progress and toleration of activity level on Mobility SBAR; progress patient to next Phase/Stage  - Instruct patient to call for assistance with activity based on assessment  - Consider rehabilitation consult to assist with strengthening/weightbearing, etc   Outcome: Progressing     Problem: DISCHARGE PLANNING  Goal: Discharge to home or other facility with appropriate resources  Description: INTERVENTIONS:  - Identify barriers to discharge w/patient and caregiver  - Arrange for needed discharge resources and transportation as appropriate  - Identify discharge learning needs (meds, wound care, etc )  - Arrange for interpretive services to assist at discharge as needed  - Refer to Case Management Department for coordinating discharge planning if the patient needs post-hospital services based on physician/advanced practitioner order or complex needs related to functional status, cognitive ability, or social support system  Outcome: Progressing     Problem: Knowledge Deficit  Goal: Patient/family/caregiver demonstrates understanding of disease process, treatment plan, medications, and discharge instructions  Description: Complete learning assessment and assess knowledge base  Interventions:  - Provide teaching at level of understanding  - Provide teaching via preferred learning methods  Outcome: Progressing     Problem: Potential for Falls  Goal: Patient will remain free of falls  Description: INTERVENTIONS:  - Assess patient frequently for physical needs  -  Identify cognitive and physical deficits and behaviors that affect risk of falls    -  [place] fall precautions as indicated by assessment   - Educate patient/family on patient safety including physical limitations  - Instruct patient to call for assistance with activity based on assessment  - Modify environment to reduce risk of injury  - Consider OT/PT consult to assist with strengthening/mobility  Outcome: Progressing

## 2021-01-27 NOTE — PROGRESS NOTES
Progress Note - Cardiology   Christopher Hall 76 y o  male MRN: [id number]  Unit/Bed#: E4 -01 Encounter: [id number]    Assessment:    1  Acute on chronic diastolic congestive heart failure  2  Chronic lymphedema, goes to lymphedema clinic and uses pumps at home  3  Chronic atrial fibrillation with a controlled ventricular response  4  Moderate pulmonary hypertension   5  Morbid obesity  6  Obstructive sleep apnea on BiPAP at night  7  Bilateral venous insufficiency, status post lower extremity venous ablation  8  Pharmacologic nuclear stress test September 2020 negative for ischemia with low normal left ventricular ejection fraction, 50%    Plan:    1  Begin furosemide 80 mg daily 1st dose now  2  Begin spironolactone 25 mg daily 1st dose now  3  Discontinue potassium chloride supplements      Interval history:    Patient in good spirits  Weight up 1 and 3/4 lb but down over 35 lb since admission  Patient denies shortness of breath  Atrial fibrillation with controlled ventricular response  Vitals: /73   Pulse 83   Temp 97 6 °F (36 4 °C) (Tympanic)   Resp 18   Ht 5' 9" (1 753 m)   Wt 130 kg (285 lb 11 5 oz)   SpO2 93%   BMI 42 19 kg/m²   Vitals:    01/26/21 0600 01/27/21 0600   Weight: 129 kg (284 lb) 130 kg (285 lb 11 5 oz)     Orthostatic Blood Pressures      Most Recent Value   Blood Pressure  109/73 filed at 01/27/2021 0900   Patient Position - Orthostatic VS  Lying filed at 01/27/2021 0700            Intake/Output Summary (Last 24 hours) at 1/27/2021 1106  Last data filed at 1/27/2021 1048  Gross per 24 hour   Intake 1600 ml   Output 2900 ml   Net -1300 ml       Invasive Devices     Peripheral Intravenous Line            Peripheral IV 01/24/21 Dorsal (posterior); Left Forearm 2 days                Review of Systems   Constitutional: Negative for activity change  Respiratory: Negative for cough, chest tightness, shortness of breath and wheezing      Cardiovascular: Positive for leg swelling  Negative for chest pain and palpitations  Musculoskeletal: Negative for gait problem  Skin: Negative for color change  Neurological: Negative for dizziness, tremors, syncope, weakness, light-headedness and headaches  Psychiatric/Behavioral: Negative for agitation and confusion  Physical Exam  Vitals signs reviewed  Constitutional:       General: He is not in acute distress  Appearance: He is well-developed  Comments: Morbid obesity, BMI 42 2   HENT:      Head: Normocephalic and atraumatic  Neck:      Vascular: No carotid bruit or JVD  Cardiovascular:      Rate and Rhythm: Normal rate  Rhythm irregular  Pulses: Normal pulses  Heart sounds: Normal heart sounds  No murmur  No friction rub  No gallop  Pulmonary:      Effort: Pulmonary effort is normal  No respiratory distress  Breath sounds: Normal breath sounds  No wheezing, rhonchi or rales  Chest:      Chest wall: No tenderness  Abdominal:      General: Bowel sounds are normal  There is no distension  Palpations: Abdomen is soft  Musculoskeletal:      Right lower leg: Edema present  Left lower leg: Edema present  Comments: At most 1+ bilateral pretibial edema   Skin:     General: Skin is warm and dry  Neurological:      General: No focal deficit present  Mental Status: He is alert and oriented to person, place, and time  Psychiatric:         Mood and Affect: Mood normal          Behavior: Behavior normal          Thought Content: Thought content normal          Judgment: Judgment normal            ----------------------------------------------------------------------------------------------  NUCLEAR STRESS TEST: No results found for this or any previous visit    Results for orders placed during the hospital encounter of 09/21/20   NM myocardial perfusion spect (rx stress and/or rest)    Narrative [address] [address]  [phone]    Regadenoson    Patient: Marina Gomez  MR number: [id number]  Account number: [de-identified]  : 1955  Age: 76 years  Gender: Male  Status: Outpatient  Location: Stress lab  Height: 69 in  Weight: 294 lb  BP: 122/ 82 mmHg    Allergies: PENICILLINS, SULFA ANTIBIOTICS    Diagnosis: 427 31 - ATRIAL FIBRILLATION, R06 00 - Dyspnea, unspecified    RN:  Nicolle Casanova RN  Primary Physician:  Keyona Rosario  Technician:  Chantal Engel  Referring Physician:  Wil Moreland DO  Interpreting Physician:  Fredna Soulier, DO  Group:  [facility]    INDICATIONS: Coronary artery disease  HISTORY: The patient is a 76year old  male  Chest pain status: no chest pain  Other symptoms: dyspnea  Coronary artery disease risk factors: hypertension  Cardiovascular history: arrhythmia  REST ECG: Atrial fibrillation  The ECG showed right bundle branch block  PROCEDURE: The study was performed in the the Stress lab  A regadenoson infusion pharmacologic stress test was performed  Systolic blood pressure was 122 mmHg, at the start of the study  Diastolic blood pressure was 82 mmHg, at the start  of the study  The heart rate was 67 bpm, at the start of the study  Regadenoson protocol:  HR bpm SBP mmHg DBP mmHg  Baseline 67 122 82  Immediate 108 120 74  2 min -- 130 80  5 min 80 124 84  No medications or fluids given  STRESS SUMMARY: Duration of pharmacologic stress was 3 min  Maximal heart rate during stress was 125 bpm  The rate-pressure product for the peak heart rate and blood pressure was 29234  There was no chest pain during stress  The stress  test was terminated due to protocol completion  Arrhythmia during stress: isolated premature ventricular beats  The stress ECG was non-diagnostic for ischemia      ISOTOPE ADMINISTRATION:  Resting isotope administration Stress isotope administration  Agent Tetrofosmin Tetrofosmin  Dose 16 3 mCi 48 2 mCi  Date 2020 09/21/2020  Injection time 08:00 09:40  Imaging time 08:30 10:20  Injection-image interval 30 min 40 min    The radiopharmaceutical was injected at the peak effect of pharmacologic stress  MYOCARDIAL PERFUSION IMAGING:  The image quality was good  Rotating projection images reveal mild diaphragmatic attenuation  Left ventricular size was normal  The TID ratio was 1 02  PERFUSION DEFECTS:  -  There was a small, mildly severe, fixed myocardial perfusion defect of the basal to mid inferior wall with normal myocardial thickening due to diaphragm attenuation  GATED SPECT:  The calculated left ventricular ejection fraction was 50 %  Left ventricular ejection fraction was within normal limits by visual estimate  There was no left ventricular regional abnormality  SUMMARY:  -  Stress results: There was no chest pain during stress  -  ECG conclusions: The stress ECG was non-diagnostic for ischemia  -  Perfusion imaging: There was a small, mildly severe, fixed myocardial perfusion defect of the basal to mid inferior wall with normal myocardial thickening due to diaphragm attenuation   -  Gated SPECT: The calculated left ventricular ejection fraction was 50 %  Left ventricular ejection fraction was within normal limits by visual estimate  There was no left ventricular regional abnormality  IMPRESSIONS: Normal study after pharmacologic vasodilation  There was image artifact, without diagnostic evidence for perfusion abnormality  Left ventricular systolic function was normal     Prepared and signed by    Leeann Ambrocio DO  Signed 09/21/2020 11:19:26         ------  ECHO:   Results for orders placed during the hospital encounter of 01/22/21   Echo complete with contrast if indicated    Narrative [address]    [place], [address]  [phone]    Transthoracic Echocardiogram  2D, M-mode, Doppler, and Color Doppler    Study date:  24-Jan-2021    Patient: Arden Hernandezbody Jael Whittington  MR number: [id number]  Account number: [de-identified]  : 1955  Age: 76 years  Gender: Male  Status: Inpatient  Location: Bedside  Height: 69 in  Weight: 294 4 lb  BP: 129/ 60 mmHg    Indications: Edema  Heart failure  Diagnoses: I50 9 - Heart failure, unspecified    Sonographer:  JAM Blank  Referring Physician:  Shelbi Santos Do  Group:  [facility]  Interpreting Physician:  FORTINO Webber    PROCEDURE INFORMATION:  Intravenous contrast (  4ml of Definity) was administered  LEFT VENTRICLE:  Systolic function was normal by visual assessment  Ejection fraction was estimated to be 55 %  There were no regional wall motion abnormalities  Wall thickness was mildly increased  There was mild concentric hypertrophy  VENTRICULAR SEPTUM:  There was paradoxical motion  These changes are consistent with a conduction abnormality or paced rhythm  RIGHT VENTRICLE:  The ventricle was mildly dilated  LEFT ATRIUM:  The atrium was mildly dilated  LA volume index 40 mL/m2  RIGHT ATRIUM:  The atrium was mildly to moderately dilated  MITRAL VALVE:  There was trace regurgitation  TRICUSPID VALVE:  There was trace regurgitation  SUMMARY MEASUREMENTS  2D measurements:  Unspecified Anatomy:   %FS was 39 4 %  Ao Diam was 3 4 cm   EDV(Teich) was 129 3 ml   EF(Teich) was 69 5 %  ESV(Teich) was 39 4 ml  IVSd was 1 1 cm  LA Diam was 3 8 cm  LAAs A2C was 27 cm2  LAAs A4C was 26 8 cm2  LAESV A-L A2C was  98 5 ml  LAESV A-L A4C was 92 5 ml  LAESV Index (A-L) was 40 1 ml/m2  LAESV MOD A2C was 94 9 ml  LAESV MOD A4C was 85 4 ml  LAESV(A-L) was 97 7 ml  LAESV(MOD BP) was 91 9 ml  LAESVInd MOD BP was 37 7 ml/m2  LALs A2C was 6 3 cm  LALs A4C was 6 6 cm  LVEDV MOD A4C was 126 8 ml  LVEF MOD A4C was 52 7 %  LVESV MOD A4C was 60 ml  LVIDd was 5 2 cm  LVIDs was 3 2 cm  LVLd A4C was 8 1 cm  LVLs A4C was 7 1 cm  LVPWd was 1 cm    RAAs A4C was 25 8 cm2  RAESV A-L was  91 3 ml   RAESV MOD was 87 9 ml  RALs was 6 2 cm  SV MOD A4C was 66 9 ml   SV(Teich) was 89 9 ml   CW measurements:  Unspecified Anatomy:   AV Env  Ti was 283 9 ms   AV VTI was 27 9 cm   AV Vmax was 1 5 m/s  AV Vmean was 1 m/s  AV maxPG was 8 8 mmHg  AV meanPG was 4 5 mmHg  MM measurements:  Unspecified Anatomy:   TAPSE was 2 cm  PW measurements:  Unspecified Anatomy:   DVI was 0 7   LVOT Env  Ti was 279 7 ms  LVOT VTI was 20 8 cm  LVOT Vmax was 1 2 m/s  LVOT Vmean was 0 7 m/s  LVOT maxPG was 5 9 mmHg  LVOT meanPG was 2 6 mmHg  RV S' was 0 1 m/s  HISTORY: PRIOR HISTORY: lymphedema  Congestive heart failure  Pulmonary hypertension  Atrial fibrillation  Risk factors: hypertension and morbid obesity  PROCEDURE: The procedure was performed at the bedside  This was a routine study  The transthoracic approach was used  The study included complete 2D imaging, M-mode, complete spectral Doppler, and color Doppler  The heart rate was 90 bpm,  at the start of the study  Intravenous contrast (  4ml of Definity) was administered  Image quality was adequate  LEFT VENTRICLE: Size was normal  Systolic function was normal by visual assessment  Ejection fraction was estimated to be 55 %  There were no regional wall motion abnormalities  Wall thickness was mildly increased  There was mild  concentric hypertrophy  DOPPLER: The study was not technically sufficient to allow evaluation of LV diastolic function  VENTRICULAR SEPTUM: There was paradoxical motion  These changes are consistent with a conduction abnormality or paced rhythm  RIGHT VENTRICLE: The ventricle was mildly dilated  Systolic function was normal  Wall thickness was normal     LEFT ATRIUM: The atrium was mildly dilated  LA volume index 40 mL/m2  RIGHT ATRIUM: The atrium was mildly to moderately dilated  MITRAL VALVE: Valve structure was normal  There was normal leaflet separation   DOPPLER: The transmitral velocity was within the normal range  There was no evidence for stenosis  There was trace regurgitation  AORTIC VALVE: The valve was trileaflet  Leaflets exhibited mildly increased thickness, normal cuspal separation, and sclerosis  DOPPLER: Transaortic velocity was within the normal range  There was no evidence for stenosis  There was no  regurgitation  TRICUSPID VALVE: The valve structure was normal  There was normal leaflet separation  DOPPLER: The transtricuspid velocity was within the normal range  There was no evidence for stenosis  There was trace regurgitation  The tricuspid jet  envelope definition was inadequate for estimation of RV systolic pressure  PULMONIC VALVE: Not well visualized  DOPPLER: There was no significant regurgitation  PERICARDIUM: There was no pericardial effusion  The pericardium was normal in appearance  AORTA: The root exhibited normal size  SYSTEMIC VEINS: IVC: The inferior vena cava was normal in size and course  Respirophasic changes were normal     SYSTEM MEASUREMENT TABLES    2D  %FS: 39 4 %  Ao Diam: 3 4 cm  EDV(Teich): 129 3 ml  EF(Teich): 69 5 %  ESV(Teich): 39 4 ml  IVSd: 1 1 cm  LA Diam: 3 8 cm  LAAs A2C: 27 cm2  LAAs A4C: 26 8 cm2  LAESV A-L A2C: 98 5 ml  LAESV A-L A4C: 92 5 ml  LAESV Index (A-L): 40 1 ml/m2  LAESV MOD A2C: 94 9 ml  LAESV MOD A4C: 85 4 ml  LAESV(A-L): 97 7 ml  LAESV(MOD BP): 91 9 ml  LAESVInd MOD BP: 37 7 ml/m2  LALs A2C: 6 3 cm  LALs A4C: 6 6 cm  LVEDV MOD A4C: 126 8 ml  LVEF MOD A4C: 52 7 %  LVESV MOD A4C: 60 ml  LVIDd: 5 2 cm  LVIDs: 3 2 cm  LVLd A4C: 8 1 cm  LVLs A4C: 7 1 cm  LVPWd: 1 cm  RAAs A4C: 25 8 cm2  RAESV A-L: 91 3 ml  RAESV MOD: 87 9 ml  RALs: 6 2 cm  SV MOD A4C: 66 9 ml  SV(Teich): 89 9 ml    CW  AV Env  Ti: 283 9 ms  AV VTI: 27 9 cm  AV Vmax: 1 5 m/s  AV Vmean: 1 m/s  AV maxP 8 mmHg  AV meanP 5 mmHg    MM  TAPSE: 2 cm    PW  DVI: 0 7  LVOT Env  Ti: 279 7 ms  LVOT VTI: 20 8 cm  LVOT Vmax: 1 2 m/s  LVOT Vmean: 0 7 m/s  LVOT maxP 9 mmHg  LVOT meanP 6 mmHg  RV S': 0 1 m/s    [facility]    Prepared and electronically signed by    FORTINO Bauer  Signed 2021 13:29:34       --------------------------------------------------------------------------------  HOLTER  No results found for this or any previous visit  Results for orders placed during the hospital encounter of 20   Holter monitor - 48 hour    Narrative PT NAME: Lauro Naylor  : 1955  AGE: 76 y o  GENDER: male  MRN: [id number]   PROCEDURE: Holter monitor - 48 hour      INDICATIONS:  Atrial fibrillation    DESCRIPTION OF FINDINGS:  The patient was monitored for a total of 47 hours and 59 minutes  Predominant rhythm throughout the tracing noted to be atrial fibrillation   with an average heart rate of 92 beats per minute  A minimum heart rate   of 50 beats per minute was noted at 3:12 a m  on day two with a maximum   heart rate of 154 beats per minute noted at 5:55 a m  on day one  Rare ventricular ectopic activity was noted consisting of 14 single PVCs  No sustained ventricular rhythms were noted  There were no significant pauses or high-grade AV block  Normal diurnal heart rate variation  Impression 1  Predominant rhythm noted throughout the tracing was atrial fibrillation   with an average heart rate of 92 beats per minute  2  Rare ventricular ectopic activity without any sustained ventricular   rhythms    3  No symptom-rhythm correlation as the patient did not return a diary   with the monitor          ======================================================    Lab Results   Component Value Date    WBC 4 57 2021    HGB 14 9 2021    HCT 45 7 2021    MCV 91 2021     2021      Lab Results   Component Value Date    SODIUM 139 2021    K 4 2 2021     2021    CO2 34 (H) 2021    BUN 24 2021    CREATININE 0 73 01/27/2021    GLUC 99 01/27/2021    CALCIUM 9 4 01/27/2021      Lab Results   Component Value Date    HGBA1C 5 7 10/27/2019      No results found for: CHOL  Lab Results   Component Value Date    HDL 55 10/27/2019     Lab Results   Component Value Date    LDLCALC 90 10/27/2019     Lab Results   Component Value Date    TRIG 93 10/27/2019     No results found for: [place], [place]   Lab Results   Component Value Date    INR 1 59 (H) 01/22/2021    INR 1 18 10/26/2019    PROTIME 18 7 (H) 01/22/2021    PROTIME 15 1 (H) 10/26/2019        Imaging:   I have personally reviewed pertinent reports          EKG:   Atrial fibrillation with a controlled ventricular response

## 2021-01-27 NOTE — PLAN OF CARE
Problem: PAIN - ADULT  Goal: Verbalizes/displays adequate comfort level or baseline comfort level  Description: Interventions:  - Encourage patient to monitor pain and request assistance  - Assess pain using appropriate pain scale  - Administer analgesics based on type and severity of pain and evaluate response  - Implement non-pharmacological measures as appropriate and evaluate response  - Consider cultural and social influences on pain and pain management  - Notify physician/advanced practitioner if interventions unsuccessful or patient reports new pain  Outcome: Progressing     Problem: INFECTION - ADULT  Goal: Absence or prevention of progression during hospitalization  Description: INTERVENTIONS:  - Assess and monitor for signs and symptoms of infection  - Monitor lab/diagnostic results  - Monitor all insertion sites, i e  indwelling lines, tubes, and drains  - Monitor endotracheal if appropriate and nasal secretions for changes in amount and color  - [place] appropriate cooling/warming therapies per order  - Administer medications as ordered  - Instruct and encourage patient and family to use good hand hygiene technique  - Identify and instruct in appropriate isolation precautions for identified infection/condition  Outcome: Progressing     Problem: SAFETY ADULT  Goal: Patient will remain free of falls  Description: INTERVENTIONS:  - Assess patient frequently for physical needs  -  Identify cognitive and physical deficits and behaviors that affect risk of falls    -  [place] fall precautions as indicated by assessment   - Educate patient/family on patient safety including physical limitations  - Instruct patient to call for assistance with activity based on assessment  - Modify environment to reduce risk of injury  - Consider OT/PT consult to assist with strengthening/mobility  Outcome: Progressing  Goal: Maintain or return to baseline ADL function  Description: INTERVENTIONS:  -  Assess patient's ability to carry out ADLs; assess patient's baseline for ADL function and identify physical deficits which impact ability to perform ADLs (bathing, care of mouth/teeth, toileting, grooming, dressing, etc )  - Assess/evaluate cause of self-care deficits   - Assess range of motion  - Assess patient's mobility; develop plan if impaired  - Assess patient's need for assistive devices and provide as appropriate  - Encourage maximum independence but intervene and supervise when necessary  - Involve family in performance of ADLs  - Assess for home care needs following discharge   - Consider OT consult to assist with ADL evaluation and planning for discharge  - Provide patient education as appropriate  Outcome: Progressing  Goal: Maintain or return mobility status to optimal level  Description: INTERVENTIONS:  - Assess patient's baseline mobility status (ambulation, transfers, stairs, etc )    - Identify cognitive and physical deficits and behaviors that affect mobility  - Identify mobility aids required to assist with transfers and/or ambulation (gait belt, sit-to-stand, lift, walker, cane, etc )  - [place] fall precautions as indicated by assessment  - Record patient progress and toleration of activity level on Mobility SBAR; progress patient to next Phase/Stage  - Instruct patient to call for assistance with activity based on assessment  - Consider rehabilitation consult to assist with strengthening/weightbearing, etc   Outcome: Progressing     Problem: DISCHARGE PLANNING  Goal: Discharge to home or other facility with appropriate resources  Description: INTERVENTIONS:  - Identify barriers to discharge w/patient and caregiver  - Arrange for needed discharge resources and transportation as appropriate  - Identify discharge learning needs (meds, wound care, etc )  - Arrange for interpretive services to assist at discharge as needed  - Refer to Case Management Department for coordinating discharge planning if the patient needs post-hospital services based on physician/advanced practitioner order or complex needs related to functional status, cognitive ability, or social support system  Outcome: Progressing     Problem: Knowledge Deficit  Goal: Patient/family/caregiver demonstrates understanding of disease process, treatment plan, medications, and discharge instructions  Description: Complete learning assessment and assess knowledge base  Interventions:  - Provide teaching at level of understanding  - Provide teaching via preferred learning methods  Outcome: Progressing     Problem: Potential for Falls  Goal: Patient will remain free of falls  Description: INTERVENTIONS:  - Assess patient frequently for physical needs  -  Identify cognitive and physical deficits and behaviors that affect risk of falls    -  [place] fall precautions as indicated by assessment   - Educate patient/family on patient safety including physical limitations  - Instruct patient to call for assistance with activity based on assessment  - Modify environment to reduce risk of injury  - Consider OT/PT consult to assist with strengthening/mobility  Outcome: Progressing

## 2021-01-28 ENCOUNTER — APPOINTMENT (OUTPATIENT)
Dept: PHYSICAL THERAPY | Facility: CLINIC | Age: 66
End: 2021-01-28
Payer: COMMERCIAL

## 2021-01-28 LAB — GLUCOSE SERPL-MCNC: 98 MG/DL (ref 65–140)

## 2021-01-28 PROCEDURE — 82948 REAGENT STRIP/BLOOD GLUCOSE: CPT

## 2021-01-28 PROCEDURE — 99232 SBSQ HOSP IP/OBS MODERATE 35: CPT | Performed by: INTERNAL MEDICINE

## 2021-01-28 PROCEDURE — 94660 CPAP INITIATION&MGMT: CPT

## 2021-01-28 RX ORDER — FUROSEMIDE 80 MG
80 TABLET ORAL
Status: DISCONTINUED | OUTPATIENT
Start: 2021-01-28 | End: 2021-01-30 | Stop reason: HOSPADM

## 2021-01-28 RX ORDER — SPIRONOLACTONE 25 MG/1
25 TABLET ORAL 2 TIMES DAILY
Status: DISCONTINUED | OUTPATIENT
Start: 2021-01-28 | End: 2021-01-30 | Stop reason: HOSPADM

## 2021-01-28 RX ADMIN — PREGABALIN 100 MG: 100 CAPSULE ORAL at 20:16

## 2021-01-28 RX ADMIN — SPIRONOLACTONE 25 MG: 25 TABLET, FILM COATED ORAL at 17:26

## 2021-01-28 RX ADMIN — ZINC SULFATE 220 MG (50 MG) CAPSULE 220 MG: CAPSULE at 08:54

## 2021-01-28 RX ADMIN — OXYCODONE HYDROCHLORIDE AND ACETAMINOPHEN 1000 MG: 500 TABLET ORAL at 08:55

## 2021-01-28 RX ADMIN — DOCUSATE SODIUM 100 MG: 100 CAPSULE, LIQUID FILLED ORAL at 17:26

## 2021-01-28 RX ADMIN — FUROSEMIDE 80 MG: 80 TABLET ORAL at 08:55

## 2021-01-28 RX ADMIN — METOPROLOL SUCCINATE 50 MG: 50 TABLET, EXTENDED RELEASE ORAL at 08:55

## 2021-01-28 RX ADMIN — METOPROLOL SUCCINATE 50 MG: 50 TABLET, EXTENDED RELEASE ORAL at 20:16

## 2021-01-28 RX ADMIN — RIVAROXABAN 20 MG: 20 TABLET, FILM COATED ORAL at 08:56

## 2021-01-28 RX ADMIN — SPIRONOLACTONE 25 MG: 25 TABLET, FILM COATED ORAL at 08:55

## 2021-01-28 RX ADMIN — FUROSEMIDE 80 MG: 80 TABLET ORAL at 17:26

## 2021-01-28 RX ADMIN — Medication 2000 UNITS: at 08:54

## 2021-01-28 RX ADMIN — ALLOPURINOL 100 MG: 100 TABLET ORAL at 08:56

## 2021-01-28 RX ADMIN — DOCUSATE SODIUM 100 MG: 100 CAPSULE, LIQUID FILLED ORAL at 08:55

## 2021-01-28 RX ADMIN — TAMSULOSIN HYDROCHLORIDE 0.4 MG: 0.4 CAPSULE ORAL at 17:26

## 2021-01-28 RX ADMIN — PREGABALIN 100 MG: 100 CAPSULE ORAL at 08:55

## 2021-01-28 RX ADMIN — PREGABALIN 100 MG: 100 CAPSULE ORAL at 17:26

## 2021-01-28 NOTE — PLAN OF CARE
Problem: PAIN - ADULT  Goal: Verbalizes/displays adequate comfort level or baseline comfort level  Description: Interventions:  - Encourage patient to monitor pain and request assistance  - Assess pain using appropriate pain scale  - Administer analgesics based on type and severity of pain and evaluate response  - Implement non-pharmacological measures as appropriate and evaluate response  - Consider cultural and social influences on pain and pain management  - Notify physician/advanced practitioner if interventions unsuccessful or patient reports new pain  Outcome: Progressing     Problem: INFECTION - ADULT  Goal: Absence or prevention of progression during hospitalization  Description: INTERVENTIONS:  - Assess and monitor for signs and symptoms of infection  - Monitor lab/diagnostic results  - Monitor all insertion sites, i e  indwelling lines, tubes, and drains  - Monitor endotracheal if appropriate and nasal secretions for changes in amount and color  - [place] appropriate cooling/warming therapies per order  - Administer medications as ordered  - Instruct and encourage patient and family to use good hand hygiene technique  - Identify and instruct in appropriate isolation precautions for identified infection/condition  Outcome: Progressing     Problem: SAFETY ADULT  Goal: Patient will remain free of falls  Description: INTERVENTIONS:  - Assess patient frequently for physical needs  -  Identify cognitive and physical deficits and behaviors that affect risk of falls    -  [place] fall precautions as indicated by assessment   - Educate patient/family on patient safety including physical limitations  - Instruct patient to call for assistance with activity based on assessment  - Modify environment to reduce risk of injury  - Consider OT/PT consult to assist with strengthening/mobility  Outcome: Progressing  Goal: Maintain or return to baseline ADL function  Description: INTERVENTIONS:  -  Assess patient's ability to carry out ADLs; assess patient's baseline for ADL function and identify physical deficits which impact ability to perform ADLs (bathing, care of mouth/teeth, toileting, grooming, dressing, etc )  - Assess/evaluate cause of self-care deficits   - Assess range of motion  - Assess patient's mobility; develop plan if impaired  - Assess patient's need for assistive devices and provide as appropriate  - Encourage maximum independence but intervene and supervise when necessary  - Involve family in performance of ADLs  - Assess for home care needs following discharge   - Consider OT consult to assist with ADL evaluation and planning for discharge  - Provide patient education as appropriate  Outcome: Progressing  Goal: Maintain or return mobility status to optimal level  Description: INTERVENTIONS:  - Assess patient's baseline mobility status (ambulation, transfers, stairs, etc )    - Identify cognitive and physical deficits and behaviors that affect mobility  - Identify mobility aids required to assist with transfers and/or ambulation (gait belt, sit-to-stand, lift, walker, cane, etc )  - [place] fall precautions as indicated by assessment  - Record patient progress and toleration of activity level on Mobility SBAR; progress patient to next Phase/Stage  - Instruct patient to call for assistance with activity based on assessment  - Consider rehabilitation consult to assist with strengthening/weightbearing, etc   Outcome: Progressing     Problem: DISCHARGE PLANNING  Goal: Discharge to home or other facility with appropriate resources  Description: INTERVENTIONS:  - Identify barriers to discharge w/patient and caregiver  - Arrange for needed discharge resources and transportation as appropriate  - Identify discharge learning needs (meds, wound care, etc )  - Arrange for interpretive services to assist at discharge as needed  - Refer to Case Management Department for coordinating discharge planning if the patient needs post-hospital services based on physician/advanced practitioner order or complex needs related to functional status, cognitive ability, or social support system  Outcome: Progressing     Problem: Knowledge Deficit  Goal: Patient/family/caregiver demonstrates understanding of disease process, treatment plan, medications, and discharge instructions  Description: Complete learning assessment and assess knowledge base  Interventions:  - Provide teaching at level of understanding  - Provide teaching via preferred learning methods  Outcome: Progressing     Problem: Potential for Falls  Goal: Patient will remain free of falls  Description: INTERVENTIONS:  - Assess patient frequently for physical needs  -  Identify cognitive and physical deficits and behaviors that affect risk of falls    -  [place] fall precautions as indicated by assessment   - Educate patient/family on patient safety including physical limitations  - Instruct patient to call for assistance with activity based on assessment  - Modify environment to reduce risk of injury  - Consider OT/PT consult to assist with strengthening/mobility  Outcome: Progressing

## 2021-01-28 NOTE — PROGRESS NOTES
Progress Note - Cardiology   Cornelio Plummer 76 y o  male MRN: [id number]  Unit/Bed#: E4 -01 Encounter: [id number]    Assessment:    1  Acute on chronic diastolic congestive heart failure  2  With conversion to oral diuretics, patient's weight continues to rise  3  Chronic lymphedema, patient attends lymphedema clinic an uses lymphedema pumps at home  4  Chronic atrial fibrillation with controlled ventricular response  5  Moderate pulmonary hypertension   6  Morbid obesity  7  Obstructive sleep apnea on BiPAP at night  8  Bilateral venous insufficiency, status post lower extremity venous ablation  9  Nuclear stress test 9/20 negative for ischemia, EF 50%    Plan:    1  Will increase furosemide 80 mg b i d   2  Will increase spironolactone to 25 mg b i d   3  Would like weight stabilized and not climbing before patient discharged      Interval history:   weight up almost 2 lb since yesterday  Blood pressure well controlled  Heart rate controlled  Patient comfortable breathing on room air  Minimal lower extremity edema  BMP for today pending  Vitals: /77 (BP Location: Left arm)   Pulse 88   Temp (!) 97 1 °F (36 2 °C) (Temporal)   Resp 18   Ht 5' 9" (1 753 m)   Wt 130 kg (287 lb 0 6 oz)   SpO2 94%   BMI 42 39 kg/m²   Vitals:    01/27/21 0600 01/28/21 0600   Weight: 130 kg (285 lb 11 5 oz) 130 kg (287 lb 0 6 oz)     Orthostatic Blood Pressures      Most Recent Value   Blood Pressure  124/77 filed at 01/28/2021 8670   Patient Position - Orthostatic VS  Sitting filed at 01/28/2021 8972            Intake/Output Summary (Last 24 hours) at 1/28/2021 0930  Last data filed at 1/27/2021 2001  Gross per 24 hour   Intake 630 ml   Output 600 ml   Net 30 ml       Invasive Devices     Peripheral Intravenous Line            Peripheral IV 01/24/21 Dorsal (posterior); Left Forearm 3 days                Review of Systems   Constitutional: Negative for activity change     Respiratory: Negative for cough, chest tightness, shortness of breath and wheezing  Cardiovascular: Positive for leg swelling  Negative for chest pain and palpitations  Musculoskeletal: Negative for gait problem  Skin: Negative for color change  Neurological: Negative for dizziness, tremors, syncope, weakness, light-headedness and headaches  Psychiatric/Behavioral: Negative for agitation and confusion  Physical Exam  Constitutional:       Appearance: He is well-developed  HENT:      Head: Normocephalic and atraumatic  Neck:      Musculoskeletal: Normal range of motion and neck supple  Vascular: No carotid bruit or JVD  Trachea: No tracheal deviation  Cardiovascular:      Rate and Rhythm: Normal rate  Rhythm irregular  Pulses: Normal pulses  Heart sounds: Normal heart sounds  No murmur  No friction rub  No gallop  Pulmonary:      Effort: Pulmonary effort is normal  No respiratory distress  Breath sounds: Normal breath sounds  No wheezing, rhonchi or rales  Abdominal:      General: Bowel sounds are normal       Palpations: Abdomen is soft  Musculoskeletal:      Right lower leg: Edema present  Left lower leg: Edema present  Comments: Trace to 1+ bilateral pretibial edema   Skin:     General: Skin is warm and dry  Neurological:      General: No focal deficit present  Mental Status: He is alert and oriented to person, place, and time  Psychiatric:         Mood and Affect: Mood normal          Behavior: Behavior normal          Thought Content: Thought content normal          ----------------------------------------------------------------------------------------------  NUCLEAR STRESS TEST: No results found for this or any previous visit    Results for orders placed during the hospital encounter of 09/21/20   NM myocardial perfusion spect (rx stress and/or rest)    Narrative [address] [address]  [phone]    Regadenoson    Patient: Ahmet No  MR number: [id number]  Account number: [de-identified]  : 1955  Age: 76 years  Gender: Male  Status: Outpatient  Location: Stress lab  Height: 69 in  Weight: 294 lb  BP: 122/ 82 mmHg    Allergies: PENICILLINS, SULFA ANTIBIOTICS    Diagnosis: 427 31 - ATRIAL FIBRILLATION, R06 00 - Dyspnea, unspecified    RN:  Mk Wing RN  Primary Physician:  Deana Guidry  Technician:  Nancy Xiong  Referring Physician:  Clayton Hernandez DO  Interpreting Physician:  Michelle Ott DO  Group:  [facility]    INDICATIONS: Coronary artery disease  HISTORY: The patient is a 76year old  male  Chest pain status: no chest pain  Other symptoms: dyspnea  Coronary artery disease risk factors: hypertension  Cardiovascular history: arrhythmia  REST ECG: Atrial fibrillation  The ECG showed right bundle branch block  PROCEDURE: The study was performed in the the Stress lab  A regadenoson infusion pharmacologic stress test was performed  Systolic blood pressure was 122 mmHg, at the start of the study  Diastolic blood pressure was 82 mmHg, at the start  of the study  The heart rate was 67 bpm, at the start of the study  Regadenoson protocol:  HR bpm SBP mmHg DBP mmHg  Baseline 67 122 82  Immediate 108 120 74  2 min -- 130 80  5 min 80 124 84  No medications or fluids given  STRESS SUMMARY: Duration of pharmacologic stress was 3 min  Maximal heart rate during stress was 125 bpm  The rate-pressure product for the peak heart rate and blood pressure was 64370  There was no chest pain during stress  The stress  test was terminated due to protocol completion  Arrhythmia during stress: isolated premature ventricular beats  The stress ECG was non-diagnostic for ischemia      ISOTOPE ADMINISTRATION:  Resting isotope administration Stress isotope administration  Agent Tetrofosmin Tetrofosmin  Dose 16 3 mCi 48 2 mCi  Date 2020 09/21/2020  Injection time 08:00 09:40  Imaging time 08:30 10:20  Injection-image interval 30 min 40 min    The radiopharmaceutical was injected at the peak effect of pharmacologic stress  MYOCARDIAL PERFUSION IMAGING:  The image quality was good  Rotating projection images reveal mild diaphragmatic attenuation  Left ventricular size was normal  The TID ratio was 1 02  PERFUSION DEFECTS:  -  There was a small, mildly severe, fixed myocardial perfusion defect of the basal to mid inferior wall with normal myocardial thickening due to diaphragm attenuation  GATED SPECT:  The calculated left ventricular ejection fraction was 50 %  Left ventricular ejection fraction was within normal limits by visual estimate  There was no left ventricular regional abnormality  SUMMARY:  -  Stress results: There was no chest pain during stress  -  ECG conclusions: The stress ECG was non-diagnostic for ischemia  -  Perfusion imaging: There was a small, mildly severe, fixed myocardial perfusion defect of the basal to mid inferior wall with normal myocardial thickening due to diaphragm attenuation   -  Gated SPECT: The calculated left ventricular ejection fraction was 50 %  Left ventricular ejection fraction was within normal limits by visual estimate  There was no left ventricular regional abnormality  IMPRESSIONS: Normal study after pharmacologic vasodilation  There was image artifact, without diagnostic evidence for perfusion abnormality  Left ventricular systolic function was normal     Prepared and signed by    Bonny Donald DO  Signed 09/21/2020 11:19:26           --------------------------------------------------------------------------------  ECHO:   Results for orders placed during the hospital encounter of 01/22/21   Echo complete with contrast if indicated    Narrative [facility] access hospital0 [facility] [place] [address]    [place], [address]  [phone]    Transthoracic Echocardiogram  2D, M-mode, Doppler, and Color Doppler    Study date:  2021    Patient: Norma Glez  MR number: [id number]  Account number: [de-identified]  : 1955  Age: 76 years  Gender: Male  Status: Inpatient  Location: Bedside  Height: 69 in  Weight: 294 4 lb  BP: 129/ 60 mmHg    Indications: Edema  Heart failure  Diagnoses: I50 9 - Heart failure, unspecified    Sonographer:  JAM Valadez  Referring Physician:  Teresa Rand Do  Group:  [facility]  Interpreting Physician:  FORTINO Luu     SUMMARY    PROCEDURE INFORMATION:  Intravenous contrast (  4ml of Definity) was administered  LEFT VENTRICLE:  Systolic function was normal by visual assessment  Ejection fraction was estimated to be 55 %  There were no regional wall motion abnormalities  Wall thickness was mildly increased  There was mild concentric hypertrophy  VENTRICULAR SEPTUM:  There was paradoxical motion  These changes are consistent with a conduction abnormality or paced rhythm  RIGHT VENTRICLE:  The ventricle was mildly dilated  LEFT ATRIUM:  The atrium was mildly dilated  LA volume index 40 mL/m2  RIGHT ATRIUM:  The atrium was mildly to moderately dilated  MITRAL VALVE:  There was trace regurgitation  TRICUSPID VALVE:  There was trace regurgitation  SUMMARY MEASUREMENTS  2D measurements:  Unspecified Anatomy:   %FS was 39 4 %  Ao Diam was 3 4 cm   EDV(Teich) was 129 3 ml   EF(Teich) was 69 5 %  ESV(Teich) was 39 4 ml  IVSd was 1 1 cm  LA Diam was 3 8 cm  LAAs A2C was 27 cm2  LAAs A4C was 26 8 cm2  LAESV A-L A2C was  98 5 ml  LAESV A-L A4C was 92 5 ml  LAESV Index (A-L) was 40 1 ml/m2  LAESV MOD A2C was 94 9 ml  LAESV MOD A4C was 85 4 ml  LAESV(A-L) was 97 7 ml  LAESV(MOD BP) was 91 9 ml  LAESVInd MOD BP was 37 7 ml/m2  LALs A2C was 6 3 cm  LALs A4C was 6 6 cm  LVEDV MOD A4C was 126 8 ml  LVEF MOD A4C was 52 7 %  LVESV MOD A4C was 60 ml  LVIDd was 5 2 cm  LVIDs was 3 2 cm    LVLd A4C was 8 1 cm  LVLs A4C was 7 1 cm  LVPWd was 1 cm  RAAs A4C was 25 8 cm2  RAESV A-L was  91 3 ml   RAESV MOD was 87 9 ml  RALs was 6 2 cm  SV MOD A4C was 66 9 ml   SV(Teich) was 89 9 ml   CW measurements:  Unspecified Anatomy:   AV Env  Ti was 283 9 ms   AV VTI was 27 9 cm   AV Vmax was 1 5 m/s  AV Vmean was 1 m/s  AV maxPG was 8 8 mmHg  AV meanPG was 4 5 mmHg  MM measurements:  Unspecified Anatomy:   TAPSE was 2 cm  PW measurements:  Unspecified Anatomy:   DVI was 0 7   LVOT Env  Ti was 279 7 ms  LVOT VTI was 20 8 cm  LVOT Vmax was 1 2 m/s  LVOT Vmean was 0 7 m/s  LVOT maxPG was 5 9 mmHg  LVOT meanPG was 2 6 mmHg  RV S' was 0 1 m/s  HISTORY: PRIOR HISTORY: lymphedema  Congestive heart failure  Pulmonary hypertension  Atrial fibrillation  Risk factors: hypertension and morbid obesity  PROCEDURE: The procedure was performed at the bedside  This was a routine study  The transthoracic approach was used  The study included complete 2D imaging, M-mode, complete spectral Doppler, and color Doppler  The heart rate was 90 bpm,  at the start of the study  Intravenous contrast (  4ml of Definity) was administered  Image quality was adequate  LEFT VENTRICLE: Size was normal  Systolic function was normal by visual assessment  Ejection fraction was estimated to be 55 %  There were no regional wall motion abnormalities  Wall thickness was mildly increased  There was mild  concentric hypertrophy  DOPPLER: The study was not technically sufficient to allow evaluation of LV diastolic function  VENTRICULAR SEPTUM: There was paradoxical motion  These changes are consistent with a conduction abnormality or paced rhythm  RIGHT VENTRICLE: The ventricle was mildly dilated  Systolic function was normal  Wall thickness was normal     LEFT ATRIUM: The atrium was mildly dilated  LA volume index 40 mL/m2  RIGHT ATRIUM: The atrium was mildly to moderately dilated      MITRAL VALVE: Valve structure was normal  There was normal leaflet separation  DOPPLER: The transmitral velocity was within the normal range  There was no evidence for stenosis  There was trace regurgitation  AORTIC VALVE: The valve was trileaflet  Leaflets exhibited mildly increased thickness, normal cuspal separation, and sclerosis  DOPPLER: Transaortic velocity was within the normal range  There was no evidence for stenosis  There was no  regurgitation  TRICUSPID VALVE: The valve structure was normal  There was normal leaflet separation  DOPPLER: The transtricuspid velocity was within the normal range  There was no evidence for stenosis  There was trace regurgitation  The tricuspid jet  envelope definition was inadequate for estimation of RV systolic pressure  PULMONIC VALVE: Not well visualized  DOPPLER: There was no significant regurgitation  PERICARDIUM: There was no pericardial effusion  The pericardium was normal in appearance  AORTA: The root exhibited normal size  SYSTEMIC VEINS: IVC: The inferior vena cava was normal in size and course  Respirophasic changes were normal     SYSTEM MEASUREMENT TABLES    2D  %FS: 39 4 %  Ao Diam: 3 4 cm  EDV(Teich): 129 3 ml  EF(Teich): 69 5 %  ESV(Teich): 39 4 ml  IVSd: 1 1 cm  LA Diam: 3 8 cm  LAAs A2C: 27 cm2  LAAs A4C: 26 8 cm2  LAESV A-L A2C: 98 5 ml  LAESV A-L A4C: 92 5 ml  LAESV Index (A-L): 40 1 ml/m2  LAESV MOD A2C: 94 9 ml  LAESV MOD A4C: 85 4 ml  LAESV(A-L): 97 7 ml  LAESV(MOD BP): 91 9 ml  LAESVInd MOD BP: 37 7 ml/m2  LALs A2C: 6 3 cm  LALs A4C: 6 6 cm  LVEDV MOD A4C: 126 8 ml  LVEF MOD A4C: 52 7 %  LVESV MOD A4C: 60 ml  LVIDd: 5 2 cm  LVIDs: 3 2 cm  LVLd A4C: 8 1 cm  LVLs A4C: 7 1 cm  LVPWd: 1 cm  RAAs A4C: 25 8 cm2  RAESV A-L: 91 3 ml  RAESV MOD: 87 9 ml  RALs: 6 2 cm  SV MOD A4C: 66 9 ml  SV(Teich): 89 9 ml    CW  AV Env  Ti: 283 9 ms  AV VTI: 27 9 cm  AV Vmax: 1 5 m/s  AV Vmean: 1 m/s  AV maxP 8 mmHg  AV meanP 5 mmHg    MM  TAPSE: 2 cm    PW  DVI: 0 7  LVOT Env  Ti: 279 7 ms  LVOT VTI: 20 8 cm  LVOT Vmax: 1 2 m/s  LVOT Vmean: 0 7 m/s  LVOT maxP 9 mmHg  LVOT meanP 6 mmHg  RV S': 0 1 m/s    [facility]    Prepared and electronically signed by    FORTINO Lemus  Signed 2021 13:29:34         --------------------------------------------------------------------------------  HOLTER    Results for orders placed during the hospital encounter of 20   Holter monitor - 48 hour    Narrative PT NAME: Christopher Hall  : 1955  AGE: 76 y o  GENDER: male  MRN: [id number]   PROCEDURE: Holter monitor - 48 hour      INDICATIONS:  Atrial fibrillation    DESCRIPTION OF FINDINGS:  The patient was monitored for a total of 47 hours and 59 minutes  Predominant rhythm throughout the tracing noted to be atrial fibrillation   with an average heart rate of 92 beats per minute  A minimum heart rate   of 50 beats per minute was noted at 3:12 a m  on day two with a maximum   heart rate of 154 beats per minute noted at 5:55 a m  on day one  Rare ventricular ectopic activity was noted consisting of 14 single PVCs  No sustained ventricular rhythms were noted  There were no significant pauses or high-grade AV block  Normal diurnal heart rate variation  Impression 1  Predominant rhythm noted throughout the tracing was atrial fibrillation   with an average heart rate of 92 beats per minute  2  Rare ventricular ectopic activity without any sustained ventricular   rhythms    3  No symptom-rhythm correlation as the patient did not return a diary   with the monitor        --------------------------------------------------------------------------------    ======================================================    Lab Results   Component Value Date    WBC 4 57 2021    HGB 14 9 2021    HCT 45 7 2021    MCV 91 2021     2021      Lab Results   Component Value Date    SODIUM 139 2021    K 4 2 01/27/2021     01/27/2021    CO2 34 (H) 01/27/2021    BUN 24 01/27/2021    CREATININE 0 73 01/27/2021    GLUC 99 01/27/2021    CALCIUM 9 4 01/27/2021      Lab Results   Component Value Date    HGBA1C 5 7 10/27/2019      No results found for: CHOL  Lab Results   Component Value Date    HDL 55 10/27/2019     Lab Results   Component Value Date    LDLCALC 90 10/27/2019     Lab Results   Component Value Date    TRIG 93 10/27/2019     No results found for: [place], [place]   Lab Results   Component Value Date    INR 1 59 (H) 01/22/2021    INR 1 18 10/26/2019    PROTIME 18 7 (H) 01/22/2021    PROTIME 15 1 (H) 10/26/2019        Imaging:   I have personally reviewed pertinent reports          EKG: atrial fibrillation with controlled ventricular response with

## 2021-01-28 NOTE — PLAN OF CARE
Problem: PAIN - ADULT  Goal: Verbalizes/displays adequate comfort level or baseline comfort level  Description: Interventions:  - Encourage patient to monitor pain and request assistance  - Assess pain using appropriate pain scale  - Administer analgesics based on type and severity of pain and evaluate response  - Implement non-pharmacological measures as appropriate and evaluate response  - Consider cultural and social influences on pain and pain management  - Notify physician/advanced practitioner if interventions unsuccessful or patient reports new pain  Outcome: Progressing     Problem: INFECTION - ADULT  Goal: Absence or prevention of progression during hospitalization  Description: INTERVENTIONS:  - Assess and monitor for signs and symptoms of infection  - Monitor lab/diagnostic results  - Monitor all insertion sites, i e  indwelling lines, tubes, and drains  - Monitor endotracheal if appropriate and nasal secretions for changes in amount and color  - [place] appropriate cooling/warming therapies per order  - Administer medications as ordered  - Instruct and encourage patient and family to use good hand hygiene technique  - Identify and instruct in appropriate isolation precautions for identified infection/condition  Outcome: Progressing     Problem: SAFETY ADULT  Goal: Patient will remain free of falls  Description: INTERVENTIONS:  - Assess patient frequently for physical needs  -  Identify cognitive and physical deficits and behaviors that affect risk of falls    -  [place] fall precautions as indicated by assessment   - Educate patient/family on patient safety including physical limitations  - Instruct patient to call for assistance with activity based on assessment  - Modify environment to reduce risk of injury  - Consider OT/PT consult to assist with strengthening/mobility  Outcome: Progressing  Goal: Maintain or return to baseline ADL function  Description: INTERVENTIONS:  -  Assess patient's ability to carry out ADLs; assess patient's baseline for ADL function and identify physical deficits which impact ability to perform ADLs (bathing, care of mouth/teeth, toileting, grooming, dressing, etc )  - Assess/evaluate cause of self-care deficits   - Assess range of motion  - Assess patient's mobility; develop plan if impaired  - Assess patient's need for assistive devices and provide as appropriate  - Encourage maximum independence but intervene and supervise when necessary  - Involve family in performance of ADLs  - Assess for home care needs following discharge   - Consider OT consult to assist with ADL evaluation and planning for discharge  - Provide patient education as appropriate  Outcome: Progressing  Goal: Maintain or return mobility status to optimal level  Description: INTERVENTIONS:  - Assess patient's baseline mobility status (ambulation, transfers, stairs, etc )    - Identify cognitive and physical deficits and behaviors that affect mobility  - Identify mobility aids required to assist with transfers and/or ambulation (gait belt, sit-to-stand, lift, walker, cane, etc )  - [place] fall precautions as indicated by assessment  - Record patient progress and toleration of activity level on Mobility SBAR; progress patient to next Phase/Stage  - Instruct patient to call for assistance with activity based on assessment  - Consider rehabilitation consult to assist with strengthening/weightbearing, etc   Outcome: Progressing     Problem: DISCHARGE PLANNING  Goal: Discharge to home or other facility with appropriate resources  Description: INTERVENTIONS:  - Identify barriers to discharge w/patient and caregiver  - Arrange for needed discharge resources and transportation as appropriate  - Identify discharge learning needs (meds, wound care, etc )  - Arrange for interpretive services to assist at discharge as needed  - Refer to Case Management Department for coordinating discharge planning if the patient needs post-hospital services based on physician/advanced practitioner order or complex needs related to functional status, cognitive ability, or social support system  Outcome: Progressing     Problem: Knowledge Deficit  Goal: Patient/family/caregiver demonstrates understanding of disease process, treatment plan, medications, and discharge instructions  Description: Complete learning assessment and assess knowledge base  Interventions:  - Provide teaching at level of understanding  - Provide teaching via preferred learning methods  Outcome: Progressing     Problem: Potential for Falls  Goal: Patient will remain free of falls  Description: INTERVENTIONS:  - Assess patient frequently for physical needs  -  Identify cognitive and physical deficits and behaviors that affect risk of falls    -  [place] fall precautions as indicated by assessment   - Educate patient/family on patient safety including physical limitations  - Instruct patient to call for assistance with activity based on assessment  - Modify environment to reduce risk of injury  - Consider OT/PT consult to assist with strengthening/mobility  Outcome: Progressing

## 2021-01-29 LAB
ANION GAP SERPL CALCULATED.3IONS-SCNC: 4 MMOL/L (ref 4–13)
BUN SERPL-MCNC: 27 MG/DL (ref 5–25)
CALCIUM SERPL-MCNC: 9.4 MG/DL (ref 8.3–10.1)
CHLORIDE SERPL-SCNC: 101 MMOL/L (ref 100–108)
CO2 SERPL-SCNC: 34 MMOL/L (ref 21–32)
CREAT SERPL-MCNC: 0.83 MG/DL (ref 0.6–1.3)
GFR SERPL CREATININE-BSD FRML MDRD: 92 ML/MIN/1.73SQ M
GLUCOSE SERPL-MCNC: 88 MG/DL (ref 65–140)
POTASSIUM SERPL-SCNC: 3.7 MMOL/L (ref 3.5–5.3)
SODIUM SERPL-SCNC: 139 MMOL/L (ref 136–145)

## 2021-01-29 PROCEDURE — 94660 CPAP INITIATION&MGMT: CPT

## 2021-01-29 PROCEDURE — 80048 BASIC METABOLIC PNL TOTAL CA: CPT | Performed by: INTERNAL MEDICINE

## 2021-01-29 PROCEDURE — 99232 SBSQ HOSP IP/OBS MODERATE 35: CPT | Performed by: INTERNAL MEDICINE

## 2021-01-29 RX ORDER — SPIRONOLACTONE 25 MG/1
25 TABLET ORAL 2 TIMES DAILY
Qty: 28 TABLET | Refills: 0 | Status: SHIPPED | OUTPATIENT
Start: 2021-01-29 | End: 2021-01-30

## 2021-01-29 RX ORDER — ALLOPURINOL 100 MG/1
100 TABLET ORAL DAILY
Qty: 14 TABLET | Refills: 0 | Status: SHIPPED | OUTPATIENT
Start: 2021-01-29

## 2021-01-29 RX ORDER — PREGABALIN 100 MG/1
100 CAPSULE ORAL 3 TIMES DAILY
Qty: 42 CAPSULE | Refills: 0 | Status: SHIPPED | OUTPATIENT
Start: 2021-01-29

## 2021-01-29 RX ORDER — METOPROLOL SUCCINATE 50 MG/1
50 TABLET, EXTENDED RELEASE ORAL 2 TIMES DAILY
Qty: 28 TABLET | Refills: 0 | Status: SHIPPED | OUTPATIENT
Start: 2021-01-29 | End: 2021-01-30

## 2021-01-29 RX ORDER — FUROSEMIDE 80 MG
80 TABLET ORAL 2 TIMES DAILY
Qty: 28 TABLET | Refills: 0 | Status: SHIPPED | OUTPATIENT
Start: 2021-01-29 | End: 2021-01-30

## 2021-01-29 RX ADMIN — ZINC SULFATE 220 MG (50 MG) CAPSULE 220 MG: CAPSULE at 08:39

## 2021-01-29 RX ADMIN — ALLOPURINOL 100 MG: 100 TABLET ORAL at 08:37

## 2021-01-29 RX ADMIN — SPIRONOLACTONE 25 MG: 25 TABLET, FILM COATED ORAL at 17:11

## 2021-01-29 RX ADMIN — FUROSEMIDE 80 MG: 80 TABLET ORAL at 16:12

## 2021-01-29 RX ADMIN — DOCUSATE SODIUM 100 MG: 100 CAPSULE, LIQUID FILLED ORAL at 08:37

## 2021-01-29 RX ADMIN — RIVAROXABAN 20 MG: 20 TABLET, FILM COATED ORAL at 08:37

## 2021-01-29 RX ADMIN — METOPROLOL SUCCINATE 50 MG: 50 TABLET, EXTENDED RELEASE ORAL at 22:02

## 2021-01-29 RX ADMIN — Medication 2000 UNITS: at 08:37

## 2021-01-29 RX ADMIN — PREGABALIN 100 MG: 100 CAPSULE ORAL at 16:12

## 2021-01-29 RX ADMIN — FUROSEMIDE 80 MG: 80 TABLET ORAL at 08:37

## 2021-01-29 RX ADMIN — PREGABALIN 100 MG: 100 CAPSULE ORAL at 08:37

## 2021-01-29 RX ADMIN — TAMSULOSIN HYDROCHLORIDE 0.4 MG: 0.4 CAPSULE ORAL at 16:12

## 2021-01-29 RX ADMIN — DOCUSATE SODIUM 100 MG: 100 CAPSULE, LIQUID FILLED ORAL at 17:11

## 2021-01-29 RX ADMIN — METOPROLOL SUCCINATE 50 MG: 50 TABLET, EXTENDED RELEASE ORAL at 08:39

## 2021-01-29 RX ADMIN — SPIRONOLACTONE 25 MG: 25 TABLET, FILM COATED ORAL at 08:37

## 2021-01-29 RX ADMIN — PREGABALIN 100 MG: 100 CAPSULE ORAL at 22:02

## 2021-01-29 NOTE — CASE MANAGEMENT
CM reviewed pt care coordination rounds with Dr Cristina Lawson  Pt is a tentative d/c for today  Pt oral diuretic was increased today and not waiting for cards clearance  Cm will f/u through dc

## 2021-01-29 NOTE — PROGRESS NOTES
Progress Note - Cardiology   William Quezada 76 y o  male MRN: [id number]  Unit/Bed#: E4 -01 Encounter: [id number]    Assessment:   1  Acute on chronic diastolic congestive heart failure, now stabilized on oral diuretics  2  Chronic atrial fibrillation with controlled ventricular response  3  Chronic lymphedema, patient attends lymphedema clinic an uses lymphedema pumps at home  4  Moderate pulmonary hypertension   5  Morbid obesity  6  Obstructive sleep apnea on BiPAP at night  7  Bilateral venous insufficiency, status post lower extremity venous ablation   8  Nuclear stress test 9/2020   negative for ischemia, EF 50%    Plan:  1  Patient's cardiac status and fluid status appears to have stabilized on oral diuretics, would discharge on present doses  2  Patient sees Dr Lesley Quezada for cardiology as an outpatient but any follow-up must take into consideration that both his wife and daughter have just tested positive for COVID  3  Will sign off, call if we can be of further help      Interval history:   blood pressure ranged from 114-135 over last 24 hours  O2 saturation 92% on room air  Weight down 2 lb  BUN creatinine 27 and  0 83 respectively  GFR 92  Vitals: /87 (BP Location: Left arm)   Pulse 78   Temp (!) 96 8 °F (36 °C) (Temporal)   Resp 16   Ht 5' 9" (1 753 m)   Wt 129 kg (285 lb 0 9 oz)   SpO2 92%   BMI 42 10 kg/m²   Vitals:    01/28/21 0600 01/29/21 0600   Weight: 130 kg (287 lb 0 6 oz) 129 kg (285 lb 0 9 oz)     Orthostatic Blood Pressures      Most Recent Value   Blood Pressure  117/87 filed at 01/29/2021 0754   Patient Position - Orthostatic VS  Sitting filed at 01/29/2021 0754            Intake/Output Summary (Last 24 hours) at 1/29/2021 1057  Last data filed at 1/29/2021 0836  Gross per 24 hour   Intake 940 ml   Output 2725 ml   Net -1785 ml       Invasive Devices     Peripheral Intravenous Line            Peripheral IV 01/29/21 Dorsal (posterior); Left Forearm less than 1 day Review of Systems   Constitutional: Negative for activity change  Respiratory: Negative for cough, chest tightness, shortness of breath and wheezing  Cardiovascular: Negative for chest pain, palpitations and leg swelling  Musculoskeletal: Negative for gait problem  Skin: Negative for color change  Neurological: Negative for dizziness, tremors, syncope, weakness, light-headedness and headaches  Psychiatric/Behavioral: Negative for agitation and confusion  Physical Exam  Vitals signs reviewed  Constitutional:       General: He is not in acute distress  Appearance: He is well-developed  He is obese  HENT:      Head: Normocephalic and atraumatic  Neck:      Vascular: No carotid bruit or JVD  Cardiovascular:      Rate and Rhythm: Normal rate and regular rhythm  Heart sounds: Normal heart sounds  No murmur  No friction rub  No gallop  Pulmonary:      Effort: Pulmonary effort is normal  No respiratory distress  Breath sounds: Normal breath sounds  No wheezing, rhonchi or rales  Chest:      Chest wall: No tenderness  Abdominal:      General: Bowel sounds are normal  There is no distension  Palpations: Abdomen is soft  Musculoskeletal:      Comments: Trace bilateral pretibial edema   Skin:     General: Skin is warm and dry  Neurological:      General: No focal deficit present  Mental Status: He is alert and oriented to person, place, and time  Psychiatric:         Mood and Affect: Mood normal          Behavior: Behavior normal          Thought Content: Thought content normal            ----------------------------------------------------------------------------------------------  NUCLEAR STRESS TEST: No results found for this or any previous visit    Results for orders placed during the hospital encounter of 09/21/20   NM myocardial perfusion spect (rx stress and/or rest)    Narrative [address]  [phone]    Regadenoson    Patient: Sil Bowman  MR number: [id number]  Account number: [de-identified]  : 1955  Age: 76 years  Gender: Male  Status: Outpatient  Location: Stress lab  Height: 69 in  Weight: 294 lb  BP: 122/ 82 mmHg    Allergies: PENICILLINS, SULFA ANTIBIOTICS    Diagnosis: 427 31 - ATRIAL FIBRILLATION, R06 00 - Dyspnea, unspecified    RN:  Nelly Carter RN  Primary Physician:  Queta Ruiz  Technician:  Shelda Cooks  Referring Physician:  Daysi Hurtado DO  Interpreting Physician:  Dilia Delcid DO  Group:  [facility]    INDICATIONS: Coronary artery disease  HISTORY: The patient is a 76year old  male  Chest pain status: no chest pain  Other symptoms: dyspnea  Coronary artery disease risk factors: hypertension  Cardiovascular history: arrhythmia  REST ECG: Atrial fibrillation  The ECG showed right bundle branch block  PROCEDURE: The study was performed in the the Stress lab  A regadenoson infusion pharmacologic stress test was performed  Systolic blood pressure was 122 mmHg, at the start of the study  Diastolic blood pressure was 82 mmHg, at the start  of the study  The heart rate was 67 bpm, at the start of the study  Regadenoson protocol:  HR bpm SBP mmHg DBP mmHg  Baseline 67 122 82  Immediate 108 120 74  2 min -- 130 80  5 min 80 124 84  No medications or fluids given  STRESS SUMMARY: Duration of pharmacologic stress was 3 min  Maximal heart rate during stress was 125 bpm  The rate-pressure product for the peak heart rate and blood pressure was 90814  There was no chest pain during stress  The stress  test was terminated due to protocol completion  Arrhythmia during stress: isolated premature ventricular beats  The stress ECG was non-diagnostic for ischemia      ISOTOPE ADMINISTRATION:  Resting isotope administration Stress isotope administration  Agent Tetrofosmin Tetrofosmin  Dose 16 3 mCi 48 2 mCi  Date 09/21/2020 09/21/2020  Injection time 08:00 09:40  Imaging time 08:30 10:20  Injection-image interval 30 min 40 min    The radiopharmaceutical was injected at the peak effect of pharmacologic stress  MYOCARDIAL PERFUSION IMAGING:  The image quality was good  Rotating projection images reveal mild diaphragmatic attenuation  Left ventricular size was normal  The TID ratio was 1 02  PERFUSION DEFECTS:  -  There was a small, mildly severe, fixed myocardial perfusion defect of the basal to mid inferior wall with normal myocardial thickening due to diaphragm attenuation  GATED SPECT:  The calculated left ventricular ejection fraction was 50 %  Left ventricular ejection fraction was within normal limits by visual estimate  There was no left ventricular regional abnormality  SUMMARY:  -  Stress results: There was no chest pain during stress  -  ECG conclusions: The stress ECG was non-diagnostic for ischemia  -  Perfusion imaging: There was a small, mildly severe, fixed myocardial perfusion defect of the basal to mid inferior wall with normal myocardial thickening due to diaphragm attenuation   -  Gated SPECT: The calculated left ventricular ejection fraction was 50 %  Left ventricular ejection fraction was within normal limits by visual estimate  There was no left ventricular regional abnormality  IMPRESSIONS: Normal study after pharmacologic vasodilation  There was image artifact, without diagnostic evidence for perfusion abnormality  Left ventricular systolic function was normal     Prepared and signed by    Leilani Lovell DO  Signed 09/21/2020 11:19:26       --------------------------------------------------------------------------------  ECHO:   Results for orders placed during the hospital encounter of 01/22/21   Echo complete with contrast if indicated    Narrative [facility]    [place], [address]  [phone]    Transthoracic Echocardiogram  2D, M-mode, Doppler, and Color Doppler    Study date:  2021    Patient: Tami Spivey  MR number: [id number]  Account number: [de-identified]  : 1955  Age: 76 years  Gender: Male  Status: Inpatient  Location: Bedside  Height: 69 in  Weight: 294 4 lb  BP: 129/ 60 mmHg    Indications: Edema  Heart failure  Diagnoses: I50 9 - Heart failure, unspecified    Sonographer:  JAM Gaona  Referring Physician:  Marcelle Resendez Do  Group:  [facility]  Interpreting Physician:  FORTINO Walker     SUMMARY    PROCEDURE INFORMATION:  Intravenous contrast (  4ml of Definity) was administered  LEFT VENTRICLE:  Systolic function was normal by visual assessment  Ejection fraction was estimated to be 55 %  There were no regional wall motion abnormalities  Wall thickness was mildly increased  There was mild concentric hypertrophy  VENTRICULAR SEPTUM:  There was paradoxical motion  These changes are consistent with a conduction abnormality or paced rhythm  RIGHT VENTRICLE:  The ventricle was mildly dilated  LEFT ATRIUM:  The atrium was mildly dilated  LA volume index 40 mL/m2  RIGHT ATRIUM:  The atrium was mildly to moderately dilated  MITRAL VALVE:  There was trace regurgitation  TRICUSPID VALVE:  There was trace regurgitation  SUMMARY MEASUREMENTS  2D measurements:  Unspecified Anatomy:   %FS was 39 4 %  Ao Diam was 3 4 cm   EDV(Teich) was 129 3 ml   EF(Teich) was 69 5 %  ESV(Teich) was 39 4 ml  IVSd was 1 1 cm  LA Diam was 3 8 cm  LAAs A2C was 27 cm2  LAAs A4C was 26 8 cm2  LAESV A-L A2C was  98 5 ml  LAESV A-L A4C was 92 5 ml  LAESV Index (A-L) was 40 1 ml/m2  LAESV MOD A2C was 94 9 ml  LAESV MOD A4C was 85 4 ml  LAESV(A-L) was 97 7 ml  LAESV(MOD BP) was 91 9 ml  LAESVInd MOD BP was 37 7 ml/m2  LALs A2C was 6 3 cm  LALs A4C was 6 6 cm  LVEDV MOD A4C was 126 8 ml  LVEF MOD A4C was 52 7 %  LVESV MOD A4C was 60 ml  LVIDd was 5 2 cm    LVIDs was 3  2 cm  LVLd A4C was 8 1 cm  LVLs A4C was 7 1 cm  LVPWd was 1 cm  RAAs A4C was 25 8 cm2  RAESV A-L was  91 3 ml   RAESV MOD was 87 9 ml  RALs was 6 2 cm  SV MOD A4C was 66 9 ml   SV(Teich) was 89 9 ml   CW measurements:  Unspecified Anatomy:   AV Env  Ti was 283 9 ms   AV VTI was 27 9 cm   AV Vmax was 1 5 m/s  AV Vmean was 1 m/s  AV maxPG was 8 8 mmHg  AV meanPG was 4 5 mmHg  MM measurements:  Unspecified Anatomy:   TAPSE was 2 cm  PW measurements:  Unspecified Anatomy:   DVI was 0 7   LVOT Env  Ti was 279 7 ms  LVOT VTI was 20 8 cm  LVOT Vmax was 1 2 m/s  LVOT Vmean was 0 7 m/s  LVOT maxPG was 5 9 mmHg  LVOT meanPG was 2 6 mmHg  RV S' was 0 1 m/s  HISTORY: PRIOR HISTORY: lymphedema  Congestive heart failure  Pulmonary hypertension  Atrial fibrillation  Risk factors: hypertension and morbid obesity  PROCEDURE: The procedure was performed at the bedside  This was a routine study  The transthoracic approach was used  The study included complete 2D imaging, M-mode, complete spectral Doppler, and color Doppler  The heart rate was 90 bpm,  at the start of the study  Intravenous contrast (  4ml of Definity) was administered  Image quality was adequate  LEFT VENTRICLE: Size was normal  Systolic function was normal by visual assessment  Ejection fraction was estimated to be 55 %  There were no regional wall motion abnormalities  Wall thickness was mildly increased  There was mild  concentric hypertrophy  DOPPLER: The study was not technically sufficient to allow evaluation of LV diastolic function  VENTRICULAR SEPTUM: There was paradoxical motion  These changes are consistent with a conduction abnormality or paced rhythm  RIGHT VENTRICLE: The ventricle was mildly dilated  Systolic function was normal  Wall thickness was normal     LEFT ATRIUM: The atrium was mildly dilated  LA volume index 40 mL/m2  RIGHT ATRIUM: The atrium was mildly to moderately dilated      MITRAL VALVE: Valve structure was normal  There was normal leaflet separation  DOPPLER: The transmitral velocity was within the normal range  There was no evidence for stenosis  There was trace regurgitation  AORTIC VALVE: The valve was trileaflet  Leaflets exhibited mildly increased thickness, normal cuspal separation, and sclerosis  DOPPLER: Transaortic velocity was within the normal range  There was no evidence for stenosis  There was no  regurgitation  TRICUSPID VALVE: The valve structure was normal  There was normal leaflet separation  DOPPLER: The transtricuspid velocity was within the normal range  There was no evidence for stenosis  There was trace regurgitation  The tricuspid jet  envelope definition was inadequate for estimation of RV systolic pressure  PULMONIC VALVE: Not well visualized  DOPPLER: There was no significant regurgitation  PERICARDIUM: There was no pericardial effusion  The pericardium was normal in appearance  AORTA: The root exhibited normal size  SYSTEMIC VEINS: IVC: The inferior vena cava was normal in size and course  Respirophasic changes were normal     SYSTEM MEASUREMENT TABLES    2D  %FS: 39 4 %  Ao Diam: 3 4 cm  EDV(Teich): 129 3 ml  EF(Teich): 69 5 %  ESV(Teich): 39 4 ml  IVSd: 1 1 cm  LA Diam: 3 8 cm  LAAs A2C: 27 cm2  LAAs A4C: 26 8 cm2  LAESV A-L A2C: 98 5 ml  LAESV A-L A4C: 92 5 ml  LAESV Index (A-L): 40 1 ml/m2  LAESV MOD A2C: 94 9 ml  LAESV MOD A4C: 85 4 ml  LAESV(A-L): 97 7 ml  LAESV(MOD BP): 91 9 ml  LAESVInd MOD BP: 37 7 ml/m2  LALs A2C: 6 3 cm  LALs A4C: 6 6 cm  LVEDV MOD A4C: 126 8 ml  LVEF MOD A4C: 52 7 %  LVESV MOD A4C: 60 ml  LVIDd: 5 2 cm  LVIDs: 3 2 cm  LVLd A4C: 8 1 cm  LVLs A4C: 7 1 cm  LVPWd: 1 cm  RAAs A4C: 25 8 cm2  RAESV A-L: 91 3 ml  RAESV MOD: 87 9 ml  RALs: 6 2 cm  SV MOD A4C: 66 9 ml  SV(Teich): 89 9 ml    CW  AV Env  Ti: 283 9 ms  AV VTI: 27 9 cm  AV Vmax: 1 5 m/s  AV Vmean: 1 m/s  AV maxP 8 mmHg  AV meanP 5 mmHg    MM  TAPSE: 2 cm    PW  DVI: 0 7  LVOT Env Ti: 279 7 ms  LVOT VTI: 20 8 cm  LVOT Vmax: 1 2 m/s  LVOT Vmean: 0 7 m/s  LVOT maxP 9 mmHg  LVOT meanP 6 mmHg  RV S': 0 1 m/s    [facility]    Prepared and electronically signed by    FORTINO Raymond  Signed 2021 13:29:34         --------------------------------------------------------------------------------  HOLTER  No results found for this or any previous visit  Results for orders placed during the hospital encounter of 20   Holter monitor - 48 hour    Narrative PT NAME: Jennifer Velasco  : 1955  AGE: 76 y o  GENDER: male  MRN: [id number]   PROCEDURE: Holter monitor - 48 hour      INDICATIONS:  Atrial fibrillation    DESCRIPTION OF FINDINGS:  The patient was monitored for a total of 47 hours and 59 minutes  Predominant rhythm throughout the tracing noted to be atrial fibrillation   with an average heart rate of 92 beats per minute  A minimum heart rate   of 50 beats per minute was noted at 3:12 a m  on day two with a maximum   heart rate of 154 beats per minute noted at 5:55 a m  on day one  Rare ventricular ectopic activity was noted consisting of 14 single PVCs  No sustained ventricular rhythms were noted  There were no significant pauses or high-grade AV block  Normal diurnal heart rate variation  Impression 1  Predominant rhythm noted throughout the tracing was atrial fibrillation   with an average heart rate of 92 beats per minute  2  Rare ventricular ectopic activity without any sustained ventricular   rhythms    3  No symptom-rhythm correlation as the patient did not return a diary   with the monitor          ======================================================    Lab Results   Component Value Date    WBC 4 57 2021    HGB 14 9 2021    HCT 45 7 2021    MCV 91 2021     2021      Lab Results   Component Value Date    SODIUM 139 2021    K 3 7 01/29/2021     01/29/2021    CO2 34 (H) 01/29/2021    BUN 27 (H) 01/29/2021    CREATININE 0 83 01/29/2021    GLUC 88 01/29/2021    CALCIUM 9 4 01/29/2021      Lab Results   Component Value Date    HGBA1C 5 7 10/27/2019      No results found for: CHOL  Lab Results   Component Value Date    HDL 55 10/27/2019     Lab Results   Component Value Date    LDLCALC 90 10/27/2019     Lab Results   Component Value Date    TRIG 93 10/27/2019     No results found for: [place], [place]   Lab Results   Component Value Date    INR 1 59 (H) 01/22/2021    INR 1 18 10/26/2019    PROTIME 18 7 (H) 01/22/2021    PROTIME 15 1 (H) 10/26/2019        Imaging:   I have personally reviewed pertinent reports

## 2021-01-29 NOTE — ASSESSMENT & PLAN NOTE
Wt Readings from Last 3 Encounters:   01/28/21 130 kg (287 lb 0 6 oz)   01/22/21 (!) 147 kg (324 lb)   01/14/21 (!) 146 kg (321 lb)     · Acute on chronic diastolic CHF currently on IV furosemide 60 mg up to t i d  by cardiology  · Admission weight 319 lb    Low is weight 284 lb; currently 287 lb  · Transitioned to oral furosemide 80 mg but increased to b i d  given increased weight

## 2021-01-29 NOTE — PROGRESS NOTES
Progress Note - Jennifer Velasco 1955, 76 y o  male MRN: [id number]    Unit/Bed#: E4 -01 Encounter: [id number]    Primary Care Provider: Dipak Farley MD   Date and time admitted to hospital: 1/22/2021  4:22 PM        * Acute diastolic heart failure [facility])  Assessment & Plan  Wt Readings from Last 3 Encounters:   01/28/21 130 kg (287 lb 0 6 oz)   01/22/21 (!) 147 kg (324 lb)   01/14/21 (!) 146 kg (321 lb)     · Acute on chronic diastolic CHF currently on IV furosemide 60 mg up to t i d  by cardiology  · Admission weight 319 lb  Low is weight 284 lb; currently 287 lb  · Transitioned to oral furosemide 80 mg but increased to b i d  given increased weight    Shortness of breath with exposure to COVID-19 virus  Assessment & Plan  · Shortness of breath with exposure to COVID-19  · Current symptoms appears secondary to decompensated CHF  · COVID 19 remains negative  Lab Results   Component Value Date    SARSCOV2 Negative 01/24/2021    SARSCOV2 Negative 01/22/2021       Obesity, Class III, 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 9 (morbid obesity) ([facility] [address] )  Assessment & Plan  · Body mass index is 42 39 kg/m²  Obstructive sleep apnea on CPAP  Assessment & Plan  · ROBERT on CPAP q h s  Atrial fibrillation (HCC)  Assessment & Plan  · Chronic atrial fibrillation rate controlled on metoprolol  · Continue Xarelto for anticoagulation      VTE Pharmacologic Prophylaxis:   High Risk (Score >/= 5) - Pharmacological DVT Prophylaxis Ordered: Rivaroxaban (Xarelto)  Sequential Compression Devices Ordered  Patient Centered Rounds: I have performed bedside rounds with nursing staff today  Discussions with Specialists or Other Care Team Provider:  Case management    Education and Discussions with Family / Patient:  Spouse on telephone    Time Spent for Care: 25 mins  More than 50% of total time spent on counseling and coordination of care as described above      Current Length of Stay: 6 day(s)  Current Patient Status: Inpatient   Certification Statement: The patient will continue to require additional inpatient hospital stay due to  CHF  Discharge Plan / Estimated Discharge Date: Possibly tomorrow if cleared by Cardiology    Code Status: Level 1 - Full Code      Subjective:   Patient seen and examined  No new complaints  Feeling decreased urine output    Objective:   Vitals: Blood pressure 124/80, pulse 90, temperature 97 9 °F (36 6 °C), temperature source Tympanic, resp  rate 18, height 5' 9" (1 753 m), weight 130 kg (287 lb 0 6 oz), SpO2 96 %  Physical Exam  Vitals signs reviewed  Constitutional:       General: He is not in acute distress  HENT:      Head: Atraumatic  Eyes:      Extraocular Movements: Extraocular movements intact  Pupils: Pupils are equal, round, and reactive to light  Cardiovascular:      Rate and Rhythm: Regular rhythm  Heart sounds: Normal heart sounds  Pulmonary:      Effort: Pulmonary effort is normal       Breath sounds: Decreased breath sounds present  No wheezing  Abdominal:      General: Bowel sounds are normal       Palpations: Abdomen is soft  Tenderness: There is no abdominal tenderness  There is no rebound  Musculoskeletal:         General: Swelling (2+Lower extremities bilaterally) present  No tenderness  Skin:     General: Skin is warm and dry  Neurological:      General: No focal deficit present  Mental Status: He is alert and oriented to person, place, and time  Cranial Nerves: No cranial nerve deficit     Psychiatric:         Mood and Affect: Mood normal        Additional Data:   Labs:  Results from last 7 days   Lab Units 01/26/21  1047 01/25/21  0556 01/24/21  0601 01/23/21  0458 01/22/21  1659   WBC Thousand/uL 4 57 4 51 3 96* 3 23* 5 05   HEMOGLOBIN g/dL 14 9 13 1 12 6 11 7* 12 1   HEMATOCRIT % 45 7 41 0 40 4 36 8 37 2   MCV fL 91 92 94 93 92   PLATELETS Thousands/uL 195 167 154 138* 164   INR   --   --   --   --  1 59*     Results from last 7 days   Lab Units 01/27/21  7054 01/26/21  1047 01/25/21  0556 01/24/21  0601 01/23/21  0458 01/22/21  1659   SODIUM mmol/L 139 141 142 144 142 142   POTASSIUM mmol/L 4 2 3 7 3 7 3 8 3 3* 3 5   CHLORIDE mmol/L 101 102 104 106 107 105   CO2 mmol/L 34* 34* 32 33* 33* 31   ANION GAP mmol/L 4 5 6 5 2* 6   BUN mg/dL 24 22 18 16 13 19   CREATININE mg/dL 0 73 0 85 0 78 0 85 0 73 0 77   CALCIUM mg/dL 9 4 9 5 9 2 9 2 8 8 9 0   ALBUMIN g/dL 3 5  --   --   --   --  3 3*   TOTAL BILIRUBIN mg/dL 0 82  --   --   --   --  0 46   ALK PHOS U/L 71  --   --   --   --  73   ALT U/L 25  --   --   --   --  28   AST U/L 32  --   --   --   --  22   EGFR ml/min/1 73sq m 97 91 94 91 97 95   GLUCOSE RANDOM mg/dL 99 126 92 94 98 92     Results from last 7 days   Lab Units 01/26/21  1047 01/25/21  0556 01/24/21  0601 01/23/21  0458   MAGNESIUM mg/dL 2 2 2 1 2 3 2 1     Results from last 7 days   Lab Units 01/23/21  0037 01/22/21  2146 01/22/21  1851   TROPONIN I ng/mL <0 02 <0 02 <0 02     Results from last 7 days   Lab Units 01/22/21  1659   NT-PRO BNP pg/mL 1,536*          Results from last 7 days   Lab Units 01/27/21  0740   POC GLUCOSE mg/dl 78             * I Have Reviewed All Lab Data Listed Above  Cultures:   Results from last 7 days   Lab Units 01/24/21  1324 01/22/21  1659   INFLUENZA A PCR  Negative Negative       Results from last 7 days   Lab Units 01/24/21  1324 01/22/21  1659   SARS-COV-2  Negative Negative   INFLUENZA A PCR  Negative Negative   INFLUENZA B PCR  Negative Negative   RSV PCR  Negative Negative           Lines/Drains:  Invasive Devices     Peripheral Intravenous Line            Peripheral IV 01/24/21 Dorsal (posterior); Left Forearm 4 days              Telemetry:      Imaging:  Imaging Reports Reviewed Today Include:   Xr Chest 1 View Portable    Result Date: 1/23/2021  Impression: Cardiomegaly  No acute cardiopulmonary disease   Workstation performed: [id number]     Scheduled Meds:  Current Facility-Administered Medications Medication Dose Route Frequency Provider Last Rate    acetaminophen  650 mg Oral Q4H PRN Cyndi Maria, DO      allopurinol  100 mg Oral Daily Denys Viveros, DO      ammonium lactate   Topical BID PRN Mary Ann Champagne MD      ascorbic acid  1,000 mg Oral Q12H [address] Merrillrie Candace, DO      cholecalciferol  2,000 Units Oral Daily Denys Viveros, DO      docusate sodium  100 mg Oral BID Denys Viveros, DO      furosemide  80 mg Oral BID (diuretic) Hali Kent MD      metoprolol succinate  50 mg Oral BID Nicole Coombs MD      morphine injection  2 mg Intravenous Q1H PRN Cyndi Maria, DO      zinc sulfate  220 mg Oral Daily Denys Viveros DO      Followed by   Alvarado Emanuel ON 1/30/2021] multivitamin-minerals  1 tablet Oral Daily Denys Viveros, DO      ondansetron  4 mg Intravenous Q6H PRN Cyndi Maria, DO      oxyCODONE  5 mg Oral Q4H PRN Cyndi Maria, DO      pregabalin  100 mg Oral TID Denys Viveros, DO      rivaroxaban  20 mg Oral Daily With Breakfast Denys Viveros, DO      spironolactone  25 mg Oral BID Hali Kent MD      tamsulosin  0 4 mg Oral Daily With Dinner Denys Viveros, DO      traMADol  50 mg Oral Q6H PRN Cyndi Maria, DO Aryan Diggs DO  [facility] Internal Medicine  Hospitalist    ** Please Note: This note has been constructed using a voice recognition system   **

## 2021-01-29 NOTE — PLAN OF CARE
Problem: PAIN - ADULT  Goal: Verbalizes/displays adequate comfort level or baseline comfort level  Description: Interventions:  - Encourage patient to monitor pain and request assistance  - Assess pain using appropriate pain scale  - Administer analgesics based on type and severity of pain and evaluate response  - Implement non-pharmacological measures as appropriate and evaluate response  - Consider cultural and social influences on pain and pain management  - Notify physician/advanced practitioner if interventions unsuccessful or patient reports new pain  Outcome: Progressing     Problem: INFECTION - ADULT  Goal: Absence or prevention of progression during hospitalization  Description: INTERVENTIONS:  - Assess and monitor for signs and symptoms of infection  - Monitor lab/diagnostic results  - Monitor all insertion sites, i e  indwelling lines, tubes, and drains  - Monitor endotracheal if appropriate and nasal secretions for changes in amount and color  - [place] appropriate cooling/warming therapies per order  - Administer medications as ordered  - Instruct and encourage patient and family to use good hand hygiene technique  - Identify and instruct in appropriate isolation precautions for identified infection/condition  Outcome: Progressing     Problem: SAFETY ADULT  Goal: Patient will remain free of falls  Description: INTERVENTIONS:  - Assess patient frequently for physical needs  -  Identify cognitive and physical deficits and behaviors that affect risk of falls    -  [place] fall precautions as indicated by assessment   - Educate patient/family on patient safety including physical limitations  - Instruct patient to call for assistance with activity based on assessment  - Modify environment to reduce risk of injury  - Consider OT/PT consult to assist with strengthening/mobility  Outcome: Progressing  Goal: Maintain or return to baseline ADL function  Description: INTERVENTIONS:  -  Assess patient's ability to carry out ADLs; assess patient's baseline for ADL function and identify physical deficits which impact ability to perform ADLs (bathing, care of mouth/teeth, toileting, grooming, dressing, etc )  - Assess/evaluate cause of self-care deficits   - Assess range of motion  - Assess patient's mobility; develop plan if impaired  - Assess patient's need for assistive devices and provide as appropriate  - Encourage maximum independence but intervene and supervise when necessary  - Involve family in performance of ADLs  - Assess for home care needs following discharge   - Consider OT consult to assist with ADL evaluation and planning for discharge  - Provide patient education as appropriate  Outcome: Progressing  Goal: Maintain or return mobility status to optimal level  Description: INTERVENTIONS:  - Assess patient's baseline mobility status (ambulation, transfers, stairs, etc )    - Identify cognitive and physical deficits and behaviors that affect mobility  - Identify mobility aids required to assist with transfers and/or ambulation (gait belt, sit-to-stand, lift, walker, cane, etc )  - [place] fall precautions as indicated by assessment  - Record patient progress and toleration of activity level on Mobility SBAR; progress patient to next Phase/Stage  - Instruct patient to call for assistance with activity based on assessment  - Consider rehabilitation consult to assist with strengthening/weightbearing, etc   Outcome: Progressing     Problem: DISCHARGE PLANNING  Goal: Discharge to home or other facility with appropriate resources  Description: INTERVENTIONS:  - Identify barriers to discharge w/patient and caregiver  - Arrange for needed discharge resources and transportation as appropriate  - Identify discharge learning needs (meds, wound care, etc )  - Arrange for interpretive services to assist at discharge as needed  - Refer to Case Management Department for coordinating discharge planning if the patient needs post-hospital services based on physician/advanced practitioner order or complex needs related to functional status, cognitive ability, or social support system  Outcome: Progressing     Problem: Knowledge Deficit  Goal: Patient/family/caregiver demonstrates understanding of disease process, treatment plan, medications, and discharge instructions  Description: Complete learning assessment and assess knowledge base  Interventions:  - Provide teaching at level of understanding  - Provide teaching via preferred learning methods  Outcome: Progressing     Problem: Potential for Falls  Goal: Patient will remain free of falls  Description: INTERVENTIONS:  - Assess patient frequently for physical needs  -  Identify cognitive and physical deficits and behaviors that affect risk of falls    -  [place] fall precautions as indicated by assessment   - Educate patient/family on patient safety including physical limitations  - Instruct patient to call for assistance with activity based on assessment  - Modify environment to reduce risk of injury  - Consider OT/PT consult to assist with strengthening/mobility  Outcome: Progressing     Problem: CARDIOVASCULAR - ADULT  Goal: Maintains optimal cardiac output and hemodynamic stability  Description: INTERVENTIONS:  - Monitor I/O, vital signs and rhythm  - Monitor for S/S and trends of decreased cardiac output  - Administer and titrate ordered vasoactive medications to optimize hemodynamic stability  - Assess quality of pulses, skin color and temperature  - Assess for signs of decreased coronary artery perfusion  - Instruct patient to report change in severity of symptoms  Outcome: Progressing  Goal: Absence of cardiac dysrhythmias or at baseline rhythm  Description: INTERVENTIONS:  - Continuous cardiac monitoring, vital signs, obtain 12 lead EKG if ordered  - Administer antiarrhythmic and heart rate control medications as ordered  - Monitor electrolytes and administer replacement therapy as ordered  Outcome: Progressing     Problem: RESPIRATORY - ADULT  Goal: Achieves optimal ventilation and oxygenation  Description: INTERVENTIONS:  - Assess for changes in respiratory status  - Assess for changes in mentation and behavior  - Position to facilitate oxygenation and minimize respiratory effort  - Oxygen administered by appropriate delivery if ordered  - Initiate smoking cessation education as indicated  - Encourage broncho-pulmonary hygiene including cough, deep breathe, Incentive Spirometry  - Assess the need for suctioning and aspirate as needed  - Assess and instruct to report SOB or any respiratory difficulty  - Respiratory Therapy support as indicated  Outcome: Progressing

## 2021-01-29 NOTE — PROGRESS NOTES
Progress Note - Domi January 1955, 76 y o  male MRN: [id number]    Unit/Bed#: E4 -01 Encounter: [id number]    Primary Care Provider: Lanette Martin MD   Date and time admitted to hospital: 1/22/2021  4:22 PM        * Acute diastolic heart failure [facility])  Assessment & Plan  Wt Readings from Last 3 Encounters:   01/29/21 129 kg (285 lb 0 9 oz)   01/22/21 (!) 147 kg (324 lb)   01/14/21 (!) 146 kg (321 lb)     · Acute on chronic diastolic CHF currently on IV furosemide 60 mg up to t i d  by cardiology  · Admission weight 319 lb  Low is weight 284 lb; currently 285 lb  · Transitioned to oral furosemide 80 mg but increased to b i d  Will be discharged with this dose    Shortness of breath with exposure to COVID-19 virus  Assessment & Plan  · Shortness of breath with exposure to COVID-19  · Current symptoms appears secondary to decompensated CHF  · COVID 19 remains negative  Lab Results   Component Value Date    SARSCOV2 Negative 01/24/2021    SARSCOV2 Negative 01/22/2021       Obesity, Class III, BMI 40-49 9 (morbid obesity) ([facility] [address] )  Assessment & Plan  · Body mass index is 42 1 kg/m²  Obstructive sleep apnea on CPAP  Assessment & Plan  · ROBERT on CPAP q h s  Atrial fibrillation (HCC)  Assessment & Plan  · Chronic atrial fibrillation rate controlled on metoprolol but increased to succinate 50 mg b i d   · Continue Xarelto for anticoagulation      VTE Pharmacologic Prophylaxis:   Moderate Risk (Score 3-4) - Pharmacological DVT Prophylaxis Ordered: Rivaroxaban (Xarelto)  Patient Centered Rounds: I have performed bedside rounds with nursing staff today  Discussions with Specialists or Other Care Team Provider:     Education and Discussions with Family / Patient:  Spouse on telephone    Time Spent for Care: 25 mins  More than 50% of total time spent on counseling and coordination of care as described above      Current Length of Stay: 7 day(s)  Current Patient Status: Inpatient   Certification Statement: The patient will continue to require additional inpatient hospital stay due to CHF  Discharge Plan / Estimated Discharge Date: Anticipate discharge tomorrow to home  Code Status: Level 1 - Full Code      Subjective:   Patient seen and examined  No new complaints  Deciding on going home with his family/spouse/daughter (who both have COVID) versus his son in [place]    Objective:   Vitals: Blood pressure 133/87, pulse 73, temperature (!) 97 4 °F (36 3 °C), temperature source Temporal, resp  rate 18, height 5' 9" (1 753 m), weight 129 kg (285 lb 0 9 oz), SpO2 98 %      Physical Exam  Additional Data:   Labs:  Results from last 7 days   Lab Units 01/26/21  1047 01/25/21  0556 01/24/21  0601 01/23/21  0458   WBC Thousand/uL 4 57 4 51 3 96* 3 23*   HEMOGLOBIN g/dL 14 9 13 1 12 6 11 7*   HEMATOCRIT % 45 7 41 0 40 4 36 8   MCV fL 91 92 94 93   PLATELETS Thousands/uL 195 167 154 138*     Results from last 7 days   Lab Units 01/29/21  0432 01/27/21  0633 01/26/21  1047 01/25/21  0556 01/24/21  0601 01/23/21  0458   SODIUM mmol/L 139 139 141 142 144 142   POTASSIUM mmol/L 3 7 4 2 3 7 3 7 3 8 3 3*   CHLORIDE mmol/L 101 101 102 104 106 107   CO2 mmol/L 34* 34* 34* 32 33* 33*   ANION GAP mmol/L 4 4 5 6 5 2*   BUN mg/dL 27* 24 22 18 16 13   CREATININE mg/dL 0 83 0 73 0 85 0 78 0 85 0 73   CALCIUM mg/dL 9 4 9 4 9 5 9 2 9 2 8 8   ALBUMIN g/dL  --  3 5  --   --   --   --    TOTAL BILIRUBIN mg/dL  --  0 82  --   --   --   --    ALK PHOS U/L  --  71  --   --   --   --    ALT U/L  --  25  --   --   --   --    AST U/L  --  32  --   --   --   --    EGFR ml/min/1 73sq m 92 97 91 94 91 97   GLUCOSE RANDOM mg/dL 88 99 126 92 94 98     Results from last 7 days   Lab Units 01/26/21  1047 01/25/21  0556 01/24/21  0601 01/23/21  0458   MAGNESIUM mg/dL 2 2 2 1 2 3 2 1     Results from last 7 days   Lab Units 01/23/21  0037 01/22/21  2146 01/22/21  1851   TROPONIN I ng/mL <0 02 <0 02 <0 02              Results from last 7 days Lab Units 01/28/21 2044 01/27/21  0740   POC GLUCOSE mg/dl 98 78             * I Have Reviewed All Lab Data Listed Above  Cultures:   Results from last 7 days   Lab Units 01/24/21  1324   INFLUENZA A PCR  Negative       Results from last 7 days   Lab Units 01/24/21  1324   SARS-COV-2  Negative   INFLUENZA A PCR  Negative   INFLUENZA B PCR  Negative   RSV PCR  Negative           Lines/Drains:  Invasive Devices     Peripheral Intravenous Line            Peripheral IV 01/29/21 Dorsal (posterior); Left Forearm less than 1 day              Telemetry:      Imaging:  Imaging Reports Reviewed Today Include:   Xr Chest 1 View Portable    Result Date: 1/23/2021  Impression: Cardiomegaly  No acute cardiopulmonary disease   Workstation performed: [id number]     Scheduled Meds:  Current Facility-Administered Medications   Medication Dose Route Frequency Provider Last Rate    acetaminophen  650 mg Oral Q4H PRN Areta Romance, DO      allopurinol  100 mg Oral Daily Denys Viveros, DO      ammonium lactate   Topical BID PRN Ila Dean MD      cholecalciferol  2,000 Units Oral Daily Denys Coronado, DO      docusate sodium  100 mg Oral BID Denys Viveros, DO      furosemide  80 mg Oral BID (diuretic) Gertrude Hogan MD      metoprolol succinate  50 mg Oral BID Nam Wilburn MD      morphine injection  2 mg Intravenous Q1H PRN Kelly Landry DO      [START ON 1/30/2021] multivitamin-minerals  1 tablet Oral Daily Denys Viveros, DO      ondansetron  4 mg Intravenous Q6H PRN Areta Romanavery, DO      oxyCODONE  5 mg Oral Q4H PRN Areta Romanavery, DO      pregabalin  100 mg Oral TID Denys Viveros, DO      rivaroxaban  20 mg Oral Daily With Breakfast Denys Viveros, DO      spironolactone  25 mg Oral BID Gertrude Hogan MD      tamsulosin  0 4 mg Oral Daily With Dinner Denys Viveros, DO      traMADol  50 mg Oral Q6H PRN Areta Romanavery, DO         Romulo Elizondo DO  [facility] Internal Medicine  Hospitalist    ** Please Note: This note has been constructed using a voice recognition system   **

## 2021-01-29 NOTE — PLAN OF CARE
Problem: PAIN - ADULT  Goal: Verbalizes/displays adequate comfort level or baseline comfort level  Description: Interventions:  - Encourage patient to monitor pain and request assistance  - Assess pain using appropriate pain scale  - Administer analgesics based on type and severity of pain and evaluate response  - Implement non-pharmacological measures as appropriate and evaluate response  - Consider cultural and social influences on pain and pain management  - Notify physician/advanced practitioner if interventions unsuccessful or patient reports new pain  Outcome: Progressing     Problem: INFECTION - ADULT  Goal: Absence or prevention of progression during hospitalization  Description: INTERVENTIONS:  - Assess and monitor for signs and symptoms of infection  - Monitor lab/diagnostic results  - Monitor all insertion sites, i e  indwelling lines, tubes, and drains  - Monitor endotracheal if appropriate and nasal secretions for changes in amount and color  - [place] appropriate cooling/warming therapies per order  - Administer medications as ordered  - Instruct and encourage patient and family to use good hand hygiene technique  - Identify and instruct in appropriate isolation precautions for identified infection/condition  Outcome: Progressing     Problem: SAFETY ADULT  Goal: Patient will remain free of falls  Description: INTERVENTIONS:  - Assess patient frequently for physical needs  -  Identify cognitive and physical deficits and behaviors that affect risk of falls    -  [place] fall precautions as indicated by assessment   - Educate patient/family on patient safety including physical limitations  - Instruct patient to call for assistance with activity based on assessment  - Modify environment to reduce risk of injury  - Consider OT/PT consult to assist with strengthening/mobility  Outcome: Progressing  Goal: Maintain or return to baseline ADL function  Description: INTERVENTIONS:  -  Assess patient's ability to carry out ADLs; assess patient's baseline for ADL function and identify physical deficits which impact ability to perform ADLs (bathing, care of mouth/teeth, toileting, grooming, dressing, etc )  - Assess/evaluate cause of self-care deficits   - Assess range of motion  - Assess patient's mobility; develop plan if impaired  - Assess patient's need for assistive devices and provide as appropriate  - Encourage maximum independence but intervene and supervise when necessary  - Involve family in performance of ADLs  - Assess for home care needs following discharge   - Consider OT consult to assist with ADL evaluation and planning for discharge  - Provide patient education as appropriate  Outcome: Progressing  Goal: Maintain or return mobility status to optimal level  Description: INTERVENTIONS:  - Assess patient's baseline mobility status (ambulation, transfers, stairs, etc )    - Identify cognitive and physical deficits and behaviors that affect mobility  - Identify mobility aids required to assist with transfers and/or ambulation (gait belt, sit-to-stand, lift, walker, cane, etc )  - [place] fall precautions as indicated by assessment  - Record patient progress and toleration of activity level on Mobility SBAR; progress patient to next Phase/Stage  - Instruct patient to call for assistance with activity based on assessment  - Consider rehabilitation consult to assist with strengthening/weightbearing, etc   Outcome: Progressing     Problem: DISCHARGE PLANNING  Goal: Discharge to home or other facility with appropriate resources  Description: INTERVENTIONS:  - Identify barriers to discharge w/patient and caregiver  - Arrange for needed discharge resources and transportation as appropriate  - Identify discharge learning needs (meds, wound care, etc )  - Arrange for interpretive services to assist at discharge as needed  - Refer to Case Management Department for coordinating discharge planning if the patient needs post-hospital services based on physician/advanced practitioner order or complex needs related to functional status, cognitive ability, or social support system  Outcome: Progressing     Problem: Knowledge Deficit  Goal: Patient/family/caregiver demonstrates understanding of disease process, treatment plan, medications, and discharge instructions  Description: Complete learning assessment and assess knowledge base  Interventions:  - Provide teaching at level of understanding  - Provide teaching via preferred learning methods  Outcome: Progressing     Problem: Potential for Falls  Goal: Patient will remain free of falls  Description: INTERVENTIONS:  - Assess patient frequently for physical needs  -  Identify cognitive and physical deficits and behaviors that affect risk of falls    -  [place] fall precautions as indicated by assessment   - Educate patient/family on patient safety including physical limitations  - Instruct patient to call for assistance with activity based on assessment  - Modify environment to reduce risk of injury  - Consider OT/PT consult to assist with strengthening/mobility  Outcome: Progressing     Problem: CARDIOVASCULAR - ADULT  Goal: Maintains optimal cardiac output and hemodynamic stability  Description: INTERVENTIONS:  - Monitor I/O, vital signs and rhythm  - Monitor for S/S and trends of decreased cardiac output  - Administer and titrate ordered vasoactive medications to optimize hemodynamic stability  - Assess quality of pulses, skin color and temperature  - Assess for signs of decreased coronary artery perfusion  - Instruct patient to report change in severity of symptoms  Outcome: Progressing  Goal: Absence of cardiac dysrhythmias or at baseline rhythm  Description: INTERVENTIONS:  - Continuous cardiac monitoring, vital signs, obtain 12 lead EKG if ordered  - Administer antiarrhythmic and heart rate control medications as ordered  - Monitor electrolytes and administer replacement therapy as ordered  Outcome: Progressing     Problem: RESPIRATORY - ADULT  Goal: Achieves optimal ventilation and oxygenation  Description: INTERVENTIONS:  - Assess for changes in respiratory status  - Assess for changes in mentation and behavior  - Position to facilitate oxygenation and minimize respiratory effort  - Oxygen administered by appropriate delivery if ordered  - Initiate smoking cessation education as indicated  - Encourage broncho-pulmonary hygiene including cough, deep breathe, Incentive Spirometry  - Assess the need for suctioning and aspirate as needed  - Assess and instruct to report SOB or any respiratory difficulty  - Respiratory Therapy support as indicated  Outcome: Progressing

## 2021-01-30 VITALS
HEART RATE: 85 BPM | HEIGHT: 69 IN | DIASTOLIC BLOOD PRESSURE: 77 MMHG | RESPIRATION RATE: 18 BRPM | SYSTOLIC BLOOD PRESSURE: 120 MMHG | OXYGEN SATURATION: 96 % | BODY MASS INDEX: 42.22 KG/M2 | TEMPERATURE: 97.8 F | WEIGHT: 285.06 LBS

## 2021-01-30 PROCEDURE — 99239 HOSP IP/OBS DSCHRG MGMT >30: CPT | Performed by: INTERNAL MEDICINE

## 2021-01-30 PROCEDURE — 94660 CPAP INITIATION&MGMT: CPT

## 2021-01-30 RX ORDER — METOPROLOL SUCCINATE 50 MG/1
50 TABLET, EXTENDED RELEASE ORAL 2 TIMES DAILY
Qty: 180 TABLET | Refills: 2 | Status: SHIPPED | OUTPATIENT
Start: 2021-01-30 | End: 2021-05-06 | Stop reason: SDUPTHER

## 2021-01-30 RX ORDER — FUROSEMIDE 80 MG
80 TABLET ORAL 2 TIMES DAILY
Qty: 180 TABLET | Refills: 0 | Status: SHIPPED | OUTPATIENT
Start: 2021-01-30 | End: 2021-03-11

## 2021-01-30 RX ORDER — SPIRONOLACTONE 25 MG/1
25 TABLET ORAL 2 TIMES DAILY
Qty: 180 TABLET | Refills: 0 | Status: SHIPPED | OUTPATIENT
Start: 2021-01-30 | End: 2021-06-04 | Stop reason: SDUPTHER

## 2021-01-30 RX ADMIN — DOCUSATE SODIUM 100 MG: 100 CAPSULE, LIQUID FILLED ORAL at 09:02

## 2021-01-30 RX ADMIN — ALLOPURINOL 100 MG: 100 TABLET ORAL at 09:03

## 2021-01-30 RX ADMIN — SPIRONOLACTONE 25 MG: 25 TABLET, FILM COATED ORAL at 09:02

## 2021-01-30 RX ADMIN — RIVAROXABAN 20 MG: 20 TABLET, FILM COATED ORAL at 09:02

## 2021-01-30 RX ADMIN — Medication 2000 UNITS: at 09:03

## 2021-01-30 RX ADMIN — METOPROLOL SUCCINATE 50 MG: 50 TABLET, EXTENDED RELEASE ORAL at 09:03

## 2021-01-30 RX ADMIN — Medication 1 TABLET: at 09:03

## 2021-01-30 RX ADMIN — FUROSEMIDE 80 MG: 80 TABLET ORAL at 09:03

## 2021-01-30 RX ADMIN — PREGABALIN 100 MG: 100 CAPSULE ORAL at 09:02

## 2021-01-30 NOTE — ASSESSMENT & PLAN NOTE
· Chronic atrial fibrillation rate controlled on metoprolol but increased to succinate 50 mg b i d   · Continue Xarelto for anticoagulation

## 2021-01-30 NOTE — DISCHARGE SUMMARY
Discharge- Shasha Brothers 1955, 76 y o  male MRN: [id number]  Unit/Bed#: E4 -01 Encounter: [id number]  Primary Care Provider: Becky Canseco MD   Date and time admitted to hospital: 1/22/2021  4:22 PM      Admitting Provider:  Susie Ding MD  Discharge Provider:  Zuleika Leach DO  Admission Date: 1/22/2021       Discharge Date: 01/30/21   LOS: 8  Primary Care Physician at Discharge: Becky Canseco [phone]    HOSPITAL COURSE:  Shasha Brothers is a 76 y o  male with a history of atrial fibrillation and CHF who presented to cardiology office for follow-up examination  Due to significant weight gain and lower extremity edema he was sent over for admission  He was seen in consultation by Cardiology have required IV furosemide  He had lost a significant amount of weight on discharge 285 lb down from 319 lb  His furosemide has been increased at time of discharge  Metoprolol increased for rate control of atrial fibrillation  Of note his spouse and daughter both tested positive for COVID but he has had 2 negative tests  He states that he will quarantene at home and try to stay way from the two affected persons  Case was discussed with spouse prior to discharge  Please see problem list listed below  DISCHARGE DIAGNOSES  * Acute diastolic heart failure (HCC)  Assessment & Plan  Wt Readings from Last 3 Encounters:   01/29/21 129 kg (285 lb 0 9 oz)   01/22/21 (!) 147 kg (324 lb)   01/14/21 (!) 146 kg (321 lb)     · Acute on chronic diastolic CHF currently on IV furosemide 60 mg up to t i d  by cardiology  · Admission weight 319 lb  Lowest weight 284 lb; currently 285 lb  · Transitioned to oral furosemide 80 mg but increased to b i d  Will be discharged with this dose    Shortness of breath with exposure to COVID-19 virus  Assessment & Plan  · Shortness of breath with exposure to COVID-19  · Current symptoms appears secondary to decompensated CHF  · COVID 19 remains negative      Lab Results   Component Value Date    SARSCOV2 Negative 01/24/2021    SARSCOV2 Negative 01/22/2021       Obesity, Class III, 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 9 (morbid obesity) ([facility] [address] )  Assessment & Plan  · Body mass index is 42 1 kg/m²  Obstructive sleep apnea on CPAP  Assessment & Plan  · ROBERT on CPAP q h s  Atrial fibrillation (HCC)  Assessment & Plan  · Chronic atrial fibrillation rate controlled on metoprolol but increased to succinate 50 mg b i d   · Continue Xarelto for anticoagulation    CONSULTING PROVIDERS   IP CONSULT TO NUTRITION SERVICES  IP CONSULT TO CARDIOLOGY    PROCEDURES PERFORMED  Echocardiogram  Date: 1/24/2021  SUMMARY  PROCEDURE INFORMATION:  Intravenous contrast (  4ml of Definity) was administered  LEFT VENTRICLE:  Systolic function was normal by visual assessment  Ejection fraction was estimated to be 55 %  There were no regional wall motion abnormalities  Wall thickness was mildly increased  There was mild concentric hypertrophy  RADIOLOGY RESULTS  Xr Chest 1 View Portable  Result Date: 1/23/2021  Impression: Cardiomegaly  No acute cardiopulmonary disease   Workstation performed: [id number]       LABS  Results from last 7 days   Lab Units 01/26/21  1047 01/25/21  0556 01/24/21  0601   WBC Thousand/uL 4 57 4 51 3 96*   HEMOGLOBIN g/dL 14 9 13 1 12 6   HEMATOCRIT % 45 7 41 0 40 4   MCV fL 91 92 94   PLATELETS Thousands/uL 195 167 154     Results from last 7 days   Lab Units 01/29/21  0432 01/27/21  0633 01/26/21  1047 01/25/21  0556 01/24/21  0601   SODIUM mmol/L 139 139 141 142 144   POTASSIUM mmol/L 3 7 4 2 3 7 3 7 3 8   CHLORIDE mmol/L 101 101 102 104 106   CO2 mmol/L 34* 34* 34* 32 33*   BUN mg/dL 27* 24 22 18 16   CREATININE mg/dL 0 83 0 73 0 85 0 78 0 85   CALCIUM mg/dL 9 4 9 4 9 5 9 2 9 2   ALBUMIN g/dL  --  3 5  --   --   --    TOTAL BILIRUBIN mg/dL  --  0 82  --   --   --    ALK PHOS U/L  --  71  --   --   --    ALT U/L  --  25  --   --   --    AST U/L  --  32  --   --   --    EGFR ml/min/1 73sq m 92 97 91 94 91   GLUCOSE RANDOM mg/dL 88 99 126 92 94                  Results from last 7 days   Lab Units 01/28/21 2044 01/27/21  0740   POC GLUCOSE mg/dl 98 78         DISCHARGE DAY VISIT AND PHYSICAL EXAM:  Subjective:  Patient seen examined  Feeling very good  Decided to go home where his spouse and daughter both have [place]  Vitals:   Blood Pressure: 120/77 (01/30/21 0819)  Pulse: 85 (01/30/21 0819)  Temperature: 97 8 °F (36 6 °C) (01/30/21 0819)  Temp Source: Temporal (01/30/21 0819)  Respirations: 18 (01/30/21 0819)  Height: 5' 9" (175 3 cm) (01/22/21 2017)  Weight - Scale: 129 kg (285 lb 0 9 oz) (01/29/21 0600)  SpO2: 96 % (01/30/21 0819)    Physical Exam  Vitals signs reviewed  Constitutional:       General: He is not in acute distress  HENT:      Head: Atraumatic  Cardiovascular:      Rate and Rhythm: Regular rhythm  Heart sounds: Normal heart sounds  Pulmonary:      Effort: Pulmonary effort is normal       Breath sounds: No wheezing  Abdominal:      General: Bowel sounds are normal       Palpations: Abdomen is soft  Tenderness: There is no abdominal tenderness  There is no rebound  Musculoskeletal:         General: Swelling present  No tenderness  Comments: +1 edema lower extremities bilaterally   Skin:     General: Skin is warm and dry  Neurological:      General: No focal deficit present  Mental Status: He is alert and oriented to person, place, and time  Cranial Nerves: No cranial nerve deficit  Psychiatric:         Mood and Affect: Mood normal        Planned Re-admission:  No  Discharge Disposition: Home/Self Care      Test Results Pending at Discharge:  None  Incidental findings:     Medications   · Summary of Medication Adjustments made as a result of this hospitalization:   · [address]  Torsemide changed to furosemide 80 mg b i d  · Atrial fibrillation    Metoprolol increased to 50 mg b i d   · Discharge Medication List: See after visit summary for reconciled discharge medications  Diet restrictions:  Cardiac diet   Activity restrictions: No strenuous activity  Discharge Condition: stable    Outpatient Follow-Up and Discharge Instructions  See after visit summary section titled Discharge Instructions for information provided to patient and family  Code Status: Level 1 - Full Code  Discharge Statement   I spent 35 minutes discharging the patient  This time was spent on the day of discharge  Greater than 50% of total time was spent with the patient and / or family counseling and / or coordination of care  ** Please Note: This note has been constructed using a voice recognition system   **

## 2021-01-30 NOTE — ASSESSMENT & PLAN NOTE
Wt Readings from Last 3 Encounters:   01/29/21 129 kg (285 lb 0 9 oz)   01/22/21 (!) 147 kg (324 lb)   01/14/21 (!) 146 kg (321 lb)     · Acute on chronic diastolic CHF currently on IV furosemide 60 mg up to t i d  by cardiology  · Admission weight 319 lb  Lowest weight 284 lb; currently 285 lb  · Transitioned to oral furosemide 80 mg but increased to b i d   Will be discharged with this dose

## 2021-02-02 NOTE — PROGRESS NOTES
PT Discharge    Today's date: 2021  Patient name: Domi January  : 1955  MRN: [id number]  Referring provider: Ashish Zendejas MD  Dx:   Encounter Diagnosis     ICD-10-CM    1  Lymphedema  I89 0        Start Time: 1000  Stop Time: 1100  Total time in clinic (min): 60 minutes    Assessment  Assessment details: Patient discharged due to hospitalization, covid quarantine  Patient to follow as needed/indicated  Understanding of Dx/Px/POC: good   Prognosis: good    Goals  No progress toward goals after 2021 re-evaluation  Discharge 2/2 hospitalization and pending covid quarantine  STGs to be achieved in 2 - 4 weeks  Demonstrate at least 75% compliance with self MLD - Continues  Demonstrate at least 75% compliance with self compression - Continues  Demonstrate at least 75% compliance with self skin and nail inspection - Continues  Free from s/s of infection - Continues  Demonstrate understanding of importance of compliance with POC - Continues    LTGs to be achieved in 4 - 6 weeks   Demonstrate at least 100% compliance with self MLD   Demonstrate at least 100% compliance with self compression   Demonstrate at least 100% compliance with self skin and nail inspection  Free from s/s of infection   Demonstrate understanding of day/night compression wearing schedule   Obtain alternative compression to ensure independence with self management           Subjective Evaluation    History of Present Illness  Mechanism of injury: UPDATE:  Patient reports he continues to struggle with LE volume maintenance with increased standing/walking at work  Hours at work continue to be increased  Decreased ability to perform self maintenance program and regular exercise  Is trying to work on weight reduction  Addition of antibiotics, increased diuretics  Patient reports bilateral foot swelling, saw podiatrist and was referred to PCP  Patient saw PCP and had bilateral foot swelling    Also with increased thigh edema since last episode of care  She increased diuretic and patient has follow up upcoming  He presents with ongoing pain in feet, however, improved since last episode of care with addition of gabapentin and tramadol  Of note:  Patient has started with diet in the last few weeks to include more fruit and vegetables, intermittent protein bars  Limiting salt intake    Quality of life: good    Pain  Current pain ratin  At best pain ratin  At worst pain ratin  Location: Bilateral feet  Quality: burning  Relieving factors: medications  Aggravating factors: standing, walking and stair climbing  Progression: improved    Treatments  Previous treatment: physical therapy and injection treatment  Current treatment: medication and physical therapy  Patient Goals  Patient goals for therapy: decreased edema, increased strength and independence with ADLs/IADLs          Objective  Lymphedema Evaluation    Medical Considerations:  NEG Cardiac - following with new cardiologist 2/2 JUAN CHU-fib  NEG Pulmonary  NEG Kidney  NEG Liver  NEG Current Fever/Infection  POS Current/Recent Wounds - started on antibiotics recently  NEG Current/Recent Treatments/Interventions/Port Access/PICC Line  NEG Current/Recent/History of DVT or Clotting issues    ROM Considerations:  ROM WFL, h/o THR    Strength Considerations:  Grossly WFL bilateral LEs    Gait Considerations:  WFL bilateral LEs    Skin Considerations/Scars:  Patient presents with skin inspection as follows:  Hemosiderin staining/skin discoloration - POS  Finger/Toe Nails - NEG  Open Wounds - NEG  S/S infection - POS  Firm/Sponge/Hard - POS  [place] - NEG  Weeping - POS, area of irritation noted bilateral posterior calf, scan drainage noted    Girth:    LE girth measurements (cm) - Current      Right           Left   Forefoot         27  27                        Metatarsals             27 5  28  Malleolus   32 5  34  +4               32 5  33  +8 cm    33  34  +12 38 5     33 5  +16 cm    44 5  38  +20                           47 5  45  +24 cm    53 5  54 5  +28                                   56  52  +32                                          56 5                 56 5  +36        55  58          Knee joint line              64 5  60 5      Malleoli to Fib head length - 36 cm  To trochanter - 80 cm    Compression measurements for knee length gina - Junior from [facility] - [phone]

## 2021-02-09 ENCOUNTER — TELEPHONE (OUTPATIENT)
Dept: SLEEP CENTER | Facility: CLINIC | Age: 66
End: 2021-02-09

## 2021-02-11 ENCOUNTER — IMMUNIZATIONS (OUTPATIENT)
Dept: FAMILY MEDICINE CLINIC | Facility: HOSPITAL | Age: 66
End: 2021-02-11

## 2021-02-11 DIAGNOSIS — Z23 ENCOUNTER FOR IMMUNIZATION: Primary | ICD-10-CM

## 2021-02-11 PROCEDURE — 0011A SARS-COV-2 / COVID-19 MRNA VACCINE (MODERNA) 100 MCG: CPT

## 2021-02-11 PROCEDURE — 91301 SARS-COV-2 / COVID-19 MRNA VACCINE (MODERNA) 100 MCG: CPT

## 2021-02-16 ENCOUNTER — EVALUATION (OUTPATIENT)
Dept: PHYSICAL THERAPY | Facility: CLINIC | Age: 66
End: 2021-02-16
Payer: COMMERCIAL

## 2021-02-16 ENCOUNTER — TRANSCRIBE ORDERS (OUTPATIENT)
Dept: PHYSICAL THERAPY | Facility: CLINIC | Age: 66
End: 2021-02-16

## 2021-02-16 DIAGNOSIS — I89.0 LYMPHEDEMA: Primary | ICD-10-CM

## 2021-02-16 DIAGNOSIS — R29.898 OTHER SYMPTOMS AND SIGNS INVOLVING THE MUSCULOSKELETAL SYSTEM: ICD-10-CM

## 2021-02-16 DIAGNOSIS — I89.0 LYMPHEDEMA, NOT ELSEWHERE CLASSIFIED: Primary | ICD-10-CM

## 2021-02-16 DIAGNOSIS — R29.898 WEAKNESS OF BOTH LOWER EXTREMITIES: ICD-10-CM

## 2021-02-16 PROCEDURE — 97140 MANUAL THERAPY 1/> REGIONS: CPT | Performed by: PHYSICAL THERAPIST

## 2021-02-16 PROCEDURE — 97162 PT EVAL MOD COMPLEX 30 MIN: CPT | Performed by: PHYSICAL THERAPIST

## 2021-02-16 NOTE — PROGRESS NOTES
PT Re-Evaluation     Today's date: 2021  Patient name: Venkat Donald  : 1955  MRN: [id number]  Referring provider: Mike Mckeon MD  Dx:   Encounter Diagnosis     ICD-10-CM    1  Lymphedema  I89 0    2  Weakness of both lower extremities  R29 898                   Assessment  Assessment details: Pt is 76 y o  male seen for PT evaluation on 2021 s/p admit to [address] [facility] [address]  Presents back to OPPT for follow up with lymphedema bilateral LEs, B/L LE weakness related to prolonged immobility while hospitalized  Comorbidities affecting pt's physical performance at time of assessment include: CHF, h/o LE wounds, vascular interventions  LOF prior to admission was independent, working full time without physical restriction  Personal factors affecting pt at time of IE include: increased weight gain, decreased mobility, decreased functional activity tolerance  Please find objective findings from PT assessment regarding body systems outlined above with impairments and limitations including weakness, impaired balance, decreased endurance, pain, decreased activity tolerance  Pt to benefit from continued PT tx to address deficits as defined above and maximize level of functional independent mobility and consistency  PT 1-2 times per week with progression and return to full self maintenance program   Progress with HEP  Impairments: abnormal gait, abnormal or restricted ROM, activity intolerance, impaired balance, impaired physical strength, pain with function and safety issue  Understanding of Dx/Px/POC: good   Prognosis: good    Goals  STGs to be achieved in 2 - 4 weeks  Demonstrate at least 100% compliance with self MLD   Demonstrate at least 100% compliance with self compression   Demonstrate at least 100% compliance with self skin and nail inspection   Free from s/s of infection   Demonstrate understanding of importance of compliance with POC     LTGs to be achieved in 4 - 6 weeks   1   Pt will be I with HEP in order to continue to improve quality of life and independence and reduce risk for re-injury  2  Pt to demonstrate return to community ambulation without limitations or restrictions  3  Pt to demonstrate improved function as noted by achieving or exceeding predicted score on FOTO outcomes assessment tool  4  Pt to demonstrate increased MMT of bilateral LEs by at least 1/2-1 grade in order to improve safety and stability with ADLs and functional mobility  Plan  Patient would benefit from: skilled physical therapy  Other planned modality interventions: Modalities prn for symptom management  Planned therapy interventions: manual therapy, neuromuscular re-education, therapeutic exercise, therapeutic training and home exercise program  Frequency: 2x week  Duration in visits: 8  Duration in weeks: 4  Plan of Care beginning date: 2021  Plan of Care expiration date: 3/18/2021  Treatment plan discussed with: patient        Subjective Evaluation    History of Present Illness  Mechanism of injury: UPDATE:  Reports weakness in LEs, foot/ankle swelling after hospitalized for several days of immobility while being treated  Presents today for re-evaluation after hospitalization  Patient reports bilateral foot swelling, saw podiatrist and was referred to PCP  Patient saw PCP and had bilateral foot swelling  Also with increased thigh edema since last episode of care  She increased diuretic and patient has follow up upcoming  He presents with ongoing pain in feet, however, improved since last episode of care with addition of gabapentin and tramadol  Of note:  Patient has started with diet in the last few weeks to include more fruit and vegetables, intermittent protein bars  Limiting salt intake    Quality of life: good    Pain  Current pain ratin  At best pain ratin  At worst pain ratin  Location: Bilateral feet  Quality: burning  Relieving factors: medications  Aggravating factors: standing, walking and stair climbing  Progression: improved    Treatments  Previous treatment: physical therapy  Current treatment: medication and physical therapy  Patient Goals  Patient goals for therapy: decreased edema, increased strength and independence with ADLs/IADLs          Objective  Lymphedema Evaluation    Medical Considerations:  POS Cardiac - following with new cardiologist 2/2 JUAN [facility]-mi[facility]lava, newly adjusted meds for CHF    NEG Pulmonary  NEG Kidney  NEG Liver  NEG Current Fever/Infection  NEG Current/Recent Wounds  NEG Current/Recent Treatments/Interventions/Port Access/PICC Line  NEG Current/Recent/History of DVT or Clotting issues    ROM Considerations:  ROM WFL, h/o THR    Strength Considerations:  Grossly WFL bilateral LEs    Gait Considerations:  WFL bilateral LEs    Skin Considerations/Scars:  Patient presents with skin inspection as follows:  Hemosiderin staining/skin discoloration - POS  Finger/Toe Nails - NEG  Open Wounds - NEG  S/S infection - NEG  Firm/Sponge/Hard - POS  Peau D' Orange - NEG  Weeping - POS, area of irritation noted bilateral posterior calf, scan drainage noted    ROM Considerations:  Hip flexion to 90 degrees  Hip ABd to 40 degrees  Heel cords to neutral    Strength Considerations:  Hip flexion - 3/5  Hip ABd - 3/5    Girth:  LE girth measurements (cm) - Current      Right           Left   Forefoot         26  26                        Metatarsals              27  27  Malleolus   32  32 5  +4               32 5  33  +8 cm    33  33  +12                           35 5     33 5  +16 cm    42  38  +20                           45  44 5  +24 cm    53 5  53 5  +28                                   55  52  +32                                          55                  55  +36        55  57          Knee joint line              62  60    Malleoli to Fib head length - 36 cm  To trochanter - 80 cm    Compression measurements for knee length compreshort - Sigvaris from [facility] - contact Juan José Corral - [phone]         Precautions:  Falls, CHF, A-fib  Re-eval Date: 3/15/2021    Date 2/16       Visit Count 1       FOTO See IE       Pain In See IE       Pain Out See IE              Manuals        MLD 30 mins                               Neuro Re-Ed         Dynavision Stand  Foam                                                         Ther Ex        SLR x 3        HR/TR        Mini Squats        Lunges        Side Lunge                                Ther Activity                        Gait Training                        Modalities

## 2021-02-16 NOTE — LETTER
2021    Candance Cambridge, MD  [address] [facility] [address]  [address] [facility] [address]    Patient: Megan Mead   YOB: 1955   Date of Visit: 2021     Encounter Diagnosis     ICD-10-CM    1  Lymphedema  I89 0    2  Weakness of both lower extremities  R29 898        Dear Dr Carl Brasher: Thank you for your recent referral of Megan Mead  Please review the attached evaluation summary from [facility] recent visit  Please verify that you agree with the plan of care by signing the attached order  If you have any questions or concerns, please do not hesitate to call  I sincerely appreciate the opportunity to share in the care of one of your patients and hope to have another opportunity to work with you in the near future  Sincerely,    Shruthi Brower      Referring Provider:      I certify that I have read the below Plan of Care and certify the need for these services furnished under this plan of treatment while under my care  Candance Cambridge, MD  [address] [address]  [address] [place] [address]  Via Fax: [phone]          PT Re-Evaluation     Today's date: 2021  Patient name: Megan Mead  : 1955  MRN: [id number]  Referring provider: Anahi Gruber MD  Dx:   Encounter Diagnosis     ICD-10-CM    1  Lymphedema  I89 0    2  Weakness of both lower extremities  R29 898                   Assessment  Assessment details: Pt is 76 y o  male seen for PT evaluation on 2021 s/p admit to [facility]  Presents back to OPPT for follow up with lymphedema bilateral LEs, B/L LE weakness related to prolonged immobility while hospitalized  Comorbidities affecting pt's physical performance at time of assessment include: CHF, h/o LE wounds, vascular interventions  LOF prior to admission was independent, working full time without physical restriction   Personal factors affecting pt at time of IE include: increased weight gain, decreased mobility, decreased functional activity tolerance  Please find objective findings from PT assessment regarding body systems outlined above with impairments and limitations including weakness, impaired balance, decreased endurance, pain, decreased activity tolerance  Pt to benefit from continued PT tx to address deficits as defined above and maximize level of functional independent mobility and consistency  PT 1-2 times per week with progression and return to full self maintenance program   Progress with HEP  Impairments: abnormal gait, abnormal or restricted ROM, activity intolerance, impaired balance, impaired physical strength, pain with function and safety issue  Understanding of Dx/Px/POC: good   Prognosis: good    Goals  STGs to be achieved in 2 - 4 weeks  Demonstrate at least 100% compliance with self MLD   Demonstrate at least 100% compliance with self compression   Demonstrate at least 100% compliance with self skin and nail inspection   Free from s/s of infection   Demonstrate understanding of importance of compliance with POC     LTGs to be achieved in 4 - 6 weeks   1  Pt will be I with HEP in order to continue to improve quality of life and independence and reduce risk for re-injury  2  Pt to demonstrate return to community ambulation without limitations or restrictions  3  Pt to demonstrate improved function as noted by achieving or exceeding predicted score on FOTO outcomes assessment tool  4  Pt to demonstrate increased MMT of bilateral LEs by at least 1/2-1 grade in order to improve safety and stability with ADLs and functional mobility         Plan  Patient would benefit from: skilled physical therapy  Other planned modality interventions: Modalities prn for symptom management  Planned therapy interventions: manual therapy, neuromuscular re-education, therapeutic exercise, therapeutic training and home exercise program  Frequency: 2x week  Duration in visits: 8  Duration in weeks: 4  Plan of Care beginning date: 2021  Plan of Care expiration date: 3/18/2021  Treatment plan discussed with: patient        Subjective Evaluation    History of Present Illness  Mechanism of injury: UPDATE:  Reports weakness in LEs, foot/ankle swelling after hospitalized for several days of immobility while being treated  Presents today for re-evaluation after hospitalization  Patient reports bilateral foot swelling, saw podiatrist and was referred to PCP  Patient saw PCP and had bilateral foot swelling  Also with increased thigh edema since last episode of care  She increased diuretic and patient has follow up upcoming  He presents with ongoing pain in feet, however, improved since last episode of care with addition of gabapentin and tramadol  Of note:  Patient has started with diet in the last few weeks to include more fruit and vegetables, intermittent protein bars  Limiting salt intake  Quality of life: good    Pain  Current pain ratin  At best pain ratin  At worst pain ratin  Location: Bilateral feet  Quality: burning  Relieving factors: medications  Aggravating factors: standing, walking and stair climbing  Progression: improved    Treatments  Previous treatment: physical therapy  Current treatment: medication and physical therapy  Patient Goals  Patient goals for therapy: decreased edema, increased strength and independence with ADLs/IADLs          Objective  Lymphedema Evaluation    Medical Considerations:  POS Cardiac - following with new cardiologist 2/2 CHU, A-fib, newly adjusted meds for CHF    NEG Pulmonary  NEG Kidney  NEG Liver  NEG Current Fever/Infection  NEG Current/Recent Wounds  NEG Current/Recent Treatments/Interventions/Port Access/PICC Line  NEG Current/Recent/History of DVT or Clotting issues    ROM Considerations:  ROM WFL, h/o THR    Strength Considerations:  Grossly WFL bilateral LEs    Gait Considerations:  WFL bilateral LEs    Skin Considerations/Scars:  Patient presents with skin inspection as follows:  Hemosiderin staining/skin discoloration - POS  Finger/Toe Nails - NEG  Open Wounds - NEG  S/S infection - NEG  Firm/Sponge/Hard - POS  Peau D' Orange - NEG  Weeping - POS, area of irritation noted bilateral posterior calf, scan drainage noted    ROM Considerations:  Hip flexion to 90 degrees  Hip ABd to 40 degrees  Heel cords to neutral    Strength Considerations:  Hip flexion - 3/5  Hip ABd - 3/5    Girth:  LE girth measurements (cm) - Current      Right           Left   Forefoot         26  26                        Metatarsals              27  27  Malleolus   32  32 5  +4               32 5  33  +8 cm    33  33  +12                           35 5     33 5  +16 cm    42  38  +20                           45  44 5  +24 cm    53 5  53 5  +28                                   55  52  +32                                          55                  55  +36        55  57          Knee joint line              62  60    Malleoli to Fib head length - 36 cm  To trochanter - 80 cm    Compression measurements for knee length compreshort - Sigvaris from [address] - contact [employer] - [phone]         Precautions:  Falls, CHF, A-fib  Re-eval Date: 3/15/2021    Date 2/16       Visit Count 1       FOTO See IE       Pain In See IE       Pain Out See IE              Manuals        MLD 30 mins                               Neuro Re-Ed         Dynavision Stand  Foam                                                         Ther Ex        SLR x 3        HR/TR        Mini Squats        Lunges        Side Lunge                                Ther Activity                        Gait Training                        Modalities

## 2021-02-26 ENCOUNTER — OFFICE VISIT (OUTPATIENT)
Dept: PHYSICAL THERAPY | Facility: CLINIC | Age: 66
End: 2021-02-26
Payer: COMMERCIAL

## 2021-02-26 DIAGNOSIS — I89.0 LYMPHEDEMA: Primary | ICD-10-CM

## 2021-02-26 DIAGNOSIS — R29.898 WEAKNESS OF BOTH LOWER EXTREMITIES: ICD-10-CM

## 2021-02-26 PROCEDURE — 97140 MANUAL THERAPY 1/> REGIONS: CPT | Performed by: PHYSICAL THERAPIST

## 2021-02-26 NOTE — PROGRESS NOTES
Daily Note     Today's date: 2021  Patient name: Roro Strickland  : 1955  MRN: [id number]  Referring provider: Rico Parnell MD  Dx:   Encounter Diagnosis     ICD-10-CM    1  Lymphedema  I89 0    2  Weakness of both lower extremities  R29 898                   Subjective: Feeling better, was going to go for a walk, but had too much to do  Objective: See treatment diary below      Assessment: Tolerated treatment well  Patient demonstrated fatigue post treatment and would benefit from continued PT  Reviewed s/s of CHF, also discussed monitoring I&Os  SOB, CHU  Noted with area of redness on anterior left shin similar to abrasion, unknown DASIA  Plan: Continue per plan of care        Precautions:  Falls, CHF, A-fib  Re-eval Date: 3/15/2021    Date       Visit Count 1 2      FOTO See IE       Pain In See IE 0      Pain Out See IE 0          Manuals        MLD 30 mins 55 mins                              Neuro Re-Ed         Dynavision Stand  Foam                                                         Ther Ex        SLR x 3        HR/TR        Mini Squats        Lunges        Side Lunge                                Ther Activity                        Gait Training                        Modalities

## 2021-03-11 ENCOUNTER — OFFICE VISIT (OUTPATIENT)
Dept: CARDIOLOGY CLINIC | Facility: CLINIC | Age: 66
End: 2021-03-11
Payer: COMMERCIAL

## 2021-03-11 VITALS
BODY MASS INDEX: 43.87 KG/M2 | DIASTOLIC BLOOD PRESSURE: 74 MMHG | WEIGHT: 296.2 LBS | SYSTOLIC BLOOD PRESSURE: 106 MMHG | HEIGHT: 69 IN

## 2021-03-11 DIAGNOSIS — I50.32 CHRONIC DIASTOLIC HEART FAILURE (HCC): Primary | ICD-10-CM

## 2021-03-11 DIAGNOSIS — Z99.89 OBSTRUCTIVE SLEEP APNEA ON CPAP: ICD-10-CM

## 2021-03-11 DIAGNOSIS — G47.33 OBSTRUCTIVE SLEEP APNEA ON CPAP: ICD-10-CM

## 2021-03-11 DIAGNOSIS — I48.11 LONGSTANDING PERSISTENT ATRIAL FIBRILLATION (HCC): ICD-10-CM

## 2021-03-11 DIAGNOSIS — R94.31 ABNORMAL ECG: ICD-10-CM

## 2021-03-11 DIAGNOSIS — I89.0 LYMPHEDEMA: ICD-10-CM

## 2021-03-11 DIAGNOSIS — E66.01 OBESITY, MORBID (HCC): ICD-10-CM

## 2021-03-11 PROCEDURE — 99214 OFFICE O/P EST MOD 30 MIN: CPT | Performed by: INTERNAL MEDICINE

## 2021-03-11 PROCEDURE — 1036F TOBACCO NON-USER: CPT | Performed by: INTERNAL MEDICINE

## 2021-03-11 PROCEDURE — 3008F BODY MASS INDEX DOCD: CPT | Performed by: INTERNAL MEDICINE

## 2021-03-11 PROCEDURE — 1160F RVW MEDS BY RX/DR IN RCRD: CPT | Performed by: INTERNAL MEDICINE

## 2021-03-11 RX ORDER — TORSEMIDE 20 MG/1
60 TABLET ORAL 2 TIMES DAILY
Qty: 540 TABLET | Refills: 2 | Status: SHIPPED | OUTPATIENT
Start: 2021-03-11 | End: 2021-05-20

## 2021-03-11 RX ORDER — CLINDAMYCIN HYDROCHLORIDE 300 MG/1
300 CAPSULE ORAL AS NEEDED
COMMUNITY

## 2021-03-11 RX ORDER — ACETAMINOPHEN 500 MG
500 TABLET ORAL EVERY 6 HOURS PRN
COMMUNITY

## 2021-03-11 RX ORDER — TORSEMIDE 20 MG/1
20 TABLET ORAL 2 TIMES DAILY
COMMUNITY
End: 2021-03-11

## 2021-03-11 NOTE — PROGRESS NOTES
Cardiology Office Note  MD Manda Holcomb MD Lorinda Spates, DO, [facility] MD Kisha Sr DO, Tish Denver, DO, [facility] [place]  ----------------------------------------------------------------  [address] PrésMaco Saeed [age] y o  male MRN: [id number]  Unit/Bed#:  Encounter: [id number]      History of Present Illness: It was a pleasure to see Pito Ocasio in the office today for follow-up CV evaluation  He has a longstanding history of atrial fibrillation with prior failed ELAINE guided cardioversion, history of morbid obesity and pulmonary hypertension with chronic venous insufficiency/lymphedema  The patient has a known history of obstructive sleep apnea was noncompliant with his CPAP therapy  Over the course of many years he has been having lower extremity edema which has been believe secondary to lymphedema with chronic venous insufficiency  He has had venous ablation is in the past for his reflux disease  In October 2019, he was found have pulmonary hypertension with pulmonary artery systolic pressure is estimated at 50 mm Hg  We had initially seen him in late August 2020 due to increased fatigue and dyspnea on exertion  He has become somewhat short of breath with basic activity  Previously, he had been managed for his atrial fibrillation at [facility]  Echocardiogram, stress test and Holter monitor were ordered, but stress test and Holter were completed  Stress test was found to be negative for myocardial ischemia  Holter monitor demonstrated atrial fibrillation with an average heart rate of 92 beats per minute and rare VPCs  His weight trends unfortunately continue to go upward and he had dietary discretion  He seen by electrophysiology and recommended for a sleep study as well as an evaluation with advanced heart failure    He was placed on CPAP and has been compliant since that time   He also was seen by bariatric surgery and wish to undergo conservative options with dietary modifications  Despite increasing diuretic load, he he continue to gain approximately 30 lb and was subsequently sent to [facility] in [facility]  He was diuresed down to 285 lb  While hospitalized, his echocardiogram was performed showing normal left ventricular function  Since that time, he has gone up in weight mildly stating that overall his weight gain has been minimal since discharge and admits that there is some difference between his weight on the scale here and at home  He denies any significant shortness of breath  He states that his lower extremity swelling is still significantly improved  Denies orthopnea or paroxysmal nocturnal dyspnea  Denies chest pain, pressure, tightness or squeezing  Denies lightheadedness, dizziness or palpitations  Review of Systems:  Review of Systems   Constitution: Negative for decreased appetite, fever, malaise/fatigue, weight gain and weight loss  HENT: Negative for congestion and sore throat  Eyes: Negative for visual disturbance  Cardiovascular: Negative for chest pain, dyspnea on exertion, leg swelling, near-syncope and palpitations  Respiratory: Negative for cough and shortness of breath  Hematologic/Lymphatic: Negative for bleeding problem  Skin: Negative for rash  Musculoskeletal: Negative for myalgias and neck pain  Gastrointestinal: Negative for abdominal pain and nausea  Neurological: Negative for light-headedness and weakness  Psychiatric/Behavioral: Negative for depression         Past Medical History:   Diagnosis Date    A-fib ([address] )     Arthritis     CPAP (continuous positive airway pressure) dependence     Irregular heart beat     Lymphedema     Sleep apnea     Urinary frequency     Wears glasses     Wears partial dentures     lower partial       Past Surgical History:   Procedure Laterality Date    ABLATION SAPHENOUS VEIN W/ RFA      COLONOSCOPY      MI CYSTOURETHRO W/IMPLANT N/A 11/13/2017    Procedure: CYSTOSCOPY WITH INSERTION UROLIFT;  Surgeon: Sabine Olvera DO;  Location: [facility];  Service: Urology    TONSILLECTOMY         Social History     Socioeconomic History    Marital status: /Civil Union     Spouse name: None    Number of children: None    Years of education: None    Highest education level: None   Occupational History    None   Social Needs    Financial resource strain: None    Food insecurity     Worry: None     Inability: None    Transportation needs     Medical: None     Non-medical: None   Tobacco Use    Smoking status: Never Smoker    Smokeless tobacco: Never Used   Substance and Sexual Activity    Alcohol use:  Yes     Alcohol/week: 4 0 standard drinks     Types: 4 Cans of beer per week     Frequency: 2-3 times a week     Binge frequency: Never     Comment: socially    Drug use: No    Sexual activity: None   Lifestyle    Physical activity     Days per week: None     Minutes per session: None    Stress: None   Relationships    Social connections     Talks on phone: None     Gets together: None     Attends Yazidism service: None     Active member of club or organization: None     Attends meetings of clubs or organizations: None     Relationship status: None    Intimate partner violence     Fear of current or ex partner: None     Emotionally abused: None     Physically abused: None     Forced sexual activity: None   Other Topics Concern    None   Social History Narrative    None       Family History   Problem Relation Age of Onset    Heart disease Mother     Lymphoma Father     Cancer Father     Heart disease Sister     Hypertension Brother     Diabetes Neg Hx     Thyroid disease Neg Hx     Stroke Neg Hx        Allergies   Allergen Reactions    Penicillins Anaphylaxis    Sulfa Antibiotics Anaphylaxis         Current Outpatient Medications:     acetaminophen (TYLENOL) 500 mg tablet, Take 500 mg by mouth every 6 (six) hours as needed for mild pain, Disp: , Rfl:     allopurinol (ZYLOPRIM) 100 mg tablet, Take 1 tablet (100 mg total) by mouth daily, Disp: 14 tablet, Rfl: 0    ammonium lactate (AMLACTIN) 12 % lotion, Apply topically 2 (two) times a day as needed for dry skin, Disp: , Rfl:     clindamycin (CLEOCIN) 300 MG capsule, Take 300 mg by mouth as needed Prior to dental work, Disp: , Rfl:     CoenzymeQ10-Isoleucine-Glycine (CO Q-10) 100-50-25 MG TB24, Co Q-10, Disp: , Rfl:     colchicine (COLCRYS) 0 6 mg tablet, as needed , Disp: , Rfl:     ferrous sulfate 325 (65 Fe) mg tablet, Take 325 mg by mouth every other day, Disp: , Rfl:     GARCINIA CAMBOGIA-CHROMIUM PO, Take 750 mg by mouth 2 (two) times a day , Disp: , Rfl:     Glucosamine-Chondroit-Vit C-Mn (Glucosamine 1500 Complex) CAPS, Take by mouth, Disp: , Rfl:     Green Coffee Silva-Yerba Mate (GREEN COFFEE BEAN EXTRACT PO), Take 800 mg by mouth daily , Disp: , Rfl:     halobetasol (ULTRAVATE) 0 05 % ointment, APPLY TO AFFECTED AREA ON LEG TWICE DAILY, Disp: , Rfl:     L-ARGININE-500 PO, Take 500 mg by mouth 2 (two) times a day , Disp: , Rfl:     metoprolol succinate (TOPROL-XL) 50 mg 24 hr tablet, Take 1 tablet (50 mg total) by mouth 2 (two) times a day, Disp: 180 tablet, Rfl: 2    Multiple Vitamins-Minerals (OCUVITE EXTRA PO), Take 1 tablet by mouth daily  , Disp: , Rfl:     patient supplied medication, Take 1 each by mouth 2 (two) times a day, Disp: , Rfl:     patient supplied medication, Take 1 each by mouth 2 (two) times a day, Disp: , Rfl:     pregabalin (LYRICA) 100 mg capsule, Take 1 capsule (100 mg total) by mouth 3 (three) times a day, Disp: 42 capsule, Rfl: 0    RASPBERRY KETONES PO, Take 100 mg by mouth 2 (two) times a day, Disp: , Rfl:     rivaroxaban (Xarelto) 20 mg tablet, Take 1 tablet (20 mg total) by mouth daily, Disp: 14 tablet, Rfl: 0    spironolactone (ALDACTONE) 25 mg tablet, Take 1 tablet (25 mg total) by mouth 2 (two) times a day, Disp: 180 tablet, Rfl: 0    tadalafil (CIALIS) 5 MG tablet, Take 5 mg by mouth daily as needed for erectile dysfunction, Disp: , Rfl:     traMADol (ULTRAM) 50 mg tablet, Take 50 mg by mouth every 6 (six) hours as needed for moderate pain, Disp: , Rfl:     Turmeric Curcumin 500 MG CAPS, Take 1,000 mg by mouth 2 (two) times a day , Disp: , Rfl:     VITAMIN D PO, Take by mouth, Disp: , Rfl:     torsemide (DEMADEX) 20 mg tablet, Take 3 tablets (60 mg total) by mouth 2 (two) times a day, Disp: 540 tablet, Rfl: 2    Vitals:    03/11/21 1538   BP: 106/74   BP Location: Left arm   Patient Position: Sitting   Cuff Size: Large   Weight: 134 kg (296 lb 3 2 oz)   Height: 5' 9" (1 753 m)       PHYSICAL EXAMINATION:  Gen: Awake, Alert, NAD    HEENT: AT/NC, Anicteric, mmm  Neck: Supple, No elevated JVP  Resp: CTA bilaterally no w/r/r  CV:  Irregularly irregular +S1, S2, No m/r/g  Abd: Soft, obese, NT/ND + BS  Ext: warm, bilateral lower extremities wrapped with trivial pitting edema bilaterally/lymphedema  Neuro: Follows commands, moves all extermities  Psych: Appropriate affect    --------------------------------------------------------------------------------  TREADMILL STRESS  No results found for this or any previous visit    --------------------------------------------------------------------------------  NUCLEAR STRESS TEST: No results found for this or any previous visit    No results found for this or any previous visit     --------------------------------------------------------------------------------  CATH:  No results found for this or any previous visit   --------------------------------------------------------------------------------  ECHO:   Results for orders placed during the hospital encounter of 10/26/19   Echo complete with contrast if indicated    Narrative [address] [place] [address] [address]  [phone]    Transthoracic Echocardiogram  2D, M-mode, Doppler, and Color Doppler    Study date:  28-Oct-2019    Patient: Ahmet No  MR number: [id number]  Account number: [de-identified]  : 1955  Age: 71 years  Gender: Male  Status: Inpatient  Location: Bedside  Height: 69 in  Weight: 302 lb  BP: 141/ 88 mmHg    Indications: left sided paralysis    Diagnoses: 56 - CVA    Sonographer:  Sueellen Mcardle RD, CCT  Referring Physician:  Charli Perez DO  Group:  [facility]  Interpreting Physician:  Deidra Phillips DO    SUMMARY    LEFT VENTRICLE:  Systolic function was normal  Ejection fraction was estimated to be 55 %  This study was inadequate for the evaluation of regional wall motion  Wall thickness was mildly increased  RIGHT VENTRICLE:  The ventricle was dilated  Systolic function was normal     MITRAL VALVE:  There was mild regurgitation  TRICUSPID VALVE:  There was mild regurgitation  Estimated peak PA pressure was 50 mmHg  HISTORY: PRIOR HISTORY: Patient has no history of cardiovascular disease  PROCEDURE: The procedure was performed at the bedside  This was a routine study  The transthoracic approach was used  The study included complete 2D imaging, M-mode, complete spectral Doppler, and color Doppler  The heart rate was 80 bpm,  at the start of the study  Intravenous contrast (Definity solution [1 3 ml Definity/8 7ml normal saline solution], 3 ml) was administered to opacify the left ventricle  Echocardiographic views were limited due to poor acoustic window  availability  This was a technically difficult study  LEFT VENTRICLE: Size was normal  Systolic function was normal  Ejection fraction was estimated to be 55 %  This study was inadequate for the evaluation of regional wall motion  Wall thickness was mildly increased  DOPPLER: The study was  not technically sufficient to allow evaluation of LV diastolic function      RIGHT VENTRICLE: The ventricle was dilated  Systolic function was normal     LEFT ATRIUM: Size was normal     RIGHT ATRIUM: Size was normal     MITRAL VALVE: Valve structure was normal  There was normal leaflet separation  DOPPLER: The transmitral velocity was within the normal range  There was no evidence for stenosis  There was mild regurgitation  AORTIC VALVE: The valve was trileaflet  Leaflets exhibited normal thickness and normal cuspal separation  DOPPLER: Transaortic velocity was within the normal range  There was no evidence for stenosis  There was no regurgitation  TRICUSPID VALVE: The valve structure was normal  There was normal leaflet separation  DOPPLER: The transtricuspid velocity was within the normal range  There was no evidence for stenosis  There was mild regurgitation  Estimated peak PA  pressure was 50 mmHg  PULMONIC VALVE: Leaflets exhibited normal thickness, no calcification, and normal cuspal separation  DOPPLER: The transpulmonic velocity was within the normal range  There was no regurgitation  PERICARDIUM: There was no pericardial effusion  The pericardium was normal in appearance  AORTA: The root exhibited normal size  SYSTEMIC VEINS: IVC: The inferior vena cava was not well visualized      SYSTEM MEASUREMENT TABLES    2D  %FS: 34 72 %  Ao Diam: 2 87 cm  EDV(Teich): 143 23 ml  EF(Teich): 63 36 %  ESV(Teich): 52 47 ml  IVSd: 1 15 cm  LA Diam: 4 16 cm  LVIDd: 5 43 cm  LVIDs: 3 55 cm  LVPWd: 1 04 cm  RWT: 0 38  SV(Teich): 90 75 ml    CW  AV Vmax: 1 34 m/s  AV maxP 13 mmHg  RAP: 0 mmHg  TR Vmax: 3 26 m/s  TR maxP 5 mmHg    PW  E' Sept: 0 09 m/s  MV E Terrance: 1 03 m/s  RVSP: 42 5 mmHg    [employer] Accredited Echocardiography Laboratory    Prepared and electronically signed by    Jordan Blum DO  Signed 28-Oct-2019 10:37:38       No results found for this or any previous visit   --------------------------------------------------------------------------------  HOLTER  No results found for this or any previous visit  No results found for this or any previous visit   --------------------------------------------------------------------------------  CAROTIDS  No results found for this or any previous visit    --------------------------------------------------------------------------------  ECGs:  No results found for this visit on 03/11/21  Lab Results   Component Value Date    WBC 4 57 01/26/2021    HGB 14 9 01/26/2021    HCT 45 7 01/26/2021    MCV 91 01/26/2021     01/26/2021      Lab Results   Component Value Date    SODIUM 139 01/29/2021    K 3 7 01/29/2021     01/29/2021    CO2 34 (H) 01/29/2021    BUN 27 (H) 01/29/2021    CREATININE 0 83 01/29/2021    GLUC 88 01/29/2021    CALCIUM 9 4 01/29/2021      Lab Results   Component Value Date    HGBA1C 5 7 10/27/2019      No results found for: CHOL  Lab Results   Component Value Date    HDL 55 10/27/2019     Lab Results   Component Value Date    LDLCALC 90 10/27/2019     Lab Results   Component Value Date    TRIG 93 10/27/2019     No results found for: [place], [place]   Lab Results   Component Value Date    INR 1 59 (H) 01/22/2021    INR 1 18 10/26/2019    PROTIME 18 7 (H) 01/22/2021    PROTIME 15 1 (H) 10/26/2019        1  Chronic diastolic heart failure (HCC)  -     torsemide (DEMADEX) 20 mg tablet; Take 3 tablets (60 mg total) by mouth 2 (two) times a day    2  Longstanding persistent atrial fibrillation (HCC)    3  Obesity, morbid ([address] )    4  Lymphedema    5  Abnormal ECG    6   Obstructive sleep apnea on CPAP        IMPRESSION:  · Chronic diastolic heart failure  · Persistent atrial fibrillation (long standing since before October 2019) on Xarelto  · LVEF 55%, mild LVH, paradoxical septal wall motion, mild RV dilatation, mild LA dilatation, mild to moderate RA dilatation, trace MR/TR, January 2021  · History of bilateral venous insufficiency status post LE vein ablation  · Pharmacologic nuclear stress test negative for myocardial ischemia with gated EF 50%, September 2020  · Holter with AF, avg HR 92 bpm, rare VPCs, September 2020  · Abnormal ECG with Bifascicular block  · Moderate pulmonary hypertension  · Lymphedema  · Morbid obesity  · ROBERT on 20/14 cm BiPAP    PLAN:  It was a pleasure to see Tamiko Mack in the office today for follow-up CV evaluation  Patient is here today for follow-up after his prior evaluation in the office  When he was seen previously, he had a he had short period of increased diuretic with no significant benefit  He did not report these findings to the office  He has progressive lower extremity edema and is up 30 lb from his dry weight which appears to be 294 lb  He was previously seen by electrophysiology who recommended rate control and that he only undergo rhythm control should he lose enough weight, improve his pulmonary hypertension and treat his sleep apnea  He has been seen by sleep medicine and recommended to start 20/14 cm BiPAP  He has been trying to improve his diet and decrease his salt intake  Despite this, he continues to gain weight  He is a symptoms concerning for angina  With ambulation, his oxygen saturation dropped to 86%  Based on his clinical presentation, I have the following recommendations:    1  Due to the patient's worsening heart failure, increased NT proBNP as an outpatient, and dropping oxygen saturation with ambulation, I believe the patient should be admitted to [facility] for treatment of his acute heart failure  2  Recommend checking urinalysis to look for protein  3  Start Lasix at at least 60 mg IV b i d    4  Repeat his 2D echocardiogram to reassess cardiac structure and function  5  Placed on CPAP nocturnally  Recommend continued compliance  6   I believe that he would benefit from evaluation and treatment by nutrition while he is inpatient  7  Salt restriction has been heavily encouraged  Patient will continue with dietary modifications  8  As always, I have recommended a heart healthy diet low in sodium, weight loss and exercise regimen  9  Should he gain greater than 3 lb in a day or 5 lb overall, I have recommended that he call the office for further titration of his diuretic medication   10  We will follow up with him after his hospitalization  As always, please do not hesitate to call with any questions  Portions of the record may have been created with voice recognition software  Occasional wrong word or "sound a like" substitutions may have occurred due to the inherent limitations of voice recognition software  Read the chart carefully and recognize, using context, where substitutions have occurred          Signed: Wil Moreland DO, [facility] [place]

## 2021-03-12 ENCOUNTER — OFFICE VISIT (OUTPATIENT)
Dept: PHYSICAL THERAPY | Facility: CLINIC | Age: 66
End: 2021-03-12
Payer: COMMERCIAL

## 2021-03-12 ENCOUNTER — IMMUNIZATIONS (OUTPATIENT)
Dept: FAMILY MEDICINE CLINIC | Facility: HOSPITAL | Age: 66
End: 2021-03-12

## 2021-03-12 DIAGNOSIS — R29.898 WEAKNESS OF BOTH LOWER EXTREMITIES: ICD-10-CM

## 2021-03-12 DIAGNOSIS — Z23 ENCOUNTER FOR IMMUNIZATION: Primary | ICD-10-CM

## 2021-03-12 DIAGNOSIS — I89.0 LYMPHEDEMA: Primary | ICD-10-CM

## 2021-03-12 PROCEDURE — 97140 MANUAL THERAPY 1/> REGIONS: CPT | Performed by: PHYSICAL THERAPIST

## 2021-03-12 PROCEDURE — 91301 SARS-COV-2 / COVID-19 MRNA VACCINE (MODERNA) 100 MCG: CPT

## 2021-03-12 PROCEDURE — 97110 THERAPEUTIC EXERCISES: CPT | Performed by: PHYSICAL THERAPIST

## 2021-03-12 PROCEDURE — 0012A SARS-COV-2 / COVID-19 MRNA VACCINE (MODERNA) 100 MCG: CPT

## 2021-03-12 NOTE — PROGRESS NOTES
Daily Note     Today's date: 3/12/2021  Patient name: Elena Lucero  : 1955  MRN: [id number]  Referring provider: Tiny Medina MD  Dx:   Encounter Diagnosis     ICD-10-CM    1  Lymphedema  I89 0    2  Weakness of both lower extremities  R29 898                   Subjective: Patient reports he got his first covid shot, pending 2nd one  Ongoing limitation in LE strength  Objective: See treatment diary below      Assessment: Tolerated treatment well  Patient demonstrated fatigue post treatment and would benefit from continued PT  Patient with ongoing hip weakness, responded well to program today with progression of TE  Plan: Continue per plan of care        Precautions:  Falls, CHF, A-fib  Re-eval Date: 3/15/2021    Date 2/16 2/26 3/12     Visit Count 1 2 3     FOTO See IE       Pain In See IE 0 0     Pain Out See IE 0 0         Manuals        MLD 30 mins 55 mins 30 mins                             Neuro Re-Ed         Dynavision Stand  Foam                                                         Ther Ex        SLR x 3        HR/TR        Mini Squats        Lunges        Side Lunge        Aerobic   Upright bike with seat elevated  15 mins     Knee extn machine/flex machine   22#  2x15 ea  B/L     Leg Press   2x15  B/L  70#     Ther Activity                        Gait Training                        Modalities

## 2021-03-15 NOTE — PROGRESS NOTES
PT Re-Evaluation     Today's date: 3/16/2021  Patient name: Pito Ocasio  : 1955  MRN: [id number]  Referring provider: Austin Linares MD  Dx:   Encounter Diagnosis     ICD-10-CM    1  Lymphedema  I89 0    2  Weakness of both lower extremities  R29 898                   Assessment  Assessment details: Patient is a 76year old male with improved LE volume  Noted with ongoing weakness and has not returned to HEP regularly  He is beginning process for weight loss system again, however, this will be in a few weeks given cost   He is motivated to improve overall health, mobility, wellness  Will continue per POC  Impairments: abnormal gait, abnormal or restricted ROM, activity intolerance, impaired balance, impaired physical strength, pain with function and safety issue  Understanding of Dx/Px/POC: good   Prognosis: good    Goals  STGs to be achieved in 2 - 4 weeks - MET as listed  Demonstrate at least 100% compliance with self MLD   Demonstrate at least 100% compliance with self compression   Demonstrate at least 100% compliance with self skin and nail inspection   Free from s/s of infection   Demonstrate understanding of importance of compliance with POC     LTGs to be achieved in 4 - 6 weeks - MET as listed, ongoing as listed  1  Pt will be I with HEP in order to continue to improve quality of life and independence and reduce risk for re-injury  ONGOING  2  Pt to demonstrate return to community ambulation without limitations or restrictions  ONGOING  3  Pt to demonstrate improved function as noted by achieving or exceeding predicted score on FOTO outcomes assessment tool  ONGOING  4  Pt to demonstrate increased MMT of bilateral LEs by at least 1/2-1 grade in order to improve safety and stability with ADLs and functional mobility    MET      Plan  Patient would benefit from: skilled physical therapy  Other planned modality interventions: Modalities prn for symptom management  Planned therapy interventions: manual therapy, neuromuscular re-education, therapeutic exercise, therapeutic training and home exercise program  Frequency: 2x week  Duration in visits: 8  Duration in weeks: 4  Plan of Care beginning date: 3/16/2021  Plan of Care expiration date: 4/15/2021  Treatment plan discussed with: patient        Subjective Evaluation    History of Present Illness  Mechanism of injury: UPDATE:  Reports feeling much better, more active  Better compliance with compression  Patient with ongoing limitation in LE strength and endurance  Has not returned to HEP  Patient reports bilateral foot swelling, saw podiatrist and was referred to PCP  Patient saw PCP and had bilateral foot swelling  Also with increased thigh edema since last episode of care  She increased diuretic and patient has follow up upcoming  He presents with ongoing pain in feet, however, improved since last episode of care with addition of gabapentin and tramadol  Of note:  Patient has started with diet in the last few weeks to include more fruit and vegetables, intermittent protein bars  Limiting salt intake  Quality of life: good    Pain  Current pain ratin  At best pain ratin  At worst pain ratin  Location: Bilateral feet  Quality: burning  Relieving factors: medications  Aggravating factors: standing, walking and stair climbing  Progression: improved    Treatments  Previous treatment: physical therapy  Current treatment: medication and physical therapy  Patient Goals  Patient goals for therapy: decreased edema, increased strength and independence with ADLs/IADLs          Objective  Lymphedema Evaluation    Medical Considerations:  POS Cardiac - following with new cardiologist 2/2 [facility], A-fib, newly adjusted meds for CHF    NEG Pulmonary  NEG Kidney  NEG Liver  NEG Current Fever/Infection  NEG Current/Recent Wounds  NEG Current/Recent Treatments/Interventions/Port Access/PICC Line  NEG Current/Recent/History of DVT or Clotting issues    ROM Considerations:  ROM WFL, h/o THR    Strength Considerations:  Grossly WFL bilateral LEs    Gait Considerations:  WFL bilateral LEs    Skin Considerations/Scars:  Patient presents with skin inspection as follows:  Hemosiderin staining/skin discoloration - POS  Finger/Toe Nails - NEG  Open Wounds - NEG  S/S infection - NEG  Firm/Sponge/Hard - POS  Peau D' Orange - NEG  Weeping - Resolved    ROM Considerations:  Hip flexion to 90 degrees  Hip ABd to 45 degrees  Heel cords to neutral    Strength Considerations:  Hip flexion - 3/5  Hip ABd - 3/5    Girth:  LE girth measurements (cm) - Current      Right           Left   Forefoot         26  26                        Metatarsals              27  27  Malleolus   32  32 5  +4               32 5  33  +8 cm    33  33  +12                           35 5     33 5  +16 cm    42  38  +20                           45  44 5  +24 cm    53 5  53 5  +28                                   55  52  +32                                          55                  55  +36        55  57          Knee joint line              62  60    Malleoli to Fib head length - 36 cm  To trochanter - 80 cm      LE girth measurements (cm) - at present      Right           Left   Forefoot       24  25                                    Metatarsals             25  27  Malleolus   27 5  28 5  +4 cm               29  28 5  +8 cm    32  29 5  +12 cm               37 5  35 5                          +16 cm    43 5  41  +20 cm                         49  45 5     +24 cm    50  49  +28 cm                                   48 5  48 5  +32 cm    56  48    Compression measurements for knee length compreshort - [facility] from [address] - contact Evi Both - [phone]         Precautions:  Falls, CHF, A-fib  Re-eval Date: 3/15/2021    Date 2/16 2/26 3/12 3/16    Visit Count 1 2 3 4    FOTO See IE   See RE    Pain In See IE 0 0 See RE    Pain Out See IE 0 0 See RE        Manuals        MLD 30 mins 55 mins 30 mins 45 mins                            Neuro Re-Ed         Dynavision Stand  Foam                                                         Ther Ex        SLR x 3     **   HR/TR     **   Mini Squats        Lunges        Side Lunge     **   Aerobic   Upright bike with seat elevated  15 mins Nustep  15 mins  L6    Knee extn machine/flex machine   22#  2x15 ea  B/L  **   Leg Press   2x15  B/L  70#  **   Ther Activity                        Gait Training                        Modalities

## 2021-03-16 ENCOUNTER — EVALUATION (OUTPATIENT)
Dept: PHYSICAL THERAPY | Facility: CLINIC | Age: 66
End: 2021-03-16
Payer: COMMERCIAL

## 2021-03-16 ENCOUNTER — TRANSCRIBE ORDERS (OUTPATIENT)
Dept: PHYSICAL THERAPY | Facility: CLINIC | Age: 66
End: 2021-03-16

## 2021-03-16 DIAGNOSIS — I89.0 OBLITERATION OF LYMPHATIC VESSEL: Primary | ICD-10-CM

## 2021-03-16 DIAGNOSIS — R29.898 WEAKNESS OF BOTH LOWER EXTREMITIES: ICD-10-CM

## 2021-03-16 DIAGNOSIS — I89.0 LYMPHEDEMA: Primary | ICD-10-CM

## 2021-03-16 PROCEDURE — 97140 MANUAL THERAPY 1/> REGIONS: CPT

## 2021-03-16 PROCEDURE — 97110 THERAPEUTIC EXERCISES: CPT

## 2021-03-16 NOTE — LETTER
2021    Robin Clemons MD  [address] [facility] [address]  [address] [facility] [address]    Patient: Prabhu Peraza   YOB: 1955   Date of Visit: 3/16/2021     Encounter Diagnosis     ICD-10-CM    1  Lymphedema  I89 0    2  Weakness of both lower extremities  R29 898        Dear Dr Kim Mccoy: Thank you for your recent referral of Prabhu Peraza  Please review the attached evaluation summary from [facility] recent visit  Please verify that you agree with the plan of care by signing the attached order  If you have any questions or concerns, please do not hesitate to call  I sincerely appreciate the opportunity to share in the care of one of your patients and hope to have another opportunity to work with you in the near future  Sincerely,    Kateryna Palm      Referring Provider:      I certify that I have read the below Plan of Care and certify the need for these services furnished under this plan of treatment while under my care  Robin Clemons MD  [address] [address]  [address] [facility] [address]  Via Fax: [phone]          PT Re-Evaluation     Today's date: 3/16/2021  Patient name: Prabhu Peraza  : 1955  MRN: [id number]  Referring provider: Vilma Perdue MD  Dx:   Encounter Diagnosis     ICD-10-CM    1  Lymphedema  I89 0    2  Weakness of both lower extremities  R29 898                   Assessment  Assessment details: Patient is a 76year old male with improved LE volume  Noted with ongoing weakness and has not returned to HEP regularly  He is beginning process for weight loss system again, however, this will be in a few weeks given cost   He is motivated to improve overall health, mobility, wellness  Will continue per POC    Impairments: abnormal gait, abnormal or restricted ROM, activity intolerance, impaired balance, impaired physical strength, pain with function and safety issue  Understanding of Dx/Px/POC: good   Prognosis: good    Goals  STGs to be achieved in 2 - 4 weeks - MET as listed  Demonstrate at least 100% compliance with self MLD   Demonstrate at least 100% compliance with self compression   Demonstrate at least 100% compliance with self skin and nail inspection   Free from s/s of infection   Demonstrate understanding of importance of compliance with POC     LTGs to be achieved in 4 - 6 weeks - MET as listed, ongoing as listed  1  Pt will be I with HEP in order to continue to improve quality of life and independence and reduce risk for re-injury  ONGOING  2  Pt to demonstrate return to community ambulation without limitations or restrictions  ONGOING  3  Pt to demonstrate improved function as noted by achieving or exceeding predicted score on FOTO outcomes assessment tool  ONGOING  4  Pt to demonstrate increased MMT of bilateral LEs by at least 1/2-1 grade in order to improve safety and stability with ADLs and functional mobility  MET      Plan  Patient would benefit from: skilled physical therapy  Other planned modality interventions: Modalities prn for symptom management  Planned therapy interventions: manual therapy, neuromuscular re-education, therapeutic exercise, therapeutic training and home exercise program  Frequency: 2x week  Duration in visits: 8  Duration in weeks: 4  Plan of Care beginning date: 3/16/2021  Plan of Care expiration date: 4/15/2021  Treatment plan discussed with: patient        Subjective Evaluation    History of Present Illness  Mechanism of injury: UPDATE:  Reports feeling much better, more active  Better compliance with compression  Patient with ongoing limitation in LE strength and endurance  Has not returned to HEP  Patient reports bilateral foot swelling, saw podiatrist and was referred to PCP  Patient saw PCP and had bilateral foot swelling  Also with increased thigh edema since last episode of care  She increased diuretic and patient has follow up upcoming    He presents with ongoing pain in feet, however, improved since last episode of care with addition of gabapentin and tramadol  Of note:  Patient has started with diet in the last few weeks to include more fruit and vegetables, intermittent protein bars  Limiting salt intake  Quality of life: good    Pain  Current pain ratin  At best pain ratin  At worst pain ratin  Location: Bilateral feet  Quality: burning  Relieving factors: medications  Aggravating factors: standing, walking and stair climbing  Progression: improved    Treatments  Previous treatment: physical therapy  Current treatment: medication and physical therapy  Patient Goals  Patient goals for therapy: decreased edema, increased strength and independence with ADLs/IADLs          Objective  Lymphedema Evaluation    Medical Considerations:  POS Cardiac - following with new cardiologist 2/2 [facility], A-fib, newly adjusted meds for CHF    NEG Pulmonary  NEG Kidney  NEG Liver  NEG Current Fever/Infection  NEG Current/Recent Wounds  NEG Current/Recent Treatments/Interventions/Port Access/PICC Line  NEG Current/Recent/History of DVT or Clotting issues    ROM Considerations:  ROM WFL, h/o THR    Strength Considerations:  Grossly WFL bilateral LEs    Gait Considerations:  WFL bilateral LEs    Skin Considerations/Scars:  Patient presents with skin inspection as follows:  Hemosiderin staining/skin discoloration - POS  Finger/Toe Nails - NEG  Open Wounds - NEG  S/S infection - NEG  Firm/Sponge/Hard - POS  Peau D' Orange - NEG  Weeping - Resolved    ROM Considerations:  Hip flexion to 90 degrees  Hip ABd to 45 degrees  Heel cords to neutral    Strength Considerations:  Hip flexion - 3/5  Hip ABd - 3/5    Girth:  LE girth measurements (cm) - Current      Right           Left   Forefoot         26  26                        Metatarsals              27  27  Malleolus   32  32 5  +4               32 5  33  +8 cm    33  33  +12                           35 5     33 5  +16 cm    42  38  +20                           45  44 5  +24 cm    53 5  53 5  +28                                   55  52  +32                                          55                  55  +36        55  57          Knee joint line              62  60    Malleoli to Fib head length - 36 cm  To trochanter - 80 cm      LE girth measurements (cm) - at present      Right           Left   Forefoot       24  25                                    Metatarsals             25  27  Malleolus   27 5  28 5  +4 cm               29  28 5  +8 cm    32  29 5  +12 cm               37 5  35 5                          +16 cm    43 5  41  +20 cm                         49  45 5     +24 cm    50  49  +28 cm                                   48 5  48 5  +32 cm    56  48    Compression measurements for knee length compreshort - [facility] from [address] - contact Mendel Miners - [phone]         Precautions:  Falls, CHF, A-fib  Re-eval Date: 3/15/2021    Date 2/16 2/26 3/12 3/16    Visit Count 1 2 3 4    FOTO See IE   See RE    Pain In See IE 0 0 See RE    Pain Out See IE 0 0 See RE        Manuals        MLD 30 mins 55 mins 30 mins 45 mins                            Neuro Re-Ed         Dynavision Stand  Foam                                                         Ther Ex        SLR x 3     **   HR/TR     **   Mini Squats        Lunges        Side Lunge     **   Aerobic   Upright bike with seat elevated  15 mins Nustep  15 mins  L6    Knee extn machine/flex machine   22#  2x15 ea  B/L  **   Leg Press   2x15  B/L  70#  **   Ther Activity                        Gait Training                        Modalities

## 2021-03-23 NOTE — PROGRESS NOTES
Daily Note     Today's date: 3/24/2021  Patient name: Rolf Marsh  : 1955  MRN: [id number]  Referring provider: Todd Soriano MD  Dx:   Encounter Diagnosis     ICD-10-CM    1  Lymphedema  I89 0    2  Weakness of both lower extremities  R29 898                   Subjective: Still taking anti-coags and diuretic  Feeling better, still limited stamina  Plans to retire upcoming  Objective: See treatment diary below      Assessment: Tolerated treatment well  Patient demonstrated fatigue post treatment and would benefit from continued PT  Continues with WBOS and asymmetry of gait, volume of limbs reducing  Ongoing stiffness  Plan: Continue per plan of care        Precautions:  Falls, CHF, A-fib  Re-eval Date: 4/15/2021    Date 2/16 2/26 3/12 3/16 3/24   Visit Count 1 2 3 4 5   FOTO See IE   See RE    Pain In See IE 0 0 See RE 0   Pain Out See IE 0 0 See RE 0       Manuals        MLD 30 mins 55 mins 30 mins 45 mins 45 mins                           Neuro Re-Ed         Dynavision Stand  Foam                                                         Ther Ex        SLR x 3     ** Reviewed for HEP   HR/TR     ** Reviewed for HEP   Mini Squats        Lunges        Side Lunge     ** NV   Aerobic   Upright bike with seat elevated  15 mins Nustep  15 mins  L6 Nustep  10 mins  L6   Knee extn machine/flex machine   22#  2x15 ea  B/L  22#  2x15 ea  B/L   Leg Press   2x15  B/L  70#  2x15  B/L  70#   Ther Activity                        Gait Training                        Modalities

## 2021-03-24 ENCOUNTER — OFFICE VISIT (OUTPATIENT)
Dept: PHYSICAL THERAPY | Facility: CLINIC | Age: 66
End: 2021-03-24
Payer: COMMERCIAL

## 2021-03-24 DIAGNOSIS — R29.898 WEAKNESS OF BOTH LOWER EXTREMITIES: ICD-10-CM

## 2021-03-24 DIAGNOSIS — I89.0 LYMPHEDEMA: Primary | ICD-10-CM

## 2021-03-24 PROCEDURE — 97110 THERAPEUTIC EXERCISES: CPT | Performed by: PHYSICAL THERAPIST

## 2021-03-24 PROCEDURE — 97140 MANUAL THERAPY 1/> REGIONS: CPT | Performed by: PHYSICAL THERAPIST

## 2021-03-30 NOTE — PROGRESS NOTES
Daily Note     Today's date: 3/31/2021  Patient name: Roxanna Roldan  : 1955  MRN: [id number]  Referring provider: Nicole Rodgers MD  Dx:   Encounter Diagnosis     ICD-10-CM    1  Lymphedema  I89 0    2  Weakness of both lower extremities  R29 898                   Subjective: Wearing compression daily No new sx's/co's BL LE  Officially retired 4/15/21      Objective: See treatment diary below      Assessment: Tolerated treatment well  Noted increase L HS mm cramping after HS curl activity reviewed self stretching and provided 2 x 1 min HS stretch to L LE seated Progressing with increase reps/resistance with TE  Pt compliant with daily compression BL LE   Patient would benefit from continued PT      Plan: Continue per plan of care        Precautions:  Falls, CHF, A-fib  Re-eval Date: 4/15/2021    Date 3/31       Visit Count 6       FOTO        Pain In        Pain Out          Manuals        MLD 30 mins  PT/MW  W/ measurements        L HS stretch  2x 1 min                       Neuro Re-Ed         Dynavision                                                        Ther Ex        SLR x 3 ** Reviewed for HEP       HR/TR ** Reviewed for HEP       Mini Squats        Lunges        Side Lunge ** NV       Aerobic Nustep  10 mins  L6       Knee extn machine/flex machine 22#  2x15, 5" ext    33#  2x15 flex  B/L       Leg Press 2x15  B/L  70#       Ther Activity                        Gait Training                        Modalities

## 2021-03-31 ENCOUNTER — OFFICE VISIT (OUTPATIENT)
Dept: PHYSICAL THERAPY | Facility: CLINIC | Age: 66
End: 2021-03-31
Payer: COMMERCIAL

## 2021-03-31 DIAGNOSIS — R29.898 WEAKNESS OF BOTH LOWER EXTREMITIES: ICD-10-CM

## 2021-03-31 DIAGNOSIS — I89.0 LYMPHEDEMA: Primary | ICD-10-CM

## 2021-03-31 PROCEDURE — 97140 MANUAL THERAPY 1/> REGIONS: CPT | Performed by: PHYSICAL THERAPIST

## 2021-03-31 PROCEDURE — 97110 THERAPEUTIC EXERCISES: CPT

## 2021-03-31 NOTE — PROGRESS NOTES
LE girth measurements (cm)      Left          Right   Forefoot             26  26                           Metatarsals             30  28  Malleolus   31 5  30  +4 cm               31 5  30 5  +8 cm    32 5  31  +12 cm               36  39                         +16 cm   46  44  +20 cm                          50  49   +24 cm   53 5  52  +28 cm                                   55 5  53  +32 cm   58 9  51

## 2021-04-07 NOTE — PROGRESS NOTES
Daily Note     Today's date: 2021  Patient name: Trevor Rollins  : 1955  MRN: [id number]  Referring provider: Jai Mckeon MD  Dx:   Encounter Diagnosis     ICD-10-CM    1  Lymphedema  I89 0    2  Weakness of both lower extremities  R29 898                   Subjective: Legs feeling better, back on diuretics  Objective: See treatment diary below      Assessment: Tolerated treatment well  Patient demonstrated fatigue post treatment and would benefit from continued PT  Continues to progress with more symmetric gait  Plan: Continue per plan of care        Precautions:  Falls, CHF, A-fib  Re-eval Date: 4/15/2021    Date 3/31 4/8      Visit Count 6 7      FOTO        Pain In  0      Pain Out  0        Manuals        MLD 30 mins  PT/MW  W/ measurements 30 mins  PT/MW         L HS stretch  2x 1 min                       Neuro Re-Ed         Dynavision                                                        Ther Ex        SLR x 3 ** Reviewed for HEP       HR/TR ** Reviewed for HEP       Mini Squats        Lunges        Side Lunge ** NV       Aerobic Nustep  10 mins  L6 Nustep  10 mins  L6      Knee extn machine/flex machine 22#  2x15, 5" ext    33#  2x15 flex  B/L 22#  2x15, 5" ext    33#  2x15 flex  B/L      Leg Press 2x15  B/L  70# 1x10  B/L  80#  1x10  120#      Ther Activity                        Gait Training                        Modalities

## 2021-04-08 ENCOUNTER — OFFICE VISIT (OUTPATIENT)
Dept: PHYSICAL THERAPY | Facility: CLINIC | Age: 66
End: 2021-04-08
Payer: COMMERCIAL

## 2021-04-08 DIAGNOSIS — I89.0 LYMPHEDEMA: Primary | ICD-10-CM

## 2021-04-08 DIAGNOSIS — R29.898 WEAKNESS OF BOTH LOWER EXTREMITIES: ICD-10-CM

## 2021-04-08 PROCEDURE — 97110 THERAPEUTIC EXERCISES: CPT | Performed by: PHYSICAL THERAPIST

## 2021-04-08 PROCEDURE — 97140 MANUAL THERAPY 1/> REGIONS: CPT | Performed by: PHYSICAL THERAPIST

## 2021-04-09 ENCOUNTER — OFFICE VISIT (OUTPATIENT)
Dept: SLEEP CENTER | Facility: CLINIC | Age: 66
End: 2021-04-09
Payer: COMMERCIAL

## 2021-04-09 VITALS
HEIGHT: 69 IN | BODY MASS INDEX: 44.58 KG/M2 | SYSTOLIC BLOOD PRESSURE: 110 MMHG | DIASTOLIC BLOOD PRESSURE: 76 MMHG | HEART RATE: 80 BPM | WEIGHT: 301 LBS

## 2021-04-09 DIAGNOSIS — G47.33 OSA (OBSTRUCTIVE SLEEP APNEA): Primary | ICD-10-CM

## 2021-04-09 PROCEDURE — 99213 OFFICE O/P EST LOW 20 MIN: CPT | Performed by: INTERNAL MEDICINE

## 2021-04-09 NOTE — PROGRESS NOTES
Progress Note - Sleep Center   Kamini LEWIS:[birth year] MRN: [id number]      Reason for Visit:  76 y o male here for PAP compliance check    Assessment:  Doing well with new PAP device  Sleep quality is improved and the patient feels less drowsy  Compliance data show utilization for greater than or equal to 70% of nights, for greater than or equal to 4 hours per night  Plan:  Adequate compliance and successful treatment    Follow up: One year    History of Present Illness:  History of ROBERT on PAP therapy  Meets adequate compliance          Review of Systems      Genitourinary need to urinate more than twice a night and difficulty with erection   Cardiology ankle/leg swelling   Gastrointestinal none   Neurology none   Constitutional none   Integumentary rash or dry skin and itching   Psychiatry none   Musculoskeletal none   Pulmonary none   ENT none   Endocrine frequent urination   Hematological none           I have reviewed and updated the review of systems as necessary    Historical Information    Past Medical History:   Diagnosis Date    [facility]     Arthritis     CPAP (continuous positive airway pressure) dependence     Irregular heart beat     Lymphedema     Sleep apnea     Urinary frequency     Wears glasses     Wears partial dentures     lower partial         Past Surgical History:   Procedure Laterality Date    ABLATION SAPHENOUS VEIN W/ RFA      COLONOSCOPY      [facility] CYSTOURETHRO W/IMPLANT N/A 11/13/2017    Procedure: CYSTOSCOPY WITH INSERTION UROLIFT;  Surgeon: Annabel Snow DO;  Location: [facility];  Service: Urology    TONSILLECTOMY           Social History     Socioeconomic History    Marital status: /Civil Union     Spouse name: None    Number of children: None    Years of education: None    Highest education level: None   Occupational History    None   Social Needs    Financial resource strain: None    Food insecurity     Worry: None     Inability: None    Transportation needs     Medical: None     Non-medical: None   Tobacco Use    Smoking status: Never Smoker    Smokeless tobacco: Never Used   Substance and Sexual Activity    Alcohol use:  Yes     Alcohol/week: 4 0 standard drinks     Types: 4 Cans of beer per week     Frequency: 2-3 times a week     Binge frequency: Never     Comment: socially    Drug use: No    Sexual activity: None   Lifestyle    Physical activity     Days per week: None     Minutes per session: None    Stress: None   Relationships    Social connections     Talks on phone: None     Gets together: None     Attends Confucianist service: None     Active member of club or organization: None     Attends meetings of clubs or organizations: None     Relationship status: None    Intimate partner violence     Fear of current or ex partner: None     Emotionally abused: None     Physically abused: None     Forced sexual activity: None   Other Topics Concern    None   Social History Narrative    None         Family History   Problem Relation Age of Onset    Heart disease Mother     Lymphoma Father     Cancer Father     Heart disease Sister     Hypertension Brother     Diabetes Neg Hx     Thyroid disease Neg Hx     Stroke Neg Hx        Medications/Allergies:      Current Outpatient Medications:     acetaminophen (TYLENOL) 500 mg tablet, Take 500 mg by mouth every 6 (six) hours as needed for mild pain, Disp: , Rfl:     allopurinol (ZYLOPRIM) 100 mg tablet, Take 1 tablet (100 mg total) by mouth daily, Disp: 14 tablet, Rfl: 0    ammonium lactate (AMLACTIN) 12 % lotion, Apply topically 2 (two) times a day as needed for dry skin, Disp: , Rfl:     clindamycin (CLEOCIN) 300 MG capsule, Take 300 mg by mouth as needed Prior to dental work, Disp: , Rfl:     CoenzymeQ10-Isoleucine-Glycine (CO Q-10) 100-50-25 MG TB24, Co Q-10, Disp: , Rfl:     colchicine (COLCRYS) 0 6 mg tablet, as needed , Disp: , Rfl:     ferrous sulfate 325 (65 Fe) mg tablet, Take 325 mg by mouth every other day, Disp: , Rfl:     GARCINIA CAMBOGIA-CHROMIUM PO, Take 750 mg by mouth 2 (two) times a day , Disp: , Rfl:     Glucosamine-Chondroit-Vit C-Mn (Glucosamine 1500 Complex) CAPS, Take by mouth, Disp: , Rfl:     Green Coffee Silva-Yerba Mate (GREEN COFFEE BEAN EXTRACT PO), Take 800 mg by mouth daily , Disp: , Rfl:     halobetasol (ULTRAVATE) 0 05 % ointment, APPLY TO AFFECTED AREA ON LEG TWICE DAILY, Disp: , Rfl:     L-ARGININE-500 PO, Take 500 mg by mouth 2 (two) times a day , Disp: , Rfl:     metoprolol succinate (TOPROL-XL) 50 mg 24 hr tablet, Take 1 tablet (50 mg total) by mouth 2 (two) times a day, Disp: 180 tablet, Rfl: 2    Multiple Vitamins-Minerals (OCUVITE EXTRA PO), Take 1 tablet by mouth daily  , Disp: , Rfl:     patient supplied medication, Take 1 each by mouth 2 (two) times a day, Disp: , Rfl:     patient supplied medication, Take 1 each by mouth 2 (two) times a day, Disp: , Rfl:     pregabalin (LYRICA) 100 mg capsule, Take 1 capsule (100 mg total) by mouth 3 (three) times a day, Disp: 42 capsule, Rfl: 0    RASPBERRY KETONES PO, Take 100 mg by mouth 2 (two) times a day, Disp: , Rfl:     rivaroxaban (Xarelto) 20 mg tablet, Take 1 tablet (20 mg total) by mouth daily, Disp: 14 tablet, Rfl: 0    spironolactone (ALDACTONE) 25 mg tablet, Take 1 tablet (25 mg total) by mouth 2 (two) times a day, Disp: 180 tablet, Rfl: 0    tadalafil (CIALIS) 5 MG tablet, Take 5 mg by mouth daily as needed for erectile dysfunction, Disp: , Rfl:     torsemide (DEMADEX) 20 mg tablet, Take 3 tablets (60 mg total) by mouth 2 (two) times a day, Disp: 540 tablet, Rfl: 2    traMADol (ULTRAM) 50 mg tablet, Take 50 mg by mouth every 6 (six) hours as needed for moderate pain, Disp: , Rfl:     Turmeric Curcumin 500 MG CAPS, Take 1,000 mg by mouth 2 (two) times a day , Disp: , Rfl:     VITAMIN D PO, Take by mouth, Disp: , Rfl:       Objective    Vital Signs:   Vitals:    04/09/21 1014   BP: 110/76   Pulse: 80     [place] Sleepiness Scale: Total score: 9        Physical Exam:    General: Alert, appropriate, cooperative, overweight    Head: NC/AT    Skin: Warm, dry    Neuro: No motor abnormalities, cranial nerves appear intact    Extremity: No clubbing, cyanosis    PAP setting:   BiPAP 20/14 cm  DME Provider: [employer]  Test results:  AHI = 70 0    Counseling / Coordination of Care  Total clinic time spent today 10 minutes  A description of the counseling / coordination of care: Maintain compliance  Discussed equipment  QUE Stevenson    Board Certified Sleep Specialist

## 2021-04-16 ENCOUNTER — CONSULT (OUTPATIENT)
Dept: CARDIOLOGY CLINIC | Facility: CLINIC | Age: 66
End: 2021-04-16
Payer: MEDICARE

## 2021-04-16 VITALS
WEIGHT: 302.4 LBS | BODY MASS INDEX: 44.79 KG/M2 | HEIGHT: 69 IN | DIASTOLIC BLOOD PRESSURE: 70 MMHG | SYSTOLIC BLOOD PRESSURE: 102 MMHG | HEART RATE: 57 BPM

## 2021-04-16 DIAGNOSIS — I48.11 LONGSTANDING PERSISTENT ATRIAL FIBRILLATION (HCC): ICD-10-CM

## 2021-04-16 PROCEDURE — 1123F ACP DISCUSS/DSCN MKR DOCD: CPT | Performed by: INTERNAL MEDICINE

## 2021-04-16 PROCEDURE — 1160F RVW MEDS BY RX/DR IN RCRD: CPT | Performed by: INTERNAL MEDICINE

## 2021-04-16 PROCEDURE — 99215 OFFICE O/P EST HI 40 MIN: CPT | Performed by: INTERNAL MEDICINE

## 2021-04-16 PROCEDURE — 93000 ELECTROCARDIOGRAM COMPLETE: CPT | Performed by: INTERNAL MEDICINE

## 2021-04-16 PROCEDURE — 1036F TOBACCO NON-USER: CPT | Performed by: INTERNAL MEDICINE

## 2021-04-16 PROCEDURE — 3008F BODY MASS INDEX DOCD: CPT | Performed by: INTERNAL MEDICINE

## 2021-04-16 RX ORDER — AMIODARONE HYDROCHLORIDE 200 MG/1
TABLET ORAL
Qty: 90 TABLET | Refills: 3 | Status: SHIPPED | OUTPATIENT
Start: 2021-04-16 | End: 2021-05-06 | Stop reason: SDUPTHER

## 2021-04-16 NOTE — PROGRESS NOTES
HEART AND VASCULAR  CARDIAC ELECTROPHYSIOLOGY   HEART RHYTHM CENTER  [facility]    Outpatient  Follow-up  Today's Date: 04/16/21        Patient name: Demian Rudd  YOB: 1955  Sex: male         Chief Complaint: f/u afib      ASSESSMENT:  Problem List Items Addressed This Visit        Cardiovascular and Mediastinum    Atrial fibrillation (HCC)    Relevant Medications    amiodarone 200 mg tablet    Other Relevant Orders    POCT ECG          77 yo male  1) Persistent afib- long term, failed prior DCCV, never tried antiarrhtymic or ablation  Seen Dr Sunshine Buenrostro who recommended weight loss and CPAP, which he has done   [id number] on xarelto   2) chronic diastolic chf, EF 75% and pulm HTN >50mmHg, neg nuclear stress -   3) Severe moribd obesity  4) Weeping lower extremity edema   5) HTN well controlled  6) ROBERT on CPAP  7) RBBB    PLAN:  1  Recommend trial of NSR to see if makes him feel any better- start amiodarone load 200mg TID x2 weeks then 200mg daily  Explained he may get bradycardia and need to lower his metoprolol    Explained long term risks of side effects to other organs etc    2  DCCV in 2 weeks after amio load  3  Cont xarelto  4  If does better in NSR may consider ablation  If no difference would just opt for rate control  5  Home BP cuff w HR monitoring, if HR<50bpm on amio, may consider reducing metoprolol  Follow up in: 1 month    Orders Placed This Encounter   Procedures    POCT ECG     There are no discontinued medications    HPI/Subjective:       77 yo male  1) Persistent afib- long term, failed prior DCCV, never tried antiarrhtymic or ablation  Seen Dr Sunshine Buenrostro who recommended weight loss and CPAP, which he has done    [id number] on xarelto   2) chronic diastolic chf, EF 12% and pulm HTN >50mmHg, neg nuclear stress -   3) Severe moribd obesity  4) Weeping lower extremity edema   5) HTN well controlled  6) ROBERT on CPAP  7) RBBB    He feels better  Since seeing me in hospital Jan 2021 for acute chf, diuresed, denies sob  Has severe chronic edema  Please note HPI is listed by problem with with update following it, it is copied again in the assessment above and reflects medical decision making as well  Complete 12 point ROS reviewed and otherwise non pertinent or negative except as per HPI pertinent positives in Cardiovascular and Respiratory emphasized  Please see paper chart for outpatient clinic patients where the patient completed the 12 point ROS survey  Past Medical History:   Diagnosis Date    A-fib (HCC)     Arthritis     CPAP (continuous positive airway pressure) dependence     Irregular heart beat     Lymphedema     Sleep apnea     Urinary frequency     Wears glasses     Wears partial dentures     lower partial       Allergies   Allergen Reactions    Penicillins Anaphylaxis    Sulfa Antibiotics Anaphylaxis     I reviewed the Home Medication list and Allergies in the chart     Scheduled Meds:  Current Outpatient Medications   Medication Sig Dispense Refill    allopurinol (ZYLOPRIM) 100 mg tablet Take 1 tablet (100 mg total) by mouth daily 14 tablet 0    ammonium lactate (AMLACTIN) 12 % lotion Apply topically 2 (two) times a day as needed for dry skin      clindamycin (CLEOCIN) 300 MG capsule Take 300 mg by mouth as needed Prior to dental work      CoenzymeQ10-Isoleucine-Glycine (CO Q-10) 100-50-25 MG TB24 Co Q-10      ferrous sulfate 325 (65 Fe) mg tablet Take 325 mg by mouth every other day      GARCINIA CAMBOGIA-CHROMIUM PO Take 750 mg by mouth 2 (two) times a day       Glucosamine-Chondroit-Vit C-Mn (Glucosamine 1500 Complex) CAPS Take by mouth      Green Coffee Silva-Yerba Mate (GREEN COFFEE BEAN EXTRACT PO) Take 800 mg by mouth daily       halobetasol (ULTRAVATE) 0 05 % ointment APPLY TO AFFECTED AREA ON LEG TWICE DAILY  L-ARGININE-500 PO Take 500 mg by mouth 2 (two) times a day       metoprolol succinate (TOPROL-XL) 50 mg 24 hr tablet Take 1 tablet (50 mg total) by mouth 2 (two) times a day 180 tablet 2    Multiple Vitamins-Minerals (OCUVITE EXTRA PO) Take 1 tablet by mouth daily        patient supplied medication Take 1 each by mouth 2 (two) times a day      patient supplied medication Take 1 each by mouth 2 (two) times a day      pregabalin (LYRICA) 100 mg capsule Take 1 capsule (100 mg total) by mouth 3 (three) times a day 42 capsule 0    RASPBERRY KETONES PO Take 100 mg by mouth 2 (two) times a day      rivaroxaban (Xarelto) 20 mg tablet Take 1 tablet (20 mg total) by mouth daily 14 tablet 0    spironolactone (ALDACTONE) 25 mg tablet Take 1 tablet (25 mg total) by mouth 2 (two) times a day 180 tablet 0    tadalafil (CIALIS) 5 MG tablet Take 5 mg by mouth daily as needed for erectile dysfunction      torsemide (DEMADEX) 20 mg tablet Take 3 tablets (60 mg total) by mouth 2 (two) times a day 540 tablet 2    traMADol (ULTRAM) 50 mg tablet Take 50 mg by mouth every 6 (six) hours as needed for moderate pain      Turmeric Curcumin 500 MG CAPS Take 1,000 mg by mouth 2 (two) times a day       VITAMIN D PO Take by mouth      acetaminophen (TYLENOL) 500 mg tablet Take 500 mg by mouth every 6 (six) hours as needed for mild pain      amiodarone 200 mg tablet Take 200mg TID for 2 weeks  After 2 weeks take 200mg daily 90 tablet 3    colchicine (COLCRYS) 0 6 mg tablet as needed        No current facility-administered medications for this visit        PRN Meds:         Family History   Problem Relation Age of Onset    Heart disease Mother     Lymphoma Father     Cancer Father     Heart disease Sister     Hypertension Brother     Diabetes Neg Hx     Thyroid disease Neg Hx     Stroke Neg Hx         Social History     Socioeconomic History    Marital status: /Civil Union     Spouse name: Not on file    Number of children: Not on file    Years of education: Not on file    Highest education level: Not on file   Occupational History    Not on file   Social Needs    Financial resource strain: Not on file    Food insecurity     Worry: Not on file     Inability: Not on file    Transportation needs     Medical: Not on file     Non-medical: Not on file   Tobacco Use    Smoking status: Never Smoker    Smokeless tobacco: Never Used   Substance and Sexual Activity    Alcohol use: Yes     Alcohol/week: 4 0 standard drinks     Types: 4 Cans of beer per week     Frequency: 2-3 times a week     Binge frequency: Never     Comment: socially    Drug use: No    Sexual activity: Not on file   Lifestyle    Physical activity     Days per week: Not on file     Minutes per session: Not on file    Stress: Not on file   Relationships    Social connections     Talks on phone: Not on file     Gets together: Not on file     Attends Buddhism service: Not on file     Active member of club or organization: Not on file     Attends meetings of clubs or organizations: Not on file     Relationship status: Not on file    Intimate partner violence     Fear of current or ex partner: Not on file     Emotionally abused: Not on file     Physically abused: Not on file     Forced sexual activity: Not on file   Other Topics Concern    Not on file   Social History Narrative    Not on file           OBJECTIVE:    /70 (BP Location: Left arm, Patient Position: Sitting, Cuff Size: Large)   Pulse 57   Ht 5' 9" (1 753 m)   Wt (!) 137 kg (302 lb 6 4 oz)   BMI 44 66 kg/m²   Vitals:    04/16/21 1439   Weight: (!) 137 kg (302 lb 6 4 oz)     GEN: No acute distress, Alert and oriented, well appearing  HEENT:External ears normal, wearing a mask     EYES: Pupils equal, sclera anicteric, midline, normal conjuctiva  NECK: No JVD, supple, no obvious masses or thryomegaly or goiter  CARDIOVASCULAR: irreg irreg No murmur, rub, gallops S1,S2  LUNGS: Clear To auscultation bilaterally, normal effort, no rales, rhonchi, crackles   ABDOMEN: morbid obese nondistended,  without obvious organomegaly or ascites  EXTREMITIES/VASCULAR: severe 3+ edema warm an well perfused  PSYCH: Normal Affect,  linear speech pattern without evidence of psychosis  NEURO: Grossly intact, moving all extremiteis equal, face symmetric, alert and responsive, no obvious focal defecits   GAIT:Ambulates normally without difficulty  HEME: No bleeding, bruising, petechia, purpura   SKIN: No significant rashes on visibile skin, warm, no diaphoresis or pallor  Lab Results:       LABS:      Chemistry        Component Value Date/Time    K 3 7 2021 0432    K 4 6 2020 0805     2021 0432     2020 0805    CO2 34 (H) 2021 0432    CO2 30 2020 0805    BUN 27 (H) 2021 0432    BUN 24 2020 0805    CREATININE 0 83 2021 0432        Component Value Date/Time    CALCIUM 9 4 2021 0432    CALCIUM 9 2 2020 0805    ALKPHOS 71 2021 0633    AST 32 2021 0633    ALT 25 2021 0633            No results found for: CHOL  Lab Results   Component Value Date    HDL 55 10/27/2019     Lab Results   Component Value Date    LDLCALC 90 10/27/2019     Lab Results   Component Value Date    TRIG 93 10/27/2019     No results found for: CHOLHDL    IMAGING: No results found  Cardiac testing:   Results for orders placed during the hospital encounter of 21   Echo complete with contrast if indicated    Narrative [address] [place] [address]    [place], [address] [facility]  [phone]    Transthoracic Echocardiogram  2D, M-mode, Doppler, and Color Doppler    Study date:  2021    Patient: April Be  MR number: [id number]  Account number: [de-identified]  : 1955  Age: 76 years  Gender: Male  Status: Inpatient  Location: Bedside  Height: 69 in  Weight: 294 4 lb  BP: 129/ 60 mmHg    Indications: Edema  Heart failure  Diagnoses: I50 9 - Heart failure, unspecified    Sonographer:  JAM Szymanski  Referring Physician:  Kenneth Philip Do  Group:  [facility]  Interpreting Physician:  FORTINO Callejas     SUMMARY    PROCEDURE INFORMATION:  Intravenous contrast (  4ml of Definity) was administered  LEFT VENTRICLE:  Systolic function was normal by visual assessment  Ejection fraction was estimated to be 55 %  There were no regional wall motion abnormalities  Wall thickness was mildly increased  There was mild concentric hypertrophy  VENTRICULAR SEPTUM:  There was paradoxical motion  These changes are consistent with a conduction abnormality or paced rhythm  RIGHT VENTRICLE:  The ventricle was mildly dilated  LEFT ATRIUM:  The atrium was mildly dilated  LA volume index 40 mL/m2  RIGHT ATRIUM:  The atrium was mildly to moderately dilated  MITRAL VALVE:  There was trace regurgitation  TRICUSPID VALVE:  There was trace regurgitation  SUMMARY MEASUREMENTS  2D measurements:  Unspecified Anatomy:   %FS was 39 4 %  Ao Diam was 3 4 cm   EDV(Teich) was 129 3 ml   EF(Teich) was 69 5 %  ESV(Teich) was 39 4 ml  IVSd was 1 1 cm  LA Diam was 3 8 cm  LAAs A2C was 27 cm2  LAAs A4C was 26 8 cm2  LAESV A-L A2C was  98 5 ml  LAESV A-L A4C was 92 5 ml  LAESV Index (A-L) was 40 1 ml/m2  LAESV MOD A2C was 94 9 ml  LAESV MOD A4C was 85 4 ml  LAESV(A-L) was 97 7 ml  LAESV(MOD BP) was 91 9 ml  LAESVInd MOD BP was 37 7 ml/m2  LALs A2C was 6 3 cm  LALs A4C was 6 6 cm  LVEDV MOD A4C was 126 8 ml  LVEF MOD A4C was 52 7 %  LVESV MOD A4C was 60 ml  LVIDd was 5 2 cm  LVIDs was 3 2 cm  LVLd A4C was 8 1 cm  LVLs A4C was 7 1 cm  LVPWd was 1 cm  RAAs A4C was 25 8 cm2  RAESV A-L was  91 3 ml   RAESV MOD was 87 9 ml  RALs was 6 2 cm  SV MOD A4C was 66 9 ml   SV(Teich) was 89 9 ml   CW measurements:  Unspecified Anatomy:   AV Env  Ti was 283 9 ms   AV VTI was 27 9 cm    AV Vmax was 1 5 m/s  AV Vmean was 1 m/s  AV maxPG was 8 8 mmHg  AV meanPG was 4 5 mmHg  MM measurements:  Unspecified Anatomy:   TAPSE was 2 cm  PW measurements:  Unspecified Anatomy:   DVI was 0 7   LVOT Env  Ti was 279 7 ms  LVOT VTI was 20 8 cm  LVOT Vmax was 1 2 m/s  LVOT Vmean was 0 7 m/s  LVOT maxPG was 5 9 mmHg  LVOT meanPG was 2 6 mmHg  RV S' was 0 1 m/s  HISTORY: PRIOR HISTORY: lymphedema  Congestive heart failure  Pulmonary hypertension  Atrial fibrillation  Risk factors: hypertension and morbid obesity  PROCEDURE: The procedure was performed at the bedside  This was a routine study  The transthoracic approach was used  The study included complete 2D imaging, M-mode, complete spectral Doppler, and color Doppler  The heart rate was 90 bpm,  at the start of the study  Intravenous contrast (  4ml of Definity) was administered  Image quality was adequate  LEFT VENTRICLE: Size was normal  Systolic function was normal by visual assessment  Ejection fraction was estimated to be 55 %  There were no regional wall motion abnormalities  Wall thickness was mildly increased  There was mild  concentric hypertrophy  DOPPLER: The study was not technically sufficient to allow evaluation of LV diastolic function  VENTRICULAR SEPTUM: There was paradoxical motion  These changes are consistent with a conduction abnormality or paced rhythm  RIGHT VENTRICLE: The ventricle was mildly dilated  Systolic function was normal  Wall thickness was normal     LEFT ATRIUM: The atrium was mildly dilated  LA volume index 40 mL/m2  RIGHT ATRIUM: The atrium was mildly to moderately dilated  MITRAL VALVE: Valve structure was normal  There was normal leaflet separation  DOPPLER: The transmitral velocity was within the normal range  There was no evidence for stenosis  There was trace regurgitation  AORTIC VALVE: The valve was trileaflet   Leaflets exhibited mildly increased thickness, normal cuspal separation, and sclerosis  DOPPLER: Transaortic velocity was within the normal range  There was no evidence for stenosis  There was no  regurgitation  TRICUSPID VALVE: The valve structure was normal  There was normal leaflet separation  DOPPLER: The transtricuspid velocity was within the normal range  There was no evidence for stenosis  There was trace regurgitation  The tricuspid jet  envelope definition was inadequate for estimation of RV systolic pressure  PULMONIC VALVE: Not well visualized  DOPPLER: There was no significant regurgitation  PERICARDIUM: There was no pericardial effusion  The pericardium was normal in appearance  AORTA: The root exhibited normal size  SYSTEMIC VEINS: IVC: The inferior vena cava was normal in size and course  Respirophasic changes were normal     SYSTEM MEASUREMENT TABLES    2D  %FS: 39 4 %  Ao Diam: 3 4 cm  EDV(Teich): 129 3 ml  EF(Teich): 69 5 %  ESV(Teich): 39 4 ml  IVSd: 1 1 cm  LA Diam: 3 8 cm  LAAs A2C: 27 cm2  LAAs A4C: 26 8 cm2  LAESV A-L A2C: 98 5 ml  LAESV A-L A4C: 92 5 ml  LAESV Index (A-L): 40 1 ml/m2  LAESV MOD A2C: 94 9 ml  LAESV MOD A4C: 85 4 ml  LAESV(A-L): 97 7 ml  LAESV(MOD BP): 91 9 ml  LAESVInd MOD BP: 37 7 ml/m2  LALs A2C: 6 3 cm  LALs A4C: 6 6 cm  LVEDV MOD A4C: 126 8 ml  LVEF MOD A4C: 52 7 %  LVESV MOD A4C: 60 ml  LVIDd: 5 2 cm  LVIDs: 3 2 cm  LVLd A4C: 8 1 cm  LVLs A4C: 7 1 cm  LVPWd: 1 cm  RAAs A4C: 25 8 cm2  RAESV A-L: 91 3 ml  RAESV MOD: 87 9 ml  RALs: 6 2 cm  SV MOD A4C: 66 9 ml  SV(Teich): 89 9 ml    CW  AV Env  Ti: 283 9 ms  AV VTI: 27 9 cm  AV Vmax: 1 5 m/s  AV Vmean: 1 m/s  AV maxP 8 mmHg  AV meanP 5 mmHg    MM  TAPSE: 2 cm    PW  DVI: 0 7  LVOT Env  Ti: 279 7 ms  LVOT VTI: 20 8 cm  LVOT Vmax: 1 2 m/s  LVOT Vmean: 0 7 m/s  LVOT maxP 9 mmHg  LVOT meanP 6 mmHg  RV S': 0 1 m/s    [facility] Accredited Echocardiography Laboratory    Prepared and electronically signed by    FORTINO Kaminski    Signed 2021 13:29:34 No results found for this or any previous visit  No results found for this or any previous visit  No results found for this or any previous visit          I reviewed and interpreted the following LABS/EKG/TELE/IMAGING and below is summary of my interpretation (if data available):    LABS:normal renal function    Current EKG and Rhythm Strip:afib, Rbbb, rate controlled 56bpm    Past EKGs and RHYTHM strip: Jan 2021 afib 80-90bpm  ECHO images reviewed and this is my interpretation: Reviewed images, EF normal, LVH, mild LAE, no sigfnicant valve diseaes

## 2021-04-16 NOTE — H&P (VIEW-ONLY)
HEART AND VASCULAR  CARDIAC ELECTROPHYSIOLOGY   HEART RHYTHM CENTER  [facility]    Outpatient  Follow-up  Today's Date: 04/16/21        Patient name: Paul Diop  YOB: 1955  Sex: male         Chief Complaint: f/u afib      ASSESSMENT:  Problem List Items Addressed This Visit        Cardiovascular and Mediastinum    Atrial fibrillation (HCC)    Relevant Medications    amiodarone 200 mg tablet    Other Relevant Orders    POCT ECG          77 yo male  1) Persistent afib- long term, failed prior DCCV, never tried antiarrhtymic or ablation  Seen Dr Angelito Schneider who recommended weight loss and CPAP, which he has done   [id number] on xarelto   2) chronic diastolic chf, EF 00% and pulm HTN >50mmHg, neg nuclear stress -   3) Severe moribd obesity  4) Weeping lower extremity edema   5) HTN well controlled  6) ROBERT on CPAP  7) RBBB    PLAN:  1  Recommend trial of NSR to see if makes him feel any better- start amiodarone load 200mg TID x2 weeks then 200mg daily  Explained he may get bradycardia and need to lower his metoprolol    Explained long term risks of side effects to other organs etc    2  DCCV in 2 weeks after amio load  3  Cont xarelto  4  If does better in NSR may consider ablation  If no difference would just opt for rate control  5  Home BP cuff w HR monitoring, if HR<50bpm on amio, may consider reducing metoprolol  Follow up in: 1 month    Orders Placed This Encounter   Procedures    POCT ECG     There are no discontinued medications    HPI/Subjective:       77 yo male  1) Persistent afib- long term, failed prior DCCV, never tried antiarrhtymic or ablation  Seen Dr Angelito Schneider who recommended weight loss and CPAP, which he has done    [id number] on xarelto   2) chronic diastolic chf, EF 71% and pulm HTN >50mmHg, neg nuclear stress -   3) Severe moribd obesity  4) Weeping lower extremity edema   5) HTN well controlled  6) ROBERT on CPAP  7) RBBB    He feels better  Since seeing me in hospital Jan 2021 for acute chf, diuresed, denies sob  Has severe chronic edema  Please note HPI is listed by problem with with update following it, it is copied again in the assessment above and reflects medical decision making as well  Complete 12 point ROS reviewed and otherwise non pertinent or negative except as per HPI pertinent positives in Cardiovascular and Respiratory emphasized  Please see paper chart for outpatient clinic patients where the patient completed the 12 point ROS survey  Past Medical History:   Diagnosis Date    A-fib (HCC)     Arthritis     CPAP (continuous positive airway pressure) dependence     Irregular heart beat     Lymphedema     Sleep apnea     Urinary frequency     Wears glasses     Wears partial dentures     lower partial       Allergies   Allergen Reactions    Penicillins Anaphylaxis    Sulfa Antibiotics Anaphylaxis     I reviewed the Home Medication list and Allergies in the chart     Scheduled Meds:  Current Outpatient Medications   Medication Sig Dispense Refill    allopurinol (ZYLOPRIM) 100 mg tablet Take 1 tablet (100 mg total) by mouth daily 14 tablet 0    ammonium lactate (AMLACTIN) 12 % lotion Apply topically 2 (two) times a day as needed for dry skin      clindamycin (CLEOCIN) 300 MG capsule Take 300 mg by mouth as needed Prior to dental work      CoenzymeQ10-Isoleucine-Glycine (CO Q-10) 100-50-25 MG TB24 Co Q-10      ferrous sulfate 325 (65 Fe) mg tablet Take 325 mg by mouth every other day      GARCINIA CAMBOGIA-CHROMIUM PO Take 750 mg by mouth 2 (two) times a day       Glucosamine-Chondroit-Vit C-Mn (Glucosamine 1500 Complex) CAPS Take by mouth      Green Coffee Silva-Yerba Mate (GREEN COFFEE BEAN EXTRACT PO) Take 800 mg by mouth daily       halobetasol (ULTRAVATE) 0 05 % ointment APPLY TO AFFECTED AREA ON LEG TWICE DAILY  L-ARGININE-500 PO Take 500 mg by mouth 2 (two) times a day       metoprolol succinate (TOPROL-XL) 50 mg 24 hr tablet Take 1 tablet (50 mg total) by mouth 2 (two) times a day 180 tablet 2    Multiple Vitamins-Minerals (OCUVITE EXTRA PO) Take 1 tablet by mouth daily        patient supplied medication Take 1 each by mouth 2 (two) times a day      patient supplied medication Take 1 each by mouth 2 (two) times a day      pregabalin (LYRICA) 100 mg capsule Take 1 capsule (100 mg total) by mouth 3 (three) times a day 42 capsule 0    RASPBERRY KETONES PO Take 100 mg by mouth 2 (two) times a day      rivaroxaban (Xarelto) 20 mg tablet Take 1 tablet (20 mg total) by mouth daily 14 tablet 0    spironolactone (ALDACTONE) 25 mg tablet Take 1 tablet (25 mg total) by mouth 2 (two) times a day 180 tablet 0    tadalafil (CIALIS) 5 MG tablet Take 5 mg by mouth daily as needed for erectile dysfunction      torsemide (DEMADEX) 20 mg tablet Take 3 tablets (60 mg total) by mouth 2 (two) times a day 540 tablet 2    traMADol (ULTRAM) 50 mg tablet Take 50 mg by mouth every 6 (six) hours as needed for moderate pain      Turmeric Curcumin 500 MG CAPS Take 1,000 mg by mouth 2 (two) times a day       VITAMIN D PO Take by mouth      acetaminophen (TYLENOL) 500 mg tablet Take 500 mg by mouth every 6 (six) hours as needed for mild pain      amiodarone 200 mg tablet Take 200mg TID for 2 weeks  After 2 weeks take 200mg daily 90 tablet 3    colchicine (COLCRYS) 0 6 mg tablet as needed        No current facility-administered medications for this visit        PRN Meds:         Family History   Problem Relation Age of Onset    Heart disease Mother     Lymphoma Father     Cancer Father     Heart disease Sister     Hypertension Brother     Diabetes Neg Hx     Thyroid disease Neg Hx     Stroke Neg Hx         Social History     Socioeconomic History    Marital status: /Civil Union     Spouse name: Not on file    Number of children: Not on file    Years of education: Not on file    Highest education level: Not on file   Occupational History    Not on file   Social Needs    Financial resource strain: Not on file    Food insecurity     Worry: Not on file     Inability: Not on file    Transportation needs     Medical: Not on file     Non-medical: Not on file   Tobacco Use    Smoking status: Never Smoker    Smokeless tobacco: Never Used   Substance and Sexual Activity    Alcohol use: Yes     Alcohol/week: 4 0 standard drinks     Types: 4 Cans of beer per week     Frequency: 2-3 times a week     Binge frequency: Never     Comment: socially    Drug use: No    Sexual activity: Not on file   Lifestyle    Physical activity     Days per week: Not on file     Minutes per session: Not on file    Stress: Not on file   Relationships    Social connections     Talks on phone: Not on file     Gets together: Not on file     Attends Jain service: Not on file     Active member of club or organization: Not on file     Attends meetings of clubs or organizations: Not on file     Relationship status: Not on file    Intimate partner violence     Fear of current or ex partner: Not on file     Emotionally abused: Not on file     Physically abused: Not on file     Forced sexual activity: Not on file   Other Topics Concern    Not on file   Social History Narrative    Not on file           OBJECTIVE:    /70 (BP Location: Left arm, Patient Position: Sitting, Cuff Size: Large)   Pulse 57   Ht 5' 9" (1 753 m)   Wt (!) 137 kg (302 lb 6 4 oz)   BMI 44 66 kg/m²   Vitals:    04/16/21 1439   Weight: (!) 137 kg (302 lb 6 4 oz)     GEN: No acute distress, Alert and oriented, well appearing  HEENT:External ears normal, wearing a mask     EYES: Pupils equal, sclera anicteric, midline, normal conjuctiva  NECK: No JVD, supple, no obvious masses or thryomegaly or goiter  CARDIOVASCULAR: irreg irreg No murmur, rub, gallops S1,S2  LUNGS: Clear To auscultation bilaterally, normal effort, no rales, rhonchi, crackles   ABDOMEN: morbid obese nondistended,  without obvious organomegaly or ascites  EXTREMITIES/VASCULAR: severe 3+ edema warm an well perfused  PSYCH: Normal Affect,  linear speech pattern without evidence of psychosis  NEURO: Grossly intact, moving all extremiteis equal, face symmetric, alert and responsive, no obvious focal defecits   GAIT:Ambulates normally without difficulty  HEME: No bleeding, bruising, petechia, purpura   SKIN: No significant rashes on visibile skin, warm, no diaphoresis or pallor  Lab Results:       LABS:      Chemistry        Component Value Date/Time    K 3 7 2021 0432    K 4 6 2020 0805     2021 0432     2020 0805    CO2 34 (H) 2021 0432    CO2 30 2020 0805    BUN 27 (H) 2021 0432    BUN 24 2020 0805    CREATININE 0 83 2021 0432        Component Value Date/Time    CALCIUM 9 4 2021 0432    CALCIUM 9 2 2020 0805    ALKPHOS 71 2021 0633    AST 32 2021 0633    ALT 25 2021 0633            No results found for: CHOL  Lab Results   Component Value Date    HDL 55 10/27/2019     Lab Results   Component Value Date    LDLCALC 90 10/27/2019     Lab Results   Component Value Date    TRIG 93 10/27/2019     No results found for: CHOLHDL    IMAGING: No results found  Cardiac testing:   Results for orders placed during the hospital encounter of 21   Echo complete with contrast if indicated    Narrative [address] [facility]    [place], [address]  [phone]    Transthoracic Echocardiogram  2D, M-mode, Doppler, and Color Doppler    Study date:  2021    Patient: Monica Vargas  MR number: [id number]  Account number: [de-identified]  : 1955  Age: 76 years  Gender: Male  Status: Inpatient  Location: Bedside  Height: 69 in  Weight: 294 4 lb  BP: 129/ 60 mmHg    Indications: Edema  Heart failure  Diagnoses: I50 9 - Heart failure, unspecified    Sonographer:  JAM Wheat  Referring Physician:  Dane Lorenzana Do  Group:  [facility]  Interpreting Physician:  FORTINO Caputo     SUMMARY    PROCEDURE INFORMATION:  Intravenous contrast (  4ml of Definity) was administered  LEFT VENTRICLE:  Systolic function was normal by visual assessment  Ejection fraction was estimated to be 55 %  There were no regional wall motion abnormalities  Wall thickness was mildly increased  There was mild concentric hypertrophy  VENTRICULAR SEPTUM:  There was paradoxical motion  These changes are consistent with a conduction abnormality or paced rhythm  RIGHT VENTRICLE:  The ventricle was mildly dilated  LEFT ATRIUM:  The atrium was mildly dilated  LA volume index 40 mL/m2  RIGHT ATRIUM:  The atrium was mildly to moderately dilated  MITRAL VALVE:  There was trace regurgitation  TRICUSPID VALVE:  There was trace regurgitation  SUMMARY MEASUREMENTS  2D measurements:  Unspecified Anatomy:   %FS was 39 4 %  Ao Diam was 3 4 cm   EDV(Teich) was 129 3 ml   EF(Teich) was 69 5 %  ESV(Teich) was 39 4 ml  IVSd was 1 1 cm  LA Diam was 3 8 cm  LAAs A2C was 27 cm2  LAAs A4C was 26 8 cm2  LAESV A-L A2C was  98 5 ml  LAESV A-L A4C was 92 5 ml  LAESV Index (A-L) was 40 1 ml/m2  LAESV MOD A2C was 94 9 ml  LAESV MOD A4C was 85 4 ml  LAESV(A-L) was 97 7 ml  LAESV(MOD BP) was 91 9 ml  LAESVInd MOD BP was 37 7 ml/m2  LALs A2C was 6 3 cm  LALs A4C was 6 6 cm  LVEDV MOD A4C was 126 8 ml  LVEF MOD A4C was 52 7 %  LVESV MOD A4C was 60 ml  LVIDd was 5 2 cm  LVIDs was 3 2 cm  LVLd A4C was 8 1 cm  LVLs A4C was 7 1 cm  LVPWd was 1 cm  RAAs A4C was 25 8 cm2  RAESV A-L was  91 3 ml   RAESV MOD was 87 9 ml  RALs was 6 2 cm  SV MOD A4C was 66 9 ml   SV(Teich) was 89 9 ml   CW measurements:  Unspecified Anatomy:   AV Env  Ti was 283 9 ms   AV VTI was 27 9 cm    AV Vmax was 1 5 m/s  AV Vmean was 1 m/s  AV maxPG was 8 8 mmHg  AV meanPG was 4 5 mmHg  MM measurements:  Unspecified Anatomy:   TAPSE was 2 cm  PW measurements:  Unspecified Anatomy:   DVI was 0 7   LVOT Env  Ti was 279 7 ms  LVOT VTI was 20 8 cm  LVOT Vmax was 1 2 m/s  LVOT Vmean was 0 7 m/s  LVOT maxPG was 5 9 mmHg  LVOT meanPG was 2 6 mmHg  RV S' was 0 1 m/s  HISTORY: PRIOR HISTORY: lymphedema  Congestive heart failure  Pulmonary hypertension  Atrial fibrillation  Risk factors: hypertension and morbid obesity  PROCEDURE: The procedure was performed at the bedside  This was a routine study  The transthoracic approach was used  The study included complete 2D imaging, M-mode, complete spectral Doppler, and color Doppler  The heart rate was 90 bpm,  at the start of the study  Intravenous contrast (  4ml of Definity) was administered  Image quality was adequate  LEFT VENTRICLE: Size was normal  Systolic function was normal by visual assessment  Ejection fraction was estimated to be 55 %  There were no regional wall motion abnormalities  Wall thickness was mildly increased  There was mild  concentric hypertrophy  DOPPLER: The study was not technically sufficient to allow evaluation of LV diastolic function  VENTRICULAR SEPTUM: There was paradoxical motion  These changes are consistent with a conduction abnormality or paced rhythm  RIGHT VENTRICLE: The ventricle was mildly dilated  Systolic function was normal  Wall thickness was normal     LEFT ATRIUM: The atrium was mildly dilated  LA volume index 40 mL/m2  RIGHT ATRIUM: The atrium was mildly to moderately dilated  MITRAL VALVE: Valve structure was normal  There was normal leaflet separation  DOPPLER: The transmitral velocity was within the normal range  There was no evidence for stenosis  There was trace regurgitation  AORTIC VALVE: The valve was trileaflet   Leaflets exhibited mildly increased thickness, normal cuspal separation, and sclerosis  DOPPLER: Transaortic velocity was within the normal range  There was no evidence for stenosis  There was no  regurgitation  TRICUSPID VALVE: The valve structure was normal  There was normal leaflet separation  DOPPLER: The transtricuspid velocity was within the normal range  There was no evidence for stenosis  There was trace regurgitation  The tricuspid jet  envelope definition was inadequate for estimation of RV systolic pressure  PULMONIC VALVE: Not well visualized  DOPPLER: There was no significant regurgitation  PERICARDIUM: There was no pericardial effusion  The pericardium was normal in appearance  AORTA: The root exhibited normal size  SYSTEMIC VEINS: IVC: The inferior vena cava was normal in size and course  Respirophasic changes were normal     SYSTEM MEASUREMENT TABLES    2D  %FS: 39 4 %  Ao Diam: 3 4 cm  EDV(Teich): 129 3 ml  EF(Teich): 69 5 %  ESV(Teich): 39 4 ml  IVSd: 1 1 cm  LA Diam: 3 8 cm  LAAs A2C: 27 cm2  LAAs A4C: 26 8 cm2  LAESV A-L A2C: 98 5 ml  LAESV A-L A4C: 92 5 ml  LAESV Index (A-L): 40 1 ml/m2  LAESV MOD A2C: 94 9 ml  LAESV MOD A4C: 85 4 ml  LAESV(A-L): 97 7 ml  LAESV(MOD BP): 91 9 ml  LAESVInd MOD BP: 37 7 ml/m2  LALs A2C: 6 3 cm  LALs A4C: 6 6 cm  LVEDV MOD A4C: 126 8 ml  LVEF MOD A4C: 52 7 %  LVESV MOD A4C: 60 ml  LVIDd: 5 2 cm  LVIDs: 3 2 cm  LVLd A4C: 8 1 cm  LVLs A4C: 7 1 cm  LVPWd: 1 cm  RAAs A4C: 25 8 cm2  RAESV A-L: 91 3 ml  RAESV MOD: 87 9 ml  RALs: 6 2 cm  SV MOD A4C: 66 9 ml  SV(Teich): 89 9 ml    CW  AV Env  Ti: 283 9 ms  AV VTI: 27 9 cm  AV Vmax: 1 5 m/s  AV Vmean: 1 m/s  AV maxP 8 mmHg  AV meanP 5 mmHg    MM  TAPSE: 2 cm    PW  DVI: 0 7  LVOT Env  Ti: 279 7 ms  LVOT VTI: 20 8 cm  LVOT Vmax: 1 2 m/s  LVOT Vmean: 0 7 m/s  LVOT maxP 9 mmHg  LVOT meanP 6 mmHg  RV S': 0 1 m/s    [facility]    Prepared and electronically signed by    Sherren Reasoner, D O    Signed 2021 13:29:34 No results found for this or any previous visit  No results found for this or any previous visit  No results found for this or any previous visit          I reviewed and interpreted the following LABS/EKG/TELE/IMAGING and below is summary of my interpretation (if data available):    LABS:normal renal function    Current EKG and Rhythm Strip:afib, Rbbb, rate controlled 56bpm    Past EKGs and RHYTHM strip: Jan 2021 afib 80-90bpm  ECHO images reviewed and this is my interpretation: Reviewed images, EF normal, LVH, mild LAE, no sigfnicant valve diseaes

## 2021-04-21 ENCOUNTER — TELEPHONE (OUTPATIENT)
Dept: CARDIOLOGY CLINIC | Facility: CLINIC | Age: 66
End: 2021-04-21

## 2021-04-21 DIAGNOSIS — I48.11 LONGSTANDING PERSISTENT ATRIAL FIBRILLATION (HCC): Primary | ICD-10-CM

## 2021-04-21 NOTE — TELEPHONE ENCOUNTER
----- Message from Shalini Thomason MD sent at 4/16/2021  3:38 PM EDT -----  Please schedule DCCV in 3 weeks (starting amiodarone first)

## 2021-04-21 NOTE — TELEPHONE ENCOUNTER
COVID Pre-Visit Screening     1  Is this a family member screening? Yes  2  Have you traveled outside of your state in the past 2 weeks? No  3  Do you presently have a fever or flu-like symptoms? No  4  Do you have symptoms of an upper respiratory infection like runny nose, sore throat, or cough? No  5  Are you suffering from new headache that you have not had in the past?  No  6  Do you have/have you experienced any new shortness of breath recently? No  7  Do you have any new diarrhea, nausea or vomiting? No  8  Have you been in contact with anyone who has been sick or diagnosed with COVID-19? No  9  Do you have any new loss of taste or smell? No  10  Are you able to wear a mask without a valve for the entire visit? Yes     Patient schedule DCCV  At [facility] on 5/6/21 with  Dr Jose Dunaway  Patient aware of general instructions, mediation holds (spironolactone and Torsemide) and labs require    Please mary can you check for insurance approval

## 2021-04-24 NOTE — PROGRESS NOTES
PT Discharge    Today's date: 3/16/2021  Patient name: Abdias Acevedo  : 1955  MRN: [id number]  Referring provider: Trupti Mendez MD  Dx:   Encounter Diagnosis     ICD-10-CM    1  Lymphedema  I89 0    2  Weakness of both lower extremities  R29 898        Start Time: 0900  Stop Time: 1000  Total time in clinic (min): 60 minutes    Assessment  Assessment details: Patient discharged to self maintenance  Follow up scheduled  Understanding of Dx/Px/POC: good   Prognosis: good    Goals  STGs to be achieved in 2 - 4 weeks - MET as listed  Demonstrate at least 100% compliance with self MLD   Demonstrate at least 100% compliance with self compression   Demonstrate at least 100% compliance with self skin and nail inspection   Free from s/s of infection   Demonstrate understanding of importance of compliance with POC     LTGs to be achieved in 4 - 6 weeks - MET as listed, ongoing as listed  1  Pt will be I with HEP in order to continue to improve quality of life and independence and reduce risk for re-injury  MET  2  Pt to demonstrate return to community ambulation without limitations or restrictions  MET  3  Pt to demonstrate improved function as noted by achieving or exceeding predicted score on FOTO outcomes assessment tool  MET  4  Pt to demonstrate increased MMT of bilateral LEs by at least 1/2-1 grade in order to improve safety and stability with ADLs and functional mobility  MET      Plan  Duration in visits: 1  Duration in weeks: 1  Plan of Care beginning date: 2021  Plan of Care expiration date: 2021        Subjective Evaluation    History of Present Illness  Mechanism of injury: UPDATE:  Reports feeling much better, more active  Better compliance with compression  Patient with ongoing limitation in LE strength and endurance  Has not returned to HEP  Patient reports bilateral foot swelling, saw podiatrist and was referred to PCP  Patient saw PCP and had bilateral foot swelling    Also with increased thigh edema since last episode of care  She increased diuretic and patient has follow up upcoming  He presents with ongoing pain in feet, however, improved since last episode of care with addition of gabapentin and tramadol  Of note:  Patient has started with diet in the last few weeks to include more fruit and vegetables, intermittent protein bars  Limiting salt intake  Quality of life: good    Pain  Current pain ratin  At best pain ratin  At worst pain ratin  Location: Bilateral feet  Quality: burning  Relieving factors: medications  Aggravating factors: standing, walking and stair climbing  Progression: improved    Treatments  Previous treatment: physical therapy  Current treatment: medication and physical therapy  Patient Goals  Patient goals for therapy: decreased edema, increased strength and independence with ADLs/IADLs          Objective  Lymphedema Evaluation    Medical Considerations:  POS Cardiac - following with new cardiologist 2/2 JUAN CHU-miroslava, newly adjusted meds for CHF    NEG Pulmonary  NEG Kidney  NEG Liver  NEG Current Fever/Infection  NEG Current/Recent Wounds  NEG Current/Recent Treatments/Interventions/Port Access/PICC Line  NEG Current/Recent/History of DVT or Clotting issues    ROM Considerations:  ROM WFL, h/o THR    Strength Considerations:  Grossly WFL bilateral LEs    Gait Considerations:  WFL bilateral LEs    Skin Considerations/Scars:  Patient presents with skin inspection as follows:  Hemosiderin staining/skin discoloration - POS  Finger/Toe Nails - NEG  Open Wounds - NEG  S/S infection - NEG  Firm/Sponge/Hard - POS  [place] - NEG  Weeping - Resolved    ROM Considerations:  Hip flexion to 90 degrees  Hip ABd to 45 degrees  Heel cords to neutral    Strength Considerations:  Hip flexion - 3/5  Hip ABd - 3/5    Girth:  LE girth measurements (cm) - at present      Right           Left   Forefoot       24  25 Metatarsals             25  27  Malleolus   27 5  28  +4 cm               29  28  +8 cm    32  28 5  +12 cm               36  34 5                          +16 cm    43  41  +20 cm                         49  45 5     +24 cm    50  49  +28 cm                                   47  47 5  +32 cm    56  48    Compression measurements for knee length gina Pacheco from [facility] [phone]

## 2021-05-04 ENCOUNTER — APPOINTMENT (OUTPATIENT)
Dept: LAB | Facility: HOSPITAL | Age: 66
End: 2021-05-04
Payer: COMMERCIAL

## 2021-05-04 DIAGNOSIS — I50.33 ACUTE ON CHRONIC DIASTOLIC HEART FAILURE (HCC): ICD-10-CM

## 2021-05-04 DIAGNOSIS — I48.11 LONGSTANDING PERSISTENT ATRIAL FIBRILLATION (HCC): Primary | ICD-10-CM

## 2021-05-04 LAB
ALBUMIN SERPL BCP-MCNC: 4.4 G/DL (ref 3.5–5)
ALP SERPL-CCNC: 67 U/L (ref 46–116)
ALT SERPL W P-5'-P-CCNC: 26 U/L (ref 12–78)
ANION GAP SERPL CALCULATED.3IONS-SCNC: 9 MMOL/L (ref 4–13)
AST SERPL W P-5'-P-CCNC: 27 U/L (ref 5–45)
BASOPHILS # BLD AUTO: 0.03 THOUSANDS/ΜL (ref 0–0.1)
BASOPHILS NFR BLD AUTO: 1 % (ref 0–1)
BILIRUB SERPL-MCNC: 0.76 MG/DL (ref 0.2–1)
BUN SERPL-MCNC: 69 MG/DL (ref 5–25)
CALCIUM SERPL-MCNC: 9.8 MG/DL (ref 8.3–10.1)
CHLORIDE SERPL-SCNC: 96 MMOL/L (ref 100–108)
CO2 SERPL-SCNC: 30 MMOL/L (ref 21–32)
CREAT SERPL-MCNC: 1.94 MG/DL (ref 0.6–1.3)
EOSINOPHIL # BLD AUTO: 0.05 THOUSAND/ΜL (ref 0–0.61)
EOSINOPHIL NFR BLD AUTO: 1 % (ref 0–6)
ERYTHROCYTE [DISTWIDTH] IN BLOOD BY AUTOMATED COUNT: 14.6 % (ref 11.6–15.1)
GFR SERPL CREATININE-BSD FRML MDRD: 35 ML/MIN/1.73SQ M
GLUCOSE P FAST SERPL-MCNC: 99 MG/DL (ref 65–99)
HCT VFR BLD AUTO: 41.2 % (ref 36.5–49.3)
HGB BLD-MCNC: 13.9 G/DL (ref 12–17)
IMM GRANULOCYTES # BLD AUTO: 0.01 THOUSAND/UL (ref 0–0.2)
IMM GRANULOCYTES NFR BLD AUTO: 0 % (ref 0–2)
LYMPHOCYTES # BLD AUTO: 1.14 THOUSANDS/ΜL (ref 0.6–4.47)
LYMPHOCYTES NFR BLD AUTO: 21 % (ref 14–44)
MAGNESIUM SERPL-MCNC: 3.1 MG/DL (ref 1.6–2.6)
MCH RBC QN AUTO: 30.8 PG (ref 26.8–34.3)
MCHC RBC AUTO-ENTMCNC: 33.7 G/DL (ref 31.4–37.4)
MCV RBC AUTO: 91 FL (ref 82–98)
MONOCYTES # BLD AUTO: 0.49 THOUSAND/ΜL (ref 0.17–1.22)
MONOCYTES NFR BLD AUTO: 9 % (ref 4–12)
NEUTROPHILS # BLD AUTO: 3.69 THOUSANDS/ΜL (ref 1.85–7.62)
NEUTS SEG NFR BLD AUTO: 68 % (ref 43–75)
NRBC BLD AUTO-RTO: 0 /100 WBCS
NT-PROBNP SERPL-MCNC: 1185 PG/ML
PLATELET # BLD AUTO: 171 THOUSANDS/UL (ref 149–390)
PMV BLD AUTO: 10.8 FL (ref 8.9–12.7)
POTASSIUM SERPL-SCNC: 5.1 MMOL/L (ref 3.5–5.3)
PROT SERPL-MCNC: 9.2 G/DL (ref 6.4–8.2)
RBC # BLD AUTO: 4.52 MILLION/UL (ref 3.88–5.62)
SODIUM SERPL-SCNC: 135 MMOL/L (ref 136–145)
WBC # BLD AUTO: 5.41 THOUSAND/UL (ref 4.31–10.16)

## 2021-05-04 PROCEDURE — 83735 ASSAY OF MAGNESIUM: CPT | Performed by: INTERNAL MEDICINE

## 2021-05-04 PROCEDURE — 36415 COLL VENOUS BLD VENIPUNCTURE: CPT | Performed by: INTERNAL MEDICINE

## 2021-05-04 PROCEDURE — 83880 ASSAY OF NATRIURETIC PEPTIDE: CPT

## 2021-05-04 PROCEDURE — 85025 COMPLETE CBC W/AUTO DIFF WBC: CPT

## 2021-05-04 PROCEDURE — 80053 COMPREHEN METABOLIC PANEL: CPT

## 2021-05-05 ENCOUNTER — TELEPHONE (OUTPATIENT)
Dept: SURGERY | Facility: HOSPITAL | Age: 66
End: 2021-05-05

## 2021-05-05 RX ORDER — SODIUM CHLORIDE 9 MG/ML
100 INJECTION, SOLUTION INTRAVENOUS ONCE
Status: CANCELLED | OUTPATIENT
Start: 2021-05-06 | End: 2021-05-06

## 2021-05-06 ENCOUNTER — HOSPITAL ENCOUNTER (OUTPATIENT)
Dept: NON INVASIVE DIAGNOSTICS | Facility: HOSPITAL | Age: 66
Discharge: HOME | End: 2021-05-06
Attending: INTERNAL MEDICINE | Admitting: INTERNAL MEDICINE
Payer: COMMERCIAL

## 2021-05-06 ENCOUNTER — ANESTHESIA (OUTPATIENT)
Dept: NON INVASIVE DIAGNOSTICS | Facility: HOSPITAL | Age: 66
End: 2021-05-06

## 2021-05-06 ENCOUNTER — ANESTHESIA EVENT (OUTPATIENT)
Dept: NON INVASIVE DIAGNOSTICS | Facility: HOSPITAL | Age: 66
End: 2021-05-06

## 2021-05-06 VITALS
TEMPERATURE: 98.2 F | WEIGHT: 283 LBS | RESPIRATION RATE: 16 BRPM | SYSTOLIC BLOOD PRESSURE: 103 MMHG | HEIGHT: 69 IN | BODY MASS INDEX: 41.92 KG/M2 | OXYGEN SATURATION: 99 % | DIASTOLIC BLOOD PRESSURE: 55 MMHG | HEART RATE: 43 BPM

## 2021-05-06 DIAGNOSIS — I48.11 LONGSTANDING PERSISTENT ATRIAL FIBRILLATION (HCC): ICD-10-CM

## 2021-05-06 DIAGNOSIS — I50.33 ACUTE ON CHRONIC DIASTOLIC CONGESTIVE HEART FAILURE (HCC): ICD-10-CM

## 2021-05-06 PROBLEM — Z01.89 ENCOUNTER FOR CARDIOVERSION PROCEDURE: Status: ACTIVE | Noted: 2021-05-06

## 2021-05-06 LAB
ATRIAL RATE: 208 BPM
ATRIAL RATE: 45 BPM
P AXIS: 33 DEGREES
PR INTERVAL: 218 MS
QRS AXIS: -45 DEGREES
QRS AXIS: -58 DEGREES
QRSD INTERVAL: 168 MS
QRSD INTERVAL: 174 MS
QT INTERVAL: 448 MS
QT INTERVAL: 514 MS
QTC INTERVAL: 444 MS
QTC INTERVAL: 454 MS
T WAVE AXIS: -4 DEGREES
T WAVE AXIS: -7 DEGREES
VENTRICULAR RATE: 45 BPM
VENTRICULAR RATE: 62 BPM

## 2021-05-06 PROCEDURE — 93005 ELECTROCARDIOGRAM TRACING: CPT

## 2021-05-06 PROCEDURE — 93010 ELECTROCARDIOGRAM REPORT: CPT | Performed by: INTERNAL MEDICINE

## 2021-05-06 PROCEDURE — 92960 CARDIOVERSION ELECTRIC EXT: CPT

## 2021-05-06 PROCEDURE — 92960 CARDIOVERSION ELECTRIC EXT: CPT | Performed by: INTERNAL MEDICINE

## 2021-05-06 RX ORDER — ONDANSETRON 2 MG/ML
4 INJECTION INTRAMUSCULAR; INTRAVENOUS ONCE AS NEEDED
Status: DISCONTINUED | OUTPATIENT
Start: 2021-05-06 | End: 2021-05-06 | Stop reason: HOSPADM

## 2021-05-06 RX ORDER — PROPOFOL 10 MG/ML
INJECTION, EMULSION INTRAVENOUS AS NEEDED
Status: DISCONTINUED | OUTPATIENT
Start: 2021-05-06 | End: 2021-05-06

## 2021-05-06 RX ORDER — ATROPINE SULFATE 1 MG/ML
INJECTION, SOLUTION INTRAMUSCULAR; INTRAVENOUS; SUBCUTANEOUS AS NEEDED
Status: DISCONTINUED | OUTPATIENT
Start: 2021-05-06 | End: 2021-05-06

## 2021-05-06 RX ORDER — EPHEDRINE SULFATE 50 MG/ML
INJECTION INTRAVENOUS AS NEEDED
Status: DISCONTINUED | OUTPATIENT
Start: 2021-05-06 | End: 2021-05-06

## 2021-05-06 RX ORDER — FENTANYL CITRATE/PF 50 MCG/ML
25 SYRINGE (ML) INJECTION
Status: DISCONTINUED | OUTPATIENT
Start: 2021-05-06 | End: 2021-05-06 | Stop reason: HOSPADM

## 2021-05-06 RX ORDER — SODIUM CHLORIDE 9 MG/ML
INJECTION, SOLUTION INTRAVENOUS CONTINUOUS PRN
Status: DISCONTINUED | OUTPATIENT
Start: 2021-05-06 | End: 2021-05-06

## 2021-05-06 RX ORDER — SODIUM CHLORIDE 9 MG/ML
100 INJECTION, SOLUTION INTRAVENOUS ONCE
Status: COMPLETED | OUTPATIENT
Start: 2021-05-06 | End: 2021-05-06

## 2021-05-06 RX ORDER — METOPROLOL SUCCINATE 25 MG/1
50 TABLET, EXTENDED RELEASE ORAL 2 TIMES DAILY
Qty: 60 TABLET | Refills: 1 | Status: SHIPPED | OUTPATIENT
Start: 2021-05-06 | End: 2021-06-03 | Stop reason: HOSPADM

## 2021-05-06 RX ORDER — AMIODARONE HYDROCHLORIDE 200 MG/1
200 TABLET ORAL DAILY
Qty: 90 TABLET | Refills: 0 | Status: SHIPPED | OUTPATIENT
Start: 2021-05-06 | End: 2021-06-15

## 2021-05-06 RX ADMIN — ATROPINE SULFATE 0.25 MG: 1 INJECTION, SOLUTION INTRAMUSCULAR; INTRAVENOUS; SUBCUTANEOUS at 11:30

## 2021-05-06 RX ADMIN — SODIUM CHLORIDE 100 ML/HR: 0.9 INJECTION, SOLUTION INTRAVENOUS at 11:02

## 2021-05-06 RX ADMIN — ATROPINE SULFATE 0.25 MG: 1 INJECTION, SOLUTION INTRAMUSCULAR; INTRAVENOUS; SUBCUTANEOUS at 11:28

## 2021-05-06 RX ADMIN — PROPOFOL 50 MG: 10 INJECTION, EMULSION INTRAVENOUS at 11:27

## 2021-05-06 RX ADMIN — PROPOFOL 100 MG: 10 INJECTION, EMULSION INTRAVENOUS at 11:25

## 2021-05-06 RX ADMIN — SODIUM CHLORIDE: 0.9 INJECTION, SOLUTION INTRAVENOUS at 11:10

## 2021-05-06 RX ADMIN — EPHEDRINE SULFATE 10 MG: 50 INJECTION, SOLUTION INTRAVENOUS at 11:30

## 2021-05-06 NOTE — DISCHARGE INSTRUCTIONS
Cardioversion   WHAT YOU SHOULD KNOW:   Cardioversion is a procedure to correct arrhythmias, which is when your heart beats too fast or irregularly  Arrhythmias may prevent your body from getting the blood and oxygen it needs  Cardioversion delivers a shock of electricity to your heart to help it return to its normal rhythm  INSTRUCTIONS:   Medicines:   · Anticoagulants    are a type of blood thinner medicine that helps prevent clots  Clots can cause strokes, heart attacks, and death  These medicines may cause you to bleed or bruise more easily  ¨ Watch for bleeding from your gums or nose  Watch for blood in your urine and bowel movements  Use a soft washcloth and a soft toothbrush  If you shave, use an electric razor  Avoid activities that can cause bruising or bleeding  ¨ Tell your healthcare provider about all medicines you take because many medicines cannot be used with anticoagulants  Do not start or stop any medicines unless your healthcare provider tells you to  Tell your dentist and other healthcare providers that you take anticoagulants  Wear a bracelet or necklace that says you take this medicine  ¨ You will need regular blood tests so your healthcare provider can decide how much medicine you need  Take anticoagulants exactly as directed  Tell your healthcare provider right away if you forget to take the medicine, or if you take too much  ¨ If you take warfarin, some foods can change how your blood clots  Do not make major changes to your diet while you take warfarin  Warfarin works best when you eat about the same amount of vitamin K every day  Vitamin K is found in green leafy vegetables, broccoli, grapes, and other foods  Ask for more information about what to eat when you take warfarin  · Heart medicine: This medicine is given to strengthen or regulate your heartbeat  · Take your medicine as directed    Call your healthcare provider if you think your medicine is not helping or if you have side effects  Tell him if you are allergic to any medicine  Keep a list of the medicines, vitamins, and herbs you take  Include the amounts, and when and why you take them  Bring the list or the pill bottles to follow-up visits  Carry your medicine list with you in case of an emergency  Follow up with your cardiologist as directed:  Write down your questions so you remember to ask them during your visits  Contact your cardiologist if:   · You have a fever  · You have new or worsening weakness or tiredness  · You have questions or concerns about your condition or care  Return to the emergency department if:   · You feel like your heart is fluttering or jumping in your chest     · You feel lightheaded or you have fainted  · You have chest pain when you take a deep breath or cough  You may cough up blood  · You have discomfort in your chest that feels like squeezing, pressure, fullness, or pain  · You have pain or discomfort in your back, neck, jaw, stomach, or arm  · You have weakness or numbness in part of your body  · You have sudden trouble breathing  · You become confused or have difficulty speaking  · You have dizziness, a severe headache, or vision loss  © 2014 [address] is for End User's use only and may not be sold, redistributed or otherwise used for commercial purposes  All illustrations and images included in CareNotes® are the copyrighted property of [employer]  or [employer]  The above information is an  only  It is not intended as medical advice for individual conditions or treatments  Talk to your doctor, nurse or pharmacist before following any medical regimen to see if it is safe and effective for you  Heart Failure, Ambulatory Care   GENERAL INFORMATION:   Heart failure  happens when your heart has become too weak to pump enough blood to your organs and tissues   Heart failure is often the result of damage or injury to your heart caused by other heart problems and high blood pressure  Heart failure is a long-term condition that tends to get worse over time  Common symptoms include the following:   · Shortness of breath     · Fatigue or lack of energy (often worsened by physical activity)     · Swelling in your ankles, legs, or abdomen    · Coughing or wheezing     · Recent weight gain or weight loss     · Confusion or poor ability to concentrate  Seek immediate care for the following symptoms:   · Squeezing, pressure, or pain in your chest that lasts longer than 5 minutes or returns    · Discomfort or pain in your back, neck, jaw, stomach, or arm     · Trouble breathing    · Nausea or vomiting    · Lightheadedness or a sudden cold sweat, especially with chest pain or trouble breathing    · Gaining 3 or more pounds (1 4 kg) in a day (or more than your healthcare provider says you should)    · Heartbeat that is fast, slow, or uneven all the time  Treatment for heart failure  Treatment for heart failure may include any of the following:  · Heart medicines  help regulate your heart rhythm, lower your blood pressure, and get rid of extra fluids  · Antiplatelets , such as aspirin, help prevent blood clots  Take your antiplatelet medicine exactly as directed  These medicines make it more likely for you to bleed or bruise  If you are told to take aspirin, do not take acetaminophen or ibuprofen instead  · Anticoagulants    are a type of blood thinner medicine that helps prevent clots  Clots can cause strokes, heart attacks, and death  These medicines may cause you to bleed or bruise more easily  ¨ Watch for bleeding from your gums or nose  Watch for blood in your urine and bowel movements  Use a soft washcloth and a soft toothbrush  If you shave, use an electric razor  Avoid activities that can cause bruising or bleeding      ¨ Tell your healthcare provider about all medicines you take because many medicines cannot be used with anticoagulants  Do not start or stop any medicines unless your healthcare provider tells you to  Tell your dentist and other healthcare providers that you take anticoagulants  Wear a bracelet or necklace that says you take this medicine  ¨ You will need regular blood tests so your healthcare provider can decide how much medicine you need  Take anticoagulants exactly as directed  Tell your healthcare provider right away if you forget to take the medicine, or if you take too much  ¨ If you take warfarin, some foods can change how your blood clots  Do not make major changes to your diet while you take warfarin  Warfarin works best when you eat about the same amount of vitamin K every day  Vitamin K is found in green leafy vegetables, broccoli, grapes, and other foods  Ask for more information about what to eat when you take warfarin  · Cardiac rehab  is a program run by specialists who will help you safely strengthen your heart  The program includes exercise, relaxation, stress management, and heart-healthy nutrition  · Oxygen  may help you breathe easier if your oxygen level is lower than normal  A CPAP machine may be used to keep your airway open while you sleep  · Surgery  can be done to implant a pacemaker in your chest to regulate your heart rhythm  Other types of surgery can open blocked heart vessels, replace a damaged heart valve, or remove scar tissue  Manage heart failure:   · Weigh yourself every morning  using the same scale, in the same spot  Do this after you use the bathroom, but before you eat or drink anything  Wear the same type of clothing  Do not wear shoes  Record your weight each day so you will notice any sudden weight gain  Swelling and weight gain are signs of fluid retention  If you are overweight, ask your healthcare provider how to lose weight safely  · Check your blood pressure and heart rate every day    Ask for more information about how to measure your blood pressure and heart rate correctly  Ask what these numbers should be for you  Check your blood sugar as directed if you have diabetes  You will have fewer symptoms if you manage other health conditions such as high blood pressure, COPD, or diabetes  · Get a flu shot every year  You should also get a pneumonia vaccine  Vaccines protect you from these infections, which can be severe for those with heart failure  · Limit the amount of liquids you drink if you retain fluid  Ask your healthcare provider how much liquid to drink each day and which liquids are best for you  Follow up with your healthcare provider as directed:  Write down your questions so you remember to ask them during your visits  CARE AGREEMENT:   You have the right to help plan your care  Learn about your health condition and how it may be treated  Discuss treatment options with your caregivers to decide what care you want to receive  You always have the right to refuse treatment  The above information is an  only  It is not intended as medical advice for individual conditions or treatments  Talk to your doctor, nurse or pharmacist before following any medical regimen to see if it is safe and effective for you  © 2014 [address] is for End User's use only and may not be sold, redistributed or otherwise used for commercial purposes  All illustrations and images included in CareNotes® are the copyrighted property of [employer]  or [employer]

## 2021-05-06 NOTE — PROCEDURES
Electrical Cardioversion    Date/Time: 5/6/2021 11:34 AM  Performed by: Hans Sr MD  Authorized by: Hans Sr MD     Verbal consent obtained?: No    Written consent obtained?: Yes    Risks and benefits: Risks, benefits and alternatives were discussed    Consent given by:  Patient  Patient states understanding of procedure being performed: Yes    Patient's understanding of procedure matches consent: Yes    Procedure consent matches procedure scheduled: Yes    Relevant documents present and verified: Yes    Test results available and properly labeled: Yes    Site marked: Yes    Time out: Immediately prior to the procedure a time out was called    Patient sedated: Yes    Sedation:  Propofol  Cardioversion basis:  Elective  Pre-procedure rhythm:  Atrial fibrillation  Position: Patient was placed in a supine position    Chest area exposed: Chest area was exposed    Electrodes:  Pads  Electrodes placed:  Anterior-posterior  Number of attempts:  2  Attempt 1:     Attempt 1 mode:  Synchronous    Attempt 1 waveform:  Biphasic    Attempt 1 shock (Joules):  300    Attempt 1 outcome:  No change in rhythm  Attempt 2:     Attempt 2 mode:  Synchronous    Attempt 2 waveform:  Biphasic    Attempt 2 shock (Joules):  360    Attempt 2 outcome:  Conversion to normal sinus rhythm  Post-procedure rhythm:  Normal sinus rhythm  Complications: hypotension     Patient had transient bradycardia with heart rate in high 30s to low 40s immediately post cardioversion  Was given atropine 0 25 mg x2  Had transient hypotension and was given ephedrine x1  ECG was performed and showed sinus bradycardia with heart rate 45 beats per minute with first-degree AV block and right bundle-branch block with repolarization abnormalities  Patient normal immediate recovery from anesthesia  Is being transferred to PACU and will be sent to same-day procedure unit subsequently    If his blood pressure and heart rate are stable and heart rates are greater than 50 then we will discharge him  We will send him on amiodarone at 200 mg once daily and metoprolol succinate at 25 mg once daily (he was previously taking 50 mg twice daily)  Patient will follow-up with his electrophysiologist Dr Karen Marcum upon discharge      Hans Sr MD        Post cardioversion ECG

## 2021-05-06 NOTE — ANESTHESIA PREPROCEDURE EVALUATION
Procedure:  CARDIOVERSION (NON INVASIVE ONLY)    Relevant Problems   CARDIO   (+) Atrial fibrillation (HCC)      HEMATOLOGY   (+) Thrombocytopenia (HCC)      NEURO/PSYCH   (+) Paresthesias      PULMONARY   (+) Obstructive sleep apnea on CPAP   LEFT VENTRICLE:  Systolic function was normal by visual assessment  Ejection fraction was estimated to be 55 %  There were no regional wall motion abnormalities  Wall thickness was mildly increased  There was mild concentric hypertrophy     Physical Exam    Airway    Mallampati score: III  TM Distance: >3 FB  Neck ROM: full     Dental   upper dentures and lower dentures,     Cardiovascular  Rhythm: irregular, Rate: normal,     Pulmonary  Breath sounds clear to auscultation,     Other Findings        Anesthesia Plan  ASA Score- 3     Anesthesia Type- IV sedation with anesthesia with ASA Monitors  Additional Monitors:   Airway Plan:           Plan Factors-Exercise tolerance (METS): <4 METS  Chart reviewed  EKG reviewed  Existing labs reviewed  Patient summary reviewed  Patient is not a current smoker  Induction- intravenous  Postoperative Plan-     Informed Consent- Anesthetic plan and risks discussed with patient and spouse

## 2021-05-06 NOTE — INTERVAL H&P NOTE
Update: (This section must be completed if the H&P was completed greater than 24 hrs to procedure or admission)    Patient re-evaluated  Accept as history and physical   Patient remains in atrial fibrillation rhythm  Has been on anticoagulation consistently  Has been loaded with amiodarone  Informed consent obtained for cardioversion procedure      Hans Sr MD/May 6, 2021/11:20 AM

## 2021-05-06 NOTE — ANESTHESIA POSTPROCEDURE EVALUATION
Post-Op Assessment Note    CV Status:  Stable  Pain Score: 0    Pain management: adequate     Mental Status:  Alert and awake   Hydration Status:  Euvolemic and stable   PONV Controlled:  Controlled   Airway Patency:  Patent      Post Op Vitals Reviewed: Yes      Staff: Anesthesiologist         No complications documented      BP      Temp      Pulse    Resp      SpO2

## 2021-05-11 ENCOUNTER — APPOINTMENT (OUTPATIENT)
Dept: LAB | Facility: HOSPITAL | Age: 66
End: 2021-05-11
Payer: COMMERCIAL

## 2021-05-11 DIAGNOSIS — I48.11 LONGSTANDING PERSISTENT ATRIAL FIBRILLATION (HCC): ICD-10-CM

## 2021-05-11 LAB
ANION GAP SERPL CALCULATED.3IONS-SCNC: 8 MMOL/L (ref 4–13)
BUN SERPL-MCNC: 34 MG/DL (ref 5–25)
CALCIUM SERPL-MCNC: 9.6 MG/DL (ref 8.3–10.1)
CHLORIDE SERPL-SCNC: 101 MMOL/L (ref 100–108)
CO2 SERPL-SCNC: 26 MMOL/L (ref 21–32)
CREAT SERPL-MCNC: 1.17 MG/DL (ref 0.6–1.3)
GFR SERPL CREATININE-BSD FRML MDRD: 65 ML/MIN/1.73SQ M
GLUCOSE SERPL-MCNC: 91 MG/DL (ref 65–140)
POTASSIUM SERPL-SCNC: 4.8 MMOL/L (ref 3.5–5.3)
SODIUM SERPL-SCNC: 135 MMOL/L (ref 136–145)

## 2021-05-11 PROCEDURE — 80048 BASIC METABOLIC PNL TOTAL CA: CPT

## 2021-05-11 PROCEDURE — 36415 COLL VENOUS BLD VENIPUNCTURE: CPT

## 2021-05-13 ENCOUNTER — OFFICE VISIT (OUTPATIENT)
Dept: CARDIOLOGY CLINIC | Facility: CLINIC | Age: 66
End: 2021-05-13
Payer: COMMERCIAL

## 2021-05-13 VITALS
WEIGHT: 290.5 LBS | HEART RATE: 60 BPM | BODY MASS INDEX: 43.03 KG/M2 | HEIGHT: 69 IN | OXYGEN SATURATION: 96 % | SYSTOLIC BLOOD PRESSURE: 104 MMHG | DIASTOLIC BLOOD PRESSURE: 70 MMHG

## 2021-05-13 DIAGNOSIS — I48.11 LONGSTANDING PERSISTENT ATRIAL FIBRILLATION (HCC): Primary | ICD-10-CM

## 2021-05-13 PROCEDURE — 93000 ELECTROCARDIOGRAM COMPLETE: CPT | Performed by: INTERNAL MEDICINE

## 2021-05-13 PROCEDURE — 99215 OFFICE O/P EST HI 40 MIN: CPT | Performed by: INTERNAL MEDICINE

## 2021-05-13 NOTE — PROGRESS NOTES
HEART AND VASCULAR  CARDIAC [address]    Outpatient  Follow-up  Today's Date: 05/13/21        Patient name: Sheyla Morales  YOB: 1955  Sex: male         Chief Complaint: f/u afib      ASSESSMENT:  Problem List Items Addressed This Visit        Cardiovascular and Mediastinum    Atrial fibrillation ([address] ) - Primary    Relevant Orders    POCT ECG          77 yo male  1) Persistent afib- long term, had DCCV last week, back in afib already, but he did feel less SOB after DCCV when walking dog  He is on amiodarone  He has failed prior DCCV before as well  Seen Dr Yovani Tejada who recommended weight loss and CPAP, which he has done   [id number] on xarelto   2) chronic diastolic chf, EF 52% and pulm HTN >50mmHg, neg nuclear stress -   3) Severe moribd obesity  4) Weeping lower extremity edema   5) HTN well controlled  6) ROBERT on CPAP  7) RBBB    PLAN:  1  Long discussion about ablation  He did feel noticeably less Short of breath after DCCV, he has lost 12 pounds recently and using CPAP  He wishes to try to for NSR  LA was 3 8cm  I estimate with antiarrhythmic and ablation 70% chance of NSR and was honest with him about recurrence and need for repeat ablations and explained risk of bleeding around heart, vasculature, damage to esphagus/lungs/stroke etc      He wishes to proceed with ablation for afib and atrial flutter ablation (in atypical flutter today on amiodarone)  2  Reduce amiodarone 100mg daily as he had sinus bradycaria 45bpm after DCCV  Orders Placed This Encounter   Procedures    POCT ECG     There are no discontinued medications    HPI/Subjective:         77 yo male  1) Persistent afib- long term, had DCCV last week, back in afib already, but he did feel less SOB after DCCV when walking dog  He is on amiodarone     He has failed prior DCCV before as well  Seen Dr Scherrie Fabry who recommended weight loss and CPAP, which he has done   [id number] on xarelto   2) chronic diastolic chf, EF 23% and pulm HTN >50mmHg, neg nuclear stress -   3) Severe moribd obesity  4) Weeping lower extremity edema   5) HTN well controlled  6) ROBERT on CPAP  7) RBBB      Had DCCV Last week, was sinus bradycardia 45bpm so reduced metoprolol to 1x per day  He is in atrial flutter today, unclear when recurred  HE did notice less shortness of breath when walking dog after DCCV  Please note HPI is listed by problem with with update following it, it is copied again in the assessment above and reflects medical decision making as well  Complete 12 point ROS reviewed and otherwise non pertinent or negative except as per HPI pertinent positives in Cardiovascular and Respiratory emphasized  Please see paper chart for outpatient clinic patients where the patient completed the 12 point ROS survey  Past Medical History:   Diagnosis Date    A-fib (HCC)     Arthritis     CPAP (continuous positive airway pressure) dependence     Irregular heart beat     Lymphedema     Sleep apnea     Urinary frequency     Wears glasses     Wears partial dentures     lower partial       Allergies   Allergen Reactions    Penicillins Anaphylaxis    Sulfa Antibiotics Anaphylaxis     I reviewed the Home Medication list and Allergies in the chart  Scheduled Meds:  Current Outpatient Medications   Medication Sig Dispense Refill    allopurinol (ZYLOPRIM) 100 mg tablet Take 1 tablet (100 mg total) by mouth daily 14 tablet 0    amiodarone 200 mg tablet Take 1 tablet (200 mg total) by mouth daily Take 200mg TID for 2 weeks   After 2 weeks take 200mg daily 90 tablet 0    ammonium lactate (AMLACTIN) 12 % lotion Apply topically 2 (two) times a day as needed for dry skin      CoenzymeQ10-Isoleucine-Glycine (CO Q-10) 100-50-25 MG TB24 Co Q-10      ferrous sulfate 325 (65 Fe) mg tablet Take 325 mg by mouth every other day      GARCINIA CAMBOGIA-CHROMIUM PO Take 750 mg by mouth 2 (two) times a day       Glucosamine-Chondroit-Vit C-Mn (Glucosamine 1500 Complex) CAPS Take by mouth      metoprolol succinate (TOPROL-XL) 25 mg 24 hr tablet Take 2 tablets (50 mg total) by mouth 2 (two) times a day 60 tablet 1    Multiple Vitamins-Minerals (OCUVITE EXTRA PO) Take 1 tablet by mouth daily        pregabalin (LYRICA) 100 mg capsule Take 1 capsule (100 mg total) by mouth 3 (three) times a day 42 capsule 0    rivaroxaban (Xarelto) 20 mg tablet Take 1 tablet (20 mg total) by mouth daily 14 tablet 0    spironolactone (ALDACTONE) 25 mg tablet Take 1 tablet (25 mg total) by mouth 2 (two) times a day 180 tablet 0    tadalafil (CIALIS) 5 MG tablet Take 5 mg by mouth daily as needed for erectile dysfunction      Turmeric Curcumin 500 MG CAPS Take 1,000 mg by mouth 2 (two) times a day       VITAMIN D PO Take by mouth      acetaminophen (TYLENOL) 500 mg tablet Take 500 mg by mouth every 6 (six) hours as needed for mild pain      clindamycin (CLEOCIN) 300 MG capsule Take 300 mg by mouth as needed Prior to dental work      colchicine (COLCRYS) 0 6 mg tablet as needed       halobetasol (ULTRAVATE) 0 05 % ointment APPLY TO AFFECTED AREA ON LEG TWICE DAILY      L-ARGININE-500 PO Take 500 mg by mouth 2 (two) times a day       patient supplied medication Take 1 each by mouth 2 (two) times a day      torsemide (DEMADEX) 20 mg tablet Take 3 tablets (60 mg total) by mouth 2 (two) times a day (Patient not taking: Reported on 5/13/2021) 540 tablet 2    traMADol (ULTRAM) 50 mg tablet Take 50 mg by mouth every 6 (six) hours as needed for moderate pain       No current facility-administered medications for this visit        PRN Meds:         Family History   Problem Relation Age of Onset    Heart disease Mother     Lymphoma Father     Cancer Father     Heart disease Sister     Hypertension Brother     Diabetes Neg Hx     Thyroid disease Neg Hx     Stroke Neg Hx         Social History     Socioeconomic History    Marital status: /Civil Union     Spouse name: Not on file    Number of children: Not on file    Years of education: Not on file    Highest education level: Not on file   Occupational History    Not on file   Social Needs    Financial resource strain: Not on file    Food insecurity     Worry: Not on file     Inability: Not on file   [employer] needs     Medical: Not on file     Non-medical: Not on file   Tobacco Use    Smoking status: Never Smoker    Smokeless tobacco: Never Used   Substance and Sexual Activity    Alcohol use:  Yes     Alcohol/week: 4 0 standard drinks     Types: 4 Cans of beer per week     Frequency: 2-3 times a week     Binge frequency: Never     Comment: socially    Drug use: No    Sexual activity: Not on file   Lifestyle    Physical activity     Days per week: Not on file     Minutes per session: Not on file    Stress: Not on file   Relationships    Social connections     Talks on phone: Not on file     Gets together: Not on file     Attends Voodoo service: Not on file     Active member of club or organization: Not on file     Attends meetings of clubs or organizations: Not on file     Relationship status: Not on file    Intimate partner violence     Fear of current or ex partner: Not on file     Emotionally abused: Not on file     Physically abused: Not on file     Forced sexual activity: Not on file   Other Topics Concern    Not on file   Social History Narrative    Not on file           OBJECTIVE:    /70 (BP Location: Left arm, Patient Position: Sitting, Cuff Size: Large)   Pulse 60   Ht 5' 9" (1 753 m)   Wt 132 kg (290 lb 8 oz)   SpO2 96%   BMI 42 90 kg/m²   Vitals:    05/13/21 1206   Weight: 132 kg (290 lb 8 oz)       /70 (BP Location: Left arm, Patient Position: Sitting, Cuff Size: Large)   Pulse 60   Ht 5' 9" (1 753 m)   Wt 132 kg (290 lb 8 oz)   SpO2 96%   BMI 42 90 kg/m²   Vitals:    05/13/21 1206   Weight: 132 kg (290 lb 8 oz)     GEN: No acute distress, Alert and oriented, well appearing  HEENT:External ears normal, wearing a mask  EYES: Pupils equal, sclera anicteric, midline, normal conjuctiva  NECK: No JVD, supple, no obvious masses or thryomegaly or goiter  CARDIOVASCULAR:  RRR, No murmur, rub, gallops S1,S2  LUNGS: Clear To auscultation bilaterally, normal effort, no rales, rhonchi, crackles   ABDOMEN: jobese, nondistended,  without obvious organomegaly or ascites  EXTREMITIES/VASCULAR: 2+edema  warm an well perfused  PSYCH: Normal Affect,  linear speech pattern without evidence of psychosis  NEURO: Grossly intact, moving all extremiteis equal, face symmetric, alert and responsive, no obvious focal defecits   GAIT:  Ambulates normally without difficulty  HEME: No bleeding, bruising, petechia, purpura   SKIN: No significant rashes on visibile skin, warm, no diaphoresis or pallor  LABS:      Chemistry        Component Value Date/Time    K 4 8 05/11/2021 1342    K 4 6 09/03/2020 0805     05/11/2021 1342     09/03/2020 0805    CO2 26 05/11/2021 1342    CO2 30 09/03/2020 0805    BUN 34 (H) 05/11/2021 1342    BUN 24 09/03/2020 0805    CREATININE 1 17 05/11/2021 1342        Component Value Date/Time    CALCIUM 9 6 05/11/2021 1342    CALCIUM 9 2 09/03/2020 0805    ALKPHOS 67 05/04/2021 1151    AST 27 05/04/2021 1151    ALT 26 05/04/2021 1151            No results found for: CHOL  Lab Results   Component Value Date    HDL 55 10/27/2019     Lab Results   Component Value Date    LDLCALC 90 10/27/2019     Lab Results   Component Value Date    TRIG 93 10/27/2019     No results found for: CHOLHDL    IMAGING: No results found       Cardiac testing:   Results for orders placed during the hospital encounter of 01/22/21   Echo complete with contrast if indicated    Narrative [address] [place] [address] [facility], [address]  [phone]    Transthoracic Echocardiogram  2D, M-mode, Doppler, and Color Doppler    Study date:  2021    Patient: Walter Aguirre  MR number: [id number]  Account number: [de-identified]  : 1955  Age: 76 years  Gender: Male  Status: Inpatient  Location: Bedside  Height: 69 in  Weight: 294 4 lb  BP: 129/ 60 mmHg    Indications: Edema  Heart failure  Diagnoses: I50 9 - Heart failure, unspecified    Sonographer:  JAM Alanis  Referring Physician:  Sruthi Augustin Do  Group:  [facility]  Interpreting Physician:  FORTINO Coates     SUMMARY    PROCEDURE INFORMATION:  Intravenous contrast (  4ml of Definity) was administered  LEFT VENTRICLE:  Systolic function was normal by visual assessment  Ejection fraction was estimated to be 55 %  There were no regional wall motion abnormalities  Wall thickness was mildly increased  There was mild concentric hypertrophy  VENTRICULAR SEPTUM:  There was paradoxical motion  These changes are consistent with a conduction abnormality or paced rhythm  RIGHT VENTRICLE:  The ventricle was mildly dilated  LEFT ATRIUM:  The atrium was mildly dilated  LA volume index 40 mL/m2  RIGHT ATRIUM:  The atrium was mildly to moderately dilated  MITRAL VALVE:  There was trace regurgitation  TRICUSPID VALVE:  There was trace regurgitation  SUMMARY MEASUREMENTS  2D measurements:  Unspecified Anatomy:   %FS was 39 4 %  Ao Diam was 3 4 cm   EDV(Teich) was 129 3 ml   EF(Teich) was 69 5 %  ESV(Teich) was 39 4 ml  IVSd was 1 1 cm  LA Diam was 3 8 cm  LAAs A2C was 27 cm2  LAAs A4C was 26 8 cm2  LAESV A-L A2C was  98 5 ml  LAESV A-L A4C was 92 5 ml  LAESV Index (A-L) was 40 1 ml/m2  LAESV MOD A2C was 94 9 ml  LAESV MOD A4C was 85 4 ml  LAESV(A-L) was 97 7 ml  LAESV(MOD BP) was 91 9 ml  LAESVInd MOD BP was 37 7 ml/m2  LALs A2C was 6 3 cm  LALs A4C was 6 6 cm    LVEDV MOD A4C was 126 8 ml  LVEF MOD A4C was 52 7 %  LVESV MOD A4C was 60 ml  LVIDd was 5 2 cm  LVIDs was 3 2 cm  LVLd A4C was 8 1 cm  LVLs A4C was 7 1 cm  LVPWd was 1 cm  RAAs A4C was 25 8 cm2  RAESV A-L was  91 3 ml   RAESV MOD was 87 9 ml  RALs was 6 2 cm  SV MOD A4C was 66 9 ml   SV(Teich) was 89 9 ml   CW measurements:  Unspecified Anatomy:   AV Env  Ti was 283 9 ms   AV VTI was 27 9 cm   AV Vmax was 1 5 m/s  AV Vmean was 1 m/s  AV maxPG was 8 8 mmHg  AV meanPG was 4 5 mmHg  MM measurements:  Unspecified Anatomy:   TAPSE was 2 cm  PW measurements:  Unspecified Anatomy:   DVI was 0 7   LVOT Env  Ti was 279 7 ms  LVOT VTI was 20 8 cm  LVOT Vmax was 1 2 m/s  LVOT Vmean was 0 7 m/s  LVOT maxPG was 5 9 mmHg  LVOT meanPG was 2 6 mmHg  RV S' was 0 1 m/s  HISTORY: PRIOR HISTORY: lymphedema  Congestive heart failure  Pulmonary hypertension  Atrial fibrillation  Risk factors: hypertension and morbid obesity  PROCEDURE: The procedure was performed at the bedside  This was a routine study  The transthoracic approach was used  The study included complete 2D imaging, M-mode, complete spectral Doppler, and color Doppler  The heart rate was 90 bpm,  at the start of the study  Intravenous contrast (  4ml of Definity) was administered  Image quality was adequate  LEFT VENTRICLE: Size was normal  Systolic function was normal by visual assessment  Ejection fraction was estimated to be 55 %  There were no regional wall motion abnormalities  Wall thickness was mildly increased  There was mild  concentric hypertrophy  DOPPLER: The study was not technically sufficient to allow evaluation of LV diastolic function  VENTRICULAR SEPTUM: There was paradoxical motion  These changes are consistent with a conduction abnormality or paced rhythm  RIGHT VENTRICLE: The ventricle was mildly dilated  Systolic function was normal  Wall thickness was normal     LEFT ATRIUM: The atrium was mildly dilated   LA volume index 40 mL/m2     RIGHT ATRIUM: The atrium was mildly to moderately dilated  MITRAL VALVE: Valve structure was normal  There was normal leaflet separation  DOPPLER: The transmitral velocity was within the normal range  There was no evidence for stenosis  There was trace regurgitation  AORTIC VALVE: The valve was trileaflet  Leaflets exhibited mildly increased thickness, normal cuspal separation, and sclerosis  DOPPLER: Transaortic velocity was within the normal range  There was no evidence for stenosis  There was no  regurgitation  TRICUSPID VALVE: The valve structure was normal  There was normal leaflet separation  DOPPLER: The transtricuspid velocity was within the normal range  There was no evidence for stenosis  There was trace regurgitation  The tricuspid jet  envelope definition was inadequate for estimation of RV systolic pressure  PULMONIC VALVE: Not well visualized  DOPPLER: There was no significant regurgitation  PERICARDIUM: There was no pericardial effusion  The pericardium was normal in appearance  AORTA: The root exhibited normal size  SYSTEMIC VEINS: IVC: The inferior vena cava was normal in size and course  Respirophasic changes were normal     SYSTEM MEASUREMENT TABLES    2D  %FS: 39 4 %  Ao Diam: 3 4 cm  EDV(Teich): 129 3 ml  EF(Teich): 69 5 %  ESV(Teich): 39 4 ml  IVSd: 1 1 cm  LA Diam: 3 8 cm  LAAs A2C: 27 cm2  LAAs A4C: 26 8 cm2  LAESV A-L A2C: 98 5 ml  LAESV A-L A4C: 92 5 ml  LAESV Index (A-L): 40 1 ml/m2  LAESV MOD A2C: 94 9 ml  LAESV MOD A4C: 85 4 ml  LAESV(A-L): 97 7 ml  LAESV(MOD BP): 91 9 ml  LAESVInd MOD BP: 37 7 ml/m2  LALs A2C: 6 3 cm  LALs A4C: 6 6 cm  LVEDV MOD A4C: 126 8 ml  LVEF MOD A4C: 52 7 %  LVESV MOD A4C: 60 ml  LVIDd: 5 2 cm  LVIDs: 3 2 cm  LVLd A4C: 8 1 cm  LVLs A4C: 7 1 cm  LVPWd: 1 cm  RAAs A4C: 25 8 cm2  RAESV A-L: 91 3 ml  RAESV MOD: 87 9 ml  RALs: 6 2 cm  SV MOD A4C: 66 9 ml  SV(Teich): 89 9 ml    CW  AV Env  Ti: 283 9 ms  AV VTI: 27 9 cm  AV Vmax: 1 5 m/s  AV Vmean: 1 m/s  AV maxP 8 mmHg  AV meanP 5 mmHg    MM  TAPSE: 2 cm    PW  DVI: 0 7  LVOT Env  Ti: 279 7 ms  LVOT VTI: 20 8 cm  LVOT Vmax: 1 2 m/s  LVOT Vmean: 0 7 m/s  LVOT maxP 9 mmHg  LVOT meanP 6 mmHg  RV S': 0 1 m/s    [facility] Accredited [facility]chocardi[facility] Laboratory    Prepared and electronically signed by    FORTINO Rooney  Signed 2021 13:29:34       No results found for this or any previous visit  No results found for this or any previous visit  No results found for this or any previous visit  I reviewed and interpreted the following LABS/EKG/TELE/IMAGING and below is summary of my interpretation (if data available):    LABS:normal renal function    Current EKG and Rhythm Strip:Atypical flutter, A is pos V1 and pos inf leads  Rbbb, rate controlled 60bpm in 4:1, regular

## 2021-05-20 ENCOUNTER — TELEPHONE (OUTPATIENT)
Dept: CARDIOLOGY CLINIC | Facility: CLINIC | Age: 66
End: 2021-05-20

## 2021-05-20 ENCOUNTER — OFFICE VISIT (OUTPATIENT)
Dept: CARDIOLOGY CLINIC | Facility: CLINIC | Age: 66
End: 2021-05-20
Payer: COMMERCIAL

## 2021-05-20 VITALS
RESPIRATION RATE: 16 BRPM | SYSTOLIC BLOOD PRESSURE: 104 MMHG | HEIGHT: 69 IN | WEIGHT: 289 LBS | BODY MASS INDEX: 42.8 KG/M2 | DIASTOLIC BLOOD PRESSURE: 60 MMHG | HEART RATE: 59 BPM

## 2021-05-20 DIAGNOSIS — I27.20 PULMONARY HYPERTENSION (HCC): ICD-10-CM

## 2021-05-20 DIAGNOSIS — E66.01 OBESITY, MORBID (HCC): ICD-10-CM

## 2021-05-20 DIAGNOSIS — I89.0 LYMPHEDEMA: ICD-10-CM

## 2021-05-20 DIAGNOSIS — I48.11 LONGSTANDING PERSISTENT ATRIAL FIBRILLATION (HCC): Primary | ICD-10-CM

## 2021-05-20 DIAGNOSIS — I50.32 CHRONIC DIASTOLIC HEART FAILURE (HCC): ICD-10-CM

## 2021-05-20 DIAGNOSIS — N17.9 AKI (ACUTE KIDNEY INJURY) (HCC): ICD-10-CM

## 2021-05-20 PROCEDURE — 93000 ELECTROCARDIOGRAM COMPLETE: CPT | Performed by: INTERNAL MEDICINE

## 2021-05-20 PROCEDURE — 99214 OFFICE O/P EST MOD 30 MIN: CPT | Performed by: INTERNAL MEDICINE

## 2021-05-20 RX ORDER — TORSEMIDE 20 MG/1
20 TABLET ORAL 2 TIMES DAILY
Qty: 180 TABLET | Refills: 1 | Status: SHIPPED | OUTPATIENT
Start: 2021-05-20 | End: 2021-11-22

## 2021-05-20 NOTE — PROGRESS NOTES
Cardiology Office Note  MD Monroe Hargrove MD Emmitt Jain, DO, MD Alicia Paniagua DO, Nasim Jurado DO, [facility] [place]  ----------------------------------------------------------------  [address] [place] [address] 49 76 y o  male MRN: [id number]  Unit/Bed#:  Encounter: [id number]      History of Present Illness: It was a pleasure to see Debra Mosley in the office today for follow-up CV evaluation  He has a longstanding history of atrial fibrillation with prior failed ELAINE guided cardioversion, history of morbid obesity and pulmonary hypertension with chronic venous insufficiency/lymphedema  The patient has a known history of obstructive sleep apnea was noncompliant with his CPAP therapy  Over the course of many years he has been having lower extremity edema which has been believe secondary to lymphedema with chronic venous insufficiency  He has had venous ablation is in the past for his reflux disease  In October 2019, he was found have pulmonary hypertension with pulmonary artery systolic pressure is estimated at 50 mm Hg  We had initially seen him in late August 2020 due to increased fatigue and dyspnea on exertion  He has become somewhat short of breath with basic activity  Previously, he had been managed for his atrial fibrillation at [facility]  Echocardiogram, stress test and Holter monitor were ordered, but stress test and Holter were completed  Stress test was found to be negative for myocardial ischemia  Holter monitor demonstrated atrial fibrillation with an average heart rate of 92 beats per minute and rare VPCs  His weight trends unfortunately continue to go upward and he had dietary discretion  He seen by electrophysiology and recommended for a sleep study as well as an evaluation with advanced heart failure    He was placed on CPAP and has been compliant since that time   He also was seen by bariatric surgery and wish to undergo conservative options with dietary modifications  Despite increasing diuretic load, he he continue to gain approximately 30 lb and was subsequently sent to [facility]  He was diuresed down to 285 lb  While hospitalized, his echocardiogram was performed showing normal left ventricular function  Following discharge, he was seen by electrophysiology and sent for a cardioversion  The cardioversion was initially successful, but he reverted back to rate controlled atrial flutter  He has been placed on amiodarone and decreased down to 100 mg daily  On his dose of torsemide, he had acute kidney injury with a creatinine rise to 1 9  After holding his diuretics for several days, his creatinine came down to 1 1  He admits to some mild increased lower extremity swelling, but not a substantial increase  His weights have been fairly stable but mildly increased since discharge  He denies any chest pain, pressure, tightness or squeezing  Admits to mild increased dyspnea on exertion, but overall is feeling well  Denies orthopnea or paroxysmal nocturnal dyspnea  Denies lightheadedness, dizziness or palpitations  Review of Systems:  Review of Systems   Constitution: Negative for decreased appetite, fever, malaise/fatigue, weight gain and weight loss  HENT: Negative for congestion and sore throat  Eyes: Negative for visual disturbance  Cardiovascular: Negative for chest pain, dyspnea on exertion, leg swelling, near-syncope and palpitations  Respiratory: Negative for cough and shortness of breath  Hematologic/Lymphatic: Negative for bleeding problem  Skin: Negative for rash  Musculoskeletal: Negative for myalgias and neck pain  Gastrointestinal: Negative for abdominal pain and nausea  Neurological: Negative for light-headedness and weakness  Psychiatric/Behavioral: Negative for depression         Past Medical History:   Diagnosis Date    A-fib ([facility] [address] )     Arthritis     CPAP (continuous positive airway pressure) dependence     Irregular heart beat     Lymphedema     Sleep apnea     Urinary frequency     Wears glasses     Wears partial dentures     lower partial       Past Surgical History:   Procedure Laterality Date    ABLATION SAPHENOUS VEIN W/ RFA      COLONOSCOPY      IN CYSTOURETHRO W/IMPLANT N/A 11/13/2017    Procedure: CYSTOSCOPY WITH INSERTION UROLIFT;  Surgeon: Saranya Arellano DO;  Location: [facility];  Service: Urology    TONSILLECTOMY         Social History     Socioeconomic History    Marital status: /Civil Union     Spouse name: None    Number of children: None    Years of education: None    Highest education level: None   Occupational History    None   Social Needs    Financial resource strain: None    Food insecurity     Worry: None     Inability: None    Transportation needs     Medical: None     Non-medical: None   Tobacco Use    Smoking status: Never Smoker    Smokeless tobacco: Never Used   Substance and Sexual Activity    Alcohol use:  Yes     Alcohol/week: 4 0 standard drinks     Types: 4 Cans of beer per week     Frequency: 2-3 times a week     Binge frequency: Never     Comment: socially    Drug use: No    Sexual activity: None   Lifestyle    Physical activity     Days per week: None     Minutes per session: None    Stress: None   Relationships    Social connections     Talks on phone: None     Gets together: None     Attends [employer] service: None     Active member of club or organization: None     Attends meetings of clubs or organizations: None     Relationship status: None    Intimate partner violence     Fear of current or ex partner: None     Emotionally abused: None     Physically abused: None     Forced sexual activity: None   Other Topics Concern    None   Social History Narrative    None       Family History   Problem Relation Age of Onset    Heart disease Mother     Lymphoma Father     Cancer Father     Heart disease Sister     Hypertension Brother     Diabetes Neg Hx     Thyroid disease Neg Hx     Stroke Neg Hx        Allergies   Allergen Reactions    Penicillins Anaphylaxis    Sulfa Antibiotics Anaphylaxis         Current Outpatient Medications:     acetaminophen (TYLENOL) 500 mg tablet, Take 500 mg by mouth every 6 (six) hours as needed for mild pain, Disp: , Rfl:     allopurinol (ZYLOPRIM) 100 mg tablet, Take 1 tablet (100 mg total) by mouth daily, Disp: 14 tablet, Rfl: 0    amiodarone 200 mg tablet, Take 1 tablet (200 mg total) by mouth daily Take 200mg TID for 2 weeks   After 2 weeks take 200mg daily (Patient taking differently: Take 200 mg by mouth daily Half tablet daily), Disp: 90 tablet, Rfl: 0    ammonium lactate (AMLACTIN) 12 % lotion, Apply topically 2 (two) times a day as needed for dry skin, Disp: , Rfl:     clindamycin (CLEOCIN) 300 MG capsule, Take 300 mg by mouth as needed Prior to dental work, Disp: , Rfl:     CoenzymeQ10-Isoleucine-Glycine (CO Q-10) 100-50-25 MG TB24, Co Q-10, Disp: , Rfl:     colchicine (COLCRYS) 0 6 mg tablet, as needed , Disp: , Rfl:     ferrous sulfate 325 (65 Fe) mg tablet, Take 325 mg by mouth every other day, Disp: , Rfl:     GARCINIA CAMBOGIA-CHROMIUM PO, Take 750 mg by mouth 2 (two) times a day , Disp: , Rfl:     Glucosamine-Chondroit-Vit C-Mn (Glucosamine 1500 Complex) CAPS, Take by mouth, Disp: , Rfl:     halobetasol (ULTRAVATE) 0 05 % ointment, APPLY TO AFFECTED AREA ON LEG TWICE DAILY, Disp: , Rfl:     L-ARGININE-500 PO, Take 500 mg by mouth 2 (two) times a day , Disp: , Rfl:     metoprolol succinate (TOPROL-XL) 25 mg 24 hr tablet, Take 2 tablets (50 mg total) by mouth 2 (two) times a day (Patient taking differently: Take by mouth daily ), Disp: 60 tablet, Rfl: 1    Multiple Vitamins-Minerals (OCUVITE EXTRA PO), Take 1 tablet by mouth daily  , Disp: , Rfl:     patient supplied medication, Take 1 each by mouth 2 (two) times a day, Disp: , Rfl:     pregabalin (LYRICA) 100 mg capsule, Take 1 capsule (100 mg total) by mouth 3 (three) times a day, Disp: 42 capsule, Rfl: 0    rivaroxaban (Xarelto) 20 mg tablet, Take 1 tablet (20 mg total) by mouth daily, Disp: 14 tablet, Rfl: 0    spironolactone (ALDACTONE) 25 mg tablet, Take 1 tablet (25 mg total) by mouth 2 (two) times a day, Disp: 180 tablet, Rfl: 0    tadalafil (CIALIS) 5 MG tablet, Take 5 mg by mouth daily as needed for erectile dysfunction, Disp: , Rfl:     traMADol (ULTRAM) 50 mg tablet, Take 50 mg by mouth every 6 (six) hours as needed for moderate pain, Disp: , Rfl:     Turmeric Curcumin 500 MG CAPS, Take 1,000 mg by mouth 2 (two) times a day , Disp: , Rfl:     VITAMIN D PO, Take by mouth, Disp: , Rfl:     torsemide (DEMADEX) 20 mg tablet, Take 1 tablet (20 mg total) by mouth 2 (two) times a day, Disp: 180 tablet, Rfl: 1    Vitals:    05/20/21 0917   BP: 104/60   Pulse: 59   Resp: 16   Weight: 131 kg (289 lb)   Height: 5' 9" (1 753 m)       PHYSICAL EXAMINATION:  Gen: Awake, Alert, NAD    HEENT: AT/NC, Anicteric, mmm  Neck: Supple, No elevated JVP  Resp: CTA bilaterally no w/r/r  CV:  Irregularly irregular +S1, S2, No m/r/g  Abd: Soft, obese, NT/ND + BS  Ext: warm, bilateral lower extremities wrapped with +1 pitting edema bilaterally/lymphedema  Neuro: Follows commands, moves all extermities  Psych: Appropriate affect    --------------------------------------------------------------------------------  TREADMILL STRESS  No results found for this or any previous visit    --------------------------------------------------------------------------------  NUCLEAR STRESS TEST: No results found for this or any previous visit    No results found for this or any previous visit     --------------------------------------------------------------------------------  CATH:  No results found for this or any previous visit   --------------------------------------------------------------------------------  ECHO:   Results for orders placed during the hospital encounter of 10/26/19   Echo complete with contrast if indicated    [facility] [address] [facility] [address] [facility] [address]  [phone]    Transthoracic Echocardiogram  2D, M-mode, Doppler, and Color Doppler    Study date:  28-Oct-2019    Patient: Harsh Rodriguez  MR number: [id number]  Account number: [de-identified]  : 1955  Age: 71 years  Gender: Male  Status: Inpatient  Location: Bedside  Height: 69 in  Weight: 302 lb  BP: 141/ 88 mmHg    Indications: left sided paralysis    Diagnoses: 56 - CVA    Sonographer:  Miguel Rubalcava RD, [facility]  Referring Physician:  Kev Banda DO  Group:  [facility]  Interpreting Physician:  Rhina Chavez DO    SUMMARY    LEFT VENTRICLE:  Systolic function was normal  Ejection fraction was estimated to be 55 %  This study was inadequate for the evaluation of regional wall motion  Wall thickness was mildly increased  RIGHT VENTRICLE:  The ventricle was dilated  Systolic function was normal     MITRAL VALVE:  There was mild regurgitation  TRICUSPID VALVE:  There was mild regurgitation  Estimated peak PA pressure was 50 mmHg  HISTORY: PRIOR HISTORY: Patient has no history of cardiovascular disease  PROCEDURE: The procedure was performed at the bedside  This was a routine study  The transthoracic approach was used  The study included complete 2D imaging, M-mode, complete spectral Doppler, and color Doppler  The heart rate was 80 bpm,  at the start of the study  Intravenous contrast (Definity solution [1 3 ml Definity/8 7ml normal saline solution], 3 ml) was administered to opacify the left ventricle  Echocardiographic views were limited due to poor acoustic window  availability  This was a technically difficult study      LEFT VENTRICLE: Size was normal  Systolic function was normal  Ejection fraction was estimated to be 55 %  This study was inadequate for the evaluation of regional wall motion  Wall thickness was mildly increased  DOPPLER: The study was  not technically sufficient to allow evaluation of LV diastolic function  RIGHT VENTRICLE: The ventricle was dilated  Systolic function was normal     LEFT ATRIUM: Size was normal     RIGHT ATRIUM: Size was normal     MITRAL VALVE: Valve structure was normal  There was normal leaflet separation  DOPPLER: The transmitral velocity was within the normal range  There was no evidence for stenosis  There was mild regurgitation  AORTIC VALVE: The valve was trileaflet  Leaflets exhibited normal thickness and normal cuspal separation  DOPPLER: Transaortic velocity was within the normal range  There was no evidence for stenosis  There was no regurgitation  TRICUSPID VALVE: The valve structure was normal  There was normal leaflet separation  DOPPLER: The transtricuspid velocity was within the normal range  There was no evidence for stenosis  There was mild regurgitation  Estimated peak PA  pressure was 50 mmHg  PULMONIC VALVE: Leaflets exhibited normal thickness, no calcification, and normal cuspal separation  DOPPLER: The transpulmonic velocity was within the normal range  There was no regurgitation  PERICARDIUM: There was no pericardial effusion  The pericardium was normal in appearance  AORTA: The root exhibited normal size  SYSTEMIC VEINS: IVC: The inferior vena cava was not well visualized      SYSTEM MEASUREMENT TABLES    2D  %FS: 34 72 %  Ao Diam: 2 87 cm  EDV(Teich): 143 23 ml  EF(Teich): 63 36 %  ESV(Teich): 52 47 ml  IVSd: 1 15 cm  LA Diam: 4 16 cm  LVIDd: 5 43 cm  LVIDs: 3 55 cm  LVPWd: 1 04 cm  RWT: 0 38  SV(Teich): 90 75 ml    CW  AV Vmax: 1 34 m/s  AV maxP 13 mmHg  RAP: 0 mmHg  TR Vmax: 3 26 m/s  TR maxP 5 mmHg    PW  E' Sept: 0 09 m/s  MV E Terrance: 1 03 m/s  RVSP: 42 5 mmHg    [employer] Accredited Echocardiography Laboratory    Prepared and electronically signed by    Sanjuanita Cole DO  Signed 28-Oct-2019 10:37:38       No results found for this or any previous visit   --------------------------------------------------------------------------------  HOLTER  No results found for this or any previous visit  No results found for this or any previous visit   --------------------------------------------------------------------------------  CAROTIDS  No results found for this or any previous visit    --------------------------------------------------------------------------------  ECGs:  Results for orders placed or performed in visit on 05/20/21   POCT ECG    Impression    Atrial flutter with variable block 59 beats per minute, RBBB, LAFB        Lab Results   Component Value Date    WBC 5 41 05/04/2021    HGB 13 9 05/04/2021    HCT 41 2 05/04/2021    MCV 91 05/04/2021     05/04/2021      Lab Results   Component Value Date    SODIUM 135 (L) 05/11/2021    K 4 8 05/11/2021     05/11/2021    CO2 26 05/11/2021    BUN 34 (H) 05/11/2021    CREATININE 1 17 05/11/2021    GLUC 91 05/11/2021    CALCIUM 9 6 05/11/2021      Lab Results   Component Value Date    HGBA1C 5 7 10/27/2019      No results found for: CHOL  Lab Results   Component Value Date    HDL 55 10/27/2019     Lab Results   Component Value Date    LDLCALC 90 10/27/2019     Lab Results   Component Value Date    TRIG 93 10/27/2019     No results found for: [place], [place]   Lab Results   Component Value Date    INR 1 59 (H) 01/22/2021    INR 1 18 10/26/2019    PROTIME 18 7 (H) 01/22/2021    PROTIME 15 1 (H) 10/26/2019        1  Longstanding persistent atrial fibrillation (HCC)  -     POCT ECG    2  Chronic diastolic heart failure (HCC)  -     torsemide (DEMADEX) 20 mg tablet; Take 1 tablet (20 mg total) by mouth 2 (two) times a day  -     Basic metabolic panel    3   Obesity, morbid ([address] )    4  Pulmonary hypertension ([address] )    5  Lymphedema    6  ALANNA (acute kidney injury) [facility])  -     Ambulatory referral to Nephrology; Future        IMPRESSION:  · Chronic diastolic heart failure  · Paroxysmal atrial fibrillation/flutter (had long standing since before October 2019 s/p DCCV in May 2021) on Xarelto and Amiodarone  · LVEF 55%, mild LVH, paradoxical septal wall motion, mild RV dilatation, mild LA dilatation, mild to moderate RA dilatation, trace MR/TR, January 2021  · History of bilateral venous insufficiency status post LE vein ablation  · Pharmacologic nuclear stress test negative for myocardial ischemia with gated EF 50%, September 2020  · Holter with AF, avg HR 92 bpm, rare VPCs, September 2020  · Abnormal ECG with bifascicular block  · Moderate pulmonary hypertension  · Lymphedema  · Morbid obesity  · ROBERT on 20/14 cm BiPAP    PLAN:  It was a pleasure to see Hunter Jackson in the office today for follow-up CV evaluation  He is here today following his recent successful cardioversion and then reversion to atrial flutter  He has plan for possible ablation in the near future  He has no symptoms concerning for angina  His weights have mildly increased  Dry weight was around 285 lb and he has been fluctuating around 289-290 lb  He admits to mild increase in swelling in his legs with some subtle increase in dyspnea  He was held on diuretic medications due to his acute kidney injury  Creatinine had increased to 1 9  ECG in the office today shows bifascicular block with atrial flutter and variable block  Heart rates are well controlled  Overall, he seems markedly improved in comparison to prior to his hospitalization  Based on his clinical presentation, I have the following recommendations:    1   Since being off the diuretic, it appears that his swelling is mildly increased but not substantially like in the past   Due to his known history of diastolic heart failure, I think it is reasonable for us to restart his torsemide but at a much decreased dose of 20 mg b i d     Would repeat a metabolic panel in 1 week on the new dose  2  I have sent notification to electrophysiology as he is very interested in ablation therapy  We will discuss timing on him going for this procedure  3  Recommend continuing a total of 4 weeks of anticoagulation following his cardioversion to decrease his risk of thromboembolism  After that time, it is reasonable to hold his Xarelto for 2 days if necessary for dental extraction  4  Continue CPAP therapy  5  Salt restriction  6  Continue amiodarone 100 mg daily, Toprol-XL 50 mg daily, Xarelto  7  Will hold off on spironolactone for now with blood pressure in the 100s and recent acute kidney injury  8  Referral has been made to Nephrology due to recent acute kidney injury and volume overload to help with co-Management of diuretic and antihypertensive medication    9  Should he gain greater than 3 lb in a day or 5 lb overall, I recommended he call the office for further titration of his diuretic medication  10  We will follow up with him in 2 months to re-evaluate him following the procedure  As always, please do not hesitate to call with any questions  Portions of the record may have been created with voice recognition software  Occasional wrong word or "sound a like" substitutions may have occurred due to the inherent limitations of voice recognition software  Read the chart carefully and recognize, using context, where substitutions have occurred          Signed: Morro Ghosh DO, [facility]

## 2021-05-25 ENCOUNTER — TELEPHONE (OUTPATIENT)
Dept: CARDIOLOGY CLINIC | Facility: CLINIC | Age: 66
End: 2021-05-25

## 2021-05-25 NOTE — TELEPHONE ENCOUNTER
Patient scheduled for ELAINE/Afib/Aflutter ablation post wall on 6/2/21 in [facility] with Dr Andi Shabazz  Mailing patient instructions  Medication hold Xarelto, Torsemide, Amiodarone, and Metoprolol morning of  Can I have auth?

## 2021-05-26 ENCOUNTER — APPOINTMENT (OUTPATIENT)
Dept: LAB | Facility: HOSPITAL | Age: 66
End: 2021-05-26
Payer: COMMERCIAL

## 2021-05-26 DIAGNOSIS — I48.11 LONGSTANDING PERSISTENT ATRIAL FIBRILLATION (HCC): ICD-10-CM

## 2021-05-26 LAB
ALBUMIN SERPL BCP-MCNC: 4 G/DL (ref 3.5–5)
ALP SERPL-CCNC: 62 U/L (ref 46–116)
ALT SERPL W P-5'-P-CCNC: 19 U/L (ref 12–78)
ANION GAP SERPL CALCULATED.3IONS-SCNC: 9 MMOL/L (ref 4–13)
AST SERPL W P-5'-P-CCNC: 24 U/L (ref 5–45)
BASOPHILS # BLD AUTO: 0.02 THOUSANDS/ΜL (ref 0–0.1)
BASOPHILS NFR BLD AUTO: 1 % (ref 0–1)
BILIRUB SERPL-MCNC: 0.72 MG/DL (ref 0.2–1)
BUN SERPL-MCNC: 27 MG/DL (ref 5–25)
CALCIUM SERPL-MCNC: 9.3 MG/DL (ref 8.3–10.1)
CHLORIDE SERPL-SCNC: 101 MMOL/L (ref 100–108)
CO2 SERPL-SCNC: 30 MMOL/L (ref 21–32)
CREAT SERPL-MCNC: 1.22 MG/DL (ref 0.6–1.3)
EOSINOPHIL # BLD AUTO: 0.05 THOUSAND/ΜL (ref 0–0.61)
EOSINOPHIL NFR BLD AUTO: 1 % (ref 0–6)
ERYTHROCYTE [DISTWIDTH] IN BLOOD BY AUTOMATED COUNT: 14 % (ref 11.6–15.1)
GFR SERPL CREATININE-BSD FRML MDRD: 61 ML/MIN/1.73SQ M
GLUCOSE P FAST SERPL-MCNC: 97 MG/DL (ref 65–99)
HCT VFR BLD AUTO: 40.5 % (ref 36.5–49.3)
HGB BLD-MCNC: 13.6 G/DL (ref 12–17)
IMM GRANULOCYTES # BLD AUTO: 0.01 THOUSAND/UL (ref 0–0.2)
IMM GRANULOCYTES NFR BLD AUTO: 0 % (ref 0–2)
LYMPHOCYTES # BLD AUTO: 0.9 THOUSANDS/ΜL (ref 0.6–4.47)
LYMPHOCYTES NFR BLD AUTO: 21 % (ref 14–44)
MCH RBC QN AUTO: 31.5 PG (ref 26.8–34.3)
MCHC RBC AUTO-ENTMCNC: 33.6 G/DL (ref 31.4–37.4)
MCV RBC AUTO: 94 FL (ref 82–98)
MONOCYTES # BLD AUTO: 0.54 THOUSAND/ΜL (ref 0.17–1.22)
MONOCYTES NFR BLD AUTO: 12 % (ref 4–12)
NEUTROPHILS # BLD AUTO: 2.87 THOUSANDS/ΜL (ref 1.85–7.62)
NEUTS SEG NFR BLD AUTO: 65 % (ref 43–75)
NRBC BLD AUTO-RTO: 0 /100 WBCS
PLATELET # BLD AUTO: 168 THOUSANDS/UL (ref 149–390)
PMV BLD AUTO: 11.1 FL (ref 8.9–12.7)
POTASSIUM SERPL-SCNC: 4 MMOL/L (ref 3.5–5.3)
PROT SERPL-MCNC: 8.1 G/DL (ref 6.4–8.2)
RBC # BLD AUTO: 4.32 MILLION/UL (ref 3.88–5.62)
SODIUM SERPL-SCNC: 140 MMOL/L (ref 136–145)
WBC # BLD AUTO: 4.39 THOUSAND/UL (ref 4.31–10.16)

## 2021-05-26 PROCEDURE — 80053 COMPREHEN METABOLIC PANEL: CPT

## 2021-05-26 PROCEDURE — 85025 COMPLETE CBC W/AUTO DIFF WBC: CPT

## 2021-05-26 PROCEDURE — 36415 COLL VENOUS BLD VENIPUNCTURE: CPT

## 2021-05-26 NOTE — TELEPHONE ENCOUNTER
He does not need auth for his ELAINE [id number] and Ablations [id number] and [id number] on 6/2/21 at [facility] at [place]  Ref# [id number]

## 2021-06-01 ENCOUNTER — ANESTHESIA EVENT (OUTPATIENT)
Dept: NON INVASIVE DIAGNOSTICS | Facility: HOSPITAL | Age: 66
End: 2021-06-01
Payer: COMMERCIAL

## 2021-06-01 RX ORDER — PANTOPRAZOLE SODIUM 40 MG/1
40 INJECTION, POWDER, FOR SOLUTION INTRAVENOUS ONCE
Status: CANCELLED | OUTPATIENT
Start: 2021-06-01 | End: 2021-06-01

## 2021-06-02 ENCOUNTER — APPOINTMENT (OUTPATIENT)
Dept: NON INVASIVE DIAGNOSTICS | Facility: HOSPITAL | Age: 66
End: 2021-06-02
Attending: INTERNAL MEDICINE
Payer: COMMERCIAL

## 2021-06-02 ENCOUNTER — HOSPITAL ENCOUNTER (OUTPATIENT)
Dept: NON INVASIVE DIAGNOSTICS | Facility: HOSPITAL | Age: 66
Discharge: HOME | End: 2021-06-03
Attending: INTERNAL MEDICINE | Admitting: INTERNAL MEDICINE
Payer: COMMERCIAL

## 2021-06-02 ENCOUNTER — ANESTHESIA (OUTPATIENT)
Dept: NON INVASIVE DIAGNOSTICS | Facility: HOSPITAL | Age: 66
End: 2021-06-02
Payer: COMMERCIAL

## 2021-06-02 DIAGNOSIS — I48.11 LONGSTANDING PERSISTENT ATRIAL FIBRILLATION (HCC): ICD-10-CM

## 2021-06-02 LAB
ANION GAP SERPL CALCULATED.3IONS-SCNC: 6 MMOL/L (ref 4–13)
ATRIAL RATE: 264 BPM
BASOPHILS # BLD AUTO: 0.02 THOUSANDS/ΜL (ref 0–0.1)
BASOPHILS NFR BLD AUTO: 0 % (ref 0–1)
BUN SERPL-MCNC: 36 MG/DL (ref 5–25)
CALCIUM SERPL-MCNC: 9.2 MG/DL (ref 8.3–10.1)
CHLORIDE SERPL-SCNC: 104 MMOL/L (ref 100–108)
CO2 SERPL-SCNC: 31 MMOL/L (ref 21–32)
CREAT SERPL-MCNC: 1.28 MG/DL (ref 0.6–1.3)
EOSINOPHIL # BLD AUTO: 0.07 THOUSAND/ΜL (ref 0–0.61)
EOSINOPHIL NFR BLD AUTO: 2 % (ref 0–6)
ERYTHROCYTE [DISTWIDTH] IN BLOOD BY AUTOMATED COUNT: 14.1 % (ref 11.6–15.1)
GFR SERPL CREATININE-BSD FRML MDRD: 58 ML/MIN/1.73SQ M
GLUCOSE P FAST SERPL-MCNC: 101 MG/DL (ref 65–99)
GLUCOSE SERPL-MCNC: 101 MG/DL (ref 65–140)
HCT VFR BLD AUTO: 39.1 % (ref 36.5–49.3)
HGB BLD-MCNC: 12.9 G/DL (ref 12–17)
IMM GRANULOCYTES # BLD AUTO: 0.01 THOUSAND/UL (ref 0–0.2)
IMM GRANULOCYTES NFR BLD AUTO: 0 % (ref 0–2)
INR PPP: 2.26 (ref 0.84–1.19)
KCT BLD-ACNC: 144 SEC (ref 89–137)
KCT BLD-ACNC: 299 SEC (ref 89–137)
KCT BLD-ACNC: 344 SEC (ref 89–137)
KCT BLD-ACNC: 344 SEC (ref 89–137)
KCT BLD-ACNC: 364 SEC (ref 89–137)
KCT BLD-ACNC: 384 SEC (ref 89–137)
LYMPHOCYTES # BLD AUTO: 1.21 THOUSANDS/ΜL (ref 0.6–4.47)
LYMPHOCYTES NFR BLD AUTO: 26 % (ref 14–44)
MCH RBC QN AUTO: 31.2 PG (ref 26.8–34.3)
MCHC RBC AUTO-ENTMCNC: 33 G/DL (ref 31.4–37.4)
MCV RBC AUTO: 94 FL (ref 82–98)
MONOCYTES # BLD AUTO: 0.41 THOUSAND/ΜL (ref 0.17–1.22)
MONOCYTES NFR BLD AUTO: 9 % (ref 4–12)
NEUTROPHILS # BLD AUTO: 2.95 THOUSANDS/ΜL (ref 1.85–7.62)
NEUTS SEG NFR BLD AUTO: 63 % (ref 43–75)
NRBC BLD AUTO-RTO: 0 /100 WBCS
P AXIS: 93 DEGREES
PLATELET # BLD AUTO: 141 THOUSANDS/UL (ref 149–390)
PMV BLD AUTO: 10.5 FL (ref 8.9–12.7)
POTASSIUM SERPL-SCNC: 3.6 MMOL/L (ref 3.5–5.3)
PROTHROMBIN TIME: 24.9 SECONDS (ref 11.6–14.5)
QRS AXIS: -52 DEGREES
QRSD INTERVAL: 164 MS
QT INTERVAL: 460 MS
QTC INTERVAL: 492 MS
RBC # BLD AUTO: 4.14 MILLION/UL (ref 3.88–5.62)
SODIUM SERPL-SCNC: 141 MMOL/L (ref 136–145)
SPECIMEN SOURCE: ABNORMAL
T WAVE AXIS: -3 DEGREES
VENTRICULAR RATE: 69 BPM
WBC # BLD AUTO: 4.67 THOUSAND/UL (ref 4.31–10.16)

## 2021-06-02 PROCEDURE — C1769 GUIDE WIRE: HCPCS | Performed by: INTERNAL MEDICINE

## 2021-06-02 PROCEDURE — C1894 INTRO/SHEATH, NON-LASER: HCPCS | Performed by: INTERNAL MEDICINE

## 2021-06-02 PROCEDURE — 93657 TX L/R ATRIAL FIB ADDL: CPT | Performed by: INTERNAL MEDICINE

## 2021-06-02 PROCEDURE — C1893 INTRO/SHEATH, FIXED,NON-PEEL: HCPCS | Performed by: INTERNAL MEDICINE

## 2021-06-02 PROCEDURE — 93613 INTRACARDIAC EPHYS 3D MAPG: CPT | Performed by: INTERNAL MEDICINE

## 2021-06-02 PROCEDURE — 93005 ELECTROCARDIOGRAM TRACING: CPT

## 2021-06-02 PROCEDURE — 80048 BASIC METABOLIC PNL TOTAL CA: CPT | Performed by: PHYSICIAN ASSISTANT

## 2021-06-02 PROCEDURE — 93655 ICAR CATH ABLTJ DSCRT ARRHYT: CPT | Performed by: INTERNAL MEDICINE

## 2021-06-02 PROCEDURE — C1733 CATH, EP, OTHR THAN COOL-TIP: HCPCS | Performed by: INTERNAL MEDICINE

## 2021-06-02 PROCEDURE — 85025 COMPLETE CBC W/AUTO DIFF WBC: CPT | Performed by: PHYSICIAN ASSISTANT

## 2021-06-02 PROCEDURE — 93662 INTRACARDIAC ECG (ICE): CPT | Performed by: INTERNAL MEDICINE

## 2021-06-02 PROCEDURE — C1730 CATH, EP, 19 OR FEW ELECT: HCPCS | Performed by: INTERNAL MEDICINE

## 2021-06-02 PROCEDURE — 85610 PROTHROMBIN TIME: CPT | Performed by: PHYSICIAN ASSISTANT

## 2021-06-02 PROCEDURE — 76937 US GUIDE VASCULAR ACCESS: CPT | Performed by: INTERNAL MEDICINE

## 2021-06-02 PROCEDURE — C9113 INJ PANTOPRAZOLE SODIUM, VIA: HCPCS | Performed by: PHYSICIAN ASSISTANT

## 2021-06-02 PROCEDURE — C1732 CATH, EP, DIAG/ABL, 3D/VECT: HCPCS | Performed by: INTERNAL MEDICINE

## 2021-06-02 PROCEDURE — C1759 CATH, INTRA ECHOCARDIOGRAPHY: HCPCS | Performed by: INTERNAL MEDICINE

## 2021-06-02 PROCEDURE — 93656 COMPRE EP EVAL ABLTJ ATR FIB: CPT | Performed by: INTERNAL MEDICINE

## 2021-06-02 PROCEDURE — 93623 PRGRMD STIMJ&PACG IV RX NFS: CPT | Performed by: INTERNAL MEDICINE

## 2021-06-02 PROCEDURE — C1760 CLOSURE DEV, VASC: HCPCS | Performed by: INTERNAL MEDICINE

## 2021-06-02 PROCEDURE — 93312 ECHO TRANSESOPHAGEAL: CPT

## 2021-06-02 PROCEDURE — 93010 ELECTROCARDIOGRAM REPORT: CPT | Performed by: INTERNAL MEDICINE

## 2021-06-02 PROCEDURE — 85347 COAGULATION TIME ACTIVATED: CPT

## 2021-06-02 RX ORDER — SODIUM CHLORIDE 9 MG/ML
50 INJECTION, SOLUTION INTRAVENOUS CONTINUOUS
Status: DISCONTINUED | OUTPATIENT
Start: 2021-06-02 | End: 2021-06-03 | Stop reason: HOSPADM

## 2021-06-02 RX ORDER — ONDANSETRON 2 MG/ML
INJECTION INTRAMUSCULAR; INTRAVENOUS AS NEEDED
Status: DISCONTINUED | OUTPATIENT
Start: 2021-06-02 | End: 2021-06-02

## 2021-06-02 RX ORDER — METOPROLOL SUCCINATE 50 MG/1
50 TABLET, EXTENDED RELEASE ORAL DAILY
Status: DISCONTINUED | OUTPATIENT
Start: 2021-06-02 | End: 2021-06-03 | Stop reason: HOSPADM

## 2021-06-02 RX ORDER — FENTANYL CITRATE/PF 50 MCG/ML
25 SYRINGE (ML) INJECTION
Status: DISCONTINUED | OUTPATIENT
Start: 2021-06-02 | End: 2021-06-02 | Stop reason: HOSPADM

## 2021-06-02 RX ORDER — PREGABALIN 100 MG/1
100 CAPSULE ORAL 3 TIMES DAILY
Status: DISCONTINUED | OUTPATIENT
Start: 2021-06-02 | End: 2021-06-03 | Stop reason: HOSPADM

## 2021-06-02 RX ORDER — FUROSEMIDE 10 MG/ML
40 INJECTION INTRAMUSCULAR; INTRAVENOUS
Status: DISCONTINUED | OUTPATIENT
Start: 2021-06-02 | End: 2021-06-03

## 2021-06-02 RX ORDER — PROTAMINE SULFATE 10 MG/ML
INJECTION, SOLUTION INTRAVENOUS AS NEEDED
Status: DISCONTINUED | OUTPATIENT
Start: 2021-06-02 | End: 2021-06-02

## 2021-06-02 RX ORDER — PROPOFOL 10 MG/ML
INJECTION, EMULSION INTRAVENOUS AS NEEDED
Status: DISCONTINUED | OUTPATIENT
Start: 2021-06-02 | End: 2021-06-02

## 2021-06-02 RX ORDER — DEXAMETHASONE SODIUM PHOSPHATE 10 MG/ML
INJECTION, SOLUTION INTRAMUSCULAR; INTRAVENOUS AS NEEDED
Status: DISCONTINUED | OUTPATIENT
Start: 2021-06-02 | End: 2021-06-02

## 2021-06-02 RX ORDER — TRAMADOL HYDROCHLORIDE 50 MG/1
50 TABLET ORAL EVERY 6 HOURS PRN
Status: DISCONTINUED | OUTPATIENT
Start: 2021-06-02 | End: 2021-06-03 | Stop reason: HOSPADM

## 2021-06-02 RX ORDER — ALLOPURINOL 100 MG/1
100 TABLET ORAL DAILY
Status: DISCONTINUED | OUTPATIENT
Start: 2021-06-03 | End: 2021-06-03 | Stop reason: HOSPADM

## 2021-06-02 RX ORDER — FENTANYL CITRATE 50 UG/ML
INJECTION, SOLUTION INTRAMUSCULAR; INTRAVENOUS AS NEEDED
Status: DISCONTINUED | OUTPATIENT
Start: 2021-06-02 | End: 2021-06-02

## 2021-06-02 RX ORDER — ACETAMINOPHEN 325 MG/1
650 TABLET ORAL EVERY 4 HOURS PRN
Status: CANCELLED | OUTPATIENT
Start: 2021-06-02

## 2021-06-02 RX ORDER — SUCCINYLCHOLINE/SOD CL,ISO/PF 100 MG/5ML
SYRINGE (ML) INTRAVENOUS AS NEEDED
Status: DISCONTINUED | OUTPATIENT
Start: 2021-06-02 | End: 2021-06-02

## 2021-06-02 RX ORDER — ONDANSETRON 2 MG/ML
4 INJECTION INTRAMUSCULAR; INTRAVENOUS ONCE AS NEEDED
Status: DISCONTINUED | OUTPATIENT
Start: 2021-06-02 | End: 2021-06-02 | Stop reason: HOSPADM

## 2021-06-02 RX ORDER — MIDAZOLAM HYDROCHLORIDE 2 MG/2ML
INJECTION, SOLUTION INTRAMUSCULAR; INTRAVENOUS AS NEEDED
Status: DISCONTINUED | OUTPATIENT
Start: 2021-06-02 | End: 2021-06-02

## 2021-06-02 RX ORDER — LIDOCAINE HYDROCHLORIDE 10 MG/ML
INJECTION, SOLUTION EPIDURAL; INFILTRATION; INTRACAUDAL; PERINEURAL AS NEEDED
Status: DISCONTINUED | OUTPATIENT
Start: 2021-06-02 | End: 2021-06-02

## 2021-06-02 RX ORDER — HEPARIN SODIUM 1000 [USP'U]/ML
INJECTION, SOLUTION INTRAVENOUS; SUBCUTANEOUS CODE/TRAUMA/SEDATION MEDICATION
Status: COMPLETED | OUTPATIENT
Start: 2021-06-02 | End: 2021-06-02

## 2021-06-02 RX ORDER — SPIRONOLACTONE 25 MG/1
25 TABLET ORAL 2 TIMES DAILY
Status: DISCONTINUED | OUTPATIENT
Start: 2021-06-02 | End: 2021-06-03 | Stop reason: HOSPADM

## 2021-06-02 RX ORDER — FERROUS SULFATE 325(65) MG
325 TABLET ORAL EVERY OTHER DAY
Status: DISCONTINUED | OUTPATIENT
Start: 2021-06-03 | End: 2021-06-03 | Stop reason: HOSPADM

## 2021-06-02 RX ORDER — COLCHICINE 0.6 MG/1
0.6 TABLET ORAL 2 TIMES DAILY PRN
Status: DISCONTINUED | OUTPATIENT
Start: 2021-06-02 | End: 2021-06-03 | Stop reason: HOSPADM

## 2021-06-02 RX ORDER — AMIODARONE HYDROCHLORIDE 200 MG/1
100 TABLET ORAL
Status: DISCONTINUED | OUTPATIENT
Start: 2021-06-02 | End: 2021-06-03 | Stop reason: HOSPADM

## 2021-06-02 RX ORDER — PANTOPRAZOLE SODIUM 40 MG/1
40 TABLET, DELAYED RELEASE ORAL DAILY
Status: CANCELLED | OUTPATIENT
Start: 2021-06-03

## 2021-06-02 RX ORDER — PANTOPRAZOLE SODIUM 40 MG/1
40 INJECTION, POWDER, FOR SOLUTION INTRAVENOUS ONCE
Status: COMPLETED | OUTPATIENT
Start: 2021-06-02 | End: 2021-06-02

## 2021-06-02 RX ORDER — SODIUM CHLORIDE 9 MG/ML
INJECTION, SOLUTION INTRAVENOUS CONTINUOUS PRN
Status: DISCONTINUED | OUTPATIENT
Start: 2021-06-02 | End: 2021-06-02

## 2021-06-02 RX ORDER — HEPARIN SODIUM 10000 [USP'U]/100ML
INJECTION, SOLUTION INTRAVENOUS
Status: COMPLETED | OUTPATIENT
Start: 2021-06-02 | End: 2021-06-02

## 2021-06-02 RX ADMIN — Medication 100 MG: at 08:13

## 2021-06-02 RX ADMIN — HEPARIN SODIUM 3500 UNITS/HR: 10000 INJECTION, SOLUTION INTRAVENOUS at 09:13

## 2021-06-02 RX ADMIN — AMIODARONE HYDROCHLORIDE 100 MG: 200 TABLET ORAL at 18:15

## 2021-06-02 RX ADMIN — HEPARIN SODIUM 10000 UNITS: 1000 INJECTION INTRAVENOUS; SUBCUTANEOUS at 09:12

## 2021-06-02 RX ADMIN — PHENYLEPHRINE HYDROCHLORIDE 100 MCG: 10 INJECTION INTRAVENOUS at 08:17

## 2021-06-02 RX ADMIN — FENTANYL CITRATE 50 MCG: 50 INJECTION INTRAMUSCULAR; INTRAVENOUS at 12:10

## 2021-06-02 RX ADMIN — ISOPROTERENOL HYDROCHLORIDE 2 MCG/MIN: 0.2 INJECTION, SOLUTION INTRAMUSCULAR; INTRAVENOUS at 11:29

## 2021-06-02 RX ADMIN — SODIUM CHLORIDE: 0.9 INJECTION, SOLUTION INTRAVENOUS at 08:01

## 2021-06-02 RX ADMIN — ONDANSETRON 4 MG: 2 INJECTION INTRAMUSCULAR; INTRAVENOUS at 11:25

## 2021-06-02 RX ADMIN — PANTOPRAZOLE SODIUM 40 MG: 40 INJECTION, POWDER, FOR SOLUTION INTRAVENOUS at 08:13

## 2021-06-02 RX ADMIN — PHENYLEPHRINE HYDROCHLORIDE 100 MCG: 10 INJECTION INTRAVENOUS at 12:22

## 2021-06-02 RX ADMIN — IOHEXOL 30 ML: 350 INJECTION, SOLUTION INTRAVENOUS at 12:36

## 2021-06-02 RX ADMIN — PROTAMINE SULFATE 20 MG: 10 INJECTION, SOLUTION INTRAVENOUS at 12:19

## 2021-06-02 RX ADMIN — PROTAMINE SULFATE 10 MG: 10 INJECTION, SOLUTION INTRAVENOUS at 12:25

## 2021-06-02 RX ADMIN — HEPARIN SODIUM 3000 UNITS: 1000 INJECTION INTRAVENOUS; SUBCUTANEOUS at 09:33

## 2021-06-02 RX ADMIN — FENTANYL CITRATE 50 MCG: 50 INJECTION INTRAMUSCULAR; INTRAVENOUS at 08:13

## 2021-06-02 RX ADMIN — METOPROLOL SUCCINATE 50 MG: 50 TABLET, EXTENDED RELEASE ORAL at 18:15

## 2021-06-02 RX ADMIN — PHENYLEPHRINE HYDROCHLORIDE 50 MCG/MIN: 10 INJECTION INTRAVENOUS at 08:20

## 2021-06-02 RX ADMIN — SODIUM CHLORIDE: 0.9 INJECTION, SOLUTION INTRAVENOUS at 12:22

## 2021-06-02 RX ADMIN — LIDOCAINE HYDROCHLORIDE 50 MG: 10 INJECTION, SOLUTION EPIDURAL; INFILTRATION; INTRACAUDAL; PERINEURAL at 08:13

## 2021-06-02 RX ADMIN — PROPOFOL 100 MG: 10 INJECTION, EMULSION INTRAVENOUS at 08:13

## 2021-06-02 RX ADMIN — PREGABALIN 100 MG: 100 CAPSULE ORAL at 22:48

## 2021-06-02 RX ADMIN — FUROSEMIDE 40 MG: 10 INJECTION, SOLUTION INTRAMUSCULAR; INTRAVENOUS at 18:16

## 2021-06-02 RX ADMIN — DEXAMETHASONE SODIUM PHOSPHATE 10 MG: 10 INJECTION, SOLUTION INTRAMUSCULAR; INTRAVENOUS at 08:51

## 2021-06-02 RX ADMIN — SPIRONOLACTONE 25 MG: 25 TABLET, FILM COATED ORAL at 18:15

## 2021-06-02 RX ADMIN — PROTAMINE SULFATE 20 MG: 10 INJECTION, SOLUTION INTRAVENOUS at 12:21

## 2021-06-02 RX ADMIN — PHENYLEPHRINE HYDROCHLORIDE 50 MCG: 10 INJECTION INTRAVENOUS at 08:13

## 2021-06-02 RX ADMIN — PHENYLEPHRINE HYDROCHLORIDE 100 MCG: 10 INJECTION INTRAVENOUS at 09:16

## 2021-06-02 RX ADMIN — PROTAMINE SULFATE 20 MG: 10 INJECTION, SOLUTION INTRAVENOUS at 12:23

## 2021-06-02 RX ADMIN — VANCOMYCIN HYDROCHLORIDE 1500 MG: 10 INJECTION, POWDER, LYOPHILIZED, FOR SOLUTION INTRAVENOUS at 08:09

## 2021-06-02 RX ADMIN — MIDAZOLAM 2 MG: 1 INJECTION INTRAMUSCULAR; INTRAVENOUS at 07:59

## 2021-06-02 RX ADMIN — RIVAROXABAN 20 MG: 20 TABLET, FILM COATED ORAL at 18:15

## 2021-06-02 NOTE — ANESTHESIA PROCEDURE NOTES
Arterial Line Insertion  Performed by: Alexa Car CRNA  Authorized by: Maria L Richmond MD   Consent: Verbal consent obtained  Written consent obtained  Risks and benefits: risks, benefits and alternatives were discussed  Consent given by: patient  Patient understanding: patient states understanding of the procedure being performed  Patient consent: the patient's understanding of the procedure matches consent given  Procedure consent: procedure consent matches procedure scheduled  Relevant documents: relevant documents present and verified  Test results: test results available and properly labeled  Site marked: the operative site was not marked  Radiology Images: Radiology Images displayed and confirmed  If images not available, report reviewed  Required items: required blood products, implants, devices, and special equipment available  Patient identity confirmed: verbally with patient, arm band, provided demographic data and hospital-assigned identification number  Time out: Immediately prior to procedure a "time out" was called to verify the correct patient, procedure, equipment, support staff and site/side marked as required  Preparation: Patient was prepped and draped in the usual sterile fashion    Indications: hemodynamic monitoring  Orientation:  Left  Location: radial artery  Procedure Details:  Minor's test normal: yes  Needle gauge: 20  Seldinger technique: Seldinger technique used  Number of attempts: 1    Post-procedure:  Post-procedure: dressing applied  Patient tolerance: Patient tolerated the procedure well with no immediate complications

## 2021-06-02 NOTE — ANESTHESIA POSTPROCEDURE EVALUATION
Post-Op Assessment Note    CV Status:  Stable  Pain Score: 0    Pain management: adequate     Mental Status:  Alert and awake   Hydration Status:  Stable   PONV Controlled:  Controlled   Airway Patency:  Patent      Post Op Vitals Reviewed: Yes      Staff: CRNA   Comments: VSS  No airway concerns  No complications documented      BP   119/79   Temp     Pulse  65   Resp   16   SpO2   96

## 2021-06-02 NOTE — ANESTHESIA PREPROCEDURE EVALUATION
Medical History    History Comments   Arthritis    Sleep apnea    CPAP (continuous positive airway pressure) dependence    Urinary frequency    Irregular heart beat    A-fib (HCC)    Lymphedema    Wears glasses    Wears partial dentures lower partial     Procedure:  ELAINE  CARDIAC EPS/AFIB ABLATION    Relevant Problems   CARDIO   (+) Atrial fibrillation (HCC)      HEMATOLOGY   (+) Thrombocytopenia (HCC)      NEURO/PSYCH   (+) Paresthesias      PULMONARY   (+) Obstructive sleep apnea on CPAP        Physical Exam    Airway    Mallampati score: III  TM Distance: >3 FB  Neck ROM: full     Dental   lower dentures and upper dentures,     Cardiovascular  Rate: normal,     Pulmonary  Pulmonary exam normal     Other Findings  Top denture partial, bottom full  Multiple missing teeth, per pt denies anything loose or removeable      Anesthesia Plan  ASA Score- 3     Anesthesia Type- general with ASA Monitors  Additional Monitors: arterial line  Airway Plan: ETT  Plan Factors-Exercise tolerance (METS): >4 METS  Chart reviewed  EKG reviewed  Existing labs reviewed  Patient summary reviewed  Patient is not a current smoker  Induction- intravenous  Postoperative Plan- Plan for postoperative opioid use  Informed Consent- Anesthetic plan and risks discussed with patient  I personally reviewed this patient with the CRNA  Discussed and agreed on the Anesthesia Plan with the CRNA  Niraj Bradford

## 2021-06-02 NOTE — PROGRESS NOTES
Pt does complain of chest tightness// no SOB or lightheadness or dizziness/ will make Dr Sayra Banks aware

## 2021-06-03 VITALS
DIASTOLIC BLOOD PRESSURE: 85 MMHG | HEART RATE: 55 BPM | TEMPERATURE: 98.5 F | OXYGEN SATURATION: 93 % | SYSTOLIC BLOOD PRESSURE: 131 MMHG | RESPIRATION RATE: 18 BRPM

## 2021-06-03 LAB
ANION GAP SERPL CALCULATED.3IONS-SCNC: 6 MMOL/L (ref 4–13)
ATRIAL RATE: 66 BPM
BASOPHILS # BLD AUTO: 0 THOUSANDS/ΜL (ref 0–0.1)
BASOPHILS NFR BLD AUTO: 0 % (ref 0–1)
BUN SERPL-MCNC: 26 MG/DL (ref 5–25)
CALCIUM SERPL-MCNC: 8.2 MG/DL (ref 8.3–10.1)
CHLORIDE SERPL-SCNC: 105 MMOL/L (ref 100–108)
CO2 SERPL-SCNC: 28 MMOL/L (ref 21–32)
CREAT SERPL-MCNC: 0.95 MG/DL (ref 0.6–1.3)
EOSINOPHIL # BLD AUTO: 0 THOUSAND/ΜL (ref 0–0.61)
EOSINOPHIL NFR BLD AUTO: 0 % (ref 0–6)
ERYTHROCYTE [DISTWIDTH] IN BLOOD BY AUTOMATED COUNT: 14.5 % (ref 11.6–15.1)
GFR SERPL CREATININE-BSD FRML MDRD: 83 ML/MIN/1.73SQ M
GLUCOSE SERPL-MCNC: 169 MG/DL (ref 65–140)
HCT VFR BLD AUTO: 36.7 % (ref 36.5–49.3)
HGB BLD-MCNC: 12 G/DL (ref 12–17)
IMM GRANULOCYTES # BLD AUTO: 0.01 THOUSAND/UL (ref 0–0.2)
IMM GRANULOCYTES NFR BLD AUTO: 0 % (ref 0–2)
LYMPHOCYTES # BLD AUTO: 0.3 THOUSANDS/ΜL (ref 0.6–4.47)
LYMPHOCYTES NFR BLD AUTO: 6 % (ref 14–44)
MAGNESIUM SERPL-MCNC: 2.4 MG/DL (ref 1.6–2.6)
MCH RBC QN AUTO: 31.1 PG (ref 26.8–34.3)
MCHC RBC AUTO-ENTMCNC: 32.7 G/DL (ref 31.4–37.4)
MCV RBC AUTO: 95 FL (ref 82–98)
MONOCYTES # BLD AUTO: 0.29 THOUSAND/ΜL (ref 0.17–1.22)
MONOCYTES NFR BLD AUTO: 6 % (ref 4–12)
NEUTROPHILS # BLD AUTO: 4.07 THOUSANDS/ΜL (ref 1.85–7.62)
NEUTS SEG NFR BLD AUTO: 88 % (ref 43–75)
NRBC BLD AUTO-RTO: 0 /100 WBCS
P AXIS: 60 DEGREES
PLATELET # BLD AUTO: 104 THOUSANDS/UL (ref 149–390)
PMV BLD AUTO: 12.1 FL (ref 8.9–12.7)
POTASSIUM SERPL-SCNC: 3.8 MMOL/L (ref 3.5–5.3)
PR INTERVAL: 238 MS
QRS AXIS: -58 DEGREES
QRSD INTERVAL: 179 MS
QT INTERVAL: 450 MS
QTC INTERVAL: 472 MS
RBC # BLD AUTO: 3.86 MILLION/UL (ref 3.88–5.62)
SODIUM SERPL-SCNC: 139 MMOL/L (ref 136–145)
T WAVE AXIS: -32 DEGREES
VENTRICULAR RATE: 66 BPM
WBC # BLD AUTO: 4.67 THOUSAND/UL (ref 4.31–10.16)

## 2021-06-03 PROCEDURE — 93320 DOPPLER ECHO COMPLETE: CPT | Performed by: INTERNAL MEDICINE

## 2021-06-03 PROCEDURE — 93321 DOPPLER ECHO F-UP/LMTD STD: CPT | Performed by: INTERNAL MEDICINE

## 2021-06-03 PROCEDURE — 80048 BASIC METABOLIC PNL TOTAL CA: CPT | Performed by: PHYSICIAN ASSISTANT

## 2021-06-03 PROCEDURE — 83735 ASSAY OF MAGNESIUM: CPT | Performed by: PHYSICIAN ASSISTANT

## 2021-06-03 PROCEDURE — 85025 COMPLETE CBC W/AUTO DIFF WBC: CPT | Performed by: PHYSICIAN ASSISTANT

## 2021-06-03 PROCEDURE — 93312 ECHO TRANSESOPHAGEAL: CPT | Performed by: INTERNAL MEDICINE

## 2021-06-03 PROCEDURE — NC001 PR NO CHARGE: Performed by: PHYSICIAN ASSISTANT

## 2021-06-03 PROCEDURE — 93010 ELECTROCARDIOGRAM REPORT: CPT | Performed by: INTERNAL MEDICINE

## 2021-06-03 RX ORDER — METOPROLOL SUCCINATE 25 MG/1
25 TABLET, EXTENDED RELEASE ORAL DAILY
Qty: 90 TABLET | Refills: 0 | Status: SHIPPED | OUTPATIENT
Start: 2021-06-03 | End: 2021-07-30 | Stop reason: HOSPADM

## 2021-06-03 RX ORDER — PANTOPRAZOLE SODIUM 40 MG/1
40 TABLET, DELAYED RELEASE ORAL DAILY
Qty: 30 TABLET | Refills: 0 | Status: SHIPPED | OUTPATIENT
Start: 2021-06-03 | End: 2021-06-15

## 2021-06-03 RX ADMIN — PREGABALIN 100 MG: 100 CAPSULE ORAL at 08:09

## 2021-06-03 RX ADMIN — SPIRONOLACTONE 25 MG: 25 TABLET, FILM COATED ORAL at 09:44

## 2021-06-03 RX ADMIN — FERROUS SULFATE TAB 325 MG (65 MG ELEMENTAL FE) 325 MG: 325 (65 FE) TAB at 08:21

## 2021-06-03 RX ADMIN — ALLOPURINOL 100 MG: 100 TABLET ORAL at 08:22

## 2021-06-03 NOTE — NURSING NOTE
Went over metoprolol and amiodarone medication regimen in addition to groin site care and restrictions with pt and spouse  Pt had vitals WDL and voiced that all questions had been answered prior to discharge  Pt left via wheelchair with RN and spouse

## 2021-06-03 NOTE — DISCHARGE INSTRUCTIONS
NEW MEDICATIONS:  Please start taking Protonix 40mg once daily for 1 month in the post ablation setting  Please continue amiodarone 100mg daily  Please decrease metoprolol succinate 25mg daily  You can continue all other medications as previously prescribed  RESTRICTIONS:   No heavy lifting or strenuous activity for one week  No soaking in a bath tub/hot tub/swimming pool for one week or until groin heals  You may shower - please let soap and water run over the groins, no scrubbing, and pat the area dry  Please place band-aid on groins daily for up to five days, but you may remove sooner if no issues are noted  If you notice ongoing bleeding, swelling, or firm lumps in groin near ablation incision, please contact Dr Amie Arnett' office - [phone]

## 2021-06-03 NOTE — PLAN OF CARE
Problem: Potential for Falls  Goal: Patient will remain free of falls  Description: INTERVENTIONS:  - Assess patient frequently for physical needs  -  Identify cognitive and physical deficits and behaviors that affect risk of falls    -  [place] fall precautions as indicated by assessment   - Educate patient/family on patient safety including physical limitations  - Instruct patient to call for assistance with activity based on assessment  - Modify environment to reduce risk of injury  - Consider OT/PT consult to assist with strengthening/mobility  Outcome: Progressing     Problem: PAIN - ADULT  Goal: Verbalizes/displays adequate comfort level or baseline comfort level  Description: Interventions:  - Encourage patient to monitor pain and request assistance  - Assess pain using appropriate pain scale  - Administer analgesics based on type and severity of pain and evaluate response  - Implement non-pharmacological measures as appropriate and evaluate response  - Consider cultural and social influences on pain and pain management  - Notify physician/advanced practitioner if interventions unsuccessful or patient reports new pain  Outcome: Progressing     Problem: INFECTION - ADULT  Goal: Absence or prevention of progression during hospitalization  Description: INTERVENTIONS:  - Assess and monitor for signs and symptoms of infection  - Monitor lab/diagnostic results  - Monitor all insertion sites, i e  indwelling lines, tubes, and drains  - Monitor endotracheal if appropriate and nasal secretions for changes in amount and color  - [place] appropriate cooling/warming therapies per order  - Administer medications as ordered  - Instruct and encourage patient and family to use good hand hygiene technique  - Identify and instruct in appropriate isolation precautions for identified infection/condition  Outcome: Progressing  Goal: Absence of fever/infection during neutropenic period  Description: INTERVENTIONS:  - Monitor WBC    Outcome: Progressing     Problem: SAFETY ADULT  Goal: Patient will remain free of falls  Description: INTERVENTIONS:  - Assess patient frequently for physical needs  -  Identify cognitive and physical deficits and behaviors that affect risk of falls    -  [place] fall precautions as indicated by assessment   - Educate patient/family on patient safety including physical limitations  - Instruct patient to call for assistance with activity based on assessment  - Modify environment to reduce risk of injury  - Consider OT/PT consult to assist with strengthening/mobility  Outcome: Progressing  Goal: Maintain or return to baseline ADL function  Description: INTERVENTIONS:  -  Assess patient's ability to carry out ADLs; assess patient's baseline for ADL function and identify physical deficits which impact ability to perform ADLs (bathing, care of mouth/teeth, toileting, grooming, dressing, etc )  - Assess/evaluate cause of self-care deficits   - Assess range of motion  - Assess patient's mobility; develop plan if impaired  - Assess patient's need for assistive devices and provide as appropriate  - Encourage maximum independence but intervene and supervise when necessary  - Involve family in performance of ADLs  - Assess for home care needs following discharge   - Consider OT consult to assist with ADL evaluation and planning for discharge  - Provide patient education as appropriate  Outcome: Progressing  Goal: Maintain or return mobility status to optimal level  Description: INTERVENTIONS:  - Assess patient's baseline mobility status (ambulation, transfers, stairs, etc )    - Identify cognitive and physical deficits and behaviors that affect mobility  - Identify mobility aids required to assist with transfers and/or ambulation (gait belt, sit-to-stand, lift, walker, cane, etc )  - [place] fall precautions as indicated by assessment  - Record patient progress and toleration of activity level on Mobility SBAR; progress patient to next Phase/Stage  - Instruct patient to call for assistance with activity based on assessment  - Consider rehabilitation consult to assist with strengthening/weightbearing, etc   Outcome: Progressing     Problem: DISCHARGE PLANNING  Goal: Discharge to home or other facility with appropriate resources  Description: INTERVENTIONS:  - Identify barriers to discharge w/patient and caregiver  - Arrange for needed discharge resources and transportation as appropriate  - Identify discharge learning needs (meds, wound care, etc )  - Arrange for interpretive services to assist at discharge as needed  - Refer to Case Management Department for coordinating discharge planning if the patient needs post-hospital services based on physician/advanced practitioner order or complex needs related to functional status, cognitive ability, or social support system  Outcome: Progressing     Problem: Knowledge Deficit  Goal: Patient/family/caregiver demonstrates understanding of disease process, treatment plan, medications, and discharge instructions  Description: Complete learning assessment and assess knowledge base    Interventions:  - Provide teaching at level of understanding  - Provide teaching via preferred learning methods  Outcome: Progressing

## 2021-06-03 NOTE — DISCHARGE SUMMARY
Discharge Summary - Evette Forte 76 y o  male MRN: [id number]    Unit/Bed#: CW2 218-01 Encounter: [id number]      Admission Date: 6/2/2021   Discharge Date: 6/3/2021    Discharge Diagnosis:   1  Persistent atrial fibrillation  - anticoagulated with Xarelto / [id number] score of 3 (age, CHF, HTN)  - EF of 55% per echo 1/2021 / LA diameter of 3 8cm  - rate control: metoprolol succinate 25mg daily   - antiarrhythmic therapy: amiodarone 100mg daily   - prior cardioversion:  5/6/2021  - status post cryoablation of atrial fibrillation isolation and posterior wall isolation, ablation of AVNRT, and radiofrequency ablation of CTI dependent flutter by Dr Ralph Cain on 6/2/2021  2  Right bundle-branch block at baseline   3  Chronic diastolic congestive heart failure   - diuretic regimen of torsemide 20mg twice daily   4  Lymphedema  5  Essential hypertension  6  ROBERT compliant with CPAP   7  Obesity with BMI of 42    Procedures Performed:   1  Atrial fibrillation ablation using Cryoablation  2  Cavotricuspid Isthmus Right atrial flutter ablation  3  Left atrial posterior wall isolation using cryoballoon to treat afib  4  SVT ablation-AVNRT  5  3-D map with NAVX   6  Intracardiac Echocardiography ICE  7  Left atrial pacing and recording  8  EP testing with isoproterenol  Orders Placed This Encounter   Procedures    Cardiac eps/afib ablation       Consultants: None    HPI: Please refer to the initial history and physical as well as procedure notes for full details  Briefly, Evette Forte is a 76y o  year old male with early persistent atrial fibrillation anticoagulated with Xarelto, right bundle branch block at baseline, chronic diastolic congestive heart failure, hypertension, obesity and ROBERT  He was seen by Dr Teofilo Taylor as an outpatient, and ablation was recommended  He presented this hospital admission to undergo this procedure  Hospital Course: Evette Forte presented at his baseline state of health  After the procedure was explained in detail and consent was obtained, he underwent ablation of atrial fibrillation, atrial flutter, and AVNRT along with posterior wall without complications  He tolerated the procedure well  He was then monitored overnight for further observation  There were no acute issues or events overnight  The following morning he denied all cardiac complaints, including chest pain/pressure, dyspnea, palpitations, dizziness, lightheadedness, or syncope  His vital signs were reviewed and labs are stable  Telemetry showed sinus rhythm in the 50's and 60's  His groins were soft without significant hematoma or recurrent bleeding  Physical exam on the day of discharge was as follows:  GEN: NAD, alert and oriented, well appearing  SKIN: dry without significant lesions or rashes  HEENT: NCAT, PERRL, EOMs intact  NECK: No JVD or carotid bruits appreciated  CARDIOVASCULAR: RRR, normal S1, S2 without murmurs, rubs, or gallops appreciated  LUNGS: Clear to auscultation bilaterally without wheezes, rhonchi, or rales  ABDOMEN: Soft, nontender, nondistended  EXTREMITIES/VASCULAR: perfused without clubbing, cyanosis, or edema b/l  PSYCH: Normal mood and affect  NEURO: CN ll-Xll grossly intact    He was given routine post ablation discharge instructions and restrictions, and these were explained in detail  He was given a follow up appointment with Himanshu Jenkins PA-C, and he was instructed to follow up with his primary cardiologist as previously instructed  In terms of his medications, he will be continued on amiodarone 100mg daily  Will decrease metoprolol to 25mg daily  He will take Protonix 40mg daily for one month in the post ablation at which point this can be discontinued  He is stable for discharge at this time with all questions answered  He was discussed in detail with Dr Carolyn Babinski who is in agreement with this discharge summary       Discharge Medications:  See after visit summary for reconciled discharge medications provided to patient and family      Medications Prior to Admission   Medication    allopurinol (ZYLOPRIM) 100 mg tablet    amiodarone 200 mg tablet    ammonium lactate (AMLACTIN) 12 % lotion    CoenzymeQ10-Isoleucine-Glycine (CO Q-10) 100-50-25 MG TB24    ferrous sulfate 325 (65 Fe) mg tablet    Glucosamine-Chondroit-Vit C-Mn (Glucosamine 1500 Complex) CAPS    metoprolol succinate (TOPROL-XL) 25 mg 24 hr tablet    Multiple Vitamins-Minerals (OCUVITE EXTRA PO)    pregabalin (LYRICA) 100 mg capsule    rivaroxaban (Xarelto) 20 mg tablet    torsemide (DEMADEX) 20 mg tablet    Turmeric Curcumin 500 MG CAPS    VITAMIN D PO    acetaminophen (TYLENOL) 500 mg tablet    clindamycin (CLEOCIN) 300 MG capsule    colchicine (COLCRYS) 0 6 mg tablet    GARCINIA CAMBOGIA-CHROMIUM PO    halobetasol (ULTRAVATE) 0 05 % ointment    L-ARGININE-500 PO    patient supplied medication    spironolactone (ALDACTONE) 25 mg tablet    tadalafil (CIALIS) 5 MG tablet    traMADol (ULTRAM) 50 mg tablet         [facility] Labs/diagnostics:  CBC with diff:   Results from last 7 days   Lab Units 06/03/21  0444 06/02/21  0750   WBC Thousand/uL 4 67 4 67   HEMOGLOBIN g/dL 12 0 12 9   HEMATOCRIT % 36 7 39 1   MCV fL 95 94   PLATELETS Thousands/uL 104* 141*   MCH pg 31 1 31 2   MCHC g/dL 32 7 33 0   RDW % 14 5 14 1   MPV fL 12 1 10 5   NRBC AUTO /100 WBCs 0 0       BMP:  Results from last 7 days   Lab Units 06/03/21  0444 06/02/21  0805   POTASSIUM mmol/L 3 8 3 6   CHLORIDE mmol/L 105 104   CO2 mmol/L 28 31   BUN mg/dL 26* 36*   CREATININE mg/dL 0 95 1 28   CALCIUM mg/dL 8 2* 9 2       Magnesium:   Results from last 7 days   Lab Units 06/03/21  0444   MAGNESIUM mg/dL 2 4       Coags:   Results from last 7 days   Lab Units 06/02/21  0645   INR  9 67*         Complications: none    Condition at Discharge: good     Discharge instructions/Information to patient and family:   See after visit summary for information provided to patient and family  Provisions for Follow-Up Care:  See after visit summary for information related to follow-up care and any pertinent home health orders  Disposition: Home    Planned Readmission: No    Discharge Statement   I spent 45 minutes minutes discharging the patient  This time was spent on the day of discharge  I had direct contact with the patient on the day of discharge  Additional documentation is required if more than 30 minutes were spent on discharge   Evaluating the incision, discussing discharge instructions and restrictions, arranging follow up appointments, discussing medications

## 2021-06-03 NOTE — PLAN OF CARE
Problem: Potential for Falls  Goal: Patient will remain free of falls  Description: INTERVENTIONS:  - Assess patient frequently for physical needs  -  Identify cognitive and physical deficits and behaviors that affect risk of falls    -  [place] fall precautions as indicated by assessment   - Educate patient/family on patient safety including physical limitations  - Instruct patient to call for assistance with activity based on assessment  - Modify environment to reduce risk of injury  - Consider OT/PT consult to assist with strengthening/mobility  Outcome: Progressing     Problem: PAIN - ADULT  Goal: Verbalizes/displays adequate comfort level or baseline comfort level  Description: Interventions:  - Encourage patient to monitor pain and request assistance  - Assess pain using appropriate pain scale  - Administer analgesics based on type and severity of pain and evaluate response  - Implement non-pharmacological measures as appropriate and evaluate response  - Consider cultural and social influences on pain and pain management  - Notify physician/advanced practitioner if interventions unsuccessful or patient reports new pain  Outcome: Progressing     Problem: INFECTION - ADULT  Goal: Absence or prevention of progression during hospitalization  Description: INTERVENTIONS:  - Assess and monitor for signs and symptoms of infection  - Monitor lab/diagnostic results  - Monitor all insertion sites, i e  indwelling lines, tubes, and drains  - Monitor endotracheal if appropriate and nasal secretions for changes in amount and color  - [place] appropriate cooling/warming therapies per order  - Administer medications as ordered  - Instruct and encourage patient and family to use good hand hygiene technique  - Identify and instruct in appropriate isolation precautions for identified infection/condition  Outcome: Progressing  Goal: Absence of fever/infection during neutropenic period  Description: INTERVENTIONS:  - Monitor WBC    Outcome: Progressing     Problem: SAFETY ADULT  Goal: Patient will remain free of falls  Description: INTERVENTIONS:  - Assess patient frequently for physical needs  -  Identify cognitive and physical deficits and behaviors that affect risk of falls    -  [place] fall precautions as indicated by assessment   - Educate patient/family on patient safety including physical limitations  - Instruct patient to call for assistance with activity based on assessment  - Modify environment to reduce risk of injury  - Consider OT/PT consult to assist with strengthening/mobility  Outcome: Progressing  Goal: Maintain or return to baseline ADL function  Description: INTERVENTIONS:  -  Assess patient's ability to carry out ADLs; assess patient's baseline for ADL function and identify physical deficits which impact ability to perform ADLs (bathing, care of mouth/teeth, toileting, grooming, dressing, etc )  - Assess/evaluate cause of self-care deficits   - Assess range of motion  - Assess patient's mobility; develop plan if impaired  - Assess patient's need for assistive devices and provide as appropriate  - Encourage maximum independence but intervene and supervise when necessary  - Involve family in performance of ADLs  - Assess for home care needs following discharge   - Consider OT consult to assist with ADL evaluation and planning for discharge  - Provide patient education as appropriate  Outcome: Progressing  Goal: Maintain or return mobility status to optimal level  Description: INTERVENTIONS:  - Assess patient's baseline mobility status (ambulation, transfers, stairs, etc )    - Identify cognitive and physical deficits and behaviors that affect mobility  - Identify mobility aids required to assist with transfers and/or ambulation (gait belt, sit-to-stand, lift, walker, cane, etc )  - [place] fall precautions as indicated by assessment  - Record patient progress and toleration of activity level on Mobility SBAR; progress patient to next Phase/Stage  - Instruct patient to call for assistance with activity based on assessment  - Consider rehabilitation consult to assist with strengthening/weightbearing, etc   Outcome: Progressing     Problem: DISCHARGE PLANNING  Goal: Discharge to home or other facility with appropriate resources  Description: INTERVENTIONS:  - Identify barriers to discharge w/patient and caregiver  - Arrange for needed discharge resources and transportation as appropriate  - Identify discharge learning needs (meds, wound care, etc )  - Arrange for interpretive services to assist at discharge as needed  - Refer to Case Management Department for coordinating discharge planning if the patient needs post-hospital services based on physician/advanced practitioner order or complex needs related to functional status, cognitive ability, or social support system  Outcome: Progressing     Problem: Knowledge Deficit  Goal: Patient/family/caregiver demonstrates understanding of disease process, treatment plan, medications, and discharge instructions  Description: Complete learning assessment and assess knowledge base    Interventions:  - Provide teaching at level of understanding  - Provide teaching via preferred learning methods  Outcome: Progressing

## 2021-06-04 ENCOUNTER — TELEPHONE (OUTPATIENT)
Dept: OTHER | Facility: OTHER | Age: 66
End: 2021-06-04

## 2021-06-04 ENCOUNTER — TELEPHONE (OUTPATIENT)
Dept: CARDIOLOGY CLINIC | Facility: CLINIC | Age: 66
End: 2021-06-04

## 2021-06-04 DIAGNOSIS — I50.33 ACUTE ON CHRONIC DIASTOLIC CONGESTIVE HEART FAILURE (HCC): ICD-10-CM

## 2021-06-04 RX ORDER — SPIRONOLACTONE 25 MG/1
25 TABLET ORAL 2 TIMES DAILY
Qty: 180 TABLET | Refills: 1 | Status: ON HOLD | OUTPATIENT
Start: 2021-06-04 | End: 2021-07-30 | Stop reason: SDUPTHER

## 2021-06-04 NOTE — TELEPHONE ENCOUNTER
Patient called with 10 lb weight gain and does not know why  He ate salted popcorn last night and has been out of his spironolactone for over a week and never called to get a refill until today  He is to take it BID and has a history of CHF  Patient is going to  prescription today and get back on it  He has edema of legs but states that he also has lymphodema so it is hard to tell if it is CHF or that  He states he feels fine and no SOB

## 2021-06-04 NOTE — TELEPHONE ENCOUNTER
Patient called after hours, requested Spironolactone 25 mg twice daily to be called in to the pharmacy  Dr Shana Yadav on call was made aware of

## 2021-06-04 NOTE — TELEPHONE ENCOUNTER
Cardiology Note:  Patient underwent atrial flutter ablation yesterday  He has been off of his spironolactone for the past week and his diet in the last 24 hours also includes salted popcorn  He reports that in the last couple days, he is up approximately 10 lb  He has had no shortness of breath, orthopnea or paroxysmal nocturnal dyspnea  As he remains asymptomatic with probable etiology being related to medication and dietary noncompliance, will monitor him over the weekend  We will increase his torsemide to 40 mg b i d  For the next 24 hours and restart his spironolactone this evening  Patient understands that should he gain any further weight or develops any shortness of breath despite the increased diuretic, I recommend he go directly to the emergency department/dial 911  He was instructed to call the office on Monday to discuss his progress

## 2021-06-07 ENCOUNTER — TELEPHONE (OUTPATIENT)
Dept: CARDIOLOGY CLINIC | Facility: CLINIC | Age: 66
End: 2021-06-07

## 2021-06-07 ENCOUNTER — EVALUATION (OUTPATIENT)
Dept: PHYSICAL THERAPY | Facility: CLINIC | Age: 66
End: 2021-06-07
Payer: COMMERCIAL

## 2021-06-07 DIAGNOSIS — R53.83 DECREASED STAMINA: ICD-10-CM

## 2021-06-07 DIAGNOSIS — R60.0 BILATERAL LOWER EXTREMITY EDEMA: Primary | ICD-10-CM

## 2021-06-07 PROCEDURE — 97161 PT EVAL LOW COMPLEX 20 MIN: CPT | Performed by: PHYSICAL THERAPIST

## 2021-06-07 PROCEDURE — 97140 MANUAL THERAPY 1/> REGIONS: CPT | Performed by: PHYSICAL THERAPIST

## 2021-06-07 NOTE — LETTER
2021    Mary Villegas MD  [address] [facility] 1  [address] [place] [id number]    Patient: Jennifer Augustin   YOB: 1955   Date of Visit: 2021     Encounter Diagnosis     ICD-10-CM    1  Bilateral lower extremity edema  R60 0    2  Decreased stamina  R53 83        Dear Dr Rudolph Adler: Jennifer Augustin self referred back to OPPT after procedure  LE edema has improved, but not resolved  Please review the attached evaluation summary from [facility] recent visit  Please verify that you agree with the plan of care by signing the attached order  If you have any questions or concerns, please do not hesitate to call  I sincerely appreciate the opportunity to share in the care of one of your patients and hope to have another opportunity to work with you in the near future  Sincerely,    Mona Callaway      Referring Provider:      I certify that I have read the below Plan of Care and certify the need for these services furnished under this plan of treatment while under my care  Mary Villegas MD  [address] [address]  [address] [place] [address]  Via Fax: [phone]          PT Evaluation     Today's date: 2021  Patient name: Jennifer Augustin  : 1955  MRN: [id number]  Referring provider: Derrell Herrera MD  Dx:   Encounter Diagnosis     ICD-10-CM    1  Bilateral lower extremity edema  R60 0    2  Decreased stamina  R53 83                   Assessment  Assessment details: Patient presents to OPPT with s/s consistent with bilateral LE lymphedema  PT interventions to include CDT (complete decongestive therapy) including MLD (manual lymphatic drainage) and compression using short stretch bandages as able  PT to further provide extensive patient education on self bandaging, self MLD techniques, and skin care    Patient will transition patient to long term maintenance with compression plan for both morning and evening wear once optimal decongestive and volume reduction of limb has been achieved  Thank you for this referral and the opportunity to participate in the care of this patient  Impairments: abnormal gait, abnormal or restricted ROM, activity intolerance, impaired balance, impaired physical strength, pain with function and safety issue  Other impairment: LE lymphedema  Understanding of Dx/Px/POC: good   Prognosis: good    Goals  STGs to be achieved in 2 - 4 weeks   Demonstrate at least 100% compliance with self MLD   Demonstrate at least 100% compliance with self compression   Demonstrate at least 100% compliance with self skin and nail inspection   Free from s/s of infection   Demonstrate understanding of importance of compliance with POC     LTGs to be achieved in 4 - 6 weeks   1  Pt will be I with HEP in order to continue to improve quality of life and independence and reduce risk for re-injury  ONGOING  2  Pt to demonstrate return to community ambulation without limitations or restrictions  ONGOING  3  Pt to demonstrate improved function as noted by achieving or exceeding predicted score on FOTO outcomes assessment tool  ONGOING  4  Pt to demonstrate increased MMT of bilateral LEs by at least 1/2-1 grade in order to improve safety and stability with ADLs and functional mobility  Plan  Patient would benefit from: skilled physical therapy  Other planned modality interventions: Modalities prn for symptom management  Planned therapy interventions: manual therapy, neuromuscular re-education, therapeutic exercise, therapeutic training and home exercise program  Frequency: 2x week  Duration in visits: 8  Duration in weeks: 4  Plan of Care beginning date: 6/7/2021  Plan of Care expiration date: 7/7/2021  Treatment plan discussed with: patient        Subjective Evaluation    History of Present Illness  Mechanism of injury: Patient reports he had a cardiac ablation, LEs feeling better, but still a little swollen    Cleared for compression and to complete lymph MLD   Started new diuretic med  Started another diet and has lost 20 lbs  Quality of life: good    Pain  Current pain ratin  At best pain ratin  At worst pain ratin  Location: Bilateral feet  Quality: burning  Relieving factors: medications  Aggravating factors: standing, walking and stair climbing  Progression: improved    Treatments  Previous treatment: physical therapy  Current treatment: medication and physical therapy  Patient Goals  Patient goals for therapy: decreased edema, increased strength and independence with ADLs/IADLs          Objective  Lymphedema Evaluation    Medical Considerations:  POS Cardiac - following with new cardiologist 2/2 [facility], A-fib, newly adjusted meds for CHF    NEG Pulmonary  NEG Kidney  NEG Liver  NEG Current Fever/Infection  NEG Current/Recent Wounds  NEG Current/Recent Treatments/Interventions/Port Access/PICC Line  NEG Current/Recent/History of DVT or Clotting issues    ROM Considerations:  ROM WFL, h/o THR    Strength Considerations:  Grossly WFL bilateral LEs    Gait Considerations:  WFL bilateral LEs    Skin Considerations/Scars:  Patient presents with skin inspection as follows:  Hemosiderin staining/skin discoloration - POS  Finger/Toe Nails - NEG  Open Wounds - NEG  S/S infection - NEG  Firm/Sponge/Hard - POS  Peau D' Orange - NEG  Weeping - NEG    ROM Considerations:  Hip flexion to 90 degrees  Hip ABd to 45 degrees  Heel cords to neutral    Strength Considerations:  Hip flexion - 3/5  Hip ABd - 3/5    Girth:  LE girth measurements (cm) -       Right           Left   Forefoot       24  25                                    Metatarsals             25  27  Malleolus   27 5  28 5  +4 cm               29  28 5  +8 cm    32  29 5  +12 cm               37 5  35 5                          +16 cm    43 5  41  +20 cm                         49  45 5     +24 cm    50  49  +28 cm                                   48 5  48 5  +32 cm    56  48    Compression measurements for knee length compreshort - Sigvaris from [facility]  - contact [facility] - [phone]    LE girth measurements (cm)      Left           Right  Forefoot             26  26 5                           Metatarsals             29  29  Malleolus   29 5  29 5  +4 cm               29  30  +8 cm    30  33  +12 cm               31  34                         +16 cm    35  34 5  +20 cm                          39  44   +24 cm    45 5  46 5  +28 cm                                  48  51  +32 cm    51 5  54  +36 cm    50  51  +40 cm    50 5  49                Precautions [place]  Re-eval Date: 7/6/2021    Date 6/7       Visit Count 1       FOTO See IE       Pain In See IE       Pain Out See IE                   Manuals        MLD 45 mins                               Neuro Re-Ed                                         Ther Ex        LE lymph ex                                                                Ther Activity                        Gait Training                        Modalities

## 2021-06-07 NOTE — TELEPHONE ENCOUNTER
PC from patient with update on edema and the gained 10 lbs  He is much better after increasing the torsemide for two days, and restarting the spironolactone  He is back to normal weight  No SOB and he was at his lymphedema physician today  Note in Epic  He was calling in with update at requested

## 2021-06-07 NOTE — PROGRESS NOTES
PT Evaluation     Today's date: 2021  Patient name: Niurka Lagunas  : 1955  MRN: [id number]  Referring provider: Demi Rodrigez MD  Dx:   Encounter Diagnosis     ICD-10-CM    1  Bilateral lower extremity edema  R60 0    2  Decreased stamina  R53 83                   Assessment  Assessment details: Patient presents to OPPT with s/s consistent with bilateral LE lymphedema  PT interventions to include CDT (complete decongestive therapy) including MLD (manual lymphatic drainage) and compression using short stretch bandages as able  PT to further provide extensive patient education on self bandaging, self MLD techniques, and skin care  Patient will transition patient to long term maintenance with compression plan for both morning and evening wear once optimal decongestive and volume reduction of limb has been achieved  Thank you for this referral and the opportunity to participate in the care of this patient  Impairments: abnormal gait, abnormal or restricted ROM, activity intolerance, impaired balance, impaired physical strength, pain with function and safety issue  Other impairment: LE lymphedema  Understanding of Dx/Px/POC: good   Prognosis: good    Goals  STGs to be achieved in 2 - 4 weeks   Demonstrate at least 100% compliance with self MLD   Demonstrate at least 100% compliance with self compression   Demonstrate at least 100% compliance with self skin and nail inspection   Free from s/s of infection   Demonstrate understanding of importance of compliance with POC     LTGs to be achieved in 4 - 6 weeks   1  Pt will be I with HEP in order to continue to improve quality of life and independence and reduce risk for re-injury  ONGOING  2  Pt to demonstrate return to community ambulation without limitations or restrictions  ONGOING  3  Pt to demonstrate improved function as noted by achieving or exceeding predicted score on FOTO outcomes assessment tool  ONGOING  4   Pt to demonstrate increased MMT of bilateral LEs by at least 1/2-1 grade in order to improve safety and stability with ADLs and functional mobility  Plan  Patient would benefit from: skilled physical therapy  Other planned modality interventions: Modalities prn for symptom management  Planned therapy interventions: manual therapy, neuromuscular re-education, therapeutic exercise, therapeutic training and home exercise program  Frequency: 2x week  Duration in visits: 8  Duration in weeks: 4  Plan of Care beginning date: 2021  Plan of Care expiration date: 2021  Treatment plan discussed with: patient        Subjective Evaluation    History of Present Illness  Mechanism of injury: Patient reports he had a cardiac ablation, LEs feeling better, but still a little swollen  Cleared for compression and to complete lymph MLD  Started new diuretic med  Started another diet and has lost 20 lbs  Quality of life: good    Pain  Current pain ratin  At best pain ratin  At worst pain ratin  Location: Bilateral feet  Quality: burning  Relieving factors: medications  Aggravating factors: standing, walking and stair climbing  Progression: improved    Treatments  Previous treatment: physical therapy  Current treatment: medication and physical therapy  Patient Goals  Patient goals for therapy: decreased edema, increased strength and independence with ADLs/IADLs          Objective  Lymphedema Evaluation    Medical Considerations:  POS Cardiac - following with new cardiologist 2/2 [facility], A-fib, newly adjusted meds for CHF    NEG Pulmonary  NEG Kidney  NEG Liver  NEG Current Fever/Infection  NEG Current/Recent Wounds  NEG Current/Recent Treatments/Interventions/Port Access/PICC Line  NEG Current/Recent/History of DVT or Clotting issues    ROM Considerations:  ROM WFL, h/o THR    Strength Considerations:  Grossly WFL bilateral LEs    Gait Considerations:  WFL bilateral LEs    Skin Considerations/Scars:  Patient presents with skin inspection as follows:  Hemosiderin staining/skin discoloration - POS  Finger/Toe Nails - NEG  Open Wounds - NEG  S/S infection - NEG  Firm/Sponge/Hard - POS  Peau D' Orange - NEG  Weeping - NEG    ROM Considerations:  Hip flexion to 90 degrees  Hip ABd to 45 degrees  Heel cords to neutral    Strength Considerations:  Hip flexion - 3/5  Hip ABd - 3/5    Girth:  LE girth measurements (cm) -       Right           Left   Forefoot       24  25                                    Metatarsals             25  27  Malleolus   27 5  28 5  +4 cm               29  28 5  +8 cm    32  29 5  +12 cm               37 5  35 5                          +16 cm    43 5  41  +20 cm                         49  45 5     +24 cm    50  49  +28 cm                                   48 5  48 5  +32 cm    56  48    Compression measurements for knee length compreshort - Sigvaris from [address] - contact Yash Obando - [phone]    LE girth measurements (cm)      Left           Right  Forefoot             26  26 5                           Metatarsals             29  29  Malleolus   29 5  29 5  +4 cm               29  30  +8 cm    30  33  +12 cm               31  34                         +16 cm    35  34 5  +20 cm                          39  44   +24 cm    45 5  46 5  +28 cm                                  48  51  +32 cm    51 5  54  +36 cm    50  51  +40 cm    50 5  49                Precautions [place]  Re-eval Date: 7/6/2021    Date 6/7       Visit Count 1       FOTO See IE       Pain In See IE       Pain Out See IE                   Manuals        MLD 45 mins                               Neuro Re-Ed                                         Ther Ex        LE lymph ex                                                                Ther Activity                        Gait Training                        Modalities

## 2021-06-08 ENCOUNTER — TRANSCRIBE ORDERS (OUTPATIENT)
Dept: ADMINISTRATIVE | Facility: HOSPITAL | Age: 66
End: 2021-06-08

## 2021-06-08 ENCOUNTER — APPOINTMENT (OUTPATIENT)
Dept: LAB | Facility: HOSPITAL | Age: 66
End: 2021-06-08
Payer: COMMERCIAL

## 2021-06-08 DIAGNOSIS — R73.9 ELEVATED BLOOD SUGAR: Primary | ICD-10-CM

## 2021-06-08 DIAGNOSIS — R73.9 ELEVATED BLOOD SUGAR: ICD-10-CM

## 2021-06-08 LAB
EST. AVERAGE GLUCOSE BLD GHB EST-MCNC: 108 MG/DL
HBA1C MFR BLD: 5.4 %

## 2021-06-08 PROCEDURE — 83036 HEMOGLOBIN GLYCOSYLATED A1C: CPT

## 2021-06-08 PROCEDURE — 36415 COLL VENOUS BLD VENIPUNCTURE: CPT

## 2021-06-08 PROCEDURE — G0103 PSA SCREENING: HCPCS

## 2021-06-08 NOTE — TELEPHONE ENCOUNTER
That's great news  Will continue current meds  I believe he has a script for blood work/BMP  Lets just double check  Thank you

## 2021-06-08 NOTE — PROGRESS NOTES
Daily Note     Today's date: 2021  Patient name: Hermes Denton  : 1955  MRN: [id number]  Referring provider: Sanam Vail MD  Dx:   Encounter Diagnosis     ICD-10-CM    1  Bilateral lower extremity edema  R60 0    2  Decreased stamina  R53 83                   Subjective: Patient reports he is going for walk, legs get tired  Objective: See treatment diary below      Assessment: Tolerated treatment well  Patient demonstrated fatigue post treatment and would benefit from continued PT  Volume reduced  Skin intact  Plan: Continue per plan of care        Precautions [place]  Re-eval Date: 2021    Date       Visit Count 1 2      FOTO See IE       Pain In See IE 0      Pain Out See IE 0        Manuals        MLD 45 mins 55 mins                              Neuro Re-Ed                                         Ther Ex        LE lymph ex                                                                Ther Activity                        Gait Training                        Modalities

## 2021-06-09 ENCOUNTER — TRANSCRIBE ORDERS (OUTPATIENT)
Dept: PHYSICAL THERAPY | Facility: CLINIC | Age: 66
End: 2021-06-09

## 2021-06-09 ENCOUNTER — OFFICE VISIT (OUTPATIENT)
Dept: PHYSICAL THERAPY | Facility: CLINIC | Age: 66
End: 2021-06-09
Payer: COMMERCIAL

## 2021-06-09 DIAGNOSIS — R60.0 BILATERAL LOWER EXTREMITY EDEMA: Primary | ICD-10-CM

## 2021-06-09 DIAGNOSIS — R53.83 DECREASED STAMINA: ICD-10-CM

## 2021-06-09 LAB — PSA SERPL-MCNC: 1 NG/ML (ref 0–4)

## 2021-06-09 PROCEDURE — 97140 MANUAL THERAPY 1/> REGIONS: CPT | Performed by: PHYSICAL THERAPIST

## 2021-06-11 NOTE — PROGRESS NOTES
Daily Note     Today's date: 2021  Patient name: Demian Rudd  : 1955  MRN: [id number]  Referring provider: Cordelia Maldonado MD  Dx:   Encounter Diagnosis     ICD-10-CM    1  Bilateral lower extremity edema  R60 0    2  Decreased stamina  R53 83                   Subjective: Reports more walking this weekend  Legs feel okay  Objective: See treatment diary below      Assessment: Tolerated treatment well  Patient demonstrated fatigue post treatment and would benefit from continued PT  Overall progressing with volume reduction, discussed possibility of compression options  Plan: Continue per plan of care        Precautions [place]  Re-eval Date: 2021    Date      Visit Count 1 2 3     FOTO See IE       Pain In See IE 0 0     Pain Out See IE 0 0       Manuals        MLD 45 mins 55 mins 55 mins                             Neuro Re-Ed                                         Ther Ex        LE lymph ex                                                                Ther Activity                        Gait Training                        Modalities

## 2021-06-14 ENCOUNTER — OFFICE VISIT (OUTPATIENT)
Dept: PHYSICAL THERAPY | Facility: CLINIC | Age: 66
End: 2021-06-14
Payer: COMMERCIAL

## 2021-06-14 DIAGNOSIS — R53.83 DECREASED STAMINA: ICD-10-CM

## 2021-06-14 DIAGNOSIS — R60.0 BILATERAL LOWER EXTREMITY EDEMA: Primary | ICD-10-CM

## 2021-06-14 PROCEDURE — 97140 MANUAL THERAPY 1/> REGIONS: CPT | Performed by: PHYSICAL THERAPIST

## 2021-06-15 ENCOUNTER — CONSULT (OUTPATIENT)
Dept: NEPHROLOGY | Facility: CLINIC | Age: 66
End: 2021-06-15
Payer: COMMERCIAL

## 2021-06-15 ENCOUNTER — APPOINTMENT (OUTPATIENT)
Dept: LAB | Facility: HOSPITAL | Age: 66
End: 2021-06-15
Attending: INTERNAL MEDICINE
Payer: COMMERCIAL

## 2021-06-15 ENCOUNTER — APPOINTMENT (OUTPATIENT)
Dept: PHYSICAL THERAPY | Facility: CLINIC | Age: 66
End: 2021-06-15
Payer: COMMERCIAL

## 2021-06-15 VITALS
BODY MASS INDEX: 40.64 KG/M2 | DIASTOLIC BLOOD PRESSURE: 80 MMHG | WEIGHT: 274.4 LBS | HEART RATE: 65 BPM | HEIGHT: 69 IN | SYSTOLIC BLOOD PRESSURE: 130 MMHG | OXYGEN SATURATION: 99 %

## 2021-06-15 DIAGNOSIS — I48.11 LONGSTANDING PERSISTENT ATRIAL FIBRILLATION (HCC): ICD-10-CM

## 2021-06-15 DIAGNOSIS — D64.9 ANEMIA, UNSPECIFIED TYPE: ICD-10-CM

## 2021-06-15 DIAGNOSIS — M19.91 PRIMARY OSTEOARTHRITIS, UNSPECIFIED SITE: ICD-10-CM

## 2021-06-15 DIAGNOSIS — R60.0 BILATERAL LOWER EXTREMITY EDEMA: ICD-10-CM

## 2021-06-15 DIAGNOSIS — N17.9 AKI (ACUTE KIDNEY INJURY) (HCC): Primary | ICD-10-CM

## 2021-06-15 DIAGNOSIS — E61.1 IRON DEFICIENCY: ICD-10-CM

## 2021-06-15 DIAGNOSIS — N18.31 STAGE 3A CHRONIC KIDNEY DISEASE (HCC): ICD-10-CM

## 2021-06-15 LAB
25(OH)D3 SERPL-MCNC: 42.9 NG/ML (ref 30–100)
ALBUMIN SERPL BCP-MCNC: 3.9 G/DL (ref 3.5–5)
ALP SERPL-CCNC: 64 U/L (ref 46–116)
ALT SERPL W P-5'-P-CCNC: 22 U/L (ref 12–78)
ANION GAP SERPL CALCULATED.3IONS-SCNC: 10 MMOL/L (ref 4–13)
AST SERPL W P-5'-P-CCNC: 29 U/L (ref 5–45)
BACTERIA UR QL AUTO: NORMAL /HPF
BASOPHILS # BLD AUTO: 0.03 THOUSANDS/ΜL (ref 0–0.1)
BASOPHILS NFR BLD AUTO: 1 % (ref 0–1)
BILIRUB SERPL-MCNC: 0.63 MG/DL (ref 0.2–1)
BILIRUB UR QL STRIP: NEGATIVE
BUN SERPL-MCNC: 47 MG/DL (ref 5–25)
CALCIUM SERPL-MCNC: 9.3 MG/DL (ref 8.3–10.1)
CHLORIDE SERPL-SCNC: 102 MMOL/L (ref 100–108)
CLARITY UR: CLEAR
CO2 SERPL-SCNC: 26 MMOL/L (ref 21–32)
COLOR UR: YELLOW
CREAT SERPL-MCNC: 1.59 MG/DL (ref 0.6–1.3)
CREAT UR-MCNC: 40.4 MG/DL
EOSINOPHIL # BLD AUTO: 0.09 THOUSAND/ΜL (ref 0–0.61)
EOSINOPHIL NFR BLD AUTO: 2 % (ref 0–6)
ERYTHROCYTE [DISTWIDTH] IN BLOOD BY AUTOMATED COUNT: 14 % (ref 11.6–15.1)
FERRITIN SERPL-MCNC: 277 NG/ML (ref 8–388)
GFR SERPL CREATININE-BSD FRML MDRD: 45 ML/MIN/1.73SQ M
GLUCOSE SERPL-MCNC: 111 MG/DL (ref 65–140)
GLUCOSE UR STRIP-MCNC: NEGATIVE MG/DL
HCT VFR BLD AUTO: 40.4 % (ref 36.5–49.3)
HGB BLD-MCNC: 13.4 G/DL (ref 12–17)
HGB UR QL STRIP.AUTO: NEGATIVE
IMM GRANULOCYTES # BLD AUTO: 0.02 THOUSAND/UL (ref 0–0.2)
IMM GRANULOCYTES NFR BLD AUTO: 0 % (ref 0–2)
IRON SATN MFR SERPL: 13 %
IRON SERPL-MCNC: 42 UG/DL (ref 65–175)
KETONES UR STRIP-MCNC: NEGATIVE MG/DL
LEUKOCYTE ESTERASE UR QL STRIP: NEGATIVE
LYMPHOCYTES # BLD AUTO: 1.12 THOUSANDS/ΜL (ref 0.6–4.47)
LYMPHOCYTES NFR BLD AUTO: 20 % (ref 14–44)
MCH RBC QN AUTO: 30.9 PG (ref 26.8–34.3)
MCHC RBC AUTO-ENTMCNC: 33.2 G/DL (ref 31.4–37.4)
MCV RBC AUTO: 93 FL (ref 82–98)
MICROALBUMIN UR-MCNC: 6.1 MG/L (ref 0–20)
MICROALBUMIN/CREAT 24H UR: 15 MG/G CREATININE (ref 0–30)
MONOCYTES # BLD AUTO: 0.49 THOUSAND/ΜL (ref 0.17–1.22)
MONOCYTES NFR BLD AUTO: 9 % (ref 4–12)
NEUTROPHILS # BLD AUTO: 3.8 THOUSANDS/ΜL (ref 1.85–7.62)
NEUTS SEG NFR BLD AUTO: 68 % (ref 43–75)
NITRITE UR QL STRIP: NEGATIVE
NON-SQ EPI CELLS URNS QL MICRO: NORMAL /HPF
NRBC BLD AUTO-RTO: 0 /100 WBCS
PH UR STRIP.AUTO: 5.5 [PH]
PLATELET # BLD AUTO: 191 THOUSANDS/UL (ref 149–390)
PMV BLD AUTO: 11.5 FL (ref 8.9–12.7)
POTASSIUM SERPL-SCNC: 4.5 MMOL/L (ref 3.5–5.3)
PROT SERPL-MCNC: 8.3 G/DL (ref 6.4–8.2)
PROT UR STRIP-MCNC: NEGATIVE MG/DL
RBC # BLD AUTO: 4.33 MILLION/UL (ref 3.88–5.62)
RBC #/AREA URNS AUTO: NORMAL /HPF
SODIUM SERPL-SCNC: 138 MMOL/L (ref 136–145)
SP GR UR STRIP.AUTO: 1.01 (ref 1–1.03)
TIBC SERPL-MCNC: 326 UG/DL (ref 250–450)
UROBILINOGEN UR QL STRIP.AUTO: 0.2 E.U./DL
WBC # BLD AUTO: 5.55 THOUSAND/UL (ref 4.31–10.16)
WBC #/AREA URNS AUTO: NORMAL /HPF

## 2021-06-15 PROCEDURE — 82570 ASSAY OF URINE CREATININE: CPT

## 2021-06-15 PROCEDURE — 99204 OFFICE O/P NEW MOD 45 MIN: CPT | Performed by: INTERNAL MEDICINE

## 2021-06-15 PROCEDURE — 82728 ASSAY OF FERRITIN: CPT

## 2021-06-15 PROCEDURE — 82306 VITAMIN D 25 HYDROXY: CPT

## 2021-06-15 PROCEDURE — 83550 IRON BINDING TEST: CPT

## 2021-06-15 PROCEDURE — 1036F TOBACCO NON-USER: CPT | Performed by: INTERNAL MEDICINE

## 2021-06-15 PROCEDURE — 81001 URINALYSIS AUTO W/SCOPE: CPT

## 2021-06-15 PROCEDURE — 83540 ASSAY OF IRON: CPT

## 2021-06-15 PROCEDURE — 80053 COMPREHEN METABOLIC PANEL: CPT

## 2021-06-15 PROCEDURE — 85025 COMPLETE CBC W/AUTO DIFF WBC: CPT

## 2021-06-15 PROCEDURE — 82043 UR ALBUMIN QUANTITATIVE: CPT

## 2021-06-15 PROCEDURE — 3008F BODY MASS INDEX DOCD: CPT | Performed by: INTERNAL MEDICINE

## 2021-06-15 PROCEDURE — 1160F RVW MEDS BY RX/DR IN RCRD: CPT | Performed by: INTERNAL MEDICINE

## 2021-06-15 PROCEDURE — 36415 COLL VENOUS BLD VENIPUNCTURE: CPT

## 2021-06-15 RX ORDER — AMIODARONE HYDROCHLORIDE 200 MG/1
200 TABLET ORAL DAILY
Start: 2021-06-15 | End: 2021-07-01

## 2021-06-15 NOTE — ASSESSMENT & PLAN NOTE
Patient was placed on iron supplement if years ago, no iron stores have been done for many years  Will check the patient's iron stores and recommended that if he has an iron deficiency he should initiate ProferrinES 1 tablet daily, if he does not have a significant iron deficiency he need only take the ProferrinES once a week due to it's high efficaciousness

## 2021-06-15 NOTE — ASSESSMENT & PLAN NOTE
Patient will try to avoid all nonsteroidal anti-inflammatory medications and will discontinue turmeric  Patient may take Tylenol if needed for pain control/management

## 2021-06-15 NOTE — ASSESSMENT & PLAN NOTE
Patient's kidney function appears have improved, potentially volume related at that time  However that being said, it appears the patient was physiologically set up for potential acute kidney injury due to the use of turmeric  Patient is agreeable to discontinue this medication and never taking it again, this also includes all other nonsteroidal anti-inflammatory medications  For completeness sake, we will check a kidney ultrasound to evaluate the patient's anatomy

## 2021-06-15 NOTE — ASSESSMENT & PLAN NOTE
We discussed the patient's bilateral lower extremity edema and underlying chronic lymphedema  I strongly advise for the patient transition to a low-sodium diet, we reviewed the diet in general and in detail  Continue with diuretics according to Cardiology recommendations  Goal is to reduce need for diuretics as much as possible, potentially even reducing to just once a day

## 2021-06-15 NOTE — ASSESSMENT & PLAN NOTE
Lab Results   Component Value Date    EGFR 83 06/03/2021    EGFR 58 06/02/2021    EGFR 61 05/26/2021    CREATININE 0 95 06/03/2021    CREATININE 1 28 06/02/2021    CREATININE 1 22 05/26/2021       Patient appears to have baseline creatinine around 1 mg/ dL, estimated GFR most recently checked at 83 mL/ minute, however, there is concern that the patient's true creatinine clearance is likely closer to 60 mL/ minute  Will continue to closely monitor the patient's creatinine and estimated GFR according to the MDRD equation to follow along for now  If and when indicated, the patient may benefit from a 24 hour creatinine clearance, will defer for now

## 2021-06-15 NOTE — PROGRESS NOTES
[facility] [place], [place]    Name: Brie Morejon  YOB: 1955      Assessment/Plan:    ALANNA (acute kidney injury) [facility])  Patient's kidney function appears have improved, potentially volume related at that time  However that being said, it appears the patient was physiologically set up for potential acute kidney injury due to the use of turmeric  Patient is agreeable to discontinue this medication and never taking it again, this also includes all other nonsteroidal anti-inflammatory medications  For completeness sake, we will check a kidney ultrasound to evaluate the patient's anatomy  Stage 3a chronic kidney disease [facility])  Lab Results   Component Value Date    EGFR 83 06/03/2021    EGFR 58 06/02/2021    EGFR 61 05/26/2021    CREATININE 0 95 06/03/2021    CREATININE 1 28 06/02/2021    CREATININE 1 22 05/26/2021       Patient appears to have baseline creatinine around 1 mg/ dL, estimated GFR most recently checked at 83 mL/ minute, however, there is concern that the patient's true creatinine clearance is likely closer to 60 mL/ minute  Will continue to closely monitor the patient's creatinine and estimated GFR according to the MDRD equation to follow along for now  If and when indicated, the patient may benefit from a 24 hour creatinine clearance, will defer for now  Bilateral lower extremity edema    We discussed the patient's bilateral lower extremity edema and underlying chronic lymphedema  I strongly advise for the patient transition to a low-sodium diet, we reviewed the diet in general and in detail  Continue with diuretics according to Cardiology recommendations  Goal is to reduce need for diuretics as much as possible, potentially even reducing to just once a day  Primary osteoarthritis    Patient will try to avoid all nonsteroidal anti-inflammatory medications and will discontinue turmeric    Patient may take Tylenol if needed for pain control/management  Anemia    Patient was placed on iron supplement if years ago, no iron stores have been done for many years  Will check the patient's iron stores and recommended that if he has an iron deficiency he should initiate ProferrinES 1 tablet daily, if he does not have a significant iron deficiency he need only take the ProferrinES once a week due to it's high efficaciousness  Problem List Items Addressed This Visit        Cardiovascular and Mediastinum    Atrial fibrillation (HCC)    Relevant Medications    amiodarone 200 mg tablet       Musculoskeletal and Integument    Primary osteoarthritis       Patient will try to avoid all nonsteroidal anti-inflammatory medications and will discontinue turmeric  Patient may take Tylenol if needed for pain control/management  Genitourinary    ALANNA (acute kidney injury) ([facility] [address] ) - Primary     Patient's kidney function appears have improved, potentially volume related at that time  However that being said, it appears the patient was physiologically set up for potential acute kidney injury due to the use of turmeric  Patient is agreeable to discontinue this medication and never taking it again, this also includes all other nonsteroidal anti-inflammatory medications  For completeness sake, we will check a kidney ultrasound to evaluate the patient's anatomy  Stage 3a chronic kidney disease [facility])     Lab Results   Component Value Date    EGFR 83 06/03/2021    EGFR 58 06/02/2021    EGFR 61 05/26/2021    CREATININE 0 95 06/03/2021    CREATININE 1 28 06/02/2021    CREATININE 1 22 05/26/2021       Patient appears to have baseline creatinine around 1 mg/ dL, estimated GFR most recently checked at 83 mL/ minute, however, there is concern that the patient's true creatinine clearance is likely closer to 60 mL/ minute    Will continue to closely monitor the patient's creatinine and estimated GFR according to the MDRD equation to follow along for now  If and when indicated, the patient may benefit from a 24 hour creatinine clearance, will defer for now  Relevant Orders    Iron Panel (Includes Ferritin, Iron Sat%, Iron, and TIBC)    Comprehensive metabolic panel    Microalbumin / creatinine urine ratio    Urinalysis with microscopic    US kidney and bladder    Vitamin D 25 hydroxy    CBC and differential       Other    Bilateral lower extremity edema       We discussed the patient's bilateral lower extremity edema and underlying chronic lymphedema  I strongly advise for the patient transition to a low-sodium diet, we reviewed the diet in general and in detail  Continue with diuretics according to Cardiology recommendations  Goal is to reduce need for diuretics as much as possible, potentially even reducing to just once a day  Anemia       Patient was placed on iron supplement if years ago, no iron stores have been done for many years  Will check the patient's iron stores and recommended that if he has an iron deficiency he should initiate ProferrinES 1 tablet daily, if he does not have a significant iron deficiency he need only take the ProferrinES once a week due to it's high efficaciousness  Thank you for allowing us to participate in the care of your patient  The most likely etiology the patient's acute kidney injury was combination of aggressive diuresis in the setting of chronic NSAID use  Patient is agreeable discontinue all NSAIDs, this case turmeric  Continue with focus on low-sodium diet to try to reduce need for chronic diuretics  For due diligence, will check kidney/bladder ultrasound as well as other urine studies and blood work that can be associated with chronic kidney disease  Will see the patient back in 4 months for a regular appointment  Subjective:      Patient ID: Hermes Denton is a 76 y o  male  Patient presents for evaluation regarding acute kidney injury        Patient was noted to have reduced kidney function in May of 2021, thankfully repeat labs noted that his kidney function improved  At the time, the patient was on aggressive diuresis and was most likely volume related  However, the patient also takes chronic turmeric to assist as an anti-inflammatory a 1,000 mg twice a day  Patient does not take other anti-inflammatories at this time  Patient appears to have mild underlying chronic kidney disease, baseline creatinine appears to be around 1 mg/ dL  Patient is currently has potential for worsening kidney function due to chronic use of diuretics and other pathophysiologic issues  Hypertension  This is a chronic problem  The current episode started more than 1 year ago  The problem is unchanged  The problem is controlled  Associated symptoms include peripheral edema  Pertinent negatives include no chest pain or orthopnea  There are no associated agents to hypertension  Risk factors for coronary artery disease include male gender and obesity  Past treatments include beta blockers, diuretics and lifestyle changes  There are no compliance problems  Hypertensive end-organ damage includes kidney disease and heart failure  Identifiable causes of hypertension include chronic renal disease  The following portions of the patient's history were reviewed and updated as appropriate: allergies, current medications, past family history, past medical history, past social history, past surgical history and problem list     Review of Systems   Cardiovascular: Negative for chest pain and orthopnea  All other systems reviewed and are negative          Social History     Socioeconomic History    Marital status: /Civil Union     Spouse name: None    Number of children: None    Years of education: None    Highest education level: None   Occupational History    None   Tobacco Use    Smoking status: Never Smoker    Smokeless tobacco: Never Used   Vaping Use    Vaping Use: Never used   Substance and Sexual Activity    Alcohol use: Yes     Alcohol/week: 4 0 standard drinks     Types: 4 Cans of beer per week     Comment: socially    Drug use: No    Sexual activity: None   Other Topics Concern    None   Social History Narrative    None     Social Determinants of Health     Financial Resource Strain:     Difficulty of Paying Living Expenses:    Food Insecurity:     Worried About Running Out of Food in the Last Year:     Ran Out of Food in the Last Year:    Transportation Needs:     Lack of Transportation (Medical):      Lack of Transportation (Non-Medical):    Physical Activity:     Days of Exercise per Week:     Minutes of Exercise per Session:    Stress:     Feeling of Stress :    Social Connections:     Frequency of Communication with Friends and Family:     Frequency of Social Gatherings with Friends and Family:     Attends Confucianist Services:     Active Member of Clubs or Organizations:     Attends Club or Organization Meetings:     Marital Status:    Intimate Partner Violence:     Fear of Current or Ex-Partner:     Emotionally Abused:     Physically Abused:     Sexually Abused:      Past Medical History:   Diagnosis Date    A-fib ([address] )     Arthritis     CPAP (continuous positive airway pressure) dependence     Irregular heart beat     Lymphedema     Sleep apnea     Urinary frequency     Wears glasses     Wears partial dentures     lower partial     Past Surgical History:   Procedure Laterality Date    ABLATION SAPHENOUS VEIN W/ RFA      COLONOSCOPY      FL CYSTOURETHRO W/IMPLANT N/A 11/13/2017    Procedure: CYSTOSCOPY WITH INSERTION UROLIFT;  Surgeon: Sheila Page DO;  Location: [facility];  Service: Urology    TONSILLECTOMY         Current Outpatient Medications:     acetaminophen (TYLENOL) 500 mg tablet, Take 500 mg by mouth every 6 (six) hours as needed for mild pain, Disp: , Rfl:     allopurinol (ZYLOPRIM) 100 mg tablet, Take 1 tablet (100 mg total) by mouth daily, Disp: 14 tablet, Rfl: 0    amiodarone 200 mg tablet, Take 1 tablet (200 mg total) by mouth daily Half tablet daily, Disp: , Rfl:     ammonium lactate (AMLACTIN) 12 % lotion, Apply topically 2 (two) times a day as needed for dry skin, Disp: , Rfl:     clindamycin (CLEOCIN) 300 MG capsule, Take 300 mg by mouth as needed Prior to dental work, Disp: , Rfl:     colchicine (COLCRYS) 0 6 mg tablet, as needed , Disp: , Rfl:     GARCINIA CAMBOGIA-CHROMIUM PO, Take 750 mg by mouth 2 (two) times a day , Disp: , Rfl:     Glucosamine-Chondroit-Vit C-Mn (Glucosamine 1500 Complex) CAPS, Take by mouth, Disp: , Rfl:     L-ARGININE-500 PO, Take 500 mg by mouth 2 (two) times a day , Disp: , Rfl:     metoprolol succinate (TOPROL-XL) 25 mg 24 hr tablet, Take 1 tablet (25 mg total) by mouth daily, Disp: 90 tablet, Rfl: 0    Multiple Vitamins-Minerals (OCUVITE EXTRA PO), Take 1 tablet by mouth daily  , Disp: , Rfl:     patient supplied medication, Take 1 each by mouth 2 (two) times a day, Disp: , Rfl:     pregabalin (LYRICA) 100 mg capsule, Take 1 capsule (100 mg total) by mouth 3 (three) times a day, Disp: 42 capsule, Rfl: 0    rivaroxaban (Xarelto) 20 mg tablet, Take 1 tablet (20 mg total) by mouth daily, Disp: 14 tablet, Rfl: 0    spironolactone (ALDACTONE) 25 mg tablet, Take 1 tablet (25 mg total) by mouth 2 (two) times a day, Disp: 180 tablet, Rfl: 1    tadalafil (CIALIS) 5 MG tablet, Take 5 mg by mouth daily as needed for erectile dysfunction, Disp: , Rfl:     torsemide (DEMADEX) 20 mg tablet, Take 1 tablet (20 mg total) by mouth 2 (two) times a day, Disp: 180 tablet, Rfl: 1    traMADol (ULTRAM) 50 mg tablet, Take 50 mg by mouth every 6 (six) hours as needed for moderate pain, Disp: , Rfl:     VITAMIN D PO, Take by mouth, Disp: , Rfl:     halobetasol (ULTRAVATE) 0 05 % ointment, APPLY TO AFFECTED AREA ON LEG TWICE DAILY, Disp: , Rfl:     Lab Results   Component Value Date    SODIUM 139 06/03/2021    K 3 8 06/03/2021     06/03/2021    CO2 28 06/03/2021    AGAP 6 06/03/2021    BUN 26 (H) 06/03/2021    CREATININE 0 95 06/03/2021    GLUC 169 (H) 06/03/2021    GLUF 101 (H) 06/02/2021    CALCIUM 8 2 (L) 06/03/2021    AST 24 05/26/2021    ALT 19 05/26/2021    ALKPHOS 62 05/26/2021    TP 8 1 05/26/2021    TBILI 0 72 05/26/2021    EGFR 83 06/03/2021     Lab Results   Component Value Date    WBC 4 67 06/03/2021    HGB 12 0 06/03/2021    HCT 36 7 06/03/2021    MCV 95 06/03/2021     (L) 06/03/2021     Lab Results   Component Value Date    CHOLESTEROL 164 10/27/2019     Lab Results   Component Value Date    HDL 55 10/27/2019     Lab Results   Component Value Date    LDLCALC 90 10/27/2019     Lab Results   Component Value Date    TRIG 93 10/27/2019     No results found for: [place], [place]  Lab Results   Component Value Date    NUI4MNWTCKGW 3 181 10/27/2019     Lab Results   Component Value Date    CALCIUM 8 2 (L) 06/03/2021    PHOS 3 0 10/27/2019     No results found for: SPEP, UPEP  No results found for: OSMAN BAKER4HUR        Objective:      /80   Pulse 65   Ht 5' 9" (1 753 m)   Wt 124 kg (274 lb 6 4 oz)   SpO2 99%   BMI 40 52 kg/m²          Physical Exam  Vitals reviewed  Constitutional:       General: He is not in acute distress  Appearance: He is well-developed  HENT:      Head: Normocephalic and atraumatic  Eyes:      Conjunctiva/sclera: Conjunctivae normal    Cardiovascular:      Rate and Rhythm: Normal rate and regular rhythm  Pulmonary:      Effort: Pulmonary effort is normal       Breath sounds: Normal breath sounds  Abdominal:      Palpations: Abdomen is soft  Musculoskeletal:      Cervical back: Neck supple  Right lower leg: Edema present  Left lower leg: Edema present  Skin:     General: Skin is warm  Findings: No rash  Neurological:      Mental Status: He is alert and oriented to person, place, and time        Cranial Nerves: No cranial nerve deficit     Psychiatric:         Behavior: Behavior normal

## 2021-06-16 PROBLEM — E61.1 IRON DEFICIENCY: Status: ACTIVE | Noted: 2021-06-16

## 2021-06-16 RX ORDER — SODIUM CHLORIDE 9 MG/ML
20 INJECTION, SOLUTION INTRAVENOUS ONCE
Status: CANCELLED | OUTPATIENT
Start: 2021-06-21

## 2021-06-18 ENCOUNTER — HOSPITAL ENCOUNTER (OUTPATIENT)
Dept: RADIOLOGY | Facility: HOSPITAL | Age: 66
Discharge: HOME | End: 2021-06-18
Payer: COMMERCIAL

## 2021-06-18 ENCOUNTER — APPOINTMENT (OUTPATIENT)
Dept: LAB | Facility: HOSPITAL | Age: 66
End: 2021-06-18
Attending: INTERNAL MEDICINE
Payer: COMMERCIAL

## 2021-06-18 DIAGNOSIS — N17.9 AKI (ACUTE KIDNEY INJURY) (HCC): ICD-10-CM

## 2021-06-18 DIAGNOSIS — I50.33 ACUTE ON CHRONIC DIASTOLIC CONGESTIVE HEART FAILURE (HCC): ICD-10-CM

## 2021-06-18 DIAGNOSIS — M54.2 NECK PAIN: ICD-10-CM

## 2021-06-18 LAB
ANION GAP SERPL CALCULATED.3IONS-SCNC: 8 MMOL/L (ref 4–13)
BUN SERPL-MCNC: 44 MG/DL (ref 5–25)
CALCIUM SERPL-MCNC: 9.4 MG/DL (ref 8.3–10.1)
CHLORIDE SERPL-SCNC: 101 MMOL/L (ref 100–108)
CO2 SERPL-SCNC: 27 MMOL/L (ref 21–32)
CREAT SERPL-MCNC: 1.6 MG/DL (ref 0.6–1.3)
GFR SERPL CREATININE-BSD FRML MDRD: 44 ML/MIN/1.73SQ M
GLUCOSE P FAST SERPL-MCNC: 102 MG/DL (ref 65–99)
POTASSIUM SERPL-SCNC: 4.2 MMOL/L (ref 3.5–5.3)
SODIUM SERPL-SCNC: 136 MMOL/L (ref 136–145)

## 2021-06-18 PROCEDURE — 72050 X-RAY EXAM NECK SPINE 4/5VWS: CPT

## 2021-06-18 PROCEDURE — 36415 COLL VENOUS BLD VENIPUNCTURE: CPT

## 2021-06-18 PROCEDURE — 80048 BASIC METABOLIC PNL TOTAL CA: CPT

## 2021-06-21 ENCOUNTER — HOSPITAL ENCOUNTER (OUTPATIENT)
Dept: ULTRASOUND IMAGING | Facility: HOSPITAL | Age: 66
Discharge: HOME | End: 2021-06-21
Attending: INTERNAL MEDICINE
Payer: COMMERCIAL

## 2021-06-21 DIAGNOSIS — N17.9 AKI (ACUTE KIDNEY INJURY) (HCC): Primary | ICD-10-CM

## 2021-06-21 DIAGNOSIS — N18.31 STAGE 3A CHRONIC KIDNEY DISEASE (HCC): ICD-10-CM

## 2021-06-21 PROCEDURE — 76770 US EXAM ABDO BACK WALL COMP: CPT

## 2021-06-24 ENCOUNTER — HOSPITAL ENCOUNTER (OUTPATIENT)
Dept: INFUSION CENTER | Facility: HOSPITAL | Age: 66
Discharge: HOME | End: 2021-06-24
Attending: INTERNAL MEDICINE
Payer: COMMERCIAL

## 2021-06-24 VITALS
DIASTOLIC BLOOD PRESSURE: 60 MMHG | RESPIRATION RATE: 18 BRPM | SYSTOLIC BLOOD PRESSURE: 103 MMHG | HEART RATE: 78 BPM | OXYGEN SATURATION: 99 % | TEMPERATURE: 95.7 F

## 2021-06-24 DIAGNOSIS — E61.1 IRON DEFICIENCY: Primary | ICD-10-CM

## 2021-06-24 PROCEDURE — 96365 THER/PROPH/DIAG IV INF INIT: CPT

## 2021-06-24 RX ORDER — SODIUM CHLORIDE 9 MG/ML
20 INJECTION, SOLUTION INTRAVENOUS ONCE
Status: COMPLETED | OUTPATIENT
Start: 2021-06-24 | End: 2021-06-24

## 2021-06-24 RX ORDER — SODIUM CHLORIDE 9 MG/ML
20 INJECTION, SOLUTION INTRAVENOUS ONCE
Status: CANCELLED | OUTPATIENT
Start: 2021-07-02

## 2021-06-24 RX ADMIN — SODIUM CHLORIDE 20 ML/HR: 0.9 INJECTION, SOLUTION INTRAVENOUS at 08:20

## 2021-06-24 RX ADMIN — FERUMOXYTOL 510 MG: 510 INJECTION INTRAVENOUS at 08:52

## 2021-06-24 NOTE — PLAN OF CARE
Problem: Potential for Falls  Goal: Patient will remain free of falls  Description: INTERVENTIONS:  - Educate patient/family on patient safety including physical limitations  - Instruct patient to call for assistance with activity   - Consult OT/PT to assist with strengthening/mobility   - Keep Call bell within reach  - Keep bed low and locked with side rails adjusted as appropriate  - Keep care items and personal belongings within reach  - Initiate and maintain comfort rounds  - Make Fall Risk Sign visible to staff  - Apply yellow socks and bracelet for high fall risk patients  - Consider moving patient to room near nurses station  Outcome: Progressing     Problem: INFECTION - ADULT  Goal: Absence or prevention of progression during hospitalization  Description: INTERVENTIONS:  - Assess and monitor for signs and symptoms of infection  - Monitor lab/diagnostic results  - Monitor all insertion sites, i e  indwelling lines, tubes, and drains  - Monitor endotracheal if appropriate and nasal secretions for changes in amount and color  - [place] appropriate cooling/warming therapies per order  - Administer medications as ordered  - Instruct and encourage patient and family to use good hand hygiene technique  - Identify and instruct in appropriate isolation precautions for identified infection/condition  Outcome: Progressing     Problem: Knowledge Deficit  Goal: Patient/family/caregiver demonstrates understanding of disease process, treatment plan, medications, and discharge instructions  Description: Complete learning assessment and assess knowledge base    Interventions:  - Provide teaching at level of understanding  - Provide teaching via preferred learning methods  Outcome: Progressing

## 2021-06-25 ENCOUNTER — HOSPITAL ENCOUNTER (OUTPATIENT)
Dept: INFUSION CENTER | Facility: HOSPITAL | Age: 66
Discharge: HOME | End: 2021-06-25
Attending: INTERNAL MEDICINE

## 2021-06-29 NOTE — PROGRESS NOTES
Daily Note     Today's date: 2021  Patient name: Christin Hogan  : 1955  MRN: [id number]  Referring provider: Wilfrid Castro MD  Dx:   Encounter Diagnosis     ICD-10-CM    1  Bilateral lower extremity edema  R60 0    2  Decreased stamina  R53 83                   Subjective: Reports he is pending follow up for DME  PT will contact rep for details and ordering  Objective: See treatment diary below    LE girth measurements (cm)      Right           Left   Forefoot             26  26 5                         Metatarsals             26 5  28 0  Malleolus   29  32 0  +4 cm               28 5  29 5  +8 cm    30 0  30   +12 cm               33 5                 31   +16 cm    38 0  35  +20 cm                          42 5  40  +24 cm    45 5  44  +28 cm                                  48 0  50 5  +32 cm    49 5  53 0  +36 cm    52 0  53 0  +40 cm    49 5  52 0      Assessment: Tolerated treatment well  Patient demonstrated fatigue post treatment and would benefit from continued PT  Patient with improved volume, improved tissue texture  Plan: Continue per plan of care        Precautions Falls  Re-eval Date: 2021    Date     Visit Count 1 2 3 4    FOTO See IE       Pain In See IE 0 0 0    Pain Out See IE 0 0 0      Manuals        MLD 45 mins 55 mins 55 mins 55 mins  Measurements as noted above                            Neuro Re-Ed                                         Ther Ex        LE lymph ex                                                                Ther Activity                        Gait Training                        Modalities

## 2021-06-30 ENCOUNTER — OFFICE VISIT (OUTPATIENT)
Dept: PHYSICAL THERAPY | Facility: CLINIC | Age: 66
End: 2021-06-30
Payer: COMMERCIAL

## 2021-06-30 DIAGNOSIS — R53.83 DECREASED STAMINA: ICD-10-CM

## 2021-06-30 DIAGNOSIS — R60.0 BILATERAL LOWER EXTREMITY EDEMA: Primary | ICD-10-CM

## 2021-06-30 PROCEDURE — 97140 MANUAL THERAPY 1/> REGIONS: CPT | Performed by: PHYSICAL THERAPIST

## 2021-07-01 ENCOUNTER — TELEPHONE (OUTPATIENT)
Dept: NEPHROLOGY | Facility: CLINIC | Age: 66
End: 2021-07-01

## 2021-07-01 DIAGNOSIS — I48.11 LONGSTANDING PERSISTENT ATRIAL FIBRILLATION (HCC): ICD-10-CM

## 2021-07-01 RX ORDER — AMIODARONE HYDROCHLORIDE 200 MG/1
TABLET ORAL
Qty: 90 TABLET | Refills: 1 | Status: SHIPPED | OUTPATIENT
Start: 2021-07-01 | End: 2021-07-30 | Stop reason: HOSPADM

## 2021-07-01 NOTE — TELEPHONE ENCOUNTER
Ange from pre encounter calling in because pt needs authorization for his infusion tomorrow  Called pt's insurance company and sat on hold for 35 minutes  Unable to reach anyone today due to our office closing at 5  Will try again tomorrow morning when office opens at 8am   Please call [phone] Using member ID number [id number]  Once we receive approval please call Jewelene Gosselin back at [phone]  Jewelene Gosselin asked that this auth be backdated to 6/24 because patient already had his first infusion on that date

## 2021-07-02 ENCOUNTER — HOSPITAL ENCOUNTER (OUTPATIENT)
Dept: INFUSION CENTER | Facility: HOSPITAL | Age: 66
Discharge: HOME | End: 2021-07-02
Attending: INTERNAL MEDICINE

## 2021-07-02 NOTE — TELEPHONE ENCOUNTER
Sheri Núñez was approved with the start date of 7/2/21-1/2/22  Spoke with Mirta Otero pharmacist and had it approved  Auth Number- [id number]    Tried to back date it and no one was helping me with this issue

## 2021-07-06 ENCOUNTER — APPOINTMENT (OUTPATIENT)
Dept: LAB | Facility: HOSPITAL | Age: 66
End: 2021-07-06
Attending: INTERNAL MEDICINE
Payer: COMMERCIAL

## 2021-07-06 DIAGNOSIS — N17.9 AKI (ACUTE KIDNEY INJURY) (HCC): ICD-10-CM

## 2021-07-06 LAB
ANION GAP SERPL CALCULATED.3IONS-SCNC: 7 MMOL/L (ref 4–13)
BUN SERPL-MCNC: 32 MG/DL (ref 5–25)
CALCIUM SERPL-MCNC: 9.1 MG/DL (ref 8.3–10.1)
CHLORIDE SERPL-SCNC: 106 MMOL/L (ref 100–108)
CO2 SERPL-SCNC: 28 MMOL/L (ref 21–32)
CREAT SERPL-MCNC: 1.16 MG/DL (ref 0.6–1.3)
GFR SERPL CREATININE-BSD FRML MDRD: 65 ML/MIN/1.73SQ M
GLUCOSE P FAST SERPL-MCNC: 103 MG/DL (ref 65–99)
POTASSIUM SERPL-SCNC: 4.2 MMOL/L (ref 3.5–5.3)
SODIUM SERPL-SCNC: 141 MMOL/L (ref 136–145)

## 2021-07-06 PROCEDURE — 80048 BASIC METABOLIC PNL TOTAL CA: CPT

## 2021-07-06 PROCEDURE — 36415 COLL VENOUS BLD VENIPUNCTURE: CPT

## 2021-07-12 ENCOUNTER — OFFICE VISIT (OUTPATIENT)
Dept: CARDIOLOGY CLINIC | Facility: CLINIC | Age: 66
End: 2021-07-12
Payer: COMMERCIAL

## 2021-07-12 VITALS
DIASTOLIC BLOOD PRESSURE: 70 MMHG | SYSTOLIC BLOOD PRESSURE: 100 MMHG | HEIGHT: 69 IN | HEART RATE: 75 BPM | BODY MASS INDEX: 41.35 KG/M2 | WEIGHT: 279.2 LBS

## 2021-07-12 DIAGNOSIS — G47.33 OBSTRUCTIVE SLEEP APNEA ON CPAP: ICD-10-CM

## 2021-07-12 DIAGNOSIS — E66.01 MORBID OBESITY WITH BMI OF 40.0-44.9, ADULT (HCC): ICD-10-CM

## 2021-07-12 DIAGNOSIS — I48.11 LONGSTANDING PERSISTENT ATRIAL FIBRILLATION (HCC): Primary | ICD-10-CM

## 2021-07-12 DIAGNOSIS — I50.32 CHRONIC DIASTOLIC HEART FAILURE (HCC): ICD-10-CM

## 2021-07-12 DIAGNOSIS — I89.0 LYMPHEDEMA: ICD-10-CM

## 2021-07-12 DIAGNOSIS — R94.31 ABNORMAL ECG: ICD-10-CM

## 2021-07-12 DIAGNOSIS — Z99.89 OBSTRUCTIVE SLEEP APNEA ON CPAP: ICD-10-CM

## 2021-07-12 PROCEDURE — 93000 ELECTROCARDIOGRAM COMPLETE: CPT | Performed by: INTERNAL MEDICINE

## 2021-07-12 PROCEDURE — 99214 OFFICE O/P EST MOD 30 MIN: CPT | Performed by: INTERNAL MEDICINE

## 2021-07-12 PROCEDURE — 1036F TOBACCO NON-USER: CPT | Performed by: INTERNAL MEDICINE

## 2021-07-12 RX ORDER — FERROUS SULFATE 325(65) MG
325 TABLET ORAL
COMMUNITY

## 2021-07-12 RX ORDER — CHLORAL HYDRATE 500 MG
1000 CAPSULE ORAL 2 TIMES DAILY
COMMUNITY

## 2021-07-12 RX ORDER — DIPHENOXYLATE HYDROCHLORIDE AND ATROPINE SULFATE 2.5; .025 MG/1; MG/1
1 TABLET ORAL DAILY
COMMUNITY

## 2021-07-12 RX ORDER — ALFUZOSIN HYDROCHLORIDE 10 MG/1
10 TABLET, EXTENDED RELEASE ORAL AS NEEDED
COMMUNITY
End: 2021-10-06

## 2021-07-12 NOTE — PROGRESS NOTES
Cardiology Office Note  MD Katharine Shankar MD Spence Mater, DO, [facility] MD Elli Sr DO, Emily Stevenson DO, [facility] - [place]  ----------------------------------------------------------------  [address] [place] He [age] y o  male MRN: [id number]  Unit/Bed#:  Encounter: [id number]      History of Present Illness: It was a pleasure to see Nilda Albarran in the office today for follow-up CV evaluation  He has a longstanding history of atrial fibrillation with prior failed ELAINE guided cardioversion, history of morbid obesity and pulmonary hypertension with chronic venous insufficiency/lymphedema  The patient has a known history of obstructive sleep apnea was noncompliant with his CPAP therapy  Over the course of many years he has been having lower extremity edema which has been believe secondary to lymphedema with chronic venous insufficiency  He has had venous ablation is in the past for his reflux disease  In October 2019, he was found have pulmonary hypertension with pulmonary artery systolic pressure is estimated at 50 mm Hg  We had initially seen him in late August 2020 due to increased fatigue and dyspnea on exertion  He has become somewhat short of breath with basic activity  Previously, he had been managed for his atrial fibrillation at [facility]  Echocardiogram, stress test and Holter monitor were ordered, but stress test and Holter were completed  Stress test was found to be negative for myocardial ischemia  Holter monitor demonstrated atrial fibrillation with an average heart rate of 92 beats per minute and rare VPCs  His weight trends unfortunately continue to go upward and he had dietary discretion  He seen by electrophysiology and recommended for a sleep study as well as an evaluation with advanced heart failure    He was placed on CPAP and has been compliant since that time   He also was seen by bariatric surgery and wish to undergo conservative options with dietary modifications  Despite increasing diuretic load, he he continue to gain approximately 30 lb and was subsequently sent to [facility]  He was diuresed down to 285 lb  While hospitalized, his echocardiogram was performed showing normal left ventricular function  Following discharge, he was seen by electrophysiology and sent for a cardioversion  The cardioversion was initially successful, but he reverted back to rate controlled atrial flutter  He has been placed on amiodarone and decreased down to 100 mg daily  On his dose of torsemide, he had acute kidney injury with a creatinine rise to 1 9  After holding his diuretics for several days, his creatinine came down to 1 1  He was then decreased on his torsemide to 20 mg b i d  And his weight trends continue to improve  He has been aggressive in his dietary modifications  He also underwent radiofrequency ablation for his atrial fibrillation/flutter  He underwent a procedure in June 2021 Overall, he has felt much improved  He denies any chest pain, pressure, tightness or squeezing  Denies dyspnea on exertion  Denies orthopnea or paroxysmal nocturnal dyspnea  Denies lightheadedness, dizziness or palpitations  There has been a recall on his CPAP machine and he has been not using it for the past couple weeks  He also admits to some alcohol intake over July 4th  Review of Systems:  Review of Systems   Constitutional: Negative for decreased appetite, fever, malaise/fatigue, weight gain and weight loss  HENT: Negative for congestion and sore throat  Eyes: Negative for visual disturbance  Cardiovascular: Negative for chest pain, dyspnea on exertion, leg swelling, near-syncope and palpitations  Respiratory: Negative for cough and shortness of breath  Hematologic/Lymphatic: Negative for bleeding problem     Skin: Negative for rash    Musculoskeletal: Negative for myalgias and neck pain  Gastrointestinal: Negative for abdominal pain and nausea  Neurological: Negative for light-headedness and weakness  Psychiatric/Behavioral: Negative for depression  Past Medical History:   Diagnosis Date    A-fib ([address] )     Arthritis     CPAP (continuous positive airway pressure) dependence     Irregular heart beat     Lymphedema     Sleep apnea     Urinary frequency     Wears glasses     Wears partial dentures     lower partial       Past Surgical History:   Procedure Laterality Date    ABLATION SAPHENOUS VEIN W/ RFA      COLONOSCOPY      [place] CYSTOURETHRO W/IMPLANT N/A 11/13/2017    Procedure: CYSTOSCOPY WITH INSERTION UROLIFT;  Surgeon: Jeffery Prader, DO;  Location: [facility];  Service: Urology    TONSILLECTOMY         Social History     Socioeconomic History    Marital status: /Civil Union     Spouse name: Not on file    Number of children: Not on file    Years of education: Not on file    Highest education level: Not on file   Occupational History    Not on file   Tobacco Use    Smoking status: Never Smoker    Smokeless tobacco: Never Used   Vaping Use    Vaping Use: Never used   Substance and Sexual Activity    Alcohol use: Yes     Alcohol/week: 4 0 standard drinks     Types: 4 Cans of beer per week     Comment: socially    Drug use: No    Sexual activity: Not on file   Other Topics Concern    Not on file   Social History Narrative    Not on file     Social Determinants of Health     Financial Resource Strain:     Difficulty of Paying Living Expenses:    Food Insecurity:     Worried About Running Out of Food in the Last Year:     [address] in the Last Year:    Transportation Needs:     Lack of Transportation (Medical):      Lack of Transportation (Non-Medical):    Physical Activity:     Days of Exercise per Week:     Minutes of Exercise per Session:    Stress:     Feeling of Stress :    Social Connections:     Frequency of Communication with Friends and Family:     Frequency of Social Gatherings with Friends and Family:     Attends Rastafarian Services:     Active Member of Clubs or Organizations:     Attends Club or Organization Meetings:     Marital Status:    Intimate Partner Violence:     Fear of Current or Ex-Partner:     Emotionally Abused:     Physically Abused:     Sexually Abused:        Family History   Problem Relation Age of Onset    Heart disease Mother     Lymphoma Father     Cancer Father     Heart disease Sister     Hypertension Brother     Diabetes Neg Hx     Thyroid disease Neg Hx     Stroke Neg Hx        Allergies   Allergen Reactions    Penicillins Anaphylaxis    Sulfa Antibiotics Anaphylaxis         Current Outpatient Medications:     acetaminophen (TYLENOL) 500 mg tablet, Take 500 mg by mouth every 6 (six) hours as needed for mild pain, Disp: , Rfl:     alfuzosin (UROXATRAL) 10 mg 24 hr tablet, Take 10 mg by mouth daily, Disp: , Rfl:     allopurinol (ZYLOPRIM) 100 mg tablet, Take 1 tablet (100 mg total) by mouth daily, Disp: 14 tablet, Rfl: 0    amiodarone 200 mg tablet, TAKE 1 TABLET BY MOUTH 3 TIMES A DAY FOR 2 WEEKS   AFTER 2 WEEKS TAKE 1 TABLET DAILY, Disp: 90 tablet, Rfl: 1    ammonium lactate (AMLACTIN) 12 % lotion, Apply topically 2 (two) times a day as needed for dry skin, Disp: , Rfl:     clindamycin (CLEOCIN) 300 MG capsule, Take 300 mg by mouth as needed Prior to dental work, Disp: , Rfl:     colchicine (COLCRYS) 0 6 mg tablet, as needed , Disp: , Rfl:     ferrous sulfate 325 (65 Fe) mg tablet, Take 325 mg by mouth, Disp: , Rfl:     Glucosamine-Chondroit-Vit C-Mn (Glucosamine 1500 Complex) CAPS, Take by mouth, Disp: , Rfl:     halobetasol (ULTRAVATE) 0 05 % ointment, APPLY TO AFFECTED AREA ON LEG TWICE DAILY, Disp: , Rfl:     L-ARGININE-500 PO, Take 500 mg by mouth 2 (two) times a day , Disp: , Rfl:     metoprolol succinate (TOPROL-XL) 25 mg 24 hr tablet, Take 1 tablet (25 mg total) by mouth daily (Patient taking differently: Take 50 mg by mouth 2 (two) times a day ), Disp: 90 tablet, Rfl: 0    Multiple Vitamins-Minerals (OCUVITE EXTRA PO), Take 1 tablet by mouth daily  , Disp: , Rfl:     multivitamin (THERAGRAN) TABS, Take 1 tablet by mouth daily, Disp: , Rfl:     Omega-3 Fatty Acids (fish oil) 1,000 mg, Take 1,000 mg by mouth 2 (two) times a day, Disp: , Rfl:     patient supplied medication, Take 1 each by mouth 2 (two) times a day, Disp: , Rfl:     pregabalin (LYRICA) 100 mg capsule, Take 1 capsule (100 mg total) by mouth 3 (three) times a day, Disp: 42 capsule, Rfl: 0    rivaroxaban (Xarelto) 20 mg tablet, Take 1 tablet (20 mg total) by mouth daily, Disp: 90 tablet, Rfl: 1    spironolactone (ALDACTONE) 25 mg tablet, Take 1 tablet (25 mg total) by mouth 2 (two) times a day, Disp: 180 tablet, Rfl: 1    tadalafil (CIALIS) 5 MG tablet, Take 5 mg by mouth daily as needed for erectile dysfunction, Disp: , Rfl:     torsemide (DEMADEX) 20 mg tablet, Take 1 tablet (20 mg total) by mouth 2 (two) times a day, Disp: 180 tablet, Rfl: 1    traMADol (ULTRAM) 50 mg tablet, Take 50 mg by mouth every 6 (six) hours as needed for moderate pain, Disp: , Rfl:     VITAMIN D PO, Take 2,000 Units by mouth daily , Disp: , Rfl:     GARCINIA CAMBOGIA-CHROMIUM PO, Take 750 mg by mouth 2 (two) times a day  (Patient not taking: Reported on 7/12/2021), Disp: , Rfl:     Vitals:    07/12/21 1410   BP: 100/70   Pulse: 75   Weight: 127 kg (279 lb 3 2 oz)   Height: 5' 9" (1 753 m)       PHYSICAL EXAMINATION:  Gen: Awake, Alert, NAD  Head/eyes: AT/NC, pupils equal and round, Anicteric  ENT: mmm  Neck: Supple, No elevated JVP, trachea midline  Resp: CTA bilaterally no w/r/r  CV: Irregularly irregular +S1, S2, No m/r/g  Abd: Soft, NT/ND + BS  Ext: bilateral lower extremities wrapped with +1 pitting edema bilaterally/lymphedema  Neuro:  Follows commands, moves all extermities  Psych: Appropriate affect, normal mood, pleasant attitude, non-combative  Skin: warm; no rash, erythema or venous stasis changes on exposed skin    --------------------------------------------------------------------------------  TREADMILL STRESS  No results found for this or any previous visit    --------------------------------------------------------------------------------  NUCLEAR STRESS TEST: No results found for this or any previous visit  No results found for this or any previous visit     --------------------------------------------------------------------------------  CATH:  No results found for this or any previous visit   --------------------------------------------------------------------------------  ECHO:   Results for orders placed during the hospital encounter of 10/26/19   Echo complete with contrast if indicated    Magali Danielson [address], [facility] [phone]    Transthoracic Echocardiogram  2D, M-mode, Doppler, and Color Doppler    Study date:  28-Oct-2019    Patient: Pallavi Riojas  MR number: [id number]  Account number: [de-identified]  : 1955  Age: 71 years  Gender: Male  Status: Inpatient  Location: Bedside  Height: 69 in  Weight: 302 lb  BP: 141/ 88 mmHg    Indications: left sided paralysis    Diagnoses: 56 - CVA    Sonographer:  Angi Hinkle RDCS, CCT  Referring Physician:  Dany Curiel DO  Group:  [facility]  Interpreting Physician:  Navya Gonzalez DO    SUMMARY    LEFT VENTRICLE:  Systolic function was normal  Ejection fraction was estimated to be 55 %  This study was inadequate for the evaluation of regional wall motion  Wall thickness was mildly increased  RIGHT VENTRICLE:  The ventricle was dilated  Systolic function was normal     MITRAL VALVE:  There was mild regurgitation  TRICUSPID VALVE:  There was mild regurgitation    Estimated peak PA pressure was 50 mmHg     HISTORY: PRIOR HISTORY: Patient has no history of cardiovascular disease  PROCEDURE: The procedure was performed at the bedside  This was a routine study  The transthoracic approach was used  The study included complete 2D imaging, M-mode, complete spectral Doppler, and color Doppler  The heart rate was 80 bpm,  at the start of the study  Intravenous contrast (Definity solution [1 3 ml Definity/8 7ml normal saline solution], 3 ml) was administered to opacify the left ventricle  Echocardiographic views were limited due to poor acoustic window  availability  This was a technically difficult study  LEFT VENTRICLE: Size was normal  Systolic function was normal  Ejection fraction was estimated to be 55 %  This study was inadequate for the evaluation of regional wall motion  Wall thickness was mildly increased  DOPPLER: The study was  not technically sufficient to allow evaluation of LV diastolic function  RIGHT VENTRICLE: The ventricle was dilated  Systolic function was normal     LEFT ATRIUM: Size was normal     RIGHT ATRIUM: Size was normal     MITRAL VALVE: Valve structure was normal  There was normal leaflet separation  DOPPLER: The transmitral velocity was within the normal range  There was no evidence for stenosis  There was mild regurgitation  AORTIC VALVE: The valve was trileaflet  Leaflets exhibited normal thickness and normal cuspal separation  DOPPLER: Transaortic velocity was within the normal range  There was no evidence for stenosis  There was no regurgitation  TRICUSPID VALVE: The valve structure was normal  There was normal leaflet separation  DOPPLER: The transtricuspid velocity was within the normal range  There was no evidence for stenosis  There was mild regurgitation  Estimated peak PA  pressure was 50 mmHg  PULMONIC VALVE: Leaflets exhibited normal thickness, no calcification, and normal cuspal separation  DOPPLER: The transpulmonic velocity was within the normal range  There was no regurgitation  PERICARDIUM: There was no pericardial effusion  The pericardium was normal in appearance  AORTA: The root exhibited normal size  SYSTEMIC VEINS: IVC: The inferior vena cava was not well visualized  SYSTEM MEASUREMENT TABLES    2D  %FS: 34 72 %  Ao Diam: 2 87 cm  EDV(Teich): 143 23 ml  EF(Teich): 63 36 %  ESV(Teich): 52 47 ml  IVSd: 1 15 cm  LA Diam: 4 16 cm  LVIDd: 5 43 cm  LVIDs: 3 55 cm  LVPWd: 1 04 cm  RWT: 0 38  SV(Teich): 90 75 ml    CW  AV Vmax: 1 34 m/s  AV maxP 13 mmHg  RAP: 0 mmHg  TR Vmax: 3 26 m/s  TR maxP 5 mmHg    PW  E' Sept: 0 09 m/s  MV E Terrance: 1 03 m/s  RVSP: 42 5 mmHg    [facility] Accredited Echocardiography Laboratory    Prepared and electronically signed by    Verenice Awan DO  Signed 28-Oct-2019 10:37:38       No results found for this or any previous visit   --------------------------------------------------------------------------------  HOLTER  No results found for this or any previous visit    No results found for this or any previous visit   --------------------------------------------------------------------------------  CAROTIDS  No results found for this or any previous visit    --------------------------------------------------------------------------------  ECGs:  Results for orders placed or performed in visit on 21   POCT ECG    Impression    Atrial flutter with variable conduction 75 beats per minute, RBBB        Lab Results   Component Value Date    WBC 5 55 06/15/2021    HGB 13 4 06/15/2021    HCT 40 4 06/15/2021    MCV 93 06/15/2021     06/15/2021      Lab Results   Component Value Date    SODIUM 141 2021    K 4 2 2021     2021    CO2 28 2021    BUN 32 (H) 2021    CREATININE 1 16 2021    GLUC 111 06/15/2021    CALCIUM 9 1 2021      Lab Results   Component Value Date    HGBA1C 5 4 2021      No results found for: CHOL  Lab Results   Component Value Date    HDL 55 10/27/2019     Lab Results   Component Value Date    LDLCALC 90 10/27/2019     Lab Results   Component Value Date    TRIG 93 10/27/2019     No results found for: [place], [place]   Lab Results   Component Value Date    INR 2 26 (H) 06/02/2021    INR 1 59 (H) 01/22/2021    INR 1 18 10/26/2019    PROTIME 24 9 (H) 06/02/2021    PROTIME 18 7 (H) 01/22/2021    PROTIME 15 1 (H) 10/26/2019        1  Longstanding persistent atrial fibrillation (HCC)  -     POCT ECG  -     Holter monitor - 48 hour; Future; Expected date: 07/12/2021    2  Chronic diastolic heart failure (HCC)    3  Lymphedema    4  Obstructive sleep apnea on CPAP    5  Abnormal ECG    6  Morbid obesity with BMI of 40 0-44 9, adult ([facility] [address] )        IMPRESSION:  · Chronic diastolic heart failure  · Paroxysmal atrial fibrillation/flutter (had long standing since before October 2019 s/p DCCV in May 2021) s/p RFA ablation (June 2021) on Xarelto and Amiodarone  · LVEF 55%, mild LVH, paradoxical septal wall motion, mild RV dilatation, mild LA dilatation, mild to moderate RA dilatation, trace MR/TR, January 2021  · History of bilateral venous insufficiency status post LE vein ablation  · Pharmacologic nuclear stress test negative for myocardial ischemia with gated EF 50%, September 2020  · Holter with AF, avg HR 92 bpm, rare VPCs, September 2020  · Abnormal ECG with bifascicular block  · Moderate pulmonary hypertension  · Lymphedema  · Morbid obesity  · ROBERT on 20/14 cm BiPAP    PLAN:  It was a pleasure to see Jensen Pina in the office today for follow-up CV evaluation  He is here today for follow-up following his recent radiofrequency ablation for atrial fibrillation/flutter  ECG in the office today demonstrates atrial flutter with variable conduction  He has no symptoms concerning for angina and no signs or symptoms of heart failure  He examines to be euvolemic in the office today  Blood pressure and heart rate are currently stable    Patient admits to alcohol intake over July 4th and also has not been able to use his CPAP machine for the past couple weeks due to a recall  He has been following up with Nephrology his creatinine has recently been stable  Nephrology input appreciated  Based on his clinical presentation, I have the following recommendations:    1  Recommend aggressive risk factor and lifestyle modifications with dietary restriction and salt restriction  2  Continue spironolactone and torsemide for diuresis  3  Encouraged follow-up with sleep medicine for CPAP therapy as patient has been unable to use CPAP due to his recall on the device  4  Continue amiodarone, Xarelto and metoprolol for atrial fibrillation  Instructed patient to avoid alcohol intake as this can aggravate atrial fibrillation/flutter  5  Check 48 hour Holter monitor to assess burden of atrial fibrillation and heart rates  6  Encouraged follow-up with electrophysiology in the next week post ablation  7  Should he have greater than 3 lb weight gain in a day or 5 lb overall, recommended him to call the office for further titration of diuretic medication in  8  We will follow up with him in 2 months  As always, please do not hesitate to call with any questions  Portions of the record may have been created with voice recognition software  Occasional wrong word or "sound a like" substitutions may have occurred due to the inherent limitations of voice recognition software  Read the chart carefully and recognize, using context, where substitutions have occurred        Signed: Lucero Amezcua DO, Marsha Burke

## 2021-07-13 ENCOUNTER — HOSPITAL ENCOUNTER (OUTPATIENT)
Dept: NON INVASIVE DIAGNOSTICS | Facility: HOSPITAL | Age: 66
Discharge: HOME | End: 2021-07-13
Payer: COMMERCIAL

## 2021-07-13 DIAGNOSIS — I48.11 LONGSTANDING PERSISTENT ATRIAL FIBRILLATION (HCC): ICD-10-CM

## 2021-07-13 PROCEDURE — 93226 XTRNL ECG REC<48 HR SCAN A/R: CPT

## 2021-07-13 PROCEDURE — 93225 XTRNL ECG REC<48 HRS REC: CPT

## 2021-07-15 ENCOUNTER — TELEPHONE (OUTPATIENT)
Dept: CARDIOLOGY CLINIC | Facility: CLINIC | Age: 66
End: 2021-07-15

## 2021-07-15 ENCOUNTER — OFFICE VISIT (OUTPATIENT)
Dept: CARDIOLOGY CLINIC | Facility: CLINIC | Age: 66
End: 2021-07-15
Payer: COMMERCIAL

## 2021-07-15 VITALS
DIASTOLIC BLOOD PRESSURE: 64 MMHG | HEIGHT: 69 IN | SYSTOLIC BLOOD PRESSURE: 90 MMHG | WEIGHT: 280.4 LBS | BODY MASS INDEX: 41.53 KG/M2 | HEART RATE: 79 BPM

## 2021-07-15 DIAGNOSIS — I48.92 ATRIAL FLUTTER, UNSPECIFIED TYPE (HCC): Primary | ICD-10-CM

## 2021-07-15 PROCEDURE — 3008F BODY MASS INDEX DOCD: CPT | Performed by: INTERNAL MEDICINE

## 2021-07-15 PROCEDURE — 93227 XTRNL ECG REC<48 HR R&I: CPT | Performed by: INTERNAL MEDICINE

## 2021-07-15 PROCEDURE — 99215 OFFICE O/P EST HI 40 MIN: CPT | Performed by: INTERNAL MEDICINE

## 2021-07-15 PROCEDURE — 1036F TOBACCO NON-USER: CPT | Performed by: INTERNAL MEDICINE

## 2021-07-15 PROCEDURE — 93000 ELECTROCARDIOGRAM COMPLETE: CPT | Performed by: INTERNAL MEDICINE

## 2021-07-15 PROCEDURE — 1160F RVW MEDS BY RX/DR IN RCRD: CPT | Performed by: INTERNAL MEDICINE

## 2021-07-15 NOTE — TELEPHONE ENCOUNTER
Patient schedule for ELAINE/Atrial flutter ablation on 7/28/21 with Dr Lizbeth Maritnez  Patient aware of general instructions, medications holds (Xarelto, spironolactone and lasix- Hold the Am of and Omega 3 hold a week prior) and labs require    Please Stacy Hutchinson can you check for insurance approval

## 2021-07-15 NOTE — H&P (VIEW-ONLY)
HEART  [facility]    Outpatient  Follow-up  Today's Date: 07/15/21        Patient name: Nilda Albarran  YOB: 1955  Sex: male         Chief Complaint: f/u afib      ASSESSMENT:  Problem List Items Addressed This Visit     None      Visit Diagnoses     Atrial flutter, unspecified type [facility])    -  Primary    Relevant Orders    POCT ECG          77 yo male  1)  Atrial flutter 79bpm, flutter is typcal pattern on EKG and exactly same as flutter he presented in for his ablation that was terminated to NSR 6/2/21  So this maybe reconnection of CTI vs a new atypical flutter  He also had PVI/post wall    He has h/o long term  Persistent afib- long term, does feel better in NSR  [id number] on xarelto   2) chronic diastolic chf, EF 71% and pulm HTN >50mmHg, neg nuclear stress -   3) Severe moribd obesity  4) Chronic  lower extremity edema   5) HTN well controlled  6) ROBERT on CPAP  7) RBBB    PLAN:  1  Recommend Atrial flutter ablation, hopefully reconnected CTI line which is easy fix, but I did explain could be left sided more complex similar to prior ablation  Risks and benefits discussed  Orders Placed This Encounter   Procedures    POCT ECG     There are no discontinued medications    HPI/Subjective:       77 yo male  1)  Atrial flutter 79bpm, flutter is typcal pattern on EKG and exactly same as flutter he presented in for his ablation that was terminated to NSR 6/2/21  So this maybe reconnection of CTI vs a new atypical flutter  He also had PVI/post wall    He has h/o long term  Persistent afib- long term, does feel better in NSR   [id number] on xarelto   2) chronic diastolic chf, EF 29% and pulm HTN >50mmHg, neg nuclear stress -   3) Severe moribd obesity  4) Chronic  lower extremity edema   5) HTN well controlled  6) ROBERT on CPAP  7) RBBB    Wilfredo Chamorro feels better than he did prior to ablation but does note exercise capacity got worse as of very recently, he was back in flutter in Dr Kyaw Boone office last week  Please note HPI is listed by problem with with update following it, it is copied again in the assessment above and reflects medical decision making as well  Complete 12 point ROS reviewed and otherwise non pertinent or negative except as per HPI pertinent positives in Cardiovascular and Respiratory emphasized  Please see paper chart for outpatient clinic patients where the patient completed the 12 point ROS survey  Past Medical History:   Diagnosis Date    A-fib (HCC)     Arthritis     CPAP (continuous positive airway pressure) dependence     Irregular heart beat     Lymphedema     Sleep apnea     Urinary frequency     Wears glasses     Wears partial dentures     lower partial       Allergies   Allergen Reactions    Penicillins Anaphylaxis    Sulfa Antibiotics Anaphylaxis     I reviewed the Home Medication list and Allergies in the chart  Scheduled Meds:  Current Outpatient Medications   Medication Sig Dispense Refill    alfuzosin (UROXATRAL) 10 mg 24 hr tablet Take 10 mg by mouth daily      allopurinol (ZYLOPRIM) 100 mg tablet Take 1 tablet (100 mg total) by mouth daily 14 tablet 0    amiodarone 200 mg tablet TAKE 1 TABLET BY MOUTH 3 TIMES A DAY FOR 2 WEEKS   AFTER 2 WEEKS TAKE 1 TABLET DAILY 90 tablet 1    ammonium lactate (AMLACTIN) 12 % lotion Apply topically 2 (two) times a day as needed for dry skin      clindamycin (CLEOCIN) 300 MG capsule Take 300 mg by mouth as needed Prior to dental work      ferrous sulfate 325 (65 Fe) mg tablet Take 325 mg by mouth      Glucosamine-Chondroit-Vit C-Mn (Glucosamine 1500 Complex) CAPS Take by mouth      halobetasol (ULTRAVATE) 0 05 % ointment APPLY TO AFFECTED AREA ON LEG TWICE DAILY      L-ARGININE-500 PO Take 500 mg by mouth 2 (two) times a day       metoprolol succinate (TOPROL-XL) 25 mg 24 hr tablet Take 1 tablet (25 mg total) by mouth daily (Patient taking differently: Take 50 mg by mouth 2 (two) times a day ) 90 tablet 0    Multiple Vitamins-Minerals (OCUVITE EXTRA PO) Take 1 tablet by mouth daily        multivitamin (THERAGRAN) TABS Take 1 tablet by mouth daily      Omega-3 Fatty Acids (fish oil) 1,000 mg Take 1,000 mg by mouth 2 (two) times a day      patient supplied medication Take 1 each by mouth 2 (two) times a day      pregabalin (LYRICA) 100 mg capsule Take 1 capsule (100 mg total) by mouth 3 (three) times a day 42 capsule 0    rivaroxaban (Xarelto) 20 mg tablet Take 1 tablet (20 mg total) by mouth daily 90 tablet 1    spironolactone (ALDACTONE) 25 mg tablet Take 1 tablet (25 mg total) by mouth 2 (two) times a day 180 tablet 1    tadalafil (CIALIS) 5 MG tablet Take 5 mg by mouth daily as needed for erectile dysfunction      torsemide (DEMADEX) 20 mg tablet Take 1 tablet (20 mg total) by mouth 2 (two) times a day 180 tablet 1    traMADol (ULTRAM) 50 mg tablet Take 50 mg by mouth every 6 (six) hours as needed for moderate pain      VITAMIN D PO Take 2,000 Units by mouth daily       acetaminophen (TYLENOL) 500 mg tablet Take 500 mg by mouth every 6 (six) hours as needed for mild pain      colchicine (COLCRYS) 0 6 mg tablet as needed  (Patient not taking: Reported on 7/15/2021)      GARCINIA CAMBOGIA-CHROMIUM PO Take 750 mg by mouth 2 (two) times a day  (Patient not taking: Reported on 7/12/2021)       No current facility-administered medications for this visit       PRN Meds:         Family History   Problem Relation Age of Onset    Heart disease Mother     Lymphoma Father     Cancer Father     Heart disease Sister     Hypertension Brother     Diabetes Neg Hx     Thyroid disease Neg Hx     Stroke Neg Hx         Social History     Socioeconomic History    Marital status: /Civil Union     Spouse name: Not on file    Number of children: Not on file    Years of education: Not on file    Highest education level: Not on file   Occupational History    Not on file   Tobacco Use    Smoking status: Never Smoker    Smokeless tobacco: Never Used   Vaping Use    Vaping Use: Never used   Substance and Sexual Activity    Alcohol use: Yes     Comment: socially    Drug use: No    Sexual activity: Not on file   Other Topics Concern    Not on file   Social History Narrative    Not on file     Social Determinants of Health     Financial Resource Strain:     Difficulty of Paying Living Expenses:    Food Insecurity:     Worried About Running Out of Food in the Last Year:     [address] in the Last Year:    Transportation Needs:     Lack of Transportation (Medical):      Lack of Transportation (Non-Medical):    Physical Activity:     Days of Exercise per Week:     Minutes of Exercise per Session:    Stress:     Feeling of Stress :    Social Connections:     Frequency of Communication with Friends and Family:     Frequency of Social Gatherings with Friends and Family:     Attends Christianity Services:     Active Member of Clubs or Organizations:     Attends Club or Organization Meetings:     Marital Status:    Intimate Partner Violence:     Fear of Current or Ex-Partner:     Emotionally Abused:     Physically Abused:     Sexually Abused:            OBJECTIVE:    BP 90/64 (BP Location: Left arm, Patient Position: Sitting, Cuff Size: Large)   Pulse 79   Ht 5' 9" (1 753 m)   Wt 127 kg (280 lb 6 4 oz)   BMI 41 41 kg/m²   Vitals:    07/15/21 1306   Weight: 127 kg (280 lb 6 4 oz)       BP 90/64 (BP Location: Left arm, Patient Position: Sitting, Cuff Size: Large)   Pulse 79   Ht 5' 9" (1 753 m)   Wt 127 kg (280 lb 6 4 oz)   BMI 41 41 kg/m²   Vitals:    07/15/21 1306   Weight: 127 kg (280 lb 6 4 oz)     GEN: No acute distress, Alert and oriented, well appearing  HEENT:External ears normal, wearing a mask  EYES: Pupils equal, sclera anicteric, midline, normal conjuctiva  NECK: No JVD, supple, no obvious masses or thryomegaly or goiter  CARDIOVASCULAR:  RRR, No murmur, rub, gallops S1,S2  LUNGS: Clear To auscultation bilaterally, normal effort, no rales, rhonchi, crackles   ABDOMEN: jobese, nondistended,  without obvious organomegaly or ascites  EXTREMITIES/VASCULAR: 2+edema  warm an well perfused  PSYCH: Normal Affect,  linear speech pattern without evidence of psychosis  NEURO: Grossly intact, moving all extremiteis equal, face symmetric, alert and responsive, no obvious focal defecits   GAIT:  Ambulates normally without difficulty  HEME: No bleeding, bruising, petechia, purpura   SKIN: No significant rashes on visibile skin, warm, no diaphoresis or pallor  LABS:      Chemistry        Component Value Date/Time    K 4 2 07/06/2021 1052    K 4 6 09/03/2020 0805     07/06/2021 1052     09/03/2020 0805    CO2 28 07/06/2021 1052    CO2 30 09/03/2020 0805    BUN 32 (H) 07/06/2021 1052    BUN 24 09/03/2020 0805    CREATININE 1 16 07/06/2021 1052        Component Value Date/Time    CALCIUM 9 1 07/06/2021 1052    CALCIUM 9 2 09/03/2020 0805    ALKPHOS 64 06/15/2021 1126    AST 29 06/15/2021 1126    ALT 22 06/15/2021 1126            No results found for: CHOL  Lab Results   Component Value Date    HDL 55 10/27/2019     Lab Results   Component Value Date    LDLCALC 90 10/27/2019     Lab Results   Component Value Date    TRIG 93 10/27/2019     No results found for: CHOLHDL    IMAGING: No results found  Cardiac testing:   Results for orders placed during the hospital encounter of 01/22/21   Echo complete with contrast if indicated    Narrative [facility] [address]    [facility] [address] [facility]  [phone]    Transthoracic Echocardiogram  2D, M-mode, Doppler, and Color Doppler    Study date:  24-Jan-2021    Patient: Leandra Sesay  MR number: [id number]  Account number: [de-identified]  : 1955  Age: 76 years  Gender: Male  Status: Inpatient  Location: Bedside  Height: 69 in  Weight: 294 4 lb  BP: 129/ 60 mmHg    Indications: Edema  Heart failure  Diagnoses: I50 9 - Heart failure, unspecified    Sonographer:  JAM Duran  Referring Physician:  Robert Bland Do  Group:  [facility]  Interpreting Physician:  FORTINO Degroot     SUMMARY    PROCEDURE INFORMATION:  Intravenous contrast (  4ml of Definity) was administered  LEFT VENTRICLE:  Systolic function was normal by visual assessment  Ejection fraction was estimated to be 55 %  There were no regional wall motion abnormalities  Wall thickness was mildly increased  There was mild concentric hypertrophy  VENTRICULAR SEPTUM:  There was paradoxical motion  These changes are consistent with a conduction abnormality or paced rhythm  RIGHT VENTRICLE:  The ventricle was mildly dilated  LEFT ATRIUM:  The atrium was mildly dilated  LA volume index 40 mL/m2  RIGHT ATRIUM:  The atrium was mildly to moderately dilated  MITRAL VALVE:  There was trace regurgitation  TRICUSPID VALVE:  There was trace regurgitation  SUMMARY MEASUREMENTS  2D measurements:  Unspecified Anatomy:   %FS was 39 4 %  Ao Diam was 3 4 cm   EDV(Teich) was 129 3 ml   EF(Teich) was 69 5 %  ESV(Teich) was 39 4 ml  IVSd was 1 1 cm  LA Diam was 3 8 cm  LAAs A2C was 27 cm2  LAAs A4C was 26 8 cm2  LAESV A-L A2C was  98 5 ml  LAESV A-L A4C was 92 5 ml  LAESV Index (A-L) was 40 1 ml/m2  LAESV MOD A2C was 94 9 ml  LAESV MOD A4C was 85 4 ml  LAESV(A-L) was 97 7 ml  LAESV(MOD BP) was 91 9 ml  LAESVInd MOD BP was 37 7 ml/m2  LALs A2C was 6 3 cm  LALs A4C was 6 6 cm  LVEDV MOD A4C was 126 8 ml  LVEF MOD A4C was 52 7 %  LVESV MOD A4C was 60 ml  LVIDd was 5 2 cm  LVIDs was 3 2 cm  LVLd A4C was 8 1 cm  LVLs A4C was 7 1 cm  LVPWd was 1 cm  RAAs A4C was 25 8 cm2    RAESV A-L was  91 3 ml   RAESV MOD was 87 9 ml  RALs was 6 2 cm  SV MOD A4C was 66 9 ml   SV(Teich) was 89 9 ml   CW measurements:  Unspecified Anatomy:   AV Env  Ti was 283 9 ms   AV VTI was 27 9 cm   AV Vmax was 1 5 m/s  AV Vmean was 1 m/s  AV maxPG was 8 8 mmHg  AV meanPG was 4 5 mmHg  MM measurements:  Unspecified Anatomy:   TAPSE was 2 cm  PW measurements:  Unspecified Anatomy:   DVI was 0 7   LVOT Env  Ti was 279 7 ms  LVOT VTI was 20 8 cm  LVOT Vmax was 1 2 m/s  LVOT Vmean was 0 7 m/s  LVOT maxPG was 5 9 mmHg  LVOT meanPG was 2 6 mmHg  RV S' was 0 1 m/s  HISTORY: PRIOR HISTORY: lymphedema  Congestive heart failure  Pulmonary hypertension  Atrial fibrillation  Risk factors: hypertension and morbid obesity  PROCEDURE: The procedure was performed at the bedside  This was a routine study  The transthoracic approach was used  The study included complete 2D imaging, M-mode, complete spectral Doppler, and color Doppler  The heart rate was 90 bpm,  at the start of the study  Intravenous contrast (  4ml of Definity) was administered  Image quality was adequate  LEFT VENTRICLE: Size was normal  Systolic function was normal by visual assessment  Ejection fraction was estimated to be 55 %  There were no regional wall motion abnormalities  Wall thickness was mildly increased  There was mild  concentric hypertrophy  DOPPLER: The study was not technically sufficient to allow evaluation of LV diastolic function  VENTRICULAR SEPTUM: There was paradoxical motion  These changes are consistent with a conduction abnormality or paced rhythm  RIGHT VENTRICLE: The ventricle was mildly dilated  Systolic function was normal  Wall thickness was normal     LEFT ATRIUM: The atrium was mildly dilated  LA volume index 40 mL/m2  RIGHT ATRIUM: The atrium was mildly to moderately dilated  MITRAL VALVE: Valve structure was normal  There was normal leaflet separation   DOPPLER: The transmitral velocity was within the normal range  There was no evidence for stenosis  There was trace regurgitation  AORTIC VALVE: The valve was trileaflet  Leaflets exhibited mildly increased thickness, normal cuspal separation, and sclerosis  DOPPLER: Transaortic velocity was within the normal range  There was no evidence for stenosis  There was no  regurgitation  TRICUSPID VALVE: The valve structure was normal  There was normal leaflet separation  DOPPLER: The transtricuspid velocity was within the normal range  There was no evidence for stenosis  There was trace regurgitation  The tricuspid jet  envelope definition was inadequate for estimation of RV systolic pressure  PULMONIC VALVE: Not well visualized  DOPPLER: There was no significant regurgitation  PERICARDIUM: There was no pericardial effusion  The pericardium was normal in appearance  AORTA: The root exhibited normal size  SYSTEMIC VEINS: IVC: The inferior vena cava was normal in size and course  Respirophasic changes were normal     SYSTEM MEASUREMENT TABLES    2D  %FS: 39 4 %  Ao Diam: 3 4 cm  EDV(Teich): 129 3 ml  EF(Teich): 69 5 %  ESV(Teich): 39 4 ml  IVSd: 1 1 cm  LA Diam: 3 8 cm  LAAs A2C: 27 cm2  LAAs A4C: 26 8 cm2  LAESV A-L A2C: 98 5 ml  LAESV A-L A4C: 92 5 ml  LAESV Index (A-L): 40 1 ml/m2  LAESV MOD A2C: 94 9 ml  LAESV MOD A4C: 85 4 ml  LAESV(A-L): 97 7 ml  LAESV(MOD BP): 91 9 ml  LAESVInd MOD BP: 37 7 ml/m2  LALs A2C: 6 3 cm  LALs A4C: 6 6 cm  LVEDV MOD A4C: 126 8 ml  LVEF MOD A4C: 52 7 %  LVESV MOD A4C: 60 ml  LVIDd: 5 2 cm  LVIDs: 3 2 cm  LVLd A4C: 8 1 cm  LVLs A4C: 7 1 cm  LVPWd: 1 cm  RAAs A4C: 25 8 cm2  RAESV A-L: 91 3 ml  RAESV MOD: 87 9 ml  RALs: 6 2 cm  SV MOD A4C: 66 9 ml  SV(Teich): 89 9 ml    CW  AV Env  Ti: 283 9 ms  AV VTI: 27 9 cm  AV Vmax: 1 5 m/s  AV Vmean: 1 m/s  AV maxP 8 mmHg  AV meanP 5 mmHg    MM  TAPSE: 2 cm    PW  DVI: 0 7  LVOT Env  Ti: 279 7 ms  LVOT VTI: 20 8 cm  LVOT Vmax: 1 2 m/s  LVOT Vmean: 0 7 m/s  LVOT maxP 9 mmHg  LVOT meanP 6 mmHg  RV S': 0 1 m/s    [facility]    Prepared and electronically signed by    FORTINO David  Signed 2021 13:29:34       No results found for this or any previous visit  No results found for this or any previous visit  No results found for this or any previous visit  I reviewed and interpreted the following LABS/EKG/TELE/IMAGING and below is summary of my interpretation (if data available):    LABS:normal renal function    Current EKG and Rhythm Strip:Atypical flutter, A is pos V1 and pos inf leads  Rbbb, rate controlled, varibble block, no change from prior

## 2021-07-15 NOTE — TELEPHONE ENCOUNTER
----- Message from Alexandra Navas MD sent at 7/15/2021  1:39 PM EDT -----  Please schedule [place]- flutter ablation, carto  Hold xarelto morning of procedure only

## 2021-07-15 NOTE — PROGRESS NOTES
HEART  [facility]    Outpatient  Follow-up  Today's Date: 07/15/21        Patient name: Annie Heaton  YOB: 1955  Sex: male         Chief Complaint: f/u afib      ASSESSMENT:  Problem List Items Addressed This Visit     None      Visit Diagnoses     Atrial flutter, unspecified type [facility])    -  Primary    Relevant Orders    POCT ECG          77 yo male  1)  Atrial flutter 79bpm, flutter is typcal pattern on EKG and exactly same as flutter he presented in for his ablation that was terminated to NSR 6/2/21  So this maybe reconnection of CTI vs a new atypical flutter  He also had PVI/post wall    He has h/o long term  Persistent afib- long term, does feel better in NSR  [id number] on xarelto   2) chronic diastolic chf, EF 32% and pulm HTN >50mmHg, neg nuclear stress -   3) Severe moribd obesity  4) Chronic  lower extremity edema   5) HTN well controlled  6) ROBERT on CPAP  7) RBBB    PLAN:  1  Recommend Atrial flutter ablation, hopefully reconnected CTI line which is easy fix, but I did explain could be left sided more complex similar to prior ablation  Risks and benefits discussed  Orders Placed This Encounter   Procedures    POCT ECG     There are no discontinued medications    HPI/Subjective:       77 yo male  1)  Atrial flutter 79bpm, flutter is typcal pattern on EKG and exactly same as flutter he presented in for his ablation that was terminated to NSR 6/2/21  So this maybe reconnection of CTI vs a new atypical flutter  He also had PVI/post wall    He has h/o long term  Persistent afib- long term, does feel better in NSR   [id number] on xarelto   2) chronic diastolic chf, EF 65% and pulm HTN >50mmHg, neg nuclear stress -   3) Severe moribd obesity  4) Chronic  lower extremity edema   5) HTN well controlled  6) ROBERT on CPAP  7) RBBB    Gwendolynn Resides feels better than he did prior to ablation but does note exercise capacity got worse as of very recently, he was back in fluer in Dr Kalina Barry office last week  Please note HPI is listed by problem with with update following it, it is copied again in the assessment above and reflects medical decision making as well  Complete 12 point ROS reviewed and otherwise non pertinent or negative except as per HPI pertinent positives in Cardiovascular and Respiratory emphasized  Please see paper chart for outpatient clinic patients where the patient completed the 12 point ROS survey  Past Medical History:   Diagnosis Date    A-fib (HCC)     Arthritis     CPAP (continuous positive airway pressure) dependence     Irregular heart beat     Lymphedema     Sleep apnea     Urinary frequency     Wears glasses     Wears partial dentures     lower partial       Allergies   Allergen Reactions    Penicillins Anaphylaxis    Sulfa Antibiotics Anaphylaxis     I reviewed the Home Medication list and Allergies in the chart  Scheduled Meds:  Current Outpatient Medications   Medication Sig Dispense Refill    alfuzosin (UROXATRAL) 10 mg 24 hr tablet Take 10 mg by mouth daily      allopurinol (ZYLOPRIM) 100 mg tablet Take 1 tablet (100 mg total) by mouth daily 14 tablet 0    amiodarone 200 mg tablet TAKE 1 TABLET BY MOUTH 3 TIMES A DAY FOR 2 WEEKS   AFTER 2 WEEKS TAKE 1 TABLET DAILY 90 tablet 1    ammonium lactate (AMLACTIN) 12 % lotion Apply topically 2 (two) times a day as needed for dry skin      clindamycin (CLEOCIN) 300 MG capsule Take 300 mg by mouth as needed Prior to dental work      ferrous sulfate 325 (65 Fe) mg tablet Take 325 mg by mouth      Glucosamine-Chondroit-Vit C-Mn (Glucosamine 1500 Complex) CAPS Take by mouth      halobetasol (ULTRAVATE) 0 05 % ointment APPLY TO AFFECTED AREA ON LEG TWICE DAILY      L-ARGININE-500 PO Take 500 mg by mouth 2 (two) times a day       metoprolol succinate (TOPROL-XL) 25 mg 24 hr tablet Take 1 tablet (25 mg total) by mouth daily (Patient taking differently: Take 50 mg by mouth 2 (two) times a day ) 90 tablet 0    Multiple Vitamins-Minerals (OCUVITE EXTRA PO) Take 1 tablet by mouth daily        multivitamin (THERAGRAN) TABS Take 1 tablet by mouth daily      Omega-3 Fatty Acids (fish oil) 1,000 mg Take 1,000 mg by mouth 2 (two) times a day      patient supplied medication Take 1 each by mouth 2 (two) times a day      pregabalin (LYRICA) 100 mg capsule Take 1 capsule (100 mg total) by mouth 3 (three) times a day 42 capsule 0    rivaroxaban (Xarelto) 20 mg tablet Take 1 tablet (20 mg total) by mouth daily 90 tablet 1    spironolactone (ALDACTONE) 25 mg tablet Take 1 tablet (25 mg total) by mouth 2 (two) times a day 180 tablet 1    tadalafil (CIALIS) 5 MG tablet Take 5 mg by mouth daily as needed for erectile dysfunction      torsemide (DEMADEX) 20 mg tablet Take 1 tablet (20 mg total) by mouth 2 (two) times a day 180 tablet 1    traMADol (ULTRAM) 50 mg tablet Take 50 mg by mouth every 6 (six) hours as needed for moderate pain      VITAMIN D PO Take 2,000 Units by mouth daily       acetaminophen (TYLENOL) 500 mg tablet Take 500 mg by mouth every 6 (six) hours as needed for mild pain      colchicine (COLCRYS) 0 6 mg tablet as needed  (Patient not taking: Reported on 7/15/2021)      GARCINIA CAMBOGIA-CHROMIUM PO Take 750 mg by mouth 2 (two) times a day  (Patient not taking: Reported on 7/12/2021)       No current facility-administered medications for this visit       PRN Meds:         Family History   Problem Relation Age of Onset    Heart disease Mother     Lymphoma Father     Cancer Father     Heart disease Sister     Hypertension Brother     Diabetes Neg Hx     Thyroid disease Neg Hx     Stroke Neg Hx         Social History     Socioeconomic History    Marital status: /Civil Union     Spouse name: Not on file    Number of children: Not on file    Years of education: Not on file    Highest education level: Not on file   Occupational History    Not on file   Tobacco Use    Smoking status: Never Smoker    Smokeless tobacco: Never Used   Vaping Use    Vaping Use: Never used   Substance and Sexual Activity    Alcohol use: Yes     Comment: socially    Drug use: No    Sexual activity: Not on file   Other Topics Concern    Not on file   Social History Narrative    Not on file     Social Determinants of Health     Financial Resource Strain:     Difficulty of Paying Living Expenses:    Food Insecurity:     Worried About Running Out of Food in the Last Year:     [address] in the Last Year:    Transportation Needs:     Lack of Transportation (Medical):      Lack of Transportation (Non-Medical):    Physical Activity:     Days of Exercise per Week:     Minutes of Exercise per Session:    Stress:     Feeling of Stress :    Social Connections:     Frequency of Communication with Friends and Family:     Frequency of Social Gatherings with Friends and Family:     Attends Sabianism Services:     Active Member of Clubs or Organizations:     Attends Club or Organization Meetings:     Marital Status:    Intimate Partner Violence:     Fear of Current or Ex-Partner:     Emotionally Abused:     Physically Abused:     Sexually Abused:            OBJECTIVE:    BP 90/64 (BP Location: Left arm, Patient Position: Sitting, Cuff Size: Large)   Pulse 79   Ht 5' 9" (1 753 m)   Wt 127 kg (280 lb 6 4 oz)   BMI 41 41 kg/m²   Vitals:    07/15/21 1306   Weight: 127 kg (280 lb 6 4 oz)       BP 90/64 (BP Location: Left arm, Patient Position: Sitting, Cuff Size: Large)   Pulse 79   Ht 5' 9" (1 753 m)   Wt 127 kg (280 lb 6 4 oz)   BMI 41 41 kg/m²   Vitals:    07/15/21 1306   Weight: 127 kg (280 lb 6 4 oz)     GEN: No acute distress, Alert and oriented, well appearing  HEENT:External ears normal, wearing a mask  EYES: Pupils equal, sclera anicteric, midline, normal conjuctiva  NECK: No JVD, supple, no obvious masses or thryomegaly or goiter  CARDIOVASCULAR:  RRR, No murmur, rub, gallops S1,S2  LUNGS: Clear To auscultation bilaterally, normal effort, no rales, rhonchi, crackles   ABDOMEN: jobese, nondistended,  without obvious organomegaly or ascites  EXTREMITIES/VASCULAR: 2+edema  warm an well perfused  PSYCH: Normal Affect,  linear speech pattern without evidence of psychosis  NEURO: Grossly intact, moving all extremiteis equal, face symmetric, alert and responsive, no obvious focal defecits   GAIT:  Ambulates normally without difficulty  HEME: No bleeding, bruising, petechia, purpura   SKIN: No significant rashes on visibile skin, warm, no diaphoresis or pallor  LABS:      Chemistry        Component Value Date/Time    K 4 2 07/06/2021 1052    K 4 6 09/03/2020 0805     07/06/2021 1052     09/03/2020 0805    CO2 28 07/06/2021 1052    CO2 30 09/03/2020 0805    BUN 32 (H) 07/06/2021 1052    BUN 24 09/03/2020 0805    CREATININE 1 16 07/06/2021 1052        Component Value Date/Time    CALCIUM 9 1 07/06/2021 1052    CALCIUM 9 2 09/03/2020 0805    ALKPHOS 64 06/15/2021 1126    AST 29 06/15/2021 1126    ALT 22 06/15/2021 1126            No results found for: CHOL  Lab Results   Component Value Date    HDL 55 10/27/2019     Lab Results   Component Value Date    LDLCALC 90 10/27/2019     Lab Results   Component Value Date    TRIG 93 10/27/2019     No results found for: CHOLHDL    IMAGING: No results found  Cardiac testing:   Results for orders placed during the hospital encounter of 01/22/21   Echo complete with contrast if indicated    Narrative [address]  Lucie Funez [address]    [place], [address]  [phone]    Transthoracic Echocardiogram  2D, M-mode, Doppler, and Color Doppler    Study date:  24-Jan-2021    Patient: Leann Brewster  MR number: [id number]  Account number: [de-identified]  : 1955  Age: 76 years  Gender: Male  Status: Inpatient  Location: Bedside  Height: 69 in  Weight: 294 4 lb  BP: 129/ 60 mmHg    Indications: Edema  Heart failure  Diagnoses: I50 9 - Heart failure, unspecified    Sonographer:  JAM Hagen  Referring Physician:  Peewee Coulter Do  Group:  [facility]  Interpreting Physician:  FORTINO Lopes    PROCEDURE INFORMATION:  Intravenous contrast (  4ml of Definity) was administered  LEFT VENTRICLE:  Systolic function was normal by visual assessment  Ejection fraction was estimated to be 55 %  There were no regional wall motion abnormalities  Wall thickness was mildly increased  There was mild concentric hypertrophy  VENTRICULAR SEPTUM:  There was paradoxical motion  These changes are consistent with a conduction abnormality or paced rhythm  RIGHT VENTRICLE:  The ventricle was mildly dilated  LEFT ATRIUM:  The atrium was mildly dilated  LA volume index 40 mL/m2  RIGHT ATRIUM:  The atrium was mildly to moderately dilated  MITRAL VALVE:  There was trace regurgitation  TRICUSPID VALVE:  There was trace regurgitation  SUMMARY MEASUREMENTS  2D measurements:  Unspecified Anatomy:   %FS was 39 4 %  Ao Diam was 3 4 cm   EDV(Teich) was 129 3 ml   EF(Teich) was 69 5 %  ESV(Teich) was 39 4 ml  IVSd was 1 1 cm  LA Diam was 3 8 cm  LAAs A2C was 27 cm2  LAAs A4C was 26 8 cm2  LAESV A-L A2C was  98 5 ml  LAESV A-L A4C was 92 5 ml  LAESV Index (A-L) was 40 1 ml/m2  LAESV MOD A2C was 94 9 ml  LAESV MOD A4C was 85 4 ml  LAESV(A-L) was 97 7 ml  LAESV(MOD BP) was 91 9 ml  LAESVInd MOD BP was 37 7 ml/m2  LALs A2C was 6 3 cm  LALs A4C was 6 6 cm  LVEDV MOD A4C was 126 8 ml  LVEF MOD A4C was 52 7 %  LVESV MOD A4C was 60 ml  LVIDd was 5 2 cm  LVIDs was 3 2 cm  LVLd A4C was 8 1 cm  LVLs A4C was 7 1 cm  LVPWd was 1 cm  RAAs A4C was 25 8 cm2    RAESV A-L was  91 3 ml   RAESV MOD was 87 9 ml  RALs was 6 2 cm  SV MOD A4C was 66 9 ml   SV(Teich) was 89 9 ml   CW measurements:  Unspecified Anatomy:   AV Env  Ti was 283 9 ms   AV VTI was 27 9 cm   AV Vmax was 1 5 m/s  AV Vmean was 1 m/s  AV maxPG was 8 8 mmHg  AV meanPG was 4 5 mmHg  MM measurements:  Unspecified Anatomy:   TAPSE was 2 cm  PW measurements:  Unspecified Anatomy:   DVI was 0 7   LVOT Env  Ti was 279 7 ms  LVOT VTI was 20 8 cm  LVOT Vmax was 1 2 m/s  LVOT Vmean was 0 7 m/s  LVOT maxPG was 5 9 mmHg  LVOT meanPG was 2 6 mmHg  RV S' was 0 1 m/s  HISTORY: PRIOR HISTORY: lymphedema  Congestive heart failure  Pulmonary hypertension  Atrial fibrillation  Risk factors: hypertension and morbid obesity  PROCEDURE: The procedure was performed at the bedside  This was a routine study  The transthoracic approach was used  The study included complete 2D imaging, M-mode, complete spectral Doppler, and color Doppler  The heart rate was 90 bpm,  at the start of the study  Intravenous contrast (  4ml of Definity) was administered  Image quality was adequate  LEFT VENTRICLE: Size was normal  Systolic function was normal by visual assessment  Ejection fraction was estimated to be 55 %  There were no regional wall motion abnormalities  Wall thickness was mildly increased  There was mild  concentric hypertrophy  DOPPLER: The study was not technically sufficient to allow evaluation of LV diastolic function  VENTRICULAR SEPTUM: There was paradoxical motion  These changes are consistent with a conduction abnormality or paced rhythm  RIGHT VENTRICLE: The ventricle was mildly dilated  Systolic function was normal  Wall thickness was normal     LEFT ATRIUM: The atrium was mildly dilated  LA volume index 40 mL/m2  RIGHT ATRIUM: The atrium was mildly to moderately dilated  MITRAL VALVE: Valve structure was normal  There was normal leaflet separation   DOPPLER: The transmitral velocity was within the normal range  There was no evidence for stenosis  There was trace regurgitation  AORTIC VALVE: The valve was trileaflet  Leaflets exhibited mildly increased thickness, normal cuspal separation, and sclerosis  DOPPLER: Transaortic velocity was within the normal range  There was no evidence for stenosis  There was no  regurgitation  TRICUSPID VALVE: The valve structure was normal  There was normal leaflet separation  DOPPLER: The transtricuspid velocity was within the normal range  There was no evidence for stenosis  There was trace regurgitation  The tricuspid jet  envelope definition was inadequate for estimation of RV systolic pressure  PULMONIC VALVE: Not well visualized  DOPPLER: There was no significant regurgitation  PERICARDIUM: There was no pericardial effusion  The pericardium was normal in appearance  AORTA: The root exhibited normal size  SYSTEMIC VEINS: IVC: The inferior vena cava was normal in size and course  Respirophasic changes were normal     SYSTEM MEASUREMENT TABLES    2D  %FS: 39 4 %  Ao Diam: 3 4 cm  EDV(Teich): 129 3 ml  EF(Teich): 69 5 %  ESV(Teich): 39 4 ml  IVSd: 1 1 cm  LA Diam: 3 8 cm  LAAs A2C: 27 cm2  LAAs A4C: 26 8 cm2  LAESV A-L A2C: 98 5 ml  LAESV A-L A4C: 92 5 ml  LAESV Index (A-L): 40 1 ml/m2  LAESV MOD A2C: 94 9 ml  LAESV MOD A4C: 85 4 ml  LAESV(A-L): 97 7 ml  LAESV(MOD BP): 91 9 ml  LAESVInd MOD BP: 37 7 ml/m2  LALs A2C: 6 3 cm  LALs A4C: 6 6 cm  LVEDV MOD A4C: 126 8 ml  LVEF MOD A4C: 52 7 %  LVESV MOD A4C: 60 ml  LVIDd: 5 2 cm  LVIDs: 3 2 cm  LVLd A4C: 8 1 cm  LVLs A4C: 7 1 cm  LVPWd: 1 cm  RAAs A4C: 25 8 cm2  RAESV A-L: 91 3 ml  RAESV MOD: 87 9 ml  RALs: 6 2 cm  SV MOD A4C: 66 9 ml  SV(Teich): 89 9 ml    CW  AV Env  Ti: 283 9 ms  AV VTI: 27 9 cm  AV Vmax: 1 5 m/s  AV Vmean: 1 m/s  AV maxP 8 mmHg  AV meanP 5 mmHg    MM  TAPSE: 2 cm    PW  DVI: 0 7  LVOT Env  Ti: 279 7 ms  LVOT VTI: 20 8 cm  LVOT Vmax: 1 2 m/s  LVOT Vmean: 0 7 m/s  LVOT maxP 9 mmHg  LVOT meanP 6 mmHg  RV S': 0 1 m/s    [facility]    Prepared and electronically signed by    FORTINO Lin  Signed 2021 13:29:34       No results found for this or any previous visit  No results found for this or any previous visit  No results found for this or any previous visit  I reviewed and interpreted the following LABS/EKG/TELE/IMAGING and below is summary of my interpretation (if data available):    LABS:normal renal function    Current EKG and Rhythm Strip:Atypical flutter, A is pos V1 and pos inf leads  Rbbb, rate controlled, varibble block, no change from prior

## 2021-07-16 DIAGNOSIS — I48.92 ATRIAL FLUTTER, UNSPECIFIED TYPE (HCC): Primary | ICD-10-CM

## 2021-07-19 NOTE — TELEPHONE ENCOUNTER
He does not need auth for his ELAINE [id number] and Ablation [id number] on 7/28/21 at [facility] at [facility]  Ref# [id number]

## 2021-07-28 ENCOUNTER — APPOINTMENT (OUTPATIENT)
Dept: NON INVASIVE DIAGNOSTICS | Facility: HOSPITAL | Age: 66
DRG: 274 | End: 2021-07-28
Attending: INTERNAL MEDICINE
Payer: COMMERCIAL

## 2021-07-28 ENCOUNTER — ANESTHESIA EVENT (OUTPATIENT)
Dept: NON INVASIVE DIAGNOSTICS | Facility: HOSPITAL | Age: 66
DRG: 274 | End: 2021-07-28
Payer: COMMERCIAL

## 2021-07-28 ENCOUNTER — HOSPITAL ENCOUNTER (INPATIENT)
Dept: NON INVASIVE DIAGNOSTICS | Facility: HOSPITAL | Age: 66
LOS: 1 days | Discharge: HOME | DRG: 274 | End: 2021-07-30
Attending: INTERNAL MEDICINE | Admitting: INTERNAL MEDICINE
Payer: COMMERCIAL

## 2021-07-28 DIAGNOSIS — I48.92 ATRIAL FLUTTER, UNSPECIFIED TYPE (HCC): ICD-10-CM

## 2021-07-28 DIAGNOSIS — I48.4 ATYPICAL ATRIAL FLUTTER (HCC): Primary | ICD-10-CM

## 2021-07-28 DIAGNOSIS — N18.31 STAGE 3A CHRONIC KIDNEY DISEASE (HCC): ICD-10-CM

## 2021-07-28 DIAGNOSIS — I50.33 ACUTE ON CHRONIC DIASTOLIC CONGESTIVE HEART FAILURE (HCC): ICD-10-CM

## 2021-07-28 DIAGNOSIS — I48.11 LONGSTANDING PERSISTENT ATRIAL FIBRILLATION (HCC): ICD-10-CM

## 2021-07-28 DIAGNOSIS — N17.9 AKI (ACUTE KIDNEY INJURY) (HCC): ICD-10-CM

## 2021-07-28 LAB
ANION GAP SERPL CALCULATED.3IONS-SCNC: 5 MMOL/L (ref 4–13)
ATRIAL RATE: 264 BPM
BASOPHILS # BLD AUTO: 0.03 THOUSANDS/ΜL (ref 0–0.1)
BASOPHILS NFR BLD AUTO: 1 % (ref 0–1)
BUN SERPL-MCNC: 34 MG/DL (ref 5–25)
CALCIUM SERPL-MCNC: 9.2 MG/DL (ref 8.3–10.1)
CHLORIDE SERPL-SCNC: 108 MMOL/L (ref 100–108)
CO2 SERPL-SCNC: 25 MMOL/L (ref 21–32)
CREAT SERPL-MCNC: 1.14 MG/DL (ref 0.6–1.3)
EOSINOPHIL # BLD AUTO: 0.13 THOUSAND/ΜL (ref 0–0.61)
EOSINOPHIL NFR BLD AUTO: 3 % (ref 0–6)
ERYTHROCYTE [DISTWIDTH] IN BLOOD BY AUTOMATED COUNT: 13.8 % (ref 11.6–15.1)
GFR SERPL CREATININE-BSD FRML MDRD: 67 ML/MIN/1.73SQ M
GLUCOSE SERPL-MCNC: 95 MG/DL (ref 65–140)
HCT VFR BLD AUTO: 40.7 % (ref 36.5–49.3)
HGB BLD-MCNC: 14.4 G/DL (ref 12–17)
IMM GRANULOCYTES # BLD AUTO: 0.01 THOUSAND/UL (ref 0–0.2)
IMM GRANULOCYTES NFR BLD AUTO: 0 % (ref 0–2)
INR PPP: 1.54 (ref 0.84–1.19)
KCT BLD-ACNC: 184 SEC (ref 89–137)
KCT BLD-ACNC: 268 SEC (ref 89–137)
KCT BLD-ACNC: 325 SEC (ref 89–137)
KCT BLD-ACNC: 325 SEC (ref 89–137)
KCT BLD-ACNC: 331 SEC (ref 89–137)
KCT BLD-ACNC: 344 SEC (ref 89–137)
KCT BLD-ACNC: 345 SEC (ref 89–137)
KCT BLD-ACNC: 351 SEC (ref 89–137)
KCT BLD-ACNC: 364 SEC (ref 89–137)
KCT BLD-ACNC: 377 SEC (ref 89–137)
LYMPHOCYTES # BLD AUTO: 1.1 THOUSANDS/ΜL (ref 0.6–4.47)
LYMPHOCYTES NFR BLD AUTO: 21 % (ref 14–44)
MCH RBC QN AUTO: 32.5 PG (ref 26.8–34.3)
MCHC RBC AUTO-ENTMCNC: 35.4 G/DL (ref 31.4–37.4)
MCV RBC AUTO: 92 FL (ref 82–98)
MONOCYTES # BLD AUTO: 0.42 THOUSAND/ΜL (ref 0.17–1.22)
MONOCYTES NFR BLD AUTO: 8 % (ref 4–12)
NEUTROPHILS # BLD AUTO: 3.57 THOUSANDS/ΜL (ref 1.85–7.62)
NEUTS SEG NFR BLD AUTO: 67 % (ref 43–75)
NRBC BLD AUTO-RTO: 0 /100 WBCS
P AXIS: 92 DEGREES
PLATELET # BLD AUTO: 179 THOUSANDS/UL (ref 149–390)
PMV BLD AUTO: 11.4 FL (ref 8.9–12.7)
POTASSIUM SERPL-SCNC: 3.7 MMOL/L (ref 3.5–5.3)
PROTHROMBIN TIME: 18.4 SECONDS (ref 11.6–14.5)
QRS AXIS: -57 DEGREES
QRSD INTERVAL: 164 MS
QT INTERVAL: 462 MS
QTC INTERVAL: 484 MS
RBC # BLD AUTO: 4.43 MILLION/UL (ref 3.88–5.62)
SODIUM SERPL-SCNC: 138 MMOL/L (ref 136–145)
SPECIMEN SOURCE: ABNORMAL
T WAVE AXIS: -12 DEGREES
VENTRICULAR RATE: 66 BPM
WBC # BLD AUTO: 5.26 THOUSAND/UL (ref 4.31–10.16)

## 2021-07-28 PROCEDURE — 80048 BASIC METABOLIC PNL TOTAL CA: CPT | Performed by: PHYSICIAN ASSISTANT

## 2021-07-28 PROCEDURE — 93005 ELECTROCARDIOGRAM TRACING: CPT

## 2021-07-28 PROCEDURE — C1893 INTRO/SHEATH, FIXED,NON-PEEL: HCPCS | Performed by: INTERNAL MEDICINE

## 2021-07-28 PROCEDURE — 93653 COMPRE EP EVAL TX SVT: CPT | Performed by: INTERNAL MEDICINE

## 2021-07-28 PROCEDURE — 93462 L HRT CATH TRNSPTL PUNCTURE: CPT | Performed by: INTERNAL MEDICINE

## 2021-07-28 PROCEDURE — C1732 CATH, EP, DIAG/ABL, 3D/VECT: HCPCS | Performed by: INTERNAL MEDICINE

## 2021-07-28 PROCEDURE — C1894 INTRO/SHEATH, NON-LASER: HCPCS | Performed by: INTERNAL MEDICINE

## 2021-07-28 PROCEDURE — 93010 ELECTROCARDIOGRAM REPORT: CPT | Performed by: INTERNAL MEDICINE

## 2021-07-28 PROCEDURE — 93312 ECHO TRANSESOPHAGEAL: CPT

## 2021-07-28 PROCEDURE — 85025 COMPLETE CBC W/AUTO DIFF WBC: CPT | Performed by: PHYSICIAN ASSISTANT

## 2021-07-28 PROCEDURE — 93662 INTRACARDIAC ECG (ICE): CPT | Performed by: INTERNAL MEDICINE

## 2021-07-28 PROCEDURE — 02K83ZZ MAP CONDUCTION MECHANISM, PERCUTANEOUS APPROACH: ICD-10-PCS | Performed by: INTERNAL MEDICINE

## 2021-07-28 PROCEDURE — C1766 INTRO/SHEATH,STRBLE,NON-PEEL: HCPCS | Performed by: INTERNAL MEDICINE

## 2021-07-28 PROCEDURE — C1730 CATH, EP, 19 OR FEW ELECT: HCPCS | Performed by: INTERNAL MEDICINE

## 2021-07-28 PROCEDURE — 93621 COMP EP EVL L PAC&REC C SINS: CPT | Performed by: INTERNAL MEDICINE

## 2021-07-28 PROCEDURE — 4A0234Z MEASUREMENT OF CARDIAC ELECTRICAL ACTIVITY, PERCUTANEOUS APPROACH: ICD-10-PCS | Performed by: INTERNAL MEDICINE

## 2021-07-28 PROCEDURE — 93623 PRGRMD STIMJ&PACG IV RX NFS: CPT | Performed by: INTERNAL MEDICINE

## 2021-07-28 PROCEDURE — 85347 COAGULATION TIME ACTIVATED: CPT

## 2021-07-28 PROCEDURE — 93613 INTRACARDIAC EPHYS 3D MAPG: CPT | Performed by: INTERNAL MEDICINE

## 2021-07-28 PROCEDURE — 4A023FZ MEASUREMENT OF CARDIAC RHYTHM, PERCUTANEOUS APPROACH: ICD-10-PCS | Performed by: INTERNAL MEDICINE

## 2021-07-28 PROCEDURE — 85610 PROTHROMBIN TIME: CPT | Performed by: PHYSICIAN ASSISTANT

## 2021-07-28 PROCEDURE — C1759 CATH, INTRA ECHOCARDIOGRAPHY: HCPCS | Performed by: INTERNAL MEDICINE

## 2021-07-28 PROCEDURE — 76937 US GUIDE VASCULAR ACCESS: CPT | Performed by: INTERNAL MEDICINE

## 2021-07-28 PROCEDURE — 02583ZZ DESTRUCTION OF CONDUCTION MECHANISM, PERCUTANEOUS APPROACH: ICD-10-PCS | Performed by: INTERNAL MEDICINE

## 2021-07-28 PROCEDURE — C1769 GUIDE WIRE: HCPCS | Performed by: INTERNAL MEDICINE

## 2021-07-28 RX ORDER — KETAMINE HCL IN NACL, ISO-OSM 100MG/10ML
SYRINGE (ML) INJECTION AS NEEDED
Status: DISCONTINUED | OUTPATIENT
Start: 2021-07-28 | End: 2021-07-28

## 2021-07-28 RX ORDER — SODIUM CHLORIDE, SODIUM LACTATE, POTASSIUM CHLORIDE, CALCIUM CHLORIDE 600; 310; 30; 20 MG/100ML; MG/100ML; MG/100ML; MG/100ML
20 INJECTION, SOLUTION INTRAVENOUS CONTINUOUS
Status: DISCONTINUED | OUTPATIENT
Start: 2021-07-28 | End: 2021-07-30 | Stop reason: HOSPADM

## 2021-07-28 RX ORDER — ONDANSETRON 2 MG/ML
4 INJECTION INTRAMUSCULAR; INTRAVENOUS ONCE AS NEEDED
Status: DISCONTINUED | OUTPATIENT
Start: 2021-07-28 | End: 2021-07-28 | Stop reason: HOSPADM

## 2021-07-28 RX ORDER — METOPROLOL SUCCINATE 25 MG/1
25 TABLET, EXTENDED RELEASE ORAL DAILY
Status: DISCONTINUED | OUTPATIENT
Start: 2021-07-29 | End: 2021-07-29

## 2021-07-28 RX ORDER — LIDOCAINE HYDROCHLORIDE 10 MG/ML
INJECTION, SOLUTION EPIDURAL; INFILTRATION; INTRACAUDAL; PERINEURAL AS NEEDED
Status: DISCONTINUED | OUTPATIENT
Start: 2021-07-28 | End: 2021-07-28

## 2021-07-28 RX ORDER — ADENOSINE 3 MG/ML
INJECTION INTRAVENOUS CODE/TRAUMA/SEDATION MEDICATION
Status: COMPLETED | OUTPATIENT
Start: 2021-07-28 | End: 2021-07-28

## 2021-07-28 RX ORDER — FERROUS SULFATE 325(65) MG
325 TABLET ORAL
Status: DISCONTINUED | OUTPATIENT
Start: 2021-07-29 | End: 2021-07-30 | Stop reason: HOSPADM

## 2021-07-28 RX ORDER — PROTAMINE SULFATE 10 MG/ML
INJECTION, SOLUTION INTRAVENOUS AS NEEDED
Status: DISCONTINUED | OUTPATIENT
Start: 2021-07-28 | End: 2021-07-28

## 2021-07-28 RX ORDER — ALLOPURINOL 100 MG/1
100 TABLET ORAL DAILY
Status: DISCONTINUED | OUTPATIENT
Start: 2021-07-29 | End: 2021-07-30 | Stop reason: HOSPADM

## 2021-07-28 RX ORDER — HEPARIN SODIUM 10000 [USP'U]/100ML
INJECTION, SOLUTION INTRAVENOUS
Status: COMPLETED | OUTPATIENT
Start: 2021-07-28 | End: 2021-07-28

## 2021-07-28 RX ORDER — TRAMADOL HYDROCHLORIDE 50 MG/1
50 TABLET ORAL EVERY 6 HOURS PRN
Status: DISCONTINUED | OUTPATIENT
Start: 2021-07-28 | End: 2021-07-30 | Stop reason: HOSPADM

## 2021-07-28 RX ORDER — HEPARIN SODIUM 1000 [USP'U]/ML
INJECTION, SOLUTION INTRAVENOUS; SUBCUTANEOUS CODE/TRAUMA/SEDATION MEDICATION
Status: COMPLETED | OUTPATIENT
Start: 2021-07-28 | End: 2021-07-28

## 2021-07-28 RX ORDER — ACETAMINOPHEN 325 MG/1
650 TABLET ORAL EVERY 4 HOURS PRN
Status: DISCONTINUED | OUTPATIENT
Start: 2021-07-28 | End: 2021-07-30 | Stop reason: HOSPADM

## 2021-07-28 RX ORDER — TORSEMIDE 20 MG/1
20 TABLET ORAL 2 TIMES DAILY
Status: DISCONTINUED | OUTPATIENT
Start: 2021-07-28 | End: 2021-07-29

## 2021-07-28 RX ORDER — PREGABALIN 100 MG/1
100 CAPSULE ORAL 3 TIMES DAILY
Status: DISCONTINUED | OUTPATIENT
Start: 2021-07-28 | End: 2021-07-30 | Stop reason: HOSPADM

## 2021-07-28 RX ORDER — MIDAZOLAM HYDROCHLORIDE 2 MG/2ML
INJECTION, SOLUTION INTRAMUSCULAR; INTRAVENOUS AS NEEDED
Status: DISCONTINUED | OUTPATIENT
Start: 2021-07-28 | End: 2021-07-28

## 2021-07-28 RX ORDER — SPIRONOLACTONE 25 MG/1
25 TABLET ORAL 2 TIMES DAILY
Status: DISCONTINUED | OUTPATIENT
Start: 2021-07-28 | End: 2021-07-29

## 2021-07-28 RX ORDER — FENTANYL CITRATE 50 UG/ML
INJECTION, SOLUTION INTRAMUSCULAR; INTRAVENOUS AS NEEDED
Status: DISCONTINUED | OUTPATIENT
Start: 2021-07-28 | End: 2021-07-28

## 2021-07-28 RX ORDER — VANCOMYCIN HYDROCHLORIDE 1 G/200ML
INJECTION, SOLUTION INTRAVENOUS AS NEEDED
Status: DISCONTINUED | OUTPATIENT
Start: 2021-07-28 | End: 2021-07-28

## 2021-07-28 RX ORDER — ROCURONIUM BROMIDE 10 MG/ML
INJECTION, SOLUTION INTRAVENOUS AS NEEDED
Status: DISCONTINUED | OUTPATIENT
Start: 2021-07-28 | End: 2021-07-28

## 2021-07-28 RX ORDER — SODIUM CHLORIDE 9 MG/ML
INJECTION, SOLUTION INTRAVENOUS CONTINUOUS PRN
Status: DISCONTINUED | OUTPATIENT
Start: 2021-07-28 | End: 2021-07-28

## 2021-07-28 RX ORDER — FENTANYL CITRATE/PF 50 MCG/ML
25 SYRINGE (ML) INJECTION
Status: DISCONTINUED | OUTPATIENT
Start: 2021-07-28 | End: 2021-07-28 | Stop reason: HOSPADM

## 2021-07-28 RX ORDER — ONDANSETRON 2 MG/ML
INJECTION INTRAMUSCULAR; INTRAVENOUS AS NEEDED
Status: DISCONTINUED | OUTPATIENT
Start: 2021-07-28 | End: 2021-07-28

## 2021-07-28 RX ORDER — PROPOFOL 10 MG/ML
INJECTION, EMULSION INTRAVENOUS AS NEEDED
Status: DISCONTINUED | OUTPATIENT
Start: 2021-07-28 | End: 2021-07-28

## 2021-07-28 RX ORDER — AMIODARONE HYDROCHLORIDE 200 MG/1
100 TABLET ORAL
Status: DISCONTINUED | OUTPATIENT
Start: 2021-07-29 | End: 2021-07-30 | Stop reason: HOSPADM

## 2021-07-28 RX ORDER — KETOTIFEN FUMARATE 0.35 MG/ML
1 SOLUTION/ DROPS OPHTHALMIC 2 TIMES DAILY PRN
Status: DISCONTINUED | OUTPATIENT
Start: 2021-07-28 | End: 2021-07-30 | Stop reason: HOSPADM

## 2021-07-28 RX ADMIN — LIDOCAINE HYDROCHLORIDE 50 MG: 10 INJECTION, SOLUTION EPIDURAL; INFILTRATION; INTRACAUDAL; PERINEURAL at 08:20

## 2021-07-28 RX ADMIN — Medication 20 MG: at 09:06

## 2021-07-28 RX ADMIN — FENTANYL CITRATE 25 MCG: 50 INJECTION INTRAMUSCULAR; INTRAVENOUS at 10:49

## 2021-07-28 RX ADMIN — SUGAMMADEX 200 MG: 100 INJECTION, SOLUTION INTRAVENOUS at 14:39

## 2021-07-28 RX ADMIN — HEPARIN SODIUM 6000 UNITS: 1000 INJECTION INTRAVENOUS; SUBCUTANEOUS at 10:07

## 2021-07-28 RX ADMIN — ONDANSETRON 4 MG: 2 INJECTION INTRAMUSCULAR; INTRAVENOUS at 14:27

## 2021-07-28 RX ADMIN — ROCURONIUM BROMIDE 30 MG: 50 INJECTION, SOLUTION INTRAVENOUS at 08:20

## 2021-07-28 RX ADMIN — FENTANYL CITRATE 25 MCG: 50 INJECTION INTRAMUSCULAR; INTRAVENOUS at 08:20

## 2021-07-28 RX ADMIN — SODIUM CHLORIDE: 0.9 INJECTION, SOLUTION INTRAVENOUS at 08:05

## 2021-07-28 RX ADMIN — MIDAZOLAM 2 MG: 1 INJECTION INTRAMUSCULAR; INTRAVENOUS at 08:05

## 2021-07-28 RX ADMIN — HEPARIN SODIUM 3500 UNITS/HR: 10000 INJECTION, SOLUTION INTRAVENOUS at 09:42

## 2021-07-28 RX ADMIN — PHENYLEPHRINE HYDROCHLORIDE 50 MCG/MIN: 10 INJECTION INTRAVENOUS at 08:29

## 2021-07-28 RX ADMIN — PROTAMINE SULFATE 60 MG: 10 INJECTION, SOLUTION INTRAVENOUS at 14:20

## 2021-07-28 RX ADMIN — ACETAMINOPHEN 650 MG: 325 TABLET, FILM COATED ORAL at 22:06

## 2021-07-28 RX ADMIN — SODIUM CHLORIDE: 0.9 INJECTION, SOLUTION INTRAVENOUS at 10:53

## 2021-07-28 RX ADMIN — VANCOMYCIN HYDROCHLORIDE 1500 MG: 1 INJECTION, SOLUTION INTRAVENOUS at 08:30

## 2021-07-28 RX ADMIN — PROPOFOL 180 MG: 10 INJECTION, EMULSION INTRAVENOUS at 08:20

## 2021-07-28 RX ADMIN — PREGABALIN 100 MG: 100 CAPSULE ORAL at 22:06

## 2021-07-28 RX ADMIN — HEPARIN SODIUM 10000 UNITS: 1000 INJECTION INTRAVENOUS; SUBCUTANEOUS at 09:41

## 2021-07-28 RX ADMIN — ADENOSINE 18 MG: 3 INJECTION INTRAVENOUS at 13:07

## 2021-07-28 NOTE — ANESTHESIA POSTPROCEDURE EVALUATION
Post-Op Assessment Note    CV Status:  Stable  Pain Score: 0    Pain management: adequate     Mental Status:  Alert and awake   Hydration Status:  Euvolemic   PONV Controlled:  Controlled   Airway Patency:  Patent      Post Op Vitals Reviewed: Yes      Staff: Anesthesiologist, CRNA         No complications documented      BP  112/68   Temp     Pulse  50   Resp   16   SpO2   100

## 2021-07-28 NOTE — ANESTHESIA PREPROCEDURE EVALUATION
Procedure:  ELAINE  CARDIAC EPS/AFLUTTER ABLATION    Relevant Problems   ANESTHESIA (within normal limits)   (-) History of anesthesia complications      CARDIO   (+) Atrial fibrillation (HCC) (s/p ablation 6/2/21, but with recurrence)   (+) Pulmonary hypertension (HCC)      ENDO (within normal limits)      GI/HEPATIC (within normal limits)  Confirmed NPO appropriate   (-) Gastroesophageal reflux disease      /RENAL   (+) ALANNA (acute kidney injury) (HCC)   (+) Stage 3a chronic kidney disease (HCC)      HEMATOLOGY   (+) Anemia   (+) Thrombocytopenia (HCC)      MUSCULOSKELETAL   (+) Primary osteoarthritis      NEURO/PSYCH   (+) Paresthesias      PULMONARY   (+) Obstructive sleep apnea on CPAP (Patient had been compliant with CPAP but his machine has since been recalled)   (-) Smoking   (-) URI (upper respiratory infection)      Other   (+) Obesity, Class III, BMI 40-49 9 (morbid obesity) ([facility] [address] )        Physical Exam    Airway    Mallampati score: III  TM Distance: >3 FB  Neck ROM: full     Dental   Comment: Upper and lower partials,     Cardiovascular  Rhythm: regular, Rate: normal,     Pulmonary  Pulmonary exam normal Breath sounds clear to auscultation,     Other Findings        Anesthesia Plan  ASA Score- 3     Anesthesia Type- general with ASA Monitors  Additional Monitors: arterial line  Airway Plan: ETT  Plan Factors-Exercise tolerance (METS): >4 METS  Exercise comment: Able to walk his dog for >1 hour daily  Chart reviewed  EKG reviewed  Existing labs reviewed  Patient is not a current smoker  Obstructive sleep apnea risk education given perioperatively  Induction- intravenous  Postoperative Plan-   Planned trial extubation    Informed Consent- Anesthetic plan and risks discussed with patient  I personally reviewed this patient with the CRNA  Discussed and agreed on the Anesthesia Plan with the CRNA           ELAINE 6/2/21:  LEFT VENTRICLE:  Systolic function was at the lower limits of normal  Ejection fraction was estimated to be 50 %  LEFT ATRIUM:  The atrium was mildly dilated  LEFT ATRIAL APPENDAGE:  The function was mildly reduced (mildly reduced emptying velocity)  No thrombus was identified  ATRIAL SEPTUM:  No defect or patent foramen ovale was identified  MITRAL VALVE:  There was mild regurgitation  TRICUSPID VALVE:  There was mild regurgitation        Lab Results   Component Value Date    WBC 5 55 06/15/2021    HGB 13 4 06/15/2021    HCT 40 4 06/15/2021    MCV 93 06/15/2021     06/15/2021     Lab Results   Component Value Date    SODIUM 141 07/06/2021    K 4 2 07/06/2021     07/06/2021    CO2 28 07/06/2021    BUN 32 (H) 07/06/2021    CREATININE 1 16 07/06/2021    GLUC 111 06/15/2021    CALCIUM 9 1 07/06/2021     Lab Results   Component Value Date    ALT 22 06/15/2021    AST 29 06/15/2021    ALKPHOS 64 06/15/2021     Lab Results   Component Value Date    INR 2 26 (H) 06/02/2021    INR 1 59 (H) 01/22/2021    INR 1 18 10/26/2019    PROTIME 24 9 (H) 06/02/2021    PROTIME 18 7 (H) 01/22/2021    PROTIME 15 1 (H) 10/26/2019     Lab Results   Component Value Date    HGBA1C 5 4 06/08/2021

## 2021-07-28 NOTE — INTERVAL H&P NOTE
Please see recent office visit with Dr Alba Delcid for full details  Briefly this patient is a pleasant 27-year-old male with persistent atrial fibrillation with prior cryoablation with pulmonary vein isolation, posterior wall isolation, and typical flutter line 06/2021, SVT status post AVNRT ablation at that time, chronic HFpEF, baseline right bundle-branch block, hypertension, obesity with BMI 41, structure sleep apnea compliant with CPAP, and chronic lower extremity edema/lymphedema  He was admitted to the hospital 08/2020 with acute on chronic diastolic CHF and persistent atrial fibrillation  He was later seen by EP, and risk factor modifications and rate control were initially recommended  He again presented to the hospital 01/2021 with acute CHF and was seen at that time by Dr Alba Delcid  He followed up as an outpatient, and a trial of normal sinus rhythm was recommended  He was started on amiodarone antiarrhythmic therapy, and underwent cardioversion 05/2021  He felt noticeably less short of breath when in sinus rhythm, however when he followed up with Dr Alba Delcid shortly thereafter he was back in atrial flutter  An ablation was recommended, and in 06/2021 he underwent cryoablation pulmonary vein isolation, posterior wall isolation, typical flutter line, and AVNRT ablation  When he was seen in post ablation follow-up 7/15/2021, he was again in atrial flutter with which he was symptomatic  A repeat ablation was recommended to see if this was reconnection of his CTI line verses and atypical atrial flutter  He presents today to undergo that procedure  No significant changes since he was last seen, he continues to experience symptoms related to atrial flutter  Namely, when he bends over to pick something up he becomes extremely winded, and he did not have the symptoms when in sinus rhythm  He has remained active, and walks his dog regularly    He has chronic lower extremity edema, but otherwise physical exam unchanged      Vitals:    07/28/21 0704   BP: (!) 88/53   Pulse: 67   Resp: 18   Temp: (!) 96 5 °F (35 8 °C)   SpO2: 97%

## 2021-07-29 PROBLEM — I48.92 ATRIAL FLUTTER (HCC): Status: ACTIVE | Noted: 2021-07-29

## 2021-07-29 LAB
ANION GAP SERPL CALCULATED.3IONS-SCNC: 3 MMOL/L (ref 4–13)
ATRIAL RATE: 50 BPM
ATRIAL RATE: 51 BPM
BUN SERPL-MCNC: 39 MG/DL (ref 5–25)
CALCIUM SERPL-MCNC: 8 MG/DL (ref 8.3–10.1)
CHLORIDE SERPL-SCNC: 110 MMOL/L (ref 100–108)
CO2 SERPL-SCNC: 26 MMOL/L (ref 21–32)
CREAT SERPL-MCNC: 1.69 MG/DL (ref 0.6–1.3)
ERYTHROCYTE [DISTWIDTH] IN BLOOD BY AUTOMATED COUNT: 14.6 % (ref 11.6–15.1)
GFR SERPL CREATININE-BSD FRML MDRD: 41 ML/MIN/1.73SQ M
GLUCOSE SERPL-MCNC: 63 MG/DL (ref 65–140)
HCT VFR BLD AUTO: 37.3 % (ref 36.5–49.3)
HGB BLD-MCNC: 12.2 G/DL (ref 12–17)
MCH RBC QN AUTO: 32.1 PG (ref 26.8–34.3)
MCHC RBC AUTO-ENTMCNC: 32.7 G/DL (ref 31.4–37.4)
MCV RBC AUTO: 98 FL (ref 82–98)
P AXIS: 19 DEGREES
P AXIS: 38 DEGREES
PLATELET # BLD AUTO: 98 THOUSANDS/UL (ref 149–390)
PMV BLD AUTO: 11.9 FL (ref 8.9–12.7)
POTASSIUM SERPL-SCNC: 4.3 MMOL/L (ref 3.5–5.3)
PR INTERVAL: 202 MS
PR INTERVAL: 204 MS
QRS AXIS: -68 DEGREES
QRS AXIS: -86 DEGREES
QRSD INTERVAL: 158 MS
QRSD INTERVAL: 163 MS
QT INTERVAL: 498 MS
QT INTERVAL: 504 MS
QTC INTERVAL: 454 MS
QTC INTERVAL: 465 MS
RBC # BLD AUTO: 3.8 MILLION/UL (ref 3.88–5.62)
SODIUM SERPL-SCNC: 139 MMOL/L (ref 136–145)
T WAVE AXIS: -33 DEGREES
T WAVE AXIS: -37 DEGREES
VENTRICULAR RATE: 50 BPM
VENTRICULAR RATE: 51 BPM
WBC # BLD AUTO: 5.17 THOUSAND/UL (ref 4.31–10.16)

## 2021-07-29 PROCEDURE — 93312 ECHO TRANSESOPHAGEAL: CPT | Performed by: INTERNAL MEDICINE

## 2021-07-29 PROCEDURE — 93010 ELECTROCARDIOGRAM REPORT: CPT | Performed by: INTERNAL MEDICINE

## 2021-07-29 PROCEDURE — 93320 DOPPLER ECHO COMPLETE: CPT | Performed by: INTERNAL MEDICINE

## 2021-07-29 PROCEDURE — 99232 SBSQ HOSP IP/OBS MODERATE 35: CPT | Performed by: INTERNAL MEDICINE

## 2021-07-29 PROCEDURE — 80048 BASIC METABOLIC PNL TOTAL CA: CPT | Performed by: PHYSICIAN ASSISTANT

## 2021-07-29 PROCEDURE — 93325 DOPPLER ECHO COLOR FLOW MAPG: CPT | Performed by: INTERNAL MEDICINE

## 2021-07-29 PROCEDURE — 85027 COMPLETE CBC AUTOMATED: CPT | Performed by: PHYSICIAN ASSISTANT

## 2021-07-29 PROCEDURE — G0379 DIRECT REFER HOSPITAL OBSERV: HCPCS

## 2021-07-29 RX ORDER — FUROSEMIDE 10 MG/ML
40 INJECTION INTRAMUSCULAR; INTRAVENOUS ONCE
Status: COMPLETED | OUTPATIENT
Start: 2021-07-29 | End: 2021-07-29

## 2021-07-29 RX ORDER — METOPROLOL SUCCINATE 25 MG/1
25 TABLET, EXTENDED RELEASE ORAL DAILY
Status: DISCONTINUED | OUTPATIENT
Start: 2021-07-30 | End: 2021-07-30 | Stop reason: HOSPADM

## 2021-07-29 RX ORDER — TORSEMIDE 20 MG/1
20 TABLET ORAL 2 TIMES DAILY
Status: DISCONTINUED | OUTPATIENT
Start: 2021-07-29 | End: 2021-07-30 | Stop reason: HOSPADM

## 2021-07-29 RX ADMIN — PREGABALIN 100 MG: 100 CAPSULE ORAL at 08:58

## 2021-07-29 RX ADMIN — AMIODARONE HYDROCHLORIDE 100 MG: 200 TABLET ORAL at 06:32

## 2021-07-29 RX ADMIN — PREGABALIN 100 MG: 100 CAPSULE ORAL at 17:53

## 2021-07-29 RX ADMIN — TORSEMIDE 20 MG: 20 TABLET ORAL at 21:22

## 2021-07-29 RX ADMIN — RIVAROXABAN 20 MG: 20 TABLET, FILM COATED ORAL at 06:32

## 2021-07-29 RX ADMIN — FUROSEMIDE 40 MG: 10 INJECTION, SOLUTION INTRAMUSCULAR; INTRAVENOUS at 09:46

## 2021-07-29 RX ADMIN — ALLOPURINOL 100 MG: 100 TABLET ORAL at 08:57

## 2021-07-29 RX ADMIN — PREGABALIN 100 MG: 100 CAPSULE ORAL at 21:22

## 2021-07-29 RX ADMIN — FERROUS SULFATE TAB 325 MG (65 MG ELEMENTAL FE) 325 MG: 325 (65 FE) TAB at 06:32

## 2021-07-29 NOTE — PROGRESS NOTES
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              C ) maintained on low-dose amiodarone, Toprol-XL, and Xarelto  2  Chronic HFpEF, with LVEF 50% per ELAINE 06/2021  3  Baseline right bundle-branch block  4  Hypertension  5  Obesity with BMI 41  6  Obstructive sleep apnea maintained on CPAP  7  Chronic lower extremity edema/lymphedema   8  ALANNA, thought secondary to volume overload      Plan:  He is doing well from an ablation standpoint, however labs this morning show that his creatine marija from 1 14 to 1 69  This is felt to be from volume overload, as he received fluids during the procedure and is net positive this admission  JVD is noted on exam, and he has chronic lower extremity edema  He has been unable to receive his oral torsemide due to blood pressure parameters, thus will give IV lasix 40 mg x 1 now and assess response  Close I/O monitoring  Spironolactone held in light of renal function  Ok to still give oral torsemide this evening, hold parameters lowered  Will recheck BMP in the AM and re-evaluate at that time  His blood pressures have been low normal, however his pre-ablation blood pressure was 88/53  There is no need for echo or other work up at this time, will continue to monitor  Continue all other cardiac medications, including amiodarone, Toprol, and Xarelto  Continue to monitor telemetry  Right groin suture removed without issues (left groin did not have a suture)  No evidence of ongoing bleeding or hematoma         Subjective/Objective   Chief Complaint: no acute complaints    Subjective: Patient feeling well today, denies chest pain, worsening dyspnea, palpitations, dizziness, or lightheadedness  He has been urinating his "usual amount "    Objective:     Vitals: /70   Pulse (!) 53   Temp 98 °F (36 7 °C)   Resp (!) 10   SpO2 100%   There were no vitals filed for this visit    Orthostatic Blood Pressures      Most Recent Value   Blood Pressure  109/70 filed at 07/29/2021 2502            Intake/Output Summary (Last 24 hours) at 7/29/2021 1238  Last data filed at 7/29/2021 0900  Gross per 24 hour   Intake 2070 ml   Output 1150 ml   Net 920 ml       Invasive Devices     Peripheral Intravenous Line            Peripheral IV 06/02/21 Left Forearm 57 days    Peripheral IV 06/02/21 Right Forearm 57 days    Peripheral IV 07/28/21 Left Wrist 1 day    Peripheral IV 07/28/21 Right Forearm 1 day                            Scheduled Meds:  Current Facility-Administered Medications   Medication Dose Route Frequency Provider Last Rate    acetaminophen  650 mg Oral Q4H PRN Beatris Bruce PA-C      allopurinol  100 mg Oral Daily [place]      amiodarone  100 mg Oral Daily With Breakfast Beatris Bruce PA-C      ferrous sulfate  325 mg Oral Daily With Breakfast Beatris Bruce PA-C      glycerin-hypromellose-  1 drop Both Eyes Q4H PRN Abhinav Mcallister MD      ketotifen  1 drop Both Eyes BID PRN Abhinav Mcallister MD      lactated ringers  20 mL/hr Intravenous Continuous Cheryl Mobley MD      metoprolol succinate  25 mg Oral Daily Beatris Bruce PA-C      pregabalin  100 mg Oral TID Beatris Bruce PA-C      rivaroxaban  20 mg Oral Daily With Breakfast Beatris Bruce PA-C      torsemide  20 mg Oral BID Beatris Bruce PA-C      traMADol  50 mg Oral Q6H PRN Beatris Bruce PA-C       Continuous Infusions:lactated ringers, 20 mL/hr      PRN Meds:   acetaminophen    glycerin-hypromellose-    ketotifen   traMADol    ROS      Physical Exam:   GEN: NAD, alert and oriented x 3, well appearing  SKIN: dry without significant lesions or rashes  HEENT: NCAT, PERRL, EOMs intact  NECK: + JVD   CARDIOVASCULAR: RRR, normal S1, S2 without murmurs, rubs, or gallops appreciated  LUNGS: decreased breath sounds noted but otherwise clear to auscultation bilaterally without wheezes, rhonchi, or rales  ABDOMEN: Soft, nontender, nondistended  EXTREMITIES/VASCULAR: perfused without clubbing, cyanosis; + chronic LE edema b/l  PSYCH: Normal mood and affect  NEURO: CN ll-Xll grossly intact                Lab Results: I have personally reviewed pertinent lab results  Results from last 7 days   Lab Units 21  0505 21  0700   WBC Thousand/uL 5 17 5 26   HEMOGLOBIN g/dL 12 2 14 4   HEMATOCRIT % 37 3 40 7   PLATELETS Thousands/uL 98* 179     Results from last 7 days   Lab Units 21  0505 21  0723   POTASSIUM mmol/L 4 3 3 7   CHLORIDE mmol/L 110* 108   CO2 mmol/L 26 25   BUN mg/dL 39* 34*   CREATININE mg/dL 1 69* 1 14   CALCIUM mg/dL 8 0* 9 2     Results from last 7 days   Lab Units 21  0744   INR  1 54*             Imaging: I have personally reviewed pertinent reports  Results for orders placed during the hospital encounter of 21    Echo complete with contrast if indicated    Narrative  [address] [place] [address], [address]  [phone]    Transthoracic Echocardiogram  2D, M-mode, Doppler, and Color Doppler    Study date:  2021    Patient: Mariely Mackay  MR number: [id number]  Account number: [de-identified]  : 1955  Age: 76 years  Gender: Male  Status: Inpatient  Location: Bedside  Height: 69 in  Weight: 294 4 lb  BP: 129/ 60 mmHg    Indications: Edema  Heart failure      Diagnoses: I50 9 - Heart failure, unspecified    Sonographer:  JAM Powell  Referring Physician:  Cuco Victor Do  Group:  [facility]  Interpreting Physician:  FORTINO Amin     SUMMARY    PROCEDURE INFORMATION:  Intravenous contrast (  4ml of Definity) was administered  LEFT VENTRICLE:  Systolic function was normal by visual assessment  Ejection fraction was estimated to be 55 %  There were no regional wall motion abnormalities  Wall thickness was mildly increased  There was mild concentric hypertrophy  VENTRICULAR SEPTUM:  There was paradoxical motion  These changes are consistent with a conduction abnormality or paced rhythm  RIGHT VENTRICLE:  The ventricle was mildly dilated  LEFT ATRIUM:  The atrium was mildly dilated  LA volume index 40 mL/m2  RIGHT ATRIUM:  The atrium was mildly to moderately dilated  MITRAL VALVE:  There was trace regurgitation  TRICUSPID VALVE:  There was trace regurgitation  SUMMARY MEASUREMENTS  2D measurements:  Unspecified Anatomy:   %FS was 39 4 %  Ao Diam was 3 4 cm   EDV(Teich) was 129 3 ml   EF(Teich) was 69 5 %  ESV(Teich) was 39 4 ml  IVSd was 1 1 cm  LA Diam was 3 8 cm  LAAs A2C was 27 cm2  LAAs A4C was 26 8 cm2  LAESV A-L A2C was  98 5 ml  LAESV A-L A4C was 92 5 ml  LAESV Index (A-L) was 40 1 ml/m2  LAESV MOD A2C was 94 9 ml  LAESV MOD A4C was 85 4 ml  LAESV(A-L) was 97 7 ml  LAESV(MOD BP) was 91 9 ml  LAESVInd MOD BP was 37 7 ml/m2  LALs A2C was 6 3 cm  LALs A4C was 6 6 cm  LVEDV MOD A4C was 126 8 ml  LVEF MOD A4C was 52 7 %  LVESV MOD A4C was 60 ml  LVIDd was 5 2 cm  LVIDs was 3 2 cm  LVLd A4C was 8 1 cm  LVLs A4C was 7 1 cm  LVPWd was 1 cm  RAAs A4C was 25 8 cm2  RAESV A-L was  91 3 ml   RAESV MOD was 87 9 ml  RALs was 6 2 cm  SV MOD A4C was 66 9 ml   SV(Teich) was 89 9 ml   CW measurements:  Unspecified Anatomy:   AV Env  Ti was 283 9 ms   AV VTI was 27 9 cm   AV Vmax was 1 5 m/s  AV Vmean was 1 m/s  AV maxPG was 8 8 mmHg  AV meanPG was 4 5 mmHg  MM measurements:  Unspecified Anatomy:   TAPSE was 2 cm    PW measurements:  Unspecified Anatomy:   DVI was 0 7    LVOT Env  Ti was 279 7 ms  LVOT VTI was 20 8 cm  LVOT Vmax was 1 2 m/s  LVOT Vmean was 0 7 m/s  LVOT maxPG was 5 9 mmHg  LVOT meanPG was 2 6 mmHg  RV S' was 0 1 m/s  HISTORY: PRIOR HISTORY: lymphedema  Congestive heart failure  Pulmonary hypertension  Atrial fibrillation  Risk factors: hypertension and morbid obesity  PROCEDURE: The procedure was performed at the bedside  This was a routine study  The transthoracic approach was used  The study included complete 2D imaging, M-mode, complete spectral Doppler, and color Doppler  The heart rate was 90 bpm,  at the start of the study  Intravenous contrast (  4ml of Definity) was administered  Image quality was adequate  LEFT VENTRICLE: Size was normal  Systolic function was normal by visual assessment  Ejection fraction was estimated to be 55 %  There were no regional wall motion abnormalities  Wall thickness was mildly increased  There was mild  concentric hypertrophy  DOPPLER: The study was not technically sufficient to allow evaluation of LV diastolic function  VENTRICULAR SEPTUM: There was paradoxical motion  These changes are consistent with a conduction abnormality or paced rhythm  RIGHT VENTRICLE: The ventricle was mildly dilated  Systolic function was normal  Wall thickness was normal     LEFT ATRIUM: The atrium was mildly dilated  LA volume index 40 mL/m2  RIGHT ATRIUM: The atrium was mildly to moderately dilated  MITRAL VALVE: Valve structure was normal  There was normal leaflet separation  DOPPLER: The transmitral velocity was within the normal range  There was no evidence for stenosis  There was trace regurgitation  AORTIC VALVE: The valve was trileaflet  Leaflets exhibited mildly increased thickness, normal cuspal separation, and sclerosis  DOPPLER: Transaortic velocity was within the normal range  There was no evidence for stenosis  There was no  regurgitation      TRICUSPID VALVE: The valve structure was normal  There was normal leaflet separation  DOPPLER: The transtricuspid velocity was within the normal range  There was no evidence for stenosis  There was trace regurgitation  The tricuspid jet  envelope definition was inadequate for estimation of RV systolic pressure  PULMONIC VALVE: Not well visualized  DOPPLER: There was no significant regurgitation  PERICARDIUM: There was no pericardial effusion  The pericardium was normal in appearance  AORTA: The root exhibited normal size  SYSTEMIC VEINS: IVC: The inferior vena cava was normal in size and course  Respirophasic changes were normal     SYSTEM MEASUREMENT TABLES    2D  %FS: 39 4 %  Ao Diam: 3 4 cm  EDV(Teich): 129 3 ml  EF(Teich): 69 5 %  ESV(Teich): 39 4 ml  IVSd: 1 1 cm  LA Diam: 3 8 cm  LAAs A2C: 27 cm2  LAAs A4C: 26 8 cm2  LAESV A-L A2C: 98 5 ml  LAESV A-L A4C: 92 5 ml  LAESV Index (A-L): 40 1 ml/m2  LAESV MOD A2C: 94 9 ml  LAESV MOD A4C: 85 4 ml  LAESV(A-L): 97 7 ml  LAESV(MOD BP): 91 9 ml  LAESVInd MOD BP: 37 7 ml/m2  LALs A2C: 6 3 cm  LALs A4C: 6 6 cm  LVEDV MOD A4C: 126 8 ml  LVEF MOD A4C: 52 7 %  LVESV MOD A4C: 60 ml  LVIDd: 5 2 cm  LVIDs: 3 2 cm  LVLd A4C: 8 1 cm  LVLs A4C: 7 1 cm  LVPWd: 1 cm  RAAs A4C: 25 8 cm2  RAESV A-L: 91 3 ml  RAESV MOD: 87 9 ml  RALs: 6 2 cm  SV MOD A4C: 66 9 ml  SV(Teich): 89 9 ml    CW  AV Env  Ti: 283 9 ms  AV VTI: 27 9 cm  AV Vmax: 1 5 m/s  AV Vmean: 1 m/s  AV maxP 8 mmHg  AV meanP 5 mmHg    MM  TAPSE: 2 cm    PW  DVI: 0 7  LVOT Env  Ti: 279 7 ms  LVOT VTI: 20 8 cm  LVOT Vmax: 1 2 m/s  LVOT Vmean: 0 7 m/s  LVOT maxP 9 mmHg  LVOT meanP 6 mmHg  RV S': 0 1 m/s    [employer] Accredited Echocardiography [facility]    Prepared and electronically signed by    FORTINO Bullard    Signed 2021 13:29:34      EKG/telemetry: normal sinus rhythm, no arrhythmias noted      VTE Pharmacologic Prophylaxis: Xarelto

## 2021-07-29 NOTE — PROGRESS NOTES
Patient pulled cord in bathroom and reported to RN that he was bleeding from right groin site; patient was assisted back to bed, manual pressure was held for three minutes and bleeding stopped; pressure dressing was applied, sutures remained intact  Will continue to monitor

## 2021-07-29 NOTE — PLAN OF CARE
Problem: PAIN - ADULT  Goal: Verbalizes/displays adequate comfort level or baseline comfort level  Description: Interventions:  - Encourage patient to monitor pain and request assistance  - Assess pain using appropriate pain scale  - Administer analgesics based on type and severity of pain and evaluate response  - Implement non-pharmacological measures as appropriate and evaluate response  - Consider cultural and social influences on pain and pain management  - Notify physician/advanced practitioner if interventions unsuccessful or patient reports new pain  Outcome: Progressing     Problem: INFECTION - ADULT  Goal: Absence or prevention of progression during hospitalization  Description: INTERVENTIONS:  - Assess and monitor for signs and symptoms of infection  - Monitor lab/diagnostic results  - Monitor all insertion sites, i e  indwelling lines, tubes, and drains  - Monitor endotracheal if appropriate and nasal secretions for changes in amount and color  - [place] appropriate cooling/warming therapies per order  - Administer medications as ordered  - Instruct and encourage patient and family to use good hand hygiene technique  - Identify and instruct in appropriate isolation precautions for identified infection/condition  Outcome: Progressing  Goal: Absence of fever/infection during neutropenic period  Description: INTERVENTIONS:  - Monitor WBC    Outcome: Progressing     Problem: DISCHARGE PLANNING  Goal: Discharge to home or other facility with appropriate resources  Description: INTERVENTIONS:  - Identify barriers to discharge w/patient and caregiver  - Arrange for needed discharge resources and transportation as appropriate  - Identify discharge learning needs (meds, wound care, etc )  - Arrange for interpretive services to assist at discharge as needed  - Refer to Case Management Department for coordinating discharge planning if the patient needs post-hospital services based on physician/advanced practitioner order or complex needs related to functional status, cognitive ability, or social support system  Outcome: Progressing     Problem: Knowledge Deficit  Goal: Patient/family/caregiver demonstrates understanding of disease process, treatment plan, medications, and discharge instructions  Description: Complete learning assessment and assess knowledge base    Interventions:  - Provide teaching at level of understanding  - Provide teaching via preferred learning methods  Outcome: Progressing

## 2021-07-29 NOTE — PHYSICIAN ADVISOR
Current patient class: Outpatient Procedure  The patient is currently on Hospital Day: 2 at [address] [place] [address]      After review of the relevant documentation, labs, vital signs and test results, the patient is appropriate for INPATIENT ADMISSION  Rationale is as follows: The patient is a 76 yrs Male who presented for ablation of mitral annular flutter 7/28/2021  Patient with ho atypical aflutter and persistent afib despite multiple procedures  He tolerated the procedure well but today noted to have ALANNA  Cr increased to 1 69 from 1 14  This is felt to be due to volume overload  He has received IV lasix 40mg x 1 dose  He will be diuresed and cr monitored tomorrow  Given ALANNA requiring diuresis, he is inpatient appropriate          The patients vitals on arrival were   ED Triage Vitals   Temperature Pulse Respirations Blood Pressure SpO2   07/28/21 0704 07/28/21 0704 07/28/21 0704 07/28/21 0704 07/28/21 0704   (!) 96 5 °F (35 8 °C) 67 18 (!) 88/53 97 %      Temp Source Heart Rate Source Patient Position - Orthostatic VS BP Location FiO2 (%)   07/28/21 0704 07/28/21 0704 -- -- --   Temporal Monitor         Pain Score       07/28/21 1600       No Pain           Past Medical History:   Diagnosis Date    A-fib ([address] )     Arthritis     CPAP (continuous positive airway pressure) dependence     Irregular heart beat     Lymphedema     Sleep apnea     Urinary frequency     Wears glasses     Wears partial dentures     lower partial     Past Surgical History:   Procedure Laterality Date    ABLATION SAPHENOUS VEIN W/ RFA      COLONOSCOPY      [facility] CYSTOURETHRO W/IMPLANT N/A 11/13/2017    Procedure: CYSTOSCOPY WITH INSERTION UROLIFT;  Surgeon: Jero العلي DO;  Location: [facility];  Service: Urology    TONSILLECTOMY         The patient was admitted to the hospital at N/A on N/A for the following diagnosis:  Atrial flutter, unspecified type ([address] ) [I48 92]    Consults have been placed to:   None    Vitals:    07/29/21 0235 07/29/21 0634 07/29/21 0659 07/29/21 1242   BP: 103/58 108/70 109/70 109/70   Pulse: 62 57 (!) 53 59   Resp: 16  (!) 10 18   Temp: 98 1 °F (36 7 °C)  98 °F (36 7 °C) 98 3 °F (36 8 °C)   TempSrc:       SpO2: 94% 95% 100% 91%       Most recent labs:    Recent Labs     07/28/21  0700 07/28/21  0744 07/29/21  0505   WBC  --   --  5 17   HGB  --   --  12 2   HCT  --   --  37 3   PLT  --   --  98*   K   < >  --  4 3   CALCIUM   < >  --  8 0*   BUN   < >  --  39*   CREATININE   < >  --  1 69*   INR  --  1 54*  --     < > = values in this interval not displayed  Scheduled Meds:  Current Facility-Administered Medications   Medication Dose Route Frequency Provider Last Rate    acetaminophen  650 mg Oral Q4H PRN Lucie Jalloh PA-C      allopurinol  100 mg Oral Daily Gustavo Bautista      amiodarone  100 mg Oral Daily With Breakfast Lucie Jalloh PA-C      ferrous sulfate  325 mg Oral Daily With Breakfast Lucie Jalloh PA-C      glycerin-hypromellose-  1 drop Both Eyes Q4H PRN Deonte Mckeon MD      ketotifen  1 drop Both Eyes BID PRN Deonte Mckeon MD      lactated ringers  20 mL/hr Intravenous Continuous Reji Butts MD      [START ON 7/30/2021] metoprolol succinate  25 mg Oral Daily Lucie Jalloh PA-C      pregabalin  100 mg Oral TID Lucie Jalloh PA-C      rivaroxaban  20 mg Oral Daily With Breakfast Lucie Jalloh PA-C      torsemide  20 mg Oral BID Lucie Jalloh PA-C      traMADol  50 mg Oral Q6H PRN Lucie Jalloh PA-C       Continuous Infusions:lactated ringers, 20 mL/hr      PRN Meds:   acetaminophen    glycerin-hypromellose-    ketotifen    traMADol    Surgical procedures (if appropriate):   * No procedures listed *

## 2021-07-29 NOTE — DISCHARGE SUMMARY
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   C ) maintained on low-dose amiodarone, Toprol-XL, and Xarelto  2  Chronic HFpEF, with LVEF 50% per ELAINE 06/2021  3  Baseline right bundle-branch block  4  Hypertension  5  Obesity with BMI 41  6  Obstructive sleep apnea maintained on CPAP  7  Chronic lower extremity edema/lymphedema   8  ALANNA secondary to volume overload, improved following IV Lasix      Procedures Performed:   Orders Placed This Encounter   Procedures    Cardiac eps/aflutter ablation   1  Ablation of supraventricular tachycardia (atrial flutter) in left atrium  2  3-D mapping with Carto  3  Drug infusion with Adenosine  4  EP testing with left atrial pacing and recording  5  Transseptal puncture  6  Intracardiac echo      Consultants: none      HPI: Please refer to the initial history and physical as well as procedure notes for full details  Briefly, Lissette Schultz is a 76y o  year old male with persistent atrial fibrillation with prior cryoablation with pulmonary vein isolation, posterior wall isolation, and typical flutter line 06/2021, SVT status post AVNRT ablation at that time, chronic HFpEF, baseline right bundle-branch block, hypertension, obesity with BMI 41, structure sleep apnea compliant with CPAP, and chronic lower extremity edema/lymphedema  He was admitted to the hospital 08/2020 with acute on chronic diastolic CHF and persistent atrial fibrillation  He was later seen by EP, and risk factor modifications and rate control were initially recommended    He again presented to the hospital 01/2021 with acute CHF and was seen at that time by Dr Bailee Montoya  He followed up as an outpatient, and a trial of normal sinus rhythm was recommended  He was started on amiodarone antiarrhythmic therapy, and underwent cardioversion 05/2021  He felt noticeably less short of breath when in sinus rhythm, however when he followed up with Dr Bailee Montoya shortly thereafter he was back in atrial flutter  An ablation was recommended, and in 06/2021 he underwent cryoablation pulmonary vein isolation, posterior wall isolation, typical flutter line, and AVNRT ablation  When he was seen in post ablation follow-up 7/15/2021, he was again in atrial flutter with which he was symptomatic  A repeat ablation was recommended to see if this was reconnection of his CTI line verses an atypical atrial flutter  He presented this hospital admission to undergo this procedure  Hospital Course: Doylene Gitelman presented at his baseline state of health  After the procedure was explained in detail and consent was obtained, he underwent the above procedures without complications  Please see operative notes by Dr Bailee Montoya for full details  He tolerated the procedure well  He was then monitored overnight for further observation  There were no acute issues or events overnight  The following morning he denied all cardiac complaints, including chest pain/pressure, dyspnea, palpitations, dizziness, lightheadedness, or syncope  His vital signs were were stable  Telemetry showed normal sinus rhythm, occasional sinus bradycardia, no arrhythmias noted  His groins were soft without significant hematoma or recurrent bleeding, and groin sutures were removed with incident  Blood work showed that his creatinine marija from 1 14 to 1 69    This was felt to be secondary to volume overload, as he received IV fluids during the procedure and was unable to receive his oral torsemide in the post ablation setting due to baseline low blood pressures  He was thus given IV Lasix in addition to his oral torsemide  His spironolactone was held  The following morning, his creatinine improved to 0 98  He continued to feel well without symptoms, and thus he was deemed stable for discharge  Review of Systems   Constitutional: Negative for fever and malaise/fatigue  Cardiovascular: Negative for chest pain, dyspnea on exertion, irregular heartbeat, leg swelling, near-syncope, orthopnea, palpitations, paroxysmal nocturnal dyspnea and syncope  All other systems reviewed and are negative  Physical exam:  GEN: NAD, alert and oriented x 3, well appearing  SKIN: dry without significant lesions or rashes  HEENT: NCAT, PERRL, EOMs intact  NECK: mild JVD appreciated, improved  CARDIOVASCULAR: RRR, normal S1, S2 without murmurs, rubs, or gallops appreciated  LUNGS: Clear to auscultation bilaterally without wheezes, rhonchi, or rales  ABDOMEN: Soft, nontender, nondistended  EXTREMITIES/VASCULAR: perfused without clubbing, cyanosis; chronic LE edema b/lwith chronic venous stasis changes noted  PSYCH: Normal mood and affect  NEURO: CN ll-Xll grossly intact    He was given routine post ablation discharge instructions and restrictions, and these were explained in detail  He was given a follow up appointment with Bambi Matute PA-C, and he was instructed to follow up with his primary cardiologist as previously instructed  In terms of his medications, he will be continued on low-dose amiodarone, Toprol-XL, and Xarelto  He will continue torsemide twice daily, but we asked him to decrease his spironolactone to 25 mg daily to ensure that his renal function remains stable  I have asked him to check a BMP next week to see if he needs additional potassium or other medication changes  He will continue to check his weight daily, and contact us if additional diuretics are needed  He is stable for discharge at this time with all questions answered   He was discussed in detail with Dr Lilly Wynn who is in agreement with this discharge summary  Discharge Medications:  See after visit summary for reconciled discharge medications provided to patient and family      Medications Prior to Admission   Medication    allopurinol (ZYLOPRIM) 100 mg tablet    amiodarone 200 mg tablet    ferrous sulfate 325 (65 Fe) mg tablet    Glucosamine-Chondroit-Vit C-Mn (Glucosamine 1500 Complex) CAPS    L-ARGININE-500 PO    metoprolol succinate (TOPROL-XL) 25 mg 24 hr tablet    Multiple Vitamins-Minerals (OCUVITE EXTRA PO)    multivitamin (THERAGRAN) TABS    patient supplied medication    pregabalin (LYRICA) 100 mg capsule    rivaroxaban (Xarelto) 20 mg tablet    spironolactone (ALDACTONE) 25 mg tablet    torsemide (DEMADEX) 20 mg tablet    VITAMIN D PO    acetaminophen (TYLENOL) 500 mg tablet    alfuzosin (UROXATRAL) 10 mg 24 hr tablet    ammonium lactate (AMLACTIN) 12 % lotion    clindamycin (CLEOCIN) 300 MG capsule    colchicine (COLCRYS) 0 6 mg tablet    GARCINIA CAMBOGIA-CHROMIUM PO    halobetasol (ULTRAVATE) 0 05 % ointment    Omega-3 Fatty Acids (fish oil) 1,000 mg    tadalafil (CIALIS) 5 MG tablet    traMADol (ULTRAM) 50 mg tablet         Pertininet Labs/diagnostics:  CBC with diff:   Results from last 7 days   Lab Units 07/29/21  0505 07/28/21  0700   WBC Thousand/uL 5 17 5 26   HEMOGLOBIN g/dL 12 2 14 4   HEMATOCRIT % 37 3 40 7   MCV fL 98 92   PLATELETS Thousands/uL 98* 179   MCH pg 32 1 32 5   MCHC g/dL 32 7 35 4   RDW % 14 6 13 8   MPV fL 11 9 11 4   NRBC AUTO /100 WBCs  --  0       BMP:  Results from last 7 days   Lab Units 07/29/21  0505 07/28/21  0723   POTASSIUM mmol/L 4 3 3 7   CHLORIDE mmol/L 110* 108   CO2 mmol/L 26 25   BUN mg/dL 39* 34*   CREATININE mg/dL 1 69* 1 14   CALCIUM mg/dL 8 0* 9 2       Magnesium:       Coags:   Results from last 7 days   Lab Units 07/28/21  0744   INR  2 35*         Complications: none    Condition at Discharge: good Discharge instructions/Information to patient and family:   See after visit summary for information provided to patient and family  Provisions for Follow-Up Care:  See after visit summary for information related to follow-up care and any pertinent home health orders  Disposition: Home    Planned Readmission: No    Discharge Statement   I spent 45 minutes minutes discharging the patient  This time was spent on the day of discharge  I had direct contact with the patient on the day of discharge  Additional documentation is required if more than 30 minutes were spent on discharge   Evaluating the incision, discussing discharge instructions and restrictions, arranging follow up appointments, discussing medications

## 2021-07-29 NOTE — CASE MANAGEMENT
CM changed to IP today following ALANNA requiring diuresis  LOS 2  No CM open at this time as no CM needs have been identified  No CM consult initiated   CM will continue to be available for d/c planning needs as requested by treatment team

## 2021-07-30 ENCOUNTER — HOSPITAL ENCOUNTER (OUTPATIENT)
Dept: INFUSION CENTER | Facility: HOSPITAL | Age: 66
Discharge: HOME | End: 2021-07-30
Attending: INTERNAL MEDICINE

## 2021-07-30 VITALS
SYSTOLIC BLOOD PRESSURE: 107 MMHG | OXYGEN SATURATION: 91 % | WEIGHT: 288 LBS | HEART RATE: 72 BPM | DIASTOLIC BLOOD PRESSURE: 55 MMHG | BODY MASS INDEX: 42.53 KG/M2 | TEMPERATURE: 98.5 F | RESPIRATION RATE: 18 BRPM

## 2021-07-30 LAB
ANION GAP SERPL CALCULATED.3IONS-SCNC: 4 MMOL/L (ref 4–13)
BUN SERPL-MCNC: 29 MG/DL (ref 5–25)
CALCIUM SERPL-MCNC: 8 MG/DL (ref 8.3–10.1)
CHLORIDE SERPL-SCNC: 109 MMOL/L (ref 100–108)
CO2 SERPL-SCNC: 24 MMOL/L (ref 21–32)
CREAT SERPL-MCNC: 0.98 MG/DL (ref 0.6–1.3)
GFR SERPL CREATININE-BSD FRML MDRD: 80 ML/MIN/1.73SQ M
GLUCOSE SERPL-MCNC: 96 MG/DL (ref 65–140)
POTASSIUM SERPL-SCNC: 3.6 MMOL/L (ref 3.5–5.3)
SODIUM SERPL-SCNC: 137 MMOL/L (ref 136–145)

## 2021-07-30 PROCEDURE — 80048 BASIC METABOLIC PNL TOTAL CA: CPT | Performed by: PHYSICIAN ASSISTANT

## 2021-07-30 PROCEDURE — 99238 HOSP IP/OBS DSCHRG MGMT 30/<: CPT | Performed by: PHYSICIAN ASSISTANT

## 2021-07-30 RX ORDER — SPIRONOLACTONE 25 MG/1
25 TABLET ORAL DAILY
Qty: 180 TABLET | Refills: 0
Start: 2021-07-30 | End: 2021-11-22

## 2021-07-30 RX ORDER — METOPROLOL SUCCINATE 25 MG/1
25 TABLET, EXTENDED RELEASE ORAL DAILY
Refills: 0
Start: 2021-07-31 | End: 2022-06-13

## 2021-07-30 RX ORDER — POTASSIUM CHLORIDE 20 MEQ/1
40 TABLET, EXTENDED RELEASE ORAL ONCE
Status: DISCONTINUED | OUTPATIENT
Start: 2021-07-30 | End: 2021-07-30 | Stop reason: HOSPADM

## 2021-07-30 RX ORDER — AMIODARONE HYDROCHLORIDE 100 MG/1
100 TABLET ORAL
Refills: 0
Start: 2021-07-31 | End: 2022-02-09

## 2021-07-30 RX ADMIN — AMIODARONE HYDROCHLORIDE 100 MG: 200 TABLET ORAL at 08:40

## 2021-07-30 RX ADMIN — PREGABALIN 100 MG: 100 CAPSULE ORAL at 08:38

## 2021-07-30 RX ADMIN — RIVAROXABAN 20 MG: 20 TABLET, FILM COATED ORAL at 08:38

## 2021-07-30 RX ADMIN — ALLOPURINOL 100 MG: 100 TABLET ORAL at 08:40

## 2021-07-30 RX ADMIN — FERROUS SULFATE TAB 325 MG (65 MG ELEMENTAL FE) 325 MG: 325 (65 FE) TAB at 08:38

## 2021-07-30 RX ADMIN — TORSEMIDE 20 MG: 20 TABLET ORAL at 08:38

## 2021-07-30 RX ADMIN — METOPROLOL SUCCINATE 25 MG: 25 TABLET, FILM COATED, EXTENDED RELEASE ORAL at 08:38

## 2021-07-30 NOTE — UTILIZATION REVIEW
Initial Clinical Review    WAS Outpatient / No Charge Bed  07/28/2021 @ 0056, CONVERTED TO INPATIENT ADMISSION 07/29/2021 @ 1505, DUE TO CONTINUED STAY REQUIRED TO CARE FOR PATIENT WITH  Jeri Ragland The patient is a 76 yrs Male who presented for ablation of mitral annular flutter 7/28/2021  Patient with ho atypical aflutter and persistent afib despite multiple procedures  He tolerated the procedure well but today noted to have ALANNA  Cr increased to 1 69 from 1 14  This is felt to be due to volume overload  He has received IV lasix 40mg x 1 dose  He will be diuresed and cr monitored tomorrow  Given ALANNA requiring diuresis, he is inpatient appropriate  Admission: Date/Time/Statement:   Admission Orders (From admission, onward)     Ordered        07/29/21 1505  Inpatient Admission  Once                   Orders Placed This Encounter   Procedures    Inpatient Admission     Standing Status:   Standing     Number of Occurrences:   1     Order Specific Question:   Level of Care     Answer:   Med Surg [16]     Order Specific Question:   Estimated length of stay     Answer:   More than 2 Midnights     Order Specific Question:   Certification     Answer:   I certify that inpatient services are medically necessary for this patient for a duration of greater than two midnights  See H&P and MD Progress Notes for additional information about the patient's course of treatment  Initial Presentation:  76year old male, presented to [facility] from home for OP procedure  Admitted as OP/NCB due to ELAINE, Cardiac EPS / A Flutter Ablation  PNH:  A Fib, Arthritis, Sleep apnea - CPAP dependence,  Lymphedema,  Wears glasses  Date: 07/28/2021       DATE OF PROCEDURE: 07/28/21  Procedures Performed  1  Ablation of supraventricular tachycardia (atrial flutter) in left atrium  2    3-D mapping with Carto  3  Drug infusion with Adenosine  4    EP testing with left atrial pacing and recording  5  Transseptal puncture  6  Intracardiac echo  Anesthesia:  General sedation  A ELAINE was performed and showed no LA or appendage thrombus  Final Impression:  Successful ablation of left atrial mitral annulus flutter bidirectional block confirmed    Plan:  Observation overnight  Cont current rate control and low dose amiodarone and anticoagulation  Day 1: 07/29/2021   He is doing well from an ablation standpoint, however labs this morning show that his creatine marija from 1 14 to 1 69  This is felt to be from volume overload, as he received fluids during the procedure and is net positive this admission  JVD is noted on exam, and he has chronic lower extremity edema  Give IV lasix  Close Monitor of I&O  Spironolactone held due to renal function  Recheck BMP in AM   Monitor on telemetry  Right groin suture removed without issues (left groin did not have a suture)  No evidence of ongoing bleeding or hematoma       ED Triage Vitals   Temperature Pulse Respirations Blood Pressure SpO2   07/28/21 0704 07/28/21 0704 07/28/21 0704 07/28/21 0704 07/28/21 0704   (!) 96 5 °F (35 8 °C) 67 18 (!) 88/53 97 %      Temp Source Heart Rate Source Patient Position - Orthostatic VS BP Location FiO2 (%)   07/28/21 0704 07/28/21 0704 -- -- --   Temporal Monitor         Pain Score       07/28/21 1600       No Pain          Wt Readings from Last 1 Encounters:   07/30/21 131 kg (288 lb)     Additional Vital Signs:   Date/Time  Temp  Pulse  Resp  BP  MAP (mmHg)  SpO2  Calculated FIO2 (%) - Nasal Cannula  O2 Flow Rate (L/min)  Nasal Cannula O2 Flow Rate (L/min)  O2 Device   07/30/21 07:40:48  98 5 °F (36 9 °C)  72  18  107/55  72  91 %  --  --  --  --   07/30/21 03:30:16  98 5 °F (36 9 °C)  61  20  105/64  78  93 %  --  --  --  --   07/30/21 01:05:44  98 3 °F (36 8 °C)  63  18  108/66  80  95 %  --  --  --  --   07/29/21 23:44:51  98 3 °F (36 8 °C)  61  18  110/63  79  98 %  --  --  --  --   07/29/21 21:22:08  --  56  --  109/62  78  97 %  --  --  --  -- 21  --  --  --  --  --  --  --  --  --  None (Room air)   21 19:10:11  98 4 °F (36 9 °C)  61  18  111/68  82  99 %  --  --  --  --   21 15:13:54  --  56  18  110/68  82  95 %  --  --  --  --   21 12:42:01  98 3 °F (36 8 °C)  59  18  109/70  83  91 %  --  --  --  --   21 06:59:23  98 °F (36 7 °C)  53Abnormal   10Abnormal   109/70  83  100 %  --  --  --  --   21 06:34:24  --  57  --  108/70  83  95 %  --  --  --  --   21 02:35:31  98 1 °F (36 7 °C)  62  16  103/58  73  94 %  --  --  --  --   21 22:46:55  98 2 °F (36 8 °C)  61  18  106/58  74  96 %  --  --  --  None (Room air)   21 22:04:56  --  61  --  108/54  72  92 %  --  --  --  --   21 1700  --  --  --  9861  73  --  --  --  --  --   21 16:56:47  98 5 °F (36 9 °C)  51Abnormal   --  9861  73  99 %  --  --  --  --   21 1603  --  48Abnormal   22  --  --  100 %  --  --  --  --   21 1600  97 °F (36 1 °C)Abnormal   48Abnormal   20  98/57  --  100 %  32  --  3 L/min  None (Room air)   21 1545  --  48Abnormal   24Abnormal   96/57  --  100 %  32  --  3 L/min  Nasal cannula   21 1530  97 1 °F (36 2 °C)Abnormal   50Abnormal   18  122/89  --  100 %  --  6 L/min  --  Simple mask     2021 @ 1605  EC, Sinus bradycardia        Pertinent Labs/Diagnostic Test Results:     Results from last 7 days   Lab Units 21  0505 21  0700   WBC Thousand/uL 5 17 5 26   HEMOGLOBIN g/dL 12 2 14 4   HEMATOCRIT % 37 3 40 7   PLATELETS Thousands/uL 98* 179   NEUTROS ABS Thousands/µL  --  3 57     Results from last 7 days   Lab Units 21  0631 21  0505 21  0723   SODIUM mmol/L 137 139 138   POTASSIUM mmol/L 3 6 4 3 3 7   CHLORIDE mmol/L 109* 110* 108   CO2 mmol/L 24 26 25   ANION GAP mmol/L 4 3* 5   BUN mg/dL 29* 39* 34*   CREATININE mg/dL 0 98 1 69* 1 14   EGFR ml/min/1 73sq m 80 41 67   CALCIUM mg/dL 8 0* 8 0* 9 2     Results from last 7 days   Lab Units 07/30/21  0631 07/29/21  0505 07/28/21  0723   GLUCOSE RANDOM mg/dL 96 63* 95     Results from last 7 days   Lab Units 07/28/21  0744   PROTIME seconds 18 4*   INR  1 54*     Past Medical History:   Diagnosis Date    A-fib (HCC)     Arthritis     CPAP (continuous positive airway pressure) dependence     Irregular heart beat     Lymphedema     Sleep apnea     Urinary frequency     Wears glasses     Wears partial dentures     lower partial     Admitting Diagnosis: Atrial flutter, unspecified type (HCC) [I48 92]   Acute Renal Failure  Age/Sex: 76 y o  male  Admission Orders:  Scheduled Medications:  allopurinol, 100 mg, Oral, Daily  amiodarone, 100 mg, Oral, Daily With Breakfast  ferrous sulfate, 325 mg, Oral, Daily With Breakfast  metoprolol succinate, 25 mg, Oral, Daily  pregabalin, 100 mg, Oral, TID  rivaroxaban, 20 mg, Oral, Daily With Breakfast  torsemide, 20 mg, Oral, BID      Continuous IV Infusions:  lactated ringers, 20 mL/hr, Intravenous, Continuous      PRN Meds:  acetaminophen, 650 mg, Oral, Q4H PRN  glycerin-hypromellose-, 1 drop, Both Eyes, Q4H PRN  ketotifen, 1 drop, Both Eyes, BID PRN  traMADol, 50 mg, Oral, Q6H PRN      Telemetry  [facility]      Network Utilization Review Department  ATTENTION: Please call with any questions or concerns to [phone] and carefully listen to the prompts so that you are directed to the right person  All voicemails are confidential   Claudell Patient all requests for admission clinical reviews, approved or denied determinations and any other requests to dedicated fax number below belonging to the campus where the patient is receiving treatment   List of dedicated fax numbers for the Facilities:  [address] DENIALS (Administrative/Medical Necessity) [phone]   1000  16[facility] (Maternity/NICU/Pediatrics) [phone] [address] [phone]   [address] [phone]     [address] [place] [address] [address] [place] [address] [place] [address] [place] [address] [phone]

## 2021-07-30 NOTE — DISCHARGE INSTRUCTIONS
PLEASE NOTE THE FOLLOWING MEDICATION RECOMMENDATIONS:  - continue amiodarone 100 mg daily and Xarelto 20 mg daily  - decrease Toprol XL to 25 mg daily   - decrease spironolactone to 25 mg daily  - continue torsemide twice daily     Check blood work next week for ongoing monitoring of kidney function/electrolytes  Monitor your weight closely, and contact us if you feel that you are above baseline  We may need to adjust your diuretics further  RESTRICTIONS:  No heavy lifting or strenuous activity for one week  No soaking in a bath tub/hot tub/swimming pool for one week or until groin heals  You may shower - please let soap and water run over the groins, no scrubbing, and pat the area dry  Please place band-aid on groins daily for up to five days, but you may remove sooner if no issues are noted  If you notice ongoing bleeding, swelling, or firm lumps in groin near ablation incision, please contact Dr Michelle Yeung' office - [phone]

## 2021-08-02 NOTE — UTILIZATION REVIEW
Notification of Discharge   This is a Notification of Discharge from our facility [address]  Please be advised that this patient has been discharge from our facility  Below you will find the admission and discharge date and time including the patients disposition  UTILIZATION REVIEW CONTACT:  Vin[address] Lane  Utilization   Network Utilization Review Department  Phone: [phone] x carefully listen to the prompts  All voicemails are confidential   Email: [email or web address]     PHYSICIAN ADVISORY SERVICES:  FOR [facility] REVIEW - MEDICAL NECESSITY DENIAL  Phone: [phone]  Fax: [phone]  Email: [email or web address]     PRESENTATION DATE: 7/28/2021  6:42 AM  OBERVATION ADMISSION DATE:   INPATIENT ADMISSION DATE: 7/29/21  3:05 PM   DISCHARGE DATE: 7/30/2021  1:51 PM  DISPOSITION: Home/Self Care Home/Self Care      IMPORTANT INFORMATION:  Send all requests for admission clinical reviews, approved or denied determinations and any other requests to dedicated fax number below belonging to the campus where the patient is receiving treatment   List of dedicated fax numbers:  [address] DENIALS (Administrative/Medical Necessity) [phone]   [address] (Maternity/NICU/Pediatrics) [phone]   Jennifer Angel [phone]   Zuleyka Denson [phone]   Gris Schreiber [phone]   [facility] [address] [place] [address] [phone]   [facility]  [phone]   [address] [facility] [address] [facility]  [address]01 [facility] 1000 [address] [facility] [phone]

## 2021-08-03 ENCOUNTER — EVALUATION (OUTPATIENT)
Dept: PHYSICAL THERAPY | Facility: CLINIC | Age: 66
End: 2021-08-03
Payer: COMMERCIAL

## 2021-08-03 DIAGNOSIS — M54.2 NECK PAIN: Primary | ICD-10-CM

## 2021-08-03 PROCEDURE — 97140 MANUAL THERAPY 1/> REGIONS: CPT | Performed by: PHYSICAL THERAPIST

## 2021-08-03 PROCEDURE — 97112 NEUROMUSCULAR REEDUCATION: CPT | Performed by: PHYSICAL THERAPIST

## 2021-08-03 PROCEDURE — 97161 PT EVAL LOW COMPLEX 20 MIN: CPT | Performed by: PHYSICAL THERAPIST

## 2021-08-03 NOTE — LETTER
2021    Venus Lee, [address]    Patient: Pierre Meier   YOB: 1955   Date of Visit: 8/3/2021     Encounter Diagnosis     ICD-10-CM    1  Neck pain  M54 2        Dear Dr Carlene Zeng: Thank you for your recent referral of Pierre Meier  Please review the attached evaluation summary from [facility] recent visit  Please verify that you agree with the plan of care by signing the attached order  If you have any questions or concerns, please do not hesitate to call  I sincerely appreciate the opportunity to share in the care of one of your patients and hope to have another opportunity to work with you in the near future  Sincerely,    Darrel Bates, PT      Referring Provider:      I certify that I have read the below Plan of Care and certify the need for these services furnished under this plan of treatment while under my care  Venus Lee PA-C  [phone]  Via Fax: [phone]          PT Evaluation     Today's date: 8/3/2021  Patient name: Pierre Meier  : 1955  MRN: [id number]  Referring provider: Chetan Pabon PA-C  Dx:   Encounter Diagnosis     ICD-10-CM    1  Neck pain  M54 2                   Assessment  Assessment details: Pt presents to PT with low level cervical discomfort  Poor posture  Muscular restriction noted more R than L  Slight restriction with R ROT in weight bearing but non-weight bearing/passive has excellent mobility b/l  We spoke about postural awareness and corrections including modifications when reading  After manual therapy had nearly symmetrical rotation to the right and no pain  Initiated scapular strength  Next visit will initiate cervical stability work  Skilled PT warranted to address findings    Impairments: abnormal or restricted ROM, impaired physical strength, pain with function and poor posture   Functional limitations: discomfort with driving/sleeping occasionally, discomfort after reading with head down  Symptom irritability: lowUnderstanding of Dx/Px/POC: good   Prognosis: good    Goals  ST weeks  Symmetrical mobility cervical ROT  Pain no more than 1/10 with cervical extension  Aware of posture and demonstrate ability to show positive corrections    LT weeks  No more than 1/10 pain after reading  <2/10 pain with driving for 1 5 hours to visit son  Independent with scapular strength program  Complete cervical strength assessment and address accordingly for HEP    Plan  Plan details: TE, NMR, TA, MT, self education, and modalities as needed in order to progress through skilled PT focused on  ROM, strength, balance, coordination   Patient would benefit from: skilled physical therapy  Planned modality interventions: cryotherapy and thermotherapy: hydrocollator packs  Planned therapy interventions: manual therapy, neuromuscular re-education, self care, therapeutic activities, therapeutic exercise and home exercise program  Frequency: 2x week  Duration in weeks: 5  Treatment plan discussed with: patient        Subjective Evaluation    History of Present Illness  Mechanism of injury: 76year old male presenting to PT with neck pain for the last year  Mainly with driving, reading and occasionally sleeping  Denies headaches  Denies any radicular pain  Had an incident last October where he had sharp pain between shoulder blades that led to feeling of paralysis  Was taken to hospital in ER where they ruled out anything serious and did not find what caused it  MD did gues it may have been neck related    Pain  Current pain rating: 3          Objective     General Comments:      Cervical/Thoracic Comments  Observation  Rounded shoulder posture      Cervical AROM  Flex to chest  Ext moderate ROM, slight discomfort mid/lower cervical spine  ROT R 52 deg slight discomfort  ROT L 75 deg minimal pain    Cervical PROM  80 deg ROT b/l, no pain  Full flex no pain    Muscular tone L  Mid cervical and R lower cervical/levator insertion/UT    Lateral glide normal    Shoulder screen  Flex: 150 deg b/l  Abd: 145 deg b/l  ER: WFL  Reach bb: upper lumbar b/l        UE MMT  Shoulder flex/abd: 4/5  ER: 4+/5  Rest distal strong, painfree      DTR's  2+ throughout                Precautions: none      Manuals 8-3        ST R lower cervical facet, levator/UT        Joint work Assisted L upglide with R ROT in painfree ROM        flexibility                  Neuro Re-Ed         Cervical stability work                  Scapular 2-way horiz abd otb x25 ea        tband rows gtb x30        tband ext gtb x30                          Ther Ex         tband RTC work         S/l ER         Prone rows         Prone horiz abd                                             Ther Activity                           Modalities

## 2021-08-03 NOTE — PROGRESS NOTES
PT Evaluation     Today's date: 8/3/2021  Patient name: Greg Alcala  : 1955  MRN: [id number]  Referring provider: Norma Earl PA-C  Dx:   Encounter Diagnosis     ICD-10-CM    1  Neck pain  M54 2                   Assessment  Assessment details: Pt presents to PT with low level cervical discomfort  Poor posture  Muscular restriction noted more R than L  Slight restriction with R ROT in weight bearing but non-weight bearing/passive has excellent mobility b/l  We spoke about postural awareness and corrections including modifications when reading  After manual therapy had nearly symmetrical rotation to the right and no pain  Initiated scapular strength  Next visit will initiate cervical stability work  Skilled PT warranted to address findings    Impairments: abnormal or restricted ROM, impaired physical strength, pain with function and poor posture   Functional limitations: discomfort with driving/sleeping occasionally, discomfort after reading with head down  Symptom irritability: lowUnderstanding of Dx/Px/POC: good   Prognosis: good    Goals  ST weeks  Symmetrical mobility cervical ROT  Pain no more than 1/10 with cervical extension  Aware of posture and demonstrate ability to show positive corrections    LT weeks  No more than 1/10 pain after reading  <2/10 pain with driving for 1 5 hours to visit son  Independent with scapular strength program  Complete cervical strength assessment and address accordingly for HEP    Plan  Plan details: TE, NMR, TA, MT, self education, and modalities as needed in order to progress through skilled PT focused on  ROM, strength, balance, coordination   Patient would benefit from: skilled physical therapy  Planned modality interventions: cryotherapy and thermotherapy: hydrocollator packs  Planned therapy interventions: manual therapy, neuromuscular re-education, self care, therapeutic activities, therapeutic exercise and home exercise program  Frequency: 2x week  Duration in weeks: 5  Treatment plan discussed with: patient        Subjective Evaluation    History of Present Illness  Mechanism of injury: 76year old male presenting to PT with neck pain for the last year  Mainly with driving, reading and occasionally sleeping  Denies headaches  Denies any radicular pain  Had an incident last October where he had sharp pain between shoulder blades that led to feeling of paralysis  Was taken to hospital in ER where they ruled out anything serious and did not find what caused it  MD did gues it may have been neck related    Pain  Current pain rating: 3    Patient Goals  Patient goals for therapy: decreased pain and increased motion          Objective     General Comments:      Cervical/Thoracic Comments  Observation  Rounded shoulder posture      Cervical AROM  Flex to chest  Ext moderate ROM, slight discomfort mid/lower cervical spine  ROT R 52 deg slight discomfort  ROT L 75 deg minimal pain    Cervical PROM  80 deg ROT b/l, no pain  Full flex no pain    Muscular tone L  Mid cervical and R lower cervical/levator insertion/UT    Lateral glide normal    Shoulder screen  Flex: 150 deg b/l  Abd: 145 deg b/l  ER: WFL  Reach bb: upper lumbar b/l        UE MMT  Shoulder flex/abd: 4/5  ER: 4+/5  Rest distal strong, painfree      DTR's  2+ throughout                Precautions: none      Manuals 8-3        ST R lower cervical facet, levator/UT        Joint work Assisted L upglide with R ROT in painfree ROM        flexibility                  Neuro Re-Ed         Cervical stability work                  Scapular 2-way horiz abd otb x25 ea        tband rows gtb x30        tband ext gtb x30                          Ther Ex         tband RTC work         S/l ER         Prone rows         Prone horiz abd                                             Ther Activity                           Modalities

## 2021-08-05 NOTE — PROGRESS NOTES
PT Discharge    Today's date: 2021  Patient name: Mary Monroe  : 1955  MRN: [id number]  Referring provider: Preston August MD  Dx:   Encounter Diagnosis     ICD-10-CM    1  Bilateral lower extremity edema  R60 0    2  Decreased stamina  R53 83        Start Time: 0900  Stop Time: 1000  Total time in clinic (min): 60 minutes    Assessment  Assessment details: Anita Diallo will be discharged from outpatient physical therapy care due to expiration of plan of care  No objective measures updated, Anita Diallo is not present at time of discharge  Other impairment: LE lymphedema  Understanding of Dx/Px/POC: good   Prognosis: good    Goals  No formal discharge completed    STGs to be achieved in 2 - 4 weeks   Demonstrate at least 100% compliance with self MLD   Demonstrate at least 100% compliance with self compression   Demonstrate at least 100% compliance with self skin and nail inspection   Free from s/s of infection   Demonstrate understanding of importance of compliance with POC     LTGs to be achieved in 4 - 6 weeks   1  Pt will be I with HEP in order to continue to improve quality of life and independence and reduce risk for re-injury  ONGOING  2  Pt to demonstrate return to community ambulation without limitations or restrictions  ONGOING  3  Pt to demonstrate improved function as noted by achieving or exceeding predicted score on FOTO outcomes assessment tool  ONGOING  4  Pt to demonstrate increased MMT of bilateral LEs by at least 1/2-1 grade in order to improve safety and stability with ADLs and functional mobility  Subjective Evaluation    History of Present Illness  Mechanism of injury: Patient reports he had a cardiac ablation, LEs feeling better, but still a little swollen  Cleared for compression and to complete lymph MLD  Started new diuretic med  Started another diet and has lost 20 lbs      Quality of life: good    Pain  Current pain ratin  At best pain ratin  At worst pain ratin  Location: Bilateral feet  Quality: burning  Relieving factors: medications  Aggravating factors: standing, walking and stair climbing  Progression: improved    Treatments  Previous treatment: physical therapy  Current treatment: medication and physical therapy  Patient Goals  Patient goals for therapy: decreased edema, increased strength and independence with ADLs/IADLs          Objective  Lymphedema Evaluation    Medical Considerations:  POS Cardiac - following with new cardiologist 2/2 [facility], A-fib, newly adjusted meds for CHF    NEG Pulmonary  NEG Kidney  NEG Liver  NEG Current Fever/Infection  NEG Current/Recent Wounds  NEG Current/Recent Treatments/Interventions/Port Access/PICC Line  NEG Current/Recent/History of DVT or Clotting issues    ROM Considerations:  ROM WFL, h/o THR    Strength Considerations:  Grossly WFL bilateral LEs    Gait Considerations:  WFL bilateral LEs    Skin Considerations/Scars:  Patient presents with skin inspection as follows:  Hemosiderin staining/skin discoloration - POS  Finger/Toe Nails - NEG  Open Wounds - NEG  S/S infection - NEG  Firm/Sponge/Hard - POS  [place] - NEG  Weeping - NEG    ROM Considerations:  Hip flexion to 90 degrees  Hip ABd to 45 degrees  Heel cords to neutral    Strength Considerations:  Hip flexion - 3/5  Hip ABd - 3/5    Girth:  LE girth measurements (cm) -       Right           Left   Forefoot       24  25                                    Metatarsals             25  27  Malleolus   27 5  28 5  +4 cm               29  28 5  +8 cm    32  29 5  +12 cm               37 5  35 5                          +16 cm    43 5  41  +20 cm                         49  45 5     +24 cm    50  49  +28 cm                                   48 5  48 5  +32 cm    56  48    Compression measurements for knee length compreshort - [facility] from [facility] [place]  - contact Yanna Moy - [phone]    LE girth measurements (cm)      Left           Right  Forefoot 26  26 5                           Metatarsals             29  29  Malleolus   29 5  29 5  +4 cm               29  30  +8 cm    30  33  +12 cm               31  34                         +16 cm    35  34 5  +20 cm                          39  44   +24 cm    45 5  46 5  +28 cm                                  48  51  +32 cm    51 5  54  +36 cm    50  51  +40 cm    50 5  49    Compression ordered from:  [facility]  Fax [phone]  Bilateral knee length circaids  Compression  Bike shorts

## 2021-08-07 ENCOUNTER — LAB (OUTPATIENT)
Dept: LAB | Facility: HOSPITAL | Age: 66
End: 2021-08-07
Payer: COMMERCIAL

## 2021-08-07 DIAGNOSIS — N18.31 STAGE 3A CHRONIC KIDNEY DISEASE (HCC): ICD-10-CM

## 2021-08-07 DIAGNOSIS — N17.9 AKI (ACUTE KIDNEY INJURY) (HCC): ICD-10-CM

## 2021-08-07 LAB
ANION GAP SERPL CALCULATED.3IONS-SCNC: 4 MMOL/L (ref 4–13)
BASOPHILS # BLD AUTO: 0.02 THOUSANDS/ΜL (ref 0–0.1)
BASOPHILS NFR BLD AUTO: 1 % (ref 0–1)
BUN SERPL-MCNC: 33 MG/DL (ref 5–25)
CALCIUM SERPL-MCNC: 9 MG/DL (ref 8.3–10.1)
CHLORIDE SERPL-SCNC: 104 MMOL/L (ref 100–108)
CO2 SERPL-SCNC: 30 MMOL/L (ref 21–32)
CREAT SERPL-MCNC: 1.1 MG/DL (ref 0.6–1.3)
EOSINOPHIL # BLD AUTO: 0.1 THOUSAND/ΜL (ref 0–0.61)
EOSINOPHIL NFR BLD AUTO: 2 % (ref 0–6)
ERYTHROCYTE [DISTWIDTH] IN BLOOD BY AUTOMATED COUNT: 13.8 % (ref 11.6–15.1)
GFR SERPL CREATININE-BSD FRML MDRD: 70 ML/MIN/1.73SQ M
GLUCOSE P FAST SERPL-MCNC: 108 MG/DL (ref 65–99)
HCT VFR BLD AUTO: 37.8 % (ref 36.5–49.3)
HGB BLD-MCNC: 12.4 G/DL (ref 12–17)
IMM GRANULOCYTES # BLD AUTO: 0.01 THOUSAND/UL (ref 0–0.2)
IMM GRANULOCYTES NFR BLD AUTO: 0 % (ref 0–2)
LYMPHOCYTES # BLD AUTO: 0.92 THOUSANDS/ΜL (ref 0.6–4.47)
LYMPHOCYTES NFR BLD AUTO: 22 % (ref 14–44)
MCH RBC QN AUTO: 31.2 PG (ref 26.8–34.3)
MCHC RBC AUTO-ENTMCNC: 32.8 G/DL (ref 31.4–37.4)
MCV RBC AUTO: 95 FL (ref 82–98)
MONOCYTES # BLD AUTO: 0.48 THOUSAND/ΜL (ref 0.17–1.22)
MONOCYTES NFR BLD AUTO: 11 % (ref 4–12)
NEUTROPHILS # BLD AUTO: 2.67 THOUSANDS/ΜL (ref 1.85–7.62)
NEUTS SEG NFR BLD AUTO: 64 % (ref 43–75)
NRBC BLD AUTO-RTO: 0 /100 WBCS
PLATELET # BLD AUTO: 158 THOUSANDS/UL (ref 149–390)
PMV BLD AUTO: 9.9 FL (ref 8.9–12.7)
POTASSIUM SERPL-SCNC: 4 MMOL/L (ref 3.5–5.3)
RBC # BLD AUTO: 3.98 MILLION/UL (ref 3.88–5.62)
SODIUM SERPL-SCNC: 138 MMOL/L (ref 136–145)
WBC # BLD AUTO: 4.2 THOUSAND/UL (ref 4.31–10.16)

## 2021-08-07 PROCEDURE — 80048 BASIC METABOLIC PNL TOTAL CA: CPT

## 2021-08-07 PROCEDURE — 85025 COMPLETE CBC W/AUTO DIFF WBC: CPT

## 2021-08-07 PROCEDURE — 36415 COLL VENOUS BLD VENIPUNCTURE: CPT

## 2021-08-09 ENCOUNTER — OFFICE VISIT (OUTPATIENT)
Dept: PHYSICAL THERAPY | Facility: CLINIC | Age: 66
End: 2021-08-09
Payer: COMMERCIAL

## 2021-08-09 DIAGNOSIS — M54.2 NECK PAIN: Primary | ICD-10-CM

## 2021-08-09 PROCEDURE — 97140 MANUAL THERAPY 1/> REGIONS: CPT | Performed by: PHYSICAL THERAPIST

## 2021-08-09 PROCEDURE — 97112 NEUROMUSCULAR REEDUCATION: CPT | Performed by: PHYSICAL THERAPIST

## 2021-08-09 PROCEDURE — 97110 THERAPEUTIC EXERCISES: CPT | Performed by: PHYSICAL THERAPIST

## 2021-08-09 NOTE — PROGRESS NOTES
Daily Note     Today's date: 2021  Patient name: Madonna Wei  : 1955  MRN: [id number]  Referring provider: Julio Gómez PA-C  Dx:   Encounter Diagnosis     ICD-10-CM    1  Neck pain  M54 2                   Subjective: less stiffness      Objective: See treatment diary below      Assessment: slight restriction L ROT but minimal today  Overall doing pretty well  Continued local ST including R sub-occipitals  Added cervical stability work and continued with postural work         Plan: add closed chain scapular work     Precautions: none      Manuals 8-3 8-9       ST R lower cervical facet, levator/UT Same - MM       Joint work Assisted L upglide with R ROT in painfree ROM        flexibility                  Neuro Re-Ed         Cervical stability work  Retractions 2x10 3"         Cervical ROT isometrics x10 ea       Scapular 2-way horiz abd otb x25 ea gtb 3x10 ea       tband rows gtb x30 btb x30       tband ext gtb x30 btb x30                         Ther Ex         S/l ER         Prone rows         Prone horiz abd         Bicep curls  Blue tband x30       tband ir/er  gtb 2x20 ea                         Ther Activity                           Modalities

## 2021-08-12 ENCOUNTER — APPOINTMENT (OUTPATIENT)
Dept: PHYSICAL THERAPY | Facility: CLINIC | Age: 66
End: 2021-08-12
Payer: COMMERCIAL

## 2021-08-13 ENCOUNTER — APPOINTMENT (EMERGENCY)
Dept: CT IMAGING | Facility: HOSPITAL | Age: 66
End: 2021-08-13
Payer: COMMERCIAL

## 2021-08-13 ENCOUNTER — HOSPITAL ENCOUNTER (EMERGENCY)
Facility: HOSPITAL | Age: 66
Discharge: HOME | End: 2021-08-13
Attending: EMERGENCY MEDICINE | Admitting: EMERGENCY MEDICINE
Payer: COMMERCIAL

## 2021-08-13 VITALS
SYSTOLIC BLOOD PRESSURE: 152 MMHG | HEIGHT: 69 IN | BODY MASS INDEX: 42.78 KG/M2 | WEIGHT: 288.8 LBS | RESPIRATION RATE: 16 BRPM | TEMPERATURE: 98 F | HEART RATE: 73 BPM | OXYGEN SATURATION: 95 % | DIASTOLIC BLOOD PRESSURE: 71 MMHG

## 2021-08-13 DIAGNOSIS — K52.9 GASTROENTERITIS: Primary | ICD-10-CM

## 2021-08-13 LAB
ALBUMIN SERPL BCP-MCNC: 4 G/DL (ref 3.5–5)
ALP SERPL-CCNC: 81 U/L (ref 46–116)
ALT SERPL W P-5'-P-CCNC: 19 U/L (ref 12–78)
ANION GAP SERPL CALCULATED.3IONS-SCNC: 9 MMOL/L (ref 4–13)
AST SERPL W P-5'-P-CCNC: 16 U/L (ref 5–45)
BASOPHILS # BLD AUTO: 0.01 THOUSANDS/ΜL (ref 0–0.1)
BASOPHILS NFR BLD AUTO: 0 % (ref 0–1)
BILIRUB SERPL-MCNC: 0.72 MG/DL (ref 0.2–1)
BUN SERPL-MCNC: 35 MG/DL (ref 5–25)
CALCIUM SERPL-MCNC: 9.2 MG/DL (ref 8.3–10.1)
CHLORIDE SERPL-SCNC: 105 MMOL/L (ref 100–108)
CO2 SERPL-SCNC: 26 MMOL/L (ref 21–32)
CREAT SERPL-MCNC: 1.26 MG/DL (ref 0.6–1.3)
EOSINOPHIL # BLD AUTO: 0.03 THOUSAND/ΜL (ref 0–0.61)
EOSINOPHIL NFR BLD AUTO: 1 % (ref 0–6)
ERYTHROCYTE [DISTWIDTH] IN BLOOD BY AUTOMATED COUNT: 13.8 % (ref 11.6–15.1)
GFR SERPL CREATININE-BSD FRML MDRD: 59 ML/MIN/1.73SQ M
GLUCOSE SERPL-MCNC: 136 MG/DL (ref 65–140)
HCT VFR BLD AUTO: 42.9 % (ref 36.5–49.3)
HGB BLD-MCNC: 14.2 G/DL (ref 12–17)
IMM GRANULOCYTES # BLD AUTO: 0.01 THOUSAND/UL (ref 0–0.2)
IMM GRANULOCYTES NFR BLD AUTO: 0 % (ref 0–2)
LACTATE SERPL-SCNC: 0.9 MMOL/L (ref 0.5–2)
LIPASE SERPL-CCNC: 112 U/L (ref 73–393)
LYMPHOCYTES # BLD AUTO: 0.42 THOUSANDS/ΜL (ref 0.6–4.47)
LYMPHOCYTES NFR BLD AUTO: 7 % (ref 14–44)
MCH RBC QN AUTO: 31.5 PG (ref 26.8–34.3)
MCHC RBC AUTO-ENTMCNC: 33.1 G/DL (ref 31.4–37.4)
MCV RBC AUTO: 95 FL (ref 82–98)
MONOCYTES # BLD AUTO: 0.61 THOUSAND/ΜL (ref 0.17–1.22)
MONOCYTES NFR BLD AUTO: 10 % (ref 4–12)
NEUTROPHILS # BLD AUTO: 5.22 THOUSANDS/ΜL (ref 1.85–7.62)
NEUTS SEG NFR BLD AUTO: 82 % (ref 43–75)
NRBC BLD AUTO-RTO: 0 /100 WBCS
PLATELET # BLD AUTO: 172 THOUSANDS/UL (ref 149–390)
PMV BLD AUTO: 10.4 FL (ref 8.9–12.7)
POTASSIUM SERPL-SCNC: 3.6 MMOL/L (ref 3.5–5.3)
PROT SERPL-MCNC: 8.7 G/DL (ref 6.4–8.2)
RBC # BLD AUTO: 4.51 MILLION/UL (ref 3.88–5.62)
SARS-COV-2 RNA RESP QL NAA+PROBE: NEGATIVE
SODIUM SERPL-SCNC: 140 MMOL/L (ref 136–145)
TROPONIN I SERPL-MCNC: <0.02 NG/ML
WBC # BLD AUTO: 6.3 THOUSAND/UL (ref 4.31–10.16)

## 2021-08-13 PROCEDURE — 83605 ASSAY OF LACTIC ACID: CPT | Performed by: PHYSICIAN ASSISTANT

## 2021-08-13 PROCEDURE — 80053 COMPREHEN METABOLIC PANEL: CPT | Performed by: PHYSICIAN ASSISTANT

## 2021-08-13 PROCEDURE — 85025 COMPLETE CBC W/AUTO DIFF WBC: CPT | Performed by: PHYSICIAN ASSISTANT

## 2021-08-13 PROCEDURE — 96374 THER/PROPH/DIAG INJ IV PUSH: CPT

## 2021-08-13 PROCEDURE — U0003 INFECTIOUS AGENT DETECTION BY NUCLEIC ACID (DNA OR RNA); SEVERE ACUTE RESPIRATORY SYNDROME CORONAVIRUS 2 (SARS-COV-2) (CORONAVIRUS DISEASE [COVID-19]), AMPLIFIED PROBE TECHNIQUE, MAKING USE OF HIGH THROUGHPUT TECHNOLOGIES AS DESCRIBED BY CMS-2020-01-R: HCPCS | Performed by: PHYSICIAN ASSISTANT

## 2021-08-13 PROCEDURE — 36415 COLL VENOUS BLD VENIPUNCTURE: CPT | Performed by: PHYSICIAN ASSISTANT

## 2021-08-13 PROCEDURE — 83690 ASSAY OF LIPASE: CPT | Performed by: PHYSICIAN ASSISTANT

## 2021-08-13 PROCEDURE — 99285 EMERGENCY DEPT VISIT HI MDM: CPT | Performed by: PHYSICIAN ASSISTANT

## 2021-08-13 PROCEDURE — 99284 EMERGENCY DEPT VISIT MOD MDM: CPT

## 2021-08-13 PROCEDURE — 96361 HYDRATE IV INFUSION ADD-ON: CPT

## 2021-08-13 PROCEDURE — 74177 CT ABD & PELVIS W/CONTRAST: CPT

## 2021-08-13 PROCEDURE — 93005 ELECTROCARDIOGRAM TRACING: CPT

## 2021-08-13 PROCEDURE — U0005 INFEC AGEN DETEC AMPLI PROBE: HCPCS | Performed by: PHYSICIAN ASSISTANT

## 2021-08-13 PROCEDURE — 84484 ASSAY OF TROPONIN QUANT: CPT | Performed by: PHYSICIAN ASSISTANT

## 2021-08-13 RX ORDER — ONDANSETRON 4 MG/1
4 TABLET, FILM COATED ORAL EVERY 8 HOURS PRN
Qty: 20 TABLET | Refills: 0 | Status: SHIPPED | OUTPATIENT
Start: 2021-08-13 | End: 2021-09-03

## 2021-08-13 RX ORDER — DICYCLOMINE HCL 20 MG
20 TABLET ORAL 2 TIMES DAILY
Qty: 20 TABLET | Refills: 0 | Status: SHIPPED | OUTPATIENT
Start: 2021-08-13 | End: 2021-09-03

## 2021-08-13 RX ORDER — ONDANSETRON 2 MG/ML
4 INJECTION INTRAMUSCULAR; INTRAVENOUS ONCE
Status: COMPLETED | OUTPATIENT
Start: 2021-08-13 | End: 2021-08-13

## 2021-08-13 RX ADMIN — ONDANSETRON 4 MG: 2 INJECTION INTRAMUSCULAR; INTRAVENOUS at 09:56

## 2021-08-13 RX ADMIN — SODIUM CHLORIDE 1000 ML: 0.9 INJECTION, SOLUTION INTRAVENOUS at 09:55

## 2021-08-13 RX ADMIN — IOHEXOL 100 ML: 350 INJECTION, SOLUTION INTRAVENOUS at 09:58

## 2021-08-13 NOTE — ED PROVIDER NOTES
History  Chief Complaint   Patient presents with    Diarrhea     diarrhea, abdominal cramping since monday; saw PCP and was told to take probiotic/sent stool samples with no result; was supposed to get iron infusion today but unable to do so because of increased malaise     Patient presents to the emergency department today offering a chief complaint of bilateral lower abdominal cramping in the setting of diarrhea that commenced 4 days ago  He states the stool is a yellowish color  He states it is loose  No formed stool  No history of black stool  He admits to nausea without vomiting as well as generalized fatigue weakness  No chest pain shortness of breath or cough  Has chronic lymphedema but denies any changes of the leg swelling  Patient did see primary care earlier this week stool cultures were ordered he actually just dropped him off at the outpatient lab this morning therefore they will be pending for a few days  He has been taking a probiotic according to his history  History of requiring iron infusions which actually was scheduled for today however came here instead  Prior to Admission Medications   Prescriptions Last Dose Informant Patient Reported? Taking?    GARCINIA CAMBOGIA-CHROMIUM PO  Self Yes No   Sig: Take 750 mg by mouth 2 (two) times a day    Patient not taking: Reported on 7/12/2021   Glucosamine-Chondroit-Vit C-Mn (Glucosamine 1500 Complex) CAPS  Self Yes No   Sig: Take by mouth   L-ARGININE-500 PO  Self Yes No   Sig: Take 500 mg by mouth 2 (two) times a day    Multiple Vitamins-Minerals (OCUVITE EXTRA PO)  Self Yes No   Sig: Take 1 tablet by mouth daily     Omega-3 Fatty Acids (fish oil) 1,000 mg  Self Yes No   Sig: Take 1,000 mg by mouth 2 (two) times a day   VITAMIN D PO  Self Yes No   Sig: Take 2,000 Units by mouth daily    acetaminophen (TYLENOL) 500 mg tablet  Self Yes No   Sig: Take 500 mg by mouth every 6 (six) hours as needed for mild pain   alfuzosin (UROXATRAL) 10 mg 24 hr tablet  Self Yes No   Sig: Take 10 mg by mouth daily   allopurinol (ZYLOPRIM) 100 mg tablet  Self No No   Sig: Take 1 tablet (100 mg total) by mouth daily   amiodarone 100 mg tablet   No No   Sig: Take 1 tablet (100 mg total) by mouth daily with breakfast   ammonium lactate (AMLACTIN) 12 % lotion  Self Yes No   Sig: Apply topically 2 (two) times a day as needed for dry skin   clindamycin (CLEOCIN) 300 MG capsule  Self Yes No   Sig: Take 300 mg by mouth as needed Prior to dental work   colchicine (COLCRYS) 0 6 mg tablet  Self Yes No   Sig: as needed    Patient not taking: Reported on 7/15/2021   ferrous sulfate 325 (65 Fe) mg tablet  Self Yes No   Sig: Take 325 mg by mouth   halobetasol (ULTRAVATE) 0 05 % ointment  Self Yes No   Sig: APPLY TO AFFECTED AREA ON LEG TWICE DAILY   metoprolol succinate (TOPROL-XL) 25 mg 24 hr tablet   No No   Sig: Take 1 tablet (25 mg total) by mouth daily   multivitamin (THERAGRAN) TABS  Self Yes No   Sig: Take 1 tablet by mouth daily   patient supplied medication  Self Yes No   Sig: Take 1 each by mouth 2 (two) times a day   pregabalin (LYRICA) 100 mg capsule  Self No No   Sig: Take 1 capsule (100 mg total) by mouth 3 (three) times a day   rivaroxaban (Xarelto) 20 mg tablet  Self No No   Sig: Take 1 tablet (20 mg total) by mouth daily   spironolactone (ALDACTONE) 25 mg tablet   No No   Sig: Take 1 tablet (25 mg total) by mouth daily   tadalafil (CIALIS) 5 MG tablet  Self Yes No   Sig: Take 5 mg by mouth daily as needed for erectile dysfunction   torsemide (DEMADEX) 20 mg tablet  Self No No   Sig: Take 1 tablet (20 mg total) by mouth 2 (two) times a day   traMADol (ULTRAM) 50 mg tablet  Self Yes No   Sig: Take 50 mg by mouth every 6 (six) hours as needed for moderate pain      Facility-Administered Medications: None       Past Medical History:   Diagnosis Date    A-fib (HCC)     Arthritis     CPAP (continuous positive airway pressure) dependence     Irregular heart beat     Lymphedema     Sleep apnea     Urinary frequency     Wears glasses     Wears partial dentures     lower partial       Past Surgical History:   Procedure Laterality Date    ABLATION SAPHENOUS VEIN W/ RFA      COLONOSCOPY      HI CYSTOURETHRO W/IMPLANT N/A 11/13/2017    Procedure: CYSTOSCOPY WITH INSERTION UROLIFT;  Surgeon: Philomena Lopez DO;  Location: [facility];  Service: Urology    TONSILLECTOMY         Family History   Problem Relation Age of Onset    Heart disease Mother     Lymphoma Father     Cancer Father     Heart disease Sister     Hypertension Brother     Diabetes Neg Hx     Thyroid disease Neg Hx     Stroke Neg Hx      I have reviewed and agree with the history as documented  E-Cigarette/Vaping    E-Cigarette Use Never User      E-Cigarette/Vaping Substances    Nicotine No     THC No     CBD No     Flavoring No     Other No     Unknown No      Social History     Tobacco Use    Smoking status: Never Smoker    Smokeless tobacco: Never Used   Vaping Use    Vaping Use: Never used   Substance Use Topics    Alcohol use: Yes     Comment: socially    Drug use: No       Review of Systems   Constitutional: Positive for fatigue  Negative for activity change, appetite change, chills, diaphoresis, fever and unexpected weight change  HENT: Negative  Negative for sore throat, trouble swallowing and voice change  Eyes: Negative  Respiratory: Negative  Negative for cough, chest tightness, shortness of breath and wheezing  Cardiovascular: Negative  Negative for chest pain, palpitations and leg swelling  Gastrointestinal: Positive for abdominal pain, diarrhea and nausea  Negative for blood in stool and vomiting  Endocrine: Negative  Genitourinary: Negative  Negative for flank pain and hematuria  Musculoskeletal: Negative  Negative for arthralgias, back pain, gait problem, joint swelling, myalgias, neck pain and neck stiffness  Skin: Negative    Negative for rash and wound  Allergic/Immunologic: Negative  Neurological: Positive for weakness  Negative for dizziness, seizures, syncope, light-headedness and headaches  Hematological: Negative  Psychiatric/Behavioral: Negative  All other systems reviewed and are negative  Physical Exam  Physical Exam  Vitals reviewed  Constitutional:       General: He is not in acute distress  Appearance: He is well-developed  He is not ill-appearing, toxic-appearing or diaphoretic  HENT:      Right Ear: External ear normal  No swelling  Tympanic membrane is not bulging  Left Ear: External ear normal  No swelling  Tympanic membrane is not bulging  Nose: Nose normal       Mouth/Throat:      Pharynx: No oropharyngeal exudate  Eyes:      General: Lids are normal       Conjunctiva/sclera: Conjunctivae normal       Pupils: Pupils are equal, round, and reactive to light  Neck:      Thyroid: No thyromegaly  Vascular: No JVD  Trachea: No tracheal deviation  Cardiovascular:      Rate and Rhythm: Normal rate and regular rhythm  Pulses: Normal pulses  Heart sounds: Normal heart sounds  No murmur heard  No friction rub  No gallop  Pulmonary:      Effort: Pulmonary effort is normal  No respiratory distress  Breath sounds: Normal breath sounds  No stridor  No wheezing or rales  Chest:      Chest wall: No tenderness  Abdominal:      General: Bowel sounds are normal  There is no distension  Palpations: Abdomen is soft  There is no mass  Tenderness: There is abdominal tenderness  There is no guarding or rebound  Negative signs include Crowe's sign  Hernia: No hernia is present  Musculoskeletal:         General: No tenderness  Normal range of motion  Cervical back: Normal range of motion and neck supple  No edema  Normal range of motion  Lymphadenopathy:      Cervical: No cervical adenopathy  Skin:     General: Skin is warm and dry        Capillary Refill: Capillary refill takes less than 2 seconds  Coloration: Skin is not pale  Findings: No erythema or rash  Neurological:      General: No focal deficit present  Mental Status: He is alert and oriented to person, place, and time  GCS: GCS eye subscore is 4  GCS verbal subscore is 5  GCS motor subscore is 6  Cranial Nerves: No cranial nerve deficit  Sensory: No sensory deficit  Deep Tendon Reflexes: Reflexes are normal and symmetric  Psychiatric:         Mood and Affect: Mood normal          Speech: Speech normal          Behavior: Behavior normal          Vital Signs  ED Triage Vitals [08/13/21 0914]   Temperature Pulse Respirations Blood Pressure SpO2   98 °F (36 7 °C) 74 18 157/83 96 %      Temp Source Heart Rate Source Patient Position - Orthostatic VS BP Location FiO2 (%)   Temporal Monitor Sitting Right arm --      Pain Score       6           Vitals:    08/13/21 0914 08/13/21 0945 08/13/21 1000 08/13/21 1015   BP: 157/83 127/67 (!) 173/74 154/71   Pulse: 74 88 72 72   Patient Position - Orthostatic VS: Sitting Sitting Sitting Sitting         Visual Acuity      ED Medications  Medications   sodium chloride 0 9 % bolus 1,000 mL (1,000 mL Intravenous New Bag 8/13/21 0955)   ondansetron (ZOFRAN) injection 4 mg (4 mg Intravenous Given 8/13/21 0956)   iohexol (OMNIPAQUE) 350 MG/ML injection (SINGLE-DOSE) 100 mL (100 mL Intravenous Given 8/13/21 0958)       Diagnostic Studies  Results Reviewed     Procedure Component Value Units Date/Time    Novel Coronavirus (Covid-19),PCR [facility] - 2 Hour Stat [[id number]]  (Normal) Collected: 08/13/21 0935    Lab Status: Final result Specimen: Nares from Nasopharyngeal Swab Updated: 08/13/21 1037     SARS-CoV-2 Negative    Narrative:       The specimen collection materials, transport medium, and/or testing methodology utilized in the production of these test results have been proven to be reliable in a limited validation with an abbreviated program under the Emergency Utilization Authorization provided by the FDA  Testing reported as "Presumptive positive" will be confirmed with secondary testing to ensure result accuracy  Clinical caution and judgement should be used with the interpretation of these results with consideration of the clinical impression and other laboratory testing  Testing reported as "Positive" or "Negative" has been proven to be accurate according to standard laboratory validation requirements  All testing is performed with control materials showing appropriate reactivity at standard intervals  Troponin I [[id number]]  (Normal) Collected: 08/13/21 0935    Lab Status: Final result Specimen: Blood from Arm, Left Updated: 08/13/21 1008     Troponin I <0 02 ng/mL     Lactic acid [[id number]]  (Normal) Collected: 08/13/21 0935    Lab Status: Final result Specimen: Blood from Arm, Left Updated: 08/13/21 1007     LACTIC ACID 0 9 mmol/L     Narrative:      Result may be elevated if tourniquet was used during collection      Comprehensive metabolic panel [[id number]]  (Abnormal) Collected: 08/13/21 0935    Lab Status: Final result Specimen: Blood from Arm, Left Updated: 08/13/21 1002     Sodium 140 mmol/L      Potassium 3 6 mmol/L      Chloride 105 mmol/L      CO2 26 mmol/L      ANION GAP 9 mmol/L      BUN 35 mg/dL      Creatinine 1 26 mg/dL      Glucose 136 mg/dL      Calcium 9 2 mg/dL      AST 16 U/L      ALT 19 U/L      Alkaline Phosphatase 81 U/L      Total Protein 8 7 g/dL      Albumin 4 0 g/dL      Total Bilirubin 0 72 mg/dL      eGFR 59 ml/min/1 73sq m     Narrative:      [place] guidelines for Chronic Kidney Disease (CKD):     Stage 1 with normal or high GFR (GFR > 90 mL/min/1 73 square meters)    Stage 2 Mild CKD (GFR = 60-89 mL/min/1 73 square meters)    Stage 3A Moderate CKD (GFR = 45-59 mL/min/1 73 square meters)    Stage 3B Moderate CKD (GFR = 30-44 mL/min/1 73 square meters)    Stage 4 Severe CKD (GFR = 15-29 mL/min/1 73 square meters)    Stage 5 End Stage CKD (GFR <15 mL/min/1 73 square meters)  Note: GFR calculation is accurate only with a steady state creatinine    Lipase [[id number]]  (Normal) Collected: 08/13/21 0935    Lab Status: Final result Specimen: Blood from Arm, Left Updated: 08/13/21 0959     Lipase 112 u/L     CBC and differential [[id number]]  (Abnormal) Collected: 08/13/21 0935    Lab Status: Final result Specimen: Blood from Arm, Left Updated: 08/13/21 0946     WBC 6 30 Thousand/uL      RBC 4 51 Million/uL      Hemoglobin 14 2 g/dL      Hematocrit 42 9 %      MCV 95 fL      MCH 31 5 pg      MCHC 33 1 g/dL      RDW 13 8 %      MPV 10 4 fL      Platelets 783 Thousands/uL      nRBC 0 /100 WBCs      Neutrophils Relative 82 %      Immat GRANS % 0 %      Lymphocytes Relative 7 %      Monocytes Relative 10 %      Eosinophils Relative 1 %      Basophils Relative 0 %      Neutrophils Absolute 5 22 Thousands/µL      Immature Grans Absolute 0 01 Thousand/uL      Lymphocytes Absolute 0 42 Thousands/µL      Monocytes Absolute 0 61 Thousand/µL      Eosinophils Absolute 0 03 Thousand/µL      Basophils Absolute 0 01 Thousands/µL                  CT abdomen pelvis with contrast   Final Result by Jad Elam MD (08/13 1017)      Nonobstructive bowel pattern suggestive of gastroenteritis and diarrhea  Otherwise, no evidence of acute abdominopelvic process  Colonic diverticulosis              Workstation performed: [id number]                    Procedures  ECG 12 Lead Documentation Only    Date/Time: 8/13/2021 9:36 AM  Performed by: Fannie Sanchez PA-C  Authorized by: Fannie Sanchez PA-C     Indications / Diagnosis:  Weakness  ECG reviewed by me, the ED Provider: yes    Patient location:  ED  Previous ECG:     Previous ECG:  Compared to current    Similarity:  No change  Interpretation:     Interpretation: non-specific    Rate:     ECG rate:  70    ECG rate assessment: normal    Rhythm: Rhythm: sinus rhythm    Ectopy:     Ectopy: none    QRS:     QRS axis:  Left    QRS intervals:  Normal  Conduction:     Conduction: abnormal    ST segments:     ST segments:  Normal  T waves:     T waves: normal               ED Course  ED Course as of Aug 13 1052   Fri Aug 13, 2021   0921 Blood Pressure: 157/83   0921 Temperature: 98 °F (36 7 °C)   0921 Pulse: 74   0921 Respirations: 18   0921 SpO2: 96 %   1004 WBC: 6 30   1004 Hemoglobin: 14 2   1004 Platelet Count: 596   1004 Lipase: 112   1004 Sodium: 140   1004 Albumin: 4 0   1004 eGFR: 59   1017 Troponin I: <0 02   1018 LACTIC ACID: 0 9   1018 Awaiting CT interp      1021 IMPRESSION:     Nonobstructive bowel pattern suggestive of gastroenteritis and diarrhea      Otherwise, no evidence of acute abdominopelvic process      Colonic diverticulosis  1029 Will d/c likely after covid result      1045 SARS-COV-2: Negative             HEART Risk Score      Most Recent Value   Heart Score Risk Calculator   History  0 Filed at: 08/13/2021 0937   ECG  0 Filed at: 08/13/2021 5512   Age  2 Filed at: 08/13/2021 6896   Risk Factors  2 Filed at: 08/13/2021 5906   Troponin  0 Filed at: 08/13/2021 0267   HEART Score  4 Filed at: 08/13/2021 0351                      SBIRT 22yo+      Most Recent Value   SBIRT (23 yo +)   In order to provide better care to our patients, we are screening all of our patients for alcohol and drug use  Would it be okay to ask you these screening questions? Yes Filed at: 08/13/2021 0943   Initial Alcohol Screen: US AUDIT-C    1  How often do you have a drink containing alcohol?  0 Filed at: 08/13/2021 0943   2  How many drinks containing alcohol do you have on a typical day you are drinking? 0 Filed at: 08/13/2021 0943   3a  Male UNDER 65: How often do you have five or more drinks on one occasion? 0 Filed at: 08/13/2021 0943   Audit-C Score  0 Filed at: 08/13/2021 3856   RAINA: How many times in the past year have you       Used an illegal drug or used a prescription medication for non-medical reasons? Never Filed at: 08/13/2021 6026                    MDM    Disposition  Final diagnoses:   Gastroenteritis     Time reflects when diagnosis was documented in both MDM as applicable and the Disposition within this note     Time User Action Codes Description Comment    8/13/2021 10:46 AM Hector Ivey [K52 9] Gastroenteritis       ED Disposition     ED Disposition Condition Date/Time Comment    Discharge Stable Fri Aug 13, 2021 10:46 AM Delman Goldberg discharge to home/self care  Follow-up Information     Follow up With Specialties Details Why Contact Info    Trenda Saint, MD Internal Medicine Schedule an appointment as soon as possible for a visit  As needed [address] [facility] [phone]            Patient's Medications   Discharge Prescriptions    DICYCLOMINE (BENTYL) 20 MG TABLET    Take 1 tablet (20 mg total) by mouth 2 (two) times a day       Start Date: 8/13/2021 End Date: --       Order Dose: 20 mg       Quantity: 20 tablet    Refills: 0    ONDANSETRON (ZOFRAN) 4 MG TABLET    Take 1 tablet (4 mg total) by mouth every 8 (eight) hours as needed for nausea       Start Date: 8/13/2021 End Date: --       Order Dose: 4 mg       Quantity: 20 tablet    Refills: 0     No discharge procedures on file      PDMP Review       Value Time User    PDMP Reviewed  Yes 1/29/2021  2:21 PM Grisel Cortes DO          ED Provider  Electronically Signed by           Benja Ramos PA-C  08/13/21 8888

## 2021-08-13 NOTE — ED NOTES
Patient had loose bowel movement while in the ED  Loose, yellow        Bonita Ball RN  08/13/21 7222

## 2021-08-16 ENCOUNTER — APPOINTMENT (OUTPATIENT)
Dept: PHYSICAL THERAPY | Facility: CLINIC | Age: 66
End: 2021-08-16
Payer: COMMERCIAL

## 2021-08-17 LAB
ATRIAL RATE: 70 BPM
P AXIS: 67 DEGREES
PR INTERVAL: 158 MS
QRS AXIS: 267 DEGREES
QRSD INTERVAL: 154 MS
QT INTERVAL: 456 MS
QTC INTERVAL: 492 MS
T WAVE AXIS: 16 DEGREES
VENTRICULAR RATE: 70 BPM

## 2021-08-17 PROCEDURE — 93010 ELECTROCARDIOGRAM REPORT: CPT | Performed by: INTERNAL MEDICINE

## 2021-08-19 ENCOUNTER — OFFICE VISIT (OUTPATIENT)
Dept: PHYSICAL THERAPY | Facility: CLINIC | Age: 66
End: 2021-08-19
Payer: COMMERCIAL

## 2021-08-19 DIAGNOSIS — M54.2 NECK PAIN: Primary | ICD-10-CM

## 2021-08-19 PROCEDURE — 97140 MANUAL THERAPY 1/> REGIONS: CPT

## 2021-08-19 PROCEDURE — 97110 THERAPEUTIC EXERCISES: CPT

## 2021-08-19 PROCEDURE — 97112 NEUROMUSCULAR REEDUCATION: CPT

## 2021-08-19 NOTE — PROGRESS NOTES
Daily Note     Today's date: 2021  Patient name: Jenaro Bar  : 1955  MRN: [id number]  Referring provider: Yi Santos PA-C  Dx:   Encounter Diagnosis     ICD-10-CM    1  Neck pain  M54 2                   Subjective: Patient reports that his neck pain is the same at this time  Been sick with stomach issues  Objective: See treatment diary below  Assessment: Tolerated treatment well  Patient demonstrated fatigue post treatment, exhibited good technique with therapeutic exercises and would benefit from continued PT to improve neck symptoms  Plan: Continue per plan of care        Precautions: none      Manuals 8-3 8-9 -      ST R lower cervical facet, levator/UT Same - MM R lower cervical facet, levator/UT- CM      Joint work Assisted L upglide with R ROT in painfree ROM        flexibility                  Neuro Re-Ed         Cervical stability work  Retractions 2x10 3" 3" 2x10        Cervical ROT isometrics x10 ea Cervical rot iso 10x       Scapular 2-way horiz abd otb x25 ea gtb 3x10 ea gtb 3x10      tband rows gtb x30 btb x30 btb 3x10      tband ext gtb x30 btb x30 btb 3x10                        Ther Ex         S/l ER         Prone rows         Prone horiz abd         Bicep curls  Blue tband x30 btb 3x10      tband ir/er  gtb 2x20 ea gtb 3x10      Tricep    btb 3x10      1/2 roll thoracic          Ther Activity                           Modalities         MHP   10'

## 2021-08-20 DIAGNOSIS — E61.1 IRON DEFICIENCY: Primary | ICD-10-CM

## 2021-08-20 RX ORDER — SODIUM CHLORIDE 9 MG/ML
20 INJECTION, SOLUTION INTRAVENOUS ONCE
Status: CANCELLED | OUTPATIENT
Start: 2021-08-23

## 2021-08-23 ENCOUNTER — OFFICE VISIT (OUTPATIENT)
Dept: PHYSICAL THERAPY | Facility: CLINIC | Age: 66
End: 2021-08-23
Payer: COMMERCIAL

## 2021-08-23 ENCOUNTER — HOSPITAL ENCOUNTER (OUTPATIENT)
Dept: INFUSION CENTER | Facility: HOSPITAL | Age: 66
Discharge: HOME | End: 2021-08-23
Payer: COMMERCIAL

## 2021-08-23 VITALS
SYSTOLIC BLOOD PRESSURE: 100 MMHG | TEMPERATURE: 96.2 F | DIASTOLIC BLOOD PRESSURE: 60 MMHG | OXYGEN SATURATION: 94 % | RESPIRATION RATE: 18 BRPM | HEART RATE: 64 BPM

## 2021-08-23 DIAGNOSIS — M54.2 NECK PAIN: Primary | ICD-10-CM

## 2021-08-23 DIAGNOSIS — E61.1 IRON DEFICIENCY: Primary | ICD-10-CM

## 2021-08-23 PROCEDURE — 96365 THER/PROPH/DIAG IV INF INIT: CPT

## 2021-08-23 PROCEDURE — 97112 NEUROMUSCULAR REEDUCATION: CPT

## 2021-08-23 PROCEDURE — 97110 THERAPEUTIC EXERCISES: CPT

## 2021-08-23 RX ORDER — SODIUM CHLORIDE 9 MG/ML
20 INJECTION, SOLUTION INTRAVENOUS ONCE
Status: CANCELLED | OUTPATIENT
Start: 2021-08-23

## 2021-08-23 RX ORDER — SODIUM CHLORIDE 9 MG/ML
20 INJECTION, SOLUTION INTRAVENOUS ONCE
Status: COMPLETED | OUTPATIENT
Start: 2021-08-23 | End: 2021-08-23

## 2021-08-23 RX ADMIN — SODIUM CHLORIDE 20 ML/HR: 0.9 INJECTION, SOLUTION INTRAVENOUS at 14:45

## 2021-08-23 RX ADMIN — FERUMOXYTOL 510 MG: 510 INJECTION INTRAVENOUS at 14:51

## 2021-08-26 ENCOUNTER — OFFICE VISIT (OUTPATIENT)
Dept: PHYSICAL THERAPY | Facility: CLINIC | Age: 66
End: 2021-08-26
Payer: COMMERCIAL

## 2021-08-26 DIAGNOSIS — M54.2 NECK PAIN: Primary | ICD-10-CM

## 2021-08-26 PROCEDURE — 97112 NEUROMUSCULAR REEDUCATION: CPT

## 2021-08-26 PROCEDURE — 97140 MANUAL THERAPY 1/> REGIONS: CPT

## 2021-08-26 PROCEDURE — 97110 THERAPEUTIC EXERCISES: CPT

## 2021-08-26 NOTE — PROGRESS NOTES
Daily Note     Today's date: 2021  Patient name: Sonny Li  : 1955  MRN: [id number]  Referring provider: Uma Meeks PA-C  Dx:   Encounter Diagnosis     ICD-10-CM    1  Neck pain  M54 2                   Subjective:  Pt reports feeling good today  Pt offers no c/o pain today  Objective: See treatment diary below      Assessment: Tolerated treatment well  Patient with tightness and tenderness to palpation distal levator  Pt with tightness in SO  Pt does well with all there ex  Pt is making good progress in PT to date  Pt would benefit from continued therapy for strengthening for functional mobility  Plan: Continue per plan of care        Precautions: none    Manuals 8-3 8-9 -    ST R lower cervical facet, levator/UT Same - MM R lower cervical facet, levator/UT- CM  R lower cervical facet, levator/UT-TK    Joint work Assisted L upglide with R ROT in painfree ROM        flexibility                  Neuro Re-Ed         Cervical stability work  Retractions 2x10 3" 3" 2x10 3" 2x10 3" 2x10      Cervical ROT isometrics x10 ea Cervical rot iso 10x  Cervical rot iso 10x Cervical rot iso 10x    Scapular 2-way horiz abd otb x25 ea gtb 3x10 ea gtb 3x10 Green 3x10 ea Green 3x10 ea    tband rows gtb x30 btb x30 btb 3x10 Blue 3x10 Blue 3x10    tband ext gtb x30 btb x30 btb 3x10 Blue 3x10 Blue 3x10                      Ther Ex         UBE    5' retro 5' retro    S/l ER         Prone rows         Prone horiz abd         Bicep curls  Blue tband x30 btb 3x10 Blue 3x10 Blue 3x10    tband ir/er  gtb 2x20 ea gtb 3x10 Green 3x10 Green 3x10    Tricep    btb 3x10 Blue 3x10 Blue 3x10    1/2 roll thoracic     5"x10 5" x 10    Ther Activity                           Modalities         MHP   10'

## 2021-09-01 ENCOUNTER — OFFICE VISIT (OUTPATIENT)
Dept: PHYSICAL THERAPY | Facility: CLINIC | Age: 66
End: 2021-09-01
Payer: COMMERCIAL

## 2021-09-01 DIAGNOSIS — M54.2 NECK PAIN: Primary | ICD-10-CM

## 2021-09-01 PROCEDURE — 97110 THERAPEUTIC EXERCISES: CPT

## 2021-09-01 PROCEDURE — 97112 NEUROMUSCULAR REEDUCATION: CPT

## 2021-09-01 NOTE — PROGRESS NOTES
Daily Note     Today's date: 2021  Patient name: Kandy Molina  : 1955  MRN: [id number]  Referring provider: Aleah Barnes PA-C  Dx:   Encounter Diagnosis     ICD-10-CM    1  Neck pain  M54 2                   Subjective: Patient reports his cervical pain is minimal        Objective: See treatment diary below  Assessment: Tolerated treatment well  Patient demonstrated fatigue post treatment and exhibited good technique with therapeutic exercises and will transition to HEP after today's session  ROM and strength have improved and he has minimal pain in cervical spine  Plan: Discharged after todays session        Precautions: none    Manuals 8-3 8- 9-1   ST R lower cervical facet, levator/UT Same - MM R lower cervical facet, levator/UT- CM  R lower cervical facet, levator/UT-TK    Joint work Assisted L upglide with R ROT in painfree ROM        flexibility                  Neuro Re-Ed         Cervical stability work  Retractions 2x10 3" 3" 2x10 3" 2x10 3" 2x10 5" 20x      Cervical ROT isometrics x10 ea Cervical rot iso 10x  Cervical rot iso 10x Cervical rot iso 10x Cervical Rot iso 10x   Scapular 2-way horiz abd otb x25 ea gtb 3x10 ea gtb 3x10 Green 3x10 ea Green 3x10 ea btb 3x10 ea    tband rows gtb x30 btb x30 btb 3x10 Blue 3x10 Blue 3x10 BTB 3x10   tband ext gtb x30 btb x30 btb 3x10 Blue 3x10 Blue 3x10 BTB 3x10                     Ther Ex         UBE    5' retro 5' retro 5' retro   S/l ER         Prone rows                  Prone horiz abd         Bicep curls  Blue tband x30 btb 3x10 Blue 3x10 Blue 3x10 btb 3x10   tband ir/er  gtb 2x20 ea gtb 3x10 Green 3x10 Green 3x10 gtb 3x10   Tricep    btb 3x10 Blue 3x10 Blue 3x10 btb 3x10   1/2 roll thoracic     5"x10 5" x 10 10"x10   Serratus punches         Ther Activity                           Modalities         MHP   10'

## 2021-09-01 NOTE — PROGRESS NOTES
Electrophysiology Hospital Follow Up  [address] [facility]  [address] [facility], [facility] [address]    Name: Walter Dick  : 1955  MRN: [id number]    ASSESSMENT:  1  Longstanding persistent atrial fibrillation ([facility] [address] )     2  Acute diastolic heart failure ([facility] [address] )         PLAN:  1  Persistent atrial fibrillation  - anticoagulated with Xarelto / [id number] score of 3 (age, CHF, HTN)  - EF of 55% per echo 2021 / LA diameter of 3 8cm  - rate control: metoprolol succinate 25mg daily   - antiarrhythmic therapy: amiodarone 100mg daily   - prior cardioversion:  2021  - status post cryoablation of atrial fibrillation isolation and posterior wall isolation, ablation of AVNRT, and radiofrequency ablation of CTI dependent flutter by Dr Dharmesh Velasco on 2021  - subsequent mitral annular flutter by Dr Wilder Burrell on 2021  2  Right bundle-branch block at baseline   3  Chronic diastolic congestive heart failure   - diuretic regimen of torsemide 20mg twice daily   4  Lymphedema  5  Essential hypertension  6  ROBERT compliant with CPAP   7  Obesity with BMI of 42    IMPRESSION:  1  Atrial fibrillation - patient is currently maintaining sinus rhythm  He is to be maintained on low-dose amiodarone for a couple more months at which point he can be decided if this can be discontinued  Routine testing has been within normal limits but he is had no surveillance for pulmonary toxicities  Will start with chest x-ray and determine if he needs PFTs when he follows next  2  Dietary - patient is extremely interested in modifying his food choices to help with weight loss  Will look in to nutritionist versus dietitians for him to see within our network  Patient is to follow up in our EP office in 3 months  He is to call our office with any further questions or concerns in the meantime      HPI:   Interim history: Walter Dick is a 78 y o  male with early persistent atrial fibrillation anticoagulated with Xarelto, right bundle branch block at baseline, chronic diastolic congestive heart failure, hypertension, obesity and ROBERT  He presents today for hospital follow up after ablation  Mr Vicky Farmer has a history of long-term persistent atrial fibrillation also associated with CHF exacerbations  Earlier on in his history, it was felt that patient should trial rate control and risk factor modifications  However, given recurrent hospitalizations, he was placed on amiodarone and underwent cardioversion in May of 2021  As he did have symptom relief from this, he subsequently underwent ablation in June for atrial fibrillation and flutter along with AVNRT (had posterior wall isolation)  In his follow-up visit, he was found to be in atrial flutter and was therefore set up for repeat ablation - he was found to have mitral flutter that was taken care of on 7/28/2021  He presents today for hospital follow-up and reports that he has felt very well since his second ablation  He denies any chest pain, lightheadedness, or dizziness  He does note that he does feel short of breath sporadically, he has noticed this when he is in the shower and bending down to wash himself and he returns to a standing position  He is also very interested in seeking further medical attention for his dietary needs  He has done some weight loss programs in the past but would really like to talk to a nutritionist or dietitian  EKG:  Sinus bradycardia 54 beats per minute with bifascicular block    ROS:   Review of Systems   Constitutional: Negative for chills, diaphoresis, fever and malaise/fatigue  Cardiovascular: Negative for chest pain, claudication, cyanosis, dyspnea on exertion, irregular heartbeat, leg swelling, near-syncope, orthopnea, palpitations, paroxysmal nocturnal dyspnea and syncope  Respiratory: Positive for shortness of breath  Negative for sleep disturbances due to breathing      Neurological: Negative for dizziness and light-headedness  All other systems reviewed and are negative  OBJECTIVE:   Vitals:   /72 (BP Location: Left arm, Patient Position: Sitting, Cuff Size: Large)   Pulse (!) 54   Ht 5' 9" (1 753 m)   Wt 129 kg (285 lb)   BMI 42 09 kg/m²       Physical Exam:   Physical Exam  Vitals and nursing note reviewed  Constitutional:       General: He is not in acute distress  Appearance: He is well-developed  He is obese  He is not diaphoretic  HENT:      Head: Normocephalic and atraumatic  Eyes:      Pupils: Pupils are equal, round, and reactive to light  Neck:      Vascular: No JVD  Cardiovascular:      Rate and Rhythm: Normal rate and regular rhythm  Heart sounds: No murmur heard  No friction rub  No gallop  Pulmonary:      Effort: Pulmonary effort is normal  No respiratory distress  Breath sounds: Normal breath sounds  No wheezing  Abdominal:      Palpations: Abdomen is soft  Musculoskeletal:         General: Normal range of motion  Cervical back: Normal range of motion  Skin:     General: Skin is warm and dry  Neurological:      Mental Status: He is alert and oriented to person, place, and time           Medications:      Current Outpatient Medications:     acetaminophen (TYLENOL) 500 mg tablet, Take 500 mg by mouth every 6 (six) hours as needed for mild pain, Disp: , Rfl:     alfuzosin (UROXATRAL) 10 mg 24 hr tablet, Take 10 mg by mouth daily, Disp: , Rfl:     allopurinol (ZYLOPRIM) 100 mg tablet, Take 1 tablet (100 mg total) by mouth daily, Disp: 14 tablet, Rfl: 0    amiodarone 100 mg tablet, Take 1 tablet (100 mg total) by mouth daily with breakfast, Disp: , Rfl: 0    ammonium lactate (AMLACTIN) 12 % lotion, Apply topically 2 (two) times a day as needed for dry skin, Disp: , Rfl:     clindamycin (CLEOCIN) 300 MG capsule, Take 300 mg by mouth as needed Prior to dental work (Patient not taking: Reported on 8/23/2021), Disp: , Rfl:   colchicine (COLCRYS) 0 6 mg tablet, as needed , Disp: , Rfl:     dicyclomine (BENTYL) 20 mg tablet, Take 1 tablet (20 mg total) by mouth 2 (two) times a day (Patient not taking: Reported on 8/23/2021), Disp: 20 tablet, Rfl: 0    ferrous sulfate 325 (65 Fe) mg tablet, Take 325 mg by mouth, Disp: , Rfl:     GARCINIA CAMBOGIA-CHROMIUM PO, Take 750 mg by mouth 2 (two) times a day  (Patient not taking: Reported on 7/12/2021), Disp: , Rfl:     Glucosamine-Chondroit-Vit C-Mn (Glucosamine 1500 Complex) CAPS, Take by mouth, Disp: , Rfl:     halobetasol (ULTRAVATE) 0 05 % ointment, APPLY TO AFFECTED AREA ON LEG TWICE DAILY, Disp: , Rfl:     L-ARGININE-500 PO, Take 500 mg by mouth 2 (two) times a day , Disp: , Rfl:     metoprolol succinate (TOPROL-XL) 25 mg 24 hr tablet, Take 1 tablet (25 mg total) by mouth daily, Disp: , Rfl: 0    Multiple Vitamins-Minerals (OCUVITE EXTRA PO), Take 1 tablet by mouth daily  , Disp: , Rfl:     multivitamin (THERAGRAN) TABS, Take 1 tablet by mouth daily, Disp: , Rfl:     Omega-3 Fatty Acids (fish oil) 1,000 mg, Take 1,000 mg by mouth 2 (two) times a day, Disp: , Rfl:     ondansetron (ZOFRAN) 4 mg tablet, Take 1 tablet (4 mg total) by mouth every 8 (eight) hours as needed for nausea, Disp: 20 tablet, Rfl: 0    patient supplied medication, Take 1 each by mouth 2 (two) times a day, Disp: , Rfl:     pregabalin (LYRICA) 100 mg capsule, Take 1 capsule (100 mg total) by mouth 3 (three) times a day, Disp: 42 capsule, Rfl: 0    rivaroxaban (Xarelto) 20 mg tablet, Take 1 tablet (20 mg total) by mouth daily, Disp: 90 tablet, Rfl: 1    spironolactone (ALDACTONE) 25 mg tablet, Take 1 tablet (25 mg total) by mouth daily, Disp: 180 tablet, Rfl: 0    tadalafil (CIALIS) 5 MG tablet, Take 5 mg by mouth daily as needed for erectile dysfunction, Disp: , Rfl:     torsemide (DEMADEX) 20 mg tablet, Take 1 tablet (20 mg total) by mouth 2 (two) times a day, Disp: 180 tablet, Rfl: 1    traMADol (ULTRAM) 50 mg tablet, Take 50 mg by mouth every 6 (six) hours as needed for moderate pain, Disp: , Rfl:     VITAMIN D PO, Take 2,000 Units by mouth daily , Disp: , Rfl:      Family History   Problem Relation Age of Onset    Heart disease Mother     Lymphoma Father     Cancer Father     Heart disease Sister     Hypertension Brother     Diabetes Neg Hx     Thyroid disease Neg Hx     Stroke Neg Hx      Social History     Socioeconomic History    Marital status: /Civil Union     Spouse name: Not on file    Number of children: Not on file    Years of education: Not on file    Highest education level: Not on file   Occupational History    Not on file   Tobacco Use    Smoking status: Never Smoker    Smokeless tobacco: Never Used   Vaping Use    Vaping Use: Never used   Substance and Sexual Activity    Alcohol use: Yes     Comment: socially    Drug use: No    Sexual activity: Not on file   Other Topics Concern    Not on file   Social History Narrative    Not on file     Social Determinants of Health     Financial Resource Strain:     Difficulty of Paying Living Expenses:    Food Insecurity:     Worried About Running Out of Food in the Last Year:     Ran Out of Food in the Last Year:    Transportation Needs:     Lack of Transportation (Medical):      Lack of Transportation (Non-Medical):    Physical Activity:     Days of Exercise per Week:     Minutes of Exercise per Session:    Stress:     Feeling of Stress :    Social Connections:     Frequency of Communication with Friends and Family:     Frequency of Social Gatherings with Friends and Family:     Attends Latter day Services:     Active Member of Clubs or Organizations:     Attends Club or Organization Meetings:     Marital Status:    Intimate Partner Violence:     Fear of Current or Ex-Partner:     Emotionally Abused:     Physically Abused:     Sexually Abused:      Social History     Tobacco Use   Smoking Status Never Smoker Smokeless Tobacco Never Used     Social History     Substance and Sexual Activity   Alcohol Use Yes    Comment: socially       Labs & Results:  Below is the patient's most recent value for Albumin, ALT, AST, BUN, Calcium, Chloride, Cholesterol, CO2, Creatinine, GFR, Glucose, HDL, Hematocrit, Hemoglobin, Hemoglobin A1C, LDL, Magnesium, Phosphorus, Platelets, Potassium, PSA, Sodium, Triglycerides, and WBC  Lab Results   Component Value Date    ALT 19 2021    AST 16 2021    BUN 35 (H) 2021    CALCIUM 9 2 2021     2021    CO2 26 2021    CREATININE 1 26 2021    HDL 55 10/27/2019    HCT 42 9 2021    HGB 14 2 2021    HGBA1C 5 4 2021    MG 2 4 2021    PHOS 3 0 10/27/2019     2021    K 3 6 2021    PSA 1 0 2021    TRIG 93 10/27/2019    WBC 6 30 2021     Note: for a comprehensive list of the patient's lab results, access the Results Review activity  CARDIAC TESTING:   ECHO:   Results for orders placed during the hospital encounter of 21    Echo complete with contrast if indicated    Narrative  [address] [place] [address], [address] [facility] [address]  [phone]    Transthoracic Echocardiogram  2D, M-mode, Doppler, and Color Doppler    Study date:  2021    Patient: Pallavi Riojas  MR number: [id number]  Account number: [de-identified]  : 1955  Age: 76 years  Gender: Male  Status: Inpatient  Location: Bedside  Height: 69 in  Weight: 294 4 lb  BP: 129/ 60 mmHg    Indications: Edema  Heart failure  Diagnoses: I50 9 - Heart failure, unspecified    Sonographer:  JAM Turcios  Referring Physician:  Jm Shi Do  Group:  [facility]  Interpreting Physician:  FORTINO Santiago     SUMMARY    PROCEDURE INFORMATION:  Intravenous contrast (  4ml of Definity) was administered  LEFT VENTRICLE:  Systolic function was normal by visual assessment  Ejection fraction was estimated to be 55 %  There were no regional wall motion abnormalities  Wall thickness was mildly increased  There was mild concentric hypertrophy  VENTRICULAR SEPTUM:  There was paradoxical motion  These changes are consistent with a conduction abnormality or paced rhythm  RIGHT VENTRICLE:  The ventricle was mildly dilated  LEFT ATRIUM:  The atrium was mildly dilated  LA volume index 40 mL/m2  RIGHT ATRIUM:  The atrium was mildly to moderately dilated  MITRAL VALVE:  There was trace regurgitation  TRICUSPID VALVE:  There was trace regurgitation  SUMMARY MEASUREMENTS  2D measurements:  Unspecified Anatomy:   %FS was 39 4 %  Ao Diam was 3 4 cm   EDV(Teich) was 129 3 ml   EF(Teich) was 69 5 %  ESV(Teich) was 39 4 ml  IVSd was 1 1 cm  LA Diam was 3 8 cm  LAAs A2C was 27 cm2  LAAs A4C was 26 8 cm2  LAESV A-L A2C was  98 5 ml  LAESV A-L A4C was 92 5 ml  LAESV Index (A-L) was 40 1 ml/m2  LAESV MOD A2C was 94 9 ml  LAESV MOD A4C was 85 4 ml  LAESV(A-L) was 97 7 ml  LAESV(MOD BP) was 91 9 ml  LAESVInd MOD BP was 37 7 ml/m2  LALs A2C was 6 3 cm  LALs A4C was 6 6 cm  LVEDV MOD A4C was 126 8 ml  LVEF MOD A4C was 52 7 %  LVESV MOD A4C was 60 ml  LVIDd was 5 2 cm  LVIDs was 3 2 cm  LVLd A4C was 8 1 cm  LVLs A4C was 7 1 cm  LVPWd was 1 cm  RAAs A4C was 25 8 cm2  RAESV A-L was  91 3 ml   RAESV MOD was 87 9 ml  RALs was 6 2 cm  SV MOD A4C was 66 9 ml   SV(Teich) was 89 9 ml   CW measurements:  Unspecified Anatomy:   AV Env  Ti was 283 9 ms   AV VTI was 27 9 cm   AV Vmax was 1 5 m/s  AV Vmean was 1 m/s  AV maxPG was 8 8 mmHg  AV meanPG was 4 5 mmHg  MM measurements:  Unspecified Anatomy:   TAPSE was 2 cm  PW measurements:  Unspecified Anatomy:   DVI was 0 7   LVOT Env  Ti was 279 7 ms  LVOT VTI was 20 8 cm  LVOT Vmax was 1 2 m/s  LVOT Vmean was 0 7 m/s  LVOT maxPG was 5 9 mmHg  LVOT meanPG was 2 6 mmHg  RV S' was 0 1 m/s      HISTORY: PRIOR HISTORY: lymphedema  Congestive heart failure  Pulmonary hypertension  Atrial fibrillation  Risk factors: hypertension and morbid obesity  PROCEDURE: The procedure was performed at the bedside  This was a routine study  The transthoracic approach was used  The study included complete 2D imaging, M-mode, complete spectral Doppler, and color Doppler  The heart rate was 90 bpm,  at the start of the study  Intravenous contrast (  4ml of Definity) was administered  Image quality was adequate  LEFT VENTRICLE: Size was normal  Systolic function was normal by visual assessment  Ejection fraction was estimated to be 55 %  There were no regional wall motion abnormalities  Wall thickness was mildly increased  There was mild  concentric hypertrophy  DOPPLER: The study was not technically sufficient to allow evaluation of LV diastolic function  VENTRICULAR SEPTUM: There was paradoxical motion  These changes are consistent with a conduction abnormality or paced rhythm  RIGHT VENTRICLE: The ventricle was mildly dilated  Systolic function was normal  Wall thickness was normal     LEFT ATRIUM: The atrium was mildly dilated  LA volume index 40 mL/m2  RIGHT ATRIUM: The atrium was mildly to moderately dilated  MITRAL VALVE: Valve structure was normal  There was normal leaflet separation  DOPPLER: The transmitral velocity was within the normal range  There was no evidence for stenosis  There was trace regurgitation  AORTIC VALVE: The valve was trileaflet  Leaflets exhibited mildly increased thickness, normal cuspal separation, and sclerosis  DOPPLER: Transaortic velocity was within the normal range  There was no evidence for stenosis  There was no  regurgitation  TRICUSPID VALVE: The valve structure was normal  There was normal leaflet separation  DOPPLER: The transtricuspid velocity was within the normal range  There was no evidence for stenosis  There was trace regurgitation   The tricuspid jet  envelope definition was inadequate for estimation of RV systolic pressure  PULMONIC VALVE: Not well visualized  DOPPLER: There was no significant regurgitation  PERICARDIUM: There was no pericardial effusion  The pericardium was normal in appearance  AORTA: The root exhibited normal size  SYSTEMIC VEINS: IVC: The inferior vena cava was normal in size and course  Respirophasic changes were normal     SYSTEM MEASUREMENT TABLES    2D  %FS: 39 4 %  Ao Diam: 3 4 cm  EDV(Teich): 129 3 ml  EF(Teich): 69 5 %  ESV(Teich): 39 4 ml  IVSd: 1 1 cm  LA Diam: 3 8 cm  LAAs A2C: 27 cm2  LAAs A4C: 26 8 cm2  LAESV A-L A2C: 98 5 ml  LAESV A-L A4C: 92 5 ml  LAESV Index (A-L): 40 1 ml/m2  LAESV MOD A2C: 94 9 ml  LAESV MOD A4C: 85 4 ml  LAESV(A-L): 97 7 ml  LAESV(MOD BP): 91 9 ml  LAESVInd MOD BP: 37 7 ml/m2  LALs A2C: 6 3 cm  LALs A4C: 6 6 cm  LVEDV MOD A4C: 126 8 ml  LVEF MOD A4C: 52 7 %  LVESV MOD A4C: 60 ml  LVIDd: 5 2 cm  LVIDs: 3 2 cm  LVLd A4C: 8 1 cm  LVLs A4C: 7 1 cm  LVPWd: 1 cm  RAAs A4C: 25 8 cm2  RAESV A-L: 91 3 ml  RAESV MOD: 87 9 ml  RALs: 6 2 cm  SV MOD A4C: 66 9 ml  SV(Teich): 89 9 ml    CW  AV Env  Ti: 283 9 ms  AV VTI: 27 9 cm  AV Vmax: 1 5 m/s  AV Vmean: 1 m/s  AV maxP 8 mmHg  AV meanP 5 mmHg    MM  TAPSE: 2 cm    PW  DVI: 0 7  LVOT Env  Ti: 279 7 ms  LVOT VTI: 20 8 cm  LVOT Vmax: 1 2 m/s  LVOT Vmean: 0 7 m/s  LVOT maxP 9 mmHg  LVOT meanP 6 mmHg  RV S': 0 1 m/s    [employer] Accredited Echocardiography Laboratory    Prepared and electronically signed by    FORTINO David    Signed 2021 13:29:34    Results for orders placed during the hospital encounter of 21    ELAINE    Narrative  [address]  [address], [address] [facility]  [phone]    Transesophageal Echocardiogram  Limited 2D, Doppler, and Color Doppler    Study date:  2021    Patient: Melo Arechiga  MR number: [id number]  Account number: [de-identified]  : 1955  Age: 76 years  Gender: Male  Status: Inpatient  Location: Cath lab  Height: 69 in  Weight: 280 1 lb  BP: 88/ 53 mmHg    Indications: Atrial fibrillation  Diagnoses: I48 0 - Atrial fibrillation    Sonographer:  JAM Cabrera  Referring Physician:  Asad Robles MD  Group:  [facility]  Interpreting Physician:  Asad Robles MD    SUMMARY    LEFT VENTRICLE:  Systolic function was vigorous  Ejection fraction was estimated to be 60 %  LEFT ATRIUM:  The atrium was mildly dilated  LEFT ATRIAL APPENDAGE:  The size was normal   The function was mildly reduced (mildly reduced emptying velocity)  No thrombus was identified  RIGHT ATRIUM:  The atrium was mildly dilated  MITRAL VALVE:  There was mild regurgitation  TRICUSPID VALVE:  There was mild regurgitation  HISTORY: PRIOR HISTORY: ROBERT on CPAP  Atrial fibrillation  Pulmonary hypertension  Diastolic heart failure  ALANNA  CKD3  Morbid obesity  Shortness of breath  PROCEDURE: The procedure was performed in the catheterization laboratory  This was a routine study  The risks and alternatives of the procedure were explained to the patient and informed consent was obtained  The transesophageal approach  was used  The study included limited 2D imaging, limited spectral Doppler, color Doppler, and probe insertion (without interpretation)  The heart rate was 79 bpm, at the start of the study  An adult omniplane probe was inserted by the  attending cardiologist  Intubated with ease  One intubation attempt(s)  There was no blood detected on the probe  There were no complications during the procedure  MEDICATIONS: Anesthesia administered by anesthesia team     LEFT VENTRICLE: Size was normal  Systolic function was vigorous  Ejection fraction was estimated to be 60 %  RIGHT VENTRICLE: The size was normal  Systolic function was normal  Wall thickness was normal     LEFT ATRIUM: The atrium was mildly dilated   There was no spontaneous echo contrast ("smoke")  APPENDAGE: The size was normal  No thrombus was identified  DOPPLER: The function was mildly reduced (mildly reduced emptying velocity)  RIGHT ATRIUM: The atrium was mildly dilated  MITRAL VALVE: Valve structure was normal  There was normal leaflet separation  There was no echocardiographic evidence of vegetation  DOPPLER: There was mild regurgitation  AORTIC VALVE: The valve was trileaflet  Leaflets exhibited normal thickness and normal cuspal separation  There was no echocardiographic evidence of vegetation  DOPPLER: There was no regurgitation  TRICUSPID VALVE: The valve structure was normal  There was normal leaflet separation  There was no echocardiographic evidence of vegetation  DOPPLER: There was mild regurgitation  PULMONIC VALVE: Leaflets exhibited normal thickness, no calcification, and normal cuspal separation  There was no echocardiographic evidence of vegetation  PERICARDIUM: There was no pericardial effusion  The pericardium was normal in appearance  [address] Accredited Echocardiography Laboratory    Prepared and electronically signed by    Asad Robles MD  Signed 29-Jul-2021 16:25:42      CATH:  No results found for this or any previous visit  STRESS TEST:  No results found for this or any previous visit

## 2021-09-02 ENCOUNTER — OFFICE VISIT (OUTPATIENT)
Dept: CARDIOLOGY CLINIC | Facility: CLINIC | Age: 66
End: 2021-09-02
Payer: COMMERCIAL

## 2021-09-02 VITALS
BODY MASS INDEX: 42.21 KG/M2 | HEART RATE: 54 BPM | WEIGHT: 285 LBS | DIASTOLIC BLOOD PRESSURE: 72 MMHG | HEIGHT: 69 IN | SYSTOLIC BLOOD PRESSURE: 108 MMHG

## 2021-09-02 DIAGNOSIS — I48.11 LONGSTANDING PERSISTENT ATRIAL FIBRILLATION (HCC): Primary | ICD-10-CM

## 2021-09-02 DIAGNOSIS — Z79.899 ON AMIODARONE THERAPY: ICD-10-CM

## 2021-09-02 DIAGNOSIS — I50.31 ACUTE DIASTOLIC HEART FAILURE (HCC): ICD-10-CM

## 2021-09-02 PROCEDURE — 99214 OFFICE O/P EST MOD 30 MIN: CPT | Performed by: PHYSICIAN ASSISTANT

## 2021-09-03 ENCOUNTER — OFFICE VISIT (OUTPATIENT)
Dept: CARDIOLOGY CLINIC | Facility: CLINIC | Age: 66
End: 2021-09-03
Payer: COMMERCIAL

## 2021-09-03 ENCOUNTER — TELEPHONE (OUTPATIENT)
Dept: SLEEP CENTER | Facility: CLINIC | Age: 66
End: 2021-09-03

## 2021-09-03 VITALS
BODY MASS INDEX: 42.36 KG/M2 | DIASTOLIC BLOOD PRESSURE: 68 MMHG | HEIGHT: 69 IN | HEART RATE: 52 BPM | WEIGHT: 286 LBS | SYSTOLIC BLOOD PRESSURE: 100 MMHG

## 2021-09-03 DIAGNOSIS — I87.2 VENOUS INSUFFICIENCY OF BOTH LOWER EXTREMITIES: ICD-10-CM

## 2021-09-03 DIAGNOSIS — Z79.899 ON AMIODARONE THERAPY: ICD-10-CM

## 2021-09-03 DIAGNOSIS — Z99.89 OBSTRUCTIVE SLEEP APNEA ON CPAP: ICD-10-CM

## 2021-09-03 DIAGNOSIS — I50.32 CHRONIC DIASTOLIC HEART FAILURE (HCC): Primary | ICD-10-CM

## 2021-09-03 DIAGNOSIS — G47.33 OBSTRUCTIVE SLEEP APNEA ON CPAP: ICD-10-CM

## 2021-09-03 DIAGNOSIS — E66.01 MORBID OBESITY WITH BMI OF 40.0-44.9, ADULT (HCC): ICD-10-CM

## 2021-09-03 DIAGNOSIS — G47.33 OSA (OBSTRUCTIVE SLEEP APNEA): Primary | ICD-10-CM

## 2021-09-03 DIAGNOSIS — I48.92 PAROXYSMAL ATRIAL FLUTTER (HCC): ICD-10-CM

## 2021-09-03 PROCEDURE — 1036F TOBACCO NON-USER: CPT | Performed by: INTERNAL MEDICINE

## 2021-09-03 PROCEDURE — 3008F BODY MASS INDEX DOCD: CPT | Performed by: INTERNAL MEDICINE

## 2021-09-03 PROCEDURE — 93000 ELECTROCARDIOGRAM COMPLETE: CPT | Performed by: PHYSICIAN ASSISTANT

## 2021-09-03 PROCEDURE — 1160F RVW MEDS BY RX/DR IN RCRD: CPT | Performed by: INTERNAL MEDICINE

## 2021-09-03 PROCEDURE — 99214 OFFICE O/P EST MOD 30 MIN: CPT | Performed by: INTERNAL MEDICINE

## 2021-09-03 NOTE — PROGRESS NOTES
Cardiology Office Note  MD Bria Arguello MD Denson Clement, DO, [address] [facility] MD Monty Sr DO, Ricardo Rodriguez DO, [facility] [place]  ----------------------------------------------------------------  [address] Président Maco He [age] y o  male MRN: [id number]  Unit/Bed#:  Encounter: [id number]      History of Present Illness: It was a pleasure to see Ivett Job in the office today for follow-up CV evaluation  He has a longstanding history of atrial fibrillation with prior failed ELAINE guided cardioversion, history of morbid obesity and pulmonary hypertension with chronic venous insufficiency/lymphedema  The patient has a known history of obstructive sleep apnea was noncompliant with his CPAP therapy  Over the course of many years he has been having lower extremity edema which has been believe secondary to lymphedema with chronic venous insufficiency  He has had venous ablation is in the past for his reflux disease  In October 2019, he was found have pulmonary hypertension with pulmonary artery systolic pressure is estimated at 50 mm Hg  We had initially seen him in late August 2020 due to increased fatigue and dyspnea on exertion  He has become somewhat short of breath with basic activity  Previously, he had been managed for his atrial fibrillation at [facility]  Echocardiogram, stress test and Holter monitor were ordered, but stress test and Holter were completed  Stress test was found to be negative for myocardial ischemia  Holter monitor demonstrated atrial fibrillation with an average heart rate of 92 beats per minute and rare VPCs  His weight trends unfortunately continue to go upward and he had dietary discretion  He seen by electrophysiology and recommended for a sleep study as well as an evaluation with advanced heart failure    He was placed on CPAP and has been compliant since that time   He also was seen by bariatric surgery and wish to undergo conservative options with dietary modifications  Despite increasing diuretic load, he he continue to gain approximately 30 lb and was subsequently sent to [facility] in [facility]  He was diuresed down to 285 lb  While hospitalized, his echocardiogram was performed showing normal left ventricular function  Following discharge, he was seen by electrophysiology and sent for a cardioversion in May 2021  The cardioversion was initially successful, but he reverted back to rate controlled atrial flutter  He has been placed on amiodarone and decreased down to 100 mg daily  On his dose of torsemide, he had acute kidney injury with a creatinine rise to 1 9  After holding his diuretics for several days, his creatinine came down to 1 1  He was then decreased on his torsemide to 20 mg b i d  And his weight trends continue to improve  He has been aggressive in his dietary modifications  He also underwent radiofrequency ablation for his atrial fibrillation/flutter  He underwent a procedure in June 2021 Overall, he has felt much improved  In July 2021, he ended up going back into atrial flutter  He was seen by electrophysiology who was recommending a repeat ablation study  He underwent the ablation in late July 2021 which was successful  Seen in the office yesterday by electrophysiology, repeat ECG was performed demonstrating sinus rhythm  He feels much improved since the procedure aside from an episode of gastroenteritis  That has resolved  He now remains at his baseline  Denies chest pain, pressure, tightness or squeezing  Denies shortness of breath  Admits that his lower extremity swelling is at its baseline  Denies orthopnea paroxysmal nocturnal dyspnea  Review of Systems:  Review of Systems   Constitutional: Negative for decreased appetite, fever, malaise/fatigue, weight gain and weight loss     HENT: Negative for congestion and sore throat  Eyes: Negative for visual disturbance  Cardiovascular: Negative for chest pain, dyspnea on exertion, leg swelling, near-syncope and palpitations  Respiratory: Negative for cough and shortness of breath  Hematologic/Lymphatic: Negative for bleeding problem  Skin: Negative for rash  Musculoskeletal: Negative for myalgias and neck pain  Gastrointestinal: Negative for abdominal pain and nausea  Neurological: Negative for light-headedness and weakness  Psychiatric/Behavioral: Negative for depression         Past Medical History:   Diagnosis Date    A-fib ([address] )     Arthritis     CPAP (continuous positive airway pressure) dependence     Irregular heart beat     Lymphedema     Sleep apnea     Urinary frequency     Wears glasses     Wears partial dentures     lower partial       Past Surgical History:   Procedure Laterality Date    ABLATION SAPHENOUS VEIN W/ RFA      COLONOSCOPY      NV CYSTOURETHRO W/IMPLANT N/A 11/13/2017    Procedure: CYSTOSCOPY WITH INSERTION UROLIFT;  Surgeon: Km Goins DO;  Location: [facility] OR;  Service: Urology    TONSILLECTOMY         Social History     Socioeconomic History    Marital status: /Civil Union     Spouse name: None    Number of children: None    Years of education: None    Highest education level: None   Occupational History    None   Tobacco Use    Smoking status: Never Smoker    Smokeless tobacco: Never Used   Vaping Use    Vaping Use: Never used   Substance and Sexual Activity    Alcohol use: Yes     Comment: socially    Drug use: No    Sexual activity: None   Other Topics Concern    None   Social History Narrative    None     Social Determinants of Health     Financial Resource Strain:     Difficulty of Paying Living Expenses:    Food Insecurity:     Worried About Running Out of Food in the Last Year:     Ran Out of Food in the Last Year:    Transportation Needs:     Lack of Transportation (Medical):      Lack of Transportation (Non-Medical):    Physical Activity:     Days of Exercise per Week:     Minutes of Exercise per Session:    Stress:     Feeling of Stress :    Social Connections:     Frequency of Communication with Friends and Family:     Frequency of Social Gatherings with Friends and Family:     Attends Religion Services:     Active Member of Clubs or Organizations:     Attends Club or Organization Meetings:     Marital Status:    Intimate Partner Violence:     Fear of Current or Ex-Partner:     Emotionally Abused:     Physically Abused:     Sexually Abused:        Family History   Problem Relation Age of Onset    Heart disease Mother     Lymphoma Father     Cancer Father     Heart disease Sister     Hypertension Brother     Diabetes Neg Hx     Thyroid disease Neg Hx     Stroke Neg Hx        Allergies   Allergen Reactions    Penicillins Anaphylaxis    Sulfa Antibiotics Anaphylaxis         Current Outpatient Medications:     acetaminophen (TYLENOL) 500 mg tablet, Take 500 mg by mouth every 6 (six) hours as needed for mild pain, Disp: , Rfl:     alfuzosin (UROXATRAL) 10 mg 24 hr tablet, Take 10 mg by mouth as needed , Disp: , Rfl:     amiodarone 100 mg tablet, Take 1 tablet (100 mg total) by mouth daily with breakfast, Disp: , Rfl: 0    ammonium lactate (AMLACTIN) 12 % lotion, Apply topically 2 (two) times a day as needed for dry skin, Disp: , Rfl:     clindamycin (CLEOCIN) 300 MG capsule, Take 300 mg by mouth as needed Prior to dental work , Disp: , Rfl:     colchicine (COLCRYS) 0 6 mg tablet, as needed , Disp: , Rfl:     ferrous sulfate 325 (65 Fe) mg tablet, Take 325 mg by mouth, Disp: , Rfl:     Glucosamine-Chondroit-Vit C-Mn (Glucosamine 1500 Complex) CAPS, Take by mouth, Disp: , Rfl:     halobetasol (ULTRAVATE) 0 05 % ointment, APPLY TO AFFECTED AREA ON LEG TWICE DAILY, Disp: , Rfl:     L-ARGININE-500 PO, Take 500 mg by mouth 2 (two) times a day , Disp: , Rfl:     metoprolol succinate (TOPROL-XL) 25 mg 24 hr tablet, Take 1 tablet (25 mg total) by mouth daily, Disp: , Rfl: 0    Multiple Vitamins-Minerals (OCUVITE EXTRA PO), Take 1 tablet by mouth daily  , Disp: , Rfl:     multivitamin (THERAGRAN) TABS, Take 1 tablet by mouth daily, Disp: , Rfl:     Omega-3 Fatty Acids (fish oil) 1,000 mg, Take 1,000 mg by mouth 2 (two) times a day, Disp: , Rfl:     patient supplied medication, Take 1 each by mouth 2 (two) times a day, Disp: , Rfl:     pregabalin (LYRICA) 100 mg capsule, Take 1 capsule (100 mg total) by mouth 3 (three) times a day, Disp: 42 capsule, Rfl: 0    rivaroxaban (Xarelto) 20 mg tablet, Take 1 tablet (20 mg total) by mouth daily, Disp: 90 tablet, Rfl: 1    spironolactone (ALDACTONE) 25 mg tablet, Take 1 tablet (25 mg total) by mouth daily, Disp: 180 tablet, Rfl: 0    tadalafil (CIALIS) 5 MG tablet, Take 5 mg by mouth daily as needed for erectile dysfunction, Disp: , Rfl:     torsemide (DEMADEX) 20 mg tablet, Take 1 tablet (20 mg total) by mouth 2 (two) times a day, Disp: 180 tablet, Rfl: 1    traMADol (ULTRAM) 50 mg tablet, Take 50 mg by mouth every 6 (six) hours as needed for moderate pain, Disp: , Rfl:     VITAMIN D PO, Take 2,000 Units by mouth daily , Disp: , Rfl:     allopurinol (ZYLOPRIM) 100 mg tablet, Take 1 tablet (100 mg total) by mouth daily, Disp: 14 tablet, Rfl: 0    Vitals:    09/03/21 0834   BP: 100/68   BP Location: Left arm   Patient Position: Sitting   Cuff Size: Adult   Pulse: (!) 52   Weight: 130 kg (286 lb)   Height: 5' 9" (1 753 m)       PHYSICAL EXAMINATION:  Gen: Awake, Alert, NAD  Head/eyes: AT/NC, pupils equal and round, Anicteric  ENT: mmm  Neck: Supple, No elevated JVP, trachea midline  Resp: CTA bilaterally no w/r/r  CV: Irregularly irregular +S1, S2, No m/r/g  Abd: Soft, NT/ND + BS  Ext: bilateral lower extremities wrapped with +1 pitting edema bilaterally/lymphedema  Neuro:  Follows commands, moves all extermities  Psych: Appropriate affect, normal mood, pleasant attitude, non-combative  Skin: warm; no rash, erythema or venous stasis changes on exposed skin    --------------------------------------------------------------------------------  TREADMILL STRESS  No results found for this or any previous visit    --------------------------------------------------------------------------------  NUCLEAR STRESS TEST: No results found for this or any previous visit  No results found for this or any previous visit     --------------------------------------------------------------------------------  CATH:  No results found for this or any previous visit   --------------------------------------------------------------------------------  ECHO:   Results for orders placed during the hospital encounter of 10/26/19   Echo complete with contrast if indicated    Narrative [address] [facility] [address] [facility] [place] , [facility] [phone]    Transthoracic Echocardiogram  2D, M-mode, Doppler, and Color Doppler    Study date:  28-Oct-2019    Patient: Casimer Alpers  MR number: [id number]  Account number: [de-identified]  : 1955  Age: 71 years  Gender: Male  Status: Inpatient  Location: Bedside  Height: 69 in  Weight: 302 lb  BP: 141/ 88 mmHg    Indications: left sided paralysis    Diagnoses: 56 - CVA    Sonographer:  Savita Lozano RD, CCT  Referring Physician:  Yasmani Ruiz DO  Group:  [facility]  Interpreting Physician:  Deb Agrawal DO    SUMMARY    LEFT VENTRICLE:  Systolic function was normal  Ejection fraction was estimated to be 55 %  This study was inadequate for the evaluation of regional wall motion  Wall thickness was mildly increased  RIGHT VENTRICLE:  The ventricle was dilated  Systolic function was normal     MITRAL VALVE:  There was mild regurgitation  TRICUSPID VALVE:  There was mild regurgitation    Estimated peak PA pressure was 50 mmHg     HISTORY: PRIOR HISTORY: Patient has no history of cardiovascular disease  PROCEDURE: The procedure was performed at the bedside  This was a routine study  The transthoracic approach was used  The study included complete 2D imaging, M-mode, complete spectral Doppler, and color Doppler  The heart rate was 80 bpm,  at the start of the study  Intravenous contrast (Definity solution [1 3 ml Definity/8 7ml normal saline solution], 3 ml) was administered to opacify the left ventricle  Echocardiographic views were limited due to poor acoustic window  availability  This was a technically difficult study  LEFT VENTRICLE: Size was normal  Systolic function was normal  Ejection fraction was estimated to be 55 %  This study was inadequate for the evaluation of regional wall motion  Wall thickness was mildly increased  DOPPLER: The study was  not technically sufficient to allow evaluation of LV diastolic function  RIGHT VENTRICLE: The ventricle was dilated  Systolic function was normal     LEFT ATRIUM: Size was normal     RIGHT ATRIUM: Size was normal     MITRAL VALVE: Valve structure was normal  There was normal leaflet separation  DOPPLER: The transmitral velocity was within the normal range  There was no evidence for stenosis  There was mild regurgitation  AORTIC VALVE: The valve was trileaflet  Leaflets exhibited normal thickness and normal cuspal separation  DOPPLER: Transaortic velocity was within the normal range  There was no evidence for stenosis  There was no regurgitation  TRICUSPID VALVE: The valve structure was normal  There was normal leaflet separation  DOPPLER: The transtricuspid velocity was within the normal range  There was no evidence for stenosis  There was mild regurgitation  Estimated peak PA  pressure was 50 mmHg  PULMONIC VALVE: Leaflets exhibited normal thickness, no calcification, and normal cuspal separation  DOPPLER: The transpulmonic velocity was within the normal range  There was no regurgitation  PERICARDIUM: There was no pericardial effusion  The pericardium was normal in appearance  AORTA: The root exhibited normal size  SYSTEMIC VEINS: IVC: The inferior vena cava was not well visualized  SYSTEM MEASUREMENT TABLES    2D  %FS: 34 72 %  Ao Diam: 2 87 cm  EDV(Teich): 143 23 ml  EF(Teich): 63 36 %  ESV(Teich): 52 47 ml  IVSd: 1 15 cm  LA Diam: 4 16 cm  LVIDd: 5 43 cm  LVIDs: 3 55 cm  LVPWd: 1 04 cm  RWT: 0 38  SV(Teich): 90 75 ml    CW  AV Vmax: 1 34 m/s  AV maxP 13 mmHg  RAP: 0 mmHg  TR Vmax: 3 26 m/s  TR maxP 5 mmHg    PW  E' Sept: 0 09 m/s  MV E Terrance: 1 03 m/s  RVSP: 42 5 mmHg    [facility] Accredited Echocardiography Laboratory    Prepared and electronically signed by    Marsha Hopkins DO  Signed 28-Oct-2019 10:37:38       No results found for this or any previous visit   --------------------------------------------------------------------------------  HOLTER  No results found for this or any previous visit  No results found for this or any previous visit   --------------------------------------------------------------------------------  CAROTIDS  No results found for this or any previous visit    --------------------------------------------------------------------------------  ECGs:  No results found for this visit on 21       Lab Results   Component Value Date    WBC 6 30 2021    HGB 14 2 2021    HCT 42 9 2021    MCV 95 2021     2021      Lab Results   Component Value Date    SODIUM 140 2021    K 3 6 2021     2021    CO2 26 2021    BUN 35 (H) 2021    CREATININE 1 26 2021    GLUC 136 2021    CALCIUM 9 2 2021      Lab Results   Component Value Date    HGBA1C 5 4 2021      No results found for: CHOL  Lab Results   Component Value Date    HDL 55 10/27/2019     Lab Results   Component Value Date    LDLCALC 90 10/27/2019     Lab Results Component Value Date    TRIG 93 10/27/2019     No results found for: [place], [place]   Lab Results   Component Value Date    INR 1 54 (H) 07/28/2021    INR 2 26 (H) 06/02/2021    INR 1 59 (H) 01/22/2021    PROTIME 18 4 (H) 07/28/2021    PROTIME 24 9 (H) 06/02/2021    PROTIME 18 7 (H) 01/22/2021        1  Chronic diastolic heart failure (HCC)    2  Paroxysmal atrial flutter ([address] )    3  Morbid obesity with BMI of 40 0-44 9, adult ([address] )    4  Obstructive sleep apnea on CPAP    5  On amiodarone therapy    6  Venous insufficiency of both lower extremities        IMPRESSION:  · Chronic diastolic heart failure  · Paroxysmal atrial fibrillation/flutter (had long standing since before October 2019 s/p DCCV in May 2021) s/p RFA ablation (June 2021 and July 2021) on Xarelto and Amiodarone  · LVEF 55%, mild LVH, paradoxical septal wall motion, mild RV dilatation, mild LA dilatation, mild to moderate RA dilatation, trace MR/TR, January 2021  · History of bilateral venous insufficiency status post LE vein ablation  · Pharmacologic nuclear stress test negative for myocardial ischemia with gated EF 50%, September 2020  · Holter with AF, avg HR 92 bpm, rare VPCs, September 2020  · Abnormal ECG with bifascicular block  · Moderate pulmonary hypertension  · Lymphedema  · Morbid obesity  · ROBERT on 20/14 cm BiPAP    PLAN:  It was a pleasure to see Geri Jensen in the office today for follow-up CV evaluation  He is doing well since his repeat atrial flutter ablation  He feels back to his baseline  He has no significant lower extremity swelling and no exertional symptoms  He has no symptoms concerning for angina and no signs or symptoms of heart failure  He examines to be euvolemic in the office today  Unfortunately, since the CPAP machine has been on recall, he does not have the machine again  He plans to reach out to sleep medicine regarding the recall sometime in the coming days to weeks    Recent blood work demonstrates his creatinine has been stable around 1 26  He has been taking his medications without any adverse effects  Based on his clinical presentation, I have the following recommendations:    1  Recommend continued aggressive risk factor and lifestyle modifications with dietary restriction of salt  2  Would encourage the use of spironolactone and torsemide for diuresis  3  Xarelto for anticoagulation with atrial fibrillation  Continue amiodarone to maintain sinus rhythm and metoprolol for rate control  Blood pressure and heart rate are currently stable in the office today  4  Should he gain greater than 3 lb in a day or 5 lb overall, recommend calling the office for further titration of diuretic medication  5  Would encourage increasing physical activity level with 30 minutes a day, 5 days a week of moderate intensity activity to build cardiovascular endurance  6  Encouraged follow-up with electrophysiology regarding antiarrhythmic drug therapy and his atrial flutter in 3 months as scheduled  Recommend annual blood work including TSH, CMP and PFTs and annual eye exam  7  Referral given for nutrition to help with dietary modifications  8  We will follow up in 4 months  As always, please do not hesitate to call with any questions  Portions of the record may have been created with voice recognition software  Occasional wrong word or "sound a like" substitutions may have occurred due to the inherent limitations of voice recognition software  Read the chart carefully and recognize, using context, where substitutions have occurred        Signed: Renan Antoine DO, Gerldine Dux

## 2021-09-03 NOTE — PROGRESS NOTES
PT Discharge    Today's date: 9/3/2021  Patient name: Namrata Hicks  : 1955  MRN: [id number]  Referring provider: Ld Osorio PA-C  Dx:   Encounter Diagnosis     ICD-10-CM    1  Neck pain  M54 2        Start Time: 1400  Stop Time: 1450  Total time in clinic (min): 50 minutes    Assessment  Assessment details: Pt noted improvement subjectively and objectively rotation improved  Less pain with reading and driving  He is pleased with progress and in agreement with DC  Has good understanding of HEP  Impairments: impaired physical strength and poor posture     Symptom irritability: lowUnderstanding of Dx/Px/POC: good   Prognosis: good    Goals  ST weeks - MET  Symmetrical mobility cervical ROT  Pain no more than 1/10 with cervical extension  Aware of posture and demonstrate ability to show positive corrections    LT weeks - PROGRESSING  No more than 1/10 pain after reading  <2/10 pain with driving for 1 5 hours to visit son  Independent with scapular strength program  Complete cervical strength assessment and address accordingly for HEP    Plan  Plan details: DC  Treatment plan discussed with: patient        Subjective Evaluation    History of Present Illness  Mechanism of injury: Neck is feeling better   In agreement with DC  Pain  Current pain rating: 3          Objective     General Comments:      Cervical/Thoracic Comments  Observation  Rounded shoulder posture      Cervical AROM  Flex to chest  Ext WFL  ROT: functional, minimal to no pain           Flowsheet Rows      Most Recent Value   PT/OT G-Codes   Current Score  74   Projected Score  72             Precautions: none

## 2021-09-13 DIAGNOSIS — I48.11 LONGSTANDING PERSISTENT ATRIAL FIBRILLATION (HCC): ICD-10-CM

## 2021-09-13 RX ORDER — AMIODARONE HYDROCHLORIDE 200 MG/1
TABLET ORAL
Qty: 174 TABLET | Refills: 2 | Status: SHIPPED | OUTPATIENT
Start: 2021-09-13 | End: 2021-10-06

## 2021-09-14 ENCOUNTER — HOSPITAL ENCOUNTER (OUTPATIENT)
Dept: RADIOLOGY | Facility: HOSPITAL | Age: 66
Discharge: HOME | End: 2021-09-14
Payer: COMMERCIAL

## 2021-09-14 DIAGNOSIS — Z79.899 ON AMIODARONE THERAPY: ICD-10-CM

## 2021-09-14 PROCEDURE — 71046 X-RAY EXAM CHEST 2 VIEWS: CPT

## 2021-09-17 DIAGNOSIS — I50.32 CHRONIC DIASTOLIC HEART FAILURE (HCC): ICD-10-CM

## 2021-09-17 DIAGNOSIS — I48.11 LONGSTANDING PERSISTENT ATRIAL FIBRILLATION (HCC): ICD-10-CM

## 2021-09-17 DIAGNOSIS — I87.2 VENOUS INSUFFICIENCY OF BOTH LOWER EXTREMITIES: ICD-10-CM

## 2021-09-17 DIAGNOSIS — E66.01 MORBID OBESITY WITH BMI OF 40.0-44.9, ADULT (HCC): Primary | ICD-10-CM

## 2021-09-30 ENCOUNTER — TELEPHONE (OUTPATIENT)
Dept: NUTRITION | Facility: HOSPITAL | Age: 66
End: 2021-09-30

## 2021-09-30 ENCOUNTER — APPOINTMENT (OUTPATIENT)
Dept: LAB | Facility: HOSPITAL | Age: 66
End: 2021-09-30
Attending: INTERNAL MEDICINE
Payer: COMMERCIAL

## 2021-09-30 DIAGNOSIS — N17.9 AKI (ACUTE KIDNEY INJURY) (HCC): ICD-10-CM

## 2021-09-30 LAB
ANION GAP SERPL CALCULATED.3IONS-SCNC: 6 MMOL/L (ref 4–13)
BUN SERPL-MCNC: 22 MG/DL (ref 5–25)
CALCIUM SERPL-MCNC: 8.9 MG/DL (ref 8.3–10.1)
CHLORIDE SERPL-SCNC: 106 MMOL/L (ref 100–108)
CO2 SERPL-SCNC: 28 MMOL/L (ref 21–32)
CREAT SERPL-MCNC: 1.17 MG/DL (ref 0.6–1.3)
GFR SERPL CREATININE-BSD FRML MDRD: 64 ML/MIN/1.73SQ M
GLUCOSE SERPL-MCNC: 101 MG/DL (ref 65–140)
POTASSIUM SERPL-SCNC: 4.2 MMOL/L (ref 3.5–5.3)
SODIUM SERPL-SCNC: 140 MMOL/L (ref 136–145)

## 2021-09-30 PROCEDURE — 36415 COLL VENOUS BLD VENIPUNCTURE: CPT

## 2021-09-30 PROCEDURE — 80048 BASIC METABOLIC PNL TOTAL CA: CPT

## 2021-09-30 NOTE — TELEPHONE ENCOUNTER
Spoke with patient to inform him his insurance (Medicare) will not cover nutrition counseling for obesity  He told me he spoke with his insurance and was informed he will receive 12 visits per year (as well as his wife who is also scheduled tomorrow)  Patient is aware that Medicare may decline payment  He is willing to keep appointment and bill Medicare

## 2021-10-01 ENCOUNTER — CLINICAL SUPPORT (OUTPATIENT)
Dept: NUTRITION | Facility: HOSPITAL | Age: 66
End: 2021-10-01
Payer: COMMERCIAL

## 2021-10-01 VITALS — HEIGHT: 69 IN | BODY MASS INDEX: 42.27 KG/M2 | WEIGHT: 285.4 LBS

## 2021-10-01 DIAGNOSIS — E66.01 MORBID OBESITY WITH BMI OF 40.0-44.9, ADULT (HCC): ICD-10-CM

## 2021-10-06 ENCOUNTER — OFFICE VISIT (OUTPATIENT)
Dept: NEPHROLOGY | Facility: CLINIC | Age: 66
End: 2021-10-06
Payer: COMMERCIAL

## 2021-10-06 VITALS
SYSTOLIC BLOOD PRESSURE: 100 MMHG | HEART RATE: 57 BPM | OXYGEN SATURATION: 95 % | DIASTOLIC BLOOD PRESSURE: 64 MMHG | HEIGHT: 69 IN | BODY MASS INDEX: 42.39 KG/M2 | WEIGHT: 286.2 LBS

## 2021-10-06 DIAGNOSIS — G47.33 OBSTRUCTIVE SLEEP APNEA ON CPAP: ICD-10-CM

## 2021-10-06 DIAGNOSIS — R60.0 BILATERAL LOWER EXTREMITY EDEMA: ICD-10-CM

## 2021-10-06 DIAGNOSIS — M15.9 PRIMARY OSTEOARTHRITIS INVOLVING MULTIPLE JOINTS: ICD-10-CM

## 2021-10-06 DIAGNOSIS — N18.31 STAGE 3A CHRONIC KIDNEY DISEASE (HCC): Primary | ICD-10-CM

## 2021-10-06 DIAGNOSIS — Z99.89 OBSTRUCTIVE SLEEP APNEA ON CPAP: ICD-10-CM

## 2021-10-06 DIAGNOSIS — I50.32 CHRONIC DIASTOLIC HEART FAILURE (HCC): ICD-10-CM

## 2021-10-06 PROBLEM — N17.9 AKI (ACUTE KIDNEY INJURY) (HCC): Status: RESOLVED | Noted: 2021-06-15 | Resolved: 2021-10-06

## 2021-10-06 PROCEDURE — 99214 OFFICE O/P EST MOD 30 MIN: CPT | Performed by: INTERNAL MEDICINE

## 2021-10-13 NOTE — TELEPHONE ENCOUNTER

## 2021-11-05 ENCOUNTER — CLINICAL SUPPORT (OUTPATIENT)
Dept: NUTRITION | Facility: HOSPITAL | Age: 66
End: 2021-11-05
Payer: COMMERCIAL

## 2021-11-05 VITALS — BODY MASS INDEX: 43.5 KG/M2 | WEIGHT: 294.6 LBS

## 2021-11-05 DIAGNOSIS — E66.01 MORBID OBESITY WITH BMI OF 40.0-44.9, ADULT (HCC): Primary | ICD-10-CM

## 2021-11-05 PROCEDURE — 97803 MED NUTRITION INDIV SUBSEQ: CPT

## 2021-11-21 DIAGNOSIS — I50.32 CHRONIC DIASTOLIC HEART FAILURE (HCC): ICD-10-CM

## 2021-11-22 DIAGNOSIS — I50.33 ACUTE ON CHRONIC DIASTOLIC CONGESTIVE HEART FAILURE (HCC): ICD-10-CM

## 2021-11-22 RX ORDER — SPIRONOLACTONE 25 MG/1
TABLET ORAL
Qty: 180 TABLET | Refills: 1 | Status: SHIPPED | OUTPATIENT
Start: 2021-11-22 | End: 2022-04-05

## 2021-11-22 RX ORDER — TORSEMIDE 20 MG/1
TABLET ORAL
Qty: 180 TABLET | Refills: 1 | Status: SHIPPED | OUTPATIENT
Start: 2021-11-22 | End: 2022-05-23

## 2021-12-07 DIAGNOSIS — I50.33 ACUTE ON CHRONIC DIASTOLIC CONGESTIVE HEART FAILURE (HCC): ICD-10-CM

## 2021-12-07 RX ORDER — RIVAROXABAN 20 MG/1
TABLET, FILM COATED ORAL
Qty: 90 TABLET | Refills: 1 | Status: SHIPPED | OUTPATIENT
Start: 2021-12-07 | End: 2022-04-05

## 2022-01-30 NOTE — PROGRESS NOTES
Cardiology Office Note  Kerrie Fife, MD Kevan Leventhal, MD Ramona Patterson, DO, MD Kiana Bolton DO, Matthew Christianson DO, [facility] [place]  ----------------------------------------------------------------  [address] Président Maco [address]H [age] y o  male MRN: [id number]  Unit/Bed#:  Encounter: [id number]      History of Present Illness: It was a pleasure to see Enmanuel De La Garza in the office today for follow-up CV evaluation  He has a longstanding history of atrial fibrillation with prior failed ELAINE guided cardioversion, history of morbid obesity and pulmonary hypertension with chronic venous insufficiency/lymphedema  The patient has a known history of obstructive sleep apnea was noncompliant with his CPAP therapy  Over the course of many years he has been having lower extremity edema which has been believe secondary to lymphedema with chronic venous insufficiency  He has had venous ablation is in the past for his reflux disease  In October 2019, he was found have pulmonary hypertension with pulmonary artery systolic pressure is estimated at 50 mm Hg  We had initially seen him in late August 2020 due to increased fatigue and dyspnea on exertion  He has become somewhat short of breath with basic activity  Previously, he had been managed for his atrial fibrillation at [facility]  Echocardiogram, stress test and Holter monitor were ordered, but stress test and Holter were completed  Stress test was found to be negative for myocardial ischemia  Holter monitor demonstrated atrial fibrillation with an average heart rate of 92 beats per minute and rare VPCs  His weight trends unfortunately continue to go upward and he had dietary discretion  He seen by electrophysiology and recommended for a sleep study as well as an evaluation with advanced heart failure    He was placed on CPAP and has been compliant since that time   He also was seen by bariatric surgery and wish to undergo conservative options with dietary modifications  Despite increasing diuretic load, he he continue to gain approximately 30 lb and was subsequently sent to [facility] in [facility]  He was diuresed down to 285 lb  While hospitalized, his echocardiogram was performed showing normal left ventricular function  Following discharge, he was seen by electrophysiology and sent for a cardioversion in May 2021  The cardioversion was initially successful, but he reverted back to rate controlled atrial flutter  He has been placed on amiodarone and decreased down to 100 mg daily  On his dose of torsemide, he had acute kidney injury with a creatinine rise to 1 9  After holding his diuretics for several days, his creatinine came down to 1 1  He was then decreased on his torsemide to 20 mg b i d  And his weight trends continue to improve  He has been aggressive in his dietary modifications  He also underwent radiofrequency ablation for his atrial fibrillation/flutter  He underwent a procedure in June 2021 Overall, he has felt much improved  In July 2021, he ended up going back into atrial flutter  He was seen by electrophysiology who was recommending a repeat ablation study  He underwent the ablation in late July 2021 which was successful  He was seen in the office by EP following the procedure and had remained in sinus rhythm  He has been in sinus rhythm since that time  Overall, he feels to be in his usual state of health  He has had no significant lower extremity swelling  Denies any chest pain, pressure, tightness or squeezing  Denies shortness of breath, dyspnea on exertion, orthopnea or paroxysmal nocturnal dyspnea  Denies lightheadedness dizziness or palpitations  Review of Systems:  Review of Systems   Constitutional: Negative for decreased appetite, fever, malaise/fatigue, weight gain and weight loss     HENT: Negative for congestion and sore throat  Eyes: Negative for visual disturbance  Cardiovascular: Negative for chest pain, dyspnea on exertion, leg swelling, near-syncope and palpitations  Respiratory: Negative for cough and shortness of breath  Hematologic/Lymphatic: Negative for bleeding problem  Skin: Negative for rash  Musculoskeletal: Negative for myalgias and neck pain  Gastrointestinal: Negative for abdominal pain and nausea  Neurological: Negative for light-headedness and weakness  Psychiatric/Behavioral: Negative for depression         Past Medical History:   Diagnosis Date    A-fib [facility])     [facility]    Arthritis     CPAP (continuous positive airway pressure) dependence     Irregular heart beat     Lymphedema     Sleep apnea     Urinary frequency     Wears glasses     Wears partial dentures     lower partial       Past Surgical History:   Procedure Laterality Date    ABLATION SAPHENOUS VEIN W/ RFA      COLONOSCOPY      MS CYSTOURETHRO W/IMPLANT N/A 11/13/2017    Procedure: CYSTOSCOPY WITH INSERTION UROLIFT;  Surgeon: Vic Shafer DO;  [facility];  Service: Urology    TONSILLECTOMY         Social History     Socioeconomic History    Marital status: /Civil Union     Spouse name: Not on file    Number of children: Not on file    Years of education: Not on file    Highest education level: Not on file   Occupational History    Not on file   Tobacco Use    Smoking status: Never Smoker    Smokeless tobacco: Never Used   Vaping Use    Vaping Use: Never used   Substance and Sexual Activity    Alcohol use: Yes     Comment: socially    Drug use: No    Sexual activity: Not on file   Other Topics Concern    Not on file   Social History Narrative    Not on file     Social Determinants of Health     Financial Resource Strain: Not on file   Food Insecurity: Not on file   Transportation Needs: Not on file   Physical Activity: Not on file   Stress: Not on file   Social Connections: Not on file   Intimate Partner Violence: Not on file   Housing Stability: Not on file       Family History   Problem Relation Age of Onset    Heart disease Mother     Lymphoma Father     Cancer Father     Heart disease Sister     Hypertension Brother     Diabetes Neg Hx     Thyroid disease Neg Hx     Stroke Neg Hx        Allergies   Allergen Reactions    Penicillins Anaphylaxis    Sulfa Antibiotics Anaphylaxis         Current Outpatient Medications:     acetaminophen (TYLENOL) 500 mg tablet, Take 500 mg by mouth every 6 (six) hours as needed for mild pain, Disp: , Rfl:     allopurinol (ZYLOPRIM) 100 mg tablet, Take 1 tablet (100 mg total) by mouth daily, Disp: 14 tablet, Rfl: 0    amiodarone 100 mg tablet, Take 1 tablet (100 mg total) by mouth daily with breakfast, Disp: , Rfl: 0    ferrous sulfate 325 (65 Fe) mg tablet, Take 325 mg by mouth, Disp: , Rfl:     Glucosamine-Chondroit-Vit C-Mn (Glucosamine 1500 Complex) CAPS, Take by mouth, Disp: , Rfl:     L-ARGININE-500 PO, Take 500 mg by mouth 2 (two) times a day , Disp: , Rfl:     metoprolol succinate (TOPROL-XL) 25 mg 24 hr tablet, Take 1 tablet (25 mg total) by mouth daily, Disp: , Rfl: 0    Multiple Vitamins-Minerals (OCUVITE EXTRA PO), Take 1 tablet by mouth daily  , Disp: , Rfl:     multivitamin (THERAGRAN) TABS, Take 1 tablet by mouth daily, Disp: , Rfl:     Omega-3 Fatty Acids (fish oil) 1,000 mg, Take 1,000 mg by mouth 2 (two) times a day, Disp: , Rfl:     patient supplied medication, Take 1 each by mouth 2 (two) times a day, Disp: , Rfl:     pregabalin (LYRICA) 100 mg capsule, Take 1 capsule (100 mg total) by mouth 3 (three) times a day, Disp: 42 capsule, Rfl: 0    spironolactone (ALDACTONE) 25 mg tablet, TAKE 1 TABLET BY MOUTH TWICE A DAY, Disp: 180 tablet, Rfl: 1    tadalafil (CIALIS) 5 MG tablet, Take 5 mg by mouth daily as needed for erectile dysfunction, Disp: , Rfl:    torsemide (DEMADEX) 20 mg tablet, TAKE 1 TABLET BY MOUTH TWICE A DAY, Disp: 180 tablet, Rfl: 1    traMADol (ULTRAM) 50 mg tablet, Take 50 mg by mouth every 6 (six) hours as needed for moderate pain, Disp: , Rfl:     VITAMIN D PO, Take 2,000 Units by mouth daily , Disp: , Rfl:     Xarelto 20 MG tablet, TAKE 1 TABLET BY MOUTH EVERY DAY, Disp: 90 tablet, Rfl: 1    ammonium lactate (AMLACTIN) 12 % lotion, Apply topically 2 (two) times a day as needed for dry skin, Disp: , Rfl:     clindamycin (CLEOCIN) 300 MG capsule, Take 300 mg by mouth as needed Prior to dental work  (Patient not taking: Reported on 2/1/2022 ), Disp: , Rfl:     colchicine (COLCRYS) 0 6 mg tablet, as needed  (Patient not taking: Reported on 2/1/2022 ), Disp: , Rfl:     halobetasol (ULTRAVATE) 0 05 % ointment, APPLY TO AFFECTED AREA ON LEG TWICE DAILY, Disp: , Rfl:     Vitals:    02/01/22 1437   BP: 118/70   Pulse: 58   Weight: 136 kg (299 lb 3 2 oz)   Height: 5' 9" (1 753 m)       PHYSICAL EXAMINATION:  Gen: Awake, Alert, NAD  Head/eyes: AT/NC, pupils equal and round, Anicteric  ENT: mmm  Neck: Supple, No elevated JVP, trachea midline  Resp: CTA bilaterally no w/r/r  CV: RRR +S1, S2, No m/r/g  Abd: Soft, NT/ND + BS  Ext: bilateral lower extremities wrapped with +1 pitting edema bilaterally/lymphedema  Neuro: Follows commands, moves all extermities  Psych: Appropriate affect, happy mood, pleasant attitude, non-combative  Skin: warm; no rash, erythema or venous stasis changes on exposed skin    --------------------------------------------------------------------------------  TREADMILL STRESS  No results found for this or any previous visit    --------------------------------------------------------------------------------  NUCLEAR STRESS TEST: No results found for this or any previous visit    No results found for this or any previous visit     --------------------------------------------------------------------------------  CATH:  No results found for this or any previous visit   --------------------------------------------------------------------------------  ECHO:   Results for orders placed during the hospital encounter of 10/26/19   Echo complete with contrast if indicated    [facility] [address] [facility] [address] [facility] [address]  [phone]    Transthoracic Echocardiogram  2D, M-mode, Doppler, and Color Doppler    Study date:  28-Oct-2019    Patient: Andre Álvarez  MR number: [id number]  Account number: [de-identified]  : 1955  Age: 71 years  Gender: Male  Status: Inpatient  Location: Bedside  Height: 69 in  Weight: 302 lb  BP: 141/ 88 mmHg    Indications: left sided paralysis    Diagnoses: 55 - CVA    Sonographer:  Blaine Yates [facility]  Referring Physician:  Lea Vazquez DO  Group:  [facility]  Interpreting Physician:  Alfonso Cason DO    SUMMARY    LEFT VENTRICLE:  Systolic function was normal  Ejection fraction was estimated to be 55 %  This study was inadequate for the evaluation of regional wall motion  Wall thickness was mildly increased  RIGHT VENTRICLE:  The ventricle was dilated  Systolic function was normal     MITRAL VALVE:  There was mild regurgitation  TRICUSPID VALVE:  There was mild regurgitation  Estimated peak PA pressure was 50 mmHg  HISTORY: PRIOR HISTORY: Patient has no history of cardiovascular disease  PROCEDURE: The procedure was performed at the bedside  This was a routine study  The transthoracic approach was used  The study included complete 2D imaging, M-mode, complete spectral Doppler, and color Doppler  The heart rate was 80 bpm,  at the start of the study  Intravenous contrast (Definity solution [1 3 ml Definity/8 7ml normal saline solution], 3 ml) was administered to opacify the left ventricle  Echocardiographic views were limited due to poor acoustic window  availability  This was a technically difficult study      LEFT VENTRICLE: Size was normal  Systolic function was normal  Ejection fraction was estimated to be 55 %  This study was inadequate for the evaluation of regional wall motion  Wall thickness was mildly increased  DOPPLER: The study was  not technically sufficient to allow evaluation of LV diastolic function  RIGHT VENTRICLE: The ventricle was dilated  Systolic function was normal     LEFT ATRIUM: Size was normal     RIGHT ATRIUM: Size was normal     MITRAL VALVE: Valve structure was normal  There was normal leaflet separation  DOPPLER: The transmitral velocity was within the normal range  There was no evidence for stenosis  There was mild regurgitation  AORTIC VALVE: The valve was trileaflet  Leaflets exhibited normal thickness and normal cuspal separation  DOPPLER: Transaortic velocity was within the normal range  There was no evidence for stenosis  There was no regurgitation  TRICUSPID VALVE: The valve structure was normal  There was normal leaflet separation  DOPPLER: The transtricuspid velocity was within the normal range  There was no evidence for stenosis  There was mild regurgitation  Estimated peak PA  pressure was 50 mmHg  PULMONIC VALVE: Leaflets exhibited normal thickness, no calcification, and normal cuspal separation  DOPPLER: The transpulmonic velocity was within the normal range  There was no regurgitation  PERICARDIUM: There was no pericardial effusion  The pericardium was normal in appearance  AORTA: The root exhibited normal size  SYSTEMIC VEINS: IVC: The inferior vena cava was not well visualized      SYSTEM MEASUREMENT TABLES    2D  %FS: 34 72 %  Ao Diam: 2 87 cm  EDV(Teich): 143 23 ml  EF(Teich): 63 36 %  ESV(Teich): 52 47 ml  IVSd: 1 15 cm  LA Diam: 4 16 cm  LVIDd: 5 43 cm  LVIDs: 3 55 cm  LVPWd: 1 04 cm  RWT: 0 38  SV(Teich): 90 75 ml    CW  AV Vmax: 1 34 m/s  AV maxP 13 mmHg  RAP: 0 mmHg  TR Vmax: 3 26 m/s  TR maxP 5 mmHg    PW  E' Sept: 0 09 m/s  MV E Terrance: 1 03 m/s  RVSP: 42 5 mmHg    [facility] Accredited Echocardiography Laboratory    Prepared and electronically signed by    Spencer Lane DO  Signed 28-Oct-2019 10:37:38       No results found for this or any previous visit   --------------------------------------------------------------------------------  HOLTER  No results found for this or any previous visit  No results found for this or any previous visit   --------------------------------------------------------------------------------  CAROTIDS  No results found for this or any previous visit    --------------------------------------------------------------------------------  ECGs:  No results found for this visit on 02/01/22  Lab Results   Component Value Date    WBC 6 30 08/13/2021    HGB 14 2 08/13/2021    HCT 42 9 08/13/2021    MCV 95 08/13/2021     08/13/2021      Lab Results   Component Value Date    SODIUM 140 09/30/2021    K 4 2 09/30/2021     09/30/2021    CO2 28 09/30/2021    BUN 22 09/30/2021    CREATININE 1 17 09/30/2021    GLUC 101 09/30/2021    CALCIUM 8 9 09/30/2021      Lab Results   Component Value Date    HGBA1C 5 4 06/08/2021      No results found for: CHOL  Lab Results   Component Value Date    HDL 55 10/27/2019     Lab Results   Component Value Date    LDLCALC 90 10/27/2019     Lab Results   Component Value Date    TRIG 93 10/27/2019     No results found for: [place], [place]   Lab Results   Component Value Date    INR 1 54 (H) 07/28/2021    INR 2 26 (H) 06/02/2021    INR 1 59 (H) 01/22/2021    PROTIME 18 4 (H) 07/28/2021    PROTIME 24 9 (H) 06/02/2021    PROTIME 18 7 (H) 01/22/2021        1  Chronic diastolic heart failure (HCC)  -     POCT ECG    2  Paroxysmal atrial flutter (HCC)  -     POCT ECG    3  Obesity, morbid ([address] )    4  Obstructive sleep apnea on CPAP    5   Abnormal ECG        IMPRESSION:  · Chronic diastolic heart failure  · Paroxysmal atrial fibrillation/flutter (had long standing since before October 2019 s/p DCCV in May 2021) s/p RFA ablation (June 2021 and July 2021) on Xarelto and Amiodarone  · LVEF 55%, mild LVH, paradoxical septal wall motion, mild RV dilatation, mild LA dilatation, mild to moderate RA dilatation, trace MR/TR, January 2021  · History of bilateral venous insufficiency status post LE vein ablation  · Pharmacologic nuclear stress test negative for myocardial ischemia with gated EF 50%, September 2020  · Holter with AF, avg HR 92 bpm, rare VPCs, September 2020  · Abnormal ECG with bifascicular block  · Moderate pulmonary hypertension  · Lymphedema  · Morbid obesity  · ROBERT on 20/14 cm BiPAP    PLAN:  It was a pleasure to see Henok Mitchell in the office today for follow-up CV evaluation  He is here today for routine CV follow-up  Since our last encounter, he has been feeling well overall  He has had no significant lower extremity swelling and no dyspnea on exertion reported  He has no symptoms concerning for angina and no signs or symptoms of heart failure  Examines to be euvolemic in the office today  ECG is similar to prior without significant changes  He has been tolerating all his medications without any reported adverse effects  Denies significant bleeding on his anticoagulation  He is up-to-date on his CV testing  Currently his most recent issue has been back discomfort which has been helped with inserts within 1 of issues  Blood pressure and heart rate are currently stable  Based on his clinical presentation, the following recommendations:    1  Recommend discontinuation of amiodarone to mitigate future adverse effects  He has been on medication for 5 months and is currently in sinus rhythm  Will reach out to electrophysiology to discuss if we should continue the amiodarone from their perspective, but otherwise I would have him discontinue it for now  2  Continue anticoagulation for atrial fibrillation and metoprolol for rate control  Currently in sinus rhythm  3   Continue current antihypertensive regimen including spironolactone, metoprolol  4  Continue torsemide 20 mg b i d  [place] Side Should he gain greater than 3 lb in a day or 5 lb overall, recommend calling the office for further titration of diuretic medication  5  Would encourage heart healthy diet low in sodium and carbohydrate  6  Encourage 30 minutes a day, 5 days a week of moderate intensity activity  7  We will follow up with him in 6 months to re-evaluate his progress  As always, please do not hesitate to call with any questions  Portions of the record may have been created with voice recognition software  Occasional wrong word or "sound a like" substitutions may have occurred due to the inherent limitations of voice recognition software  Read the chart carefully and recognize, using context, where substitutions have occurred        Signed: Maggy Russo DO, Isidro Justin

## 2022-02-01 ENCOUNTER — OFFICE VISIT (OUTPATIENT)
Dept: CARDIOLOGY CLINIC | Facility: CLINIC | Age: 67
End: 2022-02-01
Payer: COMMERCIAL

## 2022-02-01 VITALS
HEART RATE: 58 BPM | WEIGHT: 299.2 LBS | HEIGHT: 69 IN | SYSTOLIC BLOOD PRESSURE: 118 MMHG | BODY MASS INDEX: 44.31 KG/M2 | DIASTOLIC BLOOD PRESSURE: 70 MMHG

## 2022-02-01 DIAGNOSIS — I50.32 CHRONIC DIASTOLIC HEART FAILURE (HCC): Primary | ICD-10-CM

## 2022-02-01 DIAGNOSIS — G47.33 OBSTRUCTIVE SLEEP APNEA ON CPAP: ICD-10-CM

## 2022-02-01 DIAGNOSIS — E66.01 OBESITY, MORBID (HCC): ICD-10-CM

## 2022-02-01 DIAGNOSIS — R94.31 ABNORMAL ECG: ICD-10-CM

## 2022-02-01 DIAGNOSIS — Z99.89 OBSTRUCTIVE SLEEP APNEA ON CPAP: ICD-10-CM

## 2022-02-01 DIAGNOSIS — I48.92 PAROXYSMAL ATRIAL FLUTTER (HCC): ICD-10-CM

## 2022-02-01 PROCEDURE — 93000 ELECTROCARDIOGRAM COMPLETE: CPT | Performed by: INTERNAL MEDICINE

## 2022-02-01 PROCEDURE — 99214 OFFICE O/P EST MOD 30 MIN: CPT | Performed by: INTERNAL MEDICINE

## 2022-02-09 ENCOUNTER — OFFICE VISIT (OUTPATIENT)
Dept: CARDIOLOGY CLINIC | Facility: CLINIC | Age: 67
End: 2022-02-09
Payer: COMMERCIAL

## 2022-02-09 VITALS
HEIGHT: 69 IN | SYSTOLIC BLOOD PRESSURE: 118 MMHG | DIASTOLIC BLOOD PRESSURE: 72 MMHG | WEIGHT: 298 LBS | BODY MASS INDEX: 44.14 KG/M2

## 2022-02-09 DIAGNOSIS — I48.11 LONGSTANDING PERSISTENT ATRIAL FIBRILLATION (HCC): Primary | ICD-10-CM

## 2022-02-09 PROCEDURE — 99213 OFFICE O/P EST LOW 20 MIN: CPT | Performed by: INTERNAL MEDICINE

## 2022-02-09 NOTE — PROGRESS NOTES
HEART AND VASCULAR  CARDIAC [address]    Outpatient  Follow-up  Today's Date: 02/09/22        Patient name: Anabel Gardner  YOB: 1955  Sex: male         Chief Complaint: f/u afib      ASSESSMENT:  Problem List Items Addressed This Visit     None          77 yo male  1) Post afib/flutter ablation w post wall isolation for persistent afib and repeat ablatino for Mitral flutter July 2021, in NSR since  :  2) chronic diastolic chf, EF 62% and pulm HTN >50mmHg, maintaining euvolemia,no SOB  3) Severe moribd obesity  4) Chronic  lower extremity edema   5) HTN well controlled  6) ROBERT on CPAP  7) RBBB    PLAN:  1  Agree w Dr Nery Hoang we can observe off amiodarone, if afib recurs consider alternative such as sotalol which can take w less long term risk  Recommend home monitor for afib such as Fitbit Sense or Kardia  Continue anticoagulation w xarelto    2  Encouraged he avoid alcohol and tries to lose weight by exercising more  F/u 6 months    No orders of the defined types were placed in this encounter  Medications Discontinued During This Encounter   Medication Reason    amiodarone 100 mg tablet              HPI/Subjective:       77 yo male  1) Post afib/flutter ablation w post wall isolation for persistent afib and repeat ablatino for Mitral flutter July 2021, in NSR since  :  2) chronic diastolic chf, EF 39% and pulm HTN >50mmHg, maintaining euvolemia,no SOB  3) Severe moribd obesity  4) Chronic  lower extremity edema   5) HTN well controlled  6) ROBERT on CPAP  7) RBBB    Feels good, no SOB, edema is stable, feels much better since ablation and NSR overall  Hasn't been walking  Please note HPI is listed by problem with with update following it, it is copied again in the assessment above and reflects medical decision making as well  Complete 12 point ROS reviewed and otherwise non pertinent or negative except as per HPI pertinent positives in Cardiovascular and Respiratory emphasized  Please see paper chart for outpatient clinic patients where the patient completed the 12 point ROS survey  Past Medical History:   Diagnosis Date    A-fib [facility])     ALANNA [facility]) ([facility] [address] ) 6/15/2021    Arthritis     CPAP (continuous positive airway pressure) dependence     Irregular heart beat     Lymphedema     Sleep apnea     Urinary frequency     Wears glasses     Wears partial dentures     lower partial       Allergies   Allergen Reactions    Penicillins Anaphylaxis    Sulfa Antibiotics Anaphylaxis     I reviewed the Home Medication list and Allergies in the chart     Scheduled Meds:  Current Outpatient Medications   Medication Sig Dispense Refill    acetaminophen (TYLENOL) 500 mg tablet Take 500 mg by mouth every 6 (six) hours as needed for mild pain      allopurinol (ZYLOPRIM) 100 mg tablet Take 1 tablet (100 mg total) by mouth daily 14 tablet 0    ammonium lactate (AMLACTIN) 12 % lotion Apply topically 2 (two) times a day as needed for dry skin      ferrous sulfate 325 (65 Fe) mg tablet Take 325 mg by mouth      Glucosamine-Chondroit-Vit C-Mn (Glucosamine 1500 Complex) CAPS Take by mouth      halobetasol (ULTRAVATE) 0 05 % ointment APPLY TO AFFECTED AREA ON LEG TWICE DAILY      L-ARGININE-500 PO Take 500 mg by mouth 2 (two) times a day       metoprolol succinate (TOPROL-XL) 25 mg 24 hr tablet Take 1 tablet (25 mg total) by mouth daily  0    Multiple Vitamins-Minerals (OCUVITE EXTRA PO) Take 1 tablet by mouth daily        multivitamin (THERAGRAN) TABS Take 1 tablet by mouth daily      Omega-3 Fatty Acids (fish oil) 1,000 mg Take 1,000 mg by mouth 2 (two) times a day      pregabalin (LYRICA) 100 mg capsule Take 1 capsule (100 mg total) by mouth 3 (three) times a day 42 capsule 0    spironolactone (ALDACTONE) 25 mg tablet TAKE 1 TABLET BY MOUTH TWICE A  tablet 1    tadalafil (CIALIS) 5 MG tablet Take 5 mg by mouth daily as needed for erectile dysfunction      torsemide (DEMADEX) 20 mg tablet TAKE 1 TABLET BY MOUTH TWICE A  tablet 1    traMADol (ULTRAM) 50 mg tablet Take 50 mg by mouth every 6 (six) hours as needed for moderate pain      VITAMIN D PO Take 2,000 Units by mouth daily       Xarelto 20 MG tablet TAKE 1 TABLET BY MOUTH EVERY DAY 90 tablet 1    clindamycin (CLEOCIN) 300 MG capsule Take 300 mg by mouth as needed Prior to dental work  (Patient not taking: Reported on 2/1/2022 )      colchicine (COLCRYS) 0 6 mg tablet as needed  (Patient not taking: Reported on 2/1/2022 )      patient supplied medication Take 1 each by mouth 2 (two) times a day       No current facility-administered medications for this visit       PRN Meds:         Family History   Problem Relation Age of Onset    Heart disease Mother     Lymphoma Father     Cancer Father     Heart disease Sister     Hypertension Brother     Diabetes Neg Hx     Thyroid disease Neg Hx     Stroke Neg Hx         Social History     Socioeconomic History    Marital status: /Civil Union     Spouse name: Not on file    Number of children: Not on file    Years of education: Not on file    Highest education level: Not on file   Occupational History    Not on file   Tobacco Use    Smoking status: Never Smoker    Smokeless tobacco: Never Used   Vaping Use    Vaping Use: Never used   Substance and Sexual Activity    Alcohol use: Yes     Comment: socially    Drug use: No    Sexual activity: Not on file   Other Topics Concern    Not on file   Social History Narrative    Not on file     Social Determinants of Health     Financial Resource Strain: Not on file   Food Insecurity: Not on file   Transportation Needs: Not on file   Physical Activity: Not on file   Stress: Not on file   Social Connections: Not on file   Intimate Partner Violence: Not on file   Housing Stability: Not on file           OBJECTIVE:    /72 (BP Location: Left arm, Patient Position: Sitting, Cuff Size: Large)   Ht 5' 9" (1 753 m)   Wt 135 kg (298 lb)   BMI 44 01 kg/m²   Vitals:    02/09/22 1110   Weight: 135 kg (298 lb)       /72 (BP Location: Left arm, Patient Position: Sitting, Cuff Size: Large)   Ht 5' 9" (1 753 m)   Wt 135 kg (298 lb)   BMI 44 01 kg/m²   Vitals:    02/09/22 1110   Weight: 135 kg (298 lb)     GEN: No acute distress, Alert and oriented, well appearing  HEENT:External ears normal, wearing a mask  EYES: Pupils equal, sclera anicteric, midline, normal conjuctiva  NECK: No JVD, supple, no obvious masses or thryomegaly or goiter  CARDIOVASCULAR:  RRR, No murmur, rub, gallops S1,S2  LUNGS: Clear To auscultation bilaterally, normal effort, no rales, rhonchi, crackles   ABDOMEN: jobese, nondistended,  without obvious organomegaly or ascites  EXTREMITIES/VASCULAR: 2+edema  warm an well perfused  PSYCH: Normal Affect,  linear speech pattern without evidence of psychosis  NEURO: Grossly intact, moving all extremiteis equal, face symmetric, alert and responsive, no obvious focal defecits   GAIT:  Ambulates normally without difficulty  HEME: No bleeding, bruising, petechia, purpura   SKIN: No significant rashes on visibile skin, warm, no diaphoresis or pallor           LABS:      Chemistry        Component Value Date/Time    K 4 2 09/30/2021 1046    K 4 6 09/03/2020 0805     09/30/2021 1046     09/03/2020 0805    CO2 28 09/30/2021 1046    CO2 30 09/03/2020 0805    BUN 22 09/30/2021 1046    BUN 24 09/03/2020 0805    CREATININE 1 17 09/30/2021 1046        Component Value Date/Time    CALCIUM 8 9 09/30/2021 1046    CALCIUM 9 2 09/03/2020 0805    ALKPHOS 81 08/13/2021 0935    AST 16 08/13/2021 0935    ALT 19 08/13/2021 0935            No results found for: CHOL  Lab Results   Component Value Date    HDL 55 10/27/2019     Lab Results Component Value Date    LDLCALC 90 10/27/2019     Lab Results   Component Value Date    TRIG 93 10/27/2019     No results found for: CHOLHDL    IMAGING: No results found  Cardiac testing:   Results for orders placed during the hospital encounter of 21   Echo complete with contrast if indicated    Narrative [facility]  Lucie Funez [address] [place] [address], [address]  [phone]    Transthoracic Echocardiogram  2D, M-mode, Doppler, and Color Doppler    Study date:  2021    Patient: Zhang Bradford  MR number: [id number]  Account number: [de-identified]  : 1955  Age: 76 years  Gender: Male  Status: Inpatient  Location: Bedside  Height: 69 in  Weight: 294 4 lb  BP: 129/ 60 mmHg    Indications: Edema  Heart failure  Diagnoses: I50 9 - Heart failure, unspecified    Sonographer:  JAM Wheat  Referring Physician:  Dane Lorenzana Do  Group:  [facility]  Interpreting Physician:  FORTINO Caputo     SUMMARY    PROCEDURE INFORMATION:  Intravenous contrast (  4ml of Definity) was administered  LEFT VENTRICLE:  Systolic function was normal by visual assessment  Ejection fraction was estimated to be 55 %  There were no regional wall motion abnormalities  Wall thickness was mildly increased  There was mild concentric hypertrophy  VENTRICULAR SEPTUM:  There was paradoxical motion  These changes are consistent with a conduction abnormality or paced rhythm  RIGHT VENTRICLE:  The ventricle was mildly dilated  LEFT ATRIUM:  The atrium was mildly dilated  LA volume index 40 mL/m2  RIGHT ATRIUM:  The atrium was mildly to moderately dilated  MITRAL VALVE:  There was trace regurgitation  TRICUSPID VALVE:  There was trace regurgitation  SUMMARY MEASUREMENTS  2D measurements:  Unspecified Anatomy:   %FS was 39 4 %  Ao Diam was 3 4 cm   EDV(Teich) was 129 3 ml   EF(Teich) was 69 5 %  ESV(Teich) was 39 4 ml  IVSd was 1 1 cm    LA Diam was 3 8 cm  LAAs A2C was 27 cm2  LAAs A4C was 26 8 cm2  LAESV A-L A2C was  98 5 ml  LAESV A-L A4C was 92 5 ml  LAESV Index (A-L) was 40 1 ml/m2  LAESV MOD A2C was 94 9 ml  LAESV MOD A4C was 85 4 ml  LAESV(A-L) was 97 7 ml  LAESV(MOD BP) was 91 9 ml  LAESVInd MOD BP was 37 7 ml/m2  LALs A2C was 6 3 cm  LALs A4C was 6 6 cm  LVEDV MOD A4C was 126 8 ml  LVEF MOD A4C was 52 7 %  LVESV MOD A4C was 60 ml  LVIDd was 5 2 cm  LVIDs was 3 2 cm  LVLd A4C was 8 1 cm  LVLs A4C was 7 1 cm  LVPWd was 1 cm  RAAs A4C was 25 8 cm2  RAESV A-L was  91 3 ml   RAESV MOD was 87 9 ml  RALs was 6 2 cm  SV MOD A4C was 66 9 ml   SV(Teich) was 89 9 ml   CW measurements:  Unspecified Anatomy:   AV Env  Ti was 283 9 ms   AV VTI was 27 9 cm   AV Vmax was 1 5 m/s  AV Vmean was 1 m/s  AV maxPG was 8 8 mmHg  AV meanPG was 4 5 mmHg  MM measurements:  Unspecified Anatomy:   TAPSE was 2 cm  PW measurements:  Unspecified Anatomy:   DVI was 0 7   LVOT Env  Ti was 279 7 ms  LVOT VTI was 20 8 cm  LVOT Vmax was 1 2 m/s  LVOT Vmean was 0 7 m/s  LVOT maxPG was 5 9 mmHg  LVOT meanPG was 2 6 mmHg  RV S' was 0 1 m/s  HISTORY: PRIOR HISTORY: lymphedema  Congestive heart failure  Pulmonary hypertension  Atrial fibrillation  Risk factors: hypertension and morbid obesity  PROCEDURE: The procedure was performed at the bedside  This was a routine study  The transthoracic approach was used  The study included complete 2D imaging, M-mode, complete spectral Doppler, and color Doppler  The heart rate was 90 bpm,  at the start of the study  Intravenous contrast (  4ml of Definity) was administered  Image quality was adequate  LEFT VENTRICLE: Size was normal  Systolic function was normal by visual assessment  Ejection fraction was estimated to be 55 %  There were no regional wall motion abnormalities  Wall thickness was mildly increased  There was mild  concentric hypertrophy   DOPPLER: The study was not technically sufficient to allow evaluation of LV diastolic function  VENTRICULAR SEPTUM: There was paradoxical motion  These changes are consistent with a conduction abnormality or paced rhythm  RIGHT VENTRICLE: The ventricle was mildly dilated  Systolic function was normal  Wall thickness was normal     LEFT ATRIUM: The atrium was mildly dilated  LA volume index 40 mL/m2  RIGHT ATRIUM: The atrium was mildly to moderately dilated  MITRAL VALVE: Valve structure was normal  There was normal leaflet separation  DOPPLER: The transmitral velocity was within the normal range  There was no evidence for stenosis  There was trace regurgitation  AORTIC VALVE: The valve was trileaflet  Leaflets exhibited mildly increased thickness, normal cuspal separation, and sclerosis  DOPPLER: Transaortic velocity was within the normal range  There was no evidence for stenosis  There was no  regurgitation  TRICUSPID VALVE: The valve structure was normal  There was normal leaflet separation  DOPPLER: The transtricuspid velocity was within the normal range  There was no evidence for stenosis  There was trace regurgitation  The tricuspid jet  envelope definition was inadequate for estimation of RV systolic pressure  PULMONIC VALVE: Not well visualized  DOPPLER: There was no significant regurgitation  PERICARDIUM: There was no pericardial effusion  The pericardium was normal in appearance  AORTA: The root exhibited normal size  SYSTEMIC VEINS: IVC: The inferior vena cava was normal in size and course   Respirophasic changes were normal     SYSTEM MEASUREMENT TABLES    2D  %FS: 39 4 %  Ao Diam: 3 4 cm  EDV(Teich): 129 3 ml  EF(Teich): 69 5 %  ESV(Teich): 39 4 ml  IVSd: 1 1 cm  LA Diam: 3 8 cm  LAAs A2C: 27 cm2  LAAs A4C: 26 8 cm2  LAESV A-L A2C: 98 5 ml  LAESV A-L A4C: 92 5 ml  LAESV Index (A-L): 40 1 ml/m2  LAESV MOD A2C: 94 9 ml  LAESV MOD A4C: 85 4 ml  LAESV(A-L): 97 7 ml  LAESV(MOD BP): 91 9 ml  LAESVInd MOD BP: 37 7 ml/m2  LALs A2C: 6 3 cm  LALs A4C: 6 6 cm  LVEDV MOD A4C: 126 8 ml  LVEF MOD A4C: 52 7 %  LVESV MOD A4C: 60 ml  LVIDd: 5 2 cm  LVIDs: 3 2 cm  LVLd A4C: 8 1 cm  LVLs A4C: 7 1 cm  LVPWd: 1 cm  RAAs A4C: 25 8 cm2  RAESV A-L: 91 3 ml  RAESV MOD: 87 9 ml  RALs: 6 2 cm  SV MOD A4C: 66 9 ml  SV(Teich): 89 9 ml    CW  AV Env  Ti: 283 9 ms  AV VTI: 27 9 cm  AV Vmax: 1 5 m/s  AV Vmean: 1 m/s  AV maxP 8 mmHg  AV meanP 5 mmHg    MM  TAPSE: 2 cm    PW  DVI: 0 7  LVOT Env  Ti: 279 7 ms  LVOT VTI: 20 8 cm  LVOT Vmax: 1 2 m/s  LVOT Vmean: 0 7 m/s  LVOT maxP 9 mmHg  LVOT meanP 6 mmHg  RV S': 0 1 m/s    [facility] Accredited Echocardiography Laboratory    Prepared and electronically signed by    FORTINO Myers  Signed 2021 13:29:34       No results found for this or any previous visit  No results found for this or any previous visit  No results found for this or any previous visit          I reviewed and interpreted the following LABS/EKG/TELE/IMAGING and below is summary of my interpretation (if data available):    LABS:normal renal function  EKG 22: NSR

## 2022-02-15 ENCOUNTER — EVALUATION (OUTPATIENT)
Dept: PHYSICAL THERAPY | Facility: CLINIC | Age: 67
End: 2022-02-15
Payer: COMMERCIAL

## 2022-02-15 DIAGNOSIS — M47.816 LUMBAR SPONDYLOSIS: Primary | ICD-10-CM

## 2022-02-15 PROCEDURE — 97161 PT EVAL LOW COMPLEX 20 MIN: CPT | Performed by: PHYSICAL THERAPIST

## 2022-02-15 PROCEDURE — 97140 MANUAL THERAPY 1/> REGIONS: CPT | Performed by: PHYSICAL THERAPIST

## 2022-02-15 PROCEDURE — 97110 THERAPEUTIC EXERCISES: CPT | Performed by: PHYSICAL THERAPIST

## 2022-02-15 NOTE — LETTER
February 15, 2022    Dr Alexia Valdez DO    Patient: Paige Schwab   YOB: 1955   Date of Visit: 2/15/2022     Encounter Diagnosis     ICD-10-CM    1  Lumbar spondylosis  M47 816        Dear Dr Alexia Valdez    Thank you for your recent referral of Paige Schwab  Please review the attached evaluation summary from [facility] recent visit  Please verify that you agree with the plan of care by signing the attached order  If you have any questions or concerns, please do not hesitate to call  I sincerely appreciate the opportunity to share in the care of one of your patients and hope to have another opportunity to work with you in the near future  Sincerely,    Kelly Pisano, PT      Referring Provider:      I certify that I have read the below Plan of Care and certify the need for these services furnished under this plan of treatment while under my care  Dr Alexia Valdez DO          PT Evaluation     Today's date: 2/15/2022  Patient name: Paige Schwab  : 1955  MRN: [id number]  Referring provider: Radha Parikh DO  Dx:   Encounter Diagnosis     ICD-10-CM    1  Lumbar spondylosis  M47 816                    Assessment  Assessment details: Pt presents to PT with occasional low back and left lateral hip discomfort  Today is good  Varies greatly with weather, as big changes create more pain  Currently has weakness in hips and quads/hamstrings  Deconditioned  He will benefit from physical therapy aimed to improve trunk and leg strength, reduce muscular soreness  He is in agreement with current POC    Impairments: abnormal or restricted ROM, impaired physical strength and pain with function  Functional limitations: at times sitting in morning, at times walking, varies greatly with weather  Symptom irritability: lowUnderstanding of Dx/Px/POC: good   Prognosis: good    Goals  ST-3 weeks  Instruct in low level trunk and hip strength program for HEP  Educate and instruct on proper lifting techniques and back safety  Assess balance to determine proper hip stability and prevent trendelenberg patterns    LT-6 weeks  5/5 strength quad and hamstrings  4/5 or greater with all hip strength  <3/10 pain with sitting in AM and all daily activities  Left side bend without any pain      Plan  Plan details: TE, NMR, TA, MT, self education, and modalities as needed in order to progress through skilled PT focused on  ROM, strength, balance, coordination   Patient would benefit from: skilled physical therapy  Planned modality interventions: cryotherapy and thermotherapy: hydrocollator packs  Planned therapy interventions: manual therapy, neuromuscular re-education, self care, therapeutic activities, therapeutic exercise and home exercise program  Frequency: 2x week  Duration in weeks: 6  Treatment plan discussed with: patient        Subjective Evaluation    History of Present Illness  Mechanism of injury: 78year old male presenting to PT with "nagging" low back pain over the last few months  No specific reason for starting  Weather changes, first getting up in the morning and sitting down  Denies pain into legs besides left buttock and hip  A few weeks ago pain was to point he needed to use a cane walk  Saw pain management for another issue and they then talked about his back  X-rays showed arthritis in low back and slight height difference in hip and was provided with a heel lift  Advised to go to PT  Has hx of left hip replacement     Pain  Current pain ratin  At worst pain ratin    Social Support  Stairs in house: yes   L[facility] in: [facility]  Lives with: spouse    Patient Goals  Patient goals for therapy: decreased pain and increased strength  Patient goal: dec pain with sitting in morning, manage symptoms        Objective     General Comments:      Lumbar Comments  Observation/Gait  Transfers - requires UE to complete sit-stand      Multi-seg movement patterns  Flex: prox tibia, pulling post LE b/l  Ext: scap just past heels no pain  L SB: mid thigh with pain into L LB  R SB: mid thigh, no pain      Lumbar  Slight dec in lordosis  Pain with pa throughout, tender b/l paraspinal and L iliac crest/glut med      Hip screen  Flex: 90 deg   Abd: 25 deg    Hamstring: good mobility      Strength/myotome  Hip flex: 4-/5 b/l  Hip abd: 3-/5 b/l  Knee ext 4/5  Knee flex: 4+/5  Ankle strong                     Precautions: hx of L THR      Manuals 2-15       Manual traction        Joint work        flexibility        ST L lumbar paraspinals, iliac crest, glut med       Neuro Re-Ed        TA        TA+march        birddog                                        Ther Ex        NuStep/TM        clamshells otb x20       Supine march otb x15       bridges x20 with 3" hold       Supine hand to knee press x15 with hold               Leg press        Ham curls        Knee ext        Ankle strength - band        Standing HR        Lumbar ext        Lumbar flex                SKTC        LTR        Prone ext                Ther Activity                        Gait Training                        Modalities                          Access Code: [email or web address]  URL: [email or web address]  Date: 02/15/2022  Prepared by: Alison Tinoco    Exercises  · Supine Bridge - 1 x daily - 7 x weekly - 3 sets - 10 reps  · Supine March - 1 x daily - 7 x weekly - 3 sets - 10 reps  · Supine Alternating Knee Taps with Hands - 1 x daily - 7 x weekly - 3 sets - 10 reps  · Hooklying Clamshell with Resistance - 1 x daily - 7 x weekly - 3 sets - 10 reps

## 2022-02-15 NOTE — PROGRESS NOTES
PT Evaluation     Today's date: 2/15/2022  Patient name: Memo Woodward  : 1955  MRN: [id number]  Referring provider: Robie Jeans, DO  Dx:   Encounter Diagnosis     ICD-10-CM    1  Lumbar spondylosis  M47 816                    Assessment  Assessment details: Pt presents to PT with occasional low back and left lateral hip discomfort  Today is good  Varies greatly with weather, as big changes create more pain  Currently has weakness in hips and quads/hamstrings  Deconditioned  He will benefit from physical therapy aimed to improve trunk and leg strength, reduce muscular soreness  He is in agreement with current POC    Impairments: abnormal or restricted ROM, impaired physical strength and pain with function  Functional limitations: at times sitting in morning, at times walking, varies greatly with weather  Symptom irritability: lowUnderstanding of Dx/Px/POC: good   Prognosis: good    Goals  ST-3 weeks  Instruct in low level trunk and hip strength program for HEP  Educate and instruct on proper lifting techniques and back safety  Assess balance to determine proper hip stability and prevent trendelenberg patterns    LT-6 weeks  5/5 strength quad and hamstrings  4/5 or greater with all hip strength  <3/10 pain with sitting in AM and all daily activities  Left side bend without any pain      Plan  Plan details: TE, NMR, TA, MT, self education, and modalities as needed in order to progress through skilled PT focused on  ROM, strength, balance, coordination   Patient would benefit from: skilled physical therapy  Planned modality interventions: cryotherapy and thermotherapy: hydrocollator packs  Planned therapy interventions: manual therapy, neuromuscular re-education, self care, therapeutic activities, therapeutic exercise and home exercise program  Frequency: 2x week  Duration in weeks: 6  Treatment plan discussed with: patient        Subjective Evaluation    History of Present Illness  Mechanism of injury: 78year old male presenting to PT with "nagging" low back pain over the last few months  No specific reason for starting  Weather changes, first getting up in the morning and sitting down  Denies pain into legs besides left buttock and hip  A few weeks ago pain was to point he needed to use a cane walk  Saw pain management for another issue and they then talked about his back  X-rays showed arthritis in low back and slight height difference in hip and was provided with a heel lift  Advised to go to PT  Has hx of left hip replacement     Pain  Current pain ratin  At worst pain ratin    Social Support  Stairs in house: yes   [facility] in: [facility]  Lives with: spouse    Patient Goals  Patient goals for therapy: decreased pain and increased strength  Patient goal: dec pain with sitting in morning, manage symptoms        Objective     General Comments:      Lumbar Comments  Observation/Gait  Transfers - requires UE to complete sit-stand      Multi-seg movement patterns  Flex: prox tibia, pulling post LE b/l  Ext: scap just past heels no pain  L SB: mid thigh with pain into L LB  R SB: mid thigh, no pain      Lumbar  Slight dec in lordosis  Pain with pa throughout, tender b/l paraspinal and L iliac crest/glut med      Hip screen  Flex: 90 deg   Abd: 25 deg    Hamstring: good mobility      Strength/myotome  Hip flex: 4-/5 b/l  Hip abd: 3-/5 b/l  Knee ext 4/5  Knee flex: 4+/5  Ankle strong                     Precautions: hx of L THR      Manuals 2-15       Manual traction        Joint work        flexibility        ST L lumbar paraspinals, iliac crest, glut med       Neuro Re-Ed        TA        TA+march        birddog                                        Ther Ex        NuStep/TM        clamshells otb x20       Supine march otb x15       bridges x20 with 3" hold       Supine hand to knee press x15 with hold               Leg press        Ham curls        Knee ext        Ankle strength - band Standing HR        Lumbar ext        Lumbar flex                SKTC        LTR        Prone ext                Ther Activity                        Gait Training                        Modalities                          Access Code: [email or web address]  URL: [email or web address]  Date: 02/15/2022  Prepared by: Deonte Stuart    Exercises  · Supine Bridge - 1 x daily - 7 x weekly - 3 sets - 10 reps  · Supine March - 1 x daily - 7 x weekly - 3 sets - 10 reps  · Supine Alternating Knee Taps with Hands - 1 x daily - 7 x weekly - 3 sets - 10 reps  · Hooklying Clamshell with Resistance - 1 x daily - 7 x weekly - 3 sets - 10 reps

## 2022-02-16 NOTE — LETTER
2020    Fredis DO [address] [place] [address]    Patient: Norma Olvera   YOB: 1955   Date of Visit: 2020     Encounter Diagnosis     ICD-10-CM    1  Left carpal tunnel syndrome  G56 02        Dear Dr Harrington Learn: Thank you for your recent referral of Norma Olvera  Please review the attached evaluation summary from [facility] recent visit  Please verify that you agree with the plan of care by signing the attached order  If you have any questions or concerns, please do not hesitate to call  I sincerely appreciate the opportunity to share in the care of one of your patients and hope to have another opportunity to work with you in the near future  Sincerely,    Calista Farley, CAROLT, T    Referring Provider:      I certify that I have read the below Plan of Care and certify the need for these services furnished under this plan of treatment while under my care  [place] Wagner [address] [place] [address]  VIA Facsimile: [phone]          PT Evaluation     Today's date: 2020  Patient name: Norma Olvera  : 1955  MRN: [id number]  Referring provider: Armando Lopez DO  Dx:   Encounter Diagnosis     ICD-10-CM    1  Left carpal tunnel syndrome  G56 02                   Assessment  Assessment details: Pt is a 77 YO male presenting to PT with pain, decreased AROM, strength and tolerance to activity  Pt would benefit from skilled intervention to address these issues and maximize overall function  Occupation- - pt has RTW  Dominant- Right; Involved- Left  Pt progressing with increased motion and strength and decreased scar and swelling  Goals  ST  Decrease pain to 3-4 in 4 weeks            2  Decrease swelling in hand and wrist            3  Increase AROM to composite fist and extension in 4 weeks            4     Maintain clean wound and promote healing            5   Provide orthotic for protection, compression for swelling  LT  Increase functional motion and strength for independence with ADL and self care by DC            2  Ability to RTW and recreational activity by DC    Plan  Patient would benefit from: skilled speech therapy  Planned modality interventions: thermotherapy: hydrocollator packs, cryotherapy and ultrasound  Planned therapy interventions: manual therapy, neuromuscular re-education, therapeutic activities, stretching, strengthening, therapeutic exercise, home exercise program, orthotic management and training and self care  Frequency: 2x week  Duration in weeks: 4  Treatment plan discussed with: patient        Subjective Evaluation    History of Present Illness  Date of surgery: 2020  Mechanism of injury: Several months of loss of sensation, strength in left hand and fingers  Pain  Current pain ratin  At best pain ratin  At worst pain ratin  Location: left wrist    Hand dominance: right    Treatments  Current treatment: physical therapy  Patient Goals  Patient goals for therapy: decreased edema, decreased pain, increased motion, increased strength, independence with ADLs/IADLs, return to sport/leisure activities and return to work          Objective     General Comments:      Wrist/Hand Comments  ROM left S/P- 60/90;  Wrist E/F- 75/65                   Digits-Loose composite fist/extension                   Thumb MP- 0/50; IP 0/60  Strength-  II- 30/40; 3JC-  9/9; Key-   Circumference at Wrist- 17 9  cm; MPs- 22 0 cm; LF P1- 7 2 cm  Sensation- 3 61 T, IF, MF 1/2 RF                Precautions: progress within safe lifting ability    Manuals 9/3 9/8 9/9 9/17 9/25  9/28           STM 15 15 15 15 15 15                                   tubigrip 2 2                                           Neuro Re-Ed                       HP/Pulsed Biph 15 15 15 15 15  15           MNGE 2/3 2/3 2/3 2/3 2/3  2/3                                                                                                                                   Ther Ex                       TP          Y 2'  Y 2'             5finge         Y 2/10  Y 2/10            Chris         20 2/10  20 2/10           Wily         III 2/10  III 2/10           DB E/F         4 2/10  4 2/10           flexbar         R 2/10  R 2/10           S/P         1# 2/10  1 2/10           Zottman's       4 2/10                                                                     Ther Activity                                                                                                                                               Modalities                       US 12 12 12 12 12  12           CP 15 15 15 15 15  15             Yes

## 2022-02-18 ENCOUNTER — APPOINTMENT (OUTPATIENT)
Dept: PHYSICAL THERAPY | Facility: CLINIC | Age: 67
End: 2022-02-18
Payer: COMMERCIAL

## 2022-02-22 ENCOUNTER — OFFICE VISIT (OUTPATIENT)
Dept: PHYSICAL THERAPY | Facility: CLINIC | Age: 67
End: 2022-02-22
Payer: COMMERCIAL

## 2022-02-22 DIAGNOSIS — M47.816 LUMBAR SPONDYLOSIS: Primary | ICD-10-CM

## 2022-02-22 PROCEDURE — 97110 THERAPEUTIC EXERCISES: CPT

## 2022-02-22 PROCEDURE — 97140 MANUAL THERAPY 1/> REGIONS: CPT

## 2022-02-22 NOTE — PROGRESS NOTES
Daily Note     Today's date: 2022  Patient name: Casa Gomez  : 1955  MRN: [id number]  Referring provider: Patito Godinez DO  Dx:   Encounter Diagnosis     ICD-10-CM    1  Lumbar spondylosis  M47 816                   Subjective: Patient reports low back has been feeling a lot better  He said he had mild L sided LBP this morning, but nothing too bad  He reports compliance with HEP  Objective: See treatment diary below      Assessment: Tolerated treatment well with no significant increase in LBP  Patient demonstrated most difficulty performing supine hand to knee press today  Moderate fatigue post session  Good tolerance to STM with relief noted following  Patient would benefit from continued PT to increase trunk mobility and core strength for improved function in ADLs  Plan: Continue per plan of care  Precautions: hx of L THR      Manuals 2-15 2-      Manual traction        Joint work        flexibility        ST L lumbar paraspinals, iliac crest, glut med L lumbar paraspinals, iliac crest, glut med      Neuro Re-Ed        TA        TA+march        birddog                                        Ther Ex        NuStep/TM  L1 8'      clamshells otb x20 otb x20      Supine march otb x15 otb x20      bridges x20 with 3" hold x20 with 3" hold      Supine hand to knee press x15 with hold x15 with hold              Leg press        Ham curls  P6 2x10      Knee ext  P2 2x10      Ankle strength - band        Standing HR        Lumbar ext        Lumbar flex                SKTC  10"x10      LTR  10"x10      Prone ext                Ther Activity                        Gait Training                        Modalities                          Access Code: [email or web address]  URL: [email or web address]  Date: 02/15/2022  Prepared by: Júnior Sultana    Exercises  · Supine Bridge - 1 x daily - 7 x weekly - 3 sets - 10 reps  · Supine March - 1 x daily - 7 x weekly - 3 sets - 10 reps  · Supine Alternating Knee Taps with Hands - 1 x daily - 7 x weekly - 3 sets - 10 reps  · Hooklying Clamshell with Resistance - 1 x daily - 7 x weekly - 3 sets - 10 reps

## 2022-02-24 ENCOUNTER — APPOINTMENT (OUTPATIENT)
Dept: PHYSICAL THERAPY | Facility: CLINIC | Age: 67
End: 2022-02-24
Payer: COMMERCIAL

## 2022-02-25 ENCOUNTER — APPOINTMENT (OUTPATIENT)
Dept: PHYSICAL THERAPY | Facility: CLINIC | Age: 67
End: 2022-02-25
Payer: COMMERCIAL

## 2022-03-02 ENCOUNTER — OFFICE VISIT (OUTPATIENT)
Dept: PHYSICAL THERAPY | Facility: CLINIC | Age: 67
End: 2022-03-02
Payer: COMMERCIAL

## 2022-03-02 DIAGNOSIS — M47.816 LUMBAR SPONDYLOSIS: Primary | ICD-10-CM

## 2022-03-02 PROCEDURE — 97140 MANUAL THERAPY 1/> REGIONS: CPT

## 2022-03-02 PROCEDURE — 97110 THERAPEUTIC EXERCISES: CPT

## 2022-03-02 NOTE — PROGRESS NOTES
Daily Note     Today's date: 3/2/2022  Patient name: Karo Mckeon  : 1955  MRN: [id number]  Referring provider: Tye Sandifer, DO  Dx:   Encounter Diagnosis     ICD-10-CM    1  Lumbar spondylosis  M47 816                   Subjective: Patient reports that his LBP has decreased since last session  Objective: See treatment diary below  Assessment: Tolerated treatment well  Patient demonstrated fatigue post treatment and would benefit from continued PT to improve core strength and reduce LBP symptoms  No increase in pain t/o session today  Plan: Continue per plan of care  Precautions: hx of L THR      Manuals 2-15  3-1     Manual traction        Joint work        flexibility        ST L lumbar paraspinals, iliac crest, glut med L lumbar paraspinals, iliac crest, glut med L lumbar paraspinals, iliac crest, glut med     Neuro Re-Ed        TA        TA+march        birddog                                        Ther Ex        NuStep/TM  L1 8' L4 10'     clamshells otb x20 otb x20 otb 2x10      Supine march otb x15 otb x20 otb 2x10     bridges x20 with 3" hold x20 with 3" hold 3" x20      Supine hand to knee press x15 with hold x15 with hold 3"x15              Leg press        Ham curls  P6 2x10 P6 2x10     Knee ext  P2 2x10 P3 2x10     Ankle strength - band        Standing HR        Lumbar ext        Lumbar flex                SKTC  10"x10 10"x10      LTR  10"x10 10"x10      Prone ext                Ther Activity                        Gait Training                        Modalities                          Access Code: [email or web address]  URL: [email or web address]  Date: 02/15/2022  Prepared by: Phyllis Jack    Exercises  · Supine Bridge - 1 x daily - 7 x weekly - 3 sets - 10 reps  · Supine March - 1 x daily - 7 x weekly - 3 sets - 10 reps  · Supine Alternating Knee Taps with Hands - 1 x daily - 7 x weekly - 3 sets - 10 reps  · Hooklying Clamshell with Resistance - 1 x daily - 7 x weekly - 3 sets - 10 reps

## 2022-03-04 ENCOUNTER — OFFICE VISIT (OUTPATIENT)
Dept: PHYSICAL THERAPY | Facility: CLINIC | Age: 67
End: 2022-03-04
Payer: COMMERCIAL

## 2022-03-04 DIAGNOSIS — M47.816 LUMBAR SPONDYLOSIS: Primary | ICD-10-CM

## 2022-03-04 PROCEDURE — 97140 MANUAL THERAPY 1/> REGIONS: CPT

## 2022-03-04 PROCEDURE — 97110 THERAPEUTIC EXERCISES: CPT

## 2022-03-04 NOTE — PROGRESS NOTES
Daily Note     Today's date: 3/4/2022  Patient name: Debra Mosley  : 1955  MRN: [id number]  Referring provider: Marck Bolanos DO  Dx:   Encounter Diagnosis     ICD-10-CM    1  Lumbar spondylosis  M47 816                   Subjective: Patient reports that his LBP is minimal today  Objective: See treatment diary below  Assessment: Tolerated treatment well  Patient demonstrated fatigue post treatment, exhibited good technique with therapeutic exercises and would benefit from continued PT to improve LBP and improve core strength for stability  Plan: Continue per plan of care  Precautions: hx of L THR      Manuals 2-15 2-22 3-1 3-4    Manual traction        Joint work        flexibility        ST L lumbar paraspinals, iliac crest, glut med L lumbar paraspinals, iliac crest, glut med L lumbar paraspinals, iliac crest, glut med L lumbar paraspinals, iliac crest, glut med    Neuro Re-Ed        TA        TA+march        birddog                                        Ther Ex        NuStep/TM  L1 8' L4 10' L4 10'    clamshells otb x20 otb x20 otb 2x10  gtb 2x10    Supine march otb x15 otb x20 otb 2x10 gtb 2x10    bridges x20 with 3" hold x20 with 3" hold 3" x20  2x10    Supine hand to knee press x15 with hold x15 with hold 3"x15  3"x15            Leg press        Ham curls  P6 2x10 P6 2x10 P6 3x10    Knee ext  P2 2x10 P3 2x10 P3 3x10    Ankle strength - band        Standing HR        Lumbar ext        Lumbar flex                SKTC  10"x10 10"x10  10"x10    LTR  10"x10 10"x10  10"x10     Prone ext                Ther Activity                        Gait Training                        Modalities                          Access Code: [email or web address]  URL: [email or web address]  Date: 02/15/2022  Prepared by: Stevo Pollen    Exercises  · Supine Bridge - 1 x daily - 7 x weekly - 3 sets - 10 reps  · Supine March - 1 x daily - 7 x weekly - 3 sets - 10 reps  · Supine Alternating Knee Taps with Hands - 1 x daily - 7 x weekly - 3 sets - 10 reps  · Hooklying Clamshell with Resistance - 1 x daily - 7 x weekly - 3 sets - 10 reps

## 2022-03-08 ENCOUNTER — APPOINTMENT (OUTPATIENT)
Dept: PHYSICAL THERAPY | Facility: CLINIC | Age: 67
End: 2022-03-08
Payer: COMMERCIAL

## 2022-03-08 ENCOUNTER — OFFICE VISIT (OUTPATIENT)
Dept: PHYSICAL THERAPY | Facility: CLINIC | Age: 67
End: 2022-03-08
Payer: COMMERCIAL

## 2022-03-08 DIAGNOSIS — M47.816 LUMBAR SPONDYLOSIS: Primary | ICD-10-CM

## 2022-03-08 PROCEDURE — 97110 THERAPEUTIC EXERCISES: CPT

## 2022-03-08 PROCEDURE — 97140 MANUAL THERAPY 1/> REGIONS: CPT

## 2022-03-08 NOTE — PROGRESS NOTES
Daily Note     Today's date: 3/8/2022  Patient name: Tae Fang  : 1955  MRN: [id number]  Referring provider: Dewey Justice DO  Dx:   Encounter Diagnosis     ICD-10-CM    1  Lumbar spondylosis  M47 816                   Subjective: Patient reports having increased LBP R>L after doing his exercises in the hot tub x2 since lv  He states Tylenol relieved the pain and that he feels fine today  Objective: See treatment diary below  Assessment: Tolerated treatment well  No increased pain or adverse sx's reported t/o tx  Patient demonstrated fatigue post treatment, exhibited good technique with therapeutic exercises and would benefit from continued PT to improve core strength and stability in order to improve tolerance to ADLs and functional activity  Plan: Continue per plan of care        Precautions: hx of L THR      Manuals 2-15 2-22 3-1 3-4 3-8   Manual traction        Joint work        flexibility        ST L lumbar paraspinals, iliac crest, glut med L lumbar paraspinals, iliac crest, glut med L lumbar paraspinals, iliac crest, glut med L lumbar paraspinals, iliac crest, glut med L lumbar paraspinals, iliac crest, glut med   Neuro Re-Ed        TA        TA+march        birddog                                        Ther Ex        NuStep/TM  L1 8' L4 10' L4 10' L3 10'   clamshells otb x20 otb x20 otb 2x10  gtb 2x10 gtb 2x10   Supine march otb x15 otb x20 otb 2x10 gtb 2x10 gtb 2x10   bridges x20 with 3" hold x20 with 3" hold 3" x20  2x10 2x10   Supine hand to knee press x15 with hold x15 with hold 3"x15  3"x15 3"x15           Leg press        Ham curls  P6 2x10 P6 2x10 P6 3x10 P6 3x10   Knee ext  P2 2x10 P3 2x10 P3 3x10 P3 3x10   Ankle strength - band        Standing HR        Lumbar ext        Lumbar flex                SKTC  10"x10 10"x10  10"x10 10"x10   LTR  10"x10 10"x10  10"x10  10"x10   Prone ext                Ther Activity                        Gait Training Modalities                          Access Code: [email or web address]  URL: [email or web address]  Date: 02/15/2022  Prepared by: Della Funk    Exercises  · Supine Bridge - 1 x daily - 7 x weekly - 3 sets - 10 reps  · Supine March - 1 x daily - 7 x weekly - 3 sets - 10 reps  · Supine Alternating Knee Taps with Hands - 1 x daily - 7 x weekly - 3 sets - 10 reps  · Hooklying Clamshell with Resistance - 1 x daily - 7 x weekly - 3 sets - 10 reps

## 2022-03-10 ENCOUNTER — OFFICE VISIT (OUTPATIENT)
Dept: PHYSICAL THERAPY | Facility: CLINIC | Age: 67
End: 2022-03-10
Payer: COMMERCIAL

## 2022-03-10 DIAGNOSIS — M47.816 LUMBAR SPONDYLOSIS: Primary | ICD-10-CM

## 2022-03-10 PROCEDURE — 97110 THERAPEUTIC EXERCISES: CPT

## 2022-03-10 PROCEDURE — 97140 MANUAL THERAPY 1/> REGIONS: CPT

## 2022-03-10 NOTE — PROGRESS NOTES
Daily Note     Today's date: 3/10/2022  Patient name: Evette Forte  : 1955  MRN: [id number]  Referring provider: Obey Salinas DO  Dx:   Encounter Diagnosis     ICD-10-CM    1  Lumbar spondylosis  M47 816                   Subjective: Patient reports that his LBP is minimal today  He is compliant with HEP  Objective: See treatment diary below  Assessment: Tolerated treatment well  Patient demonstrated fatigue post treatment and would benefit from continued PT to improve core and hip strength for lower back stability  He had no increase in LBP t/o session today  Plan: Continue per plan of care        Precautions: hx of L THR      Manuals 3-9 2-22 3-1 3-4 3-8   Manual traction        Joint work        flexibility        ST L lumbar paraspinals, iliac crest, glut med L lumbar paraspinals, iliac crest, glut med L lumbar paraspinals, iliac crest, glut med L lumbar paraspinals, iliac crest, glut med L lumbar paraspinals, iliac crest, glut med   Neuro Re-Ed        TA        TA+march        birddog                                        Ther Ex        NuStep/TM L4 10' L1 8' L4 10' L4 10' L3 10'   clamshells gtb 2x10  otb x20 otb 2x10  gtb 2x10 gtb 2x10   Supine march gtb 2x10 otb x20 otb 2x10 gtb 2x10 gtb 2x10   bridges 2x10 x20 with 3" hold 3" x20  2x10 2x10   Supine hand to knee press 3"x15 x15 with hold 3"x15  3"x15 3"x15           Leg press        Ham curls P6 3x10 P6 2x10 P6 2x10 P6 3x10 P6 3x10   Knee ext P3 3x10 P2 2x10 P3 2x10 P3 3x10 P3 3x10   Ankle strength - band        Standing HR        Lumbar ext        Lumbar flex                SKTC 10"x10  10"x10 10"x10  10"x10 10"x10   LTR 10"x10  10"x10 10"x10  10"x10  10"x10   Prone ext                Ther Activity                        Gait Training                        Modalities

## 2022-03-15 ENCOUNTER — OFFICE VISIT (OUTPATIENT)
Dept: PHYSICAL THERAPY | Facility: CLINIC | Age: 67
End: 2022-03-15
Payer: COMMERCIAL

## 2022-03-15 DIAGNOSIS — M47.816 LUMBAR SPONDYLOSIS: Primary | ICD-10-CM

## 2022-03-15 PROCEDURE — 97112 NEUROMUSCULAR REEDUCATION: CPT

## 2022-03-15 PROCEDURE — 97140 MANUAL THERAPY 1/> REGIONS: CPT

## 2022-03-15 PROCEDURE — 97110 THERAPEUTIC EXERCISES: CPT

## 2022-03-15 NOTE — PROGRESS NOTES
Daily Note     Today's date: 3/15/2022  Patient name: Katina Stuart  : 1955  MRN: [id number]  Referring provider: Casey Brunson DO  Dx:   Encounter Diagnosis     ICD-10-CM    1  Lumbar spondylosis  M47 816                   Subjective: Pt reports he had a rough day over the weekend with increased LS pain  Reports soreness along LS today and R side of hip/LS  Objective: See treatment diary below      Assessment: Tolerated treatment fair  Patient demonstrated fatigue post treatment  Pt unable to complete bridging today secondary to pain  Pt with some relief of sx along LS and R LS/Hip today with STM  Plan: Continue per plan of care  RC next visit  Precautions: hx of L THR      Manuals 3-9 3-15 3-1 3-4 3-8   Manual traction        Joint work        flexibility        ST L lumbar paraspinals, iliac crest, glut med L lumbar paraspinals, iliac crest, glut med L lumbar paraspinals, iliac crest, glut med L lumbar paraspinals, iliac crest, glut med L lumbar paraspinals, iliac crest, glut med   Neuro Re-Ed        TA        TA+march        birddog                                        Ther Ex        NuStep/TM L4 10' L4 10' L4 10' L4 10' L3 10'   clamshells gtb 2x10  gtb x20 otb 2x10  gtb 2x10 gtb 2x10   Supine march gtb 2x10 gtb x20 otb 2x10 gtb 2x10 gtb 2x10   bridges 2x10 Pain   Unable 3" x20  2x10 2x10   Supine hand to knee press 3"x15 x15 with hold 3" 3"x15  3"x15 3"x15           Leg press        Ham curls P6 3x10 P6 3x10 P6 2x10 P6 3x10 P6 3x10   Knee ext P3 3x10 P2 3x10 P3 2x10 P3 3x10 P3 3x10   Ankle strength - band        Standing HR        Lumbar ext        Lumbar flex                SKTC 10"x10  10"x10 10"x10  10"x10 10"x10   LTR 10"x10  10"x10 10"x10  10"x10  10"x10   Prone ext                Ther Activity                        Gait Training                        Modalities

## 2022-03-18 ENCOUNTER — APPOINTMENT (OUTPATIENT)
Dept: PHYSICAL THERAPY | Facility: CLINIC | Age: 67
End: 2022-03-18
Payer: COMMERCIAL

## 2022-03-21 ENCOUNTER — APPOINTMENT (OUTPATIENT)
Dept: LAB | Facility: HOSPITAL | Age: 67
End: 2022-03-21
Attending: INTERNAL MEDICINE
Payer: COMMERCIAL

## 2022-03-21 DIAGNOSIS — N18.31 STAGE 3A CHRONIC KIDNEY DISEASE (HCC): ICD-10-CM

## 2022-03-21 LAB
ALBUMIN SERPL BCP-MCNC: 3.8 G/DL (ref 3.5–5)
ALP SERPL-CCNC: 67 U/L (ref 46–116)
ALT SERPL W P-5'-P-CCNC: 26 U/L (ref 12–78)
ANION GAP SERPL CALCULATED.3IONS-SCNC: 7 MMOL/L (ref 4–13)
AST SERPL W P-5'-P-CCNC: 22 U/L (ref 5–45)
BACTERIA UR QL AUTO: ABNORMAL /HPF
BASOPHILS # BLD AUTO: 0.01 THOUSANDS/ΜL (ref 0–0.1)
BASOPHILS NFR BLD AUTO: 0 % (ref 0–1)
BILIRUB SERPL-MCNC: 0.73 MG/DL (ref 0.2–1)
BILIRUB UR QL STRIP: NEGATIVE
BUN SERPL-MCNC: 22 MG/DL (ref 5–25)
CALCIUM SERPL-MCNC: 9 MG/DL (ref 8.3–10.1)
CHLORIDE SERPL-SCNC: 106 MMOL/L (ref 100–108)
CLARITY UR: CLEAR
CO2 SERPL-SCNC: 28 MMOL/L (ref 21–32)
COLOR UR: YELLOW
CREAT SERPL-MCNC: 0.95 MG/DL (ref 0.6–1.3)
CREAT UR-MCNC: 123 MG/DL
EOSINOPHIL # BLD AUTO: 0.09 THOUSAND/ΜL (ref 0–0.61)
EOSINOPHIL NFR BLD AUTO: 2 % (ref 0–6)
ERYTHROCYTE [DISTWIDTH] IN BLOOD BY AUTOMATED COUNT: 13.2 % (ref 11.6–15.1)
GFR SERPL CREATININE-BSD FRML MDRD: 82 ML/MIN/1.73SQ M
GLUCOSE P FAST SERPL-MCNC: 93 MG/DL (ref 65–99)
GLUCOSE UR STRIP-MCNC: NEGATIVE MG/DL
HCT VFR BLD AUTO: 40.3 % (ref 36.5–49.3)
HGB BLD-MCNC: 14 G/DL (ref 12–17)
HGB UR QL STRIP.AUTO: NEGATIVE
IMM GRANULOCYTES # BLD AUTO: 0.01 THOUSAND/UL (ref 0–0.2)
IMM GRANULOCYTES NFR BLD AUTO: 0 % (ref 0–2)
KETONES UR STRIP-MCNC: NEGATIVE MG/DL
LEUKOCYTE ESTERASE UR QL STRIP: NEGATIVE
LYMPHOCYTES # BLD AUTO: 1.09 THOUSANDS/ΜL (ref 0.6–4.47)
LYMPHOCYTES NFR BLD AUTO: 24 % (ref 14–44)
MAGNESIUM SERPL-MCNC: 2.4 MG/DL (ref 1.6–2.6)
MCH RBC QN AUTO: 31 PG (ref 26.8–34.3)
MCHC RBC AUTO-ENTMCNC: 34.7 G/DL (ref 31.4–37.4)
MCV RBC AUTO: 89 FL (ref 82–98)
MICROALBUMIN UR-MCNC: 9.9 MG/L (ref 0–20)
MICROALBUMIN/CREAT 24H UR: 8 MG/G CREATININE (ref 0–30)
MONOCYTES # BLD AUTO: 0.38 THOUSAND/ΜL (ref 0.17–1.22)
MONOCYTES NFR BLD AUTO: 8 % (ref 4–12)
NEUTROPHILS # BLD AUTO: 3.05 THOUSANDS/ΜL (ref 1.85–7.62)
NEUTS SEG NFR BLD AUTO: 66 % (ref 43–75)
NITRITE UR QL STRIP: NEGATIVE
NON-SQ EPI CELLS URNS QL MICRO: ABNORMAL /HPF
NRBC BLD AUTO-RTO: 0 /100 WBCS
PH UR STRIP.AUTO: 5.5 [PH]
PHOSPHATE SERPL-MCNC: 3 MG/DL (ref 2.3–4.1)
PLATELET # BLD AUTO: 172 THOUSANDS/UL (ref 149–390)
PMV BLD AUTO: 10.5 FL (ref 8.9–12.7)
POTASSIUM SERPL-SCNC: 4.2 MMOL/L (ref 3.5–5.3)
PROT SERPL-MCNC: 8.4 G/DL (ref 6.4–8.2)
PROT UR STRIP-MCNC: NEGATIVE MG/DL
PTH-INTACT SERPL-MCNC: 59.4 PG/ML (ref 18.4–80.1)
RBC # BLD AUTO: 4.51 MILLION/UL (ref 3.88–5.62)
RBC #/AREA URNS AUTO: ABNORMAL /HPF
SODIUM SERPL-SCNC: 141 MMOL/L (ref 136–145)
SP GR UR STRIP.AUTO: 1.02 (ref 1–1.03)
UROBILINOGEN UR QL STRIP.AUTO: 0.2 E.U./DL
WBC # BLD AUTO: 4.63 THOUSAND/UL (ref 4.31–10.16)
WBC #/AREA URNS AUTO: ABNORMAL /HPF

## 2022-03-21 PROCEDURE — 81001 URINALYSIS AUTO W/SCOPE: CPT

## 2022-03-21 PROCEDURE — 82043 UR ALBUMIN QUANTITATIVE: CPT

## 2022-03-21 PROCEDURE — 85025 COMPLETE CBC W/AUTO DIFF WBC: CPT

## 2022-03-21 PROCEDURE — 82570 ASSAY OF URINE CREATININE: CPT

## 2022-03-21 PROCEDURE — 36415 COLL VENOUS BLD VENIPUNCTURE: CPT

## 2022-03-21 PROCEDURE — 83735 ASSAY OF MAGNESIUM: CPT

## 2022-03-21 PROCEDURE — 80053 COMPREHEN METABOLIC PANEL: CPT

## 2022-03-21 PROCEDURE — 84100 ASSAY OF PHOSPHORUS: CPT

## 2022-03-21 PROCEDURE — 83970 ASSAY OF PARATHORMONE: CPT

## 2022-03-22 ENCOUNTER — EVALUATION (OUTPATIENT)
Dept: PHYSICAL THERAPY | Facility: CLINIC | Age: 67
End: 2022-03-22
Payer: COMMERCIAL

## 2022-03-22 DIAGNOSIS — M47.816 LUMBAR SPONDYLOSIS: Primary | ICD-10-CM

## 2022-03-22 PROCEDURE — 97140 MANUAL THERAPY 1/> REGIONS: CPT

## 2022-03-22 PROCEDURE — 97110 THERAPEUTIC EXERCISES: CPT

## 2022-03-22 NOTE — PROGRESS NOTES
PT Re-Evaluation     Today's date: 3/22/2022  Patient name: Sheyla Morales  : 1955  MRN: [id number]  Referring provider: Lonny Grayson DO  Dx:   Encounter Diagnosis     ICD-10-CM    1  Lumbar spondylosis  M47 816          Assessment  Assessment details: Pt continues to have intermittent,non specific low back pain  Denies any pain into left hip/buttock recently  Recommended he start a walking program with goal of 3x/wk  Continues to have leg, hip and trunk weakness  Will benefit from continued therapy focused on addressing these findings  Impairments: abnormal or restricted ROM, impaired physical strength and pain with function  Functional limitations: at times sitting in morning, at times walking, varies greatly with weather  Symptom irritability: lowUnderstanding of Dx/Px/POC: good   Prognosis: good    Goals  ST-3 weeks - partially met  Instruct in low level trunk and hip strength program for HEP  Educate and instruct on proper lifting techniques and back safety  Assess balance to determine proper hip stability and prevent trendelenberg patterns    LT-6 weeks - partially met  5/5 strength quad and hamstrings  4/5 or greater with all hip strength  <3/10 pain with sitting in AM and all daily activities  Left side bend without any pain      Plan  Plan details: Pt will benefit from continued PT to address noted limitations  Treatment may include TE, NMR, MT, TA, or modalities as appropriate  Patient would benefit from: skilled physical therapy  Planned modality interventions: cryotherapy and thermotherapy: hydrocollator packs  Planned therapy interventions: manual therapy, neuromuscular re-education, self care, therapeutic activities, therapeutic exercise and home exercise program  Frequency: 2x week  Duration in weeks: 4  Treatment plan discussed with: patient        Subjective Evaluation    History of Present Illness  Mechanism of injury: Good and bad days   Feels when weather changes pain is at worst  Also after sitting for a while then standing he gets sore  CC is pain R left side and central low back  Denies pain into left hip/buttock recently    Pain  Current pain ratin  At worst pain ratin    Social Support  Stairs in house: yes   Lives in: [facility] [facility]  Lives with: spouse    Patient Goals  Patient goals for therapy: decreased pain and increased strength  Patient goal: dec pain with sitting in morning, manage symptoms        Objective     General Comments:      Lumbar Comments  Observation/Gait  Transfers - requires UE to complete sit-stand      Multi-seg movement patterns  Flex: prox tibia, pulling post LE b/l  Ext: scap just past heels no pain  L SB: mid thigh no pain  R SB: mid thigh, no pain      Lumbar  Slight dec in lordosis  Pain with pa throughout, tender b/l paraspinal and b/l  iliac crest/glut med      Hip screen  Flex: 90 deg   Abd: 25 deg    Hamstring: good mobility      Strength/myotome  Hip flex: 4+/5 L, 4-/5 R  Hip abd: 3-/5 L, 3+/5 R  Knee ext 4/5  Knee flex: 4+/5  Ankle strong                     Precautions: hx of L THR

## 2022-03-22 NOTE — PROGRESS NOTES
Daily Note     Today's date: 3/22/2022  Patient name: Anabel Gardner  : 1955  MRN: [id number]  Referring provider: Rey Smoker, DO  Dx:   Encounter Diagnosis     ICD-10-CM    1  Lumbar spondylosis  M47 816                   Subjective: Patient reports that his LBP has  from last session  He feels very sore and has pain after he performs his HEP, and weather changes  Sitting longer periods of times causes stiffness  Objective: See treatment diary below  Lumbar ext, flexion, standing abd, leg press added into program today  Assessment: Tolerated treatment well  Patient demonstrated fatigue post treatment and would benefit from continued PT to improve B LE and core strength  Able to tolerate progression with no LBP  Plan: Continue per plan of care  Precautions: hx of L THR      Manuals 3-9 3-15 3-22 3-4 3-8   Manual traction        Joint work        flexibility        ST L lumbar paraspinals, iliac crest, glut med L lumbar paraspinals, iliac crest, glut med  L lumbar paraspinals, iliac crest, glut med L lumbar paraspinals, iliac crest, glut med   Assessment   MM      Neuro Re-Ed        TA        TA+march        birddog                                        Ther Ex        NuStep/TM L4 10' L4 10' TM NV L4 10' L3 10'   clamshells gtb 2x10  gtb x20  gtb 2x10 gtb 2x10   Supine march gtb 2x10 gtb x20  gtb 2x10 gtb 2x10   bridges 2x10 Pain   Unable  2x10 2x10   Supine hand to knee press 3"x15 x15 with hold 3"  3"x15 3"x15           Leg press   Unable      Ham curls P6 3x10 P6 3x10 P6 3x10 P6 3x10 P6 3x10   Knee ext P3 3x10 P2 3x10 P3  3x10 P3 3x10 P3 3x10   Ankle strength - band        Standing HR        Lumbar ext   70# 3x10     Lumbar flex   50# 3x10     Standing abd   2x10     SKTC 10"x10  10"x10  10"x10 10"x10   LTR 10"x10  10"x10  10"x10  10"x10   Prone ext                Ther Activity                        Gait Training                        Modalities

## 2022-03-22 NOTE — LETTER
2022    Maximiano Osgood, DO  [facility]  [address] [place], [place] [id number] [facility] [address] [facility]    Patient: Alva Abreu   YOB: 1955   Date of Visit: 3/22/2022     Encounter Diagnosis     ICD-10-CM    1  Lumbar spondylosis  M47 816        Dear Dr Edyta Thompson: Thank you for your recent referral of Alva Abreu  Please review the attached evaluation summary from [facility] recent visit  Please verify that you agree with the plan of care by signing the attached order  If you have any questions or concerns, please do not hesitate to call  I sincerely appreciate the opportunity to share in the care of one of your patients and hope to have another opportunity to work with you in the near future  Sincerely,    Nataliya Canseco, PT      Referring Provider:      I certify that I have read the below Plan of Care and certify the need for these services furnished under this plan of treatment while under my care  Maximiano Osgood, DO  [address]  [address] [place], [place] [id number] [facility] [address] [facility]  Via Fax: [phone]          Daily Note     Today's date: 3/22/2022  Patient name: Alva Abreu  : 1955  MRN: [id number]  Referring provider: Susan Price DO  Dx:   Encounter Diagnosis     ICD-10-CM    1  Lumbar spondylosis  M47 816                   Subjective: Patient reports that his LBP has  from last session  He feels very sore and has pain after he performs his HEP, and weather changes  Sitting longer periods of times causes stiffness  Objective: See treatment diary below  Lumbar ext, flexion, standing abd, leg press added into program today  Assessment: Tolerated treatment well  Patient demonstrated fatigue post treatment and would benefit from continued PT to improve B LE and core strength  Able to tolerate progression with no LBP  Plan: Continue per plan of care        Precautions: hx of L THR      Manuals 3-9 3-15 3-22 3-4 3-8   Manual traction Joint work        flexibility        ST L lumbar paraspinals, iliac crest, glut med L lumbar paraspinals, iliac crest, glut med  L lumbar paraspinals, iliac crest, glut med L lumbar paraspinals, iliac crest, glut med   Assessment   MM      Neuro Re-Ed        TA        TA+march        birddog                                        Ther Ex        NuStep/TM L4 10' L4 10' TM NV L4 10' L3 10'   clamshells gtb 2x10  gtb x20  gtb 2x10 gtb 2x10   Supine march gtb 2x10 gtb x20  gtb 2x10 gtb 2x10   bridges 2x10 Pain  Unable  2x10 2x10   Supine hand to knee press 3"x15 x15 with hold 3"  3"x15 3"x15           Leg press   Unable      Ham curls P6 3x10 P6 3x10 P6 3x10 P6 3x10 P6 3x10   Knee ext P3 3x10 P2 3x10 P3  3x10 P3 3x10 P3 3x10   Ankle strength - band        Standing HR        Lumbar ext   70# 3x10     Lumbar flex   50# 3x10     Standing abd   2x10     SKTC 10"x10  10"x10  10"x10 10"x10   LTR 10"x10  10"x10  10"x10  10"x10   Prone ext                Ther Activity                        Gait Training                        Modalities                                                     PT Re-Evaluation     Today's date: 3/22/2022  Patient name: Ahsan Rogers  : 1955  MRN: [id number]  Referring provider: Lauren Ya DO  Dx:   Encounter Diagnosis     ICD-10-CM    1  Lumbar spondylosis  M47 816          Assessment  Assessment details: Pt continues to have intermittent,non specific low back pain  Denies any pain into left hip/buttock recently  Recommended he start a walking program with goal of 3x/wk  Continues to have leg, hip and trunk weakness  Will benefit from continued therapy focused on addressing these findings    Impairments: abnormal or restricted ROM, impaired physical strength and pain with function  Functional limitations: at times sitting in morning, at times walking, varies greatly with weather  Symptom irritability: lowUnderstanding of Dx/Px/POC: good   Prognosis: good    Goals  ST-3 weeks - partially met  Instruct in low level trunk and hip strength program for HEP  Educate and instruct on proper lifting techniques and back safety  Assess balance to determine proper hip stability and prevent trendelenberg patterns    LT-6 weeks - partially met  5/5 strength quad and hamstrings  4/5 or greater with all hip strength  <3/10 pain with sitting in AM and all daily activities  Left side bend without any pain      Plan  Plan details: Pt will benefit from continued PT to address noted limitations  Treatment may include TE, NMR, MT, TA, or modalities as appropriate  Patient would benefit from: skilled physical therapy  Planned modality interventions: cryotherapy and thermotherapy: hydrocollator packs  Planned therapy interventions: manual therapy, neuromuscular re-education, self care, therapeutic activities, therapeutic exercise and home exercise program  Frequency: 2x week  Duration in weeks: 4  Treatment plan discussed with: patient        Subjective Evaluation    History of Present Illness  Mechanism of injury: Good and bad days  Feels when weather changes pain is at worst  Also after sitting for a while then standing he gets sore  CC is pain R left side and central low back  Denies pain into left hip/buttock recently    Pain  Current pain ratin  At worst pain ratin    Social Support  Stairs in house: yes   Lives in: [facility]  Lives with: spouse    Patient Goals  Patient goals for therapy: decreased pain and increased strength  Patient goal: dec pain with sitting in morning, manage symptoms        Objective     General Comments:      Lumbar Comments  Observation/Gait  Transfers - requires UE to complete sit-stand      Multi-seg movement patterns  Flex: prox tibia, pulling post LE b/l  Ext: scap just past heels no pain  L SB: mid thigh no pain  R SB: mid thigh, no pain      Lumbar  Slight dec in lordosis  Pain with pa throughout, tender b/l paraspinal and b/l  iliac crest/glut med      Hip screen  Flex: 90 deg   Abd: 25 deg    Hamstring: good mobility      Strength/myotome  Hip flex: 4+/5 L, 4-/5 R  Hip abd: 3-/5 L, 3+/5 R  Knee ext 4/5  Knee flex: 4+/5  Ankle strong                     Precautions: hx of L THR

## 2022-03-30 ENCOUNTER — OFFICE VISIT (OUTPATIENT)
Dept: PHYSICAL THERAPY | Facility: CLINIC | Age: 67
End: 2022-03-30
Payer: COMMERCIAL

## 2022-03-30 DIAGNOSIS — M47.816 LUMBAR SPONDYLOSIS: Primary | ICD-10-CM

## 2022-03-30 PROCEDURE — 97110 THERAPEUTIC EXERCISES: CPT

## 2022-03-30 NOTE — PROGRESS NOTES
Daily Note     Today's date: 3/30/2022  Patient name: Tae Fang  : 1955  MRN: [id number]  Referring provider: Dewey Justice DO  Dx:   Encounter Diagnosis     ICD-10-CM    1  Lumbar spondylosis  M47 816                   Subjective: Patient reports his LBP is minimal, No pain with progression last session  He strained L pec muscle  Objective: See treatment diary below  Assessment: Tolerated treatment well  Patient demonstrated fatigue post treatment, exhibited good technique with therapeutic exercises and would benefit from continued PT to improve strength and core strength to improve ambulating further distances  His LBP is minimal and he has no change in pain t/o session  Plan: Continue per plan of care  Precautions: hx of L THR        Manuals 3-9 3-15 3-22 3-30 3-8   Manual traction             Joint work             flexibility             ST L lumbar paraspinals, iliac crest, glut med L lumbar paraspinals, iliac crest, glut med    L lumbar paraspinals, iliac crest, glut med   Assessment     MM       Neuro Re-Ed            TA            TA+march            birddog                                                                Ther Ex            NuStep/TM L4 10' L4 10' TM NV TM 12' L3 10'   clamshells gtb 2x10  gtb x20    gtb 2x10   Supine march gtb 2x10 gtb x20    gtb 2x10   bridges 2x10 Pain   Unable    2x10   Supine hand to knee press 3"x15 x15 with hold 3"    3"x15                Leg press     Unable       Ham curls P6 3x10 P6 3x10 P6 3x10 P7 3x10 P6 3x10   Knee ext P3 3x10 P2 3x10 P3  3x10 P1-3 3x10 P3 3x10   Ankle strength - band            Standing HR            Lumbar ext     70# 3x10 80# 3x10     Lumbar flex     50# 3x10 50# 3x10     Standing abd     2x10 2x10 ea     SKTC 10"x10  10"x10    10"x10   LTR 10"x10  10"x10    10"x10   Prone ext                         Ther Activity                                      Gait Training                                    Modalities

## 2022-04-01 ENCOUNTER — OFFICE VISIT (OUTPATIENT)
Dept: PHYSICAL THERAPY | Facility: CLINIC | Age: 67
End: 2022-04-01
Payer: COMMERCIAL

## 2022-04-01 DIAGNOSIS — M47.816 LUMBAR SPONDYLOSIS: Primary | ICD-10-CM

## 2022-04-01 PROCEDURE — 97110 THERAPEUTIC EXERCISES: CPT

## 2022-04-01 PROCEDURE — 97112 NEUROMUSCULAR REEDUCATION: CPT

## 2022-04-01 NOTE — PROGRESS NOTES
Daily Note     Today's date: 2022  Patient name: Prabhu Peraza  : 1955  MRN: [id number]  Referring provider: Galdino Colmenares DO  Dx:   Encounter Diagnosis     ICD-10-CM    1  Lumbar spondylosis  M47 816                   Subjective: Denies L-S pain upon arrival        Objective: See treatment diary below  Assessment: Tolerated treatment well  General fatigue with current POC but remained pain free  Patient demonstrated fatigue post treatment, exhibited good technique with therapeutic exercises and would benefit from continued PT to improve strength and core strength to improve ambulating further distances  Plan: Continue per plan of care  Precautions: hx of L THR        Manuals 3-9 3-15 3-22 3-30    Manual traction             Joint work             flexibility             ST L lumbar paraspinals, iliac crest, glut med L lumbar paraspinals, iliac crest, glut med       Assessment     MM       Neuro Re-Ed            TA            TA+march            birddog                                                                Ther Ex            NuStep/TM L4 10' L4 10' TM NV TM 12' TM 14'   clamshells gtb 2x10  gtb x20       Supine march gtb 2x10 gtb x20       bridges 2x10 Pain  Unable       Supine hand to knee press 3"x15 x15 with hold 3"                    Leg press     Unable       Ham curls P6 3x10 P6 3x10 P6 3x10 P7 3x10 P7 3x10   Knee ext P3 3x10 P2 3x10 P3  3x10 P1-3 3x10 P1-3   3x10   Ankle strength - band            Standing HR            Lumbar ext     70# 3x10 80# 3x10  90# 3x10   Lumbar flex     50# 3x10 50# 3x10  50# 3x10   Standing abd     2x10 2x10 ea  2x10 ea     SKTC 10"x10  10"x10       LTR 10"x10  10"x10       Prone ext                         Ther Activity                                      Gait Training                                      Modalities

## 2022-04-04 DIAGNOSIS — I50.33 ACUTE ON CHRONIC DIASTOLIC CONGESTIVE HEART FAILURE (HCC): ICD-10-CM

## 2022-04-05 ENCOUNTER — OFFICE VISIT (OUTPATIENT)
Dept: PHYSICAL THERAPY | Facility: CLINIC | Age: 67
End: 2022-04-05
Payer: COMMERCIAL

## 2022-04-05 DIAGNOSIS — M47.816 LUMBAR SPONDYLOSIS: Primary | ICD-10-CM

## 2022-04-05 PROCEDURE — 97110 THERAPEUTIC EXERCISES: CPT

## 2022-04-05 RX ORDER — RIVAROXABAN 20 MG/1
TABLET, FILM COATED ORAL
Qty: 90 TABLET | Refills: 1 | Status: SHIPPED | OUTPATIENT
Start: 2022-04-05

## 2022-04-05 RX ORDER — SPIRONOLACTONE 25 MG/1
TABLET ORAL
Qty: 180 TABLET | Refills: 1 | Status: SHIPPED | OUTPATIENT
Start: 2022-04-05

## 2022-04-05 NOTE — PROGRESS NOTES
Daily Note     Today's date: 2022  Patient name: Elena Lucero  : 1955  MRN: [id number]  Referring provider: Claudia Cole DO  Dx:   Encounter Diagnosis     ICD-10-CM    1  Lumbar spondylosis  M47 816                   Subjective: Patient reports that his LBP has soreness today when he woke up, this improved as time went on and exercise  Objective: See treatment diary below  Incorporated anti rotations, machine rows, and CC LAE into program        Assessment: Tolerated treatment well  Patient demonstrated fatigue post treatment, exhibited good technique with therapeutic exercises and would benefit from continued PT to improve core and B LE strength for functionality  He has no increase increase in pain t/o session  Strength is improving due to being lisa to tolerate progression to TE today  Plan: Continue per plan of care  Precautions: hx of L THR        Manuals 4-4 3-15 3- 3-30    Manual traction             Joint work             flexibility             ST  L lumbar paraspinals, iliac crest, glut med       Assessment    MM       Neuro Re-Ed           TA           TA+march           birddog                                                           Ther Ex           NuStep/TM TM 10' L4 10' TM NV TM 12' TM 14'   clamshells  gtb x20       Supine march  gtb x20       bridges  Pain  Unable       Supine hand to knee press  x15 with hold 3"        CC LAE P6 3x10          Leg press    Unable       Ham curls P8 3x0 P6 3x10 P6 3x10 P7 3x10 P7 3x10   Knee ext P2 3x10 P2 3x10 P3  3x10 P1-3 3x10 P1-3   3x10   Machine Rows P3 3x10          Anti Rotations P1 2x10          Lumbar ext 90# 3x10   70# 3x10 80# 3x10  90# 3x10   Lumbar flex 60# 3x10   50# 3x10 50# 3x10  50# 3x10   Standing abd 2# 2x10    2x10 2x10 ea  2x10 ea     SKTC  10"x10       LTR  10"x10       Prone ext                       Ther Activity                                   Gait Training                                   Modalities        4/1

## 2022-04-08 ENCOUNTER — APPOINTMENT (OUTPATIENT)
Dept: PHYSICAL THERAPY | Facility: CLINIC | Age: 67
End: 2022-04-08
Payer: COMMERCIAL

## 2022-04-12 ENCOUNTER — OFFICE VISIT (OUTPATIENT)
Dept: SLEEP CENTER | Facility: CLINIC | Age: 67
End: 2022-04-12
Payer: COMMERCIAL

## 2022-04-12 VITALS
HEIGHT: 69 IN | DIASTOLIC BLOOD PRESSURE: 70 MMHG | BODY MASS INDEX: 44.82 KG/M2 | HEART RATE: 69 BPM | OXYGEN SATURATION: 93 % | SYSTOLIC BLOOD PRESSURE: 122 MMHG | WEIGHT: 302.6 LBS

## 2022-04-12 DIAGNOSIS — G47.33 OSA (OBSTRUCTIVE SLEEP APNEA): Primary | ICD-10-CM

## 2022-04-12 PROCEDURE — 99213 OFFICE O/P EST LOW 20 MIN: CPT | Performed by: INTERNAL MEDICINE

## 2022-04-12 RX ORDER — AZITHROMYCIN 250 MG/1
TABLET, FILM COATED ORAL
COMMUNITY
Start: 2022-04-12

## 2022-04-12 NOTE — PROGRESS NOTES
Progress Note - [facility] Center   Chirag Clement CFS:[birth year] MRN: [id number]      Reason for Visit:  78 y o male here for annual follow-up    Assessment:  Doing well on current therapy of BiPAP 20/14 cm cm for very severe ROBERT (AHI = 70 0)  Plan:  Continue same    Follow up: One year    History of Present Illness:  History of ROBERT on PAP therapy  Previously was compliant and deriving benefit  He stopped using the device after the recall, but has received his replacement and will start using it soon  He is hesitant at this moment because of sinus pressure due to sinusitis      Review of Systems      Genitourinary need to urinate more than twice a night and difficulty with erection   Cardiology ankle/leg swelling   Gastrointestinal none   Neurology muscle weakness   Constitutional none   Integumentary none   Psychiatry none   Musculoskeletal back pain   Pulmonary shortness of breath with activity   ENT none   Endocrine frequent urination   Hematological none           I have reviewed and updated the review of systems as necessary      Historical Information    Past Medical History:   Past Medical History:   Diagnosis Date    A-fib ([facility] )     ALANNA (acute kidney injury) ([facility] ) 6/15/2021    Arthritis     CPAP (continuous positive airway pressure) dependence     Irregular heart beat     Lymphedema     Sleep apnea     Urinary frequency     Wears glasses     Wears partial dentures     lower partial         Past Surgical History:   Past Surgical History:   Procedure Laterality Date    ABLATION SAPHENOUS VEIN W/ RFA      COLONOSCOPY      NV CYSTOURETHRO W/IMPLANT N/A 11/13/2017    Procedure: CYSTOSCOPY WITH INSERTION UROLIFT;  Surgeon: Homer Whitlock DO;  Location: [facility] OR;  Service: Urology    TONSILLECTOMY         Social History:   Social History     Socioeconomic History    Marital status: /Civil Union     Spouse name: Not on file    Number of children: Not on file    Years of education: Not on file    Highest education level: Not on file   Occupational History    Not on file   Tobacco Use    Smoking status: Never Smoker    Smokeless tobacco: Never Used   Vaping Use    Vaping Use: Never used   Substance and Sexual Activity    Alcohol use: Yes     Comment: socially    Drug use: No    Sexual activity: Not on file   Other Topics Concern    Not on file   Social History Narrative    Not on file     Social Determinants of Health     Financial Resource Strain: Not on file   Food Insecurity: Not on file   Transportation Needs: Not on file   Physical Activity: Not on file   Stress: Not on file   Social Connections: Not on file   Intimate Partner Violence: Not on file   Housing Stability: Not on file       Family History:   Family History   Problem Relation Age of Onset    Heart disease Mother     Lymphoma Father     Cancer Father     Heart disease Sister     Hypertension Brother     Diabetes Neg Hx     Thyroid disease Neg Hx     Stroke Neg Hx        Medications/Allergies:      Current Outpatient Medications:     acetaminophen (TYLENOL) 500 mg tablet, Take 500 mg by mouth every 6 (six) hours as needed for mild pain, Disp: , Rfl:     allopurinol (ZYLOPRIM) 100 mg tablet, Take 1 tablet (100 mg total) by mouth daily, Disp: 14 tablet, Rfl: 0    ammonium lactate (AMLACTIN) 12 % lotion, Apply topically 2 (two) times a day as needed for dry skin, Disp: , Rfl:     ferrous sulfate 325 (65 Fe) mg tablet, Take 325 mg by mouth, Disp: , Rfl:     Glucosamine-Chondroit-Vit C-Mn (Glucosamine 1500 Complex) CAPS, Take by mouth, Disp: , Rfl:     halobetasol (ULTRAVATE) 0 05 % ointment, APPLY TO AFFECTED AREA ON LEG TWICE DAILY, Disp: , Rfl:     L-ARGININE-500 PO, Take 500 mg by mouth 2 (two) times a day , Disp: , Rfl:     metoprolol succinate (TOPROL-XL) 25 mg 24 hr tablet, Take 1 tablet (25 mg total) by mouth daily, Disp: , Rfl: 0    Multiple Vitamins-Minerals (OCUVITE EXTRA PO), Take 1 tablet by mouth daily  , Disp: , Rfl:     multivitamin (THERAGRAN) TABS, Take 1 tablet by mouth daily, Disp: , Rfl:     Omega-3 Fatty Acids (fish oil) 1,000 mg, Take 1,000 mg by mouth 2 (two) times a day, Disp: , Rfl:     pregabalin (LYRICA) 100 mg capsule, Take 1 capsule (100 mg total) by mouth 3 (three) times a day, Disp: 42 capsule, Rfl: 0    spironolactone (ALDACTONE) 25 mg tablet, TAKE 1 TABLET BY MOUTH TWICE A DAY, Disp: 180 tablet, Rfl: 1    tadalafil (CIALIS) 5 MG tablet, Take 5 mg by mouth daily as needed for erectile dysfunction, Disp: , Rfl:     torsemide (DEMADEX) 20 mg tablet, TAKE 1 TABLET BY MOUTH TWICE A DAY, Disp: 180 tablet, Rfl: 1    traMADol (ULTRAM) 50 mg tablet, Take 50 mg by mouth every 6 (six) hours as needed for moderate pain, Disp: , Rfl:     VITAMIN D PO, Take 2,000 Units by mouth daily , Disp: , Rfl:     Xarelto 20 MG tablet, TAKE 1 TABLET BY MOUTH EVERY DAY, Disp: 90 tablet, Rfl: 1    azithromycin (ZITHROMAX) 250 mg tablet, Take 2 tablets by mouth on day one; then one tablet daily for 4 days  (Patient not taking: Reported on 4/12/2022), Disp: , Rfl:     clindamycin (CLEOCIN) 300 MG capsule, Take 300 mg by mouth as needed Prior to dental work  (Patient not taking: Reported on 2/1/2022 ), Disp: , Rfl:     colchicine (COLCRYS) 0 6 mg tablet, as needed  (Patient not taking: Reported on 2/1/2022 ), Disp: , Rfl:     patient supplied medication, Take 1 each by mouth 2 (two) times a day (Patient not taking: Reported on 4/12/2022 ), Disp: , Rfl:           Objective      Vital Signs:   Vitals:    04/12/22 1055   BP: 122/70   Pulse: 69   SpO2: 93%     Allen Sleepiness Scale: Total score: 2        Physical Exam:    General: Alert, appropriate, cooperative, overweight    Head: NC/AT    Skin: Warm, dry    Neuro: No motor abnormalities, cranial nerves appear intact    Extremity: No clubbing, cyanosis      DME Provider:   [employer]        Counseling / Coordination of Care   I have spent 15 minutes with the patient today in which greater than 50% of this time was spent in counseling/coordination of care regarding: equipment and compliance  Board Certified Sleep Specialist    Portions of the record may have been created with voice recognition software  Occasional wrong word or "sound a like" substitutions may have occurred due to the inherent limitations of voice recognition software  Read the chart carefully and recognize, using context, where substitutions have occurred

## 2022-04-13 ENCOUNTER — TELEPHONE (OUTPATIENT)
Dept: SLEEP CENTER | Facility: CLINIC | Age: 67
End: 2022-04-13

## 2022-04-13 ENCOUNTER — APPOINTMENT (OUTPATIENT)
Dept: PHYSICAL THERAPY | Facility: CLINIC | Age: 67
End: 2022-04-13
Payer: COMMERCIAL

## 2022-04-15 ENCOUNTER — APPOINTMENT (OUTPATIENT)
Dept: PHYSICAL THERAPY | Facility: CLINIC | Age: 67
End: 2022-04-15
Payer: COMMERCIAL

## 2022-04-20 ENCOUNTER — OFFICE VISIT (OUTPATIENT)
Dept: PHYSICAL THERAPY | Facility: CLINIC | Age: 67
End: 2022-04-20
Payer: COMMERCIAL

## 2022-04-20 DIAGNOSIS — M47.816 LUMBAR SPONDYLOSIS: Primary | ICD-10-CM

## 2022-04-20 PROCEDURE — 97110 THERAPEUTIC EXERCISES: CPT

## 2022-04-20 NOTE — PROGRESS NOTES
Daily Note     Today's date: 2022  Patient name: Srinivas Tobar  : 1955  MRN: [id number]  Referring provider: Dayanna Oropeza DO  Dx:   Encounter Diagnosis     ICD-10-CM    1  Lumbar spondylosis  M47 816                   Subjective: Patient reports that his LBP is controlled at this time  Objective: See treatment diary below  Assessment: Tolerated treatment well  Patient demonstrated fatigue post treatment and would benefit from continued PT to improve strength and endurance  Patient is doing well, he is going to try and transition to HEP and gym  Plan: Continue per plan of care  Potential discharge next visit  Precautions: hx of L THR        Manuals 4-4  3 330    Manual traction             Joint work             flexibility             ST         Assessment   MM       Neuro Re-Ed          TA          TA+march          birddog                                                      Ther Ex          NuStep/TM TM 10' TM 15' TM NV TM 12' TM 14'   clamshells         Supine march         bridges         Supine hand to knee press          CC LAE P6 3x10 P6 3x10        Leg press   Unable       Ham curls P8 3x0 P8 3x10 P6 3x10 P7 3x10 P7 3x10   Knee ext P2 3x10 P2 3x10 P3  3x10 P1-3 3x10 P1-3   3x10   Machine Rows P3 3x10         Anti Rotations P1 2x10         Lumbar ext 90# 3x10 110# 3x10 70# 3x10 80# 3x10  90# 3x10   Lumbar flex 60# 3x10 70# 3x10 50# 3x10 50# 3x10  50# 3x10   Standing abd 2# 2x10   2x10 2x10 ea  2x10 ea     SKTC         LTR         Prone ext                     Ther Activity                                Gait Training                                Modalities

## 2022-04-22 ENCOUNTER — OFFICE VISIT (OUTPATIENT)
Dept: PHYSICAL THERAPY | Facility: CLINIC | Age: 67
End: 2022-04-22
Payer: COMMERCIAL

## 2022-04-22 DIAGNOSIS — M47.816 LUMBAR SPONDYLOSIS: Primary | ICD-10-CM

## 2022-04-22 PROCEDURE — 97110 THERAPEUTIC EXERCISES: CPT

## 2022-04-22 NOTE — PROGRESS NOTES
Daily Note     Today's date: 2022  Patient name: Araseli Hampton  : 1955  MRN: [id number]  Referring provider: Tashi Jackson DO  Dx:   Encounter Diagnosis     ICD-10-CM    1  Lumbar spondylosis  M47 816                   Subjective: Patient reports he is doing well, able to do what he wants at home with minimal issues  Objective: See treatment diary below  Assessment: Tolerated treatment well  Patient demonstrated fatigue post treatment and will be transitioning to HEP  All questions answered and will call if he has any issues  Plan: Continue per plan of care  Precautions: hx of L THR        Manuals -4 4-20 4-22 3-30 4/1   Manual traction             Joint work             flexibility             ST         Assessment         Neuro Re-Ed          TA          TA+march          birddog                                                      Ther Ex          NuStep/TM TM 10' TM 15' TM 10' TM 12' TM 14'   clamshells         Supine march         bridges         Supine hand to knee press          CC LAE P6 3x10 P6 3x10  P6 3x10      Leg press         Ham curls P8 3x0 P8 3x10 P8 3x10 P7 3x10 P7 3x10   Knee ext P2 3x10 P2 3x10 P2 3x10 P1-3 3x10 P1-3   3x10   Machine Rows P3 3x10  P4 3x10      Anti Rotations P1 2x10        Lumbar ext 90# 3x10 110# 3x10 110# 3x10 80# 3x10  90# 3x10   Lumbar flex 60# 3x10 70# 3x10 70# 3x10 50# 3x10  50# 3x10   Standing abd 2# 2x10    2x10 ea  2x10 ea     SKTC        LTR        Prone ext                   Ther Activity                             Gait Training                               Modalities

## 2022-04-28 ENCOUNTER — APPOINTMENT (OUTPATIENT)
Dept: LAB | Facility: HOSPITAL | Age: 67
End: 2022-04-28
Payer: COMMERCIAL

## 2022-04-28 DIAGNOSIS — M13.0 POLYARTHRITIS: ICD-10-CM

## 2022-04-28 DIAGNOSIS — M19.049 ARTHRITIS OF HAND: ICD-10-CM

## 2022-04-28 LAB
CHOLEST SERPL-MCNC: 216 MG/DL
CRP SERPL QL: 2 MG/L
HDLC SERPL-MCNC: 50 MG/DL
LDLC SERPL CALC-MCNC: 142 MG/DL (ref 0–100)
NONHDLC SERPL-MCNC: 166 MG/DL
TRIGL SERPL-MCNC: 118 MG/DL
URATE SERPL-MCNC: 8.5 MG/DL (ref 4.2–8)

## 2022-04-28 PROCEDURE — 86140 C-REACTIVE PROTEIN: CPT

## 2022-04-28 PROCEDURE — 36415 COLL VENOUS BLD VENIPUNCTURE: CPT

## 2022-04-28 PROCEDURE — 86200 CCP ANTIBODY: CPT

## 2022-04-28 PROCEDURE — 86038 ANTINUCLEAR ANTIBODIES: CPT

## 2022-04-28 PROCEDURE — 84550 ASSAY OF BLOOD/URIC ACID: CPT

## 2022-04-28 PROCEDURE — 80061 LIPID PANEL: CPT

## 2022-04-28 PROCEDURE — 86430 RHEUMATOID FACTOR TEST QUAL: CPT

## 2022-04-29 LAB
CCP AB SER IA-ACNC: 1.9
RHEUMATOID FACT SER QL LA: NEGATIVE

## 2022-04-30 LAB
ANA SPECKLED TITR SER: ABNORMAL {TITER}
ANA TITR SER IF: POSITIVE {TITER}
SL AMB NOTE:: ABNORMAL

## 2022-05-05 ENCOUNTER — TELEPHONE (OUTPATIENT)
Dept: NEPHROLOGY | Facility: CLINIC | Age: 67
End: 2022-05-05

## 2022-05-05 NOTE — TELEPHONE ENCOUNTER
Appointment Confirmation   Person confirmed appointment with  If not patient, name of the person Answering Machine    Date and time of appointment 05/06/22 @ 11:30 am     Patient acknowledged and will be at appointment? yes    Did you advise the patient that they will need a urine sample if they are a new patient?  No    Did you advise the patient to bring their current medications for verification? (including any OTC) Yes    Additional Information

## 2022-05-06 LAB — HLA-B27 QL NAA+PROBE: NORMAL

## 2022-05-10 NOTE — PROGRESS NOTES
PT Discharge    Today's date: 5/10/2022  Patient name: Roxanna Roldan  : 1955  MRN: [id number]  Referring provider: Nessa Snyder DO  Dx:   Encounter Diagnosis     ICD-10-CM    1  Lumbar spondylosis  M47 816          Assessment  Assessment details: Pt discharged at last visit  Symptom irritability: lowUnderstanding of Dx/Px/POC: good   Prognosis: good    Goals  ST-3 weeks - partially met  Instruct in low level trunk and hip strength program for HEP  Educate and instruct on proper lifting techniques and back safety  Assess balance to determine proper hip stability and prevent trendelenberg patterns    LT-6 weeks - progressing towards or met  5/5 strength quad and hamstrings  4/5 or greater with all hip strength  <3/10 pain with sitting in AM and all daily activities  Left side bend without any pain      Plan  Plan details: discharged  Planned therapy interventions: home exercise program  Treatment plan discussed with: patient        Subjective Evaluation    History of Present Illness  Mechanism of injury: Pt discharged at his last visit  He noted he is doing well and able to do what he wants to with minimal issues    Pain  Current pain ratin  At worst pain ratin    Social Support  Stairs in house: yes   Lives in: [facility]  Lives with: spouse    Patient Goals  Patient goals for therapy: decreased pain and increased strength  Patient goal: dec pain with sitting in morning, manage symptoms        Objective     General Comments:      Lumbar Comments  FROM last re-eval:    Observation/Gait  Transfers - requires UE to complete sit-stand      Multi-seg movement patterns  Flex: prox tibia, pulling post LE b/l  Ext: scap just past heels no pain  L SB: mid thigh no pain  R SB: mid thigh, no pain      Lumbar  Slight dec in lordosis  Pain with pa throughout, tender b/l paraspinal and b/l  iliac crest/glut med      Hip screen  Flex: 90 deg   Abd: 25 deg    Hamstring: good mobility      Strength/myotome  Hip flex: 4+/5 L, 4-/5 R  Hip abd: 3-/5 L, 3+/5 R  Knee ext 4/5  Knee flex: 4+/5  Ankle strong                     Precautions: hx of L THR

## 2022-05-22 DIAGNOSIS — I50.32 CHRONIC DIASTOLIC HEART FAILURE (HCC): ICD-10-CM

## 2022-05-23 RX ORDER — TORSEMIDE 20 MG/1
TABLET ORAL
Qty: 180 TABLET | Refills: 1 | Status: SHIPPED | OUTPATIENT
Start: 2022-05-23

## 2022-06-13 DIAGNOSIS — I48.4 ATYPICAL ATRIAL FLUTTER (HCC): ICD-10-CM

## 2022-06-13 RX ORDER — METOPROLOL SUCCINATE 25 MG/1
TABLET, EXTENDED RELEASE ORAL
Qty: 90 TABLET | Refills: 0 | Status: SHIPPED | OUTPATIENT
Start: 2022-06-13

## 2022-07-28 ENCOUNTER — HOSPITAL ENCOUNTER (OUTPATIENT)
Dept: NON INVASIVE DIAGNOSTICS | Facility: HOSPITAL | Age: 67
Discharge: HOME | End: 2022-07-28
Payer: COMMERCIAL

## 2022-07-28 DIAGNOSIS — M79.605 PAIN IN LEFT LEG: ICD-10-CM

## 2022-07-28 DIAGNOSIS — M79.604 PAIN IN RIGHT LEG: ICD-10-CM

## 2022-07-28 PROCEDURE — 93922 UPR/L XTREMITY ART 2 LEVELS: CPT | Performed by: SURGERY

## 2022-07-28 PROCEDURE — 93925 LOWER EXTREMITY STUDY: CPT

## 2022-07-28 PROCEDURE — 93925 LOWER EXTREMITY STUDY: CPT | Performed by: SURGERY

## 2022-07-28 PROCEDURE — 93923 UPR/LXTR ART STDY 3+ LVLS: CPT

## 2022-09-05 DIAGNOSIS — I50.33 ACUTE ON CHRONIC DIASTOLIC CONGESTIVE HEART FAILURE (HCC): ICD-10-CM

## 2022-09-05 DIAGNOSIS — I48.4 ATYPICAL ATRIAL FLUTTER (HCC): ICD-10-CM

## 2022-09-05 DIAGNOSIS — I50.32 CHRONIC DIASTOLIC HEART FAILURE (HCC): ICD-10-CM

## 2022-09-06 RX ORDER — TORSEMIDE 20 MG/1
TABLET ORAL
Qty: 180 TABLET | Refills: 1 | Status: SHIPPED | OUTPATIENT
Start: 2022-09-06

## 2022-09-06 RX ORDER — METOPROLOL SUCCINATE 25 MG/1
TABLET, EXTENDED RELEASE ORAL
Qty: 90 TABLET | Refills: 0 | Status: SHIPPED | OUTPATIENT
Start: 2022-09-06

## 2022-09-06 RX ORDER — RIVAROXABAN 20 MG/1
TABLET, FILM COATED ORAL
Qty: 90 TABLET | Refills: 1 | Status: SHIPPED | OUTPATIENT
Start: 2022-09-06

## 2022-09-06 RX ORDER — SPIRONOLACTONE 25 MG/1
TABLET ORAL
Qty: 180 TABLET | Refills: 1 | Status: SHIPPED | OUTPATIENT
Start: 2022-09-06

## 2022-09-06 NOTE — TELEPHONE ENCOUNTER
Requested medication(s) are due for refill today: Yes  Patient has already received a courtesy refill: No  Other reason request has been forwarded to provider: failed protocol, appt 9/27/22

## 2022-09-12 ENCOUNTER — HOSPITAL ENCOUNTER (EMERGENCY)
Facility: HOSPITAL | Age: 67
Discharge: HOME | End: 2022-09-12
Attending: EMERGENCY MEDICINE
Payer: COMMERCIAL

## 2022-09-12 ENCOUNTER — APPOINTMENT (OUTPATIENT)
Dept: RADIOLOGY | Facility: HOSPITAL | Age: 67
End: 2022-09-12
Payer: COMMERCIAL

## 2022-09-12 VITALS
SYSTOLIC BLOOD PRESSURE: 122 MMHG | WEIGHT: 309.53 LBS | TEMPERATURE: 98.2 F | DIASTOLIC BLOOD PRESSURE: 65 MMHG | RESPIRATION RATE: 16 BRPM | BODY MASS INDEX: 45.71 KG/M2 | HEART RATE: 59 BPM | OXYGEN SATURATION: 100 %

## 2022-09-12 DIAGNOSIS — R07.89 ATYPICAL CHEST PAIN: Primary | ICD-10-CM

## 2022-09-12 LAB
2HR DELTA HS TROPONIN: 1 NG/L
ALBUMIN SERPL BCP-MCNC: 3.4 G/DL (ref 3.5–5)
ALP SERPL-CCNC: 71 U/L (ref 46–116)
ALT SERPL W P-5'-P-CCNC: 27 U/L (ref 12–78)
ANION GAP SERPL CALCULATED.3IONS-SCNC: 7 MMOL/L (ref 4–13)
AST SERPL W P-5'-P-CCNC: 22 U/L (ref 5–45)
ATRIAL RATE: 58 BPM
ATRIAL RATE: 62 BPM
BASOPHILS # BLD AUTO: 0.02 THOUSANDS/ΜL (ref 0–0.1)
BASOPHILS NFR BLD AUTO: 0 % (ref 0–1)
BILIRUB SERPL-MCNC: 0.48 MG/DL (ref 0.2–1)
BUN SERPL-MCNC: 26 MG/DL (ref 5–25)
CALCIUM ALBUM COR SERPL-MCNC: 9.7 MG/DL (ref 8.3–10.1)
CALCIUM SERPL-MCNC: 9.2 MG/DL (ref 8.3–10.1)
CARDIAC TROPONIN I PNL SERPL HS: 8 NG/L
CARDIAC TROPONIN I PNL SERPL HS: 9 NG/L
CHLORIDE SERPL-SCNC: 103 MMOL/L (ref 96–108)
CO2 SERPL-SCNC: 31 MMOL/L (ref 21–32)
CREAT SERPL-MCNC: 1 MG/DL (ref 0.6–1.3)
EOSINOPHIL # BLD AUTO: 0.11 THOUSAND/ΜL (ref 0–0.61)
EOSINOPHIL NFR BLD AUTO: 2 % (ref 0–6)
ERYTHROCYTE [DISTWIDTH] IN BLOOD BY AUTOMATED COUNT: 13.4 % (ref 11.6–15.1)
GFR SERPL CREATININE-BSD FRML MDRD: 76 ML/MIN/1.73SQ M
GLUCOSE SERPL-MCNC: 82 MG/DL (ref 65–140)
HCT VFR BLD AUTO: 38.3 % (ref 36.5–49.3)
HGB BLD-MCNC: 12.8 G/DL (ref 12–17)
IMM GRANULOCYTES # BLD AUTO: 0.01 THOUSAND/UL (ref 0–0.2)
IMM GRANULOCYTES NFR BLD AUTO: 0 % (ref 0–2)
LYMPHOCYTES # BLD AUTO: 1.28 THOUSANDS/ΜL (ref 0.6–4.47)
LYMPHOCYTES NFR BLD AUTO: 28 % (ref 14–44)
MCH RBC QN AUTO: 31 PG (ref 26.8–34.3)
MCHC RBC AUTO-ENTMCNC: 33.4 G/DL (ref 31.4–37.4)
MCV RBC AUTO: 93 FL (ref 82–98)
MONOCYTES # BLD AUTO: 0.56 THOUSAND/ΜL (ref 0.17–1.22)
MONOCYTES NFR BLD AUTO: 12 % (ref 4–12)
NEUTROPHILS # BLD AUTO: 2.65 THOUSANDS/ΜL (ref 1.85–7.62)
NEUTS SEG NFR BLD AUTO: 58 % (ref 43–75)
NRBC BLD AUTO-RTO: 0 /100 WBCS
P AXIS: 45 DEGREES
P AXIS: 48 DEGREES
PLATELET # BLD AUTO: 166 THOUSANDS/UL (ref 149–390)
PMV BLD AUTO: 9.9 FL (ref 8.9–12.7)
POTASSIUM SERPL-SCNC: 4.1 MMOL/L (ref 3.5–5.3)
PR INTERVAL: 172 MS
PR INTERVAL: 200 MS
PROT SERPL-MCNC: 7.6 G/DL (ref 6.4–8.4)
QRS AXIS: -51 DEGREES
QRS AXIS: -63 DEGREES
QRSD INTERVAL: 152 MS
QRSD INTERVAL: 152 MS
QT INTERVAL: 448 MS
QT INTERVAL: 450 MS
QTC INTERVAL: 441 MS
QTC INTERVAL: 454 MS
RBC # BLD AUTO: 4.13 MILLION/UL (ref 3.88–5.62)
SODIUM SERPL-SCNC: 141 MMOL/L (ref 135–147)
T WAVE AXIS: 14 DEGREES
T WAVE AXIS: 9 DEGREES
VENTRICULAR RATE: 58 BPM
VENTRICULAR RATE: 62 BPM
WBC # BLD AUTO: 4.63 THOUSAND/UL (ref 4.31–10.16)

## 2022-09-12 PROCEDURE — 96374 THER/PROPH/DIAG INJ IV PUSH: CPT

## 2022-09-12 PROCEDURE — 99285 EMERGENCY DEPT VISIT HI MDM: CPT

## 2022-09-12 PROCEDURE — 36415 COLL VENOUS BLD VENIPUNCTURE: CPT | Performed by: EMERGENCY MEDICINE

## 2022-09-12 PROCEDURE — 80053 COMPREHEN METABOLIC PANEL: CPT | Performed by: EMERGENCY MEDICINE

## 2022-09-12 PROCEDURE — 99285 EMERGENCY DEPT VISIT HI MDM: CPT | Performed by: EMERGENCY MEDICINE

## 2022-09-12 PROCEDURE — 85025 COMPLETE CBC W/AUTO DIFF WBC: CPT | Performed by: EMERGENCY MEDICINE

## 2022-09-12 PROCEDURE — 93005 ELECTROCARDIOGRAM TRACING: CPT

## 2022-09-12 PROCEDURE — 84484 ASSAY OF TROPONIN QUANT: CPT | Performed by: EMERGENCY MEDICINE

## 2022-09-12 PROCEDURE — 71046 X-RAY EXAM CHEST 2 VIEWS: CPT

## 2022-09-12 PROCEDURE — 93010 ELECTROCARDIOGRAM REPORT: CPT | Performed by: INTERNAL MEDICINE

## 2022-09-12 RX ORDER — LIDOCAINE 50 MG/G
1 PATCH TOPICAL ONCE
Status: DISCONTINUED | OUTPATIENT
Start: 2022-09-12 | End: 2022-09-12 | Stop reason: HOSPADM

## 2022-09-12 RX ORDER — LIDOCAINE 50 MG/G
1 PATCH TOPICAL EVERY 24 HOURS
Qty: 6 PATCH | Refills: 0 | Status: SHIPPED | OUTPATIENT
Start: 2022-09-12

## 2022-09-12 RX ORDER — KETOROLAC TROMETHAMINE 30 MG/ML
15 INJECTION, SOLUTION INTRAMUSCULAR; INTRAVENOUS ONCE
Status: COMPLETED | OUTPATIENT
Start: 2022-09-12 | End: 2022-09-12

## 2022-09-12 RX ADMIN — LIDOCAINE 1 PATCH: 50 PATCH TOPICAL at 13:00

## 2022-09-12 RX ADMIN — KETOROLAC TROMETHAMINE 15 MG: 30 INJECTION, SOLUTION INTRAMUSCULAR; INTRAVENOUS at 13:00

## 2022-09-12 NOTE — DISCHARGE INSTRUCTIONS
Chest Pain  GENERAL INFORMATION:   Chest pain  can be caused by a range of conditions, from not serious to life-threatening  It may be caused by a heart attack or a blood clot in your lungs  Sometimes chest pain or pressure is caused by poor blood flow to your heart (angina)  Infection, inflammation, or a fracture in the bones or cartilage in your chest can cause pain or discomfort  Chest pain can also be a symptom of a digestive problem, such as acid reflux or a stomach ulcer  Common symptoms include the following:   Fever or sweating     Nausea or vomiting     Shortness of breath     Discomfort or pressure that spreads from your chest to your back, jaw, or arm     A racing or slow heartbeat     Feeling weak, tired, or faint  Seek immediate care for the following symptoms:   Any of the following signs of a heart attack:      Squeezing, pressure, or pain in your chest that lasts longer than 5 minutes or returns    Discomfort or pain in your back, neck, jaw, stomach, or arm     Trouble breathing     Nausea or vomiting    Lightheadedness or a sudden cold sweat, especially with trouble breathing         Chest discomfort that gets worse, even with medicine    Coughing or vomiting blood    Black or bloody bowel movements     Vomiting that does not stop, or pain when you swallow  Do not smoke: If you smoke, it is never too late to quit  Smoking increases your risk for a heart attack and other heart and lung conditions  Ask your healthcare provider for information about how to stop smoking if you need help

## 2022-09-12 NOTE — ED PROVIDER NOTES
History  Chief Complaint   Patient presents with    Chest Pain     Patient reports pain to the right of his sternum and right shoulder that started two days prior  Patient feels it is muscular but denies any recent change in activity  History provided by:  Patient  Chest Pain  Pain location:  R chest and substernal area  Pain quality: aching    Pain radiates to:  R shoulder  Pain radiates to the back: no    Pain severity:  Moderate  Onset quality:  Gradual  Duration:  2 days  Timing:  Constant  Progression:  Waxing and waning  Chronicity:  New  Context: movement and raising an arm    Relieved by:  Nothing  Associated symptoms: no abdominal pain, no claudication, no cough, no dizziness, no fever, no headache, no lower extremity edema, no nausea, no palpitations, no shortness of breath, not vomiting and no weakness    Risk factors: obesity    Risk factors: no smoking        Prior to Admission Medications   Prescriptions Last Dose Informant Patient Reported? Taking?    Glucosamine-Chondroit-Vit C-Mn (Glucosamine 1500 Complex) CAPS  Self Yes No   Sig: Take by mouth   L-ARGININE-500 PO  Self Yes No   Sig: Take 500 mg by mouth 2 (two) times a day    Multiple Vitamins-Minerals (OCUVITE EXTRA PO)  Self Yes No   Sig: Take 1 tablet by mouth daily     Omega-3 Fatty Acids (fish oil) 1,000 mg  Self Yes No   Sig: Take 1,000 mg by mouth 2 (two) times a day   VITAMIN D PO  Self Yes No   Sig: Take 2,000 Units by mouth daily    Xarelto 20 MG tablet   No No   Sig: TAKE 1 TABLET BY MOUTH EVERY DAY   acetaminophen (TYLENOL) 500 mg tablet  Self Yes No   Sig: Take 500 mg by mouth every 6 (six) hours as needed for mild pain   allopurinol (ZYLOPRIM) 100 mg tablet  Self No No   Sig: Take 1 tablet (100 mg total) by mouth daily   ammonium lactate (AMLACTIN) 12 % lotion  Self Yes No   Sig: Apply topically 2 (two) times a day as needed for dry skin   azithromycin (ZITHROMAX) 250 mg tablet   Yes No   Sig: Take 2 tablets by mouth on day one; then one tablet daily for 4 days     Patient not taking: Reported on 4/12/2022   clindamycin (CLEOCIN) 300 MG capsule  Self Yes No   Sig: Take 300 mg by mouth as needed Prior to dental work    Patient not taking: Reported on 2/1/2022    colchicine (COLCRYS) 0 6 mg tablet  Self Yes No   Sig: as needed    Patient not taking: Reported on 2/1/2022    ferrous sulfate 325 (65 Fe) mg tablet  Self Yes No   Sig: Take 325 mg by mouth   halobetasol (ULTRAVATE) 0 05 % ointment  Self Yes No   Sig: APPLY TO AFFECTED AREA ON LEG TWICE DAILY   metoprolol succinate (TOPROL-XL) 25 mg 24 hr tablet   No No   Sig: TAKE 1 TABLET BY MOUTH EVERY DAY   multivitamin (THERAGRAN) TABS  Self Yes No   Sig: Take 1 tablet by mouth daily   patient supplied medication  Self Yes No   Sig: Take 1 each by mouth 2 (two) times a day   Patient not taking: Reported on 4/12/2022    pregabalin (LYRICA) 100 mg capsule  Self No No   Sig: Take 1 capsule (100 mg total) by mouth 3 (three) times a day   spironolactone (ALDACTONE) 25 mg tablet   No No   Sig: TAKE 1 TABLET BY MOUTH TWICE A DAY   tadalafil (CIALIS) 5 MG tablet  Self Yes No   Sig: Take 5 mg by mouth daily as needed for erectile dysfunction   torsemide (DEMADEX) 20 mg tablet   No No   Sig: TAKE 1 TABLET BY MOUTH TWICE A DAY   traMADol (ULTRAM) 50 mg tablet  Self Yes No   Sig: Take 50 mg by mouth every 6 (six) hours as needed for moderate pain      Facility-Administered Medications: None       Past Medical History:   Diagnosis Date    A-fib ([facility] )     ALANNA (acute kidney injury) ([facility] ) 6/15/2021    Arthritis     CPAP (continuous positive airway pressure) dependence     Irregular heart beat     Lymphedema     Sleep apnea     Urinary frequency     Wears glasses     Wears partial dentures     lower partial       Past Surgical History:   Procedure Laterality Date    ABLATION SAPHENOUS VEIN W/ RFA      COLONOSCOPY      [place] CYSTOURETHRO W/IMPLANT N/A 11/13/2017    Procedure: CYSTOSCOPY WITH INSERTION UROLIFT;  Surgeon: Raymond Mullins DO;  Location: [facility] OR;  Service: Urology    TONSILLECTOMY         Family History   Problem Relation Age of Onset    Heart disease Mother     Lymphoma Father     Cancer Father     Heart disease Sister     Hypertension Brother     Diabetes Neg Hx     Thyroid disease Neg Hx     Stroke Neg Hx      I have reviewed and agree with the history as documented  E-Cigarette/Vaping    E-Cigarette Use Never User      E-Cigarette/Vaping Substances    Nicotine No     THC No     CBD No     Flavoring No     Other No     Unknown No      Social History     Tobacco Use    Smoking status: Never Smoker    Smokeless tobacco: Never Used   Vaping Use    Vaping Use: Never used   Substance Use Topics    Alcohol use: Yes     Comment: socially    Drug use: No       Review of Systems   Constitutional: Negative for appetite change, chills and fever  HENT: Negative for sore throat  Respiratory: Negative for cough, shortness of breath and wheezing  Cardiovascular: Positive for chest pain  Negative for palpitations and claudication  Gastrointestinal: Negative for abdominal pain, diarrhea, nausea and vomiting  Genitourinary: Negative for dysuria and hematuria  Musculoskeletal: Negative for neck pain  Skin: Negative for rash  Neurological: Negative for dizziness, weakness and headaches  Psychiatric/Behavioral: Negative for suicidal ideas  All other systems reviewed and are negative  Physical Exam  Physical Exam  Vitals and nursing note reviewed  Constitutional:       Appearance: Normal appearance  He is well-developed  He is obese  He is not toxic-appearing or diaphoretic  HENT:      Head: Normocephalic        Right Ear: Tympanic membrane and external ear normal       Left Ear: External ear normal       Nose: Nose normal    Eyes:      General: Lids are normal       Conjunctiva/sclera: Conjunctivae normal       Pupils: Pupils are equal, round, and reactive to light    Neck:      Vascular: No JVD  Meningeal: Brudzinski's sign and Kernig's sign absent  Cardiovascular:      Rate and Rhythm: Normal rate and regular rhythm  Heart sounds: Normal heart sounds  No murmur heard  Pulmonary:      Effort: Pulmonary effort is normal  No tachypnea, accessory muscle usage or respiratory distress  Breath sounds: Normal breath sounds  No wheezing  Abdominal:      General: Bowel sounds are normal  There is no distension  Palpations: Abdomen is soft  Abdomen is not rigid  There is no mass  Tenderness: There is no abdominal tenderness  There is no guarding or rebound  Musculoskeletal:         General: Normal range of motion  Cervical back: Normal range of motion and neck supple  Lymphadenopathy:      Head:      Right side of head: No submental, submandibular, preauricular or posterior auricular adenopathy  Left side of head: No submental, submandibular, preauricular or posterior auricular adenopathy  Cervical: No cervical adenopathy  Skin:     General: Skin is warm and dry  Capillary Refill: Capillary refill takes less than 2 seconds  Findings: No rash  Neurological:      Mental Status: He is alert and oriented to person, place, and time  GCS: GCS eye subscore is 4  GCS verbal subscore is 5  GCS motor subscore is 6  Cranial Nerves: No cranial nerve deficit  Sensory: No sensory deficit  Coordination: Coordination normal       Deep Tendon Reflexes: Reflexes are normal and symmetric  Psychiatric:         Speech: Speech normal          Behavior: Behavior normal          Thought Content:  Thought content normal          Vital Signs  ED Triage Vitals   Temperature Pulse Respirations Blood Pressure SpO2   09/12/22 1507 09/12/22 1212 09/12/22 1212 09/12/22 1212 09/12/22 1212   98 2 °F (36 8 °C) 68 18 148/69 97 %      Temp Source Heart Rate Source Patient Position - Orthostatic VS BP Location FiO2 (%)   09/12/22 1507 09/12/22 1212 09/12/22 1212 09/12/22 1212 --   Oral Monitor Sitting Right arm       Pain Score       09/12/22 1212       1           Vitals:    09/12/22 1212 09/12/22 1332   BP: 148/69 118/68   Pulse: 68 60   Patient Position - Orthostatic VS: Sitting          Visual Acuity      ED Medications  Medications   lidocaine (LIDODERM) 5 % patch 1 patch (1 patch Topical Medication Applied 9/12/22 1300)   ketorolac (TORADOL) injection 15 mg (15 mg Intravenous Given 9/12/22 1300)       Diagnostic Studies  Results Reviewed     Procedure Component Value Units Date/Time    HS Troponin I 2hr [[id number]]  (Normal) Collected: 09/12/22 1507    Lab Status: Final result Specimen: Blood from Arm, Right Updated: 09/12/22 1533     hs TnI 2hr 9 ng/L      Delta 2hr hsTnI 1 ng/L     HS Troponin I 4hr [[id number]]     Lab Status: No result Specimen: Blood     HS Troponin 0hr (reflex protocol) [[id number]]  (Normal) Collected: 09/12/22 1259    Lab Status: Final result Specimen: Blood from Arm, Right Updated: 09/12/22 1340     hs TnI 0hr 8 ng/L     Comprehensive metabolic panel [[id number]]  (Abnormal) Collected: 09/12/22 1259    Lab Status: Final result Specimen: Blood from Arm, Right Updated: 09/12/22 1332     Sodium 141 mmol/L      Potassium 4 1 mmol/L      Chloride 103 mmol/L      CO2 31 mmol/L      ANION GAP 7 mmol/L      BUN 26 mg/dL      Creatinine 1 00 mg/dL      Glucose 82 mg/dL      Calcium 9 2 mg/dL      Corrected Calcium 9 7 mg/dL      AST 22 U/L      ALT 27 U/L      Alkaline Phosphatase 71 U/L      Total Protein 7 6 g/dL      Albumin 3 4 g/dL      Total Bilirubin 0 48 mg/dL      eGFR 76 ml/min/1 73sq m     Narrative:      Deric guidelines for Chronic Kidney Disease (CKD):     Stage 1 with normal or high GFR (GFR > 90 mL/min/1 73 square meters)    Stage 2 Mild CKD (GFR = 60-89 mL/min/1 73 square meters)    Stage 3A Moderate CKD (GFR = 45-59 mL/min/1 73 square meters)    Stage 3B Moderate CKD (GFR = 30-44 mL/min/1 73 square meters)    Stage 4 Severe CKD (GFR = 15-29 mL/min/1 73 square meters)    Stage 5 End Stage CKD (GFR <15 mL/min/1 73 square meters)  Note: GFR calculation is accurate only with a steady state creatinine    CBC and differential [[id number]] Collected: 09/12/22 1259    Lab Status: Final result Specimen: Blood from Arm, Right Updated: 09/12/22 1308     WBC 4 63 Thousand/uL      RBC 4 13 Million/uL      Hemoglobin 12 8 g/dL      Hematocrit 38 3 %      MCV 93 fL      MCH 31 0 pg      MCHC 33 4 g/dL      RDW 13 4 %      MPV 9 9 fL      Platelets 788 Thousands/uL      nRBC 0 /100 WBCs      Neutrophils Relative 58 %      Immat GRANS % 0 %      Lymphocytes Relative 28 %      Monocytes Relative 12 %      Eosinophils Relative 2 %      Basophils Relative 0 %      Neutrophils Absolute 2 65 Thousands/µL      Immature Grans Absolute 0 01 Thousand/uL      Lymphocytes Absolute 1 28 Thousands/µL      Monocytes Absolute 0 56 Thousand/µL      Eosinophils Absolute 0 11 Thousand/µL      Basophils Absolute 0 02 Thousands/µL                  XR chest 2 views   ED Interpretation by Paul Gutierrez MD (09/12 1359)   No acute findings         Final Result by Gerhardt Brier, MD (09/12 1405)      No acute cardiopulmonary disease        Findings are stable            Workstation performed: [id number]                    Procedures  ECG 12 Lead Documentation Only    Date/Time: 9/12/2022 12:41 PM  Performed by: Paul Gutierrez MD  Authorized by: Paul Gutierrez MD     Rate:     ECG rate:  62  Rhythm:     Rhythm: sinus rhythm    Conduction:     Conduction: abnormal      Abnormal conduction: complete RBBB               ED Course  ED Course as of 09/12/22 1558   Mon Sep 12, 2022   1359 XR chest 2 views  No acute findings      36 Delta 2hr hsTnI: 1             HEART Risk Score    Flowsheet Row Most Recent Value   Heart Score Risk Calculator    History 0 Filed at: 09/12/2022 1415   ECG 1 Filed at: 09/12/2022 1415   Age 2 Filed at: 09/12/2022 1415   Risk Factors 1 Filed at: 09/12/2022 1415   Troponin 0 Filed at: 09/12/2022 1415   HEART Score 4 Filed at: 09/12/2022 1415                        SBIRT 22yo+    Flowsheet Row Most Recent Value   SBIRT (23 yo +)    In order to provide better care to our patients, we are screening all of our patients for alcohol and drug use  Would it be okay to ask you these screening questions? No Filed at: 09/12/2022 1517                    MDM    Disposition  Final diagnoses:   Atypical chest pain - probable chest wall pain     Time reflects when diagnosis was documented in both MDM as applicable and the Disposition within this note     Time User Action Codes Description Comment    9/12/2022  3:57 PM Lorrine December Add [R07 89] Atypical chest pain     9/12/2022  3:57 PM Lorrine December Modify [R07 89] Atypical chest pain probable chest wall pain      ED Disposition     ED Disposition   Discharge    Condition   Good    Date/Time   Mon Sep 12, 2022  3:57 PM    [address] discharge to home/self care  Follow-up Information     Follow up With Specialties Details Why Contact Info    Casa Medina MD Internal Medicine Schedule an appointment as soon as possible for a visit  For followup [address] [place] [address] [phone]            Patient's Medications   Discharge Prescriptions    DICLOFENAC SODIUM (VOLTAREN) 1 %    Apply 2 g topically 4 (four) times a day       Start Date: 9/12/2022 End Date: --       Order Dose: 2 g       Quantity: 100 g    Refills: 0    LIDOCAINE (LIDODERM) 5 %    Apply 1 patch topically every 24 hours Remove & Discard patch within 12 hours or as directed by MD       Start Date: 9/12/2022 End Date: --       Order Dose: 1 patch       Quantity: 6 patch    Refills: 0       No discharge procedures on file      PDMP Review       Value Time User    PDMP Reviewed  Yes 1/29/2021  2:21 PM Alicia Falcon,  ED Provider  Electronically Signed by           Obey Cain MD  09/12/22 1200

## 2022-09-27 ENCOUNTER — OFFICE VISIT (OUTPATIENT)
Dept: CARDIOLOGY CLINIC | Facility: CLINIC | Age: 67
End: 2022-09-27
Payer: COMMERCIAL

## 2022-09-27 VITALS
HEIGHT: 69 IN | DIASTOLIC BLOOD PRESSURE: 68 MMHG | SYSTOLIC BLOOD PRESSURE: 120 MMHG | WEIGHT: 312.8 LBS | BODY MASS INDEX: 46.33 KG/M2 | HEART RATE: 71 BPM

## 2022-09-27 DIAGNOSIS — E66.01 OBESITY, MORBID (HCC): ICD-10-CM

## 2022-09-27 DIAGNOSIS — R94.31 ABNORMAL ECG: ICD-10-CM

## 2022-09-27 DIAGNOSIS — I48.92 PAROXYSMAL ATRIAL FLUTTER (HCC): ICD-10-CM

## 2022-09-27 DIAGNOSIS — R60.0 BILATERAL LOWER EXTREMITY EDEMA: ICD-10-CM

## 2022-09-27 DIAGNOSIS — I50.32 CHRONIC DIASTOLIC HEART FAILURE (HCC): Primary | ICD-10-CM

## 2022-09-27 PROCEDURE — 99214 OFFICE O/P EST MOD 30 MIN: CPT | Performed by: INTERNAL MEDICINE

## 2022-09-27 NOTE — PROGRESS NOTES
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  Dominant- Right; Involved- Left  Pt progressing with increased motion and strength and decreased scar and swelling  He notes improved ability to complete his work duties  Mild swelling noted in fingers in the AM   Pt also notes increased sensitivity along his palm, and was provided with gel sheeting for softening his scar  Pt will transition to a HEP    Goals  ST  Decrease pain to 3-4 in 4 weeks            2  Decrease swelling in hand and wrist            3  Increase AROM to composite fist and extension in 4 weeks            4  Maintain clean wound and promote healing            5   Provide orthotic for protection, compression for swelling  LT  Increase functional motion and strength for independence with ADL and self care by DC            2   Ability to RTW and recreational activity by DC  Goals met    Plan  Patient would benefit from: skilled speech therapy  Planned modality interventions: thermotherapy: hydrocollator packs, cryotherapy and ultrasound  Planned therapy interventions: manual therapy, neuromuscular re-education, therapeutic activities, stretching, strengthening, therapeutic exercise, home exercise program, orthotic management and training and self care  Frequency: 2x week  Duration in weeks: 4  Treatment plan discussed with: patient        Subjective Evaluation    History of Present Illness  Date of surgery: 2020  Mechanism of injury: Several months of loss of sensation, strength in left hand and fingers  Pain  Current pain ratin  At best pain ratin  At worst pain ratin  Location: left wrist    Hand dominance: right    Treatments  Current treatment: physical therapy  Patient Goals  Patient goals for therapy: decreased edema, decreased pain, increased motion, increased strength, independence with ADLs/IADLs, return to sport/leisure activities and return to work          Objective     General Comments:      Wrist/Hand Comments   ROM left S/P- 60/90;  Wrist E/F- 75/65                   Digits-Loose composite fist/extension                   Thumb MP- 0/50; IP 0/60  Strength-  II- 50/62; 3JC- 9/10; Key- 11/15  Circumference at Wrist- 17 9  cm; MPs- 22 0 cm; LF P1- 7 2 cm  Sensation- 3 61 T, IF, MF 1/2 RF  Pt provided isotoner for nighttime, silicone sheeting for palm         Flowsheet Rows      Most Recent Value   PT/OT G-Codes   Current Score  65   Projected Score  68             Precautions: progress within safe lifting ability    Manuals 10/19 10/22 10/26 10/28 11/5 11/10  12/1  12/3  12/15  1/6   STM 15 15 15 15 15 15 15  15  15  15    Isotoner  med                     tubigrip                                               Neuro Re-Ed                       HP/Pulsed Biph 15 15 15 15 15  15  15  15  15  15   MNGE 2/3 2/3 2/3 2/3 2/3  2/3  2/3  2/3  2/3  2/3                                                                                                                           Ther Ex                       TP   O 3'  O 3'  O 3'  O 3' O 2'  O 2'  O 2'  O 3'  O 3'  O 3'     5finge  O 2/10  O  2/10  O 2/10  O  2/10 O 2/10  O 2/10  O 2/10  O 2/10  O 2/10  O  2/10    Chris  35 2/10  35  2/10  35 2/10  35  2/10 35 2/10  35 2/10  35 2/10  35  2/10  35 2/10  35  2/10   Wily  V 2/10  V  2/10  V 2/10  V  2/10 V 2/10  V 2/10  V 2/10  V  2/10  V 2/10  V  2/10   DB E/F  4 2/10  5  2/10  5 2/10  5  2/10 5 2/10  6 2/10  6 2/10  6  2/10  7 2/10  7  2/10   flexbar  G 2/10  G  2/10  G 2/10  G  2/10 G 2/10 G 2/10  G 2/10  B  2/10  B 2/10  G  2/10   S/P 1 5 2/10  2  2/10  2 2/10  2  2/10 2# 2/10  2 2/10  2 2/10 2#  2/10  3# 2/10  3#  2/10   Zottman's  4 2/10  5  2/10 5 2/10  5  2/10  5  2/10  5 2/10 5 2/10 6  2/10 6 2/10 6  2/10   FF/Scapt  4 2/10  4  2/10  4 2/10  5  2/10  5  2/10  5 2/10  5 2/10  5/6  2/10 5/6# 2/10 6#  2/10                                                                           Ther Activity                        Comp   Y 3'  Y 3'  Y 3'  Y 3'  Y 3'  Y 3'  Y 3'  Y 3'  Y 3'  Y 3'                                                                                                   Modalities                       US                       CP 15 15 15 15 15  15  15  15  15  15        [No Acute Distress] : no acute distress [Normal Oropharynx] : normal oropharynx [Normal Appearance] : normal appearance [No Neck Mass] : no neck mass [Normal Rate/Rhythm] : normal rate/rhythm [Normal S1, S2] : normal s1, s2 [No Murmurs] : no murmurs [No Resp Distress] : no resp distress [Clear to Auscultation Bilaterally] : clear to auscultation bilaterally [No Abnormalities] : no abnormalities [Benign] : benign [Normal Gait] : normal gait [No Clubbing] : no clubbing [No Cyanosis] : no cyanosis [No Edema] : no edema [FROM] : FROM [Normal Color/ Pigmentation] : normal color/ pigmentation [No Focal Deficits] : no focal deficits [Oriented x3] : oriented x3 [Normal Affect] : normal affect [III] : Mallampati Class: III [TextBox_2] : ow [TextBox_68] : I:E ratio 1:3; clear

## 2022-09-27 NOTE — PROGRESS NOTES
Cardiology Office Note  Rosy Councilman, MD Jerrell Brooklyn, MD Kenna Donning, DO, MD Teresa Del Toro DO, Patricia Cody DO, [facility] - [place]  ----------------------------------------------------------------  [address], [facility] [age] y o  male MRN: [id number]  Unit/Bed#:  Encounter: [id number]      History of Present Illness: It was a pleasure to see Alexis Agarwal in the office today for follow-up CV evaluation  He has a longstanding history of atrial fibrillation with prior failed ELAINE guided cardioversion, history of morbid obesity and pulmonary hypertension with chronic venous insufficiency/lymphedema  The patient has a known history of obstructive sleep apnea was noncompliant with his CPAP therapy  Over the course of many years he has been having lower extremity edema which has been believe secondary to lymphedema with chronic venous insufficiency  He has had venous ablation is in the past for his reflux disease  In October 2019, he was found have pulmonary hypertension with pulmonary artery systolic pressure is estimated at 50 mm Hg  We had initially seen him in late August 2020 due to increased fatigue and dyspnea on exertion  He has become somewhat short of breath with basic activity  Previously, he had been managed for his atrial fibrillation at [facility]  Echocardiogram, stress test and Holter monitor were ordered, but stress test and Holter were completed  Stress test was found to be negative for myocardial ischemia  Holter monitor demonstrated atrial fibrillation with an average heart rate of 92 beats per minute and rare VPCs  His weight trends unfortunately continue to go upward and he had dietary discretion  He seen by electrophysiology and recommended for a sleep study as well as an evaluation with advanced heart failure    He was placed on CPAP and has been compliant since that time   He also was seen by bariatric surgery and wish to undergo conservative options with dietary modifications  Despite increasing diuretic load, he he continue to gain approximately 30 lb and was subsequently sent to [facility] in [facility]  He was diuresed down to 285 lb  While hospitalized, his echocardiogram was performed showing normal left ventricular function  Following discharge, he was seen by electrophysiology and sent for a cardioversion in May 2021  The cardioversion was initially successful, but he reverted back to rate controlled atrial flutter  He has been placed on amiodarone and decreased down to 100 mg daily  On his dose of torsemide, he had acute kidney injury with a creatinine rise to 1 9  After holding his diuretics for several days, his creatinine came down to 1 1  He was then decreased on his torsemide to 20 mg b i d  And his weight trends continue to improve  He has been aggressive in his dietary modifications  He also underwent radiofrequency ablation for his atrial fibrillation/flutter  He underwent a procedure in June 2021 Overall, he has felt much improved  In July 2021, he ended up going back into atrial flutter  He was seen by electrophysiology who was recommending a repeat ablation study  He underwent the ablation in late July 2021 which was successful  He was seen in the office by EP following the procedure and had remained in sinus rhythm  He subsequently was discontinued on his amiodarone  Since that time, he has been feeling well overall aside from some musculoskeletal chest discomfort  He has had no exertional symptoms  Denies shortness of breath, dyspnea on exertion, orthopnea or paroxysmal nocturnal dyspnea  He does have chronic lower extremity swelling that is unchanged    Denies lightheadedness, dizziness or palpitations      Review of Systems:  Review of Systems   Constitutional: Negative for decreased appetite, fever, malaise/fatigue, weight gain and weight loss  HENT: Negative for congestion and sore throat  Eyes: Negative for visual disturbance  Cardiovascular: Negative for chest pain, dyspnea on exertion, leg swelling, near-syncope and palpitations  Respiratory: Negative for cough and shortness of breath  Hematologic/Lymphatic: Negative for bleeding problem  Skin: Negative for rash  Musculoskeletal: Negative for myalgias and neck pain  Gastrointestinal: Negative for abdominal pain and nausea  Neurological: Negative for light-headedness and weakness  Psychiatric/Behavioral: Negative for depression         Past Medical History:   Diagnosis Date    A-fib [facility])     ALANNA (acute kidney injury) ([facility] [address] ) 6/15/2021    Arthritis     CPAP (continuous positive airway pressure) dependence     Irregular heart beat     Lymphedema     Sleep apnea     Urinary frequency     Wears glasses     Wears partial dentures     lower partial       Past Surgical History:   Procedure Laterality Date    ABLATION SAPHENOUS VEIN W/ RFA      COLONOSCOPY      NC CYSTOURETHRO W/IMPLANT N/A 11/13/2017    Procedure: CYSTOSCOPY WITH INSERTION UROLIFT;  Surgeon: Desire Marie DO;  Location: [facility];  Service: Urology    TONSILLECTOMY         Social History     Socioeconomic History    Marital status: /Civil Union     Spouse name: Not on file    Number of children: Not on file    Years of education: Not on file    Highest education level: Not on file   Occupational History    Not on file   Tobacco Use    Smoking status: Never Smoker    Smokeless tobacco: Never Used   Vaping Use    Vaping Use: Never used   Substance and Sexual Activity    Alcohol use: Yes     Comment: socially    Drug use: No    Sexual activity: Not on file   Other Topics Concern    Not on file   Social History Narrative    Not on file     Social Determinants of Health     Financial Resource Strain: Not on file   Food Insecurity: Not on file   Transportation Needs: Not on file   Physical Activity: Not on file   Stress: Not on file   Social Connections: Not on file   Intimate Partner Violence: Not on file   Housing Stability: Not on file       Family History   Problem Relation Age of Onset    Heart disease Mother     Lymphoma Father     Cancer Father     Heart disease Sister     Hypertension Brother     Diabetes Neg Hx     Thyroid disease Neg Hx     Stroke Neg Hx        Allergies   Allergen Reactions    Penicillins Anaphylaxis    Sulfa Antibiotics Anaphylaxis         Current Outpatient Medications:     acetaminophen (TYLENOL) 500 mg tablet, Take 500 mg by mouth every 6 (six) hours as needed for mild pain, Disp: , Rfl:     allopurinol (ZYLOPRIM) 100 mg tablet, Take 1 tablet (100 mg total) by mouth daily, Disp: 14 tablet, Rfl: 0    ammonium lactate (LAC-HYDRIN) 12 % lotion, Apply topically 2 (two) times a day as needed for dry skin, Disp: , Rfl:     colchicine (COLCRYS) 0 6 mg tablet, as needed, Disp: , Rfl:     Diclofenac Sodium (VOLTAREN) 1 %, Apply 2 g topically 4 (four) times a day, Disp: 100 g, Rfl: 0    ferrous sulfate 325 (65 Fe) mg tablet, Take 325 mg by mouth, Disp: , Rfl:     Glucosamine-Chondroit-Vit C-Mn (Glucosamine 1500 Complex) CAPS, Take by mouth, Disp: , Rfl:     halobetasol (ULTRAVATE) 0 05 % ointment, APPLY TO AFFECTED AREA ON LEG TWICE DAILY, Disp: , Rfl:     L-ARGININE-500 PO, Take 500 mg by mouth 2 (two) times a day , Disp: , Rfl:     metoprolol succinate (TOPROL-XL) 25 mg 24 hr tablet, TAKE 1 TABLET BY MOUTH EVERY DAY, Disp: 90 tablet, Rfl: 0    Multiple Vitamins-Minerals (OCUVITE EXTRA PO), Take 1 tablet by mouth daily  , Disp: , Rfl:     multivitamin (THERAGRAN) TABS, Take 1 tablet by mouth daily, Disp: , Rfl:     Omega-3 Fatty Acids (fish oil) 1,000 mg, Take 1,000 mg by mouth 2 (two) times a day, Disp: , Rfl:     pregabalin (LYRICA) 100 mg capsule, Take 1 capsule (100 mg total) by mouth 3 (three) times a day, Disp: 42 capsule, Rfl: 0    spironolactone (ALDACTONE) 25 mg tablet, TAKE 1 TABLET BY MOUTH TWICE A DAY, Disp: 180 tablet, Rfl: 1    tadalafil (CIALIS) 5 MG tablet, Take 5 mg by mouth daily as needed for erectile dysfunction, Disp: , Rfl:     torsemide (DEMADEX) 20 mg tablet, TAKE 1 TABLET BY MOUTH TWICE A DAY, Disp: 180 tablet, Rfl: 1    traMADol (ULTRAM) 50 mg tablet, Take 50 mg by mouth every 6 (six) hours as needed for moderate pain, Disp: , Rfl:     VITAMIN D PO, Take 2,000 Units by mouth daily , Disp: , Rfl:     Xarelto 20 MG tablet, TAKE 1 TABLET BY MOUTH EVERY DAY, Disp: 90 tablet, Rfl: 1    azithromycin (ZITHROMAX) 250 mg tablet, Take 2 tablets by mouth on day one; then one tablet daily for 4 days  (Patient not taking: No sig reported), Disp: , Rfl:     clindamycin (CLEOCIN) 300 MG capsule, Take 300 mg by mouth as needed Prior to dental work  (Patient not taking: No sig reported), Disp: , Rfl:     lidocaine (LIDODERM) 5 %, Apply 1 patch topically every 24 hours Remove & Discard patch within 12 hours or as directed by MD (Patient not taking: Reported on 9/27/2022), Disp: 6 patch, Rfl: 0    patient supplied medication, Take 1 each by mouth 2 (two) times a day (Patient not taking: No sig reported), Disp: , Rfl:     Vitals:    09/27/22 1631   BP: 120/68   Pulse: 71   Weight: (!) 142 kg (312 lb 12 8 oz)   Height: 5' 9" (1 753 m)       PHYSICAL EXAMINATION:  Gen: Awake, Alert, NAD  Head/eyes: AT/NC, pupils equal and round, Anicteric  ENT: mmm  Neck: Supple, No elevated JVP, trachea midline  Resp: CTA bilaterally no w/r/r  CV: RRR +S1, S2, No m/r/g  Abd: Soft, NT/ND + BS  Ext: bilateral lower extremities wrapped with +1 pitting edema bilaterally/lymphedema  Neuro:  Follows commands, moves all extermities  Psych: Appropriate affect, pleasant mood, pleasant attitude, non-combative  Skin: warm; no rash, erythema or venous stasis changes on exposed skin    --------------------------------------------------------------------------------  TREADMILL STRESS  No results found for this or any previous visit    --------------------------------------------------------------------------------  NUCLEAR STRESS TEST: No results found for this or any previous visit  No results found for this or any previous visit     --------------------------------------------------------------------------------  CATH:  No results found for this or any previous visit   --------------------------------------------------------------------------------  ECHO:   Results for orders placed during the hospital encounter of 10/26/19   Echo complete with contrast if indicated    Narrative [address] [phone]    Transthoracic Echocardiogram  2D, M-mode, Doppler, and Color Doppler    Study date:  28-Oct-2019    Patient: Ayanna Hernandez  MR number: [id number]  Account number: [de-identified]  : 1955  Age: 71 years  Gender: Male  Status: Inpatient  Location: Bedside  Height: 69 in  Weight: 302 lb  BP: 141/ 88 mmHg    Indications: left sided paralysis    Diagnoses: 55 - CVA    Sonographer:  Louie Schofield RDCS, CCT  Referring Physician:  Junaid Luevano DO  Group:  [facility]  Interpreting Physician:  Sanjuanita Cole DO    SUMMARY    LEFT VENTRICLE:  Systolic function was normal  Ejection fraction was estimated to be 55 %  This study was inadequate for the evaluation of regional wall motion  Wall thickness was mildly increased  RIGHT VENTRICLE:  The ventricle was dilated  Systolic function was normal     MITRAL VALVE:  There was mild regurgitation  TRICUSPID VALVE:  There was mild regurgitation  Estimated peak PA pressure was 50 mmHg  HISTORY: PRIOR HISTORY: Patient has no history of cardiovascular disease  PROCEDURE: The procedure was performed at the bedside   This was a routine study  The transthoracic approach was used  The study included complete 2D imaging, M-mode, complete spectral Doppler, and color Doppler  The heart rate was 80 bpm,  at the start of the study  Intravenous contrast (Definity solution [1 3 ml Definity/8 7ml normal saline solution], 3 ml) was administered to opacify the left ventricle  Echocardiographic views were limited due to poor acoustic window  availability  This was a technically difficult study  LEFT VENTRICLE: Size was normal  Systolic function was normal  Ejection fraction was estimated to be 55 %  This study was inadequate for the evaluation of regional wall motion  Wall thickness was mildly increased  DOPPLER: The study was  not technically sufficient to allow evaluation of LV diastolic function  RIGHT VENTRICLE: The ventricle was dilated  Systolic function was normal     LEFT ATRIUM: Size was normal     RIGHT ATRIUM: Size was normal     MITRAL VALVE: Valve structure was normal  There was normal leaflet separation  DOPPLER: The transmitral velocity was within the normal range  There was no evidence for stenosis  There was mild regurgitation  AORTIC VALVE: The valve was trileaflet  Leaflets exhibited normal thickness and normal cuspal separation  DOPPLER: Transaortic velocity was within the normal range  There was no evidence for stenosis  There was no regurgitation  TRICUSPID VALVE: The valve structure was normal  There was normal leaflet separation  DOPPLER: The transtricuspid velocity was within the normal range  There was no evidence for stenosis  There was mild regurgitation  Estimated peak PA  pressure was 50 mmHg  PULMONIC VALVE: Leaflets exhibited normal thickness, no calcification, and normal cuspal separation  DOPPLER: The transpulmonic velocity was within the normal range  There was no regurgitation  PERICARDIUM: There was no pericardial effusion  The pericardium was normal in appearance      AORTA: The root exhibited normal size     SYSTEMIC VEINS: IVC: The inferior vena cava was not well visualized  SYSTEM MEASUREMENT TABLES    2D  %FS: 34 72 %  Ao Diam: 2 87 cm  EDV(Teich): 143 23 ml  EF(Teich): 63 36 %  ESV(Teich): 52 47 ml  IVSd: 1 15 cm  LA Diam: 4 16 cm  LVIDd: 5 43 cm  LVIDs: 3 55 cm  LVPWd: 1 04 cm  RWT: 0 38  SV(Teich): 90 75 ml    CW  AV Vmax: 1 34 m/s  AV maxP 13 mmHg  RAP: 0 mmHg  TR Vmax: 3 26 m/s  TR maxP 5 mmHg    PW  E' Sept: 0 09 m/s  MV E Terrance: 1 03 m/s  RVSP: 42 5 mmHg    [facility]    Prepared and electronically signed by    Kierra Urrutia DO  Signed 28-Oct-2019 10:37:38       No results found for this or any previous visit   --------------------------------------------------------------------------------  HOLTER  No results found for this or any previous visit  No results found for this or any previous visit   --------------------------------------------------------------------------------  CAROTIDS  No results found for this or any previous visit    --------------------------------------------------------------------------------  ECGs:  No results found for this visit on 22       Lab Results   Component Value Date    WBC 4 63 2022    HGB 12 8 2022    HCT 38 3 2022    MCV 93 2022     2022      Lab Results   Component Value Date    SODIUM 141 2022    K 4 1 2022     2022    CO2 31 2022    BUN 26 (H) 2022    CREATININE 1 00 2022    GLUC 82 2022    CALCIUM 9 2 2022      Lab Results   Component Value Date    HGBA1C 5 4 2021      No results found for: CHOL  Lab Results   Component Value Date    HDL 50 2022    HDL 55 10/27/2019     Lab Results   Component Value Date    LDLCALC 142 (H) 2022    LDLCALC 90 10/27/2019     Lab Results   Component Value Date    TRIG 118 2022    TRIG 93 10/27/2019     No results found for: CHOLHDL   Lab Results   Component Value Date    INR 1 54 (H) 07/28/2021    INR 2 26 (H) 06/02/2021    INR 1 59 (H) 01/22/2021    PROTIME 18 4 (H) 07/28/2021    PROTIME 24 9 (H) 06/02/2021    PROTIME 18 7 (H) 01/22/2021        1  Chronic diastolic heart failure (HCC)    2  Paroxysmal atrial flutter ([address] )    3  Abnormal ECG    4  Obesity, morbid ([address] )    5  Bilateral lower extremity edema        IMPRESSION:  · Chronic diastolic heart failure  · LVEF 55%, mild LVH, paradoxical septal wall motion, mild RV dilatation, mild LA dilatation, mild to moderate RA dilatation, trace MR/TR, January 2021  · Paroxysmal atrial fibrillation/flutter (had long standing since before October 2019 s/p DCCV in May 2021) s/p RFA ablation (June 2021 and July 2021) on Xarelto   · Holter with AF, avg HR 92 bpm, rare VPCs, September 2020  · History of bilateral venous insufficiency status post LE vein ablation  · Abnormal ECG with bifascicular block  · Pharmacologic nuclear stress test negative for myocardial ischemia with gated EF 50%, September 2020  · Moderate pulmonary hypertension  · Lymphedema  · Morbid obesity  · ROBERT on 20/14 cm BiPAP    PLAN:  It was a pleasure to see Siddhartha Karo in the office today for follow-up CV evaluation  He is here today for routine CV follow-up  Since our last encounter, he was seen in the emergency department for musculoskeletal chest pain  His symptoms occurred while he was in bed and he was unable to get comfortable due to a pinching sensation in his chest that he believes is musculoskeletal   Since that time, he has been very active without any further episodes of chest discomfort  In the emergency department, his cardiac enzymes were negative  He has no symptoms concerning for angina and no signs or symptoms of heart failure    He did go up in his weights, but clinically he examines similar to how he has in the past   He does admit to some dietary indiscretion and does not believe that his edema has increased substantially  Chest x-ray was clear in September 2022  Based on his clinical presentation, I have the following recommendations:    1  Recommend aggressive risk factor and lifestyle modifications  2  Would encourage heart healthy diet low in sodium and carbohydrate  Would encourage sodium restriction to under 1500 mg per day and portion control to help with cardiovascular health  3  Recommend 30 minutes a day, 5 days a week of moderate intensity activity to build cardiovascular endurance  4  Continue torsemide 20 mg twice daily  If he is to gain further weight with greater than 3 lb in a day or 5 lb in 1-2 weeks, recommend calling the office for further titration of diuretic medication  5  Continue spironolactone and Toprol-XL for antihypertensive control  6  Continue Xarelto for thromboembolic prophylaxis with history of atrial fibrillation  7  No additional CV testing at this time  8  We will follow up with him in 6 months to reassess his progress  As always, please do not hesitate to call with any questions  Portions of the record may have been created with voice recognition software  Occasional wrong word or "sound a like" substitutions may have occurred due to the inherent limitations of voice recognition software  Read the chart carefully and recognize, using context, where substitutions have occurred        Signed: Jacoby Rubalcava DO, [facility] [place], [place], FACNISHA

## 2022-12-02 DIAGNOSIS — I48.4 ATYPICAL ATRIAL FLUTTER (HCC): ICD-10-CM

## 2022-12-02 NOTE — TELEPHONE ENCOUNTER
- currently rate controlled with HR ranging between   - continue home medication regimen  - maintain on telemetry for now  - hold ASA    Requested medication(s) are due for refill today: Yes  Patient has already received a courtesy refill: No  Other reason request has been forwarded to provider:

## 2022-12-05 RX ORDER — METOPROLOL SUCCINATE 25 MG/1
TABLET, EXTENDED RELEASE ORAL
Qty: 90 TABLET | Refills: 0 | Status: SHIPPED | OUTPATIENT
Start: 2022-12-05

## 2022-12-07 ENCOUNTER — TELEPHONE (OUTPATIENT)
Dept: CARDIOLOGY CLINIC | Facility: CLINIC | Age: 67
End: 2022-12-07

## 2022-12-07 NOTE — TELEPHONE ENCOUNTER
Pt having dental extraction and implants done dentist told him to contact office in reference to medication hold   Radha Aparicio fax number provided to patient to have dental office fax request to out office  Pt understood and will contact dental office

## 2022-12-18 ENCOUNTER — NURSE TRIAGE (OUTPATIENT)
Dept: OTHER | Facility: OTHER | Age: 67
End: 2022-12-18

## 2022-12-18 NOTE — TELEPHONE ENCOUNTER
Regarding: mouth bleeding  ----- Message from Chanel Banda sent at 12/18/2022  8:51 AM EST -----  " I woke up and my mouth is bleeding, it's like full of blood   I'm not sure how to stop it "

## 2022-12-18 NOTE — TELEPHONE ENCOUNTER
Reason for Disposition  • Sounds like a serious complication to the triager    Answer Assessment - Initial Assessment Questions  1  SYMPTOM: "What's the main symptom you're concerned about?" (e g , redness, pain, drainage)      Bleeding in mouth   2  ONSET: "When did bleeding   start?"      During the night   3  SURGERY: "What surgery was performed?"      Dental implants   4  DATE of SURGERY: "When was surgery performed?"       12/15/2022  5  INCISION SITE: "Where is the incision located?"       Gums  6  REDNESS: "Is there any redness at the incision site?" If yes, ask: "How wide across is the redness?" (Inches, centimeters)       no  7  PAIN: "Is there any pain?" If Yes, ask: "How bad is it?"  (Scale 1-10; or mild, moderate, severe)      no  8  BLEEDING: "Is there any bleeding?" If Yes, ask: "How much?" and "Where?"      Yes ,woke up with blood in my mouth and a blood clot on my tongue  9  DRAINAGE: "Is there any drainage from the incision site?" If yes, ask: "What color and how much?" (e g , red, cloudy, pus; drops, teaspoon)      Not sure I put the inplants in   10  FEVER: "Do you have a fever?" If Yes, ask: "What is your temperature, how was it measured, and when did it start?"        no  11   OTHER SYMPTOMS: "Do you have any other symptoms?" (e g , shaking chills, weakness, rash elsewhere on body)        no    Protocols used: POST-OP INCISION SYMPTOMS AND QUESTIONS-ADULT-AH

## 2022-12-20 NOTE — TELEPHONE ENCOUNTER
Placed call to patient to follow up on symptoms  He states the bleeding has stopped  He did speak to a nurse who told him to go to ER which he said he felt like he did not need to do  He held Xarelto for one day on Sunday and has now resumed

## 2022-12-21 ENCOUNTER — APPOINTMENT (OUTPATIENT)
Dept: LAB | Facility: HOSPITAL | Age: 67
End: 2022-12-21

## 2022-12-21 DIAGNOSIS — I48.91 ATRIAL FIBRILLATION, UNSPECIFIED TYPE (HCC): ICD-10-CM

## 2022-12-21 LAB
ALBUMIN SERPL BCP-MCNC: 3.6 G/DL (ref 3.5–5)
ALP SERPL-CCNC: 67 U/L (ref 46–116)
ALT SERPL W P-5'-P-CCNC: 21 U/L (ref 12–78)
ANION GAP SERPL CALCULATED.3IONS-SCNC: 3 MMOL/L (ref 4–13)
AST SERPL W P-5'-P-CCNC: 18 U/L (ref 5–45)
BASOPHILS # BLD AUTO: 0.02 THOUSANDS/ÂΜL (ref 0–0.1)
BASOPHILS NFR BLD AUTO: 0 % (ref 0–1)
BILIRUB SERPL-MCNC: 0.6 MG/DL (ref 0.2–1)
BUN SERPL-MCNC: 22 MG/DL (ref 5–25)
CALCIUM SERPL-MCNC: 9.3 MG/DL (ref 8.3–10.1)
CHLORIDE SERPL-SCNC: 107 MMOL/L (ref 96–108)
CO2 SERPL-SCNC: 30 MMOL/L (ref 21–32)
CREAT SERPL-MCNC: 0.91 MG/DL (ref 0.6–1.3)
EOSINOPHIL # BLD AUTO: 0.11 THOUSAND/ÂΜL (ref 0–0.61)
EOSINOPHIL NFR BLD AUTO: 2 % (ref 0–6)
ERYTHROCYTE [DISTWIDTH] IN BLOOD BY AUTOMATED COUNT: 13 % (ref 11.6–15.1)
GFR SERPL CREATININE-BSD FRML MDRD: 86 ML/MIN/1.73SQ M
GLUCOSE P FAST SERPL-MCNC: 108 MG/DL (ref 65–99)
HCT VFR BLD AUTO: 41.4 % (ref 36.5–49.3)
HGB BLD-MCNC: 14 G/DL (ref 12–17)
IMM GRANULOCYTES # BLD AUTO: 0.01 THOUSAND/UL (ref 0–0.2)
IMM GRANULOCYTES NFR BLD AUTO: 0 % (ref 0–2)
LYMPHOCYTES # BLD AUTO: 1.16 THOUSANDS/ÂΜL (ref 0.6–4.47)
LYMPHOCYTES NFR BLD AUTO: 23 % (ref 14–44)
MCH RBC QN AUTO: 30.7 PG (ref 26.8–34.3)
MCHC RBC AUTO-ENTMCNC: 33.8 G/DL (ref 31.4–37.4)
MCV RBC AUTO: 91 FL (ref 82–98)
MONOCYTES # BLD AUTO: 0.39 THOUSAND/ÂΜL (ref 0.17–1.22)
MONOCYTES NFR BLD AUTO: 8 % (ref 4–12)
NEUTROPHILS # BLD AUTO: 3.42 THOUSANDS/ÂΜL (ref 1.85–7.62)
NEUTS SEG NFR BLD AUTO: 67 % (ref 43–75)
NRBC BLD AUTO-RTO: 0 /100 WBCS
PLATELET # BLD AUTO: 157 THOUSANDS/UL (ref 149–390)
PMV BLD AUTO: 10.5 FL (ref 8.9–12.7)
POTASSIUM SERPL-SCNC: 3.9 MMOL/L (ref 3.5–5.3)
PROT SERPL-MCNC: 7.9 G/DL (ref 6.4–8.4)
RBC # BLD AUTO: 4.56 MILLION/UL (ref 3.88–5.62)
SODIUM SERPL-SCNC: 140 MMOL/L (ref 135–147)
WBC # BLD AUTO: 5.11 THOUSAND/UL (ref 4.31–10.16)

## 2023-01-02 NOTE — PROGRESS NOTES
Cardiology Office Note  MD Ulices Mishra MD Delman Perkins, DO, [facility] MD Miriam Sr DO, Micah Calderon DO, [facility] [place]  ----------------------------------------------------------------  [address] [place] Maco He [age] y o  male MRN: [id number]  Unit/Bed#:  Encounter: [id number]      History of Present Illness: It was a pleasure to see Demian Rudd in the office today for follow-up CV evaluation  He has a longstanding history of atrial fibrillation with prior failed ELAINE guided cardioversion, history of morbid obesity and pulmonary hypertension with chronic venous insufficiency/lymphedema  The patient has a known history of obstructive sleep apnea was noncompliant with his CPAP therapy  Over the course of many years he has been having lower extremity edema which has been believe secondary to lymphedema with chronic venous insufficiency  He has had venous ablation is in the past for his reflux disease  In October 2019, he was found have pulmonary hypertension with pulmonary artery systolic pressure is estimated at 50 mm Hg  We had initially seen him in late August 2020 due to increased fatigue and dyspnea on exertion  He has become somewhat short of breath with basic activity  Previously, he had been managed for his atrial fibrillation at [facility]  Echocardiogram, stress test and Holter monitor were ordered, but stress test and Holter were completed  Stress test was found to be negative for myocardial ischemia  Holter monitor demonstrated atrial fibrillation with an average heart rate of 92 beats per minute and rare VPCs  His weight trends unfortunately continue to go upward and he had dietary discretion  He seen by electrophysiology and recommended for a sleep study as well as an evaluation with advanced heart failure    He was placed on CPAP and has been compliant since that time   He also was seen by bariatric surgery and wish to undergo conservative options with dietary modifications  Despite increasing diuretic load, he he continue to gain approximately 30 lb and was subsequently sent to [facility] in [facility]  He was diuresed down to 285 lb  While hospitalized, his echocardiogram was performed showing normal left ventricular function  Following discharge, he was seen by electrophysiology and sent for a cardioversion in May 2021  The cardioversion was initially successful, but he reverted back to rate controlled atrial flutter  He has been placed on amiodarone and decreased down to 100 mg daily  On his dose of torsemide, he had acute kidney injury with a creatinine rise to 1 9  After holding his diuretics for several days, his creatinine came down to 1 1  He was then decreased on his torsemide to 20 mg b i d  And his weight trends continue to improve  He has been aggressive in his dietary modifications  He also underwent radiofrequency ablation for his atrial fibrillation/flutter  He underwent a procedure in June 2021 Overall, he has felt much improved  In July 2021, he ended up going back into atrial flutter  He was seen by electrophysiology who was recommending a repeat ablation study  He underwent the ablation in late July 2021 which was successful  He was seen in the office by EP following the procedure and had remained in sinus rhythm  He subsequently was discontinued on his amiodarone  In October to November 2022, patient developed some symptoms of shortness of breath with exertion  The symptoms were fairly new  He had not had a significant increase in his lower extremity swelling or any associated chest discomfort  Due to his symptoms, he is presenting to the office for follow-up CV evaluation  Denies any chest pain, pressure, tightness or squeezing  Denies lightheadedness, dizziness or palpitations       Review of Systems:  Review of Systems   Constitutional: Negative for decreased appetite, fever, malaise/fatigue, weight gain and weight loss  HENT: Negative for congestion and sore throat  Eyes: Negative for visual disturbance  Cardiovascular: Positive for dyspnea on exertion  Negative for chest pain, leg swelling, near-syncope and palpitations  Respiratory: Positive for shortness of breath  Negative for cough  Hematologic/Lymphatic: Negative for bleeding problem  Skin: Negative for rash  Musculoskeletal: Negative for myalgias and neck pain  Gastrointestinal: Negative for abdominal pain and nausea  Neurological: Negative for light-headedness and weakness  Psychiatric/Behavioral: Negative for depression         Past Medical History:   Diagnosis Date   • A-fib [facility])    • [facility] 6[facility]15/2021   • Arthritis    • CPAP (continuous positive airway pressure) dependence    • Irregular heart beat    • Lymphedema    • Sleep apnea    • Urinary frequency    • Wears glasses    • Wears partial dentures     lower partial       Past Surgical History:   Procedure Laterality Date   • ABLATION SAPHENOUS VEIN W/ RFA     • COLONOSCOPY     • CO CYSTO INSERTION TRANSPROSTATIC IMPLANT SINGLE N/A 11/13/2017    Procedure: CYSTOSCOPY WITH INSERTION UROLIFT;  Surgeon: Claudette Bedolla DO;  Location: [facility];  Service: Urology   • TONSILLECTOMY         Social History     Socioeconomic History   • Marital status: /Civil Union     Spouse name: None   • Number of children: None   • Years of education: None   • Highest education level: None   Occupational History   • None   Tobacco Use   • Smoking status: Never   • Smokeless tobacco: Never   Vaping Use   • Vaping Use: Never used   Substance and Sexual Activity   • Alcohol use: Yes     Comment: socially   • Drug use: No   • Sexual activity: None   Other Topics Concern   • None   Social History Narrative   • None     Social Determinants of Health     Financial Resource Strain: Not on file   Food Insecurity: Not on file   Transportation Needs: Not on file   Physical Activity: Not on file   Stress: Not on file   Social Connections: Not on file   Intimate Partner Violence: Not on file   Housing Stability: Not on file       Family History   Problem Relation Age of Onset   • Heart disease Mother    • Lymphoma Father    • Cancer Father    • Heart disease Sister    • Hypertension Brother    • Diabetes Neg Hx    • Thyroid disease Neg Hx    • Stroke Neg Hx        Allergies   Allergen Reactions   • Penicillins Anaphylaxis   • Sulfa Antibiotics Anaphylaxis         Current Outpatient Medications:   •  acetaminophen (TYLENOL) 500 mg tablet, Take 500 mg by mouth every 6 (six) hours as needed for mild pain, Disp: , Rfl:   •  allopurinol (ZYLOPRIM) 100 mg tablet, Take 1 tablet (100 mg total) by mouth daily, Disp: 14 tablet, Rfl: 0  •  amiodarone 100 mg tablet, Take 100 mg by mouth daily, Disp: , Rfl:   •  ammonium lactate (LAC-HYDRIN) 12 % lotion, Apply topically 2 (two) times a day as needed for dry skin, Disp: , Rfl:   •  Diclofenac Sodium (VOLTAREN) 1 %, Apply 2 g topically 4 (four) times a day, Disp: 100 g, Rfl: 0  •  ferrous sulfate 325 (65 Fe) mg tablet, Take 325 mg by mouth, Disp: , Rfl:   •  Glucosamine-Chondroit-Vit C-Mn (Glucosamine 1500 Complex) CAPS, Take by mouth, Disp: , Rfl:   •  halobetasol (ULTRAVATE) 0 05 % ointment, APPLY TO AFFECTED AREA ON LEG TWICE DAILY, Disp: , Rfl:   •  L-ARGININE-500 PO, Take 500 mg by mouth 2 (two) times a day , Disp: , Rfl:   •  metoprolol succinate (TOPROL-XL) 25 mg 24 hr tablet, TAKE 1 TABLET BY MOUTH EVERY DAY, Disp: 90 tablet, Rfl: 0  •  Multiple Vitamins-Minerals (OCUVITE EXTRA PO), Take 1 tablet by mouth daily  , Disp: , Rfl:   •  multivitamin (THERAGRAN) TABS, Take 1 tablet by mouth daily, Disp: , Rfl:   •  Omega-3 Fatty Acids (fish oil) 1,000 mg, Take 1,000 mg by mouth 2 (two) times a day, Disp: , Rfl:   •  pregabalin (LYRICA) 100 mg capsule, Take 1 capsule (100 mg total) by mouth 3 (three) times a day, Disp: 42 capsule, Rfl: 0  •  spironolactone (ALDACTONE) 25 mg tablet, TAKE 1 TABLET BY MOUTH TWICE A DAY, Disp: 180 tablet, Rfl: 1  •  tadalafil (CIALIS) 5 MG tablet, Take 5 mg by mouth daily as needed for erectile dysfunction, Disp: , Rfl:   •  torsemide (DEMADEX) 20 mg tablet, TAKE 1 TABLET BY MOUTH TWICE A DAY, Disp: 180 tablet, Rfl: 1  •  traMADol (ULTRAM) 50 mg tablet, Take 50 mg by mouth every 6 (six) hours as needed for moderate pain, Disp: , Rfl:   •  VITAMIN D PO, Take 2,000 Units by mouth daily , Disp: , Rfl:   •  Xarelto 20 MG tablet, TAKE 1 TABLET BY MOUTH EVERY DAY, Disp: 90 tablet, Rfl: 1  •  azithromycin (ZITHROMAX) 250 mg tablet, Take 2 tablets by mouth on day one; then one tablet daily for 4 days  (Patient not taking: No sig reported), Disp: , Rfl:   •  clindamycin (CLEOCIN) 300 MG capsule, Take 300 mg by mouth as needed Prior to dental work  (Patient not taking: Reported on 2/1/2022), Disp: , Rfl:   •  colchicine (COLCRYS) 0 6 mg tablet, as needed (Patient not taking: Reported on 1/3/2023), Disp: , Rfl:   •  lidocaine (LIDODERM) 5 %, Apply 1 patch topically every 24 hours Remove & Discard patch within 12 hours or as directed by MD (Patient not taking: Reported on 9/27/2022), Disp: 6 patch, Rfl: 0  •  patient supplied medication, Take 1 each by mouth 2 (two) times a day (Patient not taking: Reported on 4/12/2022), Disp: , Rfl:     Vitals:    01/03/23 0843   BP: 117/72   Pulse: 66   Weight: (!) 141 kg (310 lb 12 8 oz)   Height: 5' 9" (1 753 m)       PHYSICAL EXAMINATION:  Gen: Awake, Alert, NAD  Head/eyes: AT/NC, pupils equal and round, Anicteric  ENT: mmm  Neck: Supple, No elevated JVP, trachea midline  Resp: CTA bilaterally no w/r/r  CV: RRR +S1, S2, No m/r/g  Abd: Soft, NT/ND + BS  Ext: bilateral lower extremities wrapped with +1 pitting edema bilaterally/lymphedema  Neuro:  Follows commands, moves all extermities  Psych: Appropriate affect, normal mood, cooperative attitude, non-combative  Skin: warm; no rash, erythema or venous stasis changes on exposed skin    --------------------------------------------------------------------------------  TREADMILL STRESS  No results found for this or any previous visit    --------------------------------------------------------------------------------  NUCLEAR STRESS TEST: No results found for this or any previous visit  No results found for this or any previous visit     --------------------------------------------------------------------------------  CATH:  No results found for this or any previous visit   --------------------------------------------------------------------------------  ECHO:   Results for orders placed during the hospital encounter of 10/26/19   Echo complete with contrast if indicated    Narrative [id number] [facility] [address] [facility] [place] , [facility] 34 [phone]    Transthoracic Echocardiogram  2D, M-mode, Doppler, and Color Doppler    Study date:  28-Oct-2019    Patient: Mellisa Frazier  MR number: [id number]  Account number: [de-identified]  : 1955  Age: 71 years  Gender: Male  Status: Inpatient  Location: Bedside  Height: 69 in  Weight: 302 lb  BP: 141/ 88 mmHg    Indications: left sided paralysis    Diagnoses: 56 - CVA    Sonographer:  Dejan Moore RDCS, CCT  Referring Physician:  Clarke Rivera DO  Group:  [facility]  Interpreting Physician:  Rula Contreras DO    SUMMARY    LEFT VENTRICLE:  Systolic function was normal  Ejection fraction was estimated to be 55 %  This study was inadequate for the evaluation of regional wall motion  Wall thickness was mildly increased  RIGHT VENTRICLE:  The ventricle was dilated  Systolic function was normal     MITRAL VALVE:  There was mild regurgitation  TRICUSPID VALVE:  There was mild regurgitation  Estimated peak PA pressure was 50 mmHg      HISTORY: PRIOR HISTORY: Patient has no history of cardiovascular disease  PROCEDURE: The procedure was performed at the bedside  This was a routine study  The transthoracic approach was used  The study included complete 2D imaging, M-mode, complete spectral Doppler, and color Doppler  The heart rate was 80 bpm,  at the start of the study  Intravenous contrast (Definity solution [1 3 ml Definity/8 7ml normal saline solution], 3 ml) was administered to opacify the left ventricle  Echocardiographic views were limited due to poor acoustic window  availability  This was a technically difficult study  LEFT VENTRICLE: Size was normal  Systolic function was normal  Ejection fraction was estimated to be 55 %  This study was inadequate for the evaluation of regional wall motion  Wall thickness was mildly increased  DOPPLER: The study was  not technically sufficient to allow evaluation of LV diastolic function  RIGHT VENTRICLE: The ventricle was dilated  Systolic function was normal     LEFT ATRIUM: Size was normal     RIGHT ATRIUM: Size was normal     MITRAL VALVE: Valve structure was normal  There was normal leaflet separation  DOPPLER: The transmitral velocity was within the normal range  There was no evidence for stenosis  There was mild regurgitation  AORTIC VALVE: The valve was trileaflet  Leaflets exhibited normal thickness and normal cuspal separation  DOPPLER: Transaortic velocity was within the normal range  There was no evidence for stenosis  There was no regurgitation  TRICUSPID VALVE: The valve structure was normal  There was normal leaflet separation  DOPPLER: The transtricuspid velocity was within the normal range  There was no evidence for stenosis  There was mild regurgitation  Estimated peak PA  pressure was 50 mmHg  PULMONIC VALVE: Leaflets exhibited normal thickness, no calcification, and normal cuspal separation  DOPPLER: The transpulmonic velocity was within the normal range  There was no regurgitation      PERICARDIUM: There was no pericardial effusion  The pericardium was normal in appearance  AORTA: The root exhibited normal size  SYSTEMIC VEINS: IVC: The inferior vena cava was not well visualized  SYSTEM MEASUREMENT TABLES    2D  %FS: 34 72 %  Ao Diam: 2 87 cm  EDV(Teich): 143 23 ml  EF(Teich): 63 36 %  ESV(Teich): 52 47 ml  IVSd: 1 15 cm  LA Diam: 4 16 cm  LVIDd: 5 43 cm  LVIDs: 3 55 cm  LVPWd: 1 04 cm  RWT: 0 38  SV(Teich): 90 75 ml    CW  AV Vmax: 1 34 m/s  AV maxP 13 mmHg  RAP: 0 mmHg  TR Vmax: 3 26 m/s  TR maxP 5 mmHg    PW  E' Sept: 0 09 m/s  MV E Terrance: 1 03 m/s  RVSP: 42 5 mmHg    [facility] Accredited Echocardiography Laboratory    Prepared and electronically signed by    Angie Corral DO  Signed 28-Oct-2019 10:37:38       No results found for this or any previous visit   --------------------------------------------------------------------------------  HOLTER  No results found for this or any previous visit    No results found for this or any previous visit   --------------------------------------------------------------------------------  CAROTIDS  No results found for this or any previous visit    --------------------------------------------------------------------------------  ECGs:  Results for orders placed or performed in visit on 23   POCT ECG    Impression    Sinus rhythm with PACs 66 bpm, RBBB, LAFB        Lab Results   Component Value Date    WBC 5 11 2022    HGB 14 0 2022    HCT 41 4 2022    MCV 91 2022     2022      Lab Results   Component Value Date    SODIUM 140 2022    K 3 9 2022     2022    CO2 30 2022    BUN 22 2022    CREATININE 0 91 2022    GLUC 82 2022    CALCIUM 9 3 2022      Lab Results   Component Value Date    HGBA1C 5 4 2021      No results found for: CHOL  Lab Results   Component Value Date    HDL 50 2022    HDL 55 10/27/2019     Lab Results Component Value Date    LDLCALC 142 (H) 04/28/2022    LDLCALC 90 10/27/2019     Lab Results   Component Value Date    TRIG 118 04/28/2022    TRIG 93 10/27/2019     No results found for: [place], [place]   Lab Results   Component Value Date    INR 1 54 (H) 07/28/2021    INR 2 26 (H) 06/02/2021    INR 1 59 (H) 01/22/2021    PROTIME 18 4 (H) 07/28/2021    PROTIME 24 9 (H) 06/02/2021    PROTIME 18 7 (H) 01/22/2021        1  Chronic diastolic heart failure (HCC)  -     X-ray chest 2 views; Future; Expected date: 01/03/2023  -     Echo complete w/ contrast if indicated; Future; Expected date: 01/03/2023    2  Paroxysmal atrial flutter (HCC)    3  Abnormal ECG  -     POCT ECG    4  Obesity, morbid ([address] )    5  Venous ulcer ([address] )    6  Stage 3a chronic kidney disease ([address] )    7  Dyspnea on exertion  -     NM myocardial perfusion spect (rx stress and/or rest); Future; Expected date: 01/03/2023        IMPRESSION:  · Chronic diastolic heart failure  · LVEF 55%, mild LVH, paradoxical septal wall motion, mild RV dilatation, mild LA dilatation, mild to moderate RA dilatation, trace MR/TR, January 2021  · Paroxysmal atrial fibrillation/flutter on Xarelto and amiodarone; had long standing since before October 2019 - now in [place] T[place]HA [place]  · s/p DCCV in May 2021   · s/p RFA ablation (June 2021 and July 2021)  · Holter with AF, avg HR 92 bpm, rare VPCs, September 2020  · History of bilateral venous insufficiency status post LE vein ablation  · Abnormal ECG with bifascicular block  · Pharmacologic nuclear stress test negative for myocardial ischemia with gated EF 50%, September 2020  · Moderate pulmonary hypertension  · Lymphedema  · Morbid obesity  · ROBERT on 20/14 cm BiPAP    PLAN:  It was a pleasure to see Suni Roland in the office today for follow-up CV evaluation  He is here today for follow-up  Since her last encounter, he has developed some shortness of breath with exertion    His weights have been stable to downward trending and his lower extremity edema is not changed compared to his baseline  He has no chest discomfort  Clinically he examines to be euvolemic  Blood pressure and heart rate are currently stable  ECG shows chronic right bundle branch block with left anterior fascicular block  He is tolerating his current medications without reported adverse effects  Based on his clinical presentation, the following recommendations:    1  Due to the patient's new onset of dyspnea on exertion with multiple risk factors and abnormal ECG, I would recommend nuclear stress test to assess for any evidence of underlying myocardial ischemia  Would perform this is a pharmacologic test to avoid sending the patient back into rapid atrial fibrillation/flutter with significant exertional activity  2  Would obtain repeat 2D echocardiogram to assess cardiac structure and function with his dyspnea on exertion  3   Check chest x-ray given his history of amiodarone use to look for any evidence of pulmonary fibrosis  4  Continue torsemide  Should he gain greater than 3 pounds in a day or 5 pounds in 1 to 2 weeks, recommend calling the office for further titration of diuretic medication  5  Continue spironolactone and Toprol-XL for antihypertensive control  6  Continue Xarelto for thromboembolic prophylaxis  7  Should his symptoms of shortness of breath worsen in frequency or severity or change in quality, recommend seeking immediate medical attention  8  We will follow-up with him after his testing to review the results and then in 6 months  As always, please not hesitate to call with any questions  Portions of the record may have been created with voice recognition software  Occasional wrong word or "sound a like" substitutions may have occurred due to the inherent limitations of voice recognition software  Read the chart carefully and recognize, using context, where substitutions have occurred        Signed: Sierra Thompson DO, [facility] [place], [place], [place]

## 2023-01-03 ENCOUNTER — OFFICE VISIT (OUTPATIENT)
Dept: CARDIOLOGY CLINIC | Facility: CLINIC | Age: 68
End: 2023-01-03

## 2023-01-03 VITALS
DIASTOLIC BLOOD PRESSURE: 72 MMHG | SYSTOLIC BLOOD PRESSURE: 117 MMHG | BODY MASS INDEX: 46.03 KG/M2 | WEIGHT: 310.8 LBS | HEIGHT: 69 IN | HEART RATE: 66 BPM

## 2023-01-03 DIAGNOSIS — N18.31 STAGE 3A CHRONIC KIDNEY DISEASE (HCC): ICD-10-CM

## 2023-01-03 DIAGNOSIS — I48.92 PAROXYSMAL ATRIAL FLUTTER (HCC): ICD-10-CM

## 2023-01-03 DIAGNOSIS — R06.09 DYSPNEA ON EXERTION: ICD-10-CM

## 2023-01-03 DIAGNOSIS — E66.01 OBESITY, MORBID (HCC): ICD-10-CM

## 2023-01-03 DIAGNOSIS — L97.909 VENOUS ULCER (HCC): ICD-10-CM

## 2023-01-03 DIAGNOSIS — R94.31 ABNORMAL ECG: ICD-10-CM

## 2023-01-03 DIAGNOSIS — I83.009 VENOUS ULCER (HCC): ICD-10-CM

## 2023-01-03 DIAGNOSIS — I50.32 CHRONIC DIASTOLIC HEART FAILURE (HCC): Primary | ICD-10-CM

## 2023-01-03 RX ORDER — AMIODARONE HYDROCHLORIDE 100 MG/1
100 TABLET ORAL DAILY
COMMUNITY

## 2023-01-05 ENCOUNTER — TELEPHONE (OUTPATIENT)
Dept: CARDIOLOGY CLINIC | Facility: CLINIC | Age: 68
End: 2023-01-05

## 2023-01-05 NOTE — TELEPHONE ENCOUNTER
PT called stating he thought Dr Zena Merritt was stopping medication but not on afer visit summary---Reviewed not from OV and no medications discontinued  Reviewed what occrred at [address] and patient was in agreement

## 2023-01-06 ENCOUNTER — HOSPITAL ENCOUNTER (OUTPATIENT)
Dept: RADIOLOGY | Facility: HOSPITAL | Age: 68
Discharge: HOME | End: 2023-01-06

## 2023-01-06 DIAGNOSIS — I50.32 CHRONIC DIASTOLIC HEART FAILURE (HCC): ICD-10-CM

## 2023-01-09 ENCOUNTER — TELEPHONE (OUTPATIENT)
Dept: CARDIOLOGY CLINIC | Facility: CLINIC | Age: 68
End: 2023-01-09

## 2023-01-09 NOTE — TELEPHONE ENCOUNTER
PC from patient who is having swelling in his legs,and a diagnosis of CHF  He has been eating hot dogs and sauerkraut since new years (left overs)  He has not gained any weight, he is not SOB or coughing but just the swelling  He takes torsemide 20 mgs BID  I went over diet for CHF patients and "he was unaware he should not be eating these types of foods"  (?)  Do you want to increase his torsemide? Please advise

## 2023-01-23 ENCOUNTER — HOSPITAL ENCOUNTER (OUTPATIENT)
Dept: NON INVASIVE DIAGNOSTICS | Facility: HOSPITAL | Age: 68
Discharge: HOME | End: 2023-01-23

## 2023-01-23 ENCOUNTER — HOSPITAL ENCOUNTER (OUTPATIENT)
Dept: NUCLEAR MEDICINE | Facility: HOSPITAL | Age: 68
Discharge: HOME | End: 2023-01-23

## 2023-01-23 VITALS
HEART RATE: 78 BPM | WEIGHT: 303 LBS | BODY MASS INDEX: 44.88 KG/M2 | SYSTOLIC BLOOD PRESSURE: 129 MMHG | HEIGHT: 69 IN | DIASTOLIC BLOOD PRESSURE: 82 MMHG

## 2023-01-23 DIAGNOSIS — R06.09 DYSPNEA ON EXERTION: ICD-10-CM

## 2023-01-23 DIAGNOSIS — I50.32 CHRONIC DIASTOLIC HEART FAILURE (HCC): ICD-10-CM

## 2023-01-23 LAB
AORTIC ROOT: 3.5 CM
AORTIC VALVE MEAN VELOCITY: 10.2 M/S
APICAL FOUR CHAMBER EJECTION FRACTION: 59 %
AV LVOT MEAN GRADIENT: 3 MMHG
AV LVOT PEAK GRADIENT: 6 MMHG
AV MEAN GRADIENT: 5 MMHG
AV PEAK GRADIENT: 10 MMHG
AV VELOCITY RATIO: 0.8
DOP CALC AO PEAK VEL: 1.54 M/S
DOP CALC AO VTI: 29.54 CM
DOP CALC LVOT PEAK VEL VTI: 23.15 CM
DOP CALC LVOT PEAK VEL: 1.23 M/S
DOP CALC RVOT PEAK VEL: 0.87 M/S
DOP CALC RVOT VTI: 22.03 CM
E WAVE DECELERATION TIME: 310 MS
FRACTIONAL SHORTENING: 35 % (ref 28–44)
INTERVENTRICULAR SEPTUM IN DIASTOLE (PARASTERNAL SHORT AXIS VIEW): 1.2 CM
INTERVENTRICULAR SEPTUM: 1.2 CM (ref 0.6–1.1)
LAAS-AP4: 26.2 CM2
LEFT ATRIUM SIZE: 4.6 CM
LEFT INTERNAL DIMENSION IN SYSTOLE: 3.5 CM (ref 2.1–4)
LEFT VENTRICULAR INTERNAL DIMENSION IN DIASTOLE: 5.4 CM (ref 3.5–6)
LEFT VENTRICULAR POSTERIOR WALL IN END DIASTOLE: 1.2 CM
LEFT VENTRICULAR STROKE VOLUME: 92 ML
LVSV (TEICH): 92 ML
MAX HR PERCENT: 48 %
MAX HR: 74 BPM
MV E'TISSUE VEL-SEP: 11 CM/S
MV PEAK A VEL: 0.46 M/S
MV PEAK E VEL: 119 CM/S
MV STENOSIS PRESSURE HALF TIME: 90 MS
MV VALVE AREA P 1/2 METHOD: 2.44 CM2
NUC STRESS EJECTION FRACTION: 65 %
RA PRESSURE ESTIMATED: 3 MMHG
RATE PRESSURE PRODUCT: 9028
RIGHT VENTRICLE ID DIMENSION: 4.4 CM
RV PSP: 37 MMHG
SL CV LV EF: 65
SL CV PED ECHO LEFT VENTRICLE DIASTOLIC VOLUME (MOD BIPLANE) 2D: 144 ML
SL CV PED ECHO LEFT VENTRICLE SYSTOLIC VOLUME (MOD BIPLANE) 2D: 51 ML
SL CV REST NUCLEAR ISOTOPE DOSE: 16.4 MCI
SL CV RVOT MAX GRADIENT: 3 MMHG
SL CV RVOT MEAN GRADIENT: 2 MMHG
SL CV RVOT VMEAN: 0.61 M/S
SL CV STRESS NUCLEAR ISOTOPE DOSE: 48.2 MCI
STRESS ANGINA INDEX: 0
STRESS BASELINE HR: 59 BPM
STRESS PEAK HR: 74 BPM
STRESS POST PEAK BP: 122 MMHG
STRESS ST DEPRESSION: 0 MM
STRESS/REST PERFUSION RATIO: 1
TR MAX PG: 34 MMHG
TR PEAK VELOCITY: 2.9 M/S
TRICUSPID VALVE PEAK REGURGITATION VELOCITY: 2.92 M/S

## 2023-01-23 RX ADMIN — REGADENOSON 0.4 MG: 0.08 INJECTION, SOLUTION INTRAVENOUS at 09:47

## 2023-01-24 ENCOUNTER — TELEPHONE (OUTPATIENT)
Dept: CARDIOLOGY CLINIC | Facility: CLINIC | Age: 68
End: 2023-01-24

## 2023-01-24 LAB
CHEST PAIN STATEMENT: NORMAL
MAX DIASTOLIC BP: 74 MMHG
MAX HEART RATE: 74 BPM
MAX PREDICTED HEART RATE: 152 BPM
MAX. SYSTOLIC BP: 140 MMHG
PROTOCOL NAME: NORMAL
REASON FOR TERMINATION: NORMAL
TARGET HR FORMULA: NORMAL
TEST INDICATION: NORMAL
TIME IN EXERCISE PHASE: NORMAL

## 2023-01-24 NOTE — TELEPHONE ENCOUNTER
----- Message from Yokasta Viramontes DO sent at 1/24/2023 10:40 AM EST -----    Stress test was found to be negative for myocardial ischemia  Can reach out to the patient and let him know this good news  Will discuss further with the patient in follow-up  Thank you

## 2023-01-25 ENCOUNTER — OFFICE VISIT (OUTPATIENT)
Dept: CARDIOLOGY CLINIC | Facility: CLINIC | Age: 68
End: 2023-01-25

## 2023-01-25 VITALS
WEIGHT: 304.4 LBS | HEART RATE: 54 BPM | HEIGHT: 69 IN | BODY MASS INDEX: 45.08 KG/M2 | SYSTOLIC BLOOD PRESSURE: 122 MMHG | DIASTOLIC BLOOD PRESSURE: 84 MMHG

## 2023-01-25 DIAGNOSIS — E66.01 OBESITY, MORBID (HCC): ICD-10-CM

## 2023-01-25 DIAGNOSIS — I48.92 PAROXYSMAL ATRIAL FLUTTER (HCC): ICD-10-CM

## 2023-01-25 DIAGNOSIS — R94.31 ABNORMAL ECG: ICD-10-CM

## 2023-01-25 DIAGNOSIS — R60.0 BILATERAL LOWER EXTREMITY EDEMA: ICD-10-CM

## 2023-01-25 DIAGNOSIS — I50.32 CHRONIC DIASTOLIC HEART FAILURE (HCC): Primary | ICD-10-CM

## 2023-01-25 RX ORDER — LOTEPREDNOL ETABONATE 5 MG/ML
1 SUSPENSION/ DROPS OPHTHALMIC 4 TIMES DAILY
COMMUNITY

## 2023-01-25 RX ORDER — FLUOROMETHOLONE 2.5 MG/ML
SUSPENSION/ DROPS OPHTHALMIC
COMMUNITY
Start: 2022-12-30

## 2023-01-25 RX ORDER — METHYLPREDNISOLONE 4 MG/1
TABLET ORAL
COMMUNITY
Start: 2023-01-23

## 2023-01-25 RX ORDER — ALFUZOSIN HYDROCHLORIDE 10 MG/1
10 TABLET, EXTENDED RELEASE ORAL DAILY
COMMUNITY

## 2023-01-25 NOTE — PROGRESS NOTES
Cardiology Office Note  MD Radha Castellanos MD Lacy Roche, DO, [address] [facility] MD Haider Sr DO, Juani Gonzales DO, [address]  ----------------------------------------------------------------  [address]  [address]  [address] [place] He [age] y o  male MRN: [id number]  Unit/Bed#:  Encounter: [id number]      History of Present Illness: It was a pleasure to see Lazara Shi in the office today for follow-up CV evaluation  He has a longstanding history of atrial fibrillation with prior failed ELAINE guided cardioversion, history of morbid obesity and pulmonary hypertension with chronic venous insufficiency/lymphedema  The patient has a known history of obstructive sleep apnea was noncompliant with his CPAP therapy  Over the course of many years he has been having lower extremity edema which has been believe secondary to lymphedema with chronic venous insufficiency  He has had venous ablation is in the past for his reflux disease  In October 2019, he was found have pulmonary hypertension with pulmonary artery systolic pressure is estimated at 50 mm Hg  We had initially seen him in late August 2020 due to increased fatigue and dyspnea on exertion  He has become somewhat short of breath with basic activity  Previously, he had been managed for his atrial fibrillation at [facility]  Echocardiogram, stress test and Holter monitor were ordered, but stress test and Holter were completed  Stress test was found to be negative for myocardial ischemia  Holter monitor demonstrated atrial fibrillation with an average heart rate of 92 beats per minute and rare VPCs  His weight trends unfortunately continue to go upward and he had dietary discretion  He seen by electrophysiology and recommended for a sleep study as well as an evaluation with advanced heart failure    He was placed on CPAP and has been compliant since that time   He also was seen by bariatric surgery and wish to undergo conservative options with dietary modifications  Despite increasing diuretic load, he he continue to gain approximately 30 lb and was subsequently sent to [facility] in [facility]  He was diuresed down to 285 lb  While hospitalized, his echocardiogram was performed showing normal left ventricular function  Following discharge, he was seen by electrophysiology and sent for a cardioversion in May 2021  The cardioversion was initially successful, but he reverted back to rate controlled atrial flutter  He has been placed on amiodarone and decreased down to 100 mg daily  On his dose of torsemide, he had acute kidney injury with a creatinine rise to 1 9  After holding his diuretics for several days, his creatinine came down to 1 1  He was then decreased on his torsemide to 20 mg b i d  And his weight trends continue to improve  He has been aggressive in his dietary modifications  He also underwent radiofrequency ablation for his atrial fibrillation/flutter  He underwent a procedure in June 2021 Overall, he has felt much improved  In July 2021, he ended up going back into atrial flutter  He was seen by electrophysiology who was recommending a repeat ablation study  He underwent the ablation in late July 2021 which was successful  He was seen in the office by EP following the procedure and had remained in sinus rhythm  He subsequently was discontinued on his amiodarone  In October to November 2022, patient developed some symptoms of shortness of breath with exertion  This symptom had been fairly new and the patient was sent for testing including a stress test, chest x-ray and echocardiogram   He is here today for follow-up on the results  He admits to resolution of the shortness of breath since our encounter at that time  He denies chest pain, pressure, tightness or squeezing  Admits to his chronic lower extremity swelling  Denies orthopnea or paroxysmal nocturnal dyspnea  Review of Systems:  Review of Systems   Constitutional: Negative for decreased appetite, fever, malaise/fatigue, weight gain and weight loss  HENT: Negative for congestion and sore throat  Eyes: Negative for visual disturbance  Cardiovascular: Positive for leg swelling  Negative for chest pain, dyspnea on exertion, near-syncope and palpitations  Respiratory: Negative for cough and shortness of breath  Hematologic/Lymphatic: Negative for bleeding problem  Skin: Negative for rash  Musculoskeletal: Negative for myalgias and neck pain  Gastrointestinal: Negative for abdominal pain and nausea  Neurological: Negative for light-headedness and weakness  Psychiatric/Behavioral: Negative for depression         Past Medical History:   Diagnosis Date   • A-fib [facility])    • ALANNA (acute kidney injury) ([facility] [address] ) 6/15/2021   • Arthritis    • CPAP (continuous positive airway pressure) dependence    • Irregular heart beat    • Lymphedema    • Sleep apnea    • Urinary frequency    • Wears glasses    • Wears partial dentures     lower partial       Past Surgical History:   Procedure Laterality Date   • ABLATION SAPHENOUS VEIN W/ RFA     • COLONOSCOPY     • CT CYSTO INSERTION TRANSPROSTATIC IMPLANT SINGLE N/A 11/13/2017    Procedure: CYSTOSCOPY WITH INSERTION UROLIFT;  Surgeon: Linda Aceves DO;  Location: [facility];  Service: Urology   • TONSILLECTOMY         Social History     Socioeconomic History   • Marital status: /Civil Union     Spouse name: None   • Number of children: None   • Years of education: None   • Highest education level: None   Occupational History   • None   Tobacco Use   • Smoking status: Never   • Smokeless tobacco: Never   Vaping Use   • Vaping Use: Never used   Substance and Sexual Activity   • Alcohol use: Yes     Comment: socially   • Drug use: No   • Sexual activity: None   Other Topics Concern   • None   Social History Narrative   • None     Social Determinants of Health     Financial Resource Strain: Not on file   Food Insecurity: Not on file   Transportation Needs: Not on file   Physical Activity: Not on file   Stress: Not on file   Social Connections: Not on file   Intimate Partner Violence: Not on file   Housing Stability: Not on file       Family History   Problem Relation Age of Onset   • Heart disease Mother    • Lymphoma Father    • Cancer Father    • Heart disease Sister    • Hypertension Brother    • Diabetes Neg Hx    • Thyroid disease Neg Hx    • Stroke Neg Hx        Allergies   Allergen Reactions   • Penicillins Anaphylaxis   • Sulfa Antibiotics Anaphylaxis   • Levofloxacin Other (See Comments)         Current Outpatient Medications:   •  acetaminophen (TYLENOL) 500 mg tablet, Take 500 mg by mouth every 6 (six) hours as needed for mild pain, Disp: , Rfl:   •  alfuzosin (UROXATRAL) 10 mg 24 hr tablet, Take 10 mg by mouth daily, Disp: , Rfl:   •  allopurinol (ZYLOPRIM) 100 mg tablet, Take 1 tablet (100 mg total) by mouth daily, Disp: 14 tablet, Rfl: 0  •  amiodarone 100 mg tablet, Take 100 mg by mouth daily, Disp: , Rfl:   •  ammonium lactate (LAC-HYDRIN) 12 % lotion, Apply topically 2 (two) times a day as needed for dry skin, Disp: , Rfl:   •  ferrous sulfate 325 (65 Fe) mg tablet, Take 325 mg by mouth, Disp: , Rfl:   •  FML Forte 0 25 % ophthalmic suspension, , Disp: , Rfl:   •  Glucosamine-Chondroit-Vit C-Mn (Glucosamine 1500 Complex) CAPS, Take by mouth, Disp: , Rfl:   •  halobetasol (ULTRAVATE) 0 05 % ointment, APPLY TO AFFECTED AREA ON LEG TWICE DAILY, Disp: , Rfl:   •  L-ARGININE-500 PO, Take 500 mg by mouth 2 (two) times a day , Disp: , Rfl:   •  loteprednol etabonate (LOTEMAX) 0 5 % ophthalmic suspension, 1 drop 4 (four) times a day, Disp: , Rfl:   •  methylPREDNISolone 4 MG tablet therapy pack, , Disp: , Rfl:   •  metoprolol succinate (TOPROL-XL) 25 mg 24 hr tablet, TAKE 1 TABLET BY MOUTH EVERY DAY, Disp: 90 tablet, Rfl: 0  •  Multiple Vitamins-Minerals (OCUVITE EXTRA PO), Take 1 tablet by mouth daily  , Disp: , Rfl:   •  multivitamin (THERAGRAN) TABS, Take 1 tablet by mouth daily, Disp: , Rfl:   •  Omega-3 Fatty Acids (fish oil) 1,000 mg, Take 1,000 mg by mouth 2 (two) times a day, Disp: , Rfl:   •  patient supplied medication, Take 1 each by mouth 2 (two) times a day, Disp: , Rfl:   •  pregabalin (LYRICA) 100 mg capsule, Take 1 capsule (100 mg total) by mouth 3 (three) times a day, Disp: 42 capsule, Rfl: 0  •  spironolactone (ALDACTONE) 25 mg tablet, TAKE 1 TABLET BY MOUTH TWICE A DAY, Disp: 180 tablet, Rfl: 1  •  tadalafil (CIALIS) 5 MG tablet, Take 5 mg by mouth daily as needed for erectile dysfunction, Disp: , Rfl:   •  torsemide (DEMADEX) 20 mg tablet, TAKE 1 TABLET BY MOUTH TWICE A DAY, Disp: 180 tablet, Rfl: 1  •  traMADol (ULTRAM) 50 mg tablet, Take 50 mg by mouth every 6 (six) hours as needed for moderate pain, Disp: , Rfl:   •  VITAMIN D PO, Take 2,000 Units by mouth daily , Disp: , Rfl:   •  Xarelto 20 MG tablet, TAKE 1 TABLET BY MOUTH EVERY DAY, Disp: 90 tablet, Rfl: 1  •  azithromycin (ZITHROMAX) 250 mg tablet, Take 2 tablets by mouth on day one; then one tablet daily for 4 days   (Patient not taking: No sig reported), Disp: , Rfl:   •  clindamycin (CLEOCIN) 300 MG capsule, Take 300 mg by mouth as needed Prior to dental work  (Patient not taking: Reported on 2/1/2022), Disp: , Rfl:   •  colchicine (COLCRYS) 0 6 mg tablet, as needed (Patient not taking: Reported on 1/3/2023), Disp: , Rfl:   •  Diclofenac Sodium (VOLTAREN) 1 %, Apply 2 g topically 4 (four) times a day (Patient not taking: Reported on 1/25/2023), Disp: 100 g, Rfl: 0  •  lidocaine (LIDODERM) 5 %, Apply 1 patch topically every 24 hours Remove & Discard patch within 12 hours or as directed by MD (Patient not taking: Reported on 9/27/2022), Disp: 6 patch, Rfl: 0    Vitals:    01/25/23 0817   BP: 122/84   BP Location: Right arm   Patient Position: Sitting Cuff Size: Large   Pulse: (!) 54   Weight: (!) 138 kg (304 lb 6 4 oz)   Height: 5' 9" (1 753 m)       PHYSICAL EXAMINATION:  Gen: Awake, Alert, NAD  Head/eyes: AT/NC, pupils equal and round, Anicteric  ENT: mmm  Neck: Supple, No elevated JVP, trachea midline  Resp: CTA bilaterally no w/r/r  CV: RRR +S1, S2, No m/r/g  Abd: Soft, NT/ND + BS  Ext: bilateral lower extremities wrapped with +1 pitting edema bilaterally/lymphedema  Neuro:  Follows commands, moves all extermities  Psych: Appropriate affect, happy mood, cooperative attitude, non-combative  Skin: warm; no rash, erythema or venous stasis changes on exposed skin    --------------------------------------------------------------------------------  TREADMILL STRESS  No results found for this or any previous visit    --------------------------------------------------------------------------------  NUCLEAR STRESS TEST:   · Pharmacologic nuclear stress test negative for myocardial ischemia with gated EF 50%, 2020  --------------------------------------------------------------------------------  CATH:  No results found for this or any previous visit   --------------------------------------------------------------------------------  ECHO:   Results for orders placed during the hospital encounter of 10/26/19   Echo complete with contrast if indicated    Narrative [address] [facility] [address] [facility] [address]  [phone]    Transthoracic Echocardiogram  2D, M-mode, Doppler, and Color Doppler    Study date:  28-Oct-2019    Patient: Winsome Lane  MR number: [id number]  Account number: [de-identified]  : 1955  Age: 71 years  Gender: Male  Status: Inpatient  Location: Bedside  Height: 69 in  Weight: 302 lb  BP: 141/ 88 mmHg    Indications: left sided paralysis    Diagnoses: 56 - CVA    Sonographer:  Hugh Uribe [facility]  Referring Physician:  Leslie Haro DO  Group:  [facility]  Interpreting Physician:  Unice Holter, DO    SUMMARY    LEFT VENTRICLE:  Systolic function was normal  Ejection fraction was estimated to be 55 %  This study was inadequate for the evaluation of regional wall motion  Wall thickness was mildly increased  RIGHT VENTRICLE:  The ventricle was dilated  Systolic function was normal     MITRAL VALVE:  There was mild regurgitation  TRICUSPID VALVE:  There was mild regurgitation  Estimated peak PA pressure was 50 mmHg  HISTORY: PRIOR HISTORY: Patient has no history of cardiovascular disease  PROCEDURE: The procedure was performed at the bedside  This was a routine study  The transthoracic approach was used  The study included complete 2D imaging, M-mode, complete spectral Doppler, and color Doppler  The heart rate was 80 bpm,  at the start of the study  Intravenous contrast (Definity solution [1 3 ml Definity/8 7ml normal saline solution], 3 ml) was administered to opacify the left ventricle  Echocardiographic views were limited due to poor acoustic window  availability  This was a technically difficult study  LEFT VENTRICLE: Size was normal  Systolic function was normal  Ejection fraction was estimated to be 55 %  This study was inadequate for the evaluation of regional wall motion  Wall thickness was mildly increased  DOPPLER: The study was  not technically sufficient to allow evaluation of LV diastolic function  RIGHT VENTRICLE: The ventricle was dilated  Systolic function was normal     LEFT ATRIUM: Size was normal     RIGHT ATRIUM: Size was normal     MITRAL VALVE: Valve structure was normal  There was normal leaflet separation  DOPPLER: The transmitral velocity was within the normal range  There was no evidence for stenosis  There was mild regurgitation  AORTIC VALVE: The valve was trileaflet  Leaflets exhibited normal thickness and normal cuspal separation  DOPPLER: Transaortic velocity was within the normal range   There was no evidence for stenosis  There was no regurgitation  TRICUSPID VALVE: The valve structure was normal  There was normal leaflet separation  DOPPLER: The transtricuspid velocity was within the normal range  There was no evidence for stenosis  There was mild regurgitation  Estimated peak PA  pressure was 50 mmHg  PULMONIC VALVE: Leaflets exhibited normal thickness, no calcification, and normal cuspal separation  DOPPLER: The transpulmonic velocity was within the normal range  There was no regurgitation  PERICARDIUM: There was no pericardial effusion  The pericardium was normal in appearance  AORTA: The root exhibited normal size  SYSTEMIC VEINS: IVC: The inferior vena cava was not well visualized  SYSTEM MEASUREMENT TABLES    2D  %FS: 34 72 %  Ao Diam: 2 87 cm  EDV(Teich): 143 23 ml  EF(Teich): 63 36 %  ESV(Teich): 52 47 ml  IVSd: 1 15 cm  LA Diam: 4 16 cm  LVIDd: 5 43 cm  LVIDs: 3 55 cm  LVPWd: 1 04 cm  RWT: 0 38  SV(Teich): 90 75 ml    CW  AV Vmax: 1 34 m/s  AV maxP 13 mmHg  RAP: 0 mmHg  TR Vmax: 3 26 m/s  TR maxP 5 mmHg    PW  E' Sept: 0 09 m/s  MV E Terrance: 1 03 m/s  RVSP: 42 5 mmHg    [facility]redited [facility]ch[facility]    Prepared and electronically signed by    Vj Nicole DO  Signed 28-Oct-2019 10:37:38       · LVEF 55%, mild LVH, paradoxical septal wall motion, mild RV dilatation, mild LA dilatation, mild to moderate RA dilatation, trace MR/TR, 2021  --------------------------------------------------------------------------------  HOLTER  No results found for this or any previous visit  No results found for this or any previous visit   --------------------------------------------------------------------------------  CAROTIDS  No results found for this or any previous visit    --------------------------------------------------------------------------------  ECGs:  No results found for this visit on 23       Lab Results   Component Value Date    WBC 5 11 12/21/2022    HGB 14 0 12/21/2022    HCT 41 4 12/21/2022    MCV 91 12/21/2022     12/21/2022      Lab Results   Component Value Date    SODIUM 140 12/21/2022    K 3 9 12/21/2022     12/21/2022    CO2 30 12/21/2022    BUN 22 12/21/2022    CREATININE 0 91 12/21/2022    GLUC 82 09/12/2022    CALCIUM 9 3 12/21/2022      Lab Results   Component Value Date    HGBA1C 5 4 06/08/2021      No results found for: CHOL  Lab Results   Component Value Date    HDL 50 04/28/2022    HDL 55 10/27/2019     Lab Results   Component Value Date    LDLCALC 142 (H) 04/28/2022    LDLCALC 90 10/27/2019     Lab Results   Component Value Date    TRIG 118 04/28/2022    TRIG 93 10/27/2019     No results found for: [place], [place]   Lab Results   Component Value Date    INR 1 54 (H) 07/28/2021    INR 2 26 (H) 06/02/2021    INR 1 59 (H) 01/22/2021    PROTIME 18 4 (H) 07/28/2021    PROTIME 24 9 (H) 06/02/2021    PROTIME 18 7 (H) 01/22/2021        1  Chronic diastolic heart failure (HCC)    2  Paroxysmal atrial flutter ([address] )    3  Abnormal ECG    4  Obesity, morbid ([address] )    5   Bilateral lower extremity edema        IMPRESSION:  · Chronic diastolic heart failure  · LVEF 65%, mild LVH, normal diastolic function, septal motion consistent with elevated PVR, mild RV dilatation with normal function, mild biatrial dilatation, trace MR/TR with PASP 37 mmHg (poor Doppler signal), January 2023  · Paroxysmal atrial fibrillation/flutter on Xarelto and amiodarone; had long standing since before October 2019 - now in [facility]  · s/p DCCV in May 2021   · s/p RFA ablation (June 2021 and July 2021)  · Holter with AF, avg HR 92 bpm, rare VPCs, September 2020  · History of bilateral venous insufficiency status post LE vein ablation  · Abnormal ECG with bifascicular block  · Pharmacologic nuclear stress test appears negative for myocardial ischemia, gated EF 65%, January 2023  · Moderate pulmonary hypertension  · Lymphedema  · Morbid obesity  · ROBERT on 20/14 cm BiPAP    PLAN:  It was a pleasure to see Jose Noonan in the office today for follow-up CV evaluation  He is here today for follow-up to review his chest x-ray, stress test and echocardiogram   The chest x-ray demonstrated no evidence of airspace disease  The stress test was found to be negative for myocardial ischemia  Echocardiogram demonstrated normal left ventricular function with some pulmonary hypertension and elevated right-sided filling pressures based on RV size and septal wall motion  He does have a known history of sleep apnea and chronic lower extremity swelling  He has no symptoms concerning for angina  Clinically his weights appear stable compared to his baseline  He did have transient rise in his weights after some dietary changes, but this is resolved and he is back to where he was feeling before  Based on his clinical presentation, the following recommendations:    1  Due to his findings of pulmonary hypertension and elevated filling pressures, I would recommend aggressive risk factor and lifestyle modifications  2  Would encourage 30 hours a day, 5 days a week of moderate intensity activity to build cardiovascular endurance  Would also recommend a weight loss regimen to help with some of his elevated filling pressures  3  Recommend a heart healthy diet low in sodium and carbohydrate  We have discussed carefully watching salt intake and would encourage a 1500 mg salt restriction  4   Continue torsemide 20 mg twice daily  5  Continue current antihypertensive regimen including metoprolol and spironolactone  6  Continue Xarelto for thromboembolic prophylaxis  7  Should his shortness of breath worsen, should he gain any significant weight with greater than 3 pounds in a day or 5 pounds in 1 to 2 weeks, recommend calling the office for further titration of diuretic or seeking medical attention  8  We will follow-up with him again in 6 months to reassess his progress      As always, please not hesitate to call with any questions  Portions of the record may have been created with voice recognition software  Occasional wrong word or "sound a like" substitutions may have occurred due to the inherent limitations of voice recognition software  Read the chart carefully and recognize, using context, where substitutions have occurred        Signed: Tanya Henry DO, [address], [place], FACP

## 2023-03-08 ENCOUNTER — CONSULT (OUTPATIENT)
Dept: PAIN MEDICINE | Facility: CLINIC | Age: 68
End: 2023-03-08

## 2023-03-08 ENCOUNTER — APPOINTMENT (OUTPATIENT)
Dept: RADIOLOGY | Facility: MEDICAL CENTER | Age: 68
End: 2023-03-08

## 2023-03-08 VITALS
HEART RATE: 66 BPM | HEIGHT: 69 IN | SYSTOLIC BLOOD PRESSURE: 130 MMHG | BODY MASS INDEX: 44.46 KG/M2 | RESPIRATION RATE: 16 BRPM | DIASTOLIC BLOOD PRESSURE: 74 MMHG | WEIGHT: 300.2 LBS

## 2023-03-08 DIAGNOSIS — Z79.891 LONG TERM (CURRENT) USE OF OPIATE ANALGESIC: ICD-10-CM

## 2023-03-08 DIAGNOSIS — M54.16 LUMBAR RADICULOPATHY: Primary | ICD-10-CM

## 2023-03-08 DIAGNOSIS — M79.18 MYOFASCIAL PAIN SYNDROME: ICD-10-CM

## 2023-03-08 DIAGNOSIS — M51.36 LUMBAR DEGENERATIVE DISC DISEASE: ICD-10-CM

## 2023-03-08 DIAGNOSIS — G89.4 CHRONIC PAIN SYNDROME: ICD-10-CM

## 2023-03-08 DIAGNOSIS — I48.4 ATYPICAL ATRIAL FLUTTER (HCC): ICD-10-CM

## 2023-03-08 DIAGNOSIS — F11.20 UNCOMPLICATED OPIOID DEPENDENCE (HCC): ICD-10-CM

## 2023-03-08 DIAGNOSIS — M54.16 LUMBAR RADICULOPATHY: ICD-10-CM

## 2023-03-08 RX ORDER — METOPROLOL SUCCINATE 25 MG/1
25 TABLET, EXTENDED RELEASE ORAL DAILY
Qty: 90 TABLET | Refills: 2 | Status: SHIPPED | OUTPATIENT
Start: 2023-03-08

## 2023-03-08 NOTE — PROGRESS NOTES
Assessment:  1  Lumbar radiculopathy    2  Lumbar degenerative disc disease    3  Myofascial pain syndrome        Plan:  Patient is a 18-year-old male with complaints of low back pain and bilateral leg weakness with chronic pain syndrome secondary to lumbar degenerative disc disease, lumbar radiculopathy presents to office for initial consultation  Patient does have lymphedema and peripheral neuropathy in bilateral feet  Patient does do home exercise regimen and notes that his pain is intermittent  At this time we would like to also engage patient in guided therapy for lumbar core strengthening exercises  Patient does report he was stable on tramadol for pain control however he does not take it on a daily basis  Patient shows no signs of aberrant drug-related behavior  1   We will order physical therapy for core strengthening exercises  2  We will order a new x-ray of the lumbar spine to better assess the degenerative change according patient current presentation  3   After patient's completed physical therapy we will then order an MRI of the lumbar spine to better assess the discogenic pathology behind patient's low back pain and bilateral leg weakness  4  We will obtain a UDS and Drug Screen  Pt is not a candidate for NSAIDs due to anticoagulation therapy       There are risks associated with opioid medications, including dependence, addiction and tolerance  The patient understands and agrees to use these medications only as prescribed  Potential side effects of the medications include, but are not limited to, constipation, drowsiness, addiction, impaired judgment and risk of fatal overdose if not taken as prescribed  The patient was warned against driving while taking sedation medications  Sharing medications is a felony  At this point in time, the patient is showing no signs of addiction, abuse, diversion or suicidal ideation      A urine drug screen was collected at today's office visit as part of our medication management protocol  The point of care testing results were appropriate for what was being prescribed  The specimen will be sent for confirmatory testing  The drug screen is medically necessary because the patient is either dependent on opioid medication or is being considered for opioid medication therapy and the results could impact ongoing or future treatment  The drug screen is to evaluate for the presences or absence of prescribed, non-prescribed, and/or illicit drugs/substances  [place] Prescription Drug Monitoring Program report was reviewed and was appropriate         History of Present Illness: The patient is a 75 y o  male who presents for consultation in regards to Back Pain  Symptoms have been present for 2 years  Symptoms began without any precipitating injury or trauma  Pain is reported to be 7 on the numeric rating scale  Symptoms are felt intermittently and worst in the morning  Symptoms are characterized as dull/aching  Symptoms are associated with no weakness  Aggravating factors include standing, bending, leaning forward and leaning bckward  Relieving factors include lying down and relaxation  No change in symptoms with kneeling, sitting, walking, exercise, turning the head, coughing/sneezing and bowel movements  Treatments that have been helpful include chiropractic manipulation and home exercise  Medications to relieve symptoms include tramadol, Tylenol  Review of Systems:    Review of Systems   HENT: Positive for hearing loss  Cardiovascular: Positive for leg swelling  Musculoskeletal: Positive for back pain  MUSCLE PAIN   All other systems reviewed and are negative            Past Medical History:   Diagnosis Date   • A-fib [facility])    • ALANNA (acute kidney injury) ([facility] [address] ) 6/15/2021   • Arthritis    • CPAP (continuous positive airway pressure) dependence    • Irregular heart beat    • Lymphedema    • Sleep apnea    • Urinary frequency    • Wears glasses • Wears partial dentures     lower partial       Past Surgical History:   Procedure Laterality Date   • ABLATION SAPHENOUS VEIN W/ RFA     • COLONOSCOPY     • SC CYSTO INSERTION TRANSPROSTATIC IMPLANT SINGLE N/A 11/13/2017    Procedure: CYSTOSCOPY WITH INSERTION UROLIFT;  Surgeon: Jamaal Proctor DO;  Location: [facility];  Service: Urology   • TONSILLECTOMY         Family History   Problem Relation Age of Onset   • Heart disease Mother    • Lymphoma Father    • Cancer Father    • Heart disease Sister    • Hypertension Brother    • Diabetes Neg Hx    • Thyroid disease Neg Hx    • Stroke Neg Hx        Social History     Occupational History   • Not on file   Tobacco Use   • Smoking status: Never   • Smokeless tobacco: Never   Vaping Use   • Vaping Use: Never used   Substance and Sexual Activity   • Alcohol use: Yes     Comment: socially   • Drug use: No   • Sexual activity: Not on file         Current Outpatient Medications:   •  alfuzosin (UROXATRAL) 10 mg 24 hr tablet, Take 10 mg by mouth daily, Disp: , Rfl:   •  allopurinol (ZYLOPRIM) 100 mg tablet, Take 1 tablet (100 mg total) by mouth daily, Disp: 14 tablet, Rfl: 0  •  amiodarone 100 mg tablet, Take 100 mg by mouth daily, Disp: , Rfl:   •  ammonium lactate (LAC-HYDRIN) 12 % lotion, Apply topically 2 (two) times a day as needed for dry skin, Disp: , Rfl:   •  ferrous sulfate 325 (65 Fe) mg tablet, Take 325 mg by mouth, Disp: , Rfl:   •  FML Forte 0 25 % ophthalmic suspension, , Disp: , Rfl:   •  Glucosamine-Chondroit-Vit C-Mn (Glucosamine 1500 Complex) CAPS, Take by mouth, Disp: , Rfl:   •  halobetasol (ULTRAVATE) 0 05 % ointment, APPLY TO AFFECTED AREA ON LEG TWICE DAILY, Disp: , Rfl:   •  L-ARGININE-500 PO, Take 500 mg by mouth 2 (two) times a day , Disp: , Rfl:   •  loteprednol etabonate (LOTEMAX) 0 5 % ophthalmic suspension, 1 drop 4 (four) times a day, Disp: , Rfl:   •  metoprolol succinate (TOPROL-XL) 25 mg 24 hr tablet, TAKE 1 TABLET BY MOUTH EVERY DAY, Disp: 90 tablet, Rfl: 0  •  Multiple Vitamins-Minerals (OCUVITE EXTRA PO), Take 1 tablet by mouth daily  , Disp: , Rfl:   •  multivitamin (THERAGRAN) TABS, Take 1 tablet by mouth daily, Disp: , Rfl:   •  Omega-3 Fatty Acids (fish oil) 1,000 mg, Take 1,000 mg by mouth 2 (two) times a day, Disp: , Rfl:   •  patient supplied medication, Take 1 each by mouth 2 (two) times a day, Disp: , Rfl:   •  pregabalin (LYRICA) 100 mg capsule, Take 1 capsule (100 mg total) by mouth 3 (three) times a day, Disp: 42 capsule, Rfl: 0  •  spironolactone (ALDACTONE) 25 mg tablet, TAKE 1 TABLET BY MOUTH TWICE A DAY, Disp: 180 tablet, Rfl: 1  •  tadalafil (CIALIS) 5 MG tablet, Take 5 mg by mouth daily as needed for erectile dysfunction, Disp: , Rfl:   •  torsemide (DEMADEX) 20 mg tablet, TAKE 1 TABLET BY MOUTH TWICE A DAY, Disp: 180 tablet, Rfl: 1  •  traMADol (ULTRAM) 50 mg tablet, Take 50 mg by mouth every 6 (six) hours as needed for moderate pain, Disp: , Rfl:   •  VITAMIN D PO, Take 2,000 Units by mouth daily , Disp: , Rfl:   •  Xarelto 20 MG tablet, TAKE 1 TABLET BY MOUTH EVERY DAY, Disp: 90 tablet, Rfl: 1  •  acetaminophen (TYLENOL) 500 mg tablet, Take 500 mg by mouth every 6 (six) hours as needed for mild pain, Disp: , Rfl:   •  azithromycin (ZITHROMAX) 250 mg tablet, Take 2 tablets by mouth on day one; then one tablet daily for 4 days   (Patient not taking: No sig reported), Disp: , Rfl:   •  clindamycin (CLEOCIN) 300 MG capsule, Take 300 mg by mouth as needed Prior to dental work  (Patient not taking: Reported on 2/1/2022), Disp: , Rfl:   •  colchicine (COLCRYS) 0 6 mg tablet, as needed (Patient not taking: Reported on 1/3/2023), Disp: , Rfl:   •  Diclofenac Sodium (VOLTAREN) 1 %, Apply 2 g topically 4 (four) times a day (Patient not taking: Reported on 1/25/2023), Disp: 100 g, Rfl: 0  •  lidocaine (LIDODERM) 5 %, Apply 1 patch topically every 24 hours Remove & Discard patch within 12 hours or as directed by MD (Patient not taking: Reported on 9/27/2022), Disp: 6 patch, Rfl: 0  •  methylPREDNISolone 4 MG tablet therapy pack, , Disp: , Rfl:     Allergies   Allergen Reactions   • Penicillins Anaphylaxis   • Sulfa Antibiotics Anaphylaxis   • Levofloxacin Other (See Comments)       Physical Exam:    /74 (BP Location: Left arm, Patient Position: Sitting, Cuff Size: Large)   Pulse 66   Resp 16   Ht 5' 9" (1 753 m)   Wt (!) 136 kg (300 lb 3 2 oz)   BMI 44 33 kg/m²     Constitutional: normal, well developed, well nourished, alert, in no distress and non-toxic and no overt pain behavior  and obese  Eyes: anicteric  HEENT: grossly intact  Neck: supple, symmetric, trachea midline and no masses   Pulmonary:even and unlabored  Cardiovascular:No edema or pitting edema present  Skin:Normal without rashes or lesions and well hydrated  Psychiatric:Mood and affect appropriate  Neurologic:Cranial Nerves II-XII grossly intact  Musculoskeletal:normal, b/l lymph edema     Lumbar/Sacral Spine examination demonstrates  Decreased range of motion lumbar spine with pain upon: flexion, lateral rotation to the left/right, and bending to the left/right  Bilateral lumbar paraspinals tender to palpation  Muscle spasms noted in the lumbar area bilaterally  4/5 lower extremity strength in all muscle groups bilaterally  Positive seated straight leg raise for bilateral lower extremities  Sensitivity to light touch intact bilateral lower extremities  2+ reflexes in the patella and Achilles  No ankle clonus    Imaging    Anatomical Region Laterality Modality   T-spine -- Digital Radiography   L-spine -- --   Pelvis -- --   Spine -- --     Impression    IMPRESSION: Mild thoracolumbar osteoarthritis                Workstation:[id number]  Narrative     CLINICAL HISTORY: Lumbar muscle spasm  COMPARISON:  None  TECHNIQUE: AP, lateral, both oblique, and LS junction views of lumbosacral   spine       FINDINGS: Five views demonstrate normal height of the five lumbar type   vertebrae  Mild narrowing and degenerative changes at L2-3, L3-4 and L4-5  The   remaining lumbar interspaces demonstrate normal width and alignment  Lower   thoracic interspaces included demonstrate mild to moderate anterior spurring  Lordosis is normal in range  Sacroiliac joints are unremarkable as included  Upper portion of a total left hip appliance is visualized and unremarkable  Mild   degenerative changes at the right hip  No significant interval change  Procedure Note    Angel David MD - 01/18/2022   Formatting of this note might be different from the original     CLINICAL HISTORY: Lumbar muscle spasm  COMPARISON:  None  TECHNIQUE: AP, lateral, both oblique, and LS junction views of lumbosacral   spine  FINDINGS: Five views demonstrate normal height of the five lumbar type   vertebrae  Mild narrowing and degenerative changes at L2-3, L3-4 and L4-5  The   remaining lumbar interspaces demonstrate normal width and alignment  Lower   thoracic interspaces included demonstrate mild to moderate anterior spurring  Lordosis is normal in range  Sacroiliac joints are unremarkable as included  Upper portion of a total left hip appliance is visualized and unremarkable  Mild   degenerative changes at the right hip  No significant interval change  IMPRESSION:   IMPRESSION: Mild thoracolumbar osteoarthritis          No orders to display       No orders of the defined types were placed in this encounter

## 2023-03-08 NOTE — PATIENT INSTRUCTIONS
Core Strengthening Exercises   WHAT YOU NEED TO KNOW:   What do I need to know about core strengthening exercises? Your core includes the muscles of your lower back, hip, pelvis, and abdomen  Core strengthening exercises help heal and strengthen these muscles  This helps prevent another injury, and keeps your pelvis, spine, and hips in the correct position  What do I need to know about exercise safety? Talk to your healthcare provider before you start an exercise program  A physical therapist can teach you how to do core strengthening exercises safely  Do the exercises on a mat or firm surface  A firm surface will support your spine and prevent low back pain  Do not do these exercises on a bed  Move slowly and smoothly  Avoid fast or jerky motions  Stop if you feel pain  You may feel some discomfort at first, but you should not feel pain  Tell your provider or physical therapist if you have pain while you exercise  Regular exercise will help decrease your discomfort over time  Breathe normally during core exercises  Do not hold your breath  This may cause an increase in blood pressure and prevent muscle strengthening  Your healthcare provider will tell you when to inhale and exhale during the exercise  Begin all of your exercises with abdominal bracing  Abdominal bracing helps warm up your core muscles  You can also practice abdominal bracing throughout the day  Lie on your back with your knees bent and feet flat on the floor  Place your arms in a relaxed position beside your body  Tighten your abdominal muscles  Pull your belly button in and up toward your spine  Hold for 5 seconds  Relax your muscles  Repeat 10 times  How do I perform core strengthening exercises? Your healthcare provider will tell you how often to do these exercises  The provider will also tell you how many repetitions of each exercise you should do  Hold each exercise for 5 seconds or as directed   As you get stronger, increase your hold to 10 to 15 seconds  You can do some of these exercises on a stability ball, or with a weight  Ask your healthcare provider how to use a stability ball or weight for these exercises:  Bridging:  Lie on your back with your knees bent and feet flat on the floor  Rest your arms at your side  Tighten your buttocks, and then lift your hips 1 inch off the floor  Hold for 5 seconds  When you can do this exercise without pain for 10 seconds, increase the distance you lift your hips  A good goal is to be able to lift your hips so that your shoulders, hips, and knees are in a straight line  Dead bug:  Lie on your back with your knees bent and feet flat on the floor  Place your arms in a relaxed position beside your body  Begin with abdominal bracing  Next, raise one leg, keeping your knee bent  Hold for 5 seconds  Repeat with the other leg  When you can do this exercise without pain for 10 to 15 seconds, you may raise one straight leg and hold  Repeat with the other leg  Quadruped:  Place your hands and knees on the floor  Keep your wrists directly below your shoulders and your knees directly below your hips  Pull your belly button in toward your spine  Do not flatten or arch your back  Tighten your abdominal muscles below your belly button  Hold for 5 seconds  When you can do this exercise without pain for 10 to 15 seconds, you may extend one arm and hold  Repeat on the other side  Side bridge exercises:      Standing side bridge:  Stand next to a wall and extend one arm toward the wall  Place your palm flat on the wall with your fingers pointing upward  Begin with abdominal bracing  Next, without moving your feet, slowly bend your arm to 90 degrees  Hold for 5 seconds  Repeat on the other side  When you can do this exercise without pain for 10 to 15 seconds, you may do the bent leg side bridge on the floor           Bent leg side bridge:  Lie on one side with your legs, hips, and shoulders in a straight line  Prop yourself up onto your forearm so your elbow is directly below your shoulder  Bend your knees back to 90 degrees  Begin with abdominal bracing  Next, lift your hips and balance yourself on your forearm and knees  Hold for 5 seconds  Repeat on the other side  When you can do this exercise without pain for 10 to 15 seconds, you may do the straight leg side bridge on the floor  Straight leg side bridge:  Lie on one side with your legs, hips, and shoulders in a straight line  Prop yourself up onto your forearm so your elbow is directly below your shoulder  Begin with abdominal bracing  Lift your hips off the floor and balance yourself on your forearm and the outside of your flexed foot  Do not let your ankle bend sideways  Hold for 5 seconds  Repeat on the other side  When you can do this exercise without pain for 10 to 15 seconds, ask your healthcare provider for more advanced exercises  Superman:  Lie on your stomach  Extend your arms forward on the floor  Tighten your abdominal muscles and lift your right hand and left leg off the floor  Hold this position  Slowly return to the starting position  Tighten your abdominal muscles and lift your left hand and right leg off the floor  Hold this position  Slowly return to the starting position  Clam:  Lie on your side with your knees bent  Put your bottom arm under your head to keep your neck in line  Put your top hand on your hip to keep your pelvis from moving  Put your heels together, and keep them together during this exercise  Slowly raise your top knee toward the ceiling  Then lower your leg so your knees are together  Repeat this exercise 10 times  Then switch sides and do the exercise 10 times with the other leg  Curl up:  Lie on your back with your knees bent and feet flat on the floor  Place your hands, palms down, underneath your lower back   Next, with your elbows on the floor, lift your shoulders and chest 2 to 3 inches off the floor  Keep your head in line with your shoulders  Hold this position  Slowly return to the starting position  Straight leg raises:  Lie on your back with one leg straight  Bend the other knee and place your foot flat on the floor  Tighten your abdominal muscles  Keep your leg straight and slowly lift it straight up 6 to 12 inches off the floor  Hold this position  Lower your leg slowly  Do as many repetitions as directed on this side  Repeat with the other leg  Plank:  Lie on your stomach  Bend your elbows and place your forearms flat on the floor  Lift your chest, stomach, and knees off the floor  Make sure your elbows are below your shoulders  Your body should be in a straight line  Do not let your hips or lower back sink to the ground  Squeeze your abdominal muscles together and hold for 15 seconds  To make this exercise harder, hold for 30 seconds or lift 1 leg at a time  Bicycles:  Lie on your back  Bend both knees and bring them toward your chest  Your calves should be parallel to the floor  Place the palms of your hands on the back of your head  Straighten your right leg and keep it lifted 2 inches off the floor  Raise your head and shoulders off the floor and twist towards your left  Keep your head and shoulders lifted  Bend your right knee while you straighten your left leg  Keep your left leg 2 inches off the floor  Twist your head and chest towards the left leg  Continue to straighten 1 leg at a time and twist        When should I call my doctor or physical therapist?   You have sharp or worsening pain during exercise or at rest     You have questions or concerns about your condition, care, or exercise program     CARE AGREEMENT:   You have the right to help plan your care  Learn about your health condition and how it may be treated  Discuss treatment options with your healthcare providers to decide what care you want to receive   You always have the right to refuse treatment  The above information is an  only  It is not intended as medical advice for individual conditions or treatments  Talk to your doctor, nurse or pharmacist before following any medical regimen to see if it is safe and effective for you  © Copyright [employer] 2022 Information is for End User's use only and may not be sold, redistributed or otherwise used for commercial purposes

## 2023-03-08 NOTE — PROGRESS NOTES
PT Evaluation     Today's date: 3/15/2023  Patient name: Leory Duncan  : 1955  MRN: [id number]  Referring provider: David Guzman MD  Dx:   Encounter Diagnosis     ICD-10-CM    1  Lumbar radiculopathy  M54 16 Ambulatory referral to Physical Therapy      2  Lumbar degenerative disc disease  M51 36 Ambulatory referral to Physical Therapy      3  Myofascial pain syndrome  M79 18 Ambulatory referral to Physical Therapy                     Assessment  Assessment details:   CURRENT FUNCTIONAL STATUS    Sleep tolerance: Good  Sitting tolerance: Good  Standing/ADL tolerance: Good  Walking tolerance: Occasionally painful      Ability to bend forward: Occasionally painful     Ability to arise from sitting: Occasionally painful     Ability to reach overhead: Occasionally painful    SHORT TERM GOALS (2 WEEKS)    Increase lumbar spine AROM 10 % in left sidebending and extension  Improve sitting posture  Decrease pain to 0-3/10  Independent with HEP  Walking tolerance: Infrequently painful      Ability to bend forward: Infrequently painful     Ability to arise from sitting: Infrequently painful     Ability to reach overhead: Infrequently painful    LONG TERM GOALS (DISCHARGE)    Lumbar Spine AROM: WFL in all planes  Sitting posture: Good  Decrease pain to 0/10  Independent with HEP  Walking tolerance: Painfree      Ability to bend forward: Painfree     Ability to arise from sitting: Painfree     Ability to reach overhead: Painfree  Understanding of Dx/Px/POC: good   Prognosis: good    Goals  See assessment details above  Plan  Plan details: Leroy Duncan is a 75 y o  male presenting to PT with pain, decreased range of motion, and decreased tolerance to activity  This patient would benefit from skilled PT services to address these issues and to maximize function  A home exercise program was provided and all questions were answered   Thank you for the referral     Patient would benefit from: skilled physical therapy  Planned modality interventions: cryotherapy and thermotherapy: hydrocollator packs  Planned therapy interventions: manual therapy, neuromuscular re-education, therapeutic exercise, therapeutic activities, self care and home exercise program  Frequency: 2x week  Duration in weeks: 4        Subjective Evaluation    History of Present Illness  Mechanism of injury: CC: Left sided low back pain  Denies having any weakness or parasthesias from his low back  HPI: The patient has had low back problems for several years  This episode began one year ago without any history of injury  X-rays from 3 years ago revealed DDD and DJD  He states that he randomly gets sharp twinges of pain in his back, not consistent with any particular activity or movement, and it is not long lasting  He takes Tylenol as needed for relief  He also states that his left long is longer than his right 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, and he does wear a heel lift in his right shoe because of it  He is retired and enjoys exercising at [employer]  Pain  Current pain ratin  At best pain ratin  At worst pain ratin    Patient Goals  Patient goals for therapy: decreased pain  Patient goal: Improved ability to bend, reach, and arise from sitting  Objective     Postural Observations    Additional Postural Observation Details  Gait and transfers normal  Pelvic obliquity noted, being lower on the right side, resulting in a scoliosis to the right  General Comments:      Lumbar Comments  CURRENT OBJECTIVE MEASUREMENTS    Lumbar Spine AROM: F=100 %, EXT=75 %, R FJ=417 %, L SB=50 %    Lower Extremity Strength: Deferred  Lower Extremity Reflexes: Deferred  Lower Extremity Sensation: Deferred  Sitting Posture: Poor                         Precautions: L THR      Manuals 3/15        STM Left paraspinals prone RK        P/A Mob         Flex/Rot Mob         Hamstring Stretch         Piriformis Stretch Neuro Re-Ed         Quadruped Arm Raises         Quadruped Leg Raises         Quadruped Arm and Leg Raises         AB Supine         AB with Arm Raises Supine         AB with Leg Raises Supine         AB with Arm and Leg Raises Supine         Prone TB ROT         Prone Ball Squeeze         Prone Hip EXT         Prone Planks         Squats with Arm Raises         Centralization Concepts         Body Mechanics Instruction for Bending and Lifting         Patient  Instruction on DDD/DJD RK        Sitting Posture Correction with Roll RK        Sleeping Posture Correction with Roll         Prophylaxis of Recurrence                  Ther Ex         Prone Lying         Elbow Props         Press Ups         Standing Back Bends 10x5" TID HEP        Left Side Glide 10x5" TID HEP        LTR         SKTC         DKTC         Flexion in Sitting         Flexion in Standing         Curl Ups Straight         Curl Ups Oblique         BACK EXT:  F , P , C         PYR AB:  S , P         Rotary Torso:  S , P , C         Hoist Row: S         LAT Pulldown         SA Pulldown         Oblique Press         Multi Hip: Flex  F              EXT  P              ABD                  ADD         PYR QUAD:  S , P , C         NuStep:   S , A                  Ther Activity         Squatting         Lifting         Carrying         Lunging                  Modalities         HP         Traction

## 2023-03-11 LAB
6MAM UR QL CFM: NEGATIVE NG/ML
7AMINOCLONAZEPAM UR QL CFM: NEGATIVE NG/ML
A-OH ALPRAZ UR QL CFM: NEGATIVE NG/ML
ACCEPTABLE CREAT UR QL: NORMAL MG/DL
ACCEPTIBLE SP GR UR QL: NORMAL
AMITRIP UR QL CFM: NEGATIVE NG/ML
AMPHET UR QL CFM: NEGATIVE NG/ML
AMPHET UR QL CFM: NEGATIVE NG/ML
BUPRENORPHINE UR QL CFM: NEGATIVE NG/ML
BZE UR QL CFM: NEGATIVE NG/ML
DESIPRAMINE UR QL CFM: NEGATIVE NG/ML
EDDP UR QL CFM: NEGATIVE NG/ML
ETHYL GLUCURONIDE UR QL CFM: NEGATIVE NG/ML
ETHYL SULFATE UR QL SCN: NEGATIVE NG/ML
FENTANYL UR QL CFM: NEGATIVE NG/ML
FLUOXETINE UR QL CFM: NEGATIVE NG/ML
GLIADIN IGG SER IA-ACNC: NEGATIVE NG/ML
GLUCOSE 30M P 50 G LAC PO SERPL-MCNC: NEGATIVE NG/ML
HYDROCODONE UR QL CFM: NEGATIVE NG/ML
HYDROMORPHONE UR QL CFM: NEGATIVE NG/ML
LORAZEPAM UR QL CFM: NEGATIVE NG/ML
MDMA UR QL CFM: NEGATIVE NG/ML
ME-PHENIDATE UR QL CFM: NEGATIVE NG/ML
MEPERIDINE UR QL CFM: NEGATIVE NG/ML
METHADONE UR QL CFM: NEGATIVE NG/ML
METHAMPHET UR QL CFM: NEGATIVE NG/ML
MORPHINE UR QL CFM: NEGATIVE NG/ML
NITRITE UR QL: NORMAL UG/ML
NORBUPRENORPHINE UR QL CFM: NEGATIVE NG/ML
NORDIAZEPAM UR QL CFM: NEGATIVE NG/ML
NORFENTANYL UR QL CFM: NEGATIVE NG/ML
NORFLUOXETINE UR QL CFM: NEGATIVE NG/ML
NORHYDROCODONE UR QL CFM: NEGATIVE NG/ML
NORMEPERIDINE UR QL CFM: NEGATIVE NG/ML
NOROXYCODONE UR QL CFM: NEGATIVE NG/ML
NORTRIP UR QL CFM: NEGATIVE NG/ML
OLANZAPINE QUANTIFICATION: NEGATIVE NG/ML
OPC-3373 QUANTIFICATION: NEGATIVE
OXAZEPAM UR QL CFM: NEGATIVE NG/ML
OXYCODONE UR QL CFM: NEGATIVE NG/ML
OXYMORPHONE UR QL CFM: NEGATIVE NG/ML
OXYMORPHONE UR QL CFM: NEGATIVE NG/ML
PARA-FLUOROFENTANYL QUANTIFICATION: NORMAL NG/ML
PROLACTIN SERPL-MCNC: NEGATIVE NG/ML
RESULT ALL_PRESCRIBED MEDS AND SPECIAL INSTRUCTIONS: NORMAL
SECOBARBITAL UR QL CFM: NEGATIVE NG/ML
SL AMB 4-ANPP QUANTIFICATION: NORMAL NG/ML
SL AMB 7-OH-MITRAGYNINE (KRATOM ALKALOID) QUANTIFICATION: NEGATIVE NG/ML
SL AMB ACETYL FENTANYL QUANTIFICATION: NORMAL NG/ML
SL AMB ACETYL NORFENTANYL QUANTIFICATION: NORMAL NG/ML
SL AMB ACRYL FENTANYL QUANTIFICATION: NORMAL NG/ML
SL AMB CARFENTANIL QUANTIFICATION: NORMAL NG/ML
SL AMB CITALOPRAM/ESCITALOPRAM QUANTIFICATION: NEGATIVE NG/ML
SL AMB CITALOPRAM/ESCITALOPRAM QUANTIFICATION: NEGATIVE NG/ML
SL AMB CLOZAPINE QUANTIFICATION: NEGATIVE NG/ML
SL AMB CTHC (MARIJUANA METABOLITE) QUANTIFICATION: NEGATIVE NG/ML
SL AMB DEXTRORPHAN (DEXTROMETHORPHAN METABOLITE) QUANT: NEGATIVE NG/ML
SL AMB HALOPERIDOL  QUANTIFICATION: NEGATIVE NG/ML
SL AMB HALOPERIDOL METABOLITE QUANTIFICATION: NEGATIVE NG/ML
SL AMB HYDROXYBUPROPION QUANTIFICATION: NEGATIVE NG/ML
SL AMB HYDROXYRISPERIDONE QUANTIFICATION: NEGATIVE NG/ML
SL AMB N-DESMETHYL-TRAMADOL QUANTIFICATION: ABNORMAL NG/ML
SL AMB N-DESMETHYLCLOZAPINE QUANTIFICATION: NEGATIVE NG/ML
SL AMB NORQUETIAPINE QUANTIFICATION: NEGATIVE NG/ML
SL AMB PHENTERMINE QUANTIFICATION: NEGATIVE NG/ML
SL AMB PREGABALIN QUANTIFICATION: NORMAL
SL AMB QUETIAPINE QUANTIFICATION: NEGATIVE NG/ML
SL AMB RISPERIDONE QUANTIFICATION: NEGATIVE NG/ML
SL AMB RITALINIC ACID QUANTIFICATION: NEGATIVE NG/ML
SPECIMEN PH ACCEPTABLE UR: NORMAL
TAPENTADOL UR QL CFM: NEGATIVE NG/ML
TEMAZEPAM UR QL CFM: NEGATIVE NG/ML
TEMAZEPAM UR QL CFM: NEGATIVE NG/ML
TRAMADOL UR QL CFM: ABNORMAL NG/ML
URATE/CREAT 24H UR: ABNORMAL NG/ML

## 2023-03-15 ENCOUNTER — EVALUATION (OUTPATIENT)
Dept: PHYSICAL THERAPY | Facility: CLINIC | Age: 68
End: 2023-03-15

## 2023-03-15 ENCOUNTER — TELEPHONE (OUTPATIENT)
Dept: PAIN MEDICINE | Facility: CLINIC | Age: 68
End: 2023-03-15

## 2023-03-15 DIAGNOSIS — M51.36 LUMBAR DEGENERATIVE DISC DISEASE: ICD-10-CM

## 2023-03-15 DIAGNOSIS — M54.16 LUMBAR RADICULOPATHY: Primary | ICD-10-CM

## 2023-03-15 DIAGNOSIS — M79.18 MYOFASCIAL PAIN SYNDROME: ICD-10-CM

## 2023-03-15 NOTE — TELEPHONE ENCOUNTER
Pt saw ELMIRA on 3/8 and is following his plan,  Today pt saw Emir Goodman from PT and had evaluation  Per pt he goies to [employer] at least 3 times a week for over 6 weeks and he reviewed with physical therapist the exercises he does at the gym  Per the PT he is doing well and he even added a few more that he  could do that would be beneficial     Per pt he s/w with insurance company and the rep said well we can't tell you how many PT sessions you would have to go to it depends on the diagnosis  RN advised that is not our experience with [facility], they require 6 weeks  RN advised that she would send this to ELMIRA to see if he would order the MRI and if his exercises at [employer] could could as a Home exercise program especially since the physical therapist evaluated him and gave him a few more that he could do? Still would need 6 weeks of this but would this be something that could be done?     --please advise thank you--  Pt is ok if it has to be actually PT but he just feels what he is doing now is more beneficial

## 2023-03-15 NOTE — TELEPHONE ENCOUNTER
Caller: Joce Gaming from [facility]    Doctor: Robert Caceres    Reason for call: Shereemaureenavani Gaming states pt is requesting an MRI for his back pain & she was calling to start the process to obtain prior authorization    Call back#: [phone] (pt's number)

## 2023-03-20 ENCOUNTER — OFFICE VISIT (OUTPATIENT)
Dept: PHYSICAL THERAPY | Facility: CLINIC | Age: 68
End: 2023-03-20

## 2023-03-20 DIAGNOSIS — M54.16 LUMBAR RADICULOPATHY: Primary | ICD-10-CM

## 2023-03-20 DIAGNOSIS — M79.18 MYOFASCIAL PAIN SYNDROME: ICD-10-CM

## 2023-03-20 DIAGNOSIS — M51.36 LUMBAR DEGENERATIVE DISC DISEASE: ICD-10-CM

## 2023-03-20 NOTE — PROGRESS NOTES
Daily Note     Today's date: 3/20/2023  Patient name: Deepika Melchor  : 1955  MRN: [id number]  Referring provider: Kamran Frazier MD  Dx:   Encounter Diagnosis     ICD-10-CM    1  Lumbar radiculopathy  M54 16       2  Lumbar degenerative disc disease  M51 36       3  Myofascial pain syndrome  M79 18           Start Time: 1100  Stop Time: 1150  Total time in clinic (min): 50 minutes    Subjective: Patient reports having mid back pain on arrival today  Objective: See treatment diary below      Assessment: Tolerated treatment well  Patient exhibited good technique with therapeutic exercises  Patient denies any radicular sx throughout session  Most challenged with LTR today  Plan: Continue per plan of care  Precautions: L THR      Manuals 3/15 3/20       STM Left paraspinals prone RK        P/A Mob         Flex/Rot Mob         Hamstring Stretch  seated 10'x2 ea       Piriformis Stretch                  Neuro Re-Ed         Quadruped Arm Raises         Quadruped Leg Raises         Quadruped Arm and Leg Raises         AB Supine         AB with Arm Raises Supine         AB with Leg Raises Supine         AB with Arm and Leg Raises Supine         Prone TB ROT         Prone Ball Squeeze         Prone Hip EXT         Prone Planks         Squats with Arm Raises         Centralization Concepts         Body Mechanics Instruction for Bending and Lifting  supineBlue clams 2x10        Patient  Instruction on DDD/DJD RK Bridge 2x10       Sitting Posture Correction with Roll RK seated 2x10       Sleeping Posture Correction with Roll         Prophylaxis of Recurrence                  Ther Ex         Prone Lying         Elbow Props  On wall ;2'       Press Ups  standing hip ext, abd 2x10 ea       Standing Back Bends 10x5" TID HEP 10x5"       Left Side Glide 10x5" TID HEP 10x5"       LTR  10xR  Then 5xL (left pain with initial ex trial; improved post right perf         [id number] [address] Flexion in Sitting         Flexion in Standing         Curl Ups Straight         Curl Ups Oblique         BACK EXT:  F , P , C         PYR AB:  S , P         Rotary Torso:  S , P , C         Hoist Row: S         LAT Pulldown         SA Pulldown         Oblique Press         Multi Hip: Flex  F              EXT  P              ABD                  ADD         PYR QUAD:  S , P , C         NuStep:   S , A  10'                Ther Activity         Squatting         Lifting         Carrying         Lunging                  Modalities         HP         Traction

## 2023-03-22 ENCOUNTER — OFFICE VISIT (OUTPATIENT)
Dept: PHYSICAL THERAPY | Facility: CLINIC | Age: 68
End: 2023-03-22

## 2023-03-22 DIAGNOSIS — M54.16 LUMBAR RADICULOPATHY: Primary | ICD-10-CM

## 2023-03-22 DIAGNOSIS — M79.18 MYOFASCIAL PAIN SYNDROME: ICD-10-CM

## 2023-03-22 DIAGNOSIS — M51.36 LUMBAR DEGENERATIVE DISC DISEASE: ICD-10-CM

## 2023-03-22 NOTE — PROGRESS NOTES
Daily Note     Today's date: 3/22/2023  Patient name: Brie Morejon  : 1955  MRN: [id number]  Referring provider: Britta Rubalcava MD  Dx:   Encounter Diagnosis     ICD-10-CM    1  Lumbar radiculopathy  M54 16       2  Lumbar degenerative disc disease  M51 36       3  Myofascial pain syndrome  M79 18                      Subjective: Pt reports he is doing swell  Objective: See treatment diary below      Assessment: Tolerated treatment well  Pt tolerated addition of new exercises well  Pt had moderate tightness/limitations in B hamstrings, would benefit to stretch hams/piriformis  Patient demonstrated fatigue post treatment, exhibited good technique with therapeutic exercises and would benefit from continued PT      Plan: Continue per plan of care        Precautions: L THR      Manuals 3/15 3/20 3/22      STM Left paraspinals prone RK        P/A Mob         Flex/Rot Mob         Hamstring Stretch  seated 10'x2 ea supine 10'      Piriformis Stretch                  Neuro Re-Ed         Quadruped Arm Raises         Quadruped Leg Raises         Quadruped Arm and Leg Raises         AB Supine         AB with Arm Raises Supine         AB with Leg Raises Supine         AB with Arm and Leg Raises Supine         Prone TB ROT         Prone Ball Squeeze         Prone Hip EXT         Prone Planks         Squats with Arm Raises         Centralization Concepts         Body Mechanics Instruction for Bending and Lifting  supineBlue clams 2x10  supine L4 clams 2x10      Patient  Instruction on DDD/DJD RK Bridge 2x10 Bridge 2x10      Sitting Posture Correction with Roll RK seated 2x10 seated 2x10      Sleeping Posture Correction with Roll         Prophylaxis of Recurrence                  Ther Ex         Prone Lying         Elbow Props  On wall ;2'       Press Ups  standing hip ext, abd 2x10 ea       Standing Back Bends 10x5" TID HEP 10x5" 10x5"      Left Side Glide 10x5" TID HEP 10x5" 10x5"      LTR  10xR  Then 5xL (left pain with initial ex trial; improved post right perf   10x ea      Standing 3-way   standing hip ext, abd 2x10 ea      Ball rollouts   10x      SKTC   10x ea      DKTC         Flexion in Sitting         Flexion in Standing         Curl Ups Straight         Curl Ups Oblique         BACK EXT:  F , P , C         PYR AB:  S , P         Rotary Torso:  S , P , C         Hoist Row: S         LAT Pulldown         SA Pulldown         Oblique Press         Multi Hip: Flex  F              EXT  P              ABD                  ADD         PYR QUAD:  S , P , C         NuStep:   S , A  10' 10'               Ther Activity         Squatting         Lifting         Carrying         Lunging                  Modalities         HP         Traction

## 2023-03-27 ENCOUNTER — OFFICE VISIT (OUTPATIENT)
Dept: PHYSICAL THERAPY | Facility: CLINIC | Age: 68
End: 2023-03-27

## 2023-03-27 DIAGNOSIS — M54.16 LUMBAR RADICULOPATHY: Primary | ICD-10-CM

## 2023-03-27 DIAGNOSIS — M51.36 LUMBAR DEGENERATIVE DISC DISEASE: ICD-10-CM

## 2023-03-27 DIAGNOSIS — M79.18 MYOFASCIAL PAIN SYNDROME: ICD-10-CM

## 2023-03-27 NOTE — PROGRESS NOTES
"Daily Note     Today's date: 3/27/2023  Patient name: Marissa Luke  : 1955  MRN: [id number]  Referring provider: Debra Cevallos MD  Dx:   Encounter Diagnosis     ICD-10-CM    1  Lumbar radiculopathy  M54 16       2  Lumbar degenerative disc disease  M51 36       3  Myofascial pain syndrome  M79 18                      Subjective: Reports walking a lot yesterday and doing well  Reports no c/o increased pain  Objective: See treatment diary below      Assessment: Tolerated treatment well  Patient exhibited good technique with therapeutic exercises  Pt cont to have notable restriction in B LE's and tightness  Pt lonnie program with min c/o pain today  Making slow steady gains  Plan: Continue per plan of care        Precautions: L [facility]      Manuals 3/15 3/20 3/22 3/27     STM Left paraspinals prone RK        P/A Mob         Flex/Rot Mob         Hamstring Stretch  seated 10'x2 ea supine 10' supine 10'     Piriformis Stretch                  Neuro Re-Ed         Quadruped Arm Raises         Quadruped Leg Raises         Quadruped Arm and Leg Raises         AB Supine         AB with Arm Raises Supine         AB with Leg Raises Supine         AB with Arm and Leg Raises Supine         Prone TB ROT         Prone Ball Squeeze         Prone Hip EXT         Prone Planks         Squats with Arm Raises         Centralization Concepts         Body Mechanics Instruction for Bending and Lifting  supineBlue clams 2x10  supine L4 clams 2x10 L4 2/10     Patient  Instruction on DDD/DJD RK Bridge 2x10 Bridge 2x10 Bridge 2/10     Sitting Posture Correction with Roll RK seated 2x10 seated 2x10      Sleeping Posture Correction with Roll         Prophylaxis of Recurrence                  Ther Ex         Prone Lying         Elbow Props  On wall ;2'       Press Ups  standing hip ext, abd 2x10 ea  stading 2/10 3 way hip     Standing Back Bends 10x5\" TID HEP 10x5\" 10x5\" 10\"     Left Side Glide 10x5\" TID HEP 10x5\" 10x5\" " "10/5\"     LTR  10xR  Then 5xL (left pain with initial ex trial; improved post right perf   10x ea 2/10 ea     Standing 3-way   standing hip ext, abd 2x10 ea standing hp 2/10     Ball rollouts   10x 10x     SKTC   10x ea 10x ea     DKTC         Flexion in Sitting         Flexion in Standing         Curl Ups Straight         Curl Ups Oblique         BACK EXT:  F , P , C         PYR AB:  S , P         Rotary Torso:  S , P , C         Hoist Row: S         LAT Pulldown         SA Pulldown         Oblique Press         Multi Hip: Flex  F              EXT  P              ABD                  ADD         PYR QUAD:  S , P , C         NuStep:   S , A  10' 10' 10'              Ther Activity         Squatting         Lifting         Carrying         Lunging                  Modalities         HP         Traction                       "

## 2023-03-29 ENCOUNTER — OFFICE VISIT (OUTPATIENT)
Dept: PHYSICAL THERAPY | Facility: CLINIC | Age: 68
End: 2023-03-29

## 2023-03-29 DIAGNOSIS — M51.36 LUMBAR DEGENERATIVE DISC DISEASE: ICD-10-CM

## 2023-03-29 DIAGNOSIS — M79.18 MYOFASCIAL PAIN SYNDROME: ICD-10-CM

## 2023-03-29 DIAGNOSIS — M54.16 LUMBAR RADICULOPATHY: Primary | ICD-10-CM

## 2023-03-29 NOTE — PROGRESS NOTES
"Daily Note     Today's date: 3/29/2023  Patient name: Thomas Vazquez  : 1955  MRN: [id number]  Referring provider: Yoshi Mcnally MD  Dx:   Encounter Diagnosis     ICD-10-CM    1  Lumbar radiculopathy  M54 16       2  Lumbar degenerative disc disease  M51 36       3  Myofascial pain syndrome  M79 18                      Subjective: Pt reports he is doing well  Reports some stiffness today but doing well overall  Objective: See treatment diary below      Assessment: Tolerated treatment well  Patient would benefit from continued PT  Pt cont to have notable restriction with stretching in B FRANCO's  Pt with min c/o pain today  No c/o radicular sx  Plan: Continue per plan of care        Precautions: L Counts include [facility] beds at the [facility]      Manuals 3/15 3/20 3/22 3/27 3/29    STM Left paraspinals prone RK        P/A Mob         Flex/Rot Mob         Hamstring Stretch  seated 10'x2 ea supine 10' supine 10' supine 10'    Piriformis Stretch                  Neuro Re-Ed         Quadruped Arm Raises         Quadruped Leg Raises         Quadruped Arm and Leg Raises         AB Supine         AB with Arm Raises Supine         AB with Leg Raises Supine         AB with Arm and Leg Raises Supine         Prone TB ROT         Prone Ball Squeeze         Prone Hip EXT         Prone Planks         Squats with Arm Raises         Centralization Concepts         Body Mechanics Instruction for Bending and Lifting  supineBlue clams 2x10  supine L4 clams 2x10 L4 2/10     Patient  Instruction on DDD/DJD RK Bridge 2x10 Bridge 2x10 Bridge 2/10     Sitting Posture Correction with Roll RK seated 2x10 seated 2x10      Sleeping Posture Correction with Roll         Prophylaxis of Recurrence                  Ther Ex         Prone Lying         Elbow Props  On wall ;2'       Press Ups  standing hip ext, abd 2x10 ea  stading [address] 3 way hip standing 2/10 3 way    Standing Back Bends 10x5\" TID HEP 10x5\" 10x5\" 10/5\" 10/5\"    Left Side Glide 10x5\" TID HEP 10x5\" " "10x5\" 10/5\" 10/5\"    LTR  10xR  Then 5xL (left pain with initial ex trial; improved post right perf   10x ea 2/10 ea 2/10 ea    Standing 3-way   standing hip ext, abd 2x10 ea standing hp 2/10 standing hip 2/10    Ball rollouts   10x 10x 10x    SKTC   10x ea 10x ea 10x ea    DKTC         Flexion in Sitting         Flexion in Standing         Curl Ups Straight         Curl Ups Oblique         BACK EXT:  F , P , C         PYR AB:  S , P         Rotary Torso:  S , P , C         Hoist Row: S         LAT Pulldown         SA Pulldown         Oblique Press         Multi Hip: Flex  F              EXT  P              ABD                  ADD         PYR QUAD:  S , P , C         NuStep:   S , A  10' 10' 10' 10' strength             Ther Activity         Squatting         Lifting         Carrying         Lunging                  Modalities         HP         Traction                       "

## 2023-04-03 ENCOUNTER — OFFICE VISIT (OUTPATIENT)
Dept: PHYSICAL THERAPY | Facility: CLINIC | Age: 68
End: 2023-04-03

## 2023-04-03 DIAGNOSIS — M51.36 LUMBAR DEGENERATIVE DISC DISEASE: ICD-10-CM

## 2023-04-03 DIAGNOSIS — M54.16 LUMBAR RADICULOPATHY: Primary | ICD-10-CM

## 2023-04-03 DIAGNOSIS — M79.18 MYOFASCIAL PAIN SYNDROME: ICD-10-CM

## 2023-04-03 NOTE — PROGRESS NOTES
Daily Note     Today's date: 4/3/2023  Patient name: Alejandro Rose  : 1955  MRN: [id number]  Referring provider: Twila Licona MD  Dx:   Encounter Diagnosis     ICD-10-CM    1  Lumbar radiculopathy  M54 16       2  Lumbar degenerative disc disease  M51 36       3  Myofascial pain syndrome  M79 18                      Subjective: Pt reports back is improving and feeling better  Objective: See treatment diary below      Assessment: Tolerated treatment well  Patient exhibited good technique with therapeutic exercises  Pt making cont gains with program  Cont to lonnie well with min increased pain today and no c/o radicular sx  Pt cont to have notable B HS tightness and restriction  Will cont to benefit from PT  Plan: Continue per plan of care        Precautions: L [facility]      Manuals 3/15 3/20 3/22 3/27 3/29 4/3   STM Left paraspinals prone RK        P/A Mob         Flex/Rot Mob         Hamstring Stretch  seated 10'x2 ea supine 10' supine 10' supine 10' Supine gastroc/HS 10'   Piriformis Stretch                  Neuro Re-Ed         Quadruped Arm Raises         Quadruped Leg Raises         Quadruped Arm and Leg Raises         AB Supine         AB with Arm Raises Supine         AB with Leg Raises Supine         AB with Arm and Leg Raises Supine         Prone TB ROT         Prone Ball Squeeze         Prone Hip EXT         Prone Planks         Squats with Arm Raises         Centralization Concepts         Body Mechanics Instruction for Bending and Lifting  supineBlue clams 2x10  supine L4 clams 2x10 L4 2/10  supine clams 10' L4   Patient  Instruction on DDD/DJD RK Bridge 2x10 Bridge 2x10 Bridge 2/10  Bridge 2/10   Sitting Posture Correction with Roll RK seated 2x10 seated 2x10      Sleeping Posture Correction with Roll         Prophylaxis of Recurrence                  Ther Ex         Prone Lying         Elbow Props  On wall ;2'       Press Ups  standing hip ext, abd 2x10 ea  stading 2/10 3 way hip "standing 2/10 3 way Stand 2/10 3 way   Standing Back Bends 10x5\" TID HEP 10x5\" 10x5\" 10/5\" 10/5\" 10/5\"   Left Side Glide 10x5\" TID HEP 10x5\" 10x5\" 10/5\" 10/5\" 10/5\"   LTR  10xR  Then 5xL (left pain with initial ex trial; improved post right perf   10x ea 2/10 ea 2/10 ea 2/10 ea   Standing 3-way   standing hip ext, abd 2x10 ea standing hp 2/10 standing hip 2/10 Stand hip 2/10   Ball rollouts   10x 10x 10x 10x   SKTC   10x ea 10x ea 10x ea 10 ea   DKTC         Flexion in Sitting         Flexion in Standing         Curl Ups Straight         Curl Ups Oblique         BACK EXT:  F , P , C         PYR AB:  S , P         Rotary Torso:  S , P , C         Hoist Row: S         LAT Pulldown         SA Pulldown         Oblique Press         Multi Hip: Flex  F              EXT  P              ABD                  ADD         PYR QUAD:  S , P , C         NuStep:   S , A  10' 10' 10' 10' strength 10' strength L3            Ther Activity         Squatting         Lifting         Carrying         Lunging                  Modalities         HP         Traction                       "

## 2023-04-05 ENCOUNTER — OFFICE VISIT (OUTPATIENT)
Dept: PHYSICAL THERAPY | Facility: CLINIC | Age: 68
End: 2023-04-05

## 2023-04-05 DIAGNOSIS — M54.16 LUMBAR RADICULOPATHY: Primary | ICD-10-CM

## 2023-04-05 DIAGNOSIS — M79.18 MYOFASCIAL PAIN SYNDROME: ICD-10-CM

## 2023-04-05 DIAGNOSIS — M51.36 LUMBAR DEGENERATIVE DISC DISEASE: ICD-10-CM

## 2023-04-05 NOTE — PROGRESS NOTES
Daily Note     Today's date: 2023  Patient name: Sohan Erickson  : 1955  MRN: [id number]  Referring provider: Asif Morris MD  Dx:   Encounter Diagnosis     ICD-10-CM    1  Lumbar radiculopathy  M54 16       2  Lumbar degenerative disc disease  M51 36       3  Myofascial pain syndrome  M79 18                      Subjective: Pt reports he is doing well  Reports no recent exacerbation in pain and no c/o radicular sx  Objective: See treatment diary below      Assessment: Tolerated treatment well  Patient exhibited good technique with therapeutic exercises  Pt making cont gains with program  Cont to lonnie well with min c/o pain  Cont to have notable restriction with HS and hip mobility  Plan: Continue per plan of care        Precautions: L [facility]      Manuals 4/5 3/20 3/22 3/27 3/29 4/3   STM Left paraspinals prone         P/A Mob         Flex/Rot Mob         Hamstring Stretch supine HS/Gastroc JV seated 10'x2 ea supine 10' supine 10' supine 10' Supine gastroc/HS 10'   Piriformis Stretch                  Neuro Re-Ed         Quadruped Arm Raises         Quadruped Leg Raises         Quadruped Arm and Leg Raises         AB Supine         AB with Arm Raises Supine         AB with Leg Raises Supine         AB with Arm and Leg Raises Supine         Prone TB ROT         Prone Ball Squeeze         Prone Hip EXT         Prone Planks         Squats with Arm Raises         Centralization Concepts         Body Mechanics Instruction for Bending and Lifting Supine clams L4 2/10 supineBlue clams 2x10  supine L4 clams 2x10 L4 2/10  supine clams 2/10 L4   Patient  Instruction on DDD/DJD Bridge 2/10 Bridge 2x10 Bridge 2x10 Bridge 2/10  Bridge 2/10   Sitting Posture Correction with Roll  seated 2x10 seated 2x10      Sleeping Posture Correction with Roll         Prophylaxis of Recurrence                  Ther Ex         Prone Lying         Elbow Props  On wall ;2'       Press Ups Stand on wall 2/10 standing "hip ext, abd 2x10 ea  stading 2/10 3 way hip standing 2/10 3 way Stand 2/10 3 way   Standing Back Bends 10/5\" 10x5\" 10x5\" 10/5\" 10/5\" 10/5\"   Left Side Glide 2/10 5\" 10x5\" 10x5\" 10/5\" 10/5\" 10/5\"   LTR 2/10 ea 10xR  Then 5xL (left pain with initial ex trial; improved post right perf   10x ea 2/10 ea 2/10 ea 2/10 ea   Standing 3-way Stand hip 2/10 ea  standing hip ext, abd 2x10 ea standing hp 2/10 standing hip 2/10 Stand hip 2/10   Ball rollouts 10x  10x 10x 10x 10x   SKTC 10x ea  10x ea 10x ea 10x ea 10 ea   DKTC         Flexion in Sitting         Flexion in Standing         Curl Ups Straight         Curl Ups Oblique         BACK EXT:  F , P , C         PYR AB:  S , P         Rotary Torso:  S , P , C         Hoist Row: S         LAT Pulldown         SA Pulldown         Oblique Press         Multi Hip: Flex  F              EXT  P              ABD                  ADD         PYR QUAD:  S , P , C         NuStep:   S , A 10' L3 strength 10' 10' 10' 10' strength 10' strength L3            Ther Activity         Squatting         Lifting         Carrying         Lunging                  Modalities         HP         Traction                       "

## 2023-04-10 ENCOUNTER — APPOINTMENT (OUTPATIENT)
Dept: PHYSICAL THERAPY | Facility: CLINIC | Age: 68
End: 2023-04-10

## 2023-04-10 NOTE — PROGRESS NOTES
"Daily Note     Today's date: 4/10/2023  Patient name: Hal Santana  : 1955  MRN: [id number]  Referring provider: Jemima Carlisle MD  Dx:   Encounter Diagnosis     ICD-10-CM    1  Lumbar radiculopathy  M54 16       2  Lumbar degenerative disc disease  M51 36       3  Myofascial pain syndrome  M79 18                      Subjective:       Objective: See treatment diary below      Assessment: Tolerated treatment {Tolerated treatment :[id number]}  Patient {assessment:[id number]}      Plan: Continue per plan of care        Precautions: L THR      Manuals 4/5 4/10 3/22 3/27 3/29 4/3   STM Left paraspinals prone         P/A Mob         Flex/Rot Mob         Hamstring Stretch supine HS/Gastroc JV Supine   HS  Calf  ML supine 10' supine 10' supine 10' Supine gastroc/HS 10'   Piriformis Stretch                  Neuro Re-Ed         Quadruped Arm Raises         Quadruped Leg Raises         Quadruped Arm and Leg Raises         AB Supine         AB with Arm Raises Supine         AB with Leg Raises Supine         AB with Arm and Leg Raises Supine         Prone TB ROT         Prone Ball Squeeze         Prone Hip EXT         Prone Planks         Squats with Arm Raises         Centralization Concepts         Body Mechanics Instruction for Bending and Lifting Supine clams L4 2/10 supineclams L4 2x10  supine L4 clams 2x10 L4 2/10  supine clams 2/10 L4   Patient  Instruction on DDD/DJD Bridge 2/10 Bridge 2x10 Bridge 2x10 Bridge 2/10  Bridge 2/10   Sitting Posture Correction with Roll   seated 2x10      Sleeping Posture Correction with Roll         Prophylaxis of Recurrence                  Ther Ex         Prone Lying         Elbow Props         Press Ups Stand on wall 2/10 Stand on wall  2x10  stading 2/10 3 way hip standing 2/10 3 way Stand 2/10 3 way   Standing Back Bends 10/5\" 10x5\" 10x5\" 10/5\" 10/5\" 10/5\"   Left Side Glide 2/10 5\" 10x5\" 10x5\" 10/5\" 10/5\" 10/5\"   LTR 2/10 ea 2x10 ea 10x ea 2/10 ea 2/10 ea 2/10 ea " Standing 3-way Stand hip 2/10 ea Stand hip 2x10 ea standing hip ext, abd 2x10 ea standing hp 2/10 standing hip 2/10 Stand hip 2/10   Ball rollouts 10x 10x 10x 10x 10x 10x   SKTC 10x ea 10x ea 10x ea 10x ea 10x ea 10 ea   DKTC         Flexion in Sitting         Flexion in Standing         Curl Ups Straight         Curl Ups Oblique         BACK EXT:  F , P , C         PYR AB:  S , P         Rotary Torso:  S , P , C         Hoist Row: S         LAT Pulldown         SA Pulldown         Oblique Press         Multi Hip: Flex  F              EXT  P              ABD                  ADD         PYR QUAD:  S , P , C         NuStep:   S , A 10' L3 strength L3 10' strength 10' 10' 10' strength 10' strength L3            Ther Activity         Squatting         Lifting         Carrying         Lunging                  Modalities         HP         Traction

## 2023-04-12 ENCOUNTER — APPOINTMENT (OUTPATIENT)
Dept: PHYSICAL THERAPY | Facility: CLINIC | Age: 68
End: 2023-04-12

## 2023-04-17 ENCOUNTER — APPOINTMENT (OUTPATIENT)
Dept: PHYSICAL THERAPY | Facility: CLINIC | Age: 68
End: 2023-04-17

## 2023-04-19 ENCOUNTER — APPOINTMENT (OUTPATIENT)
Dept: PHYSICAL THERAPY | Facility: CLINIC | Age: 68
End: 2023-04-19

## 2023-04-19 ENCOUNTER — APPOINTMENT (OUTPATIENT)
Dept: LAB | Facility: HOSPITAL | Age: 68
End: 2023-04-19

## 2023-04-19 DIAGNOSIS — E66.01 OBESITY, MORBID (MORE THAN 100 LBS OVER IDEAL WEIGHT OR BMI > 40) (HCC): ICD-10-CM

## 2023-04-19 DIAGNOSIS — I48.0 PAROXYSMAL ATRIAL FIBRILLATION (HCC): ICD-10-CM

## 2023-04-19 DIAGNOSIS — I50.33 DIASTOLIC CHF, ACUTE ON CHRONIC (HCC): ICD-10-CM

## 2023-04-19 LAB
ALBUMIN SERPL BCP-MCNC: 4.1 G/DL (ref 3.5–5)
ALP SERPL-CCNC: 59 U/L (ref 34–104)
ALT SERPL W P-5'-P-CCNC: 15 U/L (ref 7–52)
ANION GAP SERPL CALCULATED.3IONS-SCNC: 7 MMOL/L (ref 4–13)
AST SERPL W P-5'-P-CCNC: 16 U/L (ref 13–39)
BASOPHILS # BLD AUTO: 0.03 THOUSANDS/ΜL (ref 0–0.1)
BASOPHILS NFR BLD AUTO: 1 % (ref 0–1)
BILIRUB SERPL-MCNC: 0.66 MG/DL (ref 0.2–1)
BUN SERPL-MCNC: 19 MG/DL (ref 5–25)
CALCIUM SERPL-MCNC: 9.3 MG/DL (ref 8.4–10.2)
CHLORIDE SERPL-SCNC: 101 MMOL/L (ref 96–108)
CHOLEST SERPL-MCNC: 228 MG/DL
CO2 SERPL-SCNC: 32 MMOL/L (ref 21–32)
CREAT SERPL-MCNC: 0.84 MG/DL (ref 0.6–1.3)
EOSINOPHIL # BLD AUTO: 0.12 THOUSAND/ΜL (ref 0–0.61)
EOSINOPHIL NFR BLD AUTO: 2 % (ref 0–6)
ERYTHROCYTE [DISTWIDTH] IN BLOOD BY AUTOMATED COUNT: 13.6 % (ref 11.6–15.1)
GFR SERPL CREATININE-BSD FRML MDRD: 89 ML/MIN/1.73SQ M
GLUCOSE P FAST SERPL-MCNC: 84 MG/DL (ref 65–99)
HCT VFR BLD AUTO: 42.2 % (ref 36.5–49.3)
HDLC SERPL-MCNC: 54 MG/DL
HGB BLD-MCNC: 13.8 G/DL (ref 12–17)
IMM GRANULOCYTES # BLD AUTO: 0.03 THOUSAND/UL (ref 0–0.2)
IMM GRANULOCYTES NFR BLD AUTO: 1 % (ref 0–2)
LDLC SERPL CALC-MCNC: 135 MG/DL (ref 0–100)
LYMPHOCYTES # BLD AUTO: 1.2 THOUSANDS/ΜL (ref 0.6–4.47)
LYMPHOCYTES NFR BLD AUTO: 21 % (ref 14–44)
MCH RBC QN AUTO: 30.1 PG (ref 26.8–34.3)
MCHC RBC AUTO-ENTMCNC: 32.7 G/DL (ref 31.4–37.4)
MCV RBC AUTO: 92 FL (ref 82–98)
MONOCYTES # BLD AUTO: 0.49 THOUSAND/ΜL (ref 0.17–1.22)
MONOCYTES NFR BLD AUTO: 9 % (ref 4–12)
NEUTROPHILS # BLD AUTO: 3.73 THOUSANDS/ΜL (ref 1.85–7.62)
NEUTS SEG NFR BLD AUTO: 66 % (ref 43–75)
NONHDLC SERPL-MCNC: 174 MG/DL
NRBC BLD AUTO-RTO: 0 /100 WBCS
PLATELET # BLD AUTO: 190 THOUSANDS/UL (ref 149–390)
PMV BLD AUTO: 10 FL (ref 8.9–12.7)
POTASSIUM SERPL-SCNC: 3.9 MMOL/L (ref 3.5–5.3)
PROT SERPL-MCNC: 7.2 G/DL (ref 6.4–8.4)
RBC # BLD AUTO: 4.59 MILLION/UL (ref 3.88–5.62)
SODIUM SERPL-SCNC: 140 MMOL/L (ref 135–147)
TRIGL SERPL-MCNC: 193 MG/DL
URATE SERPL-MCNC: 9.5 MG/DL (ref 3.5–8.5)
WBC # BLD AUTO: 5.6 THOUSAND/UL (ref 4.31–10.16)

## 2023-04-24 ENCOUNTER — APPOINTMENT (OUTPATIENT)
Dept: PHYSICAL THERAPY | Facility: CLINIC | Age: 68
End: 2023-04-24

## 2023-04-26 ENCOUNTER — APPOINTMENT (OUTPATIENT)
Dept: PHYSICAL THERAPY | Facility: CLINIC | Age: 68
End: 2023-04-26

## 2023-05-04 ENCOUNTER — OFFICE VISIT (OUTPATIENT)
Dept: PAIN MEDICINE | Facility: CLINIC | Age: 68
End: 2023-05-04

## 2023-05-04 VITALS
WEIGHT: 309.4 LBS | DIASTOLIC BLOOD PRESSURE: 79 MMHG | BODY MASS INDEX: 45.83 KG/M2 | HEIGHT: 69 IN | HEART RATE: 69 BPM | SYSTOLIC BLOOD PRESSURE: 121 MMHG

## 2023-05-04 DIAGNOSIS — M47.816 LUMBAR SPONDYLOSIS: ICD-10-CM

## 2023-05-04 DIAGNOSIS — G89.29 CHRONIC BILATERAL LOW BACK PAIN WITHOUT SCIATICA: ICD-10-CM

## 2023-05-04 DIAGNOSIS — I50.33 ACUTE ON CHRONIC DIASTOLIC CONGESTIVE HEART FAILURE (HCC): ICD-10-CM

## 2023-05-04 DIAGNOSIS — M54.50 CHRONIC BILATERAL LOW BACK PAIN WITHOUT SCIATICA: ICD-10-CM

## 2023-05-04 DIAGNOSIS — G89.4 CHRONIC PAIN SYNDROME: Primary | ICD-10-CM

## 2023-05-04 RX ORDER — RIVAROXABAN 20 MG/1
TABLET, FILM COATED ORAL
Qty: 90 TABLET | Refills: 1 | Status: SHIPPED | OUTPATIENT
Start: 2023-05-04

## 2023-05-04 NOTE — PROGRESS NOTES
Assessment:  1  Chronic pain syndrome    2  Chronic bilateral low back pain without sciatica    3  Lumbar spondylosis        Plan:  While the patient was in the office today, I did have a thorough conversation regarding their chronic pain syndrome, medication management, and treatment plan options  Patient is being seen for a follow-up visit  He was initially seen here for consultation on 3/8/2023  He was referred to physical therapy  He attended physical therapy from 3/5/2023 until 4/5/2023  Also, Dr Noemi Tyler provided him with home exercises for lumbar core strengthening/stretching on 3/8/2023  Patient tells me that he has been doing the exercises 2-3 times a week for 30 to 40 minutes at a time since 3/8/2023  So far, no significant improvement in his symptoms between physical therapy and home exercise program   At this point, we will order an MRI of his lumbar spine to rule out intraspinal pathology that would contribute to his current symptoms  A baseline urine drug screen was obtained during his initial consultation in anticipation of taking over his prescription for tramadol which she uses on a strictly as-needed basis  Urine drug screen results were within normal limits  While the patient was in the office today an opioid contract was thoroughly reviewed and signed by the patient  The patient was given adequate time to ask questions in regards to the contract and a signed copy was sent home for his/her records  He did not require refill of tramadol during today's visit  Follow-up in 1 month to review MRI results  History of Present Illness: The patient is a 75 y o  male who presents for a follow up office visit in regards to Follow-up  The patients current symptoms include complaints of low back pain, nonradiating  Current pain levels a 5/10  Quality pain is described as dull and aching      Current pain medications includes: Tramadol 50 mg which she uses on a strictly as needed basis   The patient reports that this regimen is providing up to 90% pain relief  The patient is reporting no side effects from this pain medication regimen  I have personally reviewed and/or updated the patient's past medical history, past surgical history, family history, social history, current medications, allergies, and vital signs today  Review of Systems  Review of Systems   Constitutional: Negative  HENT: Negative  Eyes: Negative  Respiratory: Negative  Cardiovascular: Negative  Gastrointestinal: Negative  Endocrine: Negative  Genitourinary: Negative  Musculoskeletal: Positive for gait problem and joint swelling  Decreased ROM  Joint stiffness     Skin: Negative  Allergic/Immunologic: Negative  Hematological: Negative  Psychiatric/Behavioral: Negative              Past Medical History:   Diagnosis Date    [facility])     [facility] 6[facility]15/[facility]21    Arthritis     CPAP (continuous positive airway pressure) dependence     Irregular heart beat     Lymphedema     Sleep apnea     Urinary frequency     Wears glasses     Wears partial dentures     lower partial       Past Surgical History:   Procedure Laterality Date    ABLATION SAPHENOUS VEIN W/ RFA      COLONOSCOPY      JOINT REPLACEMENT  Left Hip 4 /21/2009    [place] CYSTO INSERTION TRANSPROSTATIC IMPLANT SINGLE N/A 11/13/2017    Procedure: CYSTOSCOPY WITH INSERTION UROLIFT;  Surgeon: Rosenda Rojas DO;  Location: [facility];  Service: Urology    TONSILLECTOMY         Family History   Problem Relation Age of Onset    Heart disease Mother     Lymphoma Father     Cancer Father     Heart disease Sister     Hypertension Brother     Diabetes Neg Hx     Thyroid disease Neg Hx     Stroke Neg Hx        Social History     Occupational History    Not on file   Tobacco Use    Smoking status: Never    Smokeless tobacco: Never   Vaping Use    Vaping Use: Never used   Substance and Sexual Activity    Alcohol use:  Yes     Alcohol/week: 5 0 standard drinks     Types: 3 Glasses of wine, 2 Standard drinks or equivalent per week     Comment: socially    Drug use: No    Sexual activity: Not Currently     Partners: Female     Birth control/protection: Abstinence, Condom Male, Spermicide         Current Outpatient Medications:     acetaminophen (TYLENOL) 500 mg tablet, Take 500 mg by mouth every 6 (six) hours as needed for mild pain, Disp: , Rfl:     alfuzosin (UROXATRAL) 10 mg 24 hr tablet, Take 10 mg by mouth daily, Disp: , Rfl:     allopurinol (ZYLOPRIM) 100 mg tablet, Take 1 tablet (100 mg total) by mouth daily, Disp: 14 tablet, Rfl: 0    amiodarone 100 mg tablet, Take 100 mg by mouth daily, Disp: , Rfl:     ammonium lactate (LAC-HYDRIN) 12 % lotion, Apply topically 2 (two) times a day as needed for dry skin, Disp: , Rfl:     ferrous sulfate 325 (65 Fe) mg tablet, Take 325 mg by mouth, Disp: , Rfl:     FML Forte 0 25 % ophthalmic suspension, , Disp: , Rfl:     Glucosamine-Chondroit-Vit C-Mn (Glucosamine 1500 Complex) CAPS, Take by mouth, Disp: , Rfl:     halobetasol (ULTRAVATE) 0 05 % ointment, APPLY TO AFFECTED AREA ON LEG TWICE DAILY, Disp: , Rfl:     L-ARGININE-500 PO, Take 500 mg by mouth 2 (two) times a day , Disp: , Rfl:     loteprednol etabonate (LOTEMAX) 0 5 % ophthalmic suspension, 1 drop 4 (four) times a day, Disp: , Rfl:     metoprolol succinate (TOPROL-XL) 25 mg 24 hr tablet, Take 1 tablet (25 mg total) by mouth daily, Disp: 90 tablet, Rfl: 2    Multiple Vitamins-Minerals (OCUVITE EXTRA PO), Take 1 tablet by mouth daily  , Disp: , Rfl:     multivitamin (THERAGRAN) TABS, Take 1 tablet by mouth daily, Disp: , Rfl:     Omega-3 Fatty Acids (fish oil) 1,000 mg, Take 1,000 mg by mouth 2 (two) times a day, Disp: , Rfl:     patient supplied medication, Take 1 each by mouth 2 (two) times a day, Disp: , Rfl:     pregabalin (LYRICA) 100 mg capsule, Take 1 capsule (100 mg "total) by mouth 3 (three) times a day, Disp: 42 capsule, Rfl: 0    spironolactone (ALDACTONE) 25 mg tablet, Take 1 tablet (25 mg total) by mouth 2 (two) times a day, Disp: 180 tablet, Rfl: 1    tadalafil (CIALIS) 5 MG tablet, Take 5 mg by mouth daily as needed for erectile dysfunction, Disp: , Rfl:     torsemide (DEMADEX) 20 mg tablet, TAKE 1 TABLET BY MOUTH TWICE A DAY, Disp: 180 tablet, Rfl: 1    traMADol (ULTRAM) 50 mg tablet, Take 50 mg by mouth every 6 (six) hours as needed for moderate pain, Disp: , Rfl:     VITAMIN D PO, Take 2,000 Units by mouth daily , Disp: , Rfl:     Xarelto 20 MG tablet, TAKE 1 TABLET BY MOUTH EVERY DAY, Disp: 90 tablet, Rfl: 1    Diclofenac Sodium (VOLTAREN) 1 %, Apply 2 g topically 4 (four) times a day (Patient not taking: Reported on 1/25/2023), Disp: 100 g, Rfl: 0    methylPREDNISolone 4 MG tablet therapy pack, , Disp: , Rfl:     Allergies   Allergen Reactions    Penicillins Anaphylaxis    Sulfa Antibiotics Anaphylaxis    Levofloxacin Other (See Comments)       Physical Exam:    /79 (BP Location: Left arm, Patient Position: Sitting, Cuff Size: Adult)   Pulse 69   Ht 5' 9\" (1 753 m)   Wt (!) 140 kg (309 lb 6 4 oz)   BMI 45 69 kg/m²     Constitutional:normal, well developed, well nourished, alert, in no distress and non-toxic and no overt pain behavior  and obese  Eyes:anicteric  HEENT:grossly intact  Neck:supple, symmetric, trachea midline and no masses   Pulmonary:even and unlabored  Cardiovascular:No edema or pitting edema present  Skin:Normal without rashes or lesions and well hydrated  Psychiatric:Mood and affect appropriate  Neurologic:Cranial Nerves II-XII grossly intact  Musculoskeletal:Gait is slow and guarded      Imaging  MRI lumbar spine without contrast    (Results Pending)       Orders Placed This Encounter   Procedures    MRI lumbar spine without contrast       "

## 2023-05-04 NOTE — PATIENT INSTRUCTIONS

## 2023-05-10 ENCOUNTER — HOSPITAL ENCOUNTER (OUTPATIENT)
Dept: MRI IMAGING | Facility: HOSPITAL | Age: 68
Discharge: HOME | End: 2023-05-10

## 2023-05-10 DIAGNOSIS — M47.816 LUMBAR SPONDYLOSIS: ICD-10-CM

## 2023-05-10 DIAGNOSIS — M54.50 CHRONIC BILATERAL LOW BACK PAIN WITHOUT SCIATICA: ICD-10-CM

## 2023-05-10 DIAGNOSIS — G89.4 CHRONIC PAIN SYNDROME: ICD-10-CM

## 2023-05-10 DIAGNOSIS — G89.29 CHRONIC BILATERAL LOW BACK PAIN WITHOUT SCIATICA: ICD-10-CM

## 2023-05-14 DIAGNOSIS — I50.32 CHRONIC DIASTOLIC HEART FAILURE (HCC): ICD-10-CM

## 2023-05-15 RX ORDER — TORSEMIDE 20 MG/1
TABLET ORAL
Qty: 180 TABLET | Refills: 1 | Status: SHIPPED | OUTPATIENT
Start: 2023-05-15

## 2023-05-17 ENCOUNTER — TELEPHONE (OUTPATIENT)
Dept: PAIN MEDICINE | Facility: CLINIC | Age: 68
End: 2023-05-17

## 2023-05-17 NOTE — TELEPHONE ENCOUNTER
----- Message from Marquise Colin Aura  sent at 5/17/2023  9:31 AM EDT -----  Please call patient and let him know that the recent MRI of his lumbar spine revealed multilevel arthritic changes  No significant disc herniation or nerve impingement  Please advise him to keep scheduled follow-up visit

## 2023-06-01 ENCOUNTER — OFFICE VISIT (OUTPATIENT)
Dept: PAIN MEDICINE | Facility: CLINIC | Age: 68
End: 2023-06-01

## 2023-06-01 VITALS — HEART RATE: 65 BPM | DIASTOLIC BLOOD PRESSURE: 76 MMHG | SYSTOLIC BLOOD PRESSURE: 150 MMHG

## 2023-06-01 DIAGNOSIS — G89.29 CHRONIC BILATERAL LOW BACK PAIN WITHOUT SCIATICA: ICD-10-CM

## 2023-06-01 DIAGNOSIS — G89.4 CHRONIC PAIN SYNDROME: Primary | ICD-10-CM

## 2023-06-01 DIAGNOSIS — M54.50 CHRONIC BILATERAL LOW BACK PAIN WITHOUT SCIATICA: ICD-10-CM

## 2023-06-01 DIAGNOSIS — M47.816 LUMBAR SPONDYLOSIS: ICD-10-CM

## 2023-06-01 DIAGNOSIS — Z79.891 LONG-TERM CURRENT USE OF OPIATE ANALGESIC: ICD-10-CM

## 2023-06-01 DIAGNOSIS — F11.20 UNCOMPLICATED OPIOID DEPENDENCE (HCC): ICD-10-CM

## 2023-06-01 RX ORDER — TRAMADOL HYDROCHLORIDE 50 MG/1
50 TABLET ORAL 2 TIMES DAILY PRN
Qty: 60 TABLET | Refills: 1 | Status: SHIPPED | OUTPATIENT
Start: 2023-06-01

## 2023-06-01 RX ORDER — ALLOPURINOL 300 MG/1
300 TABLET ORAL DAILY
COMMUNITY
Start: 2023-05-01

## 2023-06-01 NOTE — PATIENT INSTRUCTIONS

## 2023-06-01 NOTE — PROGRESS NOTES
Assessment:  1  Chronic pain syndrome    2  Chronic bilateral low back pain without sciatica    3  Lumbar spondylosis    4  Uncomplicated opioid dependence ([address] )    5  Long-term current use of opiate analgesic        Plan:  While the patient was in the office today, I did have a thorough conversation regarding their chronic pain syndrome, medication management, and treatment plan options  Patient is being seen for a follow-up visit  He was seen here for a follow-up visit on 5/4/2023 at which time an MRI of his lumbar spine was ordered  MRI revealed multilevel, noncompressive degenerative changes  MRI results were reviewed with patient during today's visit  Discussed possibility of lumbar facet medial branch blocks to determine if he is a candidate for radiofrequency ablation  I provided him with brochures regarding both procedures to take home and read over  Renewed tramadol 50 mg twice daily if needed for pain  A prescription was sent to his pharmacy with 1 refill  There are risks associated with opioid medications, including dependence, addiction and tolerance  The patient understands and agrees to use these medications only as prescribed  Potential side effects of the medications include, but are not limited to, constipation, drowsiness, addiction, impaired judgment and risk of fatal overdose if not taken as prescribed  The patient was warned against driving while taking sedation medications  Sharing medications is a felony  At this point in time, the patient is showing no signs of addiction, abuse, diversion or suicidal ideation  A urine drug screen was collected at today's office visit as part of our medication management protocol  The point of care testing results were appropriate for what was being prescribed  The specimen will be sent for confirmatory testing   The drug screen is medically necessary because the patient is either dependent on opioid medication or is being considered for opioid medication therapy and the results could impact ongoing or future treatment  The drug screen is to evaluate for the presences or absence of prescribed, non-prescribed, and/or illicit drugs/substances  [place] Prescription Drug Monitoring Program report was reviewed and was appropriate     The patient will follow-up in 6 weeks for medication prescription refill and reevaluation  The patient was advised to contact the office should their symptoms worsen in the interim  The patient was agreeable and verbalized an understanding  History of Present Illness: The patient is a 75 y o  male who presents for a follow up office visit in regards to No chief complaint on file      The patient’s current symptoms include complaints of low back pain  Current pain level is a 5/10  Quality pain is described as dull and aching  Current pain medications includes: Tramadol 50 mg which she uses on a strictly as needed basis  The patient reports that this regimen is providing up to 80% pain relief  The patient is reporting no side effects from this pain medication regimen  I have personally reviewed and/or updated the patient's past medical history, past surgical history, family history, social history, current medications, allergies, and vital signs today  Review of Systems  Review of Systems   Constitutional: Negative for fatigue and unexpected weight change  HENT: Negative for dental problem, ear pain, hearing loss and sneezing  Eyes: Negative for visual disturbance  Respiratory: Negative for cough and chest tightness  Cardiovascular: Negative for leg swelling  Gastrointestinal: Negative for anal bleeding  Endocrine: Negative for heat intolerance  Genitourinary: Negative for flank pain and genital sores  Skin: Negative for wound  Allergic/Immunologic: Negative for immunocompromised state  Neurological: Negative for speech difficulty and light-headedness     Hematological: Negative for adenopathy  Psychiatric/Behavioral: Negative for confusion  The patient is not hyperactive  All other systems reviewed and are negative  Past Medical History:   Diagnosis Date   • A-fib [facility])    • [facility]NA [facility]acute [facility]) ([facility] ) 6/15/2021   • Arthritis    • CPAP (continuous positive airway pressure) dependence    • Irregular heart beat    • Lymphedema    • Sleep apnea    • Urinary frequency    • Wears glasses    • Wears partial dentures     lower partial       Past Surgical History:   Procedure Laterality Date   • ABLATION SAPHENOUS VEIN W/ RFA     • COLONOSCOPY     • JOINT REPLACEMENT  Left Hip 4 /21/2009   • [place] CYSTO INSERTION TRANSPROSTATIC IMPLANT SINGLE N/A 11/13/2017    Procedure: CYSTOSCOPY WITH INSERTION UROLIFT;  Surgeon: Dave Gant DO;  Location: [facility];  Service: Urology   • TONSILLECTOMY         Family History   Problem Relation Age of Onset   • Heart disease Mother    • Lymphoma Father    • Cancer Father    • Heart disease Sister    • Hypertension Brother    • Diabetes Neg Hx    • Thyroid disease Neg Hx    • Stroke Neg Hx        Social History     Occupational History   • Not on file   Tobacco Use   • Smoking status: Never   • Smokeless tobacco: Never   Vaping Use   • Vaping Use: Never used   Substance and Sexual Activity   • Alcohol use:  Yes     Alcohol/week: 5 0 standard drinks of alcohol     Types: 3 Glasses of wine, 2 Standard drinks or equivalent per week     Comment: socially   • Drug use: No   • Sexual activity: Not Currently     Partners: Female     Birth control/protection: Abstinence, Condom Male, Spermicide         Current Outpatient Medications:   •  acetaminophen (TYLENOL) 500 mg tablet, Take 500 mg by mouth every 6 (six) hours as needed for mild pain, Disp: , Rfl:   •  alfuzosin (UROXATRAL) 10 mg 24 hr tablet, Take 10 mg by mouth daily, Disp: , Rfl:   •  allopurinol (ZYLOPRIM) 100 mg tablet, Take 1 tablet (100 mg total) by mouth daily, Disp: 14 tablet, Rfl: 0  •  amiodarone 100 mg tablet, Take 100 mg by mouth daily, Disp: , Rfl:   •  ammonium lactate (LAC-HYDRIN) 12 % lotion, Apply topically 2 (two) times a day as needed for dry skin, Disp: , Rfl:   •  ferrous sulfate 325 (65 Fe) mg tablet, Take 325 mg by mouth, Disp: , Rfl:   •  FML Forte 0 25 % ophthalmic suspension, , Disp: , Rfl:   •  Glucosamine-Chondroit-Vit C-Mn (Glucosamine 1500 Complex) CAPS, Take by mouth, Disp: , Rfl:   •  halobetasol (ULTRAVATE) 0 05 % ointment, APPLY TO AFFECTED AREA ON LEG TWICE DAILY, Disp: , Rfl:   •  L-ARGININE-500 PO, Take 500 mg by mouth 2 (two) times a day , Disp: , Rfl:   •  loteprednol etabonate (LOTEMAX) 0 5 % ophthalmic suspension, 1 drop 4 (four) times a day, Disp: , Rfl:   •  metoprolol succinate (TOPROL-XL) 25 mg 24 hr tablet, Take 1 tablet (25 mg total) by mouth daily, Disp: 90 tablet, Rfl: 2  •  Multiple Vitamins-Minerals (OCUVITE EXTRA PO), Take 1 tablet by mouth daily  , Disp: , Rfl:   •  multivitamin (THERAGRAN) TABS, Take 1 tablet by mouth daily, Disp: , Rfl:   •  Omega-3 Fatty Acids (fish oil) 1,000 mg, Take 1,000 mg by mouth 2 (two) times a day, Disp: , Rfl:   •  patient supplied medication, Take 1 each by mouth 2 (two) times a day, Disp: , Rfl:   •  pregabalin (LYRICA) 100 mg capsule, Take 1 capsule (100 mg total) by mouth 3 (three) times a day, Disp: 42 capsule, Rfl: 0  •  spironolactone (ALDACTONE) 25 mg tablet, Take 1 tablet (25 mg total) by mouth 2 (two) times a day, Disp: 180 tablet, Rfl: 1  •  tadalafil (CIALIS) 5 MG tablet, Take 5 mg by mouth daily as needed for erectile dysfunction, Disp: , Rfl:   •  torsemide (DEMADEX) 20 mg tablet, TAKE 1 TABLET BY MOUTH TWICE A DAY, Disp: 180 tablet, Rfl: 1  •  traMADol (ULTRAM) 50 mg tablet, Take 1 tablet (50 mg total) by mouth 2 (two) times a day as needed for moderate pain, Disp: 60 tablet, Rfl: 1  •  VITAMIN D PO, Take 2,000 Units by mouth daily , Disp: , Rfl:   •  Xarelto 20 MG tablet, TAKE 1 TABLET BY MOUTH EVERY DAY, Disp: 90 tablet, Rfl: 1  •  allopurinol (ZYLOPRIM) 300 mg tablet, Take 300 mg by mouth daily, Disp: , Rfl:   •  Diclofenac Sodium (VOLTAREN) 1 %, Apply 2 g topically 4 (four) times a day (Patient not taking: Reported on 1/25/2023), Disp: 100 g, Rfl: 0  •  methylPREDNISolone 4 MG tablet therapy pack, , Disp: , Rfl:     Allergies   Allergen Reactions   • Penicillins Anaphylaxis   • Sulfa Antibiotics Anaphylaxis   • Levofloxacin Other (See Comments)       Physical Exam:    /76   Pulse 65     Constitutional:normal, well developed, well nourished, alert, in no distress and non-toxic and no overt pain behavior  and obese  Eyes:anicteric  HEENT:grossly intact  Neck:supple, symmetric, trachea midline and no masses   Pulmonary:even and unlabored  Cardiovascular:No edema or pitting edema present  Skin:Normal without rashes or lesions and well hydrated  Psychiatric:Mood and affect appropriate  Neurologic:Cranial Nerves II-XII grossly intact  Musculoskeletal:Range of motion of the lumbar spine is limited in all planes  There is tenderness bilaterally L1-S1  There are lumbar paraspinal muscle spasms present      Imaging  No orders to display       Orders Placed This Encounter   Procedures   • MM ALL_Prescribed Meds and Special Instructions   • MM DT_Alprazolam Definitive Test   • MM DT_Amitriptyline Definitive Test   • MM DT_Amphetamine Definitive Test   • MM DT_Aripiprazole Definitive Test   • MM DT_Buprenorphine Definitive Test   • MM DT_Bupropion Definitive Test   • MM DT_Citalopram/Escitalopram Definitive Test   • MM DT_Clonazepam Definitive Test   • MM DT_Clozapine Definitive Test   • MM DT_Cocaine Definitive Test   • MM DT_Desipramine Definitive Test   • MM Diazepam Definitive Test   • MM DT_Ethyl Glucuronide/Ethyl Sulfate Definitive Test   • MM DT_Fentanyl Definitive Test   • MM DT_Fluoxetine Definitive Test   • MM DT_Haloperidol Definitive Test   • MM DT_Heroin Definitive Test   • MM DT_Hydrocodone Definitive Test   • MM DT_Hydromorphone Definitive Test   • MM DT_Kratom Definitive Test   • MM DT_Levorphanol Definitive Test   • MM Lorazepam Definitive Test   • MM DT_MDMA Definitive Test   • MM DT_Meperidine Definitive Test   • MM DT_Methadone Definitive Test   • MM DT_Methamphetamine Definitive Test   • MM DT_Methylphenidate Definitive Test   • MM DT_Morphine Definitive Test   • MM DT_Olanzapine Definitive Test   • MM DT_Oxazepam Definitive Test   • MM DT_Oxycodone Definitive Test   • MM DT_Oxymorphone Definitive Test   • MM DT_Paroxetine Definitive Test   • MM DT_Pregablin Definitive   • MM DT_Phentermine Definitive Test   • MM DT_Quetiapine Definitive Test   • MM DT_Risperidone Definitive Test   • MM DT_Secobarbital Definitive Test   • MM DT_Tapentadol Definitive Test   • MM DT_Temazapam Definitive Test   • MM DT_THC Definitive Test   • MM DT_Tramadol Definitive Test   • MM DT_Validity Creatinine   • MM DT_Validity Oxidant   • MM DT_Validity pH   • MM DT_Validity Specific

## 2023-06-15 ENCOUNTER — TELEPHONE (OUTPATIENT)
Dept: PAIN MEDICINE | Facility: CLINIC | Age: 68
End: 2023-06-15

## 2023-06-15 NOTE — TELEPHONE ENCOUNTER
LOV 6/1/23 with Yumiko  Discussed possibility of lumbar facet medial branch blocks to determine if he is a candidate for radiofrequency ablation. Pt reviewed the information and would like to proceed. Can you please place the order?

## 2023-06-15 NOTE — TELEPHONE ENCOUNTER
Caller: patient    Doctor: [facility]    Reason for call: patient would like to proceed with the recommended procedure that was offered to him    Call back#: [phone]

## 2023-06-28 DIAGNOSIS — I50.32 CHRONIC DIASTOLIC HEART FAILURE (HCC): ICD-10-CM

## 2023-06-28 DIAGNOSIS — I50.33 ACUTE ON CHRONIC DIASTOLIC CONGESTIVE HEART FAILURE (HCC): ICD-10-CM

## 2023-06-28 DIAGNOSIS — I48.4 ATYPICAL ATRIAL FLUTTER (HCC): ICD-10-CM

## 2023-06-28 RX ORDER — TORSEMIDE 20 MG/1
20 TABLET ORAL 2 TIMES DAILY
Qty: 180 TABLET | Refills: 2 | Status: SHIPPED | OUTPATIENT
Start: 2023-06-28

## 2023-06-28 RX ORDER — METOPROLOL SUCCINATE 25 MG/1
25 TABLET, EXTENDED RELEASE ORAL DAILY
Qty: 90 TABLET | Refills: 2 | Status: SHIPPED | OUTPATIENT
Start: 2023-06-28

## 2023-07-18 ENCOUNTER — HOSPITAL ENCOUNTER (OUTPATIENT)
Facility: HOSPITAL | Age: 68
Setting detail: OUTPATIENT SURGERY
Discharge: HOME | End: 2023-07-18
Attending: ANESTHESIOLOGY | Admitting: ANESTHESIOLOGY
Payer: COMMERCIAL

## 2023-07-18 ENCOUNTER — APPOINTMENT (OUTPATIENT)
Dept: RADIOLOGY | Facility: HOSPITAL | Age: 68
End: 2023-07-18
Payer: COMMERCIAL

## 2023-07-18 VITALS
WEIGHT: 309 LBS | SYSTOLIC BLOOD PRESSURE: 128 MMHG | HEART RATE: 64 BPM | DIASTOLIC BLOOD PRESSURE: 70 MMHG | HEIGHT: 69 IN | OXYGEN SATURATION: 96 % | BODY MASS INDEX: 45.77 KG/M2 | TEMPERATURE: 97.1 F | RESPIRATION RATE: 20 BRPM

## 2023-07-18 DIAGNOSIS — M54.50 CHRONIC BILATERAL LOW BACK PAIN WITHOUT SCIATICA: ICD-10-CM

## 2023-07-18 DIAGNOSIS — G89.29 CHRONIC BILATERAL LOW BACK PAIN WITHOUT SCIATICA: ICD-10-CM

## 2023-07-18 DIAGNOSIS — M47.816 LUMBAR SPONDYLOSIS: ICD-10-CM

## 2023-07-18 PROCEDURE — 64493 INJ PARAVERT F JNT L/S 1 LEV: CPT | Performed by: ANESTHESIOLOGY

## 2023-07-18 PROCEDURE — 64494 INJ PARAVERT F JNT L/S 2 LEV: CPT | Performed by: ANESTHESIOLOGY

## 2023-07-18 RX ORDER — LIDOCAINE HYDROCHLORIDE 10 MG/ML
INJECTION, SOLUTION EPIDURAL; INFILTRATION; INTRACAUDAL; PERINEURAL AS NEEDED
Status: DISCONTINUED | OUTPATIENT
Start: 2023-07-18 | End: 2023-07-18 | Stop reason: HOSPADM

## 2023-07-18 RX ORDER — LIDOCAINE HYDROCHLORIDE 20 MG/ML
INJECTION, SOLUTION EPIDURAL; INFILTRATION; INTRACAUDAL; PERINEURAL AS NEEDED
Status: DISCONTINUED | OUTPATIENT
Start: 2023-07-18 | End: 2023-07-18 | Stop reason: HOSPADM

## 2023-07-18 NOTE — OP NOTE
OPERATIVE REPORT  PATIENT NAME: Mary Wheeler    :  1955  MRN: [id number]  Pt Location: [facility] ROOM 01    SURGERY DATE: 2023    Surgeon(s) and Role:     * Meliza Encarnacion MD - Primary    Preop Diagnosis:  Chronic pain syndrome [G89.4]  Chronic bilateral low back pain without sciatica [M54.50, G89.29]  Lumbar spondylosis [M47.816]    Post-Op Diagnosis Codes:     * Chronic pain syndrome [G89.4]     * Chronic bilateral low back pain without sciatica [M54.50, G89.29]     * Lumbar spondylosis [M47.816]    Procedure(s):  Bilateral - BLOCK MEDIAL BRANCH B/L L4-L5 AND L5-S1 MBB #1    Specimen(s):  * No specimens in log *    Estimated Blood Loss:   Minimal    Drains:  * No LDAs found *    Anesthesia Type:   Local    Operative Indications:  Chronic pain syndrome [G89.4]  Chronic bilateral low back pain without sciatica [M54.50, G89.29]  Lumbar spondylosis [M47.816]      Operative Findings:  same    Complications:   None    Procedure and Technique:  Fluoroscopically-guided Medial Branch Nerve/Dorsal Ramus Blocks of the bilateral L4-L5,-L5-S1 facet joint(s) using 2% lidocaine      After discussing the risks, benefits, and alternatives to the procedure, the patient expressed understanding and wished to proceed. The patient was brought to the fluoroscopy suite and placed in the prone position. Procedural pause conducted to verify:  correct patient identity, procedure to be performed and as applicable, correct side and site, correct patient position, and availability of implants, special equipment and special requirements. Using fluoroscopy, the junction of the transverse process and superior articulating process of the right L3, L4, L5 levels were identified and marked. The skin was sterilely prepped and draped in the usual fashion using Chloraprep skin prep. Using fluoroscopic guidance, a 5 inch 22 gauge spinal needle was advanced to each target.   After negative aspiration 0.5cc of 0.2% lidocaine was injected at each site and the needles were then removed. The procedure was repeated in the exact same way on the opposite side achieving same results. The patient tolerated the procedure well and there were no apparent complications. After appropriate observation, the patient was dismissed from the clinic in good condition under their own power. The patient was instructed to keep a pain diary and report the results at her follow up appointment. I was present for the entire procedure.     Patient Disposition:  hemodynamically stable        SIGNATURE: Ajit Beltre MD  DATE: July 18, 2023  TIME: 10:55 AM

## 2023-07-18 NOTE — DISCHARGE INSTR - AVS FIRST PAGE

## 2023-07-18 NOTE — H&P
Assessment:  1. Chronic bilateral low back pain without sciatica  FL spine and pain procedure    FL spine and pain procedure      2. Lumbar spondylosis  FL spine and pain procedure    FL spine and pain procedure          Plan:  Tawanna Herrera is a 75 y.o. male with complaints of low back pain presents to surgical center for procedure. 1. We will perform a Procedure(s) (LRB):  2. BLOCK MEDIAL BRANCH B/L L4-L5 AND L5-S1 MBB #1 (Bilateral)   3. 2. Follow-up 1 month after injection    Complete risks and benefits including bleeding, infection, tissue reaction, nerve injury and allergic reaction were discussed. The approach was demonstrated using models and literature was provided. Verbal and written consent was obtained. My impressions and treatment recommendations were discussed in detail with the patient who verbalized understanding and had no further questions. Discharge instructions were provided. I personally saw and examined the patient and I agree with the above discussed plan of care. No orders of the defined types were placed in this encounter. No orders of the defined types were placed in this encounter. History of Present Illness:  Tawanna Herrera is a 75 y.o. male who presents for a follow up office visit in regards to low back pain. The patient’s current symptoms include 8 out of 10 sharp, stabbing, throbbing. There are particular time pattern. I have personally reviewed and/or updated the patient's past medical history, past surgical history, family history, social history, current medications, allergies, and vital signs today. Review of Systems   Musculoskeletal: Positive for arthralgias and back pain. All other systems reviewed and are negative.       Patient Active Problem List   Diagnosis   • Atrial fibrillation ([address])   • Obstructive sleep apnea on CPAP   • Leukopenia   • Weakness of right upper extremity   • Paresthesias   • Cervical pain (neck)   • Obesity, Class III, BMI 40-49.9 (morbid obesity) (HCC)   • Thrombocytopenia (HCC)   • Bilateral lower extremity edema   • Lymphedema   • Obesity, morbid (HCC)   • Venous insufficiency of both lower extremities   • Pulmonary hypertension (HCC)   • Shortness of breath with exposure to COVID-19 virus   • Chronic diastolic heart failure (HCC)   • Encounter for cardioversion procedure   • Stage 3a chronic kidney disease (HCC)   • Anemia   • Primary osteoarthritis   • Iron deficiency   • Atrial flutter (HCC)   • Venous ulcer (HCC)   • Chronic pain syndrome   • Lumbar spondylosis   • Chronic bilateral low back pain without sciatica       Past Medical History:   Diagnosis Date   • A-fib ([address])    • ALANNA (acute kidney injury) ([address]) 6/15/2021   • Arthritis    • CPAP (continuous positive airway pressure) dependence    • Irregular heart beat    • Lymphedema    • Sleep apnea    • Urinary frequency    • Wears glasses    • Wears partial dentures     lower partial       Past Surgical History:   Procedure Laterality Date   • ABLATION SAPHENOUS VEIN W/ RFA     • COLONOSCOPY     • JOINT REPLACEMENT  Left Hip 4 /21/2009   • [place] CYSTO INSERTION TRANSPROSTATIC IMPLANT SINGLE N/A 11/13/2017    Procedure: CYSTOSCOPY WITH INSERTION UROLIFT;  Surgeon: Marshal Light DO;  Location: [facility];  Service: Urology   • TONSILLECTOMY         Family History   Problem Relation Age of Onset   • Heart disease Mother    • Lymphoma Father    • Cancer Father    • Heart disease Sister    • Hypertension Brother    • Diabetes Neg Hx    • Thyroid disease Neg Hx    • Stroke Neg Hx        Social History     Occupational History   • Not on file   Tobacco Use   • Smoking status: Never   • Smokeless tobacco: Never   Vaping Use   • Vaping Use: Never used   Substance and Sexual Activity   • Alcohol use:  Yes     Alcohol/week: 5.0 standard drinks of alcohol     Types: 3 Glasses of wine, 2 Standard drinks or equivalent per week     Comment: socially   • Drug use: No   • Sexual activity: Not Currently     Partners: Female     Birth control/protection: Abstinence, Condom Male, Spermicide       No current facility-administered medications on file prior to encounter.      Current Outpatient Medications on File Prior to Encounter   Medication Sig   • acetaminophen (TYLENOL) 500 mg tablet Take 500 mg by mouth every 6 (six) hours as needed for mild pain   • allopurinol (ZYLOPRIM) 300 mg tablet Take 300 mg by mouth daily   • amiodarone 100 mg tablet Take 100 mg by mouth daily   • ammonium lactate (LAC-HYDRIN) 12 % lotion Apply topically 2 (two) times a day as needed for dry skin   • Diclofenac Sodium (VOLTAREN) 1 % Apply 2 g topically 4 (four) times a day   • ferrous sulfate 325 (65 Fe) mg tablet Take 325 mg by mouth   • FML Forte 0.25 % ophthalmic suspension    • Glucosamine-Chondroit-Vit C-Mn (Glucosamine 1500 Complex) CAPS Take by mouth   • L-ARGININE-500 PO Take 500 mg by mouth 2 (two) times a day    • Multiple Vitamins-Minerals (OCUVITE EXTRA PO) Take 1 tablet by mouth daily     • multivitamin (THERAGRAN) TABS Take 1 tablet by mouth daily   • Omega-3 Fatty Acids (fish oil) 1,000 mg Take 1,000 mg by mouth 2 (two) times a day   • patient supplied medication Take 1 each by mouth 2 (two) times a day   • pregabalin (LYRICA) 100 mg capsule Take 1 capsule (100 mg total) by mouth 3 (three) times a day   • spironolactone (ALDACTONE) 25 mg tablet Take 1 tablet (25 mg total) by mouth 2 (two) times a day   • VITAMIN D PO Take 2,000 Units by mouth daily    • alfuzosin (UROXATRAL) 10 mg 24 hr tablet Take 10 mg by mouth daily   • halobetasol (ULTRAVATE) 0.05 % ointment APPLY TO AFFECTED AREA ON LEG TWICE DAILY   • loteprednol etabonate (LOTEMAX) 0.5 % ophthalmic suspension 1 drop 4 (four) times a day   • tadalafil (CIALIS) 5 MG tablet Take 5 mg by mouth daily as needed for erectile dysfunction   • traMADol (ULTRAM) 50 mg tablet Take 1 tablet (50 mg total) by mouth 2 (two) times a day as needed for moderate pain   • [DISCONTINUED] allopurinol (ZYLOPRIM) 100 mg tablet Take 1 tablet (100 mg total) by mouth daily   • [DISCONTINUED] methylPREDNISolone 4 MG tablet therapy pack  (Patient not taking: Reported on 3/8/2023)       Allergies   Allergen Reactions   • Penicillins Anaphylaxis   • Sulfa Antibiotics Anaphylaxis   • Levofloxacin Other (See Comments)       Physical Exam:    /63   Pulse 62   Temp (!) 97.1 °F (36.2 °C) (Tympanic)   Resp 18   Ht 5' 9" (1.753 m)   Wt (!) 140 kg (309 lb)   SpO2 93%   BMI 45.63 kg/m²     Constitutional:normal, well developed, well nourished, alert, in no distress and non-toxic and no overt pain behavior.   Eyes:anicteric  HEENT:grossly intact  Neck:supple, symmetric, trachea midline and no masses   Pulmonary:even and unlabored  Cardiovascular:No edema or pitting edema present  Skin:Normal without rashes or lesions and well hydrated  Psychiatric:Mood and affect appropriate  Neurologic:Cranial Nerves II-XII grossly intact  Musculoskeletal:normal

## 2023-07-20 DIAGNOSIS — G89.4 CHRONIC PAIN SYNDROME: ICD-10-CM

## 2023-07-20 DIAGNOSIS — M54.50 CHRONIC BILATERAL LOW BACK PAIN WITHOUT SCIATICA: ICD-10-CM

## 2023-07-20 DIAGNOSIS — G89.29 CHRONIC BILATERAL LOW BACK PAIN WITHOUT SCIATICA: ICD-10-CM

## 2023-07-20 DIAGNOSIS — F11.20 UNCOMPLICATED OPIOID DEPENDENCE (HCC): ICD-10-CM

## 2023-07-20 DIAGNOSIS — Z79.891 LONG-TERM CURRENT USE OF OPIATE ANALGESIC: ICD-10-CM

## 2023-07-20 DIAGNOSIS — M47.816 LUMBAR SPONDYLOSIS: ICD-10-CM

## 2023-07-20 RX ORDER — TRAMADOL HYDROCHLORIDE 50 MG/1
50 TABLET ORAL 2 TIMES DAILY PRN
Qty: 60 TABLET | Refills: 1 | OUTPATIENT
Start: 2023-07-20

## 2023-07-20 NOTE — TELEPHONE ENCOUNTER
[facility] [address] refill request for Pts Tramadol 50mg x1 BID PRN. Last Rx 6/1 with x1 refill by JORGE. (Pt should have enough medication until next OV)  LOV 6/1 with JORGE. NOV 7/24 with JORGE.  (No prescription is needed until seen at OV with JORGE)    Please advise-

## 2023-07-24 ENCOUNTER — OFFICE VISIT (OUTPATIENT)
Dept: PAIN MEDICINE | Facility: CLINIC | Age: 68
End: 2023-07-24
Payer: COMMERCIAL

## 2023-07-24 VITALS
WEIGHT: 315 LBS | DIASTOLIC BLOOD PRESSURE: 72 MMHG | HEIGHT: 69 IN | HEART RATE: 75 BPM | BODY MASS INDEX: 46.65 KG/M2 | SYSTOLIC BLOOD PRESSURE: 126 MMHG

## 2023-07-24 DIAGNOSIS — G89.4 CHRONIC PAIN SYNDROME: Primary | ICD-10-CM

## 2023-07-24 DIAGNOSIS — G89.29 CHRONIC BILATERAL LOW BACK PAIN WITHOUT SCIATICA: ICD-10-CM

## 2023-07-24 DIAGNOSIS — M47.816 LUMBAR SPONDYLOSIS: ICD-10-CM

## 2023-07-24 DIAGNOSIS — M54.50 CHRONIC BILATERAL LOW BACK PAIN WITHOUT SCIATICA: ICD-10-CM

## 2023-07-24 PROCEDURE — 99214 OFFICE O/P EST MOD 30 MIN: CPT | Performed by: NURSE PRACTITIONER

## 2023-07-24 NOTE — H&P (VIEW-ONLY)
Assessment:  1. Chronic pain syndrome    2. Chronic bilateral low back pain without sciatica    3. Lumbar spondylosis        Plan:  While the patient was in the office today, I did have a thorough conversation regarding their chronic pain syndrome, medication management, and treatment plan options. Patient is being seen for follow-up visit. He underwent bilateral L4-5 and L5-S1 medial branch blocks on 7/18/2023. He reports that his pain was at least 80% improved for at least 6 hours after the injection. At this point, he will be scheduled for bilateral L4-5 and L5-S1 medial branch block #2. Continue tramadol 50 mg which she uses on a strictly as needed basis. He did not require refill of this medication during today's visit. Complete risks and benefits including bleeding, infection, tissue reaction, nerve injury and allergic reaction were discussed. The approach was demonstrated using models and literature was provided. Verbal and written consent was obtained. There are risks associated with opioid medications, including dependence, addiction and tolerance. The patient understands and agrees to use these medications only as prescribed. Potential side effects of the medications include, but are not limited to, constipation, drowsiness, addiction, impaired judgment and risk of fatal overdose if not taken as prescribed. The patient was warned against driving while taking sedation medications. Sharing medications is a felony. At this point in time, the patient is showing no signs of addiction, abuse, diversion or suicidal ideation. [place] Prescription Drug Monitoring Program report was reviewed and was appropriate       History of Present Illness: The patient is a 75 y.o. male who presents for a follow up office visit in regards to Follow-up. The patient’s current symptoms include complaints of low back pain. Pain level ranges from a 3-6/10.   Quality pain is described as dull and aching. Current pain medications includes: Tramadol 50 mg which she uses on a strictly as needed basis. .  The patient reports that this regimen is providing up to 50% pain relief. The patient is reporting no side effects from this pain medication regimen. I have personally reviewed and/or updated the patient's past medical history, past surgical history, family history, social history, current medications, allergies, and vital signs today. Review of Systems  Review of Systems   Musculoskeletal: Positive for gait problem. Decrease ROM  Joint stiffness  Pain in extremity   All other systems reviewed and are negative.           Past Medical History:   Diagnosis Date   • A-fib [facility])    • ALANNA (acute kidney injury) ([address]) 6/15/2021   • Arthritis    • CPAP (continuous positive airway pressure) dependence    • Irregular heart beat    • Lymphedema    • Sleep apnea    • Urinary frequency    • Wears glasses    • Wears partial dentures     lower partial       Past Surgical History:   Procedure Laterality Date   • ABLATION SAPHENOUS VEIN W/ RFA     • COLONOSCOPY     • JOINT REPLACEMENT  Left Hip 4 /21/2009   • NERVE BLOCK Bilateral 7/18/2023    Procedure: BLOCK MEDIAL BRANCH B/L L4-L5 AND L5-S1 MBB #1;  Surgeon: Jessy Velasco MD;  Location: [facility];  Service: Pain Management    • [place] CYSTO INSERTION TRANSPROSTATIC IMPLANT SINGLE N/A 11/13/2017    Procedure: CYSTOSCOPY WITH INSERTION UROLIFT;  Surgeon: Forrest Carlos DO;  Location: [facility];  Service: Urology   • TONSILLECTOMY         Family History   Problem Relation Age of Onset   • Heart disease Mother    • Lymphoma Father    • Cancer Father    • Heart disease Sister    • Hypertension Brother    • Diabetes Neg Hx    • Thyroid disease Neg Hx    • Stroke Neg Hx        Social History     Occupational History   • Not on file   Tobacco Use   • Smoking status: Never   • Smokeless tobacco: Never   Vaping Use   • Vaping Use: Never used   Substance and Sexual Activity   • Alcohol use:  Yes     Alcohol/week: 5.0 standard drinks of alcohol     Types: 3 Glasses of wine, 2 Standard drinks or equivalent per week     Comment: socially   • Drug use: No   • Sexual activity: Not Currently     Partners: Female     Birth control/protection: Abstinence, Condom Male, Spermicide         Current Outpatient Medications:   •  acetaminophen (TYLENOL) 500 mg tablet, Take 500 mg by mouth every 6 (six) hours as needed for mild pain, Disp: , Rfl:   •  alfuzosin (UROXATRAL) 10 mg 24 hr tablet, Take 10 mg by mouth daily, Disp: , Rfl:   •  allopurinol (ZYLOPRIM) 300 mg tablet, Take 300 mg by mouth daily, Disp: , Rfl:   •  amiodarone 100 mg tablet, Take 100 mg by mouth daily, Disp: , Rfl:   •  ammonium lactate (LAC-HYDRIN) 12 % lotion, Apply topically 2 (two) times a day as needed for dry skin, Disp: , Rfl:   •  Diclofenac Sodium (VOLTAREN) 1 %, Apply 2 g topically 4 (four) times a day, Disp: 100 g, Rfl: 0  •  ferrous sulfate 325 (65 Fe) mg tablet, Take 325 mg by mouth, Disp: , Rfl:   •  FML Forte 0.25 % ophthalmic suspension, , Disp: , Rfl:   •  Glucosamine-Chondroit-Vit C-Mn (Glucosamine 1500 Complex) CAPS, Take by mouth, Disp: , Rfl:   •  halobetasol (ULTRAVATE) 0.05 % ointment, APPLY TO AFFECTED AREA ON LEG TWICE DAILY, Disp: , Rfl:   •  L-ARGININE-500 PO, Take 500 mg by mouth 2 (two) times a day , Disp: , Rfl:   •  loteprednol etabonate (LOTEMAX) 0.5 % ophthalmic suspension, 1 drop 4 (four) times a day, Disp: , Rfl:   •  metoprolol succinate (TOPROL-XL) 25 mg 24 hr tablet, Take 1 tablet (25 mg total) by mouth daily, Disp: 90 tablet, Rfl: 2  •  Multiple Vitamins-Minerals (OCUVITE EXTRA PO), Take 1 tablet by mouth daily  , Disp: , Rfl:   •  multivitamin (THERAGRAN) TABS, Take 1 tablet by mouth daily, Disp: , Rfl:   •  Omega-3 Fatty Acids (fish oil) 1,000 mg, Take 1,000 mg by mouth 2 (two) times a day, Disp: , Rfl:   •  patient supplied medication, Take 1 each by mouth 2 (two) times a day, Disp: , Rfl:   •  pregabalin (LYRICA) 100 mg capsule, Take 1 capsule (100 mg total) by mouth 3 (three) times a day, Disp: 42 capsule, Rfl: 0  •  rivaroxaban (Xarelto) 20 mg tablet, Take 1 tablet (20 mg total) by mouth daily, Disp: 90 tablet, Rfl: 2  •  spironolactone (ALDACTONE) 25 mg tablet, Take 1 tablet (25 mg total) by mouth 2 (two) times a day, Disp: 180 tablet, Rfl: 1  •  tadalafil (CIALIS) 5 MG tablet, Take 5 mg by mouth daily as needed for erectile dysfunction, Disp: , Rfl:   •  torsemide (DEMADEX) 20 mg tablet, Take 1 tablet (20 mg total) by mouth 2 (two) times a day, Disp: 180 tablet, Rfl: 2  •  traMADol (ULTRAM) 50 mg tablet, Take 1 tablet (50 mg total) by mouth 2 (two) times a day as needed for moderate pain, Disp: 60 tablet, Rfl: 1  •  VITAMIN D PO, Take 2,000 Units by mouth daily , Disp: , Rfl:     Allergies   Allergen Reactions   • Penicillins Anaphylaxis   • Sulfa Antibiotics Anaphylaxis   • Levofloxacin Other (See Comments)       Physical Exam:    /72 (BP Location: Left arm, Patient Position: Sitting, Cuff Size: Adult)   Pulse 75   Ht 5' 9" (1.753 m)   Wt (!) 144 kg (317 lb 6.4 oz)   BMI 46.87 kg/m²     Constitutional:normal, well developed, well nourished, alert, in no distress and non-toxic and no overt pain behavior. and obese  Eyes:anicteric  HEENT:grossly intact  Neck:supple, symmetric, trachea midline and no masses   Pulmonary:even and unlabored  Cardiovascular:No edema or pitting edema present  Skin:Normal without rashes or lesions and well hydrated  Psychiatric:Mood and affect appropriate  Neurologic:Cranial Nerves II-XII grossly intact  Musculoskeletal:Range of motion of the lumbar spine is limited in all planes. There is tenderness bilaterally L2-S1. There are lumbar paraspinal muscle spasms present. Imaging  No orders to display       No orders of the defined types were placed in this encounter.

## 2023-07-24 NOTE — PROGRESS NOTES
Assessment:  1. Chronic pain syndrome    2. Chronic bilateral low back pain without sciatica    3. Lumbar spondylosis        Plan:  While the patient was in the office today, I did have a thorough conversation regarding their chronic pain syndrome, medication management, and treatment plan options. Patient is being seen for follow-up visit. He underwent bilateral L4-5 and L5-S1 medial branch blocks on 7/18/2023. He reports that his pain was at least 80% improved for at least 6 hours after the injection. At this point, he will be scheduled for bilateral L4-5 and L5-S1 medial branch block #2. Continue tramadol 50 mg which she uses on a strictly as needed basis. He did not require refill of this medication during today's visit. Complete risks and benefits including bleeding, infection, tissue reaction, nerve injury and allergic reaction were discussed. The approach was demonstrated using models and literature was provided. Verbal and written consent was obtained. There are risks associated with opioid medications, including dependence, addiction and tolerance. The patient understands and agrees to use these medications only as prescribed. Potential side effects of the medications include, but are not limited to, constipation, drowsiness, addiction, impaired judgment and risk of fatal overdose if not taken as prescribed. The patient was warned against driving while taking sedation medications. Sharing medications is a felony. At this point in time, the patient is showing no signs of addiction, abuse, diversion or suicidal ideation. [place] Prescription Drug Monitoring Program report was reviewed and was appropriate       History of Present Illness: The patient is a 75 y.o. male who presents for a follow up office visit in regards to Follow-up. The patient’s current symptoms include complaints of low back pain. Pain level ranges from a 3-6/10.   Quality pain is described as dull and aching. Current pain medications includes: Tramadol 50 mg which she uses on a strictly as needed basis. .  The patient reports that this regimen is providing up to 50% pain relief. The patient is reporting no side effects from this pain medication regimen. I have personally reviewed and/or updated the patient's past medical history, past surgical history, family history, social history, current medications, allergies, and vital signs today. Review of Systems  Review of Systems   Musculoskeletal: Positive for gait problem. Decrease ROM  Joint stiffness  Pain in extremity   All other systems reviewed and are negative.           Past Medical History:   Diagnosis Date   • A-fib [facility])    • ALANNA (acute kidney injury) ([address]) 6/15/2021   • Arthritis    • CPAP (continuous positive airway pressure) dependence    • Irregular heart beat    • Lymphedema    • Sleep apnea    • Urinary frequency    • Wears glasses    • Wears partial dentures     lower partial       Past Surgical History:   Procedure Laterality Date   • ABLATION SAPHENOUS VEIN W/ RFA     • COLONOSCOPY     • JOINT REPLACEMENT  Left Hip 4 /21/2009   • NERVE BLOCK Bilateral 7/18/2023    Procedure: BLOCK MEDIAL BRANCH B/L L4-L5 AND L5-S1 MBB #1;  Surgeon: Tee Pillai MD;  Location: [facility];  Service: Pain Management    • [place] CYSTO INSERTION TRANSPROSTATIC IMPLANT SINGLE N/A 11/13/2017    Procedure: CYSTOSCOPY WITH INSERTION UROLIFT;  Surgeon: Nakul Petty DO;  Location: [facility];  Service: Urology   • TONSILLECTOMY         Family History   Problem Relation Age of Onset   • Heart disease Mother    • Lymphoma Father    • Cancer Father    • Heart disease Sister    • Hypertension Brother    • Diabetes Neg Hx    • Thyroid disease Neg Hx    • Stroke Neg Hx        Social History     Occupational History   • Not on file   Tobacco Use   • Smoking status: Never   • Smokeless tobacco: Never   Vaping Use   • Vaping Use: Never used   Substance and Sexual Activity   • Alcohol use:  Yes     Alcohol/week: 5.0 standard drinks of alcohol     Types: 3 Glasses of wine, 2 Standard drinks or equivalent per week     Comment: socially   • Drug use: No   • Sexual activity: Not Currently     Partners: Female     Birth control/protection: Abstinence, Condom Male, Spermicide         Current Outpatient Medications:   •  acetaminophen (TYLENOL) 500 mg tablet, Take 500 mg by mouth every 6 (six) hours as needed for mild pain, Disp: , Rfl:   •  alfuzosin (UROXATRAL) 10 mg 24 hr tablet, Take 10 mg by mouth daily, Disp: , Rfl:   •  allopurinol (ZYLOPRIM) 300 mg tablet, Take 300 mg by mouth daily, Disp: , Rfl:   •  amiodarone 100 mg tablet, Take 100 mg by mouth daily, Disp: , Rfl:   •  ammonium lactate (LAC-HYDRIN) 12 % lotion, Apply topically 2 (two) times a day as needed for dry skin, Disp: , Rfl:   •  Diclofenac Sodium (VOLTAREN) 1 %, Apply 2 g topically 4 (four) times a day, Disp: 100 g, Rfl: 0  •  ferrous sulfate 325 (65 Fe) mg tablet, Take 325 mg by mouth, Disp: , Rfl:   •  FML Forte 0.25 % ophthalmic suspension, , Disp: , Rfl:   •  Glucosamine-Chondroit-Vit C-Mn (Glucosamine 1500 Complex) CAPS, Take by mouth, Disp: , Rfl:   •  halobetasol (ULTRAVATE) 0.05 % ointment, APPLY TO AFFECTED AREA ON LEG TWICE DAILY, Disp: , Rfl:   •  L-ARGININE-500 PO, Take 500 mg by mouth 2 (two) times a day , Disp: , Rfl:   •  loteprednol etabonate (LOTEMAX) 0.5 % ophthalmic suspension, 1 drop 4 (four) times a day, Disp: , Rfl:   •  metoprolol succinate (TOPROL-XL) 25 mg 24 hr tablet, Take 1 tablet (25 mg total) by mouth daily, Disp: 90 tablet, Rfl: 2  •  Multiple Vitamins-Minerals (OCUVITE EXTRA PO), Take 1 tablet by mouth daily  , Disp: , Rfl:   •  multivitamin (THERAGRAN) TABS, Take 1 tablet by mouth daily, Disp: , Rfl:   •  Omega-3 Fatty Acids (fish oil) 1,000 mg, Take 1,000 mg by mouth 2 (two) times a day, Disp: , Rfl:   •  patient supplied medication, Take 1 each by mouth 2 (two) times a day, Disp: , Rfl:   •  pregabalin (LYRICA) 100 mg capsule, Take 1 capsule (100 mg total) by mouth 3 (three) times a day, Disp: 42 capsule, Rfl: 0  •  rivaroxaban (Xarelto) 20 mg tablet, Take 1 tablet (20 mg total) by mouth daily, Disp: 90 tablet, Rfl: 2  •  spironolactone (ALDACTONE) 25 mg tablet, Take 1 tablet (25 mg total) by mouth 2 (two) times a day, Disp: 180 tablet, Rfl: 1  •  tadalafil (CIALIS) 5 MG tablet, Take 5 mg by mouth daily as needed for erectile dysfunction, Disp: , Rfl:   •  torsemide (DEMADEX) 20 mg tablet, Take 1 tablet (20 mg total) by mouth 2 (two) times a day, Disp: 180 tablet, Rfl: 2  •  traMADol (ULTRAM) 50 mg tablet, Take 1 tablet (50 mg total) by mouth 2 (two) times a day as needed for moderate pain, Disp: 60 tablet, Rfl: 1  •  VITAMIN D PO, Take 2,000 Units by mouth daily , Disp: , Rfl:     Allergies   Allergen Reactions   • Penicillins Anaphylaxis   • Sulfa Antibiotics Anaphylaxis   • Levofloxacin Other (See Comments)       Physical Exam:    /72 (BP Location: Left arm, Patient Position: Sitting, Cuff Size: Adult)   Pulse 75   Ht 5' 9" (1.753 m)   Wt (!) 144 kg (317 lb 6.4 oz)   BMI 46.87 kg/m²     Constitutional:normal, well developed, well nourished, alert, in no distress and non-toxic and no overt pain behavior. and obese  Eyes:anicteric  HEENT:grossly intact  Neck:supple, symmetric, trachea midline and no masses   Pulmonary:even and unlabored  Cardiovascular:No edema or pitting edema present  Skin:Normal without rashes or lesions and well hydrated  Psychiatric:Mood and affect appropriate  Neurologic:Cranial Nerves II-XII grossly intact  Musculoskeletal:Range of motion of the lumbar spine is limited in all planes. There is tenderness bilaterally L2-S1. There are lumbar paraspinal muscle spasms present. Imaging  No orders to display       No orders of the defined types were placed in this encounter.

## 2023-07-31 ENCOUNTER — TELEPHONE (OUTPATIENT)
Dept: CARDIOLOGY CLINIC | Facility: CLINIC | Age: 68
End: 2023-07-31

## 2023-07-31 NOTE — TELEPHONE ENCOUNTER
Pt called went to urgent care due to pain in chest described as patient as is "ferro muscle" on Friday and [facility]. R/o MI and R/O pulmonary emboli and told to follow up with his cardiologist..transferred to clerical to schedule a f/u appt. Ray Henley prescribed steroids per patient.

## 2023-08-17 ENCOUNTER — HOSPITAL ENCOUNTER (OUTPATIENT)
Facility: HOSPITAL | Age: 68
Setting detail: OUTPATIENT SURGERY
Discharge: HOME | End: 2023-08-17
Attending: ANESTHESIOLOGY | Admitting: ANESTHESIOLOGY
Payer: COMMERCIAL

## 2023-08-17 ENCOUNTER — APPOINTMENT (OUTPATIENT)
Dept: RADIOLOGY | Facility: HOSPITAL | Age: 68
End: 2023-08-17
Payer: COMMERCIAL

## 2023-08-17 VITALS
RESPIRATION RATE: 20 BRPM | HEIGHT: 69 IN | DIASTOLIC BLOOD PRESSURE: 58 MMHG | WEIGHT: 315 LBS | OXYGEN SATURATION: 95 % | TEMPERATURE: 98 F | BODY MASS INDEX: 46.65 KG/M2 | HEART RATE: 60 BPM | SYSTOLIC BLOOD PRESSURE: 119 MMHG

## 2023-08-17 DIAGNOSIS — M47.816 LUMBAR SPONDYLOSIS: ICD-10-CM

## 2023-08-17 PROCEDURE — 64493 INJ PARAVERT F JNT L/S 1 LEV: CPT | Performed by: ANESTHESIOLOGY

## 2023-08-17 PROCEDURE — 64494 INJ PARAVERT F JNT L/S 2 LEV: CPT | Performed by: ANESTHESIOLOGY

## 2023-08-17 RX ORDER — LIDOCAINE HYDROCHLORIDE 10 MG/ML
INJECTION, SOLUTION EPIDURAL; INFILTRATION; INTRACAUDAL; PERINEURAL AS NEEDED
Status: DISCONTINUED | OUTPATIENT
Start: 2023-08-17 | End: 2023-08-17 | Stop reason: HOSPADM

## 2023-08-17 RX ORDER — BUPIVACAINE HYDROCHLORIDE 5 MG/ML
INJECTION, SOLUTION EPIDURAL; INTRACAUDAL AS NEEDED
Status: DISCONTINUED | OUTPATIENT
Start: 2023-08-17 | End: 2023-08-17 | Stop reason: HOSPADM

## 2023-08-17 NOTE — INTERVAL H&P NOTE
H&P reviewed. After examining the patient I find no changes in the patients condition since the H&P had been written.     Vitals:    08/17/23 0855   BP: 132/83   Pulse: 69   Resp: 20   Temp: (!) 97.3 °F (36.3 °C)   SpO2: 94%

## 2023-08-17 NOTE — OP NOTE
OPERATIVE REPORT  PATIENT NAME: Kevin Mccray    :  1955  MRN: [id number]  Pt Location: [facility] OR ROOM 01    SURGERY DATE: 2023    Surgeon(s) and Role:     * Luis Antonio Gonzalez MD - Primary    Preop Diagnosis:  Chronic pain syndrome [G89.4]  Lumbar spondylosis [M47.816]    Post-Op Diagnosis Codes:     * Chronic pain syndrome [G89.4]     * Lumbar spondylosis [M47.816]    Procedure(s):  Bilateral - BLOCK MEDIAL BRANCH  B/L L4-5 and L5-S1 MBB #2    Specimen(s):  * No specimens in log *    Estimated Blood Loss:   Minimal    Drains:  * No LDAs found *    Anesthesia Type:   Local    Operative Indications:  Chronic pain syndrome [G89.4]  Lumbar spondylosis [M47.816]      Operative Findings:  samd    Complications:   None    Procedure and Technique:  Fluoroscopically-guided Medial Branch Nerve/Dorsal Ramus Blocks of the bilateral L4-L5,-L5-S1 facet joint(s) using 0.5% bupivacaine    After discussing the risks, benefits, and alternatives to the procedure, the patient expressed understanding and wished to proceed. The patient was brought to the fluoroscopy suite and placed in the prone position. Procedural pause conducted to verify:  correct patient identity, procedure to be performed and as applicable, correct side and site, correct patient position, and availability of implants, special equipment and special requirements. Using fluoroscopy, the junction of the transverse process and superior articulating process of the right L3, L4, L5 levels were identified and marked. The skin was sterilely prepped and draped in the usual fashion using Chloraprep skin prep. Using fluoroscopic guidance, a 5 inch 22 gauge spinal needle was advanced to each target. After negative aspiration 0.5cc of 0.5% bupivacaine was injected at each site and the needles were then removed. The procedure was repeated in the exact same way on the opposite side achieving same results.    The patient tolerated the procedure well and there were no apparent complications. After appropriate observation, the patient was dismissed from the clinic in good condition under their own power. The patient was instructed to keep a pain diary and report the results at her follow up appointment. I was present for the entire procedure.     Patient Disposition:  hemodynamically stable        SIGNATURE: Colin Sánchez MD  DATE: August 17, 2023  TIME: 9:58 AM

## 2023-08-17 NOTE — DISCHARGE INSTR - AVS FIRST PAGE

## 2023-08-21 ENCOUNTER — TELEPHONE (OUTPATIENT)
Age: 68
End: 2023-08-21

## 2023-08-21 NOTE — TELEPHONE ENCOUNTER
Caller: Patient    Doctor: Spine / pain    Reason for call:     Patient calling spine / pain, tranferred    Call back#: n/a

## 2023-08-21 NOTE — TELEPHONE ENCOUNTER
Called patient back regarding rescheduling procedure. Procedure date was changed from 10/19 to 11/1.

## 2023-09-28 NOTE — PROGRESS NOTES
Cardiology Office Note  MD Nila Ortez MD Enos Conroy, DO, [address] [place], MD Dortha Severs, DO, Kendra Berry DO, [facility] ([facility])  ----------------------------------------------------------------  [address] [facility]  [address] [facility], [address] [place] [address] 70 y.o. male MRN: [id number]  Unit/Bed#:  Encounter: [id number]      History of Present Illness: It was a pleasure to see Alicia Black in the office today for follow-up CV evaluation. He has a longstanding history of atrial fibrillation with prior failed ELAINE guided cardioversion, history of morbid obesity and pulmonary hypertension with chronic venous insufficiency/lymphedema. The patient has a known history of obstructive sleep apnea was noncompliant with his CPAP therapy. Over the course of many years he has been having lower extremity edema which has been believe secondary to lymphedema with chronic venous insufficiency. He has had venous ablation is in the past for his reflux disease. In October 2019, he was found have pulmonary hypertension with pulmonary artery systolic pressure is estimated at 50 mm Hg. We had initially seen him in late August 2020 due to increased fatigue and dyspnea on exertion. He has become somewhat short of breath with basic activity. Previously, he had been managed for his atrial fibrillation at [facility]. Echocardiogram, stress test and Holter monitor were ordered, but stress test and Holter were completed. Stress test was found to be negative for myocardial ischemia. Holter monitor demonstrated atrial fibrillation with an average heart rate of 92 beats per minute and rare VPCs. His weight trends unfortunately continue to go upward and he had dietary discretion. He seen by electrophysiology and recommended for a sleep study as well as an evaluation with advanced heart failure.   He was placed on CPAP and has been compliant since that time.  He also was seen by bariatric surgery and wish to undergo conservative options with dietary modifications. Despite increasing diuretic load, he he continue to gain approximately 30 lb and [facility] He was diuresed down to 285 lb. While hospitalized, his echocardiogram was performed showing normal left ventricular function. Following discharge, he was seen by electrophysiology and sent for a cardioversion in May 2021. The cardioversion was initially successful, but he reverted back to rate controlled atrial flutter. He has been placed on amiodarone and decreased down to 100 mg daily. On his dose of torsemide, he had acute kidney injury with a creatinine rise to 1.9. After holding his diuretics for several days, his creatinine came down to 1.1. He was then decreased on his torsemide to 20 mg b.i.d. And his weight trends continue to improve. He has been aggressive in his dietary modifications. He also underwent radiofrequency ablation for his atrial fibrillation/flutter. He underwent a procedure in June 2021 Overall, he has felt much improved. In July 2021, he ended up going back into atrial flutter. He was seen by electrophysiology who was recommending a repeat ablation study. He underwent the ablation in late July 2021 which was successful. He was seen in the office by EP following the procedure and had remained in sinus rhythm. He subsequently was discontinued on his amiodarone. In October to November 2022, patient developed some symptoms of shortness of breath with exertion. This symptom had been fairly new and the patient was sent for testing including a stress test, chest x-ray and echocardiogram.  Stress test was found to be negative for myocardial ischemia. His echocardiogram showed normal left-ventricular systolic function with mild pulmonary hypertension. There was evidence of some mild increase in pressures on the right side. Chest x-ray at the time was found to be grossly normal.  In July 2023, the patient developed 2 episodes of right-sided chest discomfort near his shoulder. The discomfort would change with rotating his shoulder. Due to the severity of one of the episodes, he presented to the emergency department and was diagnosed with musculoskeletal pain. Troponins were negative during that hospitalization. Additionally, he admitted to continued and progressive dyspnea on exertion a little higher than his baseline. Admits to chronic lower extremity swelling with his lymphedema. Denies orthopnea or paroxysmal nocturnal dyspnea. Review of Systems:  Review of Systems   Constitutional: Negative for decreased appetite, fever, malaise/fatigue, weight gain and weight loss. HENT: Negative for congestion and sore throat. Eyes: Negative for visual disturbance. Cardiovascular: Positive for leg swelling. Negative for chest pain, dyspnea on exertion, near-syncope and palpitations. Respiratory: Negative for cough and shortness of breath. Hematologic/Lymphatic: Negative for bleeding problem. Skin: Negative for rash. Musculoskeletal: Negative for myalgias and neck pain. Gastrointestinal: Negative for abdominal pain and nausea. Neurological: Negative for light-headedness and weakness. Psychiatric/Behavioral: Negative for depression.        Past Medical History:   Diagnosis Date   • A-fib [facility])    • ALANNA (acute kidney injury) ([address]) 6/15/2021   • Arthritis    • CPAP (continuous positive airway pressure) dependence    • Irregular heart beat    • Lymphedema    • Sleep apnea    • Urinary frequency    • Wears glasses    • Wears partial dentures     lower partial       Past Surgical History:   Procedure Laterality Date   • ABLATION SAPHENOUS VEIN W/ RFA     • COLONOSCOPY     • JOINT REPLACEMENT  Left Hip 4 /21/2009   • NERVE BLOCK Bilateral 7/18/2023    Procedure: BLOCK MEDIAL BRANCH B/L L4-L5 AND L5-S1 MBB #1;  Surgeon: Zoey Elizabeth MD;  Location: [facility];  Service: Pain Management    • NERVE BLOCK Bilateral 8/17/2023    Procedure: BLOCK MEDIAL BRANCH  B/L L4-5 and L5-S1 MBB #2;  Surgeon: Zoey Elizabeth MD;  Location: [facility];  Service: Pain Management    • IL CYSTO INSERTION TRANSPROSTATIC IMPLANT SINGLE N/A 11/13/2017    Procedure: CYSTOSCOPY WITH INSERTION UROLIFT;  Surgeon: Jen Villafuerte DO;  Location: [facility];  Service: Urology   • TONSILLECTOMY         Social History     Socioeconomic History   • Marital status: /Civil Union     Spouse name: Not on file   • Number of children: Not on file   • Years of education: Not on file   • Highest education level: Not on file   Occupational History   • Not on file   Tobacco Use   • Smoking status: Never   • Smokeless tobacco: Never   Vaping Use   • Vaping Use: Never used   Substance and Sexual Activity   • Alcohol use:  Yes     Alcohol/week: 5.0 standard drinks of alcohol     Types: 3 Glasses of wine, 2 Standard drinks or equivalent per week     Comment: socially   • Drug use: No   • Sexual activity: Not Currently     Partners: Female     Birth control/protection: Abstinence, Condom Male, Spermicide   Other Topics Concern   • Not on file   Social History Narrative   • Not on file     Social Determinants of Health     Financial Resource Strain: Not on file   Food Insecurity: Not on file   Transportation Needs: Not on file   Physical Activity: Not on file   Stress: Not on file   Social Connections: Not on file   Intimate Partner Violence: Not on file   Housing Stability: Not on file       Family History   Problem Relation Age of Onset   • Heart disease Mother    • Lymphoma Father    • Cancer Father    • Heart disease Sister    • Hypertension Brother    • Diabetes Neg Hx    • Thyroid disease Neg Hx    • Stroke Neg Hx        Allergies   Allergen Reactions   • Penicillins Anaphylaxis   • Sulfa Antibiotics Anaphylaxis   • Levofloxacin Other (See Comments) Current Outpatient Medications:   •  acetaminophen (TYLENOL) 500 mg tablet, Take 500 mg by mouth every 6 (six) hours as needed for mild pain, Disp: , Rfl:   •  alfuzosin (UROXATRAL) 10 mg 24 hr tablet, Take 10 mg by mouth daily, Disp: , Rfl:   •  allopurinol (ZYLOPRIM) 300 mg tablet, Take 300 mg by mouth daily, Disp: , Rfl:   •  amiodarone 100 mg tablet, Take 100 mg by mouth daily, Disp: , Rfl:   •  ammonium lactate (LAC-HYDRIN) 12 % lotion, Apply topically 2 (two) times a day as needed for dry skin, Disp: , Rfl:   •  Diclofenac Sodium (VOLTAREN) 1 %, Apply 2 g topically 4 (four) times a day, Disp: 100 g, Rfl: 0  •  ferrous sulfate 325 (65 Fe) mg tablet, Take 325 mg by mouth, Disp: , Rfl:   •  FML Forte 0.25 % ophthalmic suspension, , Disp: , Rfl:   •  Glucosamine-Chondroit-Vit C-Mn (Glucosamine 1500 Complex) CAPS, Take by mouth, Disp: , Rfl:   •  halobetasol (ULTRAVATE) 0.05 % ointment, APPLY TO AFFECTED AREA ON LEG TWICE DAILY, Disp: , Rfl:   •  L-ARGININE-500 PO, Take 500 mg by mouth 2 (two) times a day , Disp: , Rfl:   •  loteprednol etabonate (LOTEMAX) 0.5 % ophthalmic suspension, 1 drop 4 (four) times a day, Disp: , Rfl:   •  metoprolol succinate (TOPROL-XL) 25 mg 24 hr tablet, Take 1 tablet (25 mg total) by mouth daily, Disp: 90 tablet, Rfl: 2  •  Multiple Vitamins-Minerals (OCUVITE EXTRA PO), Take 1 tablet by mouth daily  , Disp: , Rfl:   •  multivitamin (THERAGRAN) TABS, Take 1 tablet by mouth daily, Disp: , Rfl:   •  Omega-3 Fatty Acids (fish oil) 1,000 mg, Take 1,000 mg by mouth 2 (two) times a day, Disp: , Rfl:   •  patient supplied medication, Take 1 each by mouth 2 (two) times a day, Disp: , Rfl:   •  pregabalin (LYRICA) 100 mg capsule, Take 1 capsule (100 mg total) by mouth 3 (three) times a day, Disp: 42 capsule, Rfl: 0  •  rivaroxaban (Xarelto) 20 mg tablet, Take 1 tablet (20 mg total) by mouth daily, Disp: 90 tablet, Rfl: 2  •  spironolactone (ALDACTONE) 25 mg tablet, Take 1 tablet (25 mg total) by mouth 2 (two) times a day, Disp: 180 tablet, Rfl: 1  •  tadalafil (CIALIS) 5 MG tablet, Take 5 mg by mouth daily as needed for erectile dysfunction, Disp: , Rfl:   •  torsemide (DEMADEX) 20 mg tablet, Take 1 tablet (20 mg total) by mouth 2 (two) times a day, Disp: 180 tablet, Rfl: 2  •  traMADol (ULTRAM) 50 mg tablet, Take 1 tablet (50 mg total) by mouth 2 (two) times a day as needed for moderate pain, Disp: 60 tablet, Rfl: 1  •  VITAMIN D PO, Take 2,000 Units by mouth daily , Disp: , Rfl:     Vitals:    10/02/23 0918   BP: 114/72   BP Location: Right arm   Patient Position: Sitting   Cuff Size: Large   Pulse: 60   Weight: (!) 145 kg (319 lb)   Height: 5' 9" (1.753 m)       PHYSICAL EXAMINATION:  Gen: Awake, Alert, NAD  Head/eyes: AT/NC, pupils equal and round, Anicteric  ENT: mmm  Neck: Supple, No elevated JVP, trachea midline  Resp: CTA bilaterally no w/r/r  CV: RRR +S1, S2, No m/r/g  Abd: Soft, NT/ND + BS  Ext: bilateral lower extremities wrapped with +1 pitting edema bilaterally/lymphedema  Neuro:  Follows commands, moves all extermities  Psych: Appropriate affect, pleasant mood, cooperative attitude, non-combative  Skin: warm; no rash, erythema or venous stasis changes on exposed skin    --------------------------------------------------------------------------------  TREADMILL STRESS  No results found for this or any previous visit.   --------------------------------------------------------------------------------  NUCLEAR STRESS TEST:   · Pharmacologic nuclear stress test negative for myocardial ischemia with gated EF 50%, September 2020  --------------------------------------------------------------------------------  CATH:  No results found for this or any previous visit.  --------------------------------------------------------------------------------  ECHO:   Results for orders placed during the hospital encounter of 10/26/19   Echo complete with contrast if indicated    Narrative [facility] [address], [address] [facility]  [phone]    Transthoracic Echocardiogram  2D, M-mode, Doppler, and Color Doppler    Study date:  28-Oct-2019    Patient: Ruth Aaron  MR number: [id number]  Account number: [de-identified]  : 1955  Age: 71 years  Gender: Male  Status: Inpatient  Location: Bedside  Height: 69 in  Weight: 302 lb  BP: 141/ 88 mmHg    Indications: left sided paralysis    Diagnoses: 56 - CVA    Sonographer:  Jonel Chan RDHUSSAIN, CCT  Referring Physician:  Anup Colin DO  Group:  [facility]  Interpreting Physician:  Zeke Mcintosh DO    SUMMARY    LEFT VENTRICLE:  Systolic function was normal. Ejection fraction was estimated to be 55 %. This study was inadequate for the evaluation of regional wall motion. Wall thickness was mildly increased. RIGHT VENTRICLE:  The ventricle was dilated. Systolic function was normal.    MITRAL VALVE:  There was mild regurgitation. TRICUSPID VALVE:  There was mild regurgitation. Estimated peak PA pressure was 50 mmHg. HISTORY: PRIOR HISTORY: Patient has no history of cardiovascular disease. PROCEDURE: The procedure was performed at the bedside. This was a routine study. The transthoracic approach was used. The study included complete 2D imaging, M-mode, complete spectral Doppler, and color Doppler. The heart rate was 80 bpm,  at the start of the study. Intravenous contrast (Definity solution [1.3 ml Definity/8.7ml normal saline solution], 3 ml) was administered to opacify the left ventricle. Echocardiographic views were limited due to poor acoustic window  availability. This was a technically difficult study. LEFT VENTRICLE: Size was normal. Systolic function was normal. Ejection fraction was estimated to be 55 %. This study was inadequate for the evaluation of regional wall motion. Wall thickness was mildly increased.  DOPPLER: The study was  not technically sufficient to allow evaluation of LV diastolic function. RIGHT VENTRICLE: The ventricle was dilated. Systolic function was normal.    LEFT ATRIUM: Size was normal.    RIGHT ATRIUM: Size was normal.    MITRAL VALVE: Valve structure was normal. There was normal leaflet separation. DOPPLER: The transmitral velocity was within the normal range. There was no evidence for stenosis. There was mild regurgitation. AORTIC VALVE: The valve was trileaflet. Leaflets exhibited normal thickness and normal cuspal separation. DOPPLER: Transaortic velocity was within the normal range. There was no evidence for stenosis. There was no regurgitation. TRICUSPID VALVE: The valve structure was normal. There was normal leaflet separation. DOPPLER: The transtricuspid velocity was within the normal range. There was no evidence for stenosis. There was mild regurgitation. Estimated peak PA  pressure was 50 mmHg. PULMONIC VALVE: Leaflets exhibited normal thickness, no calcification, and normal cuspal separation. DOPPLER: The transpulmonic velocity was within the normal range. There was no regurgitation. PERICARDIUM: There was no pericardial effusion. The pericardium was normal in appearance. AORTA: The root exhibited normal size. SYSTEMIC VEINS: IVC: The inferior vena cava was not well visualized.     SYSTEM MEASUREMENT TABLES    2D  %FS: 34.72 %  Ao Diam: 2.87 cm  EDV(Teich): 143.23 ml  EF(Teich): 63.36 %  ESV(Teich): 52.47 ml  IVSd: 1.15 cm  LA Diam: 4.16 cm  LVIDd: 5.43 cm  LVIDs: 3.55 cm  LVPWd: 1.04 cm  RWT: 0.38  SV(Teich): 90.75 ml    CW  AV Vmax: 1.34 m/s  AV maxP.13 mmHg  RAP: 0 mmHg  TR Vmax: 3.26 m/s  TR maxP.5 mmHg    PW  E' Sept: 0.09 m/s  MV E Terrance: 1.03 m/s  RVSP: 42.5 mmHg    [employer] Commission Accredited Echocardiography Laboratory    Prepared and electronically signed by    Michelet Regan DO  Signed 28-Oct-2019 10:37:38       · LVEF 55%, mild LVH, paradoxical septal wall motion, mild RV dilatation, mild LA dilatation, mild to moderate RA dilatation, trace MR/TR, January 2021  --------------------------------------------------------------------------------  HOLTER  No results found for this or any previous visit. No results found for this or any previous visit.  --------------------------------------------------------------------------------  CAROTIDS  No results found for this or any previous visit.   --------------------------------------------------------------------------------  ECGs:  Results for orders placed or performed in visit on 10/02/23   POCT ECG    Impression    Sinus rhythm 60 bpm, RBBB, LAFB        Lab Results   Component Value Date    WBC 5.60 04/19/2023    HGB 13.8 04/19/2023    HCT 42.2 04/19/2023    MCV 92 04/19/2023     04/19/2023      Lab Results   Component Value Date    SODIUM 140 04/19/2023    K 3.9 04/19/2023     04/19/2023    CO2 32 04/19/2023    BUN 19 04/19/2023    CREATININE 0.84 04/19/2023    GLUC 82 09/12/2022    CALCIUM 9.3 04/19/2023      Lab Results   Component Value Date    HGBA1C 5.4 06/08/2021      No results found for: "CHOL"  Lab Results   Component Value Date    HDL 54 04/19/2023    HDL 50 04/28/2022    HDL 55 10/27/2019     Lab Results   Component Value Date    LDLCALC 135 (H) 04/19/2023    LDLCALC 142 (H) 04/28/2022    LDLCALC 90 10/27/2019     Lab Results   Component Value Date    TRIG 193 (H) 04/19/2023    TRIG 118 04/28/2022    TRIG 93 10/27/2019     No results found for: "CHOLHDL"   Lab Results   Component Value Date    INR 1.54 (H) 07/28/2021    INR 2.26 (H) 06/02/2021    INR 1.59 (H) 01/22/2021    PROTIME 18.4 (H) 07/28/2021    PROTIME 24.9 (H) 06/02/2021    PROTIME 18.7 (H) 01/22/2021        1. Chronic diastolic heart failure (HCC)    2. Paroxysmal atrial flutter (HCC)  -     POCT ECG    3. Abnormal ECG    4. Obesity, morbid ([address])    5.  Pulmonary hypertension (HCC)        IMPRESSION:  · Chronic diastolic heart failure  · LVEF 65%, mild LVH, normal diastolic function, septal motion consistent with elevated PVR, mild RV dilatation with normal function, mild biatrial dilatation, trace MR/TR with PASP 37 mmHg (poor Doppler signal), January 2023  · Paroxysmal atrial fibrillation/flutter on Xarelto and amiodarone; had long standing since before October 2019 - now in [address] [place] [address]  · s/p DCCV in May 2021   · s/p RFA ablation (June 2021 and July 2021)  · Holter with AF, avg HR 92 bpm, rare VPCs, September 2020  · History of bilateral venous insufficiency status post LE vein ablation  · Abnormal ECG with bifascicular block  · Pharmacologic nuclear stress test appears negative for myocardial ischemia, gated EF 65%, January 2023  · Moderate pulmonary hypertension  · Lymphedema  · Morbid obesity  · ROBERT on 20/14 cm BiPAP    PLAN:  It was a pleasure to see Devonte Del Cid in the office today for follow-up CV evaluation. He is here today for routine CV follow-up. Since her last encounter, he had episode of chest discomfort that was reproducible with rotation of his shoulder. While exerting himself and climbing stairs, he has no chest pain concerning for angina. Symptoms sound musculoskeletal in nature. He has had stress test within the past year negative for myocardial ischemia. He does have some shortness of breath with dyspnea on exertion. Dyspnea has been somewhat chronic, but mildly increased from the past.  He denies significant change in his lower extremity edema. He does admit to weight gain over the course of several years, but is unclear if this is related to fluid or diet. ECG is similar to prior. Based on his clinical presentation, I have the following recommendations:    1. Recommend increasing torsemide to 40 mg twice daily for the next 4 days. Recommend the office reaching out to the patient/patient reaching out to the office to discuss if he has been losing weight with his increased dose of diuretic and if his breathing improves.   2.  Would obtain blood work including a BMP, BNP, CBC in 1 week after increase of diuretic. 3.  Recommend heart healthy diet low in sodium and carbohydrate. 4.  Would encourage 30 minutes a day, 5 days a week of moderate intensity activity to build cardiovascular endurance. Would encourage weight loss regimen to help with his breathing issues. 5.  We will check 2D echocardiogram to assess his cardiac structure and function to assess his pulmonary pressures. 6.  Based on the results of echocardiogram, may consider right heart catheterization  7. Continue current antihypertensive regimen including Toprol-XL and spironolactone. 8.  Discontinue amiodarone. Continue Xarelto for thromboembolic prophylaxis  9. Would check pulmonary function studies and chest x-ray for completeness  10. Should his symptoms worsen in frequency or severity or change in quality, recommend seeking immediate medical attention/dial 911. 11. We will follow-up with him after testing to review the results. As always, please do not hesitate to call with any questions. Portions of the record may have been created with voice recognition software. Occasional wrong word or "sound a like" substitutions may have occurred due to the inherent limitations of voice recognition software. Read the chart carefully and recognize, using context, where substitutions have occurred.       Signed: Dayana Teresa DO, [facility], [place], [place]

## 2023-10-02 ENCOUNTER — OFFICE VISIT (OUTPATIENT)
Dept: CARDIOLOGY CLINIC | Facility: CLINIC | Age: 68
End: 2023-10-02
Payer: COMMERCIAL

## 2023-10-02 VITALS
HEIGHT: 69 IN | DIASTOLIC BLOOD PRESSURE: 72 MMHG | WEIGHT: 315 LBS | SYSTOLIC BLOOD PRESSURE: 114 MMHG | BODY MASS INDEX: 46.65 KG/M2 | HEART RATE: 60 BPM

## 2023-10-02 DIAGNOSIS — I50.32 CHRONIC DIASTOLIC HEART FAILURE (HCC): Primary | ICD-10-CM

## 2023-10-02 DIAGNOSIS — R94.31 ABNORMAL ECG: ICD-10-CM

## 2023-10-02 DIAGNOSIS — I48.92 PAROXYSMAL ATRIAL FLUTTER (HCC): ICD-10-CM

## 2023-10-02 DIAGNOSIS — I27.20 PULMONARY HYPERTENSION (HCC): ICD-10-CM

## 2023-10-02 DIAGNOSIS — E66.01 OBESITY, MORBID (HCC): ICD-10-CM

## 2023-10-02 PROCEDURE — 99214 OFFICE O/P EST MOD 30 MIN: CPT | Performed by: INTERNAL MEDICINE

## 2023-10-02 PROCEDURE — 93000 ELECTROCARDIOGRAM COMPLETE: CPT | Performed by: INTERNAL MEDICINE

## 2023-10-02 RX ORDER — TORSEMIDE 20 MG/1
40 TABLET ORAL 2 TIMES DAILY
Qty: 180 TABLET | Refills: 2 | Status: SHIPPED | OUTPATIENT
Start: 2023-10-02

## 2023-10-04 ENCOUNTER — HOSPITAL ENCOUNTER (OUTPATIENT)
Dept: RADIOLOGY | Facility: HOSPITAL | Age: 68
Discharge: HOME | End: 2023-10-04
Payer: COMMERCIAL

## 2023-10-04 ENCOUNTER — APPOINTMENT (OUTPATIENT)
Dept: LAB | Facility: HOSPITAL | Age: 68
End: 2023-10-04
Payer: COMMERCIAL

## 2023-10-04 DIAGNOSIS — I27.20 PULMONARY HYPERTENSION (HCC): ICD-10-CM

## 2023-10-04 DIAGNOSIS — I50.32 CHRONIC DIASTOLIC HEART FAILURE (HCC): ICD-10-CM

## 2023-10-04 LAB
ALBUMIN SERPL BCP-MCNC: 4.4 G/DL (ref 3.5–5)
ALP SERPL-CCNC: 54 U/L (ref 34–104)
ALT SERPL W P-5'-P-CCNC: 15 U/L (ref 7–52)
ANION GAP SERPL CALCULATED.3IONS-SCNC: 9 MMOL/L
AST SERPL W P-5'-P-CCNC: 18 U/L (ref 13–39)
BASOPHILS # BLD AUTO: 0.02 THOUSANDS/ÂΜL (ref 0–0.1)
BASOPHILS NFR BLD AUTO: 0 % (ref 0–1)
BILIRUB SERPL-MCNC: 0.9 MG/DL (ref 0.2–1)
BNP SERPL-MCNC: 47 PG/ML (ref 0–100)
BUN SERPL-MCNC: 26 MG/DL (ref 5–25)
CALCIUM SERPL-MCNC: 9.6 MG/DL (ref 8.4–10.2)
CHLORIDE SERPL-SCNC: 96 MMOL/L (ref 96–108)
CO2 SERPL-SCNC: 33 MMOL/L (ref 21–32)
CREAT SERPL-MCNC: 1.12 MG/DL (ref 0.6–1.3)
EOSINOPHIL # BLD AUTO: 0.09 THOUSAND/ÂΜL (ref 0–0.61)
EOSINOPHIL NFR BLD AUTO: 2 % (ref 0–6)
ERYTHROCYTE [DISTWIDTH] IN BLOOD BY AUTOMATED COUNT: 13.8 % (ref 11.6–15.1)
GFR SERPL CREATININE-BSD FRML MDRD: 66 ML/MIN/1.73SQ M
GLUCOSE P FAST SERPL-MCNC: 105 MG/DL (ref 65–99)
HCT VFR BLD AUTO: 41.4 % (ref 36.5–49.3)
HGB BLD-MCNC: 13.9 G/DL (ref 12–17)
IMM GRANULOCYTES # BLD AUTO: 0.01 THOUSAND/UL (ref 0–0.2)
IMM GRANULOCYTES NFR BLD AUTO: 0 % (ref 0–2)
LYMPHOCYTES # BLD AUTO: 0.95 THOUSANDS/ÂΜL (ref 0.6–4.47)
LYMPHOCYTES NFR BLD AUTO: 21 % (ref 14–44)
MCH RBC QN AUTO: 30.8 PG (ref 26.8–34.3)
MCHC RBC AUTO-ENTMCNC: 33.6 G/DL (ref 31.4–37.4)
MCV RBC AUTO: 92 FL (ref 82–98)
MONOCYTES # BLD AUTO: 0.42 THOUSAND/ÂΜL (ref 0.17–1.22)
MONOCYTES NFR BLD AUTO: 9 % (ref 4–12)
NEUTROPHILS # BLD AUTO: 2.97 THOUSANDS/ÂΜL (ref 1.85–7.62)
NEUTS SEG NFR BLD AUTO: 68 % (ref 43–75)
NRBC BLD AUTO-RTO: 0 /100 WBCS
PLATELET # BLD AUTO: 194 THOUSANDS/UL (ref 149–390)
PMV BLD AUTO: 10.3 FL (ref 8.9–12.7)
POTASSIUM SERPL-SCNC: 3.7 MMOL/L (ref 3.5–5.3)
PROT SERPL-MCNC: 7.7 G/DL (ref 6.4–8.4)
RBC # BLD AUTO: 4.51 MILLION/UL (ref 3.88–5.62)
SODIUM SERPL-SCNC: 138 MMOL/L (ref 135–147)
WBC # BLD AUTO: 4.46 THOUSAND/UL (ref 4.31–10.16)

## 2023-10-04 PROCEDURE — 83880 ASSAY OF NATRIURETIC PEPTIDE: CPT

## 2023-10-04 PROCEDURE — 85025 COMPLETE CBC W/AUTO DIFF WBC: CPT | Performed by: INTERNAL MEDICINE

## 2023-10-04 PROCEDURE — 80053 COMPREHEN METABOLIC PANEL: CPT | Performed by: INTERNAL MEDICINE

## 2023-10-04 PROCEDURE — 36415 COLL VENOUS BLD VENIPUNCTURE: CPT | Performed by: INTERNAL MEDICINE

## 2023-10-04 PROCEDURE — 71046 X-RAY EXAM CHEST 2 VIEWS: CPT

## 2023-10-05 ENCOUNTER — HOSPITAL ENCOUNTER (OUTPATIENT)
Facility: HOSPITAL | Age: 68
Setting detail: OUTPATIENT SURGERY
Discharge: HOME | End: 2023-10-05
Attending: ANESTHESIOLOGY | Admitting: ANESTHESIOLOGY
Payer: COMMERCIAL

## 2023-10-05 ENCOUNTER — TELEPHONE (OUTPATIENT)
Dept: PAIN MEDICINE | Facility: CLINIC | Age: 68
End: 2023-10-05

## 2023-10-05 ENCOUNTER — APPOINTMENT (OUTPATIENT)
Dept: RADIOLOGY | Facility: HOSPITAL | Age: 68
End: 2023-10-05
Payer: COMMERCIAL

## 2023-10-05 VITALS
BODY MASS INDEX: 45.77 KG/M2 | DIASTOLIC BLOOD PRESSURE: 79 MMHG | HEART RATE: 73 BPM | HEIGHT: 69 IN | SYSTOLIC BLOOD PRESSURE: 133 MMHG | TEMPERATURE: 97.9 F | WEIGHT: 309 LBS | RESPIRATION RATE: 18 BRPM | OXYGEN SATURATION: 94 %

## 2023-10-05 PROCEDURE — 64635 DESTROY LUMB/SAC FACET JNT: CPT | Performed by: ANESTHESIOLOGY

## 2023-10-05 PROCEDURE — 64636 DESTROY L/S FACET JNT ADDL: CPT | Performed by: ANESTHESIOLOGY

## 2023-10-05 RX ORDER — LIDOCAINE HYDROCHLORIDE 20 MG/ML
INJECTION, SOLUTION EPIDURAL; INFILTRATION; INTRACAUDAL; PERINEURAL AS NEEDED
Status: DISCONTINUED | OUTPATIENT
Start: 2023-10-05 | End: 2023-10-05 | Stop reason: HOSPADM

## 2023-10-05 RX ORDER — LIDOCAINE HYDROCHLORIDE 10 MG/ML
INJECTION, SOLUTION EPIDURAL; INFILTRATION; INTRACAUDAL; PERINEURAL AS NEEDED
Status: DISCONTINUED | OUTPATIENT
Start: 2023-10-05 | End: 2023-10-05 | Stop reason: HOSPADM

## 2023-10-05 NOTE — DISCHARGE INSTR - AVS FIRST PAGE

## 2023-10-05 NOTE — H&P
Assessment:  1. Lumbar spondylosis    Plan:  Maria Esther Bianchi is a 70 y.o. male with complaints of low back pain presents to surgical center for procedure. 1. We will perform a Procedure(s) (LRB):  2. ABLATION RADIO FREQUENCY (RFA) LEFT L4-5 AND L5-S1 RFA (Left)   3. 2. Follow-up 1 month after injection    Complete risks and benefits including bleeding, infection, tissue reaction, nerve injury and allergic reaction were discussed. The approach was demonstrated using models and literature was provided. Verbal and written consent was obtained. My impressions and treatment recommendations were discussed in detail with the patient who verbalized understanding and had no further questions. Discharge instructions were provided. I personally saw and examined the patient and I agree with the above discussed plan of care. Orders Placed This Encounter   Procedures   • Discharge Diet     Order Specific Question:   Diet Type: Answer:   Regular   • Activity as tolerated   • Remove dressing in 24 hours   • Call provider for:  persistent nausea or vomiting   • Call provider for:  severe uncontrolled pain   • Call provider for:  redness, tenderness, or signs of infection (pain, swelling, redness, odor or green/yellow discharge around incision site)   • Call provider for: active or persistent bleeding   • Call provider for:  difficulty breathing, headache or visual disturbances   • Call provider for:  hives   • Call provider for:  persistent dizziness or light-headedness   • Call provider for:  extreme fatigue   • Discharge patient     Standing Status:   Standing     Number of Occurrences:   1     Order Specific Question:   Is this a planned readmission (within 30 days)? Answer:   No     No orders of the defined types were placed in this encounter. History of Present Illness:  Maria Esther Bianchi is a 70 y.o. male who presents for a follow up office visit in regards to low back pain.    The patient’s current symptoms include 8 out of 10 sharp and stabbing, throbbing pain with any particular time pattern. I have personally reviewed and/or updated the patient's past medical history, past surgical history, family history, social history, current medications, allergies, and vital signs today. Review of Systems   Musculoskeletal: Positive for arthralgias and back pain. All other systems reviewed and are negative.       Patient Active Problem List   Diagnosis   • Atrial fibrillation (HCC)   • Obstructive sleep apnea on CPAP   • Leukopenia   • Weakness of right upper extremity   • Paresthesias   • Cervical pain (neck)   • Obesity, Class III, BMI 40-49.9 (morbid obesity) (HCC)   • Thrombocytopenia (HCC)   • Bilateral lower extremity edema   • Lymphedema   • Obesity, morbid (HCC)   • Venous insufficiency of both lower extremities   • Pulmonary hypertension (HCC)   • Shortness of breath with exposure to COVID-19 virus   • Chronic diastolic heart failure (HCC)   • Encounter for cardioversion procedure   • Stage 3a chronic kidney disease (HCC)   • Anemia   • Primary osteoarthritis   • Iron deficiency   • Atrial flutter (HCC)   • Venous ulcer (HCC)   • Chronic pain syndrome   • Lumbar spondylosis   • Chronic bilateral low back pain without sciatica       Past Medical History:   Diagnosis Date   • A-fib ([address])    • ALANNA (acute kidney injury) ([address]) 6/15/2021   • Arthritis    • CPAP (continuous positive airway pressure) dependence    • Irregular heart beat    • Lymphedema    • Sleep apnea    • Urinary frequency    • Wears glasses    • Wears partial dentures     lower partial       Past Surgical History:   Procedure Laterality Date   • ABLATION SAPHENOUS VEIN W/ RFA     • COLONOSCOPY     • JOINT REPLACEMENT  Left Hip 4 /21/2009   • NERVE BLOCK Bilateral 7/18/2023    Procedure: BLOCK MEDIAL BRANCH B/L L4-L5 AND L5-S1 MBB #1;  Surgeon: Roni Padron MD;  Location: [facility];  Service: Pain Management    • NERVE BLOCK Bilateral 8/17/2023    Procedure: BLOCK MEDIAL BRANCH  B/L L4-5 and L5-S1 MBB #2;  Surgeon: Slava Yanez MD;  Location: [facility];  Service: Pain Management    • RI CYSTO INSERTION TRANSPROSTATIC IMPLANT SINGLE N/A 11/13/2017    Procedure: CYSTOSCOPY WITH INSERTION UROLIFT;  Surgeon: Blanca Rodriguez DO;  Location: [facility];  Service: Urology   • TONSILLECTOMY         Family History   Problem Relation Age of Onset   • Heart disease Mother    • Lymphoma Father    • Cancer Father    • Heart disease Sister    • Hypertension Brother    • Diabetes Neg Hx    • Thyroid disease Neg Hx    • Stroke Neg Hx        Social History     Occupational History   • Not on file   Tobacco Use   • Smoking status: Never   • Smokeless tobacco: Never   Vaping Use   • Vaping Use: Never used   Substance and Sexual Activity   • Alcohol use: Yes     Alcohol/week: 5.0 standard drinks of alcohol     Types: 3 Glasses of wine, 2 Standard drinks or equivalent per week     Comment: socially   • Drug use: No   • Sexual activity: Not Currently     Partners: Female     Birth control/protection: Abstinence, Condom Male, Spermicide       No current facility-administered medications on file prior to encounter.      Current Outpatient Medications on File Prior to Encounter   Medication Sig   • acetaminophen (TYLENOL) 500 mg tablet Take 500 mg by mouth every 6 (six) hours as needed for mild pain   • alfuzosin (UROXATRAL) 10 mg 24 hr tablet Take 10 mg by mouth daily   • allopurinol (ZYLOPRIM) 300 mg tablet Take 300 mg by mouth daily   • ammonium lactate (LAC-HYDRIN) 12 % lotion Apply topically 2 (two) times a day as needed for dry skin   • Diclofenac Sodium (VOLTAREN) 1 % Apply 2 g topically 4 (four) times a day   • ferrous sulfate 325 (65 Fe) mg tablet Take 325 mg by mouth   • FML Forte 0.25 % ophthalmic suspension    • Glucosamine-Chondroit-Vit C-Mn (Glucosamine 1500 Complex) CAPS Take by mouth   • halobetasol (ULTRAVATE) 0.05 % ointment APPLY TO AFFECTED AREA ON LEG TWICE DAILY   • L-ARGININE-500 PO Take 500 mg by mouth 2 (two) times a day    • loteprednol etabonate (LOTEMAX) 0.5 % ophthalmic suspension 1 drop 4 (four) times a day   • metoprolol succinate (TOPROL-XL) 25 mg 24 hr tablet Take 1 tablet (25 mg total) by mouth daily   • Multiple Vitamins-Minerals (OCUVITE EXTRA PO) Take 1 tablet by mouth daily     • multivitamin (THERAGRAN) TABS Take 1 tablet by mouth daily   • Omega-3 Fatty Acids (fish oil) 1,000 mg Take 1,000 mg by mouth 2 (two) times a day   • patient supplied medication Take 1 each by mouth 2 (two) times a day   • pregabalin (LYRICA) 100 mg capsule Take 1 capsule (100 mg total) by mouth 3 (three) times a day   • rivaroxaban (Xarelto) 20 mg tablet Take 1 tablet (20 mg total) by mouth daily   • spironolactone (ALDACTONE) 25 mg tablet Take 1 tablet (25 mg total) by mouth 2 (two) times a day   • tadalafil (CIALIS) 5 MG tablet Take 5 mg by mouth daily as needed for erectile dysfunction   • traMADol (ULTRAM) 50 mg tablet Take 1 tablet (50 mg total) by mouth 2 (two) times a day as needed for moderate pain   • VITAMIN D PO Take 2,000 Units by mouth daily        Allergies   Allergen Reactions   • Penicillins Anaphylaxis   • Sulfa Antibiotics Anaphylaxis   • Levofloxacin Other (See Comments)       Physical Exam:    /76   Pulse 75   Temp 98.2 °F (36.8 °C) (Temporal)   Resp 18   Ht 5' 9" (1.753 m)   Wt (!) 140 kg (309 lb)   SpO2 95%   BMI 45.63 kg/m²     Constitutional:normal, well developed, well nourished, alert, in no distress and non-toxic and no overt pain behavior.  and obese  Eyes:anicteric  HEENT:grossly intact  Neck:supple, symmetric, trachea midline and no masses   Pulmonary:even and unlabored  Cardiovascular:No edema or pitting edema present  Skin:Normal without rashes or lesions and well hydrated  Psychiatric:Mood and affect appropriate  Neurologic:Cranial Nerves II-XII grossly intact  Musculoskeletal:normal

## 2023-10-05 NOTE — OP NOTE
OPERATIVE REPORT  PATIENT NAME: Tomasa Hanson    :  1955  MRN: [id number]  Pt Location: [facility] RO[facility] 01    SURGERY DATE: 10/5/2023    Surgeon(s) and Role:     * Rianna Herrera MD - Primary    Preop Diagnosis:  Chronic pain syndrome [G89.4]  Lumbar spondylosis [M47.816]    Post-Op Diagnosis Codes:     * Chronic pain syndrome [G89.4]     * Lumbar spondylosis [M47.816]    Procedure(s):  Left - ABLATION RADIO FREQUENCY (RFA) LEFT L4-5 AND L5-S1 RFA    Specimen(s):  * No specimens in log *    Estimated Blood Loss:   Minimal    Drains:  * No LDAs found *    Anesthesia Type:   Local    Operative Indications:  Chronic pain syndrome [G89.4]  Lumbar spondylosis [M47.816]      Operative Findings:  same    Complications:   None    Procedure and Technique:  Fluoroscopically-guided Radiofrequency denervation of the left L4-L5 and L5-S1 facet joint(s)       After discussing the risks, benefits, and alternatives to the procedure, the patient expressed understanding and wished to proceed. The patient was brought to the fluoroscopy suite and placed in the prone position. Procedural pause conducted to verify:  correct patient identity, procedure to be performed and as applicable, correct side and site, correct patient position, and availability of implants, special equipment and special requirements. Using fluoroscopy, the junction of the transverse process and superior articulating process of the left L4, 5, S1 levels were identified and marked. The skin was sterilely prepped and draped in the usual fashion using Chloraprep skin prep. The skin and subcutaneous tissue were anesthetized with 0.5 % lidocaine. Using fluoroscopic guidance, a 100 mm 18 gauge RFK RF cannula with a 10 mm active tip was advanced to each target. This was confirmed using PA, lateral and oblique fluoroscopic views. Motor testing was performed at 2Hz up to 2.5 volts, and the measured tissue impedances were satisfactory.   With final needle positioning, there was no evidence of radicular stimulation. Prior to lesioning, 1cc of 2% lidocaine was injected at each site. After waiting 2 minutes for local anesthesia to take effect, lesioning was performed at 90 degrees Celsius for 90 seconds. A slight repositioning of the needles was performed an lesioning was again performed at 90 degreees Celsius for 90 seconds. After RF treatment, each site received 1cc of 0.25% bupivacaine. The patient tolerated the procedure well and there were no apparent complications. After appropriate observation, the patient was dismissed from the clinic in good condition under their own power. I was present for the entire procedure.     Patient Disposition:  hemodynamically stable        SIGNATURE: Alessia Verma MD  DATE: October 5, 2023  TIME: 12:59 PM

## 2023-10-06 ENCOUNTER — TELEPHONE (OUTPATIENT)
Dept: CARDIOLOGY CLINIC | Facility: CLINIC | Age: 68
End: 2023-10-06

## 2023-10-06 NOTE — TELEPHONE ENCOUNTER
----- Message from Estevan Molina DO sent at 10/2/2023  9:50 AM EDT -----  Please reach out to the patient on October 6/Friday to discuss the patient's swelling, shortness of breath and how he is feeling. Also check to see how his weights are trending on the increased dose of diuretic. He will be getting blood work in 1 week. Please notify me on his progress on Friday. Thank you.

## 2023-10-06 NOTE — TELEPHONE ENCOUNTER
Placed call to patient. No answer, left vm for patient to return my call. Ok per communication consent.

## 2023-10-06 NOTE — TELEPHONE ENCOUNTER
Patient returned call. Patient states his shortness of breath has improved. He states his weight went down about 2lbs. He states it's hard to tell if his swelling has improved but it has not gotten worse. He does wear compression on both legs. Patient had labs and chest xray done 10/4/2023 and is scheduled for echo 10/11/2023.

## 2023-10-09 ENCOUNTER — HOSPITAL ENCOUNTER (OUTPATIENT)
Dept: PULMONOLOGY | Facility: HOSPITAL | Age: 68
Discharge: HOME | End: 2023-10-09
Payer: COMMERCIAL

## 2023-10-09 DIAGNOSIS — I27.20 PULMONARY HYPERTENSION (HCC): ICD-10-CM

## 2023-10-09 PROCEDURE — 94726 PLETHYSMOGRAPHY LUNG VOLUMES: CPT

## 2023-10-09 PROCEDURE — 94060 EVALUATION OF WHEEZING: CPT | Performed by: INTERNAL MEDICINE

## 2023-10-09 PROCEDURE — 94729 DIFFUSING CAPACITY: CPT | Performed by: INTERNAL MEDICINE

## 2023-10-09 PROCEDURE — 94060 EVALUATION OF WHEEZING: CPT

## 2023-10-09 PROCEDURE — 94729 DIFFUSING CAPACITY: CPT

## 2023-10-09 PROCEDURE — 94726 PLETHYSMOGRAPHY LUNG VOLUMES: CPT | Performed by: INTERNAL MEDICINE

## 2023-10-09 PROCEDURE — 94760 N-INVAS EAR/PLS OXIMETRY 1: CPT

## 2023-10-09 RX ORDER — ALBUTEROL SULFATE 2.5 MG/3ML
2.5 SOLUTION RESPIRATORY (INHALATION) ONCE AS NEEDED
Status: COMPLETED | OUTPATIENT
Start: 2023-10-09 | End: 2023-10-09

## 2023-10-09 RX ADMIN — ALBUTEROL SULFATE 2.5 MG: 2.5 SOLUTION RESPIRATORY (INHALATION) at 09:54

## 2023-10-11 ENCOUNTER — HOSPITAL ENCOUNTER (OUTPATIENT)
Dept: NON INVASIVE DIAGNOSTICS | Facility: HOSPITAL | Age: 68
Discharge: HOME | End: 2023-10-11
Payer: COMMERCIAL

## 2023-10-11 VITALS
DIASTOLIC BLOOD PRESSURE: 78 MMHG | SYSTOLIC BLOOD PRESSURE: 134 MMHG | BODY MASS INDEX: 45.71 KG/M2 | HEART RATE: 77 BPM | WEIGHT: 308.64 LBS | HEIGHT: 69 IN

## 2023-10-11 DIAGNOSIS — I50.32 CHRONIC DIASTOLIC HEART FAILURE (HCC): ICD-10-CM

## 2023-10-11 DIAGNOSIS — I27.20 PULMONARY HYPERTENSION (HCC): ICD-10-CM

## 2023-10-11 LAB
AORTIC ROOT: 3.1 CM
E WAVE DECELERATION TIME: 230 MS
E/A RATIO: 2.37
FRACTIONAL SHORTENING: 35 (ref 28–44)
INTERVENTRICULAR SEPTUM IN DIASTOLE (PARASTERNAL SHORT AXIS VIEW): 1 CM
INTERVENTRICULAR SEPTUM: 1 CM (ref 0.6–1.1)
LEFT ATRIUM SIZE: 4.8 CM
LEFT INTERNAL DIMENSION IN SYSTOLE: 3.5 CM (ref 2.1–4)
LEFT VENTRICULAR INTERNAL DIMENSION IN DIASTOLE: 5.4 CM (ref 3.5–6)
LEFT VENTRICULAR POSTERIOR WALL IN END DIASTOLE: 1.2 CM
LEFT VENTRICULAR STROKE VOLUME: 91 ML
LVSV (TEICH): 91 ML
MV E'TISSUE VEL-LAT: 14 CM/S
MV E'TISSUE VEL-SEP: 9 CM/S
MV PEAK A VEL: 0.54 M/S
MV PEAK E VEL: 128 CM/S
MV STENOSIS PRESSURE HALF TIME: 67 MS
MV VALVE AREA P 1/2 METHOD: 3.28
PULM VEIN S/D RATIO: 1.31
PV PEAK D VEL: 0.13 M/S
PV PEAK S VEL: 0.17 M/S
RA PRESSURE ESTIMATED: 5 MMHG
SL CV LV EF: 65
SL CV PED ECHO LEFT VENTRICLE DIASTOLIC VOLUME (MOD BIPLANE) 2D: 142 ML
SL CV PED ECHO LEFT VENTRICLE SYSTOLIC VOLUME (MOD BIPLANE) 2D: 51 ML
TRICUSPID ANNULAR PLANE SYSTOLIC EXCURSION: 2.6 CM
TRICUSPID VALVE PEAK E WAVE VELOCITY: 0.11 M/S

## 2023-10-11 PROCEDURE — 93306 TTE W/DOPPLER COMPLETE: CPT

## 2023-10-11 PROCEDURE — 93306 TTE W/DOPPLER COMPLETE: CPT | Performed by: INTERNAL MEDICINE

## 2023-10-11 NOTE — TELEPHONE ENCOUNTER
MEAGAN    S/W pt. Pt stated needle sites look good, denies S&S of infection, denies fevers, denies soreness and denies sun burn like sensation. Advised pt if he does get pain to take his prescribed or OTC pain medications and/or use ice/heat. Current pain scale from 0/10: 0/10  Also made Pt aware it takes up to 2 weeks to notice pain relief and 4-6 weeks for full pain effect to be achieved. Confirmed next PRO w/ pt. Call if any questions or concerns prior to next appt. Pt verbalized understanding.

## 2023-10-18 NOTE — PROGRESS NOTES
Heart Failure Outpatient Progress Note - Emily Pryor 70 y.o. male MRN: [id number]    @ Encounter: [id number]      Assessment/Plan:    Patient Active Problem List    Diagnosis Date Noted    Chronic pain syndrome 05/04/2023    Lumbar spondylosis 05/04/2023    Chronic bilateral low back pain without sciatica 05/04/2023    Venous ulcer ([address]) 01/03/2023    Atrial flutter ([address]) 07/29/2021    Iron deficiency 06/16/2021    Stage 3a chronic kidney disease ([address]) 06/15/2021    Anemia 06/15/2021    Primary osteoarthritis 06/15/2021    Encounter for cardioversion procedure 05/06/2021    Shortness of breath with exposure to COVID-19 virus 01/22/2021    Chronic diastolic heart failure ([address]) 01/22/2021    Pulmonary hypertension ([address]) 11/02/2020    Bilateral lower extremity edema 10/27/2019    Atrial fibrillation ([address]) 10/26/2019    Obstructive sleep apnea on CPAP 10/26/2019    Leukopenia 10/26/2019    Weakness of right upper extremity 10/26/2019    Paresthesias 10/26/2019    Cervical pain (neck) 10/26/2019    Obesity, Class III, BMI 40-49.9 (morbid obesity) ([address]) 10/26/2019    Thrombocytopenia ([address]) 10/26/2019    Lymphedema 07/22/2019    Obesity, morbid ([address]) 04/16/2015    Venous insufficiency of both lower extremities 02/24/2015     Chronic HFpEF with elevated PASP. Compensated on current diuretic regimen below. BP well controlled. Suspect his CHU is secondary to obesity, deconditioning and restrictive airflow. Continued lifestyle management recommended, including weight loss, exercise, treatment of sleep apnea. Weights : 319 lbs, today, 317 lbs. Volume management : Torsemide 40 mg BID,  Spironolactone 25 mg BID,     -TTE 10/11/23.  LVEF 65% with grade II DD.   -PFT 10/9/23:  Results:  FEV1/FVC Ratio: 88 %  Forced Vital Capacity: 2.15 L           51 % predicted  FEV1: 1.90 L   60 % predicted  After administration of bronchodilator FEV1: reduced 16%     Lung volumes by body plethysmography: Total Lung Capacity 58 % predicted Residual volume 73 % predicted     DLCO corrected for patients hemoglobin level: 65 %     Interpretation:     Moderate restrictive airflow defect on spirometry      No response to the administration to bronchodilator per ATS Standards     Moderately reduced TLC     Mildly Reduced  Diffusion     Restricted flow volume loops     -Pharmacologic nuclear stress test 1/2023: appears negative for myocardial ischemia, gated EF 65%   -TTE 1/2023: LVEF 65%, mild LVH, normal diastolic function, septal motion consistent with elevated PVR, mild RV dilatation with normal function, mild biatrial dilatation, trace MR/TR with PASP 37 mmHg (poor Doppler signal),       Chronic atrial fib. H/o prior failed DCCV. S/P atrial fib/flutter ablation x 2 2021. Repeated due to recurrence. Now maintaining SR  Rate Metoprolol succinate 25 mg daily  Rhythm Now off Amio. AC Xarelto 20 mg daily    Morbid obesity. Has seen bariatrics. Hesitant about surgical interventions. ROBERT. Not using . Referral placed to sleep medicine to discuss alternatives to CPAP as cannot tolerate. Chronic venous insuff with lymphedema. H/O venous ablation     HPI:   HENRRY, 10/2/23: It was a pleasure to see Maurice Hutchins in the office today for follow-up CV evaluation. He has a longstanding history of atrial fibrillation with prior failed ELAINE guided cardioversion, history of morbid obesity and pulmonary hypertension with chronic venous insufficiency/lymphedema. The patient has a known history of obstructive sleep apnea was noncompliant with his CPAP therapy. Over the course of many years he has been having lower extremity edema which has been believe secondary to lymphedema with chronic venous insufficiency. He has had venous ablation is in the past for his reflux disease. In October 2019, he was found have pulmonary hypertension with pulmonary artery systolic pressure is estimated at 50 mm Hg.   We had initially seen him in late August 2020 due to increased fatigue and dyspnea on exertion. He has become somewhat short of breath with basic activity. Previously, he had been managed for his atrial fibrillation at [facility]. Echocardiogram, stress test and Holter monitor were ordered, but stress test and Holter were completed. Stress test was found to be negative for myocardial ischemia. Holter monitor demonstrated atrial fibrillation with an average heart rate of 92 beats per minute and rare VPCs. His weight trends unfortunately continue to go upward and he had dietary discretion. He seen by electrophysiology and recommended for a sleep study as well as an evaluation with advanced heart failure. He was placed on CPAP and has been compliant since that time. He also was seen by bariatric surgery and wish to undergo conservative options with dietary modifications. Despite increasing diuretic load, he he continue to gain approximately 30 lb and was subsequently sent to [facility] in [facility]. He was diuresed down to 285 lb. While hospitalized, his echocardiogram was performed showing normal left ventricular function. Following discharge, he was seen by electrophysiology and sent for a cardioversion in May 2021. The cardioversion was initially successful, but he reverted back to rate controlled atrial flutter. He has been placed on amiodarone and decreased down to 100 mg daily. On his dose of torsemide, he had acute kidney injury with a creatinine rise to 1.9. After holding his diuretics for several days, his creatinine came down to 1.1. He was then decreased on his torsemide to 20 mg b.i.d. And his weight trends continue to improve. He has been aggressive in his dietary modifications. He also underwent radiofrequency ablation for his atrial fibrillation/flutter. He underwent a procedure in June 2021 Overall, he has felt much improved. In July 2021, he ended up going back into atrial flutter.   He was seen by electrophysiology who was recommending a repeat ablation study. He underwent the ablation in late July 2021 which was successful. He was seen in the office by EP following the procedure and had remained in sinus rhythm. He subsequently was discontinued on his amiodarone. In October to November 2022, patient developed some symptoms of shortness of breath with exertion. This symptom had been fairly new and the patient was sent for testing including a stress test, chest x-ray and echocardiogram.  Stress test was found to be negative for myocardial ischemia. His echocardiogram showed normal left-ventricular systolic function with mild pulmonary hypertension. There was evidence of some mild increase in pressures on the right side. Chest x-ray at the time was found to be grossly normal.  In July 2023, the patient developed 2 episodes of right-sided chest discomfort near his shoulder. The discomfort would change with rotating his shoulder. Due to the severity of one of the episodes, he presented to the emergency department and was diagnosed with musculoskeletal pain. Troponins were negative during that hospitalization. Additionally, he admitted to continued and progressive dyspnea on exertion a little higher than his baseline. Admits to chronic lower extremity swelling with his lymphedema. Denies orthopnea or paroxysmal nocturnal dyspnea. 10/23/23. Presents for follow up. Last seen by Dr. Blayne Flores earlier in the month. Had complaints of weight gain and progressive dyspnea. His Torsemide dose was doubled. Follow up labs appeared stable. Repeat echo appeared largely unchanged from prior study. Feels ok today. His biggest complaint today is of worsening CHU over past few months. Says he had been going to the gym pretty faithfully until a month or two ago. Does not use CPAP. Still eating late at night and making poor choices.      Past Medical History:   Diagnosis Date    A-fib [facility])     ALANNA (acute kidney injury) ([address]) 6/15/2021    Arthritis     CPAP (continuous positive airway pressure) dependence     Irregular heart beat     Lymphedema     Sleep apnea     Urinary frequency     Wears glasses     Wears partial dentures     lower partial       12 point ROS negative other than that stated in HPI    Allergies   Allergen Reactions    Penicillins Anaphylaxis    Sulfa Antibiotics Anaphylaxis    Levofloxacin Other (See Comments)     .     Current Outpatient Medications:     acetaminophen (TYLENOL) 500 mg tablet, Take 500 mg by mouth every 6 (six) hours as needed for mild pain, Disp: , Rfl:     alfuzosin (UROXATRAL) 10 mg 24 hr tablet, Take 10 mg by mouth daily, Disp: , Rfl:     allopurinol (ZYLOPRIM) 300 mg tablet, Take 300 mg by mouth daily, Disp: , Rfl:     ammonium lactate (LAC-HYDRIN) 12 % lotion, Apply topically 2 (two) times a day as needed for dry skin, Disp: , Rfl:     Diclofenac Sodium (VOLTAREN) 1 %, Apply 2 g topically 4 (four) times a day, Disp: 100 g, Rfl: 0    ferrous sulfate 325 (65 Fe) mg tablet, Take 325 mg by mouth, Disp: , Rfl:     FML Forte 0.25 % ophthalmic suspension, , Disp: , Rfl:     Glucosamine-Chondroit-Vit C-Mn (Glucosamine 1500 Complex) CAPS, Take by mouth, Disp: , Rfl:     halobetasol (ULTRAVATE) 0.05 % ointment, APPLY TO AFFECTED AREA ON LEG TWICE DAILY, Disp: , Rfl:     L-ARGININE-500 PO, Take 500 mg by mouth 2 (two) times a day , Disp: , Rfl:     loteprednol etabonate (LOTEMAX) 0.5 % ophthalmic suspension, 1 drop 4 (four) times a day, Disp: , Rfl:     metoprolol succinate (TOPROL-XL) 25 mg 24 hr tablet, Take 1 tablet (25 mg total) by mouth daily, Disp: 90 tablet, Rfl: 2    Multiple Vitamins-Minerals (OCUVITE EXTRA PO), Take 1 tablet by mouth daily  , Disp: , Rfl:     multivitamin (THERAGRAN) TABS, Take 1 tablet by mouth daily, Disp: , Rfl:     Omega-3 Fatty Acids (fish oil) 1,000 mg, Take 1,000 mg by mouth 2 (two) times a day, Disp: , Rfl:     patient supplied medication, Take 1 each by mouth 2 (two) times a day, Disp: , Rfl:     pregabalin (LYRICA) 100 mg capsule, Take 1 capsule (100 mg total) by mouth 3 (three) times a day, Disp: 42 capsule, Rfl: 0    rivaroxaban (Xarelto) 20 mg tablet, Take 1 tablet (20 mg total) by mouth daily, Disp: 90 tablet, Rfl: 2    spironolactone (ALDACTONE) 25 mg tablet, Take 1 tablet (25 mg total) by mouth 2 (two) times a day, Disp: 180 tablet, Rfl: 1    tadalafil (CIALIS) 5 MG tablet, Take 5 mg by mouth daily as needed for erectile dysfunction, Disp: , Rfl:     torsemide (DEMADEX) 20 mg tablet, Take 2 tablets (40 mg total) by mouth 2 (two) times a day, Disp: 180 tablet, Rfl: 2    traMADol (ULTRAM) 50 mg tablet, Take 1 tablet (50 mg total) by mouth 2 (two) times a day as needed for moderate pain, Disp: 60 tablet, Rfl: 1    VITAMIN D PO, Take 2,000 Units by mouth daily , Disp: , Rfl:     Social History     Socioeconomic History    Marital status: /Civil Union     Spouse name: Not on file    Number of children: Not on file    Years of education: Not on file    Highest education level: Not on file   Occupational History    Not on file   Tobacco Use    Smoking status: Never    Smokeless tobacco: Never   Vaping Use    Vaping Use: Never used   Substance and Sexual Activity    Alcohol use:  Yes     Alcohol/week: 5.0 standard drinks of alcohol     Types: 3 Glasses of wine, 2 Standard drinks or equivalent per week     Comment: socially    Drug use: No    Sexual activity: Not Currently     Partners: Female     Birth control/protection: Abstinence, Condom Male, Spermicide   Other Topics Concern    Not on file   Social History Narrative    Not on file     Social Determinants of Health     Financial Resource Strain: Not on file   Food Insecurity: Not on file   Transportation Needs: Not on file   Physical Activity: Not on file   Stress: Not on file   Social Connections: Not on file   Intimate Partner Violence: Not on file   Housing Stability: Not on file       Family History Problem Relation Age of Onset    Heart disease Mother     Lymphoma Father     Cancer Father     Heart disease Sister     Hypertension Brother     Diabetes Neg Hx     Thyroid disease Neg Hx     Stroke Neg Hx        Physical Exam:    Vitals: /64   Pulse 69   Ht 5' 9" (1.753 m)   Wt (!) 144 kg (317 lb)   SpO2 93%   BMI 46.81 kg/m²     Wt Readings from Last 3 Encounters:   10/11/23 (!) 140 kg (308 lb 10.3 oz)   10/05/23 (!) 140 kg (309 lb)   10/02/23 (!) 145 kg (319 lb)       GEN: [employer] appears well, alert and oriented x 3, pleasant and cooperative   HEENT: pupils equal, round, and reactive to light; extraocular muscles intact  NECK: supple, no carotid bruits   HEART: regular rhythm, normal S1 and S2, no murmurs, clicks, gallops or rubs, body habitus limiting assessment of JVP  LUNGS: clear to auscultation bilaterally; no wheezes, rales, or rhonchi   ABDOMEN: normal bowel sounds, soft, no tenderness, no distention  EXTREMITIES: peripheral pulses normal; no clubbing, cyanosis, chronic lymphedema present  NEURO: no focal findings   SKIN: normal without suspicious lesions on exposed skin    Labs & Results:    Chemistry        Component Value Date/Time    K 3.7 10/04/2023 0949    K 4.6 09/03/2020 0805    CL 96 10/04/2023 0949     09/03/2020 0805    CO2 33 (H) 10/04/2023 0949    CO2 30 09/03/2020 0805    BUN 26 (H) 10/04/2023 0949    BUN 24 09/03/2020 0805    CREATININE 1.12 10/04/2023 0949        Component Value Date/Time    CALCIUM 9.6 10/04/2023 0949    CALCIUM 9.2 09/03/2020 0805    ALKPHOS 54 10/04/2023 0949    AST 18 10/04/2023 0949    ALT 15 10/04/2023 0949        Lab Results   Component Value Date    WBC 4.46 10/04/2023    HGB 13.9 10/04/2023    HCT 41.4 10/04/2023    MCV 92 10/04/2023     10/04/2023     Lab Results   Component Value Date    BNP 47 10/04/2023      Lab Results   Component Value Date    LDLCALC 135 (H) 04/19/2023     Lab Results   Component Value Date [id number] 3.181 10/27/2019       EKG personally reviewed by NONA Landin. No results found for this visit on 10/23/23. Counseling / Coordination of Care  Total floor / unit time spent today 20 minutes. Greater than 50% of total time was spent with the patient and / or family counseling and / or coordination of care. A description of the counseling / coordination of care: 20. Thank you for the opportunity to participate in the care of this patient.     Dg Lino

## 2023-10-20 ENCOUNTER — TELEPHONE (OUTPATIENT)
Dept: CARDIOLOGY CLINIC | Facility: CLINIC | Age: 68
End: 2023-10-20

## 2023-10-23 ENCOUNTER — OFFICE VISIT (OUTPATIENT)
Dept: CARDIOLOGY CLINIC | Facility: CLINIC | Age: 68
End: 2023-10-23
Payer: COMMERCIAL

## 2023-10-23 VITALS
DIASTOLIC BLOOD PRESSURE: 64 MMHG | WEIGHT: 315 LBS | OXYGEN SATURATION: 93 % | HEIGHT: 69 IN | BODY MASS INDEX: 46.65 KG/M2 | SYSTOLIC BLOOD PRESSURE: 118 MMHG | HEART RATE: 69 BPM

## 2023-10-23 DIAGNOSIS — I50.30 DIASTOLIC CONGESTIVE HEART FAILURE, UNSPECIFIED HF CHRONICITY (HCC): Primary | ICD-10-CM

## 2023-10-23 PROCEDURE — 99215 OFFICE O/P EST HI 40 MIN: CPT | Performed by: NURSE PRACTITIONER

## 2023-10-23 NOTE — PATIENT INSTRUCTIONS
Weigh yourself daily  If you gain 3 lbs in one day or 5 lbs in one week, please call the office at [phone] and ask for a nurse or the heart failure nurse  Keep your sodium intake to <2 grams, (2000 mg) per day, and fluids <2 Liters (2000 ml) per day. This is around 6-7, 8 oz glasses of fluid per day    Continue Torsemide 40 mg twice daily  Continue your exercise sessions  Work on weight loss  Follow up with the sleep medicine team to discuss options to treat your sleep apnea.

## 2023-11-01 ENCOUNTER — APPOINTMENT (OUTPATIENT)
Dept: RADIOLOGY | Facility: HOSPITAL | Age: 68
End: 2023-11-01
Payer: COMMERCIAL

## 2023-11-01 ENCOUNTER — TELEPHONE (OUTPATIENT)
Dept: PAIN MEDICINE | Facility: CLINIC | Age: 68
End: 2023-11-01

## 2023-11-01 ENCOUNTER — HOSPITAL ENCOUNTER (OUTPATIENT)
Facility: HOSPITAL | Age: 68
Setting detail: OUTPATIENT SURGERY
Discharge: HOME | End: 2023-11-01
Attending: ANESTHESIOLOGY | Admitting: ANESTHESIOLOGY
Payer: COMMERCIAL

## 2023-11-01 VITALS
TEMPERATURE: 98.2 F | RESPIRATION RATE: 18 BRPM | OXYGEN SATURATION: 97 % | WEIGHT: 311 LBS | HEIGHT: 69 IN | BODY MASS INDEX: 46.06 KG/M2 | HEART RATE: 67 BPM | DIASTOLIC BLOOD PRESSURE: 80 MMHG | SYSTOLIC BLOOD PRESSURE: 138 MMHG

## 2023-11-01 DIAGNOSIS — M47.816 LUMBAR SPONDYLOSIS: Primary | ICD-10-CM

## 2023-11-01 PROCEDURE — 64635 DESTROY LUMB/SAC FACET JNT: CPT | Performed by: ANESTHESIOLOGY

## 2023-11-01 PROCEDURE — 64636 DESTROY L/S FACET JNT ADDL: CPT | Performed by: ANESTHESIOLOGY

## 2023-11-01 RX ORDER — LIDOCAINE HYDROCHLORIDE 20 MG/ML
INJECTION, SOLUTION EPIDURAL; INFILTRATION; INTRACAUDAL; PERINEURAL AS NEEDED
Status: DISCONTINUED | OUTPATIENT
Start: 2023-11-01 | End: 2023-11-01 | Stop reason: HOSPADM

## 2023-11-01 RX ORDER — LIDOCAINE HYDROCHLORIDE 10 MG/ML
INJECTION, SOLUTION EPIDURAL; INFILTRATION; INTRACAUDAL; PERINEURAL AS NEEDED
Status: DISCONTINUED | OUTPATIENT
Start: 2023-11-01 | End: 2023-11-01 | Stop reason: HOSPADM

## 2023-11-01 NOTE — DISCHARGE INSTR - AVS FIRST PAGE

## 2023-11-01 NOTE — OP NOTE
OPERATIVE REPORT  PATIENT NAME: Ksenia Hough    :  1955  MRN: [id number]  Pt Location: [facility] OR ROOM 01    SURGERY DATE: 2023    Surgeon(s) and Role:     * Leandra Cohen MD - Primary    Preop Diagnosis:  Chronic pain syndrome [G89.4]  Lumbar spondylosis [M47.816]    Post-Op Diagnosis Codes:     * Chronic pain syndrome [G89.4]     * Lumbar spondylosis [M47.816]    Procedure(s):  Right - ABLATION RADIO FREQUENCY (RFA) RIGHT L4-5 AND L5-S1 RFA    Specimen(s):  * No specimens in log *    Estimated Blood Loss:   Minimal    Drains:  * No LDAs found *    Anesthesia Type:   Local    Operative Indications:  Chronic pain syndrome [G89.4]  Lumbar spondylosis [M47.816]      Operative Findings:  same    Complications:   None    Procedure and Technique:  Fluoroscopically-guided Radiofrequency denervation of the right L4-5 L5-S1 facet joint(s)       After discussing the risks, benefits, and alternatives to the procedure, the patient expressed understanding and wished to proceed. The patient was brought to the fluoroscopy suite and placed in the prone position. Procedural pause conducted to verify:  correct patient identity, procedure to be performed and as applicable, correct side and site, correct patient position, and availability of implants, special equipment and special requirements. Using fluoroscopy, the junction of the transverse process and superior articulating process of the right L4, 5, S1 levels were identified and marked. The skin was sterilely prepped and draped in the usual fashion using Chloraprep skin prep. The skin and subcutaneous tissue were anesthetized with 0.5 % lidocaine. Using fluoroscopic guidance, a 100 mm 18 gauge RFK RF cannula with a 10 mm active tip was advanced to each target. This was confirmed using PA, lateral and oblique fluoroscopic views. Motor testing was performed at 2Hz up to 2.5 volts, and the measured tissue impedances were satisfactory.   With final needle positioning, there was no evidence of radicular stimulation. Prior to lesioning, 1cc of 2% lidocaine was injected at each site. After waiting 2 minutes for local anesthesia to take effect, lesioning was performed at 90 degrees Celsius for 90 seconds. A slight repositioning of the needles was performed an lesioning was again performed at 90 degreees Celsius for 90 seconds. After RF treatment, each site received 1cc of 0.25% bupivacaine. The patient tolerated the procedure well and there were no apparent complications. After appropriate observation, the patient was dismissed from the clinic in good condition under their own power. I was present for the entire procedure.     Patient Disposition:  hemodynamically stable        SIGNATURE: Ella Azar MD  DATE: November 1, 2023  TIME: 8:56 AM

## 2023-11-01 NOTE — TELEPHONE ENCOUNTER
Right L4-5 and L5-S1 RFA 11/1/23 at [facility] with [address] 11/20 with BK at [facility] with a 9 am arrival time

## 2023-11-02 NOTE — TELEPHONE ENCOUNTER
MEAGAN    S/W pt. Pt stated needle sites look good, denies S&S of infection, denies fevers, denies soreness and denies sun burn like sensation. Advised pt if he does get pain to take his prescribed or OTC pain medications and/or use ice/heat. Current pain scale from 0/10: 0/10  Also made Pt aware it takes up to 2 weeks to notice pain relief and 4-6 weeks for full pain effect to be achieved. Confirmed next appt w/ pt. Call if any questions or concerns prior to next appt. Pt verbalized understanding.

## 2023-11-09 NOTE — TELEPHONE ENCOUNTER
Pt called back as he did not listen to the message.  I reviewed with him and suggested he take the Tramadol as previously suggested

## 2023-11-09 NOTE — TELEPHONE ENCOUNTER
S/W pt. He states he is "hobbling around but its not comfortable". States this pain and trouble walking has been going on the past 3 days. He also states "I just don't feel like doing anything". He notes he has been taking tylenol which isn't helping. Injection site appears to be bruised and a little red per the wife. I suggested he apply some ice to the injection site, take one of his tramadol and rest today. He feels maybe he shouldn't because it is an opioid. I did explain this one dose may really help him feel better then can go back to his tylenol.     Please advise on any other recommendations at this time

## 2023-11-09 NOTE — TELEPHONE ENCOUNTER
Caller: Pola Mendez     Doctor: Ayesha Mercado    Reason for call: patient requesting other recommendations for pain unable to walk correctly please advise     Call back#: [phone]

## 2023-11-09 NOTE — TELEPHONE ENCOUNTER
I would recommend that he use the tramadol as needed for pain. He does not have to take it on a regular basis or for long term, however it is not unusual to have increased pain after this type of procedure. This is exactly the scenario that the tramadol is intended for.

## 2023-11-11 ENCOUNTER — OFFICE VISIT (OUTPATIENT)
Dept: URGENT CARE | Facility: MEDICAL CENTER | Age: 68
End: 2023-11-11
Payer: COMMERCIAL

## 2023-11-11 VITALS
WEIGHT: 315 LBS | SYSTOLIC BLOOD PRESSURE: 118 MMHG | DIASTOLIC BLOOD PRESSURE: 56 MMHG | OXYGEN SATURATION: 98 % | TEMPERATURE: 98.4 F | HEART RATE: 87 BPM | RESPIRATION RATE: 18 BRPM | BODY MASS INDEX: 47.99 KG/M2

## 2023-11-11 DIAGNOSIS — J04.0 ACUTE LARYNGITIS: Primary | ICD-10-CM

## 2023-11-11 PROCEDURE — S9083 URGENT CARE CENTER GLOBAL: HCPCS | Performed by: STUDENT IN AN ORGANIZED HEALTH CARE EDUCATION/TRAINING PROGRAM

## 2023-11-11 PROCEDURE — 99213 OFFICE O/P EST LOW 20 MIN: CPT | Performed by: STUDENT IN AN ORGANIZED HEALTH CARE EDUCATION/TRAINING PROGRAM

## 2023-11-11 RX ORDER — DUTASTERIDE 0.5 MG/1
0.5 CAPSULE, LIQUID FILLED ORAL DAILY
COMMUNITY
Start: 2023-10-26

## 2023-11-11 NOTE — PROGRESS NOTES
[facility]        NAME: Lisa Esposito is a 70 y.o. male  : 1955    MRN: [id number]    Assessment and Plan   Acute laryngitis [J04.0]  1. Acute laryngitis          URI symptoms appear consistent with a viral etiology, no bacterial infection suspected at this time. Recommended conservative measures for acute laryngitis. If not resolved in 3 weeks, will need to be seen by ENT specialist.    Patient Instructions       Follow up with PCP in 3-5 days. Proceed to  ER if symptoms worsen. Chief Complaint     Chief Complaint   Patient presents with    Cold Like Symptoms     Loss of voice, nasal congestion, and cough. Mucinex and tylenol         History of Present Illness       HPI    Patient presents with onset of sinus pressure for a few days now with nasal congestion and nonproductive cough. Reports onset of voice hoarseness this morning. Denies sore throat, trouble swallowing. Review of Systems   Review of Systems   Constitutional:  Negative for chills and fever. HENT:  Positive for sore throat and voice change. Negative for ear pain and trouble swallowing. Eyes:  Negative for pain and visual disturbance. Respiratory:  Negative for cough and shortness of breath. Cardiovascular:  Negative for chest pain and palpitations. Gastrointestinal:  Negative for abdominal pain and vomiting. Genitourinary:  Negative for dysuria and hematuria. Musculoskeletal:  Negative for arthralgias and back pain. Skin:  Negative for color change and rash. Neurological:  Negative for seizures and syncope. All other systems reviewed and are negative.     Current Medications       Current Outpatient Medications:     acetaminophen (TYLENOL) 500 mg tablet, Take 500 mg by mouth every 6 (six) hours as needed for mild pain, Disp: , Rfl:     alfuzosin (UROXATRAL) 10 mg 24 hr tablet, Take 10 mg by mouth daily, Disp: , Rfl:     allopurinol (ZYLOPRIM) 300 mg tablet, Take 300 mg by mouth daily, Disp: , Rfl: ammonium lactate (LAC-HYDRIN) 12 % lotion, Apply topically 2 (two) times a day as needed for dry skin, Disp: , Rfl:     Diclofenac Sodium (VOLTAREN) 1 %, Apply 2 g topically 4 (four) times a day, Disp: 100 g, Rfl: 0    dutasteride (AVODART) 0.5 mg capsule, Take 0.5 mg by mouth daily, Disp: , Rfl:     ferrous sulfate 325 (65 Fe) mg tablet, Take 325 mg by mouth, Disp: , Rfl:     Glucosamine-Chondroit-Vit C-Mn (Glucosamine 1500 Complex) CAPS, Take by mouth, Disp: , Rfl:     halobetasol (ULTRAVATE) 0.05 % ointment, APPLY TO AFFECTED AREA ON LEG TWICE DAILY, Disp: , Rfl:     L-ARGININE-500 PO, Take 500 mg by mouth 2 (two) times a day , Disp: , Rfl:     loteprednol etabonate (LOTEMAX) 0.5 % ophthalmic suspension, 1 drop 4 (four) times a day, Disp: , Rfl:     metoprolol succinate (TOPROL-XL) 25 mg 24 hr tablet, Take 1 tablet (25 mg total) by mouth daily, Disp: 90 tablet, Rfl: 2    Omega-3 Fatty Acids (fish oil) 1,000 mg, Take 1,000 mg by mouth 2 (two) times a day, Disp: , Rfl:     patient supplied medication, Take 1 each by mouth 2 (two) times a day, Disp: , Rfl:     pregabalin (LYRICA) 100 mg capsule, Take 1 capsule (100 mg total) by mouth 3 (three) times a day, Disp: 42 capsule, Rfl: 0    rivaroxaban (Xarelto) 20 mg tablet, Take 1 tablet (20 mg total) by mouth daily, Disp: 90 tablet, Rfl: 2    spironolactone (ALDACTONE) 25 mg tablet, Take 1 tablet (25 mg total) by mouth 2 (two) times a day, Disp: 180 tablet, Rfl: 1    tadalafil (CIALIS) 5 MG tablet, Take 5 mg by mouth daily as needed for erectile dysfunction, Disp: , Rfl:     torsemide (DEMADEX) 20 mg tablet, Take 2 tablets (40 mg total) by mouth 2 (two) times a day, Disp: 180 tablet, Rfl: 2    traMADol (ULTRAM) 50 mg tablet, Take 1 tablet (50 mg total) by mouth 2 (two) times a day as needed for moderate pain, Disp: 60 tablet, Rfl: 1    VITAMIN D PO, Take 2,000 Units by mouth daily , Disp: , Rfl:     multivitamin (THERAGRAN) TABS, Take 1 tablet by mouth daily, Disp: , Rfl:     Current Allergies     Allergies as of 11/11/2023 - Reviewed 11/11/2023   Allergen Reaction Noted    Penicillins Anaphylaxis 11/09/2017    Sulfa antibiotics Anaphylaxis 11/09/2017    Levofloxacin Other (See Comments) 11/22/2022            The following portions of the patient's history were reviewed and updated as appropriate: allergies, current medications, past family history, past medical history, past social history, past surgical history and problem list.     Past Medical History:   Diagnosis Date    A-fib ([address])     ALANNA (acute kidney injury) ([address]) 6/15/2021    Arthritis     CPAP (continuous positive airway pressure) dependence     Irregular heart beat     Lymphedema     Sleep apnea     Urinary frequency     Wears glasses     Wears partial dentures     lower partial       Past Surgical History:   Procedure Laterality Date    ABLATION SAPHENOUS VEIN W/ RFA      COLONOSCOPY      JOINT REPLACEMENT  Left Hip 4 /21/2009    NERVE BLOCK Bilateral 7/18/2023    Procedure: BLOCK MEDIAL BRANCH B/L L4-L5 AND L5-S1 MBB #1;  Surgeon: Madonna Durham MD;  [facility]: [facility];  Service: Pain Management     NERVE BLOCK Bilateral 8/17/2023    Procedure: BLOCK MEDIAL BRANCH  B/L L4-5 and L5-S1 MBB #2;  Surgeon: Madonna Durham MD;  [facility];  Service: Pain Management     AR CYSTO INSERTION TRANSPROSTATIC IMPLANT SINGLE N/A 11/13/2017    Procedure: CYSTOSCOPY WITH INSERTION Wilnette Keys;  Surgeon: Shira Fortune DO;  [facility];  Service: Urology    RADIOFREQUENCY ABLATION Left 10/5/2023    Procedure: ABLATION RADIO FREQUENCY (RFA) LEFT L4-5 AND L5-S1 RFA;  Surgeon: Madonna Durham MD;  [facility]: [facility];  Service: Pain Management     RADIOFREQUENCY ABLATION Right 11/1/2023    Procedure: ABLATION RADIO FREQUENCY (RFA) RIGHT L4-5 AND L5-S1 RFA;  Surgeon: Madonna Durham MD;  [facility]: [facility];  Service: Pain Management     TONSILLECTOMY         Family History   Problem Relation Age of Onset    Heart disease Mother     Lymphoma Father     Cancer Father     Heart disease Sister     Hypertension Brother     Diabetes Neg Hx     Thyroid disease Neg Hx     Stroke Neg Hx          Medications have been verified. Objective   /56   Pulse 87   Temp 98.4 °F (36.9 °C)   Resp 18   Wt (!) 147 kg (325 lb)   SpO2 98%   BMI 47.99 kg/m²        Physical Exam     Physical Exam  Constitutional:       Appearance: Normal appearance. HENT:      Head: Normocephalic and atraumatic. Nose: Nose normal.      Mouth/Throat:      Mouth: Mucous membranes are moist.      Pharynx: Oropharynx is clear. No oropharyngeal exudate or posterior oropharyngeal erythema. Eyes:      General: No scleral icterus. Right eye: No discharge. Left eye: No discharge. Extraocular Movements: Extraocular movements intact. Conjunctiva/sclera: Conjunctivae normal.   Cardiovascular:      Rate and Rhythm: Normal rate. Pulmonary:      Effort: Pulmonary effort is normal. No respiratory distress. Neurological:      General: No focal deficit present. Mental Status: He is alert and oriented to person, place, and time.    Psychiatric:         Mood and Affect: Mood normal.         Behavior: Behavior normal.

## 2023-11-13 ENCOUNTER — APPOINTMENT (EMERGENCY)
Dept: RADIOLOGY | Facility: HOSPITAL | Age: 68
DRG: 193 | End: 2023-11-13
Payer: COMMERCIAL

## 2023-11-13 ENCOUNTER — HOSPITAL ENCOUNTER (INPATIENT)
Facility: HOSPITAL | Age: 68
LOS: 4 days | Discharge: HOME | DRG: 193 | End: 2023-11-17
Attending: EMERGENCY MEDICINE
Payer: COMMERCIAL

## 2023-11-13 DIAGNOSIS — J96.01 ACUTE HYPOXIC RESPIRATORY FAILURE (HCC): Primary | ICD-10-CM

## 2023-11-13 DIAGNOSIS — R06.2 WHEEZING: ICD-10-CM

## 2023-11-13 DIAGNOSIS — J40 BRONCHITIS: ICD-10-CM

## 2023-11-13 LAB
2HR DELTA HS TROPONIN: -5 NG/L
ALBUMIN SERPL BCP-MCNC: 3.9 G/DL (ref 3.5–5)
ALP SERPL-CCNC: 57 U/L (ref 34–104)
ALT SERPL W P-5'-P-CCNC: 14 U/L (ref 7–52)
ANION GAP SERPL CALCULATED.3IONS-SCNC: 5 MMOL/L
AST SERPL W P-5'-P-CCNC: 35 U/L (ref 13–39)
ATRIAL RATE: 83 BPM
ATRIAL RATE: 97 BPM
BASOPHILS # BLD AUTO: 0.01 THOUSANDS/ÂΜL (ref 0–0.1)
BASOPHILS NFR BLD AUTO: 0 % (ref 0–1)
BILIRUB SERPL-MCNC: 0.9 MG/DL (ref 0.2–1)
BUN SERPL-MCNC: 14 MG/DL (ref 5–25)
CALCIUM SERPL-MCNC: 9.4 MG/DL (ref 8.4–10.2)
CARDIAC TROPONIN I PNL SERPL HS: 20 NG/L
CARDIAC TROPONIN I PNL SERPL HS: 25 NG/L
CHLORIDE SERPL-SCNC: 99 MMOL/L (ref 96–108)
CO2 SERPL-SCNC: 31 MMOL/L (ref 21–32)
CREAT SERPL-MCNC: 0.78 MG/DL (ref 0.6–1.3)
EOSINOPHIL # BLD AUTO: 0.03 THOUSAND/ÂΜL (ref 0–0.61)
EOSINOPHIL NFR BLD AUTO: 1 % (ref 0–6)
ERYTHROCYTE [DISTWIDTH] IN BLOOD BY AUTOMATED COUNT: 14.2 % (ref 11.6–15.1)
FLUAV RNA RESP QL NAA+PROBE: NEGATIVE
FLUBV RNA RESP QL NAA+PROBE: NEGATIVE
GFR SERPL CREATININE-BSD FRML MDRD: 92 ML/MIN/1.73SQ M
GLUCOSE SERPL-MCNC: 101 MG/DL (ref 65–140)
HCT VFR BLD AUTO: 37.2 % (ref 36.5–49.3)
HGB BLD-MCNC: 12.3 G/DL (ref 12–17)
IMM GRANULOCYTES # BLD AUTO: 0.03 THOUSAND/UL (ref 0–0.2)
IMM GRANULOCYTES NFR BLD AUTO: 1 % (ref 0–2)
LACTATE SERPL-SCNC: 0.9 MMOL/L (ref 0.5–2)
LYMPHOCYTES # BLD AUTO: 0.79 THOUSANDS/ÂΜL (ref 0.6–4.47)
LYMPHOCYTES NFR BLD AUTO: 13 % (ref 14–44)
MCH RBC QN AUTO: 30.6 PG (ref 26.8–34.3)
MCHC RBC AUTO-ENTMCNC: 33.1 G/DL (ref 31.4–37.4)
MCV RBC AUTO: 93 FL (ref 82–98)
MONOCYTES # BLD AUTO: 0.83 THOUSAND/ÂΜL (ref 0.17–1.22)
MONOCYTES NFR BLD AUTO: 13 % (ref 4–12)
NEUTROPHILS # BLD AUTO: 4.49 THOUSANDS/ÂΜL (ref 1.85–7.62)
NEUTS SEG NFR BLD AUTO: 72 % (ref 43–75)
NRBC BLD AUTO-RTO: 0 /100 WBCS
P AXIS: 60 DEGREES
P AXIS: 85 DEGREES
PLATELET # BLD AUTO: 148 THOUSANDS/UL (ref 149–390)
PMV BLD AUTO: 10.3 FL (ref 8.9–12.7)
POTASSIUM SERPL-SCNC: 4.5 MMOL/L (ref 3.5–5.3)
PR INTERVAL: 170 MS
PR INTERVAL: 178 MS
PROCALCITONIN SERPL-MCNC: 0.12 NG/ML
PROT SERPL-MCNC: 7.4 G/DL (ref 6.4–8.4)
QRS AXIS: 210 DEGREES
QRS AXIS: 216 DEGREES
QRSD INTERVAL: 152 MS
QRSD INTERVAL: 156 MS
QT INTERVAL: 376 MS
QT INTERVAL: 388 MS
QTC INTERVAL: 455 MS
QTC INTERVAL: 477 MS
RBC # BLD AUTO: 4.02 MILLION/UL (ref 3.88–5.62)
RSV RNA RESP QL NAA+PROBE: NEGATIVE
SARS-COV-2 RNA RESP QL NAA+PROBE: NEGATIVE
SODIUM SERPL-SCNC: 135 MMOL/L (ref 135–147)
T WAVE AXIS: 22 DEGREES
T WAVE AXIS: 24 DEGREES
VENTRICULAR RATE: 83 BPM
VENTRICULAR RATE: 97 BPM
WBC # BLD AUTO: 6.18 THOUSAND/UL (ref 4.31–10.16)

## 2023-11-13 PROCEDURE — 84145 PROCALCITONIN (PCT): CPT | Performed by: EMERGENCY MEDICINE

## 2023-11-13 PROCEDURE — 99291 CRITICAL CARE FIRST HOUR: CPT | Performed by: EMERGENCY MEDICINE

## 2023-11-13 PROCEDURE — 36415 COLL VENOUS BLD VENIPUNCTURE: CPT | Performed by: EMERGENCY MEDICINE

## 2023-11-13 PROCEDURE — 83605 ASSAY OF LACTIC ACID: CPT | Performed by: EMERGENCY MEDICINE

## 2023-11-13 PROCEDURE — 93010 ELECTROCARDIOGRAM REPORT: CPT | Performed by: INTERNAL MEDICINE

## 2023-11-13 PROCEDURE — 0241U HB NFCT DS VIR RESP RNA 4 TRGT: CPT | Performed by: EMERGENCY MEDICINE

## 2023-11-13 PROCEDURE — 99223 1ST HOSP IP/OBS HIGH 75: CPT | Performed by: STUDENT IN AN ORGANIZED HEALTH CARE EDUCATION/TRAINING PROGRAM

## 2023-11-13 PROCEDURE — 85025 COMPLETE CBC W/AUTO DIFF WBC: CPT | Performed by: EMERGENCY MEDICINE

## 2023-11-13 PROCEDURE — 80053 COMPREHEN METABOLIC PANEL: CPT | Performed by: EMERGENCY MEDICINE

## 2023-11-13 PROCEDURE — 99285 EMERGENCY DEPT VISIT HI MDM: CPT

## 2023-11-13 PROCEDURE — 71046 X-RAY EXAM CHEST 2 VIEWS: CPT

## 2023-11-13 PROCEDURE — 94640 AIRWAY INHALATION TREATMENT: CPT

## 2023-11-13 PROCEDURE — 93005 ELECTROCARDIOGRAM TRACING: CPT

## 2023-11-13 PROCEDURE — 87040 BLOOD CULTURE FOR BACTERIA: CPT | Performed by: EMERGENCY MEDICINE

## 2023-11-13 PROCEDURE — 84484 ASSAY OF TROPONIN QUANT: CPT | Performed by: EMERGENCY MEDICINE

## 2023-11-13 RX ORDER — GUAIFENESIN 600 MG/1
600 TABLET, EXTENDED RELEASE ORAL EVERY 12 HOURS SCHEDULED
Status: DISCONTINUED | OUTPATIENT
Start: 2023-11-13 | End: 2023-11-17 | Stop reason: HOSPADM

## 2023-11-13 RX ORDER — METOPROLOL SUCCINATE 25 MG/1
25 TABLET, EXTENDED RELEASE ORAL DAILY
Status: DISCONTINUED | OUTPATIENT
Start: 2023-11-14 | End: 2023-11-17 | Stop reason: HOSPADM

## 2023-11-13 RX ORDER — FLUTICASONE PROPIONATE 50 MCG
1 SPRAY, SUSPENSION (ML) NASAL DAILY
Status: DISCONTINUED | OUTPATIENT
Start: 2023-11-14 | End: 2023-11-17 | Stop reason: HOSPADM

## 2023-11-13 RX ORDER — LORATADINE 10 MG/1
10 TABLET ORAL DAILY
Status: DISCONTINUED | OUTPATIENT
Start: 2023-11-13 | End: 2023-11-17 | Stop reason: HOSPADM

## 2023-11-13 RX ORDER — PREDNISONE 20 MG/1
60 TABLET ORAL DAILY
Status: DISCONTINUED | OUTPATIENT
Start: 2023-11-14 | End: 2023-11-17 | Stop reason: HOSPADM

## 2023-11-13 RX ORDER — PREDNISONE 20 MG/1
60 TABLET ORAL ONCE
Status: COMPLETED | OUTPATIENT
Start: 2023-11-13 | End: 2023-11-13

## 2023-11-13 RX ORDER — HYDROCODONE BITARTRATE AND HOMATROPINE METHYLBROMIDE ORAL SOLUTION 5; 1.5 MG/5ML; MG/5ML
5 LIQUID ORAL EVERY 4 HOURS PRN
Status: DISCONTINUED | OUTPATIENT
Start: 2023-11-13 | End: 2023-11-17 | Stop reason: HOSPADM

## 2023-11-13 RX ORDER — FINASTERIDE 5 MG/1
5 TABLET, FILM COATED ORAL DAILY
Status: DISCONTINUED | OUTPATIENT
Start: 2023-11-14 | End: 2023-11-17 | Stop reason: HOSPADM

## 2023-11-13 RX ORDER — BENZONATATE 100 MG/1
100 CAPSULE ORAL 3 TIMES DAILY
Status: DISCONTINUED | OUTPATIENT
Start: 2023-11-13 | End: 2023-11-17 | Stop reason: HOSPADM

## 2023-11-13 RX ORDER — ALBUTEROL SULFATE 2.5 MG/3ML
5 SOLUTION RESPIRATORY (INHALATION) ONCE
Status: COMPLETED | OUTPATIENT
Start: 2023-11-13 | End: 2023-11-13

## 2023-11-13 RX ADMIN — PREDNISONE 60 MG: 20 TABLET ORAL at 17:22

## 2023-11-13 RX ADMIN — LORATADINE 10 MG: 10 TABLET ORAL at 18:48

## 2023-11-13 RX ADMIN — ALBUTEROL SULFATE 5 MG: 2.5 SOLUTION RESPIRATORY (INHALATION) at 14:15

## 2023-11-13 RX ADMIN — HYDROCODONE BITARTRATE AND HOMATROPINE METHYLBROMIDE 5 ML: 5; 1.5 SYRUP ORAL at 18:48

## 2023-11-13 RX ADMIN — BENZONATATE 100 MG: 100 CAPSULE ORAL at 21:15

## 2023-11-13 RX ADMIN — IPRATROPIUM BROMIDE 0.5 MG: 0.5 SOLUTION RESPIRATORY (INHALATION) at 14:15

## 2023-11-13 RX ADMIN — GUAIFENESIN 600 MG: 600 TABLET, EXTENDED RELEASE ORAL at 21:15

## 2023-11-13 NOTE — ASSESSMENT & PLAN NOTE
69-year-old male presented with increasing shortness of breath, cough, congestion and postnasal drip  Noted to be hypoxic at 88 and sent from PCP office for evaluation  COVID, RSV, flu negative, chest x-ray without infiltrate suggest pneumonia  Labs are fairly unremarkable  Suspect likely viral syndrome  We will add Mucinex, Claritin, Flonase and oral steroids  Currently requiring 3 L nasal cannula, wean oxygen as able

## 2023-11-13 NOTE — H&P
[facility]  H&P  Name: Alicia Black 70 y.o. male I MRN: [id number]  Unit/Bed#: ED-04 I Date of Admission: 11/13/2023   Date of Service: 11/13/2023 I Hospital Day: 0      Assessment/Plan   Stage 3a chronic kidney disease [facility])  Assessment & Plan  Lab Results   Component Value Date    EGFR 92 11/13/2023    EGFR 66 10/04/2023    EGFR 89 04/19/2023    CREATININE 0.78 11/13/2023    CREATININE 1.12 10/04/2023    CREATININE 0.84 04/19/2023     Currently stable    Chronic diastolic heart failure (HCC)  Assessment & Plan  Wt Readings from Last 3 Encounters:   11/13/23 (!) 146 kg (321 lb 6.9 oz)   11/11/23 (!) 147 kg (325 lb)   11/01/23 (!) 141 kg (311 lb)     Currently euvolemic, reports poor p.o. intake  Hold diuretics at this time          Obesity, Class III, BMI 40-49.9 (morbid obesity) ([address])  Assessment & Plan  Would benefit with weight loss, dietary changes    Atrial fibrillation (HCC)  Assessment & Plan  History of A-fib/a flutter  EKG showing sinus rhythm with PVCs  Continue Xarelto, Toprol-XL    * SOB (shortness of breath)  Assessment & Plan  21-year-old male presented with increasing shortness of breath, cough, congestion and postnasal drip  Noted to be hypoxic at 88 and sent from PCP office for evaluation  COVID, RSV, flu negative, chest x-ray without infiltrate suggest pneumonia  Labs are fairly unremarkable  Suspect likely viral syndrome  We will add Mucinex, Claritin, Flonase and oral steroids  Currently requiring 3 L nasal cannula, wean oxygen as able             VTE Prophylaxis: Rivaroxaban (Xarelto)  / sequential compression device   Code Status: FC  POLST: There is no POLST form on file for this patient (pre-hospital)  Discussion with family: wife bedside    Anticipated Length of Stay:  Patient will be admitted on an Inpatient basis with an anticipated length of stay of  2 midnights.    Justification for Hospital Stay: supportive care, wean oxygen    Chief Complaint: SOB    History of Present Illness:    Maria Esther Bianchi is a 70 y.o. male who presents with cough and shortness of breath. Presented from PCP office with reports of cough, congestion and difficulty breathing. Was seen last Friday at urgent care, recommended supportive care. Seen at PCP office and sent here as patient was noted to be hypoxic. Flu, COVID RSV have been negative. Chest x-ray without evidence of pneumonia. Suspected patient likely had viral syndrome, but did require oxygen with O2 sats noted to be 88. Reports cough with some phlegm, chest tightness. Denies any fevers, chills, nausea or vomiting. Review of Systems:    Review of Systems   Respiratory:  Positive for cough and shortness of breath.         Past Medical and Surgical History:     Past Medical History:   Diagnosis Date    A-fib ([address])     ALANNA (acute kidney injury) ([address]) 6/15/2021    Arthritis     CPAP (continuous positive airway pressure) dependence     Irregular heart beat     Lymphedema     Sleep apnea     Urinary frequency     Wears glasses     Wears partial dentures     lower partial       Past Surgical History:   Procedure Laterality Date    ABLATION SAPHENOUS VEIN W/ RFA      COLONOSCOPY      JOINT REPLACEMENT  Left Hip 4 /21/2009    NERVE BLOCK Bilateral 7/18/2023    Procedure: BLOCK MEDIAL BRANCH B/L L4-L5 AND L5-S1 MBB #1;  Surgeon: Roni Padron MD;  [facility];  Service: Pain Management     NERVE BLOCK Bilateral 8/17/2023    Procedure: BLOCK MEDIAL BRANCH  B/L L4-5 and L5-S1 MBB #2;  Surgeon: Roni Padron MD;  [facility];  Service: Pain Management     [place] CYSTO INSERTION TRANSPROSTATIC IMPLANT SINGLE N/A 11/13/2017    Procedure: CYSTOSCOPY WITH INSERTION UROLIFT;  Surgeon: Jorge L Gonsales DO;  [facility];  Service: Urology    RADIOFREQUENCY ABLATION Left 10/5/2023    Procedure: ABLATION RADIO FREQUENCY (RFA) LEFT L4-5 AND L5-S1 RFA;  Surgeon: Roni Padron MD;  [facility];  Service: Pain Management     RADIOFREQUENCY ABLATION Right 11/1/2023    Procedure: ABLATION RADIO FREQUENCY (RFA) RIGHT L4-5 AND L5-S1 RFA;  Surgeon: Darcy Herman MD;  [facility];  Service: Pain Management     TONSILLECTOMY         Meds/Allergies:    Prior to Admission medications    Medication Sig Start Date End Date Taking?  Authorizing Provider   acetaminophen (TYLENOL) 500 mg tablet Take 500 mg by mouth every 6 (six) hours as needed for mild pain    Historical Provider, MD   alfuzosin (UROXATRAL) 10 mg 24 hr tablet Take 10 mg by mouth daily    Historical Provider, MD   allopurinol (ZYLOPRIM) 300 mg tablet Take 300 mg by mouth daily 5/1/23   Historical Provider, MD   ammonium lactate (LAC-HYDRIN) 12 % lotion Apply topically 2 (two) times a day as needed for dry skin    Historical Provider, MD   Diclofenac Sodium (VOLTAREN) 1 % Apply 2 g topically 4 (four) times a day 9/12/22   Kian Ngo MD   dutasteride (AVODART) 0.5 mg capsule Take 0.5 mg by mouth daily 10/26/23   Historical Provider, MD   ferrous sulfate 325 (65 Fe) mg tablet Take 325 mg by mouth    Historical Provider, MD   Glucosamine-Chondroit-Vit C-Mn (Glucosamine 1500 Complex) CAPS Take by mouth    Historical Provider, MD   halobetasol (ULTRAVATE) 0.05 % ointment APPLY TO AFFECTED AREA ON LEG TWICE DAILY 9/22/20   Historical Provider, MD   L-ARGININE-500 PO Take 500 mg by mouth 2 (two) times a day     Historical Provider, MD   loteprednol etabonate (LOTEMAX) 0.5 % ophthalmic suspension 1 drop 4 (four) times a day    Historical Provider, MD   metoprolol succinate (TOPROL-XL) 25 mg 24 hr tablet Take 1 tablet (25 mg total) by mouth daily 6/28/23   Norm DO Lena   multivitamin [facility]) TABS Take 1 tablet by mouth daily    Historical Provider, MD   Omega-3 Fatty Acids (fish oil) 1,000 mg Take 1,000 mg by mouth 2 (two) times a day    Historical Provider, MD   patient supplied medication Take 1 each by mouth 2 (two) times a day    Historical Provider, MD   pregabalin (LYRICA) 100 mg capsule Take 1 capsule (100 mg total) by mouth 3 (three) times a day 1/29/21   Fabiola Valerio,    rivaroxaban (Xarelto) 20 mg tablet Take 1 tablet (20 mg total) by mouth daily 6/28/23   Tawanda Motts, DO   spironolactone (ALDACTONE) 25 mg tablet Take 1 tablet (25 mg total) by mouth 2 (two) times a day 4/14/23   Tawanda Motts, DO   tadalafil (CIALIS) 5 MG tablet Take 5 mg by mouth daily as needed for erectile dysfunction    Historical Provider, MD   torsemide (DEMADEX) 20 mg tablet Take 2 tablets (40 mg total) by mouth 2 (two) times a day 10/2/23   Tawanda Motts, DO   traMADol [facility]) 50 mg tablet Take 1 tablet (50 mg total) by mouth 2 (two) times a day as needed for moderate pain 6/1/23   NONA Lea   VITAMIN D PO Take 2,000 Units by mouth daily     Historical Provider, MD RACHEL have reviewed home medications with patient personally. Allergies:    Allergies   Allergen Reactions    Penicillins Anaphylaxis    Sulfa Antibiotics Anaphylaxis    Levofloxacin Other (See Comments)       Social History:     Marital Status: /Civil Union   Occupation:   Patient Pre-hospital Living Situation: home  Patient Pre-hospital Level of Mobility: amb  Patient Pre-hospital Diet Restrictions: none  Substance Use History:   Social History     Substance and Sexual Activity   Alcohol Use Yes    Alcohol/week: 5.0 standard drinks of alcohol    Types: 3 Glasses of wine, 2 Standard drinks or equivalent per week    Comment: socially     Social History     Tobacco Use   Smoking Status Never   Smokeless Tobacco Never     Social History     Substance and Sexual Activity   Drug Use No       Family History:    Family History   Problem Relation Age of Onset    Heart disease Mother     Lymphoma Father     Cancer Father     Heart disease Sister     Hypertension Brother     Diabetes Neg Hx     Thyroid disease Neg Hx     Stroke Neg Hx Physical Exam:     Vitals:   Blood Pressure: 122/58 (11/13/23 1632)  Pulse: 91 (11/13/23 1700)  Temperature: 99.9 °F (37.7 °C) (11/13/23 1259)  Temp Source: Oral (11/13/23 1259)  Respirations: 18 (11/13/23 1700)  Weight - Scale: (!) 146 kg (321 lb 6.9 oz) (11/13/23 1259)  SpO2: 94 % (11/13/23 1700)    Physical Exam  Vitals and nursing note reviewed. Constitutional:       Appearance: He is obese. HENT:      Head: Normocephalic. Eyes:      Conjunctiva/sclera: Conjunctivae normal.   Cardiovascular:      Rate and Rhythm: Normal rate. Pulmonary:      Effort: Pulmonary effort is normal. No respiratory distress. Abdominal:      General: Bowel sounds are normal. There is no distension. Palpations: Abdomen is soft. Musculoskeletal:         General: No swelling. Skin:     General: Skin is warm. Neurological:      General: No focal deficit present. Mental Status: He is alert. Mental status is at baseline. Additional Data:     Lab Results: I have personally reviewed pertinent reports. Results from last 7 days   Lab Units 11/13/23  1404   WBC Thousand/uL 6.18   HEMOGLOBIN g/dL 12.3   HEMATOCRIT % 37.2   PLATELETS Thousands/uL 148*   NEUTROS PCT % 72   LYMPHS PCT % 13*   MONOS PCT % 13*   EOS PCT % 1     Results from last 7 days   Lab Units 11/13/23  1404   SODIUM mmol/L 135   POTASSIUM mmol/L 4.5   CHLORIDE mmol/L 99   CO2 mmol/L 31   BUN mg/dL 14   CREATININE mg/dL 0.78   ANION GAP mmol/L 5   CALCIUM mg/dL 9.4   ALBUMIN g/dL 3.9   TOTAL BILIRUBIN mg/dL 0.90   ALK PHOS U/L 57   ALT U/L 14   AST U/L 35   GLUCOSE RANDOM mg/dL 101                 Results from last 7 days   Lab Units 11/13/23  1404   LACTIC ACID mmol/L 0.9   PROCALCITONIN ng/ml 0.12       Imaging: I have personally reviewed pertinent reports.       XR chest pa & lateral   ED Interpretation by Huy Hall DO (11/13 1539)   No acute disease    Image independently interpreted by myself            EKG, Pathology, and Other Studies Reviewed on Admission:   EKG: SR with PVCs    Allscripts / Epic Records Reviewed: Yes     ** Please Note: This note has been constructed using a voice recognition system.  **

## 2023-11-13 NOTE — ASSESSMENT & PLAN NOTE
Lab Results   Component Value Date    EGFR 92 11/13/2023    EGFR 66 10/04/2023    EGFR 89 04/19/2023    CREATININE 0.78 11/13/2023    CREATININE 1.12 10/04/2023    CREATININE 0.84 04/19/2023     Currently stable

## 2023-11-13 NOTE — ASSESSMENT & PLAN NOTE
Wt Readings from Last 3 Encounters:   11/13/23 (!) 146 kg (321 lb 6.9 oz)   11/11/23 (!) 147 kg (325 lb)   11/01/23 (!) 141 kg (311 lb)     Currently euvolemic, reports poor p.o. intake  Hold diuretics at this time

## 2023-11-13 NOTE — ED NOTES
SpO2 consistently alarming at 81% room air. Pt placed on 3L nasal cannula, SpO2 marija to 93%.      Nehemias Mcdowell RN  11/13/23 4649

## 2023-11-14 ENCOUNTER — APPOINTMENT (INPATIENT)
Dept: CT IMAGING | Facility: HOSPITAL | Age: 68
DRG: 193 | End: 2023-11-14
Payer: COMMERCIAL

## 2023-11-14 PROBLEM — J18.9 PNEUMONIA: Status: ACTIVE | Noted: 2023-11-14

## 2023-11-14 PROCEDURE — 99232 SBSQ HOSP IP/OBS MODERATE 35: CPT | Performed by: HOSPITALIST

## 2023-11-14 PROCEDURE — G1004 CDSM NDSC: HCPCS

## 2023-11-14 PROCEDURE — 71275 CT ANGIOGRAPHY CHEST: CPT

## 2023-11-14 RX ORDER — ACETAMINOPHEN 325 MG/1
650 TABLET ORAL EVERY 6 HOURS PRN
Status: DISCONTINUED | OUTPATIENT
Start: 2023-11-14 | End: 2023-11-17 | Stop reason: HOSPADM

## 2023-11-14 RX ORDER — HYDROCODONE BITARTRATE AND ACETAMINOPHEN 5; 325 MG/1; MG/1
1 TABLET ORAL EVERY 6 HOURS PRN
Status: DISCONTINUED | OUTPATIENT
Start: 2023-11-14 | End: 2023-11-17 | Stop reason: HOSPADM

## 2023-11-14 RX ORDER — NYSTATIN 100000 [USP'U]/G
POWDER TOPICAL 2 TIMES DAILY
Status: DISCONTINUED | OUTPATIENT
Start: 2023-11-14 | End: 2023-11-17 | Stop reason: HOSPADM

## 2023-11-14 RX ORDER — SODIUM CHLORIDE 9 MG/ML
75 INJECTION, SOLUTION INTRAVENOUS CONTINUOUS
Status: DISCONTINUED | OUTPATIENT
Start: 2023-11-14 | End: 2023-11-17 | Stop reason: HOSPADM

## 2023-11-14 RX ADMIN — NYSTATIN: 100000 POWDER TOPICAL at 09:11

## 2023-11-14 RX ADMIN — METOPROLOL SUCCINATE 25 MG: 25 TABLET, EXTENDED RELEASE ORAL at 09:10

## 2023-11-14 RX ADMIN — FLUTICASONE PROPIONATE 1 SPRAY: 50 SPRAY, METERED NASAL at 09:11

## 2023-11-14 RX ADMIN — RIVAROXABAN 20 MG: 20 TABLET, FILM COATED ORAL at 09:10

## 2023-11-14 RX ADMIN — PREDNISONE 60 MG: 20 TABLET ORAL at 09:11

## 2023-11-14 RX ADMIN — FINASTERIDE 5 MG: 5 TABLET, FILM COATED ORAL at 09:10

## 2023-11-14 RX ADMIN — IOHEXOL 85 ML: 350 INJECTION, SOLUTION INTRAVENOUS at 13:00

## 2023-11-14 RX ADMIN — HYDROCODONE BITARTRATE AND HOMATROPINE METHYLBROMIDE 5 ML: 5; 1.5 SYRUP ORAL at 11:26

## 2023-11-14 RX ADMIN — HYDROCODONE BITARTRATE AND ACETAMINOPHEN 1 TABLET: 5; 325 TABLET ORAL at 20:52

## 2023-11-14 RX ADMIN — GUAIFENESIN 600 MG: 600 TABLET, EXTENDED RELEASE ORAL at 09:10

## 2023-11-14 RX ADMIN — GUAIFENESIN 600 MG: 600 TABLET, EXTENDED RELEASE ORAL at 20:52

## 2023-11-14 RX ADMIN — BENZONATATE 100 MG: 100 CAPSULE ORAL at 20:52

## 2023-11-14 RX ADMIN — HYDROCODONE BITARTRATE AND HOMATROPINE METHYLBROMIDE 5 ML: 5; 1.5 SYRUP ORAL at 15:11

## 2023-11-14 RX ADMIN — SODIUM CHLORIDE 75 ML/HR: 0.9 INJECTION, SOLUTION INTRAVENOUS at 12:26

## 2023-11-14 RX ADMIN — LORATADINE 10 MG: 10 TABLET ORAL at 09:10

## 2023-11-14 RX ADMIN — BENZONATATE 100 MG: 100 CAPSULE ORAL at 18:15

## 2023-11-14 RX ADMIN — BENZONATATE 100 MG: 100 CAPSULE ORAL at 09:10

## 2023-11-14 RX ADMIN — HYDROCODONE BITARTRATE AND HOMATROPINE METHYLBROMIDE 5 ML: 5; 1.5 SYRUP ORAL at 03:48

## 2023-11-14 NOTE — CASE MANAGEMENT
Case Management Discharge Planning Note    Patient name Philmoena Diver  Location East 4 /E4 MS 36-* MRN [id number]  : 1955 Date 2023       Current Admission Date: 2023  Current Admission Diagnosis:SOB (shortness of breath)   Patient Active Problem List    Diagnosis Date Noted    Chronic pain syndrome 2023    Lumbar spondylosis 2023    Chronic bilateral low back pain without sciatica 2023    Venous ulcer ([address]) 2023    Atrial flutter ([address]) 2021    Iron deficiency 2021    Stage 3a chronic kidney disease ([address]) 06/15/2021    Anemia 06/15/2021    Primary osteoarthritis 06/15/2021    Encounter for cardioversion procedure 2021    SOB (shortness of breath) 2021    Chronic diastolic heart failure ([address]) 2021    Pulmonary hypertension ([address]) 2020    Bilateral lower extremity edema 10/27/2019    Atrial fibrillation ([address]) 10/26/2019    Obstructive sleep apnea on CPAP 10/26/2019    Leukopenia 10/26/2019    Weakness of right upper extremity 10/26/2019    Paresthesias 10/26/2019    Cervical pain (neck) 10/26/2019    Obesity, Class III, BMI 40-49.9 (morbid obesity) ([address]) 10/26/2019    Thrombocytopenia ([address]) 10/26/2019    Lymphedema 2019    Obesity, morbid ([address]) 2015    Venous insufficiency of both lower extremities 2015      LOS (days): 1  Geometric Mean LOS (GMLOS) (days): 2.80  Days to GMLOS:1.9     OBJECTIVE:  Risk of Unplanned Readmission Score: 11.36         Current admission status: Inpatient   Preferred Pharmacy:   [address] [place] [address],  [address] [place] [address]  Phone: [phone] Fax: [phone] [address] [place] [id number]  Phone: [phone] Fax: [phone]    Primary Care Provider: Kanchan Bates MD    Primary Insurance: Lalit Damon [facility] REP  Secondary Insurance: DISCHARGE DETAILS:     Additional Comments: Case discussed in daily rounds. Per physician, expected to dc otomorrow. No need identified. cm available as needed.

## 2023-11-14 NOTE — CASE MANAGEMENT
Case Management Assessment & Discharge Planning Note    Patient name Tomasa Morning  Location East 4 /E4 [address] [facility]-* MRN [id number]  : 1955 Date 2023       Current Admission Date: 2023  Current Admission Diagnosis:SOB (shortness of breath)   Patient Active Problem List    Diagnosis Date Noted    Chronic pain syndrome 2023    Lumbar spondylosis 2023    Chronic bilateral low back pain without sciatica 2023    Venous ulcer ([address]) 2023    Atrial flutter ([address]) 2021    Iron deficiency 2021    Stage 3a chronic kidney disease ([address]) 06/15/2021    Anemia 06/15/2021    Primary osteoarthritis 06/15/2021    Encounter for cardioversion procedure 2021    SOB (shortness of breath) 2021    Chronic diastolic heart failure ([address]) 2021    Pulmonary hypertension ([address]) 2020    Bilateral lower extremity edema 10/27/2019    Atrial fibrillation ([address]) 10/26/2019    Obstructive sleep apnea on CPAP 10/26/2019    Leukopenia 10/26/2019    Weakness of right upper extremity 10/26/2019    Paresthesias 10/26/2019    Cervical pain (neck) 10/26/2019    Obesity, Class III, BMI 40-49.9 (morbid obesity) ([address]) 10/26/2019    Thrombocytopenia ([address]) 10/26/2019    Lymphedema 2019    Obesity, morbid ([address]) 2015    Venous insufficiency of both lower extremities 2015      LOS (days): 1  Geometric Mean LOS (GMLOS) (days):   Days to GMLOS:     OBJECTIVE:    Risk of Unplanned Readmission Score: 11.33         Current admission status: Inpatient       Preferred Pharmacy:   [address] [place] [address]  Phone: [phone] Fax: [phone] [address] [place] [address]  Phone: [phone] Fax: [phone]    Primary Care Provider: Geovanna Ivan MD    Primary Insurance: [facility] REP  Secondary Insurance:     ASSESSMENT:  Active Health Care Proxies    There are no active Health Care Proxies on file. Readmission Root Cause  30 Day Readmission: No    Patient Information  Admitted from[de-identified] Home  Mental Status: Alert  During Assessment patient was accompanied by: Not accompanied during assessment  Assessment information provided by[de-identified] Patient  Primary Caregiver: Self  Support Systems: Spouse/significant other  What city do you live in?: [address] entry access options. Select all that apply.: Stairs  Number of steps to enter home. : 1  Do the steps have railings?: Yes  Type of Current Residence: [facility]  In the last 12 months, was there a time when you were not able to pay the mortgage or rent on time?: No  In the last 12 months, was there a time when you did not have a steady place to sleep or slept in a shelter (including now)?: No  Homeless/housing insecurity resource given?: N/A  Living Arrangements: Lives w/ Spouse/significant other  Is patient a ?: No    Activities of Daily Living Prior to Admission  Functional Status: Independent  Completes ADLs independently?: Yes  Ambulates independently?: Yes  Does patient use assisted devices?: No  Does patient currently own DME?: No  Does patient have a history of Outpatient Therapy (PT/OT)?: Yes (2x in 2019 for hip replacement and knee surgery in 2019 with [facility] ptot)  Does the patient have a history of Short-Term Rehab?: No  Does patient have a history of HHC?: No  Does patient currently have [address]?: No         Patient Information Continued  Income Source: Pension/senior living  Does patient have prescription coverage?: Yes  Within the past 12 months, you worried that your food would run out before you got the money to buy more.: Never true  Within the past 12 months, the food you bought just didn't last and you didn't have money to get more.: Never true  Food insecurity resource given?: N/A  Does patient receive dialysis treatments?: No  Does patient have a history of substance abuse?: No  Does patient have a history of Mental Health Diagnosis?: No         Means of Transportation  Means of Transport to Appts[de-identified] Drives Self  In the past 12 months, has lack of transportation kept you from medical appointments or from getting medications?: No  In the past 12 months, has lack of transportation kept you from meetings, work, or from getting things needed for daily living?: No  Was application for public transport provided?: N/A        DISCHARGE DETAILS:    Discharge planning discussed with[de-identified] Patient  Freedom of Choice: Yes   Additional Comments: Cm met with pt at bedside for needs assessment. Pt is [place] and is independent around the home and in his daily life. Pt does not identify needs at this time. Pt's wife will pick him up to [place] home.  Cm available as needed

## 2023-11-14 NOTE — PLAN OF CARE
Problem: Nutrition/Hydration-ADULT  Goal: Nutrient/Hydration intake appropriate for improving, restoring or maintaining nutritional needs  Description: Monitor and assess patient's nutrition/hydration status for malnutrition. Collaborate with interdisciplinary team and initiate plan and interventions as ordered. Monitor patient's weight and dietary intake as ordered or per policy. Utilize nutrition screening tool and intervene as necessary. Determine patient's food preferences and provide high-protein, high-caloric foods as appropriate.      INTERVENTIONS:  - Monitor oral intake, urinary output, labs, and treatment plans  - Assess nutrition and hydration status and recommend course of action  - Evaluate amount of meals eaten  - Assist patient with eating if necessary   - Allow adequate time for meals  - Recommend/ encourage appropriate diets, oral nutritional supplements, and vitamin/mineral supplements  - Order, calculate, and assess calorie counts as needed  - Recommend, monitor, and adjust tube feedings and TPN/PPN based on assessed needs  - Assess need for intravenous fluids  - Provide specific nutrition/hydration education as appropriate  - Include patient/family/caregiver in decisions related to nutrition  Outcome: Progressing     Problem: PAIN - ADULT  Goal: Verbalizes/displays adequate comfort level or baseline comfort level  Description: Interventions:  - Encourage patient to monitor pain and request assistance  - Assess pain using appropriate pain scale  - Administer analgesics based on type and severity of pain and evaluate response  - Implement non-pharmacological measures as appropriate and evaluate response  - Consider cultural and social influences on pain and pain management  - Notify physician/advanced practitioner if interventions unsuccessful or patient reports new pain  Outcome: Progressing     Problem: INFECTION - ADULT  Goal: Absence or prevention of progression during hospitalization  Description: INTERVENTIONS:  - Assess and monitor for signs and symptoms of infection  - Monitor lab/diagnostic results  - Monitor all insertion sites, i.e. indwelling lines, tubes, and drains  - Monitor endotracheal if appropriate and nasal secretions for changes in amount and color  - [place] appropriate cooling/warming therapies per order  - Administer medications as ordered  - Instruct and encourage patient and family to use good hand hygiene technique  - Identify and instruct in appropriate isolation precautions for identified infection/condition  Outcome: Progressing  Goal: Absence of fever/infection during neutropenic period  Description: INTERVENTIONS:  - Monitor WBC    Outcome: Progressing     Problem: SAFETY ADULT  Goal: Patient will remain free of falls  Description: INTERVENTIONS:  - Educate patient/family on patient safety including physical limitations  - Instruct patient to call for assistance with activity   - Consult OT/PT to assist with strengthening/mobility   - Keep Call bell within reach  - Keep bed low and locked with side rails adjusted as appropriate  - Keep care items and personal belongings within reach  - Initiate and maintain comfort rounds  - Make Fall Risk Sign visible to staff  - Offer Toileting every 2 Hours, in advance of need  - Initiate/Maintain bed alarm  - Obtain necessary fall risk management equipment: bed alarm   - Apply yellow socks and bracelet for high fall risk patients  - Consider moving patient to room near nurses station  Outcome: Progressing  Goal: Maintain or return to baseline ADL function  Description: INTERVENTIONS:  -  Assess patient's ability to carry out ADLs; assess patient's baseline for ADL function and identify physical deficits which impact ability to perform ADLs (bathing, care of mouth/teeth, toileting, grooming, dressing, etc.)  - Assess/evaluate cause of self-care deficits   - Assess range of motion  - Assess patient's mobility; develop plan if impaired  - Assess patient's need for assistive devices and provide as appropriate  - Encourage maximum independence but intervene and supervise when necessary  - Involve family in performance of ADLs  - Assess for home care needs following discharge   - Consider OT consult to assist with ADL evaluation and planning for discharge  - Provide patient education as appropriate  Outcome: Progressing  Goal: Maintains/Returns to pre admission functional level  Description: INTERVENTIONS:  - Perform BMAT or MOVE assessment daily.   - Set and communicate daily mobility goal to care team and patient/family/caregiver. - Collaborate with rehabilitation services on mobility goals if consulted  - Perform Range of Motion 4 times a day. - Reposition patient every 2 hours.   - Dangle patient 4 times a day  - Stand patient 4 times a day  - Ambulate patient 4 times a day  - Out of bed to chair 4 times a day   - Out of bed for meals 4 times a day  - Out of bed for toileting  - Record patient progress and toleration of activity level   Outcome: Progressing     Problem: DISCHARGE PLANNING  Goal: Discharge to home or other facility with appropriate resources  Description: INTERVENTIONS:  - Identify barriers to discharge w/patient and caregiver  - Arrange for needed discharge resources and transportation as appropriate  - Identify discharge learning needs (meds, wound care, etc.)  - Arrange for interpretive services to assist at discharge as needed  - Refer to Case Management Department for coordinating discharge planning if the patient needs post-hospital services based on physician/advanced practitioner order or complex needs related to functional status, cognitive ability, or social support system  Outcome: Progressing     Problem: Knowledge Deficit  Goal: Patient/family/caregiver demonstrates understanding of disease process, treatment plan, medications, and discharge instructions  Description: Complete learning assessment and assess knowledge base.   Interventions:  - Provide teaching at level of understanding  - Provide teaching via preferred learning methods  Outcome: Progressing     Problem: CARDIOVASCULAR - ADULT  Goal: Maintains optimal cardiac output and hemodynamic stability  Description: INTERVENTIONS:  - Monitor I/O, vital signs and rhythm  - Monitor for S/S and trends of decreased cardiac output  - Administer and titrate ordered vasoactive medications to optimize hemodynamic stability  - Assess quality of pulses, skin color and temperature  - Assess for signs of decreased coronary artery perfusion  - Instruct patient to report change in severity of symptoms  Outcome: Progressing  Goal: Absence of cardiac dysrhythmias or at baseline rhythm  Description: INTERVENTIONS:  - Continuous cardiac monitoring, vital signs, obtain 12 lead EKG if ordered  - Administer antiarrhythmic and heart rate control medications as ordered  - Monitor electrolytes and administer replacement therapy as ordered  Outcome: Progressing     Problem: RESPIRATORY - ADULT  Goal: Achieves optimal ventilation and oxygenation  Description: INTERVENTIONS:  - Assess for changes in respiratory status  - Assess for changes in mentation and behavior  - Position to facilitate oxygenation and minimize respiratory effort  - Oxygen administered by appropriate delivery if ordered  - Initiate smoking cessation education as indicated  - Encourage broncho-pulmonary hygiene including cough, deep breathe, Incentive Spirometry  - Assess the need for suctioning and aspirate as needed  - Assess and instruct to report SOB or any respiratory difficulty  - Respiratory Therapy support as indicated  Outcome: Progressing

## 2023-11-14 NOTE — ASSESSMENT & PLAN NOTE
Ground glass opacities on CT chest     Will treat as bacterial pneumonia.      He does feel better after one dose of rocephin last night

## 2023-11-14 NOTE — UTILIZATION REVIEW
Date: 11/15    Day 3: Has surpassed a 2nd midnight with active treatments and services, which include failed OP tx and admitted for probable viral PNA, on PO prednisone, oxygen to 4L NC, cough suppressants, expectorants. Initial Clinical Review    Admission: Date/Time/Statement:   Admission Orders (From admission, onward)       Ordered        11/13/23 1636  INPATIENT ADMISSION  Once                          Orders Placed This Encounter   Procedures    INPATIENT ADMISSION     Standing Status:   Standing     Number of Occurrences:   1     Order Specific Question:   Level of Care     Answer:   Med Surg [16]     Order Specific Question:   Estimated length of stay     Answer:   More than 2 Midnights     Order Specific Question:   Certification     Answer:   I certify that inpatient services are medically necessary for this patient for a duration of greater than two midnights. See H&P and MD Progress Notes for additional information about the patient's course of treatment. ED Arrival Information       Expected   -    Arrival   11/13/2023 12:53    Acuity   Urgent              Means of arrival   Walk-In    Escorted by   Self    Service   Hospitalist    Admission type   Emergency              Arrival complaint   sob             Chief Complaint   Patient presents with    Shortness of Breath     Pt reports being sob, weak, congested since Friday AM. Sent by PCP bc spo2 was 88%, doesn't wear oxygen at home. Initial Presentation: 70 y.o. male presents to the ED from PCP office with productive cough, congestion, difficulty breathing, chest tightness, hypoxia with sat 88%, no fevers. .  Was seen on 11/10 at urgent care. PMH: h/o A fib/A flutter. Obesity, chronic dHF, CKD 3a. In the ED labs - covid, flu, RSV all negative. Imaging - poss viral PNA with GGO in RUL. Treated with Nebs, PO Prednisone. On exam obesity, normal lung sounds.   He is admitted to INPATIENT status with SOB - likely viral syndrome, Mucin, Claritin, Flonase, oral steroids, oxygen 3L NC and wean. Chronid dHF - hold diuretics for now. Date: 11/14   Day 2:   CTA chest - bilat GGO, bronchitis vs vascular engorgement from edema. Pt remains afebrile but is on oxygen up to 4L NC. Using PRN Hycodan x 3 today. Remains on IV fluids. Treating as bacterial PNA. Feels improvement since start of antibiotics and is less SOB. Is euvolemic on exam, has scattered rhonchi I'm bilat upper lobes and has productive cough and nasal congestion.       ED Triage Vitals [11/13/23 1259]   Temperature Pulse Respirations Blood Pressure SpO2   99.9 °F (37.7 °C) 85 20 145/65 91 %      Temp Source Heart Rate Source Patient Position - Orthostatic VS BP Location FiO2 (%)   Oral Monitor Sitting Right arm --      Pain Score       No Pain          Wt Readings from Last 1 Encounters:   11/13/23 (!) 146 kg (321 lb 6.9 oz)     Additional Vital Signs:   Date/Time Temp Pulse Resp BP MAP (mmHg) SpO2 Calculated FIO2 (%) - Nasal Cannula Nasal Cannula O2 Flow Rate (L/min) O2 Device Patient Position - Orthostatic VS   11/14/23 0848 97.6 °F (36.4 °C) 71 18 117/65 -- 98 % 36 4 L/min Nasal cannula Sitting   11/13/23 2306 98.7 °F (37.1 °C) 80 18 119/60 84 94 % 36 4 L/min Nasal cannula Sitting   11/13/23 1822 100.5 °F (38.1 °C) 82 18 122/68 83 92 % 36 4 L/min Nasal cannula Sitting   11/13/23 1700 -- 91 18 -- -- 94 % 36 4 L/min Nasal cannula --   11/13/23 1632 -- 93 18 122/58 83 95 % 32 3 L/min Nasal cannula Lying   11/13/23 1607 -- 94 -- 134/60 -- 94 % 32 3 L/min Nasal cannula Lying   11/13/23 1557 -- 92 18 -- -- 82 % Abnormal  -- -- None (Room air) --     Pertinent Labs/Diagnostic Test Results:     11/13 ECG - Normal sinus rhythm  Right bundle branch block  Abnormal ECG    11/13  ECG - Sinus rhythm with occasional Premature ventricular complexes  Right bundle branch block  Abnormal ECG    XR chest pa & lateral   ED Interpretation by Elisabet Hill DO (11/13 7186)   No acute disease    Image independently interpreted by myself        Final Result by Porsche Duarte MD (17/33 [id number])      Possible groundglass opacities in the right upper lobe. This raises concern for the presence of viral pneumonia. Recommend CT scan of the chest.      CTA chest pe study      No pulmonary embolus. Patchy bilateral groundglass opacity, greatest in the right upper lobe, infectious or inflammatory. Edema not excluded in the appropriate clinical setting. Mild diffuse bronchial wall thickening which could be due to bronchitis versus vascular engorgement from edema. Moderate gynecomastia.      Results from last 7 days   Lab Units 11/13/23  1404   SARS-COV-2  Negative     Results from last 7 days   Lab Units 11/13/23  1404   WBC Thousand/uL 6.18   HEMOGLOBIN g/dL 12.3   HEMATOCRIT % 37.2   PLATELETS Thousands/uL 148*   NEUTROS ABS Thousands/µL 4.49         Results from last 7 days   Lab Units 11/13/23  1404   SODIUM mmol/L 135   POTASSIUM mmol/L 4.5   CHLORIDE mmol/L 99   CO2 mmol/L 31   ANION GAP mmol/L 5   BUN mg/dL 14   CREATININE mg/dL 0.78   EGFR ml/min/1.73sq m 92   CALCIUM mg/dL 9.4     Results from last 7 days   Lab Units 11/13/23  1404   AST U/L 35   ALT U/L 14   ALK PHOS U/L 57   TOTAL PROTEIN g/dL 7.4   ALBUMIN g/dL 3.9   TOTAL BILIRUBIN mg/dL 0.90         Results from last 7 days   Lab Units 11/13/23  1404   GLUCOSE RANDOM mg/dL 101     Results from last 7 days   Lab Units 11/13/23  1556 11/13/23  1404   HS TNI 0HR ng/L  --  25   HS TNI 2HR ng/L 20  --    HSTNI D2 ng/L -5  --                  Results from last 7 days   Lab Units 11/13/23  1404   PROCALCITONIN ng/ml 0.12     Results from last 7 days   Lab Units 11/13/23  1404   LACTIC ACID mmol/L 0.9     Results from last 7 days   Lab Units 11/13/23  1404   INFLUENZA A PCR  Negative   INFLUENZA B PCR  Negative   RSV PCR  Negative     Results from last 7 days   Lab Units 11/13/23  1404 11/13/23  1403   BLOOD CULTURE  Received in Microbiology Lab. Culture in Progress. Received in Microbiology Lab. Culture in Progress.      ED Treatment:   Medication Administration from 11/13/2023 1252 to 11/13/2023 1815         Date/Time Order Dose Route Action     11/13/2023 1415 EST albuterol inhalation solution 5 mg 5 mg Nebulization Given     11/13/2023 1415 EST ipratropium (ATROVENT) 0.02 % inhalation solution 0.5 mg 0.5 mg Nebulization Given     11/13/2023 1722 EST predniSONE tablet 60 mg 60 mg Oral Given          Past Medical History:   Diagnosis Date    A-fib ([address])     ALANNA (acute kidney injury) ([address]) 6/15/2021    Arthritis     CPAP (continuous positive airway pressure) dependence     Irregular heart beat     Lymphedema     Sleep apnea     Urinary frequency     Wears glasses     Wears partial dentures     lower partial     Present on Admission:   SOB (shortness of breath)   Chronic diastolic heart failure (HCC)   Obesity, Class III, BMI 40-49.9 (morbid obesity) ([facility])   Obstructive sleep apnea on CPAP   Stage 3a chronic kidney disease (HCC)   Atrial fibrillation ([facility])      Admitting Diagnosis: Wheezing [R06.2]  SOB (shortness of breath) [R06.02]  Bronchitis [J40]  Acute hypoxic respiratory failure (HCC) [J96.01]  Age/Sex: 70 y.o. male  Admission Orders:  Scheduled Medications:  benzonatate, 100 mg, Oral, TID  finasteride, 5 mg, Oral, Daily  fluticasone, 1 spray, Each Nare, Daily  guaiFENesin, 600 mg, Oral, Q12H OSEAS  loratadine, 10 mg, Oral, Daily  metoprolol succinate, 25 mg, Oral, Daily  nystatin, , Topical, BID  predniSONE, 60 mg, Oral, Daily      Continuous IV Infusions:  sodium chloride, 75 mL/hr, Intravenous, Continuous      PRN Meds:  HYDROcodone Bit-Homatrop MBr, 5 mL, Oral, Q4H PRN - x 1 11/13, x 3 11/14    Oxygen 3L NC  Wound care nurse consult  CTA chest PE study  Cardiac diet    Network Utilization Review Department  ATTENTION: Please call with any questions or concerns to [phone] and carefully listen to the prompts so that you are directed to the right person. All voicemails are confidential.   For Discharge needs, contact Care Management DC Support Team at [phone] opt. 2  Send all requests for admission clinical reviews, approved or denied determinations and any other requests to dedicated fax number below belonging to the campus where the patient is receiving treatment.  List of dedicated fax numbers for the Facilities:  [place] DENIALS (Administrative/Medical Necessity) [phone]   DISCHARGE SUPPORT TEAM (NETWORK) [id number] [address] Miller (Maternity/NICU/Pediatrics) [phone]   [address] [place] [address] 1000 [facility] [phone]   [address] 5220 [facility] [address] [address] [facility] [address] [facility] [address] [phone]

## 2023-11-14 NOTE — ASSESSMENT & PLAN NOTE
Wt Readings from Last 3 Encounters:   11/13/23 (!) 146 kg (321 lb 6.9 oz)   11/11/23 (!) 147 kg (325 lb)   11/01/23 (!) 141 kg (311 lb)     He examines as euvolemic

## 2023-11-14 NOTE — PLAN OF CARE
Problem: Nutrition/Hydration-ADULT  Goal: Nutrient/Hydration intake appropriate for improving, restoring or maintaining nutritional needs  Description: Monitor and assess patient's nutrition/hydration status for malnutrition. Collaborate with interdisciplinary team and initiate plan and interventions as ordered. Monitor patient's weight and dietary intake as ordered or per policy. Utilize nutrition screening tool and intervene as necessary. Determine patient's food preferences and provide high-protein, high-caloric foods as appropriate.      INTERVENTIONS:  - Monitor oral intake, urinary output, labs, and treatment plans  - Assess nutrition and hydration status and recommend course of action  - Evaluate amount of meals eaten  - Assist patient with eating if necessary   - Allow adequate time for meals  - Recommend/ encourage appropriate diets, oral nutritional supplements, and vitamin/mineral supplements  - Order, calculate, and assess calorie counts as needed  - Recommend, monitor, and adjust tube feedings and TPN/PPN based on assessed needs  - Assess need for intravenous fluids  - Provide specific nutrition/hydration education as appropriate  - Include patient/family/caregiver in decisions related to nutrition  Outcome: Progressing     Problem: PAIN - ADULT  Goal: Verbalizes/displays adequate comfort level or baseline comfort level  Description: Interventions:  - Encourage patient to monitor pain and request assistance  - Assess pain using appropriate pain scale  - Administer analgesics based on type and severity of pain and evaluate response  - Implement non-pharmacological measures as appropriate and evaluate response  - Consider cultural and social influences on pain and pain management  - Notify physician/advanced practitioner if interventions unsuccessful or patient reports new pain  Outcome: Progressing     Problem: INFECTION - ADULT  Goal: Absence or prevention of progression during hospitalization  Description: INTERVENTIONS:  - Assess and monitor for signs and symptoms of infection  - Monitor lab/diagnostic results  - Monitor all insertion sites, i.e. indwelling lines, tubes, and drains  - Monitor endotracheal if appropriate and nasal secretions for changes in amount and color  - [place] appropriate cooling/warming therapies per order  - Administer medications as ordered  - Instruct and encourage patient and family to use good hand hygiene technique  - Identify and instruct in appropriate isolation precautions for identified infection/condition  Outcome: Progressing  Goal: Absence of fever/infection during neutropenic period  Description: INTERVENTIONS:  - Monitor WBC    Outcome: Progressing     Problem: SAFETY ADULT  Goal: Patient will remain free of falls  Description: INTERVENTIONS:  - Educate patient/family on patient safety including physical limitations  - Instruct patient to call for assistance with activity   - Consult OT/PT to assist with strengthening/mobility   - Keep Call bell within reach  - Keep bed low and locked with side rails adjusted as appropriate  - Keep care items and personal belongings within reach  - Initiate and maintain comfort rounds  - Make Fall Risk Sign visible to staff  - Offer Toileting every 3 Hours, in advance of need  - Obtain necessary fall risk management equipment:   - Apply yellow socks and bracelet for high fall risk patients  - Consider moving patient to room near nurses station  Outcome: Progressing  Goal: Maintain or return to baseline ADL function  Description: INTERVENTIONS:  -  Assess patient's ability to carry out ADLs; assess patient's baseline for ADL function and identify physical deficits which impact ability to perform ADLs (bathing, care of mouth/teeth, toileting, grooming, dressing, etc.)  - Assess/evaluate cause of self-care deficits   - Assess range of motion  - Assess patient's mobility; develop plan if impaired  - Assess patient's need for assistive devices and provide as appropriate  - Encourage maximum independence but intervene and supervise when necessary  - Involve family in performance of ADLs  - Assess for home care needs following discharge   - Consider OT consult to assist with ADL evaluation and planning for discharge  - Provide patient education as appropriate  Outcome: Progressing  Goal: Maintains/Returns to pre admission functional level  Description: INTERVENTIONS:  - Perform BMAT or MOVE assessment daily.   - Set and communicate daily mobility goal to care team and patient/family/caregiver. - Collaborate with rehabilitation services on mobility goals if consulted  - Perform Range of Motion 3 times a day. - Reposition patient every 2 hours.   - Dangle patient 3 times a day  - Stand patient 3 times a day  - Ambulate patient 3 times a day  - Out of bed to chair 3 times a day   - Out of bed for meals 3 times a day  - Out of bed for toileting  - Record patient progress and toleration of activity level   Outcome: Progressing     Problem: DISCHARGE PLANNING  Goal: Discharge to home or other facility with appropriate resources  Description: INTERVENTIONS:  - Identify barriers to discharge w/patient and caregiver  - Arrange for needed discharge resources and transportation as appropriate  - Identify discharge learning needs (meds, wound care, etc.)  - Arrange for interpretive services to assist at discharge as needed  - Refer to Case Management Department for coordinating discharge planning if the patient needs post-hospital services based on physician/advanced practitioner order or complex needs related to functional status, cognitive ability, or social support system  Outcome: Progressing     Problem: Knowledge Deficit  Goal: Patient/family/caregiver demonstrates understanding of disease process, treatment plan, medications, and discharge instructions  Description: Complete learning assessment and assess knowledge base.  Interventions:  - Provide teaching at level of understanding  - Provide teaching via preferred learning methods  Outcome: Progressing     Problem: CARDIOVASCULAR - ADULT  Goal: Maintains optimal cardiac output and hemodynamic stability  Description: INTERVENTIONS:  - Monitor I/O, vital signs and rhythm  - Monitor for S/S and trends of decreased cardiac output  - Administer and titrate ordered vasoactive medications to optimize hemodynamic stability  - Assess quality of pulses, skin color and temperature  - Assess for signs of decreased coronary artery perfusion  - Instruct patient to report change in severity of symptoms  Outcome: Progressing  Goal: Absence of cardiac dysrhythmias or at baseline rhythm  Description: INTERVENTIONS:  - Continuous cardiac monitoring, vital signs, obtain 12 lead EKG if ordered  - Administer antiarrhythmic and heart rate control medications as ordered  - Monitor electrolytes and administer replacement therapy as ordered  Outcome: Progressing     Problem: RESPIRATORY - ADULT  Goal: Achieves optimal ventilation and oxygenation  Description: INTERVENTIONS:  - Assess for changes in respiratory status  - Assess for changes in mentation and behavior  - Position to facilitate oxygenation and minimize respiratory effort  - Oxygen administered by appropriate delivery if ordered  - Initiate smoking cessation education as indicated  - Encourage broncho-pulmonary hygiene including cough, deep breathe, Incentive Spirometry  - Assess the need for suctioning and aspirate as needed  - Assess and instruct to report SOB or any respiratory difficulty  - Respiratory Therapy support as indicated  Outcome: Progressing

## 2023-11-14 NOTE — PROGRESS NOTES
[facility]  Progress Note  Name: Rachel Tee  MRN: [id number]  Unit/Bed#: E4 -01 I Date of Admission: 2023   Date of Service: 2023 I Hospital Day: 1    Assessment/Plan   * Pneumonia  Assessment & Plan  Ground glass opacities on CT chest     Will treat as bacterial pneumonia. He does feel better after one dose of rocephin last night    Chronic diastolic heart failure ([address])  Assessment & Plan  Wt Readings from Last 3 Encounters:   23 (!) 146 kg (321 lb 6.9 oz)   23 (!) 147 kg (325 lb)   23 (!) 141 kg (311 lb)     He examines as euvolemic          Atrial fibrillation (HCC)  Assessment & Plan  On Toprol XL and Xarelto               Subjective:   Doing a little better  Still has nasal congestion  Cough is productive of green sputum  SOB is a little better. Objective:     Vitals:   Temp (24hrs), Av.8 °F (37.1 °C), Min:97.6 °F (36.4 °C), Max:100.5 °F (38.1 °C)    Temp:  [97.6 °F (36.4 °C)-100.5 °F (38.1 °C)] 98.4 °F (36.9 °C)  HR:  [69-82] 69  Resp:  [18] 18  BP: (116-122)/(60-76) 116/76  SpO2:  [92 %-98 %] 98 %  Body mass index is 47.47 kg/m². Input and Output Summary (last 24 hours): Intake/Output Summary (Last 24 hours) at 2023 1716  Last data filed at 2023 1300  Gross per 24 hour   Intake --   Output 800 ml   Net -800 ml       Physical Exam:     Physical Exam  Vitals and nursing note reviewed. HENT:      Head: Normocephalic and atraumatic. Eyes:      Pupils: Pupils are equal, round, and reactive to light. Cardiovascular:      Rate and Rhythm: Normal rate and regular rhythm. Heart sounds: No murmur heard. No friction rub. No gallop. Pulmonary:      Breath sounds: Rhonchi (scatterred rhonchi bilateral upper lobes) present. No wheezing or rales. Abdominal:      General: Bowel sounds are normal.      Palpations: Abdomen is soft. Tenderness: There is no abdominal tenderness.    Musculoskeletal: Right lower leg: No edema. Left lower leg: No edema. .       Additional Data:     Labs:    Results from last 7 days   Lab Units 11/13/23  1404   WBC Thousand/uL 6.18   HEMOGLOBIN g/dL 12.3   HEMATOCRIT % 37.2   PLATELETS Thousands/uL 148*   NEUTROS PCT % 72   LYMPHS PCT % 13*   MONOS PCT % 13*   EOS PCT % 1     Results from last 7 days   Lab Units 11/13/23  1404   POTASSIUM mmol/L 4.5   CHLORIDE mmol/L 99   CO2 mmol/L 31   BUN mg/dL 14   CREATININE mg/dL 0.78   CALCIUM mg/dL 9.4   ALK PHOS U/L 57   ALT U/L 14   AST U/L 35                       * I Have Reviewed All Lab Data     Recent Cultures (last 7 days):     Results from last 7 days   Lab Units 11/13/23  1404 11/13/23  1403   BLOOD CULTURE  Received in Microbiology Lab. Culture in Progress. Received in Microbiology Lab. Culture in Progress. Last 24 Hours Medication List:   Current Facility-Administered Medications   Medication Dose Route Frequency Provider Last Rate    benzonatate  100 mg Oral TID Debbie White MD      finasteride  5 mg Oral Daily Cheikh Fontana MD      fluticasone  1 spray Each Nare Daily Debbie White MD      guaiFENesin  600 mg Oral Q12H [address] Cheikh Fontana MD      HYDROcodone Bit-Homatrop MBr  5 mL Oral Q4H PRN Te Maximo Fontana MD      loratadine  10 mg Oral Daily Debbie White MD      metoprolol succinate  25 mg Oral Daily Debbie White MD      nystatin   Topical BID Edna Siddiqi PA-C      predniSONE  60 mg Oral Daily Debbie White MD      sodium chloride  75 mL/hr Intravenous Continuous Carl Patel DO 75 mL/hr (11/14/23 1226)         VTE Pharmacologic Prophylaxis:   Pharmacologic:  will add lovenox      Current Length of Stay: 1 day(s)    Current Patient Status: Inpatient       Discharge Plan: 2 to 3 days    Code Status: Prior           Today, Patient Was Seen By: Aye Zuniga DO    ** Please Note: Dictation voice to text software may have been used in the creation of this document. **

## 2023-11-15 LAB
ANION GAP SERPL CALCULATED.3IONS-SCNC: 4 MMOL/L
BASOPHILS # BLD AUTO: 0.01 THOUSANDS/ÂΜL (ref 0–0.1)
BASOPHILS NFR BLD AUTO: 0 % (ref 0–1)
BUN SERPL-MCNC: 26 MG/DL (ref 5–25)
CALCIUM SERPL-MCNC: 9.7 MG/DL (ref 8.4–10.2)
CHLORIDE SERPL-SCNC: 102 MMOL/L (ref 96–108)
CO2 SERPL-SCNC: 31 MMOL/L (ref 21–32)
CREAT SERPL-MCNC: 0.83 MG/DL (ref 0.6–1.3)
EOSINOPHIL # BLD AUTO: 0 THOUSAND/ÂΜL (ref 0–0.61)
EOSINOPHIL NFR BLD AUTO: 0 % (ref 0–6)
ERYTHROCYTE [DISTWIDTH] IN BLOOD BY AUTOMATED COUNT: 14 % (ref 11.6–15.1)
GFR SERPL CREATININE-BSD FRML MDRD: 89 ML/MIN/1.73SQ M
GLUCOSE SERPL-MCNC: 120 MG/DL (ref 65–140)
HCT VFR BLD AUTO: 41.4 % (ref 36.5–49.3)
HGB BLD-MCNC: 13.3 G/DL (ref 12–17)
IMM GRANULOCYTES # BLD AUTO: 0.08 THOUSAND/UL (ref 0–0.2)
IMM GRANULOCYTES NFR BLD AUTO: 1 % (ref 0–2)
LYMPHOCYTES # BLD AUTO: 0.96 THOUSANDS/ÂΜL (ref 0.6–4.47)
LYMPHOCYTES NFR BLD AUTO: 11 % (ref 14–44)
MCH RBC QN AUTO: 30.4 PG (ref 26.8–34.3)
MCHC RBC AUTO-ENTMCNC: 32.1 G/DL (ref 31.4–37.4)
MCV RBC AUTO: 95 FL (ref 82–98)
MONOCYTES # BLD AUTO: 0.76 THOUSAND/ÂΜL (ref 0.17–1.22)
MONOCYTES NFR BLD AUTO: 9 % (ref 4–12)
NEUTROPHILS # BLD AUTO: 6.76 THOUSANDS/ÂΜL (ref 1.85–7.62)
NEUTS SEG NFR BLD AUTO: 79 % (ref 43–75)
NRBC BLD AUTO-RTO: 0 /100 WBCS
PLATELET # BLD AUTO: 212 THOUSANDS/UL (ref 149–390)
PMV BLD AUTO: 10.9 FL (ref 8.9–12.7)
POTASSIUM SERPL-SCNC: 4.6 MMOL/L (ref 3.5–5.3)
RBC # BLD AUTO: 4.37 MILLION/UL (ref 3.88–5.62)
SODIUM SERPL-SCNC: 137 MMOL/L (ref 135–147)
WBC # BLD AUTO: 8.57 THOUSAND/UL (ref 4.31–10.16)

## 2023-11-15 PROCEDURE — 80048 BASIC METABOLIC PNL TOTAL CA: CPT | Performed by: HOSPITALIST

## 2023-11-15 PROCEDURE — 85025 COMPLETE CBC W/AUTO DIFF WBC: CPT | Performed by: HOSPITALIST

## 2023-11-15 PROCEDURE — 99232 SBSQ HOSP IP/OBS MODERATE 35: CPT | Performed by: HOSPITALIST

## 2023-11-15 RX ORDER — DOXYCYCLINE HYCLATE 100 MG/1
100 CAPSULE ORAL EVERY 12 HOURS SCHEDULED
Status: DISCONTINUED | OUTPATIENT
Start: 2023-11-15 | End: 2023-11-17 | Stop reason: HOSPADM

## 2023-11-15 RX ORDER — CEFTRIAXONE 1 G/50ML
1000 INJECTION, SOLUTION INTRAVENOUS EVERY 24 HOURS
Status: DISCONTINUED | OUTPATIENT
Start: 2023-11-15 | End: 2023-11-17 | Stop reason: HOSPADM

## 2023-11-15 RX ADMIN — DOXYCYCLINE 100 MG: 100 CAPSULE ORAL at 10:34

## 2023-11-15 RX ADMIN — GUAIFENESIN 600 MG: 600 TABLET, EXTENDED RELEASE ORAL at 20:32

## 2023-11-15 RX ADMIN — SODIUM CHLORIDE 75 ML/HR: 0.9 INJECTION, SOLUTION INTRAVENOUS at 20:32

## 2023-11-15 RX ADMIN — FLUTICASONE PROPIONATE 1 SPRAY: 50 SPRAY, METERED NASAL at 08:56

## 2023-11-15 RX ADMIN — HYDROCODONE BITARTRATE AND HOMATROPINE METHYLBROMIDE 5 ML: 5; 1.5 SYRUP ORAL at 00:21

## 2023-11-15 RX ADMIN — PREDNISONE 60 MG: 20 TABLET ORAL at 08:55

## 2023-11-15 RX ADMIN — DOXYCYCLINE 100 MG: 100 CAPSULE ORAL at 20:32

## 2023-11-15 RX ADMIN — BENZONATATE 100 MG: 100 CAPSULE ORAL at 20:32

## 2023-11-15 RX ADMIN — CEFTRIAXONE 1000 MG: 1 INJECTION, SOLUTION INTRAVENOUS at 10:33

## 2023-11-15 RX ADMIN — METOPROLOL SUCCINATE 25 MG: 25 TABLET, EXTENDED RELEASE ORAL at 08:55

## 2023-11-15 RX ADMIN — BENZONATATE 100 MG: 100 CAPSULE ORAL at 08:55

## 2023-11-15 RX ADMIN — SODIUM CHLORIDE 75 ML/HR: 0.9 INJECTION, SOLUTION INTRAVENOUS at 05:15

## 2023-11-15 RX ADMIN — HYDROCODONE BITARTRATE AND HOMATROPINE METHYLBROMIDE 5 ML: 5; 1.5 SYRUP ORAL at 20:32

## 2023-11-15 RX ADMIN — HYDROCODONE BITARTRATE AND ACETAMINOPHEN 1 TABLET: 5; 325 TABLET ORAL at 05:14

## 2023-11-15 RX ADMIN — BENZONATATE 100 MG: 100 CAPSULE ORAL at 16:12

## 2023-11-15 RX ADMIN — FINASTERIDE 5 MG: 5 TABLET, FILM COATED ORAL at 08:55

## 2023-11-15 RX ADMIN — GUAIFENESIN 600 MG: 600 TABLET, EXTENDED RELEASE ORAL at 08:55

## 2023-11-15 RX ADMIN — LORATADINE 10 MG: 10 TABLET ORAL at 08:55

## 2023-11-15 RX ADMIN — RIVAROXABAN 20 MG: 20 TABLET, FILM COATED ORAL at 09:21

## 2023-11-15 NOTE — PROGRESS NOTES
[facility]  Progress Note  Name: Jacob Burns  MRN: [id number]  Unit/Bed#: E4 -01 I Date of Admission: 2023   Date of Service: 11/15/2023 I Hospital Day: 2    Assessment/Plan   * Pneumonia  Assessment & Plan    Doing a little better on Rocephin  Will add doxycycline  Still on 2 liters oxygen. COVID/Flu/RSV negative. Atrial fibrillation ([address])  Assessment & Plan  On Toprol XL and Xarelto    Will reconcile med rec that the patient is chronically on Xarelto    Chronic diastolic heart failure [facility])  Assessment & Plan  Wt Readings from Last 3 Encounters:   23 (!) 146 kg (321 lb 6.9 oz)   23 (!) 147 kg (325 lb)   23 (!) 141 kg (311 lb)     He is euvolemic                   Subjective:   Feels a little better  A little less sob  No cough this am  He is on 2 liters of oxygen this am  (Normally on room air)      Objective:     Vitals:   Temp (24hrs), Av.2 °F (36.8 °C), Min:97.6 °F (36.4 °C), Max:98.5 °F (36.9 °C)    Temp:  [97.6 °F (36.4 °C)-98.5 °F (36.9 °C)] 98.4 °F (36.9 °C)  HR:  [68-74] 74  Resp:  [18] 18  BP: (109-126)/(56-76) 117/59  SpO2:  [91 %-98 %] 91 %  Body mass index is 47.47 kg/m². Input and Output Summary (last 24 hours): Intake/Output Summary (Last 24 hours) at 11/15/2023 1027  Last data filed at 2023 1300  Gross per 24 hour   Intake --   Output 800 ml   Net -800 ml       Physical Exam:     Physical Exam  Vitals and nursing note reviewed. HENT:      Head: Normocephalic and atraumatic. Eyes:      Pupils: Pupils are equal, round, and reactive to light. Cardiovascular:      Rate and Rhythm: Normal rate and regular rhythm. Heart sounds: No murmur heard. No friction rub. No gallop. Pulmonary:      Effort: Pulmonary effort is normal.      Breath sounds: Normal breath sounds. No wheezing or rales.       Comments: Much improved  No rhonchi today  Abdominal:      General: Bowel sounds are normal. Palpations: Abdomen is soft. Tenderness: There is no abdominal tenderness. Musculoskeletal:      Right lower leg: No edema. Left lower leg: No edema. .       Additional Data:     Labs:    Results from last 7 days   Lab Units 11/15/23  0507   WBC Thousand/uL 8.57   HEMOGLOBIN g/dL 13.3   HEMATOCRIT % 41.4   PLATELETS Thousands/uL 212   NEUTROS PCT % 79*   LYMPHS PCT % 11*   MONOS PCT % 9   EOS PCT % 0     Results from last 7 days   Lab Units 11/15/23  0507 11/13/23  1404   POTASSIUM mmol/L 4.6 4.5   CHLORIDE mmol/L 102 99   CO2 mmol/L 31 31   BUN mg/dL 26* 14   CREATININE mg/dL 0.83 0.78   CALCIUM mg/dL 9.7 9.4   ALK PHOS U/L  --  57   ALT U/L  --  14   AST U/L  --  35                       * I Have Reviewed All Lab Data     Recent Cultures (last 7 days):     Results from last 7 days   Lab Units 11/13/23  1404 11/13/23  1403   BLOOD CULTURE  No Growth at 24 hrs. No Growth at 24 hrs.          Last 24 Hours Medication List:   Current Facility-Administered Medications   Medication Dose Route Frequency Provider Last Rate    acetaminophen  650 mg Oral Q6H PRN Lupillo Patel, DO      benzonatate  100 mg Oral TID Chetan Hinojosa MD      cefTRIAXone  1,000 mg Intravenous Q24H [facility], DO      doxycycline hyclate  100 mg Oral Q12H [address] [facility], DO      finasteride  5 mg Oral Daily Cheikh Mosqueda MD      fluticasone  1 spray Each Nare Daily Chetan Hinojosa MD      guaiFENesin  600 mg Oral Q12H [address] Cheikh Mosqueda MD      HYDROcodone Bit-Homatrop MBr  5 mL Oral Q4H PRN Chetan Hinojosa MD      HYDROcodone-acetaminophen  1 tablet Oral Q6H PRN Lupillo Patel, DO      loratadine  10 mg Oral Daily Chetan Hinojosa MD      metoprolol succinate  25 mg Oral Daily Chetan Hinojosa MD      nystatin   Topical BID Silvio Iverson PA-C      predniSONE  60 mg Oral Daily Chetan Hinojosa MD      rivaroxaban  20 mg Oral Daily With Breakfast Vinny S Precht, DO      sodium chloride  75 mL/hr Intravenous Continuous Shira Patel DO 75 mL/hr (11/15/23 0515)         VTE Pharmacologic Prophylaxis:   Pharmacologic: Rivaroxaban (Xarelto)      Current Length of Stay: 2 day(s)    Current Patient Status: Inpatient       Discharge Plan: home when off of oxygen. Hopefully in 1 to 2 days    Code Status: Prior           Today, Patient Was Seen By: Rigo Lerma DO    ** Please Note: Dictation voice to text software may have been used in the creation of this document.  **

## 2023-11-15 NOTE — PLAN OF CARE
Problem: Nutrition/Hydration-ADULT  Goal: Nutrient/Hydration intake appropriate for improving, restoring or maintaining nutritional needs  Description: Monitor and assess patient's nutrition/hydration status for malnutrition. Collaborate with interdisciplinary team and initiate plan and interventions as ordered. Monitor patient's weight and dietary intake as ordered or per policy. Utilize nutrition screening tool and intervene as necessary. Determine patient's food preferences and provide high-protein, high-caloric foods as appropriate.      INTERVENTIONS:  - Monitor oral intake, urinary output, labs, and treatment plans  - Assess nutrition and hydration status and recommend course of action  - Evaluate amount of meals eaten  - Assist patient with eating if necessary   - Allow adequate time for meals  - Recommend/ encourage appropriate diets, oral nutritional supplements, and vitamin/mineral supplements  - Order, calculate, and assess calorie counts as needed  - Recommend, monitor, and adjust tube feedings and TPN/PPN based on assessed needs  - Assess need for intravenous fluids  - Provide specific nutrition/hydration education as appropriate  - Include patient/family/caregiver in decisions related to nutrition  Outcome: Progressing     Problem: PAIN - ADULT  Goal: Verbalizes/displays adequate comfort level or baseline comfort level  Description: Interventions:  - Encourage patient to monitor pain and request assistance  - Assess pain using appropriate pain scale  - Administer analgesics based on type and severity of pain and evaluate response  - Implement non-pharmacological measures as appropriate and evaluate response  - Consider cultural and social influences on pain and pain management  - Notify physician/advanced practitioner if interventions unsuccessful or patient reports new pain  Outcome: Progressing     Problem: INFECTION - ADULT  Goal: Absence or prevention of progression during hospitalization  Description: INTERVENTIONS:  - Assess and monitor for signs and symptoms of infection  - Monitor lab/diagnostic results  - Monitor all insertion sites, i.e. indwelling lines, tubes, and drains  - Monitor endotracheal if appropriate and nasal secretions for changes in amount and color  - [place] appropriate cooling/warming therapies per order  - Administer medications as ordered  - Instruct and encourage patient and family to use good hand hygiene technique  - Identify and instruct in appropriate isolation precautions for identified infection/condition  Outcome: Progressing  Goal: Absence of fever/infection during neutropenic period  Description: INTERVENTIONS:  - Monitor WBC    Outcome: Progressing     Problem: SAFETY ADULT  Goal: Patient will remain free of falls  Description: INTERVENTIONS:  - Educate patient/family on patient safety including physical limitations  - Instruct patient to call for assistance with activity   - Consult OT/PT to assist with strengthening/mobility   - Keep Call bell within reach  - Keep bed low and locked with side rails adjusted as appropriate  - Keep care items and personal belongings within reach  - Initiate and maintain comfort rounds  - Make Fall Risk Sign visible to staff  - Offer Toileting every 2 Hours, in advance of need  - Initiate/Maintain bed alarm  - Obtain necessary fall risk management equipment:   - Apply yellow socks and bracelet for high fall risk patients  - Consider moving patient to room near nurses station  Outcome: Progressing  Goal: Maintain or return to baseline ADL function  Description: INTERVENTIONS:  -  Assess patient's ability to carry out ADLs; assess patient's baseline for ADL function and identify physical deficits which impact ability to perform ADLs (bathing, care of mouth/teeth, toileting, grooming, dressing, etc.)  - Assess/evaluate cause of self-care deficits   - Assess range of motion  - Assess patient's mobility; develop plan if impaired  - Assess patient's need for assistive devices and provide as appropriate  - Encourage maximum independence but intervene and supervise when necessary  - Involve family in performance of ADLs  - Assess for home care needs following discharge   - Consider OT consult to assist with ADL evaluation and planning for discharge  - Provide patient education as appropriate  Outcome: Progressing  Goal: Maintains/Returns to pre admission functional level  Description: INTERVENTIONS:  - Perform BMAT or MOVE assessment daily.   - Set and communicate daily mobility goal to care team and patient/family/caregiver. - Collaborate with rehabilitation services on mobility goals if consulted  - Perform Range of Motion 3 times a day. - Reposition patient every 3 hours.   - Dangle patient 3 times a day  - Stand patient 3 times a day  - Ambulate patient 3 times a day  - Out of bed to chair 3 times a day   - Out of bed for meals 3 times a day  - Out of bed for toileting  - Record patient progress and toleration of activity level   Outcome: Progressing     Problem: DISCHARGE PLANNING  Goal: Discharge to home or other facility with appropriate resources  Description: INTERVENTIONS:  - Identify barriers to discharge w/patient and caregiver  - Arrange for needed discharge resources and transportation as appropriate  - Identify discharge learning needs (meds, wound care, etc.)  - Arrange for interpretive services to assist at discharge as needed  - Refer to Case Management Department for coordinating discharge planning if the patient needs post-hospital services based on physician/advanced practitioner order or complex needs related to functional status, cognitive ability, or social support system  Outcome: Progressing     Problem: Knowledge Deficit  Goal: Patient/family/caregiver demonstrates understanding of disease process, treatment plan, medications, and discharge instructions  Description: Complete learning assessment and assess knowledge base.   Interventions:  - Provide teaching at level of understanding  - Provide teaching via preferred learning methods  Outcome: Progressing     Problem: CARDIOVASCULAR - ADULT  Goal: Maintains optimal cardiac output and hemodynamic stability  Description: INTERVENTIONS:  - Monitor I/O, vital signs and rhythm  - Monitor for S/S and trends of decreased cardiac output  - Administer and titrate ordered vasoactive medications to optimize hemodynamic stability  - Assess quality of pulses, skin color and temperature  - Assess for signs of decreased coronary artery perfusion  - Instruct patient to report change in severity of symptoms  Outcome: Progressing  Goal: Absence of cardiac dysrhythmias or at baseline rhythm  Description: INTERVENTIONS:  - Continuous cardiac monitoring, vital signs, obtain 12 lead EKG if ordered  - Administer antiarrhythmic and heart rate control medications as ordered  - Monitor electrolytes and administer replacement therapy as ordered  Outcome: Progressing     Problem: RESPIRATORY - ADULT  Goal: Achieves optimal ventilation and oxygenation  Description: INTERVENTIONS:  - Assess for changes in respiratory status  - Assess for changes in mentation and behavior  - Position to facilitate oxygenation and minimize respiratory effort  - Oxygen administered by appropriate delivery if ordered  - Initiate smoking cessation education as indicated  - Encourage broncho-pulmonary hygiene including cough, deep breathe, Incentive Spirometry  - Assess the need for suctioning and aspirate as needed  - Assess and instruct to report SOB or any respiratory difficulty  - Respiratory Therapy support as indicated  Outcome: Progressing

## 2023-11-15 NOTE — WOUND OSTOMY CARE
Consult Note - Wound   Herson Jang 70 y.o. male MRN: [id number]  Unit/Bed#: E4 -01 Encounter: [id number]      History and Present Illness:  70year old male presented to the hospital with cough and shortness of breath. Patient's history significant for atrial fibrillation, CHF, obesity, CKD, lymphedema. Patient states he follow with Dr. Paolo Chairez with podiatry twice per week as an outpatient. He has lymphedema wraps he uses. Assessment Findings:   Patient agreeable to assessment. He is sitting up in the chair, able to stand, turn, and weight shift independently. Continent of bowel and bladder. Buttocks and sacrum intact, pink, blanchable. Bilateral heels intact, dry. Bruising to right lower back. Right lower extremity intact. MASD/intertriginous dermatitis to abdominal and groin folds--pink, moist, no open areas or satellite lesions at this time. Mild foul/yeast odor. Left lateral lower leg venous wound--patient reports wound has been present for a long time, but is improving--podiatry has been applying Unna boot twice per week outpatient. Wound bed is dark beefy red with yellow fibrinous tissue. Moderate serosanguinous/tan drainage on old dressing when removed. Caitlin-wound intact with hyperpigmentation, edema. Patient denies pain to the wound prior to, during, and after care today. See flowsheet for wound details. Wound Care Plan:   1-Hydraguard lotion to bilateral buttocks, sacrum, and heels twice daily and as needed. 2-Elevate heels off of bed/chair surface to offload pressure. 3-Offloading air cushion in chair when out of bed. 4-Moisturize skin daily with skin nourishing lotion. 5-Abdomen folds--cleanse with soap and water, pat dry. Dust with nystatin powder twice daily. 6-Left lateral leg--cleanse with normal saline, pat dry. Apply Maxorb Ag to wound. Cover with ABD and wrap with roxann.   Change dressing every other day and as needed with soilage/dislodgement. Wound care team to follow. Plan of care reviewed with primary RN. Patient should follow-up with Dr. Clyde Haro on discharge. Wound 11/13/23 MASD Pelvis Anterior;Left;Right (Active)   Wound Description Dry;Clean; Intact; Pink 11/15/23 1044   Caitlin-wound Assessment Intact 11/15/23 1044   Wound Length (cm) 0 cm 11/15/23 1044   Wound Width (cm) 0 cm 11/15/23 1044   Wound Depth (cm) 0 cm 11/15/23 1044   Wound Surface Area (cm^2) 0 cm^2 11/15/23 1044   Wound Volume (cm^3) 0 cm^3 11/15/23 1044   Calculated Wound Volume (cm^3) 0 cm^3 11/15/23 1044   Drainage Amount None 11/15/23 1044   Treatments Cleansed 11/15/23 1044   Dressing Open to air 11/15/23 1044       Wound 11/15/23 Venous Ulcer Pretibial Left;Lateral (Active)   Wound Image   11/15/23 1044   Wound Description Yellow;Slough; Beefy red 11/15/23 1044   Caitlin-wound Assessment Hyperpigmented;Edema 11/15/23 1044   Wound Length (cm) 7.5 cm 11/15/23 1044   Wound Width (cm) 3 cm 11/15/23 1044   Wound Depth (cm) 0.2 cm 11/15/23 1044   Wound Surface Area (cm^2) 22.5 cm^2 11/15/23 1044   Wound Volume (cm^3) 4.5 cm^3 11/15/23 1044   Calculated Wound Volume (cm^3) 4.5 cm^3 11/15/23 1044   Drainage Amount Moderate 11/15/23 1044   Drainage Description Serosanguineous; Mejía 11/15/23 1044   Non-staged Wound Description Full thickness 11/15/23 1044   Treatments Cleansed;Elevated;Irrigation with NSS 11/15/23 1044   Dressing Calcium Alginate with Silver;ABD;Gauze 11/15/23 1044   Dressing Changed Changed 11/15/23 1044   Patient Tolerance Tolerated well 11/15/23 1044   Dressing Status Clean; Intact;Dry 11/15/23 [address] [address] BSN, RN, Milka Velasco

## 2023-11-15 NOTE — PLAN OF CARE
Problem: Nutrition/Hydration-ADULT  Goal: Nutrient/Hydration intake appropriate for improving, restoring or maintaining nutritional needs  Description: Monitor and assess patient's nutrition/hydration status for malnutrition. Collaborate with interdisciplinary team and initiate plan and interventions as ordered. Monitor patient's weight and dietary intake as ordered or per policy. Utilize nutrition screening tool and intervene as necessary. Determine patient's food preferences and provide high-protein, high-caloric foods as appropriate.      INTERVENTIONS:  - Monitor oral intake, urinary output, labs, and treatment plans  - Assess nutrition and hydration status and recommend course of action  - Evaluate amount of meals eaten  - Assist patient with eating if necessary   - Allow adequate time for meals  - Recommend/ encourage appropriate diets, oral nutritional supplements, and vitamin/mineral supplements  - Order, calculate, and assess calorie counts as needed  - Recommend, monitor, and adjust tube feedings and TPN/PPN based on assessed needs  - Assess need for intravenous fluids  - Provide specific nutrition/hydration education as appropriate  - Include patient/family/caregiver in decisions related to nutrition  Outcome: Progressing     Problem: PAIN - ADULT  Goal: Verbalizes/displays adequate comfort level or baseline comfort level  Description: Interventions:  - Encourage patient to monitor pain and request assistance  - Assess pain using appropriate pain scale  - Administer analgesics based on type and severity of pain and evaluate response  - Implement non-pharmacological measures as appropriate and evaluate response  - Consider cultural and social influences on pain and pain management  - Notify physician/advanced practitioner if interventions unsuccessful or patient reports new pain  Outcome: Progressing     Problem: INFECTION - ADULT  Goal: Absence or prevention of progression during hospitalization  Description: INTERVENTIONS:  - Assess and monitor for signs and symptoms of infection  - Monitor lab/diagnostic results  - Monitor all insertion sites, i.e. indwelling lines, tubes, and drains  - Monitor endotracheal if appropriate and nasal secretions for changes in amount and color  - [place] appropriate cooling/warming therapies per order  - Administer medications as ordered  - Instruct and encourage patient and family to use good hand hygiene technique  - Identify and instruct in appropriate isolation precautions for identified infection/condition  Outcome: Progressing  Goal: Absence of fever/infection during neutropenic period  Description: INTERVENTIONS:  - Monitor WBC    Outcome: Progressing     Problem: SAFETY ADULT  Goal: Patient will remain free of falls  Description: INTERVENTIONS:  - Educate patient/family on patient safety including physical limitations  - Instruct patient to call for assistance with activity   - Consult OT/PT to assist with strengthening/mobility   - Keep Call bell within reach  - Keep bed low and locked with side rails adjusted as appropriate  - Keep care items and personal belongings within reach  - Initiate and maintain comfort rounds  - Make Fall Risk Sign visible to staff  - Offer Toileting every 2 Hours, in advance of need  - Initiate/Maintain bed alarm  - Obtain necessary fall risk management equipment: bed alarm   - Apply yellow socks and bracelet for high fall risk patients  - Consider moving patient to room near nurses station  Outcome: Progressing  Goal: Maintain or return to baseline ADL function  Description: INTERVENTIONS:  -  Assess patient's ability to carry out ADLs; assess patient's baseline for ADL function and identify physical deficits which impact ability to perform ADLs (bathing, care of mouth/teeth, toileting, grooming, dressing, etc.)  - Assess/evaluate cause of self-care deficits   - Assess range of motion  - Assess patient's mobility; develop plan if impaired  - Assess patient's need for assistive devices and provide as appropriate  - Encourage maximum independence but intervene and supervise when necessary  - Involve family in performance of ADLs  - Assess for home care needs following discharge   - Consider OT consult to assist with ADL evaluation and planning for discharge  - Provide patient education as appropriate  Outcome: Progressing  Goal: Maintains/Returns to pre admission functional level  Description: INTERVENTIONS:  - Perform BMAT or MOVE assessment daily.   - Set and communicate daily mobility goal to care team and patient/family/caregiver. - Collaborate with rehabilitation services on mobility goals if consulted  - Perform Range of Motion 4 times a day. - Reposition patient every 2 hours.   - Dangle patient 4 times a day  - Stand patient 4 times a day  - Ambulate patient 4 times a day  - Out of bed to chair 4 times a day   - Out of bed for meals 4 times a day  - Out of bed for toileting  - Record patient progress and toleration of activity level   Outcome: Progressing     Problem: DISCHARGE PLANNING  Goal: Discharge to home or other facility with appropriate resources  Description: INTERVENTIONS:  - Identify barriers to discharge w/patient and caregiver  - Arrange for needed discharge resources and transportation as appropriate  - Identify discharge learning needs (meds, wound care, etc.)  - Arrange for interpretive services to assist at discharge as needed  - Refer to Case Management Department for coordinating discharge planning if the patient needs post-hospital services based on physician/advanced practitioner order or complex needs related to functional status, cognitive ability, or social support system  Outcome: Progressing     Problem: Knowledge Deficit  Goal: Patient/family/caregiver demonstrates understanding of disease process, treatment plan, medications, and discharge instructions  Description: Complete learning assessment and assess knowledge base.   Interventions:  - Provide teaching at level of understanding  - Provide teaching via preferred learning methods  Outcome: Progressing     Problem: CARDIOVASCULAR - ADULT  Goal: Maintains optimal cardiac output and hemodynamic stability  Description: INTERVENTIONS:  - Monitor I/O, vital signs and rhythm  - Monitor for S/S and trends of decreased cardiac output  - Administer and titrate ordered vasoactive medications to optimize hemodynamic stability  - Assess quality of pulses, skin color and temperature  - Assess for signs of decreased coronary artery perfusion  - Instruct patient to report change in severity of symptoms  Outcome: Progressing  Goal: Absence of cardiac dysrhythmias or at baseline rhythm  Description: INTERVENTIONS:  - Continuous cardiac monitoring, vital signs, obtain 12 lead EKG if ordered  - Administer antiarrhythmic and heart rate control medications as ordered  - Monitor electrolytes and administer replacement therapy as ordered  Outcome: Progressing     Problem: RESPIRATORY - ADULT  Goal: Achieves optimal ventilation and oxygenation  Description: INTERVENTIONS:  - Assess for changes in respiratory status  - Assess for changes in mentation and behavior  - Position to facilitate oxygenation and minimize respiratory effort  - Oxygen administered by appropriate delivery if ordered  - Initiate smoking cessation education as indicated  - Encourage broncho-pulmonary hygiene including cough, deep breathe, Incentive Spirometry  - Assess the need for suctioning and aspirate as needed  - Assess and instruct to report SOB or any respiratory difficulty  - Respiratory Therapy support as indicated  Outcome: Progressing

## 2023-11-15 NOTE — ASSESSMENT & PLAN NOTE
Wt Readings from Last 3 Encounters:   11/13/23 (!) 146 kg (321 lb 6.9 oz)   11/11/23 (!) 147 kg (325 lb)   11/01/23 (!) 141 kg (311 lb)     He is euvolemic

## 2023-11-15 NOTE — CASE MANAGEMENT
Case Management Discharge Planning Note    Patient name Wanda Abad  Location East 4 /E4 [facility] MRN [id number]  : 1955 Date 11/15/2023       Current Admission Date: 2023  Current Admission Diagnosis:Pneumonia   Patient Active Problem List    Diagnosis Date Noted    Pneumonia 2023    Chronic pain syndrome 2023    Lumbar spondylosis 2023    Chronic bilateral low back pain without sciatica 2023    Venous ulcer ([address]) 2023    Atrial flutter ([address]) 2021    Iron deficiency 2021    Stage 3a chronic kidney disease ([address]) 06/15/2021    Anemia 06/15/2021    Primary osteoarthritis 06/15/2021    Encounter for cardioversion procedure 2021    SOB (shortness of breath) 2021    Chronic diastolic heart failure ([address]) 2021    Pulmonary hypertension ([address]) 2020    Bilateral lower extremity edema 10/27/2019    Atrial fibrillation ([address]) 10/26/2019    Obstructive sleep apnea on CPAP 10/26/2019    Leukopenia 10/26/2019    Weakness of right upper extremity 10/26/2019    Paresthesias 10/26/2019    Cervical pain (neck) 10/26/2019    Obesity, Class III, BMI 40-49.9 (morbid obesity) ([address]) 10/26/2019    Thrombocytopenia ([address]) 10/26/2019    Lymphedema 2019    Obesity, morbid ([address]) 2015    Venous insufficiency of both lower extremities 2015      LOS (days): 2  Geometric Mean LOS (GMLOS) (days): 2.80  Days to GMLOS:0.9     OBJECTIVE:  Risk of Unplanned Readmission Score: 13.52         Current admission status: Inpatient   Preferred Pharmacy:   [address] [place] [address],  [address] [place] [id number]  Phone: [phone] Fax: [phone] [address] [place] [id number]  Phone: [phone] Fax: [phone]    Primary Care Provider: Christian Mckeon MD    Primary Insurance: Lavern Garber [facility]  Secondary Insurance: DISCHARGE DETAILS:    Discharge planning discussed with[de-identified] Patient    IMM Given (Date):: 11/15/23  IMM Given to[de-identified] Patient     Additional Comments: Cm met with pt at beside for IMM. Pt expected to dc home within the next 24-48hrs. No needs identified. Cm remains available as needed.

## 2023-11-15 NOTE — ASSESSMENT & PLAN NOTE
Doing a little better on Rocephin  Will add doxycycline  Still on 2 liters oxygen. COVID/Flu/RSV negative.

## 2023-11-16 LAB
ANION GAP SERPL CALCULATED.3IONS-SCNC: 4 MMOL/L
BASOPHILS # BLD AUTO: 0.04 THOUSANDS/ÂΜL (ref 0–0.1)
BASOPHILS NFR BLD AUTO: 0 % (ref 0–1)
BUN SERPL-MCNC: 25 MG/DL (ref 5–25)
CALCIUM SERPL-MCNC: 9.2 MG/DL (ref 8.4–10.2)
CHLORIDE SERPL-SCNC: 105 MMOL/L (ref 96–108)
CO2 SERPL-SCNC: 29 MMOL/L (ref 21–32)
CREAT SERPL-MCNC: 0.84 MG/DL (ref 0.6–1.3)
EOSINOPHIL # BLD AUTO: 0.01 THOUSAND/ÂΜL (ref 0–0.61)
EOSINOPHIL NFR BLD AUTO: 0 % (ref 0–6)
ERYTHROCYTE [DISTWIDTH] IN BLOOD BY AUTOMATED COUNT: 13.9 % (ref 11.6–15.1)
GFR SERPL CREATININE-BSD FRML MDRD: 89 ML/MIN/1.73SQ M
GLUCOSE SERPL-MCNC: 92 MG/DL (ref 65–140)
HCT VFR BLD AUTO: 37.7 % (ref 36.5–49.3)
HGB BLD-MCNC: 12 G/DL (ref 12–17)
IMM GRANULOCYTES # BLD AUTO: 0.19 THOUSAND/UL (ref 0–0.2)
IMM GRANULOCYTES NFR BLD AUTO: 2 % (ref 0–2)
LYMPHOCYTES # BLD AUTO: 1.62 THOUSANDS/ÂΜL (ref 0.6–4.47)
LYMPHOCYTES NFR BLD AUTO: 16 % (ref 14–44)
MAGNESIUM SERPL-MCNC: 2.4 MG/DL (ref 1.9–2.7)
MCH RBC QN AUTO: 30.2 PG (ref 26.8–34.3)
MCHC RBC AUTO-ENTMCNC: 31.8 G/DL (ref 31.4–37.4)
MCV RBC AUTO: 95 FL (ref 82–98)
MONOCYTES # BLD AUTO: 1.1 THOUSAND/ÂΜL (ref 0.17–1.22)
MONOCYTES NFR BLD AUTO: 11 % (ref 4–12)
NEUTROPHILS # BLD AUTO: 6.89 THOUSANDS/ÂΜL (ref 1.85–7.62)
NEUTS SEG NFR BLD AUTO: 71 % (ref 43–75)
NRBC BLD AUTO-RTO: 0 /100 WBCS
PLATELET # BLD AUTO: 220 THOUSANDS/UL (ref 149–390)
PMV BLD AUTO: 10.8 FL (ref 8.9–12.7)
POTASSIUM SERPL-SCNC: 4.3 MMOL/L (ref 3.5–5.3)
RBC # BLD AUTO: 3.98 MILLION/UL (ref 3.88–5.62)
SODIUM SERPL-SCNC: 138 MMOL/L (ref 135–147)
WBC # BLD AUTO: 9.85 THOUSAND/UL (ref 4.31–10.16)

## 2023-11-16 PROCEDURE — 99232 SBSQ HOSP IP/OBS MODERATE 35: CPT | Performed by: HOSPITALIST

## 2023-11-16 PROCEDURE — 85025 COMPLETE CBC W/AUTO DIFF WBC: CPT | Performed by: HOSPITALIST

## 2023-11-16 PROCEDURE — 80048 BASIC METABOLIC PNL TOTAL CA: CPT | Performed by: HOSPITALIST

## 2023-11-16 PROCEDURE — 83735 ASSAY OF MAGNESIUM: CPT | Performed by: HOSPITALIST

## 2023-11-16 RX ADMIN — BENZONATATE 100 MG: 100 CAPSULE ORAL at 21:02

## 2023-11-16 RX ADMIN — BENZONATATE 100 MG: 100 CAPSULE ORAL at 09:44

## 2023-11-16 RX ADMIN — PREDNISONE 60 MG: 20 TABLET ORAL at 09:44

## 2023-11-16 RX ADMIN — LORATADINE 10 MG: 10 TABLET ORAL at 09:44

## 2023-11-16 RX ADMIN — METOPROLOL SUCCINATE 25 MG: 25 TABLET, EXTENDED RELEASE ORAL at 09:44

## 2023-11-16 RX ADMIN — DOXYCYCLINE 100 MG: 100 CAPSULE ORAL at 09:44

## 2023-11-16 RX ADMIN — FINASTERIDE 5 MG: 5 TABLET, FILM COATED ORAL at 09:45

## 2023-11-16 RX ADMIN — GUAIFENESIN 600 MG: 600 TABLET, EXTENDED RELEASE ORAL at 21:02

## 2023-11-16 RX ADMIN — DOXYCYCLINE 100 MG: 100 CAPSULE ORAL at 21:02

## 2023-11-16 RX ADMIN — FLUTICASONE PROPIONATE 1 SPRAY: 50 SPRAY, METERED NASAL at 09:49

## 2023-11-16 RX ADMIN — RIVAROXABAN 20 MG: 20 TABLET, FILM COATED ORAL at 09:44

## 2023-11-16 RX ADMIN — GUAIFENESIN 600 MG: 600 TABLET, EXTENDED RELEASE ORAL at 09:44

## 2023-11-16 RX ADMIN — HYDROCODONE BITARTRATE AND HOMATROPINE METHYLBROMIDE 5 ML: 5; 1.5 SYRUP ORAL at 03:59

## 2023-11-16 RX ADMIN — CEFTRIAXONE 1000 MG: 1 INJECTION, SOLUTION INTRAVENOUS at 09:45

## 2023-11-16 RX ADMIN — BENZONATATE 100 MG: 100 CAPSULE ORAL at 16:22

## 2023-11-16 NOTE — PLAN OF CARE
Problem: Nutrition/Hydration-ADULT  Goal: Nutrient/Hydration intake appropriate for improving, restoring or maintaining nutritional needs  Description: Monitor and assess patient's nutrition/hydration status for malnutrition. Collaborate with interdisciplinary team and initiate plan and interventions as ordered. Monitor patient's weight and dietary intake as ordered or per policy. Utilize nutrition screening tool and intervene as necessary. Determine patient's food preferences and provide high-protein, high-caloric foods as appropriate.      INTERVENTIONS:  - Monitor oral intake, urinary output, labs, and treatment plans  - Assess nutrition and hydration status and recommend course of action  - Evaluate amount of meals eaten  - Assist patient with eating if necessary   - Allow adequate time for meals  - Recommend/ encourage appropriate diets, oral nutritional supplements, and vitamin/mineral supplements  - Order, calculate, and assess calorie counts as needed  - Recommend, monitor, and adjust tube feedings and TPN/PPN based on assessed needs  - Assess need for intravenous fluids  - Provide specific nutrition/hydration education as appropriate  - Include patient/family/caregiver in decisions related to nutrition  Outcome: Progressing     Problem: PAIN - ADULT  Goal: Verbalizes/displays adequate comfort level or baseline comfort level  Description: Interventions:  - Encourage patient to monitor pain and request assistance  - Assess pain using appropriate pain scale  - Administer analgesics based on type and severity of pain and evaluate response  - Implement non-pharmacological measures as appropriate and evaluate response  - Consider cultural and social influences on pain and pain management  - Notify physician/advanced practitioner if interventions unsuccessful or patient reports new pain  Outcome: Progressing     Problem: INFECTION - ADULT  Goal: Absence or prevention of progression during hospitalization  Description: INTERVENTIONS:  - Assess and monitor for signs and symptoms of infection  - Monitor lab/diagnostic results  - Monitor all insertion sites, i.e. indwelling lines, tubes, and drains  - Monitor endotracheal if appropriate and nasal secretions for changes in amount and color  - [place] appropriate cooling/warming therapies per order  - Administer medications as ordered  - Instruct and encourage patient and family to use good hand hygiene technique  - Identify and instruct in appropriate isolation precautions for identified infection/condition  Outcome: Progressing  Goal: Absence of fever/infection during neutropenic period  Description: INTERVENTIONS:  - Monitor WBC    Outcome: Progressing     Problem: SAFETY ADULT  Goal: Patient will remain free of falls  Description: INTERVENTIONS:  - Educate patient/family on patient safety including physical limitations  - Instruct patient to call for assistance with activity   - Consult OT/PT to assist with strengthening/mobility   - Keep Call bell within reach  - Keep bed low and locked with side rails adjusted as appropriate  - Keep care items and personal belongings within reach  - Initiate and maintain comfort rounds  - Make Fall Risk Sign visible to staff  - Offer Toileting every 2 Hours, in advance of need  - Initiate/Maintain bed alarm  - Obtain necessary fall risk management equipment: bed alarm   - Apply yellow socks and bracelet for high fall risk patients  - Consider moving patient to room near nurses station  Outcome: Progressing  Goal: Maintain or return to baseline ADL function  Description: INTERVENTIONS:  -  Assess patient's ability to carry out ADLs; assess patient's baseline for ADL function and identify physical deficits which impact ability to perform ADLs (bathing, care of mouth/teeth, toileting, grooming, dressing, etc.)  - Assess/evaluate cause of self-care deficits   - Assess range of motion  - Assess patient's mobility; develop plan if impaired  - Assess patient's need for assistive devices and provide as appropriate  - Encourage maximum independence but intervene and supervise when necessary  - Involve family in performance of ADLs  - Assess for home care needs following discharge   - Consider OT consult to assist with ADL evaluation and planning for discharge  - Provide patient education as appropriate  Outcome: Progressing  Goal: Maintains/Returns to pre admission functional level  Description: INTERVENTIONS:  - Perform BMAT or MOVE assessment daily.   - Set and communicate daily mobility goal to care team and patient/family/caregiver. - Collaborate with rehabilitation services on mobility goals if consulted  - Perform Range of Motion 4 times a day. - Reposition patient every 2 hours.   - Dangle patient 4 times a day  - Stand patient 4 times a day  - Ambulate patient 4 times a day  - Out of bed to chair 4 times a day   - Out of bed for meals 4 times a day  - Out of bed for toileting  - Record patient progress and toleration of activity level   Outcome: Progressing     Problem: DISCHARGE PLANNING  Goal: Discharge to home or other facility with appropriate resources  Description: INTERVENTIONS:  - Identify barriers to discharge w/patient and caregiver  - Arrange for needed discharge resources and transportation as appropriate  - Identify discharge learning needs (meds, wound care, etc.)  - Arrange for interpretive services to assist at discharge as needed  - Refer to Case Management Department for coordinating discharge planning if the patient needs post-hospital services based on physician/advanced practitioner order or complex needs related to functional status, cognitive ability, or social support system  Outcome: Progressing     Problem: Knowledge Deficit  Goal: Patient/family/caregiver demonstrates understanding of disease process, treatment plan, medications, and discharge instructions  Description: Complete learning assessment and assess knowledge base.   Interventions:  - Provide teaching at level of understanding  - Provide teaching via preferred learning methods  Outcome: Progressing     Problem: CARDIOVASCULAR - ADULT  Goal: Maintains optimal cardiac output and hemodynamic stability  Description: INTERVENTIONS:  - Monitor I/O, vital signs and rhythm  - Monitor for S/S and trends of decreased cardiac output  - Administer and titrate ordered vasoactive medications to optimize hemodynamic stability  - Assess quality of pulses, skin color and temperature  - Assess for signs of decreased coronary artery perfusion  - Instruct patient to report change in severity of symptoms  Outcome: Progressing  Goal: Absence of cardiac dysrhythmias or at baseline rhythm  Description: INTERVENTIONS:  - Continuous cardiac monitoring, vital signs, obtain 12 lead EKG if ordered  - Administer antiarrhythmic and heart rate control medications as ordered  - Monitor electrolytes and administer replacement therapy as ordered  Outcome: Progressing     Problem: RESPIRATORY - ADULT  Goal: Achieves optimal ventilation and oxygenation  Description: INTERVENTIONS:  - Assess for changes in respiratory status  - Assess for changes in mentation and behavior  - Position to facilitate oxygenation and minimize respiratory effort  - Oxygen administered by appropriate delivery if ordered  - Initiate smoking cessation education as indicated  - Encourage broncho-pulmonary hygiene including cough, deep breathe, Incentive Spirometry  - Assess the need for suctioning and aspirate as needed  - Assess and instruct to report SOB or any respiratory difficulty  - Respiratory Therapy support as indicated  Outcome: Progressing

## 2023-11-16 NOTE — PROGRESS NOTES
[address] [facility]  Progress Note  Name: Efrain Chan  MRN: [id number]  Unit/Bed#: E4 -01 I Date of Admission: 2023   Date of Service: 2023 I Hospital Day: 3    Assessment/Plan   * Pneumonia  Assessment & Plan  Nearly off of oxygen  On Rocephin and Doxy                 Subjective:   Feels better  Less sob  Less cough  Nearly off of oxygen      Objective:     Vitals:   Temp (24hrs), Av.6 °F (36.4 °C), Min:96.9 °F (36.1 °C), Max:98 °F (36.7 °C)    Temp:  [96.9 °F (36.1 °C)-98 °F (36.7 °C)] 97.9 °F (36.6 °C)  HR:  [64-80] 80  Resp:  [18] 18  BP: (106-142)/(55-81) 142/68  SpO2:  [89 %-96 %] 91 %  Body mass index is 47.47 kg/m². Input and Output Summary (last 24 hours): Intake/Output Summary (Last 24 hours) at 2023 1757  Last data filed at 11/15/2023 2032  Gross per 24 hour   Intake 2407.5 ml   Output --   Net 2407.5 ml       Physical Exam:     Physical Exam  Vitals and nursing note reviewed. HENT:      Head: Normocephalic and atraumatic. Eyes:      Pupils: Pupils are equal, round, and reactive to light. Cardiovascular:      Rate and Rhythm: Normal rate and regular rhythm. Heart sounds: No murmur heard. No friction rub. No gallop. Pulmonary:      Effort: Pulmonary effort is normal.      Breath sounds: Normal breath sounds. No wheezing or rales. Abdominal:      General: Bowel sounds are normal.      Palpations: Abdomen is soft. Tenderness: There is no abdominal tenderness. Musculoskeletal:      Right lower leg: No edema. Left lower leg: No edema.        .       Additional Data:     Labs:    Results from last 7 days   Lab Units 23  0523   WBC Thousand/uL 9.85   HEMOGLOBIN g/dL 12.0   HEMATOCRIT % 37.7   PLATELETS Thousands/uL 220   NEUTROS PCT % 71   LYMPHS PCT % 16   MONOS PCT % 11   EOS PCT % 0     Results from last 7 days   Lab Units 23  0523 11/15/23  0507 23  1404   POTASSIUM mmol/L 4.3   < > 4.5   CHLORIDE mmol/L 105   < > 99   CO2 mmol/L 29   < > 31   BUN mg/dL 25   < > 14   CREATININE mg/dL 0.84   < > 0.78   CALCIUM mg/dL 9.2   < > 9.4   ALK PHOS U/L  --   --  57   ALT U/L  --   --  14   AST U/L  --   --  35    < > = values in this interval not displayed. * I Have Reviewed All Lab Data     Recent Cultures (last 7 days):     Results from last 7 days   Lab Units 11/13/23  1404 11/13/23  1403   BLOOD CULTURE  No Growth at 48 hrs. No Growth at 48 hrs.          Last 24 Hours Medication List:   Current Facility-Administered Medications   Medication Dose Route Frequency Provider Last Rate    acetaminophen  650 mg Oral Q6H PRN Cyn Patel, DO      benzonatate  100 mg Oral TID Nasreen Perales MD      cefTRIAXone  1,000 mg Intravenous Q24H Cyn Patel, DO 1,000 mg (11/16/23 0945)    doxycycline hyclate  100 mg Oral Q12H [address] Vinny S Prechtel, DO      finasteride  5 mg Oral Daily Cheikh Duron MD      fluticasone  1 spray Each Nare Daily Nasreen Perales MD      guaiFENesin  600 mg Oral Q12H [address] Cheikh Duron MD      HYDROcodone Bit-Homatrop MBr  5 mL Oral Q4H PRN Nasreen Perales MD      HYDROcodone-acetaminophen  1 tablet Oral Q6H PRN Cyn Patel, DO      loratadine  10 mg Oral Daily Nasreen Perales MD      metoprolol succinate  25 mg Oral Daily Nasreen Perales MD      nystatin   Topical BID Kristie Necessary, PA-C      predniSONE  60 mg Oral Daily Nasreen Perales MD      rivaroxaban  20 mg Oral Daily With Breakfast Vinny S Prechtel, DO      sodium chloride  75 mL/hr Intravenous Continuous Cyn Patel, DO 75 mL/hr (11/15/23 2032)         VTE Pharmacologic Prophylaxis:   Pharmacologic: Rivaroxaban (Xarelto)      Current Length of Stay: 3 day(s)    Current Patient Status: Inpatient       Discharge Plan: home likely tomorrow    Code Status: Prior           Today, Patient Was Seen By: Margarette Mo DO    ** Please Note: Dictation voice to text software may have been used in the creation of this document.  **

## 2023-11-16 NOTE — PLAN OF CARE
Problem: Nutrition/Hydration-ADULT  Goal: Nutrient/Hydration intake appropriate for improving, restoring or maintaining nutritional needs  Description: Monitor and assess patient's nutrition/hydration status for malnutrition. Collaborate with interdisciplinary team and initiate plan and interventions as ordered. Monitor patient's weight and dietary intake as ordered or per policy. Utilize nutrition screening tool and intervene as necessary. Determine patient's food preferences and provide high-protein, high-caloric foods as appropriate.      INTERVENTIONS:  - Monitor oral intake, urinary output, labs, and treatment plans  - Assess nutrition and hydration status and recommend course of action  - Evaluate amount of meals eaten  - Assist patient with eating if necessary   - Allow adequate time for meals  - Recommend/ encourage appropriate diets, oral nutritional supplements, and vitamin/mineral supplements  - Order, calculate, and assess calorie counts as needed  - Recommend, monitor, and adjust tube feedings and TPN/PPN based on assessed needs  - Assess need for intravenous fluids  - Provide specific nutrition/hydration education as appropriate  - Include patient/family/caregiver in decisions related to nutrition  Outcome: Progressing     Problem: PAIN - ADULT  Goal: Verbalizes/displays adequate comfort level or baseline comfort level  Description: Interventions:  - Encourage patient to monitor pain and request assistance  - Assess pain using appropriate pain scale  - Administer analgesics based on type and severity of pain and evaluate response  - Implement non-pharmacological measures as appropriate and evaluate response  - Consider cultural and social influences on pain and pain management  - Notify physician/advanced practitioner if interventions unsuccessful or patient reports new pain  Outcome: Progressing     Problem: INFECTION - ADULT  Goal: Absence or prevention of progression during hospitalization  Description: INTERVENTIONS:  - Assess and monitor for signs and symptoms of infection  - Monitor lab/diagnostic results  - Monitor all insertion sites, i.e. indwelling lines, tubes, and drains  - Monitor endotracheal if appropriate and nasal secretions for changes in amount and color  - [place] appropriate cooling/warming therapies per order  - Administer medications as ordered  - Instruct and encourage patient and family to use good hand hygiene technique  - Identify and instruct in appropriate isolation precautions for identified infection/condition  Outcome: Progressing  Goal: Absence of fever/infection during neutropenic period  Description: INTERVENTIONS:  - Monitor WBC    Outcome: Progressing     Problem: SAFETY ADULT  Goal: Patient will remain free of falls  Description: INTERVENTIONS:  - Educate patient/family on patient safety including physical limitations  - Instruct patient to call for assistance with activity   - Consult OT/PT to assist with strengthening/mobility   - Keep Call bell within reach  - Keep bed low and locked with side rails adjusted as appropriate  - Keep care items and personal belongings within reach  - Initiate and maintain comfort rounds  - Make Fall Risk Sign visible to staff  - Offer Toileting every 2 Hours, in advance of need  - Initiate/Maintain bed alarm  - Obtain necessary fall risk management equipment:   - Apply yellow socks and bracelet for high fall risk patients  - Consider moving patient to room near nurses station  Outcome: Progressing  Goal: Maintain or return to baseline ADL function  Description: INTERVENTIONS:  -  Assess patient's ability to carry out ADLs; assess patient's baseline for ADL function and identify physical deficits which impact ability to perform ADLs (bathing, care of mouth/teeth, toileting, grooming, dressing, etc.)  - Assess/evaluate cause of self-care deficits   - Assess range of motion  - Assess patient's mobility; develop plan if impaired  - Assess patient's need for assistive devices and provide as appropriate  - Encourage maximum independence but intervene and supervise when necessary  - Involve family in performance of ADLs  - Assess for home care needs following discharge   - Consider OT consult to assist with ADL evaluation and planning for discharge  - Provide patient education as appropriate  Outcome: Progressing  Goal: Maintains/Returns to pre admission functional level  Description: INTERVENTIONS:  - Perform BMAT or MOVE assessment daily.   - Set and communicate daily mobility goal to care team and patient/family/caregiver. - Collaborate with rehabilitation services on mobility goals if consulted  - Perform Range of Motion 3 times a day. - Reposition patient every 3 hours.   - Dangle patient 3 times a day  - Stand patient 3 times a day  - Ambulate patient 3 times a day  - Out of bed to chair 3 times a day   - Out of bed for meals 3 times a day  - Out of bed for toileting  - Record patient progress and toleration of activity level   Outcome: Progressing     Problem: DISCHARGE PLANNING  Goal: Discharge to home or other facility with appropriate resources  Description: INTERVENTIONS:  - Identify barriers to discharge w/patient and caregiver  - Arrange for needed discharge resources and transportation as appropriate  - Identify discharge learning needs (meds, wound care, etc.)  - Arrange for interpretive services to assist at discharge as needed  - Refer to Case Management Department for coordinating discharge planning if the patient needs post-hospital services based on physician/advanced practitioner order or complex needs related to functional status, cognitive ability, or social support system  Outcome: Progressing     Problem: Knowledge Deficit  Goal: Patient/family/caregiver demonstrates understanding of disease process, treatment plan, medications, and discharge instructions  Description: Complete learning assessment and assess knowledge base.   Interventions:  - Provide teaching at level of understanding  - Provide teaching via preferred learning methods  Outcome: Progressing     Problem: CARDIOVASCULAR - ADULT  Goal: Maintains optimal cardiac output and hemodynamic stability  Description: INTERVENTIONS:  - Monitor I/O, vital signs and rhythm  - Monitor for S/S and trends of decreased cardiac output  - Administer and titrate ordered vasoactive medications to optimize hemodynamic stability  - Assess quality of pulses, skin color and temperature  - Assess for signs of decreased coronary artery perfusion  - Instruct patient to report change in severity of symptoms  Outcome: Progressing  Goal: Absence of cardiac dysrhythmias or at baseline rhythm  Description: INTERVENTIONS:  - Continuous cardiac monitoring, vital signs, obtain 12 lead EKG if ordered  - Administer antiarrhythmic and heart rate control medications as ordered  - Monitor electrolytes and administer replacement therapy as ordered  Outcome: Progressing     Problem: RESPIRATORY - ADULT  Goal: Achieves optimal ventilation and oxygenation  Description: INTERVENTIONS:  - Assess for changes in respiratory status  - Assess for changes in mentation and behavior  - Position to facilitate oxygenation and minimize respiratory effort  - Oxygen administered by appropriate delivery if ordered  - Initiate smoking cessation education as indicated  - Encourage broncho-pulmonary hygiene including cough, deep breathe, Incentive Spirometry  - Assess the need for suctioning and aspirate as needed  - Assess and instruct to report SOB or any respiratory difficulty  - Respiratory Therapy support as indicated  Outcome: Progressing

## 2023-11-17 VITALS
WEIGHT: 315 LBS | HEART RATE: 62 BPM | DIASTOLIC BLOOD PRESSURE: 68 MMHG | RESPIRATION RATE: 18 BRPM | OXYGEN SATURATION: 93 % | TEMPERATURE: 98.1 F | SYSTOLIC BLOOD PRESSURE: 137 MMHG | HEIGHT: 69 IN | BODY MASS INDEX: 46.65 KG/M2

## 2023-11-17 PROCEDURE — 99239 HOSP IP/OBS DSCHRG MGMT >30: CPT | Performed by: HOSPITALIST

## 2023-11-17 RX ORDER — PREDNISONE 10 MG/1
TABLET ORAL
Qty: 42 TABLET | Refills: 0 | Status: SHIPPED | OUTPATIENT
Start: 2023-11-17

## 2023-11-17 RX ORDER — TADALAFIL 10 MG/1
TABLET ORAL
COMMUNITY
Start: 2023-10-25

## 2023-11-17 RX ORDER — DOXYCYCLINE HYCLATE 100 MG/1
100 CAPSULE ORAL EVERY 12 HOURS SCHEDULED
Qty: 10 CAPSULE | Refills: 0 | Status: SHIPPED | OUTPATIENT
Start: 2023-11-17 | End: 2023-11-22

## 2023-11-17 RX ADMIN — FLUTICASONE PROPIONATE 1 SPRAY: 50 SPRAY, METERED NASAL at 09:28

## 2023-11-17 RX ADMIN — FINASTERIDE 5 MG: 5 TABLET, FILM COATED ORAL at 09:23

## 2023-11-17 RX ADMIN — GUAIFENESIN 600 MG: 600 TABLET, EXTENDED RELEASE ORAL at 09:23

## 2023-11-17 RX ADMIN — BENZONATATE 100 MG: 100 CAPSULE ORAL at 09:23

## 2023-11-17 RX ADMIN — DOXYCYCLINE 100 MG: 100 CAPSULE ORAL at 09:23

## 2023-11-17 RX ADMIN — METOPROLOL SUCCINATE 25 MG: 25 TABLET, EXTENDED RELEASE ORAL at 09:23

## 2023-11-17 RX ADMIN — PREDNISONE 60 MG: 20 TABLET ORAL at 09:23

## 2023-11-17 RX ADMIN — RIVAROXABAN 20 MG: 20 TABLET, FILM COATED ORAL at 09:23

## 2023-11-17 RX ADMIN — HYDROCODONE BITARTRATE AND HOMATROPINE METHYLBROMIDE 5 ML: 5; 1.5 SYRUP ORAL at 02:20

## 2023-11-17 RX ADMIN — LORATADINE 10 MG: 10 TABLET ORAL at 09:23

## 2023-11-17 RX ADMIN — CEFTRIAXONE 1000 MG: 1 INJECTION, SOLUTION INTRAVENOUS at 09:23

## 2023-11-17 RX ADMIN — ACETAMINOPHEN 325MG 650 MG: 325 TABLET ORAL at 02:20

## 2023-11-17 NOTE — PLAN OF CARE
Problem: Nutrition/Hydration-ADULT  Goal: Nutrient/Hydration intake appropriate for improving, restoring or maintaining nutritional needs  Description: Monitor and assess patient's nutrition/hydration status for malnutrition. Collaborate with interdisciplinary team and initiate plan and interventions as ordered. Monitor patient's weight and dietary intake as ordered or per policy. Utilize nutrition screening tool and intervene as necessary. Determine patient's food preferences and provide high-protein, high-caloric foods as appropriate.      INTERVENTIONS:  - Monitor oral intake, urinary output, labs, and treatment plans  - Assess nutrition and hydration status and recommend course of action  - Evaluate amount of meals eaten  - Assist patient with eating if necessary   - Allow adequate time for meals  - Recommend/ encourage appropriate diets, oral nutritional supplements, and vitamin/mineral supplements  - Order, calculate, and assess calorie counts as needed  - Recommend, monitor, and adjust tube feedings and TPN/PPN based on assessed needs  - Assess need for intravenous fluids  - Provide specific nutrition/hydration education as appropriate  - Include patient/family/caregiver in decisions related to nutrition  11/17/2023 1131 by Mariel Orellana RN  Outcome: Adequate for Discharge  11/17/2023 1131 by Mariel Orellana RN  Outcome: Progressing  11/17/2023 1058 by Mariel Orellana RN  Outcome: Progressing     Problem: PAIN - ADULT  Goal: Verbalizes/displays adequate comfort level or baseline comfort level  Description: Interventions:  - Encourage patient to monitor pain and request assistance  - Assess pain using appropriate pain scale  - Administer analgesics based on type and severity of pain and evaluate response  - Implement non-pharmacological measures as appropriate and evaluate response  - Consider cultural and social influences on pain and pain management  - Notify physician/advanced practitioner if interventions unsuccessful or patient reports new pain  11/17/2023 1131 by Maira Marcos RN  Outcome: Adequate for Discharge  11/17/2023 1131 by Maira Marcos RN  Outcome: Progressing  11/17/2023 1058 by Maira Marcos RN  Outcome: Progressing     Problem: INFECTION - ADULT  Goal: Absence or prevention of progression during hospitalization  Description: INTERVENTIONS:  - Assess and monitor for signs and symptoms of infection  - Monitor lab/diagnostic results  - Monitor all insertion sites, i.e. indwelling lines, tubes, and drains  - Monitor endotracheal if appropriate and nasal secretions for changes in amount and color  - [place] appropriate cooling/warming therapies per order  - Administer medications as ordered  - Instruct and encourage patient and family to use good hand hygiene technique  - Identify and instruct in appropriate isolation precautions for identified infection/condition  11/17/2023 1131 by Maira Marcos RN  Outcome: Adequate for Discharge  11/17/2023 1131 by Maira Marcos RN  Outcome: Progressing  11/17/2023 1058 by Maira Marcos RN  Outcome: Progressing  Goal: Absence of fever/infection during neutropenic period  Description: INTERVENTIONS:  - Monitor WBC    11/17/2023 1131 by Maira Marcos RN  Outcome: Adequate for Discharge  11/17/2023 1131 by Maira Marcos RN  Outcome: Progressing  11/17/2023 1058 by Maira Marcos RN  Outcome: Progressing     Problem: SAFETY ADULT  Goal: Patient will remain free of falls  Description: INTERVENTIONS:  - Educate patient/family on patient safety including physical limitations  - Instruct patient to call for assistance with activity   - Consult OT/PT to assist with strengthening/mobility   - Keep Call bell within reach  - Keep bed low and locked with side rails adjusted as appropriate  - Keep care items and personal belongings within reach  - Initiate and maintain comfort rounds  - Make Fall Risk Sign visible to staff  - Offer Toileting every 2 Hours, in advance of need  - Initiate/Maintain bed alarm  - Obtain necessary fall risk management equipment:   - Apply yellow socks and bracelet for high fall risk patients  - Consider moving patient to room near nurses station  11/17/2023 1131 by Renee Phillips RN  Outcome: Adequate for Discharge  11/17/2023 1131 by Renee Phillips RN  Outcome: Progressing  11/17/2023 1058 by Renee Phillips RN  Outcome: Progressing  Goal: Maintain or return to baseline ADL function  Description: INTERVENTIONS:  -  Assess patient's ability to carry out ADLs; assess patient's baseline for ADL function and identify physical deficits which impact ability to perform ADLs (bathing, care of mouth/teeth, toileting, grooming, dressing, etc.)  - Assess/evaluate cause of self-care deficits   - Assess range of motion  - Assess patient's mobility; develop plan if impaired  - Assess patient's need for assistive devices and provide as appropriate  - Encourage maximum independence but intervene and supervise when necessary  - Involve family in performance of ADLs  - Assess for home care needs following discharge   - Consider OT consult to assist with ADL evaluation and planning for discharge  - Provide patient education as appropriate  11/17/2023 1131 by Renee Phillips RN  Outcome: Adequate for Discharge  11/17/2023 1131 by Renee Phillips RN  Outcome: Progressing  11/17/2023 1058 by Renee Phillips RN  Outcome: Progressing  Goal: Maintains/Returns to pre admission functional level  Description: INTERVENTIONS:  - Perform BMAT or MOVE assessment daily.   - Set and communicate daily mobility goal to care team and patient/family/caregiver. - Collaborate with rehabilitation services on mobility goals if consulted  - Perform Range of Motion 3 times a day. - Reposition patient every 3 hours.   - Dangle patient 3 times a day  - Stand patient 3 times a day  - Ambulate patient 3 times a day  - Out of bed to chair 3 times a day   - Out of bed for meals 3 times a day  - Out of bed for toileting  - Record patient progress and toleration of activity level   11/17/2023 1131 by Jatinder Farr RN  Outcome: Adequate for Discharge  11/17/2023 1131 by Jatinder Farr RN  Outcome: Progressing  11/17/2023 1058 by Jatinder Farr RN  Outcome: Progressing     Problem: DISCHARGE PLANNING  Goal: Discharge to home or other facility with appropriate resources  Description: INTERVENTIONS:  - Identify barriers to discharge w/patient and caregiver  - Arrange for needed discharge resources and transportation as appropriate  - Identify discharge learning needs (meds, wound care, etc.)  - Arrange for interpretive services to assist at discharge as needed  - Refer to Case Management Department for coordinating discharge planning if the patient needs post-hospital services based on physician/advanced practitioner order or complex needs related to functional status, cognitive ability, or social support system  11/17/2023 1131 by Jatinder Farr RN  Outcome: Adequate for Discharge  11/17/2023 1131 by Jatinder Farr RN  Outcome: Progressing  11/17/2023 1058 by Jatinder Farr RN  Outcome: Progressing     Problem: Knowledge Deficit  Goal: Patient/family/caregiver demonstrates understanding of disease process, treatment plan, medications, and discharge instructions  Description: Complete learning assessment and assess knowledge base.   Interventions:  - Provide teaching at level of understanding  - Provide teaching via preferred learning methods  11/17/2023 1131 by Jatinder Farr RN  Outcome: Adequate for Discharge  11/17/2023 1131 by Jatinder Farr RN  Outcome: Progressing  11/17/2023 1058 by Jatinder Farr RN  Outcome: Progressing     Problem: CARDIOVASCULAR - ADULT  Goal: Maintains optimal cardiac output and hemodynamic stability  Description: INTERVENTIONS:  - Monitor I/O, vital signs and rhythm  - Monitor for S/S and trends of decreased cardiac output  - Administer and titrate ordered vasoactive medications to optimize hemodynamic stability  - Assess quality of pulses, skin color and temperature  - Assess for signs of decreased coronary artery perfusion  - Instruct patient to report change in severity of symptoms  11/17/2023 1131 by Federico Bowen RN  Outcome: Adequate for Discharge  11/17/2023 1131 by Federico Bowen RN  Outcome: Progressing  11/17/2023 1058 by Federico Bowen RN  Outcome: Progressing  Goal: Absence of cardiac dysrhythmias or at baseline rhythm  Description: INTERVENTIONS:  - Continuous cardiac monitoring, vital signs, obtain 12 lead EKG if ordered  - Administer antiarrhythmic and heart rate control medications as ordered  - Monitor electrolytes and administer replacement therapy as ordered  11/17/2023 1131 by Federico Bowen RN  Outcome: Adequate for Discharge  11/17/2023 1131 by Federico Bowen RN  Outcome: Progressing  11/17/2023 1058 by Federico Bowen RN  Outcome: Progressing     Problem: RESPIRATORY - ADULT  Goal: Achieves optimal ventilation and oxygenation  Description: INTERVENTIONS:  - Assess for changes in respiratory status  - Assess for changes in mentation and behavior  - Position to facilitate oxygenation and minimize respiratory effort  - Oxygen administered by appropriate delivery if ordered  - Initiate smoking cessation education as indicated  - Encourage broncho-pulmonary hygiene including cough, deep breathe, Incentive Spirometry  - Assess the need for suctioning and aspirate as needed  - Assess and instruct to report SOB or any respiratory difficulty  - Respiratory Therapy support as indicated  11/17/2023 1131 by Federico Bowen RN  Outcome: Adequate for Discharge  11/17/2023 1131 by Federico Bowen RN  Outcome: Progressing  11/17/2023 1058 by Federico Bowen RN  Outcome: Progressing

## 2023-11-17 NOTE — PLAN OF CARE
Problem: Nutrition/Hydration-ADULT  Goal: Nutrient/Hydration intake appropriate for improving, restoring or maintaining nutritional needs  Description: Monitor and assess patient's nutrition/hydration status for malnutrition. Collaborate with interdisciplinary team and initiate plan and interventions as ordered. Monitor patient's weight and dietary intake as ordered or per policy. Utilize nutrition screening tool and intervene as necessary. Determine patient's food preferences and provide high-protein, high-caloric foods as appropriate.      INTERVENTIONS:  - Monitor oral intake, urinary output, labs, and treatment plans  - Assess nutrition and hydration status and recommend course of action  - Evaluate amount of meals eaten  - Assist patient with eating if necessary   - Allow adequate time for meals  - Recommend/ encourage appropriate diets, oral nutritional supplements, and vitamin/mineral supplements  - Order, calculate, and assess calorie counts as needed  - Recommend, monitor, and adjust tube feedings and TPN/PPN based on assessed needs  - Assess need for intravenous fluids  - Provide specific nutrition/hydration education as appropriate  - Include patient/family/caregiver in decisions related to nutrition  Outcome: Progressing     Problem: PAIN - ADULT  Goal: Verbalizes/displays adequate comfort level or baseline comfort level  Description: Interventions:  - Encourage patient to monitor pain and request assistance  - Assess pain using appropriate pain scale  - Administer analgesics based on type and severity of pain and evaluate response  - Implement non-pharmacological measures as appropriate and evaluate response  - Consider cultural and social influences on pain and pain management  - Notify physician/advanced practitioner if interventions unsuccessful or patient reports new pain  Outcome: Progressing     Problem: INFECTION - ADULT  Goal: Absence or prevention of progression during hospitalization  Description: INTERVENTIONS:  - Assess and monitor for signs and symptoms of infection  - Monitor lab/diagnostic results  - Monitor all insertion sites, i.e. indwelling lines, tubes, and drains  - Monitor endotracheal if appropriate and nasal secretions for changes in amount and color  - [place] appropriate cooling/warming therapies per order  - Administer medications as ordered  - Instruct and encourage patient and family to use good hand hygiene technique  - Identify and instruct in appropriate isolation precautions for identified infection/condition  Outcome: Progressing  Goal: Absence of fever/infection during neutropenic period  Description: INTERVENTIONS:  - Monitor WBC    Outcome: Progressing     Problem: SAFETY ADULT  Goal: Patient will remain free of falls  Description: INTERVENTIONS:  - Educate patient/family on patient safety including physical limitations  - Instruct patient to call for assistance with activity   - Consult OT/PT to assist with strengthening/mobility   - Keep Call bell within reach  - Keep bed low and locked with side rails adjusted as appropriate  - Keep care items and personal belongings within reach  - Initiate and maintain comfort rounds  - Make Fall Risk Sign visible to staff  - Offer Toileting every 2 Hours, in advance of need  - Initiate/Maintain bed alarm  - Obtain necessary fall risk management equipment:   - Apply yellow socks and bracelet for high fall risk patients  - Consider moving patient to room near nurses station  Outcome: Progressing  Goal: Maintain or return to baseline ADL function  Description: INTERVENTIONS:  -  Assess patient's ability to carry out ADLs; assess patient's baseline for ADL function and identify physical deficits which impact ability to perform ADLs (bathing, care of mouth/teeth, toileting, grooming, dressing, etc.)  - Assess/evaluate cause of self-care deficits   - Assess range of motion  - Assess patient's mobility; develop plan if impaired  - Assess patient's need for assistive devices and provide as appropriate  - Encourage maximum independence but intervene and supervise when necessary  - Involve family in performance of ADLs  - Assess for home care needs following discharge   - Consider OT consult to assist with ADL evaluation and planning for discharge  - Provide patient education as appropriate  Outcome: Progressing  Goal: Maintains/Returns to pre admission functional level  Description: INTERVENTIONS:  - Perform BMAT or MOVE assessment daily.   - Set and communicate daily mobility goal to care team and patient/family/caregiver. - Collaborate with rehabilitation services on mobility goals if consulted  - Perform Range of Motion 3 times a day. - Reposition patient every 3 hours.   - Dangle patient 3 times a day  - Stand patient 3 times a day  - Ambulate patient 3 times a day  - Out of bed to chair 3 times a day   - Out of bed for meals 3 times a day  - Out of bed for toileting  - Record patient progress and toleration of activity level   Outcome: Progressing     Problem: DISCHARGE PLANNING  Goal: Discharge to home or other facility with appropriate resources  Description: INTERVENTIONS:  - Identify barriers to discharge w/patient and caregiver  - Arrange for needed discharge resources and transportation as appropriate  - Identify discharge learning needs (meds, wound care, etc.)  - Arrange for interpretive services to assist at discharge as needed  - Refer to Case Management Department for coordinating discharge planning if the patient needs post-hospital services based on physician/advanced practitioner order or complex needs related to functional status, cognitive ability, or social support system  Outcome: Progressing     Problem: Knowledge Deficit  Goal: Patient/family/caregiver demonstrates understanding of disease process, treatment plan, medications, and discharge instructions  Description: Complete learning assessment and assess knowledge base.   Interventions:  - Provide teaching at level of understanding  - Provide teaching via preferred learning methods  Outcome: Progressing     Problem: CARDIOVASCULAR - ADULT  Goal: Maintains optimal cardiac output and hemodynamic stability  Description: INTERVENTIONS:  - Monitor I/O, vital signs and rhythm  - Monitor for S/S and trends of decreased cardiac output  - Administer and titrate ordered vasoactive medications to optimize hemodynamic stability  - Assess quality of pulses, skin color and temperature  - Assess for signs of decreased coronary artery perfusion  - Instruct patient to report change in severity of symptoms  Outcome: Progressing  Goal: Absence of cardiac dysrhythmias or at baseline rhythm  Description: INTERVENTIONS:  - Continuous cardiac monitoring, vital signs, obtain 12 lead EKG if ordered  - Administer antiarrhythmic and heart rate control medications as ordered  - Monitor electrolytes and administer replacement therapy as ordered  Outcome: Progressing     Problem: RESPIRATORY - ADULT  Goal: Achieves optimal ventilation and oxygenation  Description: INTERVENTIONS:  - Assess for changes in respiratory status  - Assess for changes in mentation and behavior  - Position to facilitate oxygenation and minimize respiratory effort  - Oxygen administered by appropriate delivery if ordered  - Initiate smoking cessation education as indicated  - Encourage broncho-pulmonary hygiene including cough, deep breathe, Incentive Spirometry  - Assess the need for suctioning and aspirate as needed  - Assess and instruct to report SOB or any respiratory difficulty  - Respiratory Therapy support as indicated  Outcome: Progressing

## 2023-11-17 NOTE — PLAN OF CARE
Problem: INFECTION - ADULT  Goal: Absence or prevention of progression during hospitalization  Description: INTERVENTIONS:  - Assess and monitor for signs and symptoms of infection  - Monitor lab/diagnostic results  - Monitor all insertion sites, i.e. indwelling lines, tubes, and drains  - Monitor endotracheal if appropriate and nasal secretions for changes in amount and color  - [place] appropriate cooling/warming therapies per order  - Administer medications as ordered  - Instruct and encourage patient and family to use good hand hygiene technique  - Identify and instruct in appropriate isolation precautions for identified infection/condition  Outcome: Progressing     Problem: RESPIRATORY - ADULT  Goal: Achieves optimal ventilation and oxygenation  Description: INTERVENTIONS:  - Assess for changes in respiratory status  - Assess for changes in mentation and behavior  - Position to facilitate oxygenation and minimize respiratory effort  - Oxygen administered by appropriate delivery if ordered  - Initiate smoking cessation education as indicated  - Encourage broncho-pulmonary hygiene including cough, deep breathe, Incentive Spirometry  - Assess the need for suctioning and aspirate as needed  - Assess and instruct to report SOB or any respiratory difficulty  - Respiratory Therapy support as indicated  Outcome: Progressing

## 2023-11-18 LAB
BACTERIA BLD CULT: NORMAL
BACTERIA BLD CULT: NORMAL

## 2023-11-18 NOTE — ASSESSMENT & PLAN NOTE
Initially needed oxygen for acute hypoxic respiratory failure  Now on room air oxygen saturation 95%    Did well on Rocephin and doxycycline    We will send home on a course of doxycycline

## 2023-11-18 NOTE — DISCHARGE SUMMARY
[facility]  Discharge- Ksenia Hough 1955, 70 y.o. male MRN: [id number]  Unit/Bed#: E4 -01 Encounter: [id number]  Primary Care Provider: Mónica Godoy MD   Date and time admitted to hospital: 11/13/2023 12:55 PM    * Pneumonia  Assessment & Plan  Initially needed oxygen for acute hypoxic respiratory failure  Now on room air oxygen saturation 95%    Did well on Rocephin and doxycycline    We will send home on a course of doxycycline    Atrial fibrillation ([address])  Assessment & Plan  On Toprol-XL and Xarelto    Obstructive sleep apnea on CPAP  Assessment & Plan  Would benefit from sleep study        Discharging Physician / Practitioner: Debbie Richards DO  PCP: Mónica Godoy MD  Admission Date:   Admission Orders (From admission, onward)       Ordered        11/13/23 1636  INPATIENT ADMISSION  Once                          Discharge Date: 11/17/2023    Medical Problems       Resolved Problems  Date Reviewed: 11/13/2023   None               Reason for Admission: Shortness of breath      Hospital Course: Ksenia Hough is a 70 y.o. male patient who originally presented to the hospital on 11/13/2023 due to shortness of breath. Patient has been sick for about a week. Shortness of breath and cough. Found to be hypoxic at his PCP office so he was sent in. Found to have pneumonia. With acute hypoxic respiratory failure. He was placed on Rocephin and doxycycline. He quickly improved. I will send him out on a course of doxycycline. Please see above list of diagnoses and related plan for additional information.        Condition at Discharge: good       Discharge Day Visit / Exam:     Subjective:  feels well  Off oxygen all night  No more SOB  No cough      Vitals: Blood Pressure: 137/68 (11/17/23 0748)  Pulse: 62 (11/17/23 0748)  Temperature: 98.1 °F (36.7 °C) (11/17/23 0748)  Temp Source: Temporal (11/17/23 0748)  Respirations: 18 (11/17/23 0748)  Height: 5' 9" (175.3 cm) (11/16/23 2300)  Weight - Scale: (!) 146 kg (321 lb 6.9 oz) (11/16/23 2300)  SpO2: 93 % (11/17/23 0748)    Exam:     Physical Exam  Vitals and nursing note reviewed. HENT:      Head: Normocephalic and atraumatic. Eyes:      Pupils: Pupils are equal, round, and reactive to light. Cardiovascular:      Rate and Rhythm: Normal rate and regular rhythm. Heart sounds: No murmur heard. No friction rub. No gallop. Pulmonary:      Effort: Pulmonary effort is normal.      Breath sounds: Normal breath sounds. No wheezing or rales. Abdominal:      General: Bowel sounds are normal.      Palpations: Abdomen is soft. Tenderness: There is no abdominal tenderness. Musculoskeletal:      Right lower leg: No edema. Left lower leg: No edema. .         Discharge instructions/Information to patient and family:   See after visit summary for information provided to patient and family. Provisions for Follow-Up Care:  See after visit summary for information related to follow-up care and any pertinent home health orders. Disposition:     Home       Discharge Statement:  I spent 39 minutes discharging the patient. This time was spent on the day of discharge. I had direct contact with the patient on the day of discharge. Greater than 50% of the total time was spent examining patient, answering all patient questions, arranging and discussing plan of care with patient as well as directly providing post-discharge instructions. Additional time then spent on discharge activities. Discharge Medications:  See after visit summary for reconciled discharge medications provided to patient and family.       ** Please Note: This note has been constructed using a voice recognition system **

## 2023-11-20 ENCOUNTER — OFFICE VISIT (OUTPATIENT)
Dept: PAIN MEDICINE | Facility: CLINIC | Age: 68
End: 2023-11-20
Payer: COMMERCIAL

## 2023-11-20 VITALS
DIASTOLIC BLOOD PRESSURE: 76 MMHG | BODY MASS INDEX: 46.65 KG/M2 | HEIGHT: 69 IN | RESPIRATION RATE: 16 BRPM | SYSTOLIC BLOOD PRESSURE: 127 MMHG | HEART RATE: 73 BPM | WEIGHT: 315 LBS

## 2023-11-20 DIAGNOSIS — M54.50 CHRONIC BILATERAL LOW BACK PAIN WITHOUT SCIATICA: ICD-10-CM

## 2023-11-20 DIAGNOSIS — M47.816 LUMBAR SPONDYLOSIS: ICD-10-CM

## 2023-11-20 DIAGNOSIS — Z79.891 LONG-TERM CURRENT USE OF OPIATE ANALGESIC: ICD-10-CM

## 2023-11-20 DIAGNOSIS — F11.20 UNCOMPLICATED OPIOID DEPENDENCE (HCC): ICD-10-CM

## 2023-11-20 DIAGNOSIS — G89.4 CHRONIC PAIN SYNDROME: ICD-10-CM

## 2023-11-20 DIAGNOSIS — G89.29 CHRONIC BILATERAL LOW BACK PAIN WITHOUT SCIATICA: ICD-10-CM

## 2023-11-20 PROCEDURE — 99214 OFFICE O/P EST MOD 30 MIN: CPT | Performed by: NURSE PRACTITIONER

## 2023-11-20 RX ORDER — TRAMADOL HYDROCHLORIDE 50 MG/1
50 TABLET ORAL 2 TIMES DAILY PRN
Qty: 60 TABLET | Refills: 1 | Status: CANCELLED | OUTPATIENT
Start: 2023-11-20

## 2023-11-20 NOTE — UTILIZATION REVIEW
NOTIFICATION OF ADMISSION DISCHARGE   This is a Notification of Discharge from [address] [facility], [place]. Please be advised that this patient has been discharge from our facility. Below you will find the admission and discharge date and time including the patient’s disposition. UTILIZATION REVIEW CONTACT:  Joyce Doherty  Utilization   Network Utilization Review Department  Phone: [phone] x carefully listen to the prompts. All voicemails are confidential.  Email: [email or web address]     ADMISSION INFORMATION  PRESENTATION DATE: 11/13/2023 12:55 PM  OBERVATION ADMISSION DATE:   INPATIENT ADMISSION DATE: 11/13/23  4:37 PM   DISCHARGE DATE: 11/17/2023  1:05 PM   DISPOSITION:Home/Self Care    Network Utilization Review Department  ATTENTION: Please call with any questions or concerns to [phone] and carefully listen to the prompts so that you are directed to the right person. All voicemails are confidential.   For Discharge needs, contact Care Management DC Support Team at [phone] opt. 2  Send all requests for admission clinical reviews, approved or denied determinations and any other requests to dedicated fax number below belonging to the campus where the patient is receiving treatment.  List of dedicated fax numbers for the Facilities:  [place] DENIALS (Administrative/Medical Necessity) [phone]   DISCHARGE SUPPORT TEAM (Network) [phone]   [address] [facility] (Maternity/NICU/Pediatrics) [phone]   [address] [facility] [address] [phone] [address] [facility] [phone]   [address] [place] [address]  [address]  [phone]

## 2023-11-20 NOTE — PROGRESS NOTES
Assessment:  1. Chronic pain syndrome    2. Chronic bilateral low back pain without sciatica    3. Lumbar spondylosis    4. Long-term current use of opiate analgesic    5. Uncomplicated opioid dependence ([address])        Plan:  While the patient was in the office today, I did have a thorough conversation regarding their chronic pain syndrome, medication management, and treatment plan options. Patient is being seen for a follow-up visit. He underwent a right-sided L4-5 and L5-S1 radiofrequency ablation on 11/1/2023. The left side was performed on 10/5/2023. Patient is reporting about 90% improvement in his symptoms. Start physical therapy for lumbar core strengthening/stretching. Continue tramadol 50 mg as needed. He uses tramadol on a strictly as needed basis. He did not require refill of this medication during today's visit. There are risks associated with opioid medications, including dependence, addiction and tolerance. The patient understands and agrees to use these medications only as prescribed. Potential side effects of the medications include, but are not limited to, constipation, drowsiness, addiction, impaired judgment and risk of fatal overdose if not taken as prescribed. The patient was warned against driving while taking sedation medications. Sharing medications is a felony. At this point in time, the patient is showing no signs of addiction, abuse, diversion or suicidal ideation. A urine drug screen was collected at today's office visit as part of our medication management protocol. The point of care testing results were appropriate for what was being prescribed. The specimen will be sent for confirmatory testing. The drug screen is medically necessary because the patient is either dependent on opioid medication or is being considered for opioid medication therapy and the results could impact ongoing or future treatment.  The drug screen is to evaluate for the presences or absence of prescribed, non-prescribed, and/or illicit drugs/substances. [place] Prescription Drug Monitoring Program report was reviewed and was appropriate     The patient will follow-up in three months for medication prescription refill and reevaluation. The patient was advised to contact the office should their symptoms worsen in the interim. The patient was agreeable and verbalized an understanding. History of Present Illness: The patient is a 70 y.o. male who presents for a follow up office visit in regards to Follow-up. The patient’s current symptoms include complaints of low back pain. Current pain level is a 0/10. Current pain medications includes: Tramadol 50 mg as needed for pain. The patient reports that this regimen is providing 90% pain relief. The patient is reporting no side effects from this pain medication regimen. I have personally reviewed and/or updated the patient's past medical history, past surgical history, family history, social history, current medications, allergies, and vital signs today. Review of Systems  Review of Systems   Musculoskeletal:         Swelling in legs and feet   All other systems reviewed and are negative.           Past Medical History:   Diagnosis Date    A-fib ([address])     ALANNA (acute kidney injury) ([address]) 6/15/2021    Arthritis     CPAP (continuous positive airway pressure) dependence     Irregular heart beat     Lymphedema     Sleep apnea     Urinary frequency     Wears glasses     Wears partial dentures     lower partial       Past Surgical History:   Procedure Laterality Date    ABLATION SAPHENOUS VEIN W/ RFA      COLONOSCOPY      JOINT REPLACEMENT  Left Hip 4 /21/2009    NERVE BLOCK Bilateral 7/18/2023    Procedure: BLOCK MEDIAL BRANCH B/L L4-L5 AND L5-S1 MBB #1;  Surgeon: Indra Randall MD;  Location: [facility];  Service: Pain Management     NERVE BLOCK Bilateral 8/17/2023    Procedure: BLOCK MEDIAL BRANCH  B/L L4-5 and L5-S1 MBB #2; Surgeon: Ford Car MD;  Location: [facility];  Service: Pain Management     NV CYSTO INSERTION TRANSPROSTATIC IMPLANT SINGLE N/A 11/13/2017    Procedure: CYSTOSCOPY WITH INSERTION UROLIFT;  Surgeon: Noelle Rodarte DO;  Location: [facility];  Service: Urology    RADIOFREQUENCY ABLATION Left 10/5/2023    Procedure: ABLATION RADIO FREQUENCY (RFA) LEFT L4-5 AND L5-S1 RFA;  Surgeon: Ford Car MD;  Location: [facility];  Service: Pain Management     RADIOFREQUENCY ABLATION Right 11/1/2023    Procedure: ABLATION RADIO FREQUENCY (RFA) RIGHT L4-5 AND L5-S1 RFA;  Surgeon: Ford Car MD;  Location: [facility];  Service: Pain Management     TONSILLECTOMY         Family History   Problem Relation Age of Onset    Heart disease Mother     Lymphoma Father     Cancer Father     Heart disease Sister     Hypertension Brother     Diabetes Neg Hx     Thyroid disease Neg Hx     Stroke Neg Hx        Social History     Occupational History    Not on file   Tobacco Use    Smoking status: Never    Smokeless tobacco: Never   Vaping Use    Vaping Use: Never used   Substance and Sexual Activity    Alcohol use:  Yes     Alcohol/week: 5.0 standard drinks of alcohol     Types: 3 Glasses of wine, 2 Standard drinks or equivalent per week     Comment: socially    Drug use: No    Sexual activity: Not Currently     Partners: Female     Birth control/protection: Abstinence, Condom Male, Spermicide         Current Outpatient Medications:     acetaminophen (TYLENOL) 500 mg tablet, Take 500 mg by mouth every 6 (six) hours as needed for mild pain, Disp: , Rfl:     alfuzosin (UROXATRAL) 10 mg 24 hr tablet, Take 10 mg by mouth daily, Disp: , Rfl:     allopurinol (ZYLOPRIM) 300 mg tablet, Take 300 mg by mouth daily, Disp: , Rfl:     ammonium lactate (LAC-HYDRIN) 12 % lotion, Apply topically 2 (two) times a day as needed for dry skin, Disp: , Rfl:     Cyanocobalamin (VITAMIN B 12 PO), Take by mouth, Disp: , Rfl:     Diclofenac Sodium (VOLTAREN) 1 %, Apply 2 g topically 4 (four) times a day, Disp: 100 g, Rfl: 0    doxycycline hyclate (VIBRAMYCIN) 100 mg capsule, Take 1 capsule (100 mg total) by mouth every 12 (twelve) hours for 5 days, Disp: 10 capsule, Rfl: 0    dutasteride (AVODART) 0.5 mg capsule, Take 0.5 mg by mouth daily, Disp: , Rfl:     ferrous sulfate 325 (65 Fe) mg tablet, Take 325 mg by mouth, Disp: , Rfl:     Glucosamine-Chondroit-Vit C-Mn (Glucosamine 1500 Complex) CAPS, Take by mouth, Disp: , Rfl:     halobetasol (ULTRAVATE) 0.05 % ointment, APPLY TO AFFECTED AREA ON LEG TWICE DAILY, Disp: , Rfl:     L-ARGININE-500 PO, Take 500 mg by mouth 2 (two) times a day , Disp: , Rfl:     loteprednol etabonate (LOTEMAX) 0.5 % ophthalmic suspension, 1 drop 4 (four) times a day, Disp: , Rfl:     metoprolol succinate (TOPROL-XL) 25 mg 24 hr tablet, Take 1 tablet (25 mg total) by mouth daily, Disp: 90 tablet, Rfl: 2    multivitamin (THERAGRAN) TABS, Take 1 tablet by mouth daily, Disp: , Rfl:     Omega-3 Fatty Acids (fish oil) 1,000 mg, Take 1,000 mg by mouth 2 (two) times a day, Disp: , Rfl:     patient supplied medication, Take 1 each by mouth 2 (two) times a day, Disp: , Rfl:     predniSONE 10 mg tablet, 6 tabs daily for 2 days, then 5 tabs for 2 days then 4 tabs for 2 days then 3 tabs for 2 days then 2 tab for 2 days then 1 tab for 2 days then stop stop, Disp: 42 tablet, Rfl: 0    pregabalin (LYRICA) 100 mg capsule, Take 1 capsule (100 mg total) by mouth 3 (three) times a day, Disp: 42 capsule, Rfl: 0    rivaroxaban (Xarelto) 20 mg tablet, Take 1 tablet (20 mg total) by mouth daily, Disp: 90 tablet, Rfl: 2    spironolactone (ALDACTONE) 25 mg tablet, Take 1 tablet (25 mg total) by mouth 2 (two) times a day, Disp: 180 tablet, Rfl: 1    tadalafil (CIALIS) 10 MG tablet, TAKE 1 TABLET EVERY 3 DAYS AS NEEDED, Disp: , Rfl:     tadalafil (CIALIS) 5 MG tablet, Take 5 mg by mouth daily as needed for erectile dysfunction, Disp: , Rfl:     torsemide (DEMADEX) 20 mg tablet, Take 2 tablets (40 mg total) by mouth 2 (two) times a day, Disp: 180 tablet, Rfl: 2    traMADol (ULTRAM) 50 mg tablet, Take 1 tablet (50 mg total) by mouth 2 (two) times a day as needed for moderate pain, Disp: 60 tablet, Rfl: 1    VITAMIN D PO, Take 2,000 Units by mouth daily , Disp: , Rfl:     Allergies   Allergen Reactions    Penicillins Anaphylaxis    Sulfa Antibiotics Anaphylaxis    Levofloxacin Other (See Comments)       Physical Exam:    /76   Pulse 73   Resp 16   Ht 5' 9" (1.753 m)   Wt (!) 147 kg (324 lb)   BMI 47.85 kg/m²     Constitutional:normal, well developed, well nourished, alert, in no distress and non-toxic and no overt pain behavior. and overweight  Eyes:anicteric  HEENT:grossly intact  Neck:supple, symmetric, trachea midline and no masses   Pulmonary:even and unlabored  Cardiovascular:No edema or pitting edema present  Skin:Normal without rashes or lesions and well hydrated  Psychiatric:Mood and affect appropriate  Neurologic:Cranial Nerves II-XII grossly intact  Musculoskeletal: Gait is slow and guarded.     Imaging  No orders to display       Orders Placed This Encounter   Procedures    Ambulatory Referral to Physical Therapy

## 2023-11-20 NOTE — PATIENT INSTRUCTIONS
Opioid Safety   WHAT YOU NEED TO KNOW:   An opioid medicine is used to treat pain. Examples are oxycodone, morphine, fentanyl, or codeine. Pain control and management may help you rest, heal, and return to your daily activities. You and your family will receive information about how to manage your pain at home. The instructions will include what to do if you have side effects as your pain is managed. You will get information on how to handle opioid medicine safely. You will also get suggestions on how to control pain without opioids. It is important to follow all instructions so your pain is managed effectively. DISCHARGE INSTRUCTIONS:   Call your local emergency number (911 in the ), or have someone else call if:   You have a seizure. You cannot be woken. You have trouble staying awake and your breathing is slow or shallow. Your speech is slurred, or you are confused. You are dizzy or stumble when you walk. Call your doctor, or have someone close to you call if:   You are extremely drowsy, or you have trouble staying awake or speaking. You have pale or clammy skin. You have blue fingernails or lips. Your heartbeat is slower than normal.    You cannot stop vomiting. You have questions or concerns about your condition or care. Use opioids safely:   Take prescribed opioids exactly as directed. Opioids come with directions based on the kind and how it is given. Talk to your healthcare provider or a pharmacist if you have any questions. Do not take more than the recommended amount. Too much can cause a life-threatening overdose. Do not continue to take it after your pain stops. You may develop tolerance. This means you keep needing higher doses to get the same effect. You may also develop opioid use disorder. This means you are not able to control your opioid use. Do not give opioids to others or take opioids that belong to someone else.   The kind or amount one person takes may not be right for another. The person you share them with may also be taking medicines that do not mix with opioids. The person may drink alcohol or use other drugs that can cause life-threatening problems when mixed with opioids. Do not mix opioids with other medicines or alcohol. The combination can cause an overdose, or cause you to stop breathing. Alcohol, sleeping pills, and medicines such as antihistamines can make you sleepy. A combination with opioids can lead to a coma. Do not drive or operate heavy machinery after you use an opioid. You may feel drowsy or have trouble concentrating. You can injure yourself or others if you drive or use heavy machinery when you are not alert. Your provider or pharmacist can tell you how long to wait after a dose before you do these activities. Talk to your healthcare provider if you have any side effects. Side effects include nausea, sleepiness, itching, and trouble thinking clearly. Your provider may need to make changes to the kind or amount of opioid you are taking. Your provider can also help you find ways to prevent or relieve side effects. Manage constipation:  Constipation is the most common side effect of opioid medicine. Constipation is when you have hard, dry bowel movements, or you go longer than usual between bowel movements. Tell your healthcare provider about all changes in your bowel movements while you are taking opioids. Your provider may recommend laxative medicine to help you have a bowel movement. Your provider may also change the kind of opioid you are taking, or change when you take it. The following are more ways you can prevent or relieve constipation:  Drink liquids as directed. You may need to drink extra liquids to help soften and move your bowels. Ask how much liquid to drink each day and which liquids are best for you. Eat high-fiber foods. This may help decrease constipation by adding bulk to your bowel movements.  High-fiber foods include fruits, vegetables, whole-grain breads and cereals, and beans. Your healthcare provider or dietitian can help you create a high-fiber meal plan. Your provider may also recommend a fiber supplement if you cannot get enough fiber from food. Exercise regularly. Regular physical activity can help stimulate your intestines. Walking is a good exercise to prevent or relieve constipation. Ask which exercises are best for you. Schedule a time each day to have a bowel movement. This may help train your body to have regular bowel movements. Bend forward while you are on the toilet to help move the bowel movement out. Sit on the toilet for at least 10 minutes, even if you do not have a bowel movement. Store opioids safely:   Store opioids where others cannot easily get them. Keep them in a locked cabinet or secure area. Do not  keep them in a purse or other bag you carry with you. A person may be looking for something else and find the opioids. Make sure opioids are stored out of the reach of children. A child can easily overdose on opioids. Opioids may look like candy to a small child. The best way to dispose of opioids: The laws vary by country and area. In the [facility], the best way is to return the opioids through a take-back program. This program is offered by the [employer] ([employer] The following are options for using the program:  Take the opioids to a [employer] collection site. The site is often a law enforcement center. Call your local law enforcement center for scheduled take-back days in your area. You will be given information on where to go if the collection site is in a different location. Take the opioids to an approved pharmacy or hospital.  A pharmacy or hospital may be set up as a collection site. You will need to ask if it is a [employer] collection site if you were not directed there.  A pharmacy or doctor's office may not be able to take back opioids unless it is a [employer] site. Use a mail-back system. This means you are given containers to put the opioids into. You will then mail them in the containers. Use a take-back drop box. This is a place to leave the opioids at any time. People and animals will not be able to get into the box. Your local law enforcement agency can tell you where to find a drop box in your area. Other safe ways to dispose of opioids: The medicine may come with disposal instructions. The instructions may vary depending on the brand of medicine you are using. Instructions may come in a Medication Guide, but not every medicine has one. You may instead get instructions from your pharmacy or doctor. Follow instructions carefully. The following are general guidelines to follow:  Find out if you can flush the opioid. Some opioids can be flushed down the toilet or poured into the sink. You will need to contact authorities in your area to see if this is an option for you. The FDA also offers a list of medicines that are safe to flush down the toilet. You can check the list if you cannot get the information for your local area. Ask your waste management company about rules for putting opioids in the trash. The company will be able to give you specific directions. Scratch out personal information on the original medicine label so it cannot be read. Then put it in the trash. Do not label the trash or put any information on it about the opioids. It should look like regular household trash so no one is tempted to look for the opioids. Keep the trash out of the reach of children and animals. Always make sure trash is secure. Talk to officials if you live in a facility. If you live in a nursing home or assisted living center, talk to an official. The person will know the rules for your area. Other ways to manage pain:   Ask your healthcare provider about non-opioid medicines to control pain.   Some medicines may even work better than opioids, depending on the cause of your pain. Nonprescription medicines include NSAIDs (such as ibuprofen) and acetaminophen. Prescription medicines include muscle relaxers, antidepressants, and steroids. Pain may be managed without any medicines. Some ways to relieve pain include massage, aromatherapy, or meditation. Physical or occupational therapy may also help. Follow up with your doctor or pain specialist as directed: You may need to have your dose adjusted. Your doctor or pain specialist can also help you find ways to manage pain without opioids. Write down your questions so you remember to ask them during your visits. For more information:   Drug Enforcement Administration  [address] [facility] , [address]  Phone: 3- 244 - [phone]  Web Address: [email or web address]. [employer].gov/drug_disposal/    [address] [facility] [address] and Drug Administration  [address] [facility] , [address]  Phone: 1- 027 - [phone]  Web Address: http://[employer].com/  © Copyright [facility] 2023 Information is for End User's use only and may not be sold, redistributed or otherwise used for commercial purposes. The above information is an  only. It is not intended as medical advice for individual conditions or treatments. Talk to your doctor, nurse or pharmacist before following any medical regimen to see if it is safe and effective for you.

## 2023-11-22 LAB
6MAM UR QL CFM: NEGATIVE NG/ML
7AMINOCLONAZEPAM UR QL CFM: NEGATIVE NG/ML
A-OH ALPRAZ UR QL CFM: NEGATIVE NG/ML
ACCEPTABLE CREAT UR QL: NORMAL MG/DL
ACCEPTIBLE SP GR UR QL: NORMAL
AMITRIP UR QL CFM: NEGATIVE NG/ML
AMPHET UR QL CFM: NEGATIVE NG/ML
AMPHET UR QL CFM: NEGATIVE NG/ML
BUPRENORPHINE UR QL CFM: NEGATIVE NG/ML
BZE UR QL CFM: NEGATIVE NG/ML
DESIPRAMINE UR QL CFM: NEGATIVE NG/ML
EDDP UR QL CFM: NEGATIVE NG/ML
ETHYL GLUCURONIDE UR QL CFM: NEGATIVE NG/ML
ETHYL SULFATE UR QL SCN: NEGATIVE NG/ML
FENTANYL UR QL CFM: NEGATIVE NG/ML
FLUOXETINE UR QL CFM: NEGATIVE NG/ML
GLIADIN IGG SER IA-ACNC: NEGATIVE NG/ML
GLUCOSE 30M P 50 G LAC PO SERPL-MCNC: NEGATIVE NG/ML
HYDROCODONE UR QL CFM: NEGATIVE NG/ML
HYDROMORPHONE UR QL CFM: NEGATIVE NG/ML
LORAZEPAM UR QL CFM: NEGATIVE NG/ML
MDMA UR QL CFM: NEGATIVE NG/ML
ME-PHENIDATE UR QL CFM: NEGATIVE NG/ML
MEPERIDINE UR QL CFM: NEGATIVE NG/ML
METHADONE UR QL CFM: NEGATIVE NG/ML
METHAMPHET UR QL CFM: NEGATIVE NG/ML
MORPHINE UR QL CFM: NEGATIVE NG/ML
NITRITE UR QL: NORMAL UG/ML
NORBUPRENORPHINE UR QL CFM: NEGATIVE NG/ML
NORDIAZEPAM UR QL CFM: NEGATIVE NG/ML
NORFENTANYL UR QL CFM: NEGATIVE NG/ML
NORFLUOXETINE UR QL CFM: NEGATIVE NG/ML
NORHYDROCODONE UR QL CFM: NEGATIVE NG/ML
NORMEPERIDINE UR QL CFM: NEGATIVE NG/ML
NOROXYCODONE UR QL CFM: NEGATIVE NG/ML
NORTRIP UR QL CFM: NEGATIVE NG/ML
OLANZAPINE QUANTIFICATION: NEGATIVE NG/ML
OPC-3373 QUANTIFICATION: NEGATIVE
OXAZEPAM UR QL CFM: NEGATIVE NG/ML
OXYCODONE UR QL CFM: NEGATIVE NG/ML
OXYMORPHONE UR QL CFM: NEGATIVE NG/ML
OXYMORPHONE UR QL CFM: NEGATIVE NG/ML
PARA-FLUOROFENTANYL QUANTIFICATION: NORMAL NG/ML
PROLACTIN SERPL-MCNC: NEGATIVE NG/ML
RESULT ALL_PRESCRIBED MEDS AND SPECIAL INSTRUCTIONS: NORMAL
SECOBARBITAL UR QL CFM: NEGATIVE NG/ML
SL AMB 4-ANPP QUANTIFICATION: NORMAL NG/ML
SL AMB 7-OH-MITRAGYNINE (KRATOM ALKALOID) QUANTIFICATION: NEGATIVE NG/ML
SL AMB ACETYL FENTANYL QUANTIFICATION: NORMAL NG/ML
SL AMB ACETYL NORFENTANYL QUANTIFICATION: NORMAL NG/ML
SL AMB ACRYL FENTANYL QUANTIFICATION: NORMAL NG/ML
SL AMB CARFENTANIL QUANTIFICATION: NORMAL NG/ML
SL AMB CITALOPRAM/ESCITALOPRAM QUANTIFICATION: NEGATIVE NG/ML
SL AMB CITALOPRAM/ESCITALOPRAM QUANTIFICATION: NEGATIVE NG/ML
SL AMB CLOZAPINE QUANTIFICATION: NEGATIVE NG/ML
SL AMB CTHC (MARIJUANA METABOLITE) QUANTIFICATION: NEGATIVE NG/ML
SL AMB DEXTRORPHAN (DEXTROMETHORPHAN METABOLITE) QUANT: ABNORMAL NG/ML
SL AMB HALOPERIDOL  QUANTIFICATION: NEGATIVE NG/ML
SL AMB HALOPERIDOL METABOLITE QUANTIFICATION: NEGATIVE NG/ML
SL AMB HYDROXYBUPROPION QUANTIFICATION: NEGATIVE NG/ML
SL AMB HYDROXYRISPERIDONE QUANTIFICATION: NEGATIVE NG/ML
SL AMB N-DESMETHYL-TRAMADOL QUANTIFICATION: ABNORMAL NG/ML
SL AMB N-DESMETHYLCLOZAPINE QUANTIFICATION: NEGATIVE NG/ML
SL AMB NORQUETIAPINE QUANTIFICATION: NEGATIVE NG/ML
SL AMB PHENTERMINE QUANTIFICATION: NEGATIVE NG/ML
SL AMB PREGABALIN QUANTIFICATION: NORMAL
SL AMB QUETIAPINE QUANTIFICATION: NEGATIVE NG/ML
SL AMB RISPERIDONE QUANTIFICATION: NEGATIVE NG/ML
SL AMB RITALINIC ACID QUANTIFICATION: NEGATIVE NG/ML
SPECIMEN PH ACCEPTABLE UR: NORMAL
TAPENTADOL UR QL CFM: NEGATIVE NG/ML
TEMAZEPAM UR QL CFM: NEGATIVE NG/ML
TEMAZEPAM UR QL CFM: NEGATIVE NG/ML
TRAMADOL UR QL CFM: ABNORMAL NG/ML
URATE/CREAT 24H UR: ABNORMAL NG/ML

## 2023-12-07 ENCOUNTER — OFFICE VISIT (OUTPATIENT)
Dept: CARDIOLOGY CLINIC | Facility: CLINIC | Age: 68
End: 2023-12-07
Payer: COMMERCIAL

## 2023-12-07 ENCOUNTER — APPOINTMENT (EMERGENCY)
Dept: RADIOLOGY | Facility: HOSPITAL | Age: 68
DRG: 291 | End: 2023-12-07
Payer: COMMERCIAL

## 2023-12-07 ENCOUNTER — HOSPITAL ENCOUNTER (INPATIENT)
Facility: HOSPITAL | Age: 68
LOS: 6 days | Discharge: HOME | DRG: 291 | End: 2023-12-13
Attending: EMERGENCY MEDICINE | Admitting: INTERNAL MEDICINE
Payer: COMMERCIAL

## 2023-12-07 VITALS
SYSTOLIC BLOOD PRESSURE: 115 MMHG | WEIGHT: 315 LBS | BODY MASS INDEX: 47.58 KG/M2 | HEART RATE: 81 BPM | DIASTOLIC BLOOD PRESSURE: 60 MMHG

## 2023-12-07 DIAGNOSIS — I27.20 PULMONARY HYPERTENSION (HCC): ICD-10-CM

## 2023-12-07 DIAGNOSIS — R94.31 ABNORMAL ECG: ICD-10-CM

## 2023-12-07 DIAGNOSIS — I48.92 PAROXYSMAL ATRIAL FLUTTER (HCC): ICD-10-CM

## 2023-12-07 DIAGNOSIS — I50.32 CHRONIC DIASTOLIC HEART FAILURE (HCC): Primary | ICD-10-CM

## 2023-12-07 DIAGNOSIS — J98.4 RESTRICTIVE LUNG DISEASE: ICD-10-CM

## 2023-12-07 DIAGNOSIS — E66.01 OBESITY, MORBID (HCC): ICD-10-CM

## 2023-12-07 DIAGNOSIS — I50.33 ACUTE ON CHRONIC DIASTOLIC CONGESTIVE HEART FAILURE (HCC): ICD-10-CM

## 2023-12-07 PROBLEM — I89.0 LYMPHEDEMA: Status: RESOLVED | Noted: 2019-07-22 | Resolved: 2023-12-07

## 2023-12-07 PROBLEM — I50.9 CHF EXACERBATION (HCC): Status: ACTIVE | Noted: 2023-12-07

## 2023-12-07 LAB
ANION GAP SERPL CALCULATED.3IONS-SCNC: 6 MMOL/L
ATRIAL RATE: 78 BPM
BASOPHILS # BLD AUTO: 0.01 THOUSANDS/ÂΜL (ref 0–0.1)
BASOPHILS NFR BLD AUTO: 0 % (ref 0–1)
BNP SERPL-MCNC: 91 PG/ML (ref 0–100)
BUN SERPL-MCNC: 15 MG/DL (ref 5–25)
CALCIUM SERPL-MCNC: 9.2 MG/DL (ref 8.4–10.2)
CARDIAC TROPONIN I PNL SERPL HS: 16 NG/L (ref 8–18)
CHLORIDE SERPL-SCNC: 103 MMOL/L (ref 96–108)
CO2 SERPL-SCNC: 30 MMOL/L (ref 21–32)
CREAT SERPL-MCNC: 0.91 MG/DL (ref 0.6–1.3)
EOSINOPHIL # BLD AUTO: 0.1 THOUSAND/ÂΜL (ref 0–0.61)
EOSINOPHIL NFR BLD AUTO: 2 % (ref 0–6)
ERYTHROCYTE [DISTWIDTH] IN BLOOD BY AUTOMATED COUNT: 14.8 % (ref 11.6–15.1)
GFR SERPL CREATININE-BSD FRML MDRD: 85 ML/MIN/1.73SQ M
GLUCOSE SERPL-MCNC: 94 MG/DL (ref 65–140)
HCT VFR BLD AUTO: 35.6 % (ref 36.5–49.3)
HGB BLD-MCNC: 11.7 G/DL (ref 12–17)
IMM GRANULOCYTES # BLD AUTO: 0.01 THOUSAND/UL (ref 0–0.2)
IMM GRANULOCYTES NFR BLD AUTO: 0 % (ref 0–2)
LYMPHOCYTES # BLD AUTO: 1.01 THOUSANDS/ÂΜL (ref 0.6–4.47)
LYMPHOCYTES NFR BLD AUTO: 23 % (ref 14–44)
MCH RBC QN AUTO: 30.8 PG (ref 26.8–34.3)
MCHC RBC AUTO-ENTMCNC: 32.9 G/DL (ref 31.4–37.4)
MCV RBC AUTO: 94 FL (ref 82–98)
MONOCYTES # BLD AUTO: 0.41 THOUSAND/ÂΜL (ref 0.17–1.22)
MONOCYTES NFR BLD AUTO: 9 % (ref 4–12)
NEUTROPHILS # BLD AUTO: 2.9 THOUSANDS/ÂΜL (ref 1.85–7.62)
NEUTS SEG NFR BLD AUTO: 66 % (ref 43–75)
NRBC BLD AUTO-RTO: 0 /100 WBCS
P AXIS: 66 DEGREES
PLATELET # BLD AUTO: 134 THOUSANDS/UL (ref 149–390)
PMV BLD AUTO: 10.8 FL (ref 8.9–12.7)
POTASSIUM SERPL-SCNC: 3.7 MMOL/L (ref 3.5–5.3)
PR INTERVAL: 188 MS
QRS AXIS: 269 DEGREES
QRSD INTERVAL: 152 MS
QT INTERVAL: 418 MS
QTC INTERVAL: 476 MS
RBC # BLD AUTO: 3.8 MILLION/UL (ref 3.88–5.62)
SODIUM SERPL-SCNC: 139 MMOL/L (ref 135–147)
T WAVE AXIS: 21 DEGREES
VENTRICULAR RATE: 78 BPM
WBC # BLD AUTO: 4.44 THOUSAND/UL (ref 4.31–10.16)

## 2023-12-07 PROCEDURE — 99223 1ST HOSP IP/OBS HIGH 75: CPT | Performed by: INTERNAL MEDICINE

## 2023-12-07 PROCEDURE — 99214 OFFICE O/P EST MOD 30 MIN: CPT | Performed by: INTERNAL MEDICINE

## 2023-12-07 PROCEDURE — 84484 ASSAY OF TROPONIN QUANT: CPT

## 2023-12-07 PROCEDURE — 99285 EMERGENCY DEPT VISIT HI MDM: CPT

## 2023-12-07 PROCEDURE — 99285 EMERGENCY DEPT VISIT HI MDM: CPT | Performed by: EMERGENCY MEDICINE

## 2023-12-07 PROCEDURE — 93010 ELECTROCARDIOGRAM REPORT: CPT | Performed by: INTERNAL MEDICINE

## 2023-12-07 PROCEDURE — 80048 BASIC METABOLIC PNL TOTAL CA: CPT | Performed by: EMERGENCY MEDICINE

## 2023-12-07 PROCEDURE — 71046 X-RAY EXAM CHEST 2 VIEWS: CPT

## 2023-12-07 PROCEDURE — 83880 ASSAY OF NATRIURETIC PEPTIDE: CPT | Performed by: EMERGENCY MEDICINE

## 2023-12-07 PROCEDURE — 96374 THER/PROPH/DIAG INJ IV PUSH: CPT

## 2023-12-07 PROCEDURE — 85025 COMPLETE CBC W/AUTO DIFF WBC: CPT | Performed by: EMERGENCY MEDICINE

## 2023-12-07 PROCEDURE — 93005 ELECTROCARDIOGRAM TRACING: CPT

## 2023-12-07 PROCEDURE — 36415 COLL VENOUS BLD VENIPUNCTURE: CPT | Performed by: EMERGENCY MEDICINE

## 2023-12-07 RX ORDER — SPIRONOLACTONE 25 MG/1
25 TABLET ORAL DAILY
Status: DISCONTINUED | OUTPATIENT
Start: 2023-12-07 | End: 2023-12-08

## 2023-12-07 RX ORDER — ALLOPURINOL 100 MG/1
300 TABLET ORAL DAILY
Status: DISCONTINUED | OUTPATIENT
Start: 2023-12-07 | End: 2023-12-13 | Stop reason: HOSPADM

## 2023-12-07 RX ORDER — ACETAMINOPHEN 325 MG/1
650 TABLET ORAL EVERY 4 HOURS PRN
Status: DISCONTINUED | OUTPATIENT
Start: 2023-12-07 | End: 2023-12-13 | Stop reason: HOSPADM

## 2023-12-07 RX ORDER — FERROUS SULFATE 325(65) MG
325 TABLET ORAL
Status: DISCONTINUED | OUTPATIENT
Start: 2023-12-08 | End: 2023-12-13 | Stop reason: HOSPADM

## 2023-12-07 RX ORDER — POTASSIUM CHLORIDE 20 MEQ/1
40 TABLET, EXTENDED RELEASE ORAL ONCE
Status: COMPLETED | OUTPATIENT
Start: 2023-12-07 | End: 2023-12-07

## 2023-12-07 RX ORDER — FUROSEMIDE 10 MG/ML
60 INJECTION INTRAMUSCULAR; INTRAVENOUS
Status: DISCONTINUED | OUTPATIENT
Start: 2023-12-07 | End: 2023-12-08

## 2023-12-07 RX ORDER — FUROSEMIDE 10 MG/ML
60 INJECTION INTRAMUSCULAR; INTRAVENOUS ONCE
Status: COMPLETED | OUTPATIENT
Start: 2023-12-07 | End: 2023-12-07

## 2023-12-07 RX ORDER — ONDANSETRON 2 MG/ML
4 INJECTION INTRAMUSCULAR; INTRAVENOUS EVERY 6 HOURS PRN
Status: DISCONTINUED | OUTPATIENT
Start: 2023-12-07 | End: 2023-12-13 | Stop reason: HOSPADM

## 2023-12-07 RX ORDER — TORSEMIDE 20 MG/1
20 TABLET ORAL 2 TIMES DAILY
Status: ON HOLD | COMMUNITY

## 2023-12-07 RX ORDER — FINASTERIDE 5 MG/1
5 TABLET, FILM COATED ORAL DAILY
Status: DISCONTINUED | OUTPATIENT
Start: 2023-12-07 | End: 2023-12-13 | Stop reason: HOSPADM

## 2023-12-07 RX ORDER — METOPROLOL SUCCINATE 25 MG/1
25 TABLET, EXTENDED RELEASE ORAL DAILY
Status: DISCONTINUED | OUTPATIENT
Start: 2023-12-07 | End: 2023-12-13 | Stop reason: HOSPADM

## 2023-12-07 RX ADMIN — FUROSEMIDE 60 MG: 10 INJECTION, SOLUTION INTRAVENOUS at 16:24

## 2023-12-07 RX ADMIN — POTASSIUM CHLORIDE 40 MEQ: 1500 TABLET, EXTENDED RELEASE ORAL at 18:37

## 2023-12-07 RX ADMIN — FUROSEMIDE 60 MG: 10 INJECTION, SOLUTION INTRAVENOUS at 11:24

## 2023-12-07 NOTE — ED PROVIDER NOTES
History  Chief Complaint   Patient presents with    Shortness of Breath     Pt at Dr. Prieto's office and tole to come to ED for excess fluid. C/o SOB w/ exertion and fatigue. Also reports open wound on L leg he has been seeing wound care for     68 y.o. M w/h/o CHF, afib p/w SOB/LE swelling.  Sent from Dr. Prieto's office for admission for IV diuretics. Pt was just evaluated by Dr. Prieto in the office, who would like Lasix 60 mg IV twice daily with low threshold to initiate diuretic drip and inpatient cardiology consult.  Pt reports having LE swelling, SOB, CHU, wt gain despite increasing torsemide.  Denies chest pain.      History provided by:  Patient and medical records   used: No    Shortness of Breath  Associated symptoms: cough    Associated symptoms: no abdominal pain, no chest pain, no fever and no vomiting        Prior to Admission Medications   Prescriptions Last Dose Informant Patient Reported? Taking?   Cyanocobalamin (VITAMIN B 12 PO)   Yes No   Sig: Take by mouth   Diclofenac Sodium (VOLTAREN) 1 %  Self No No   Sig: Apply 2 g topically 4 (four) times a day   Glucosamine-Chondroit-Vit C-Mn (Glucosamine 1500 Complex) CAPS  Self Yes No   Sig: Take by mouth   L-ARGININE-500 PO  Self Yes No   Sig: Take 500 mg by mouth 2 (two) times a day    Omega-3 Fatty Acids (fish oil) 1,000 mg  Self Yes No   Sig: Take 1,000 mg by mouth 2 (two) times a day   VITAMIN D PO  Self Yes No   Sig: Take 2,000 Units by mouth daily    acetaminophen (TYLENOL) 500 mg tablet  Self Yes No   Sig: Take 500 mg by mouth every 6 (six) hours as needed for mild pain   alfuzosin (UROXATRAL) 10 mg 24 hr tablet  Self Yes No   Sig: Take 10 mg by mouth daily   allopurinol (ZYLOPRIM) 300 mg tablet  Self Yes No   Sig: Take 300 mg by mouth daily   ammonium lactate (LAC-HYDRIN) 12 % lotion  Self Yes No   Sig: Apply topically 2 (two) times a day as needed for dry skin   dutasteride (AVODART) 0.5 mg capsule   Yes No   Sig: Take 0.5  mg by mouth daily   ferrous sulfate 325 (65 Fe) mg tablet  Self Yes No   Sig: Take 325 mg by mouth   halobetasol (ULTRAVATE) 0.05 % ointment  Self Yes No   Sig: APPLY TO AFFECTED AREA ON LEG TWICE DAILY   loteprednol etabonate (LOTEMAX) 0.5 % ophthalmic suspension  Self Yes No   Si drop 4 (four) times a day   metoprolol succinate (TOPROL-XL) 25 mg 24 hr tablet  Self No No   Sig: Take 1 tablet (25 mg total) by mouth daily   multivitamin (THERAGRAN) TABS  Self Yes No   Sig: Take 1 tablet by mouth daily   patient supplied medication  Self Yes No   Sig: Take 1 each by mouth 2 (two) times a day   predniSONE 10 mg tablet   No No   Si tabs daily for 2 days, then 5 tabs for 2 days then 4 tabs for 2 days then 3 tabs for 2 days then 2 tab for 2 days then 1 tab for 2 days then stop stop   pregabalin (LYRICA) 100 mg capsule  Self No No   Sig: Take 1 capsule (100 mg total) by mouth 3 (three) times a day   rivaroxaban (Xarelto) 20 mg tablet  Self No No   Sig: Take 1 tablet (20 mg total) by mouth daily   spironolactone (ALDACTONE) 25 mg tablet  Self No No   Sig: Take 1 tablet (25 mg total) by mouth 2 (two) times a day   Patient taking differently: Take 25 mg by mouth daily   tadalafil (CIALIS) 10 MG tablet   Yes No   Sig: TAKE 1 TABLET EVERY 3 DAYS AS NEEDED   Patient not taking: Reported on 2023   tadalafil (CIALIS) 5 MG tablet  Self Yes No   Sig: Take 5 mg by mouth daily as needed for erectile dysfunction   torsemide (DEMADEX) 20 mg tablet  Self No No   Sig: Take 2 tablets (40 mg total) by mouth 2 (two) times a day   Patient taking differently: Take 20 mg by mouth 2 (two) times a day   torsemide (DEMADEX) 20 mg tablet   Yes No   Sig: Take 20 mg by mouth 2 (two) times a day   traMADol (ULTRAM) 50 mg tablet  Self No No   Sig: Take 1 tablet (50 mg total) by mouth 2 (two) times a day as needed for moderate pain      Facility-Administered Medications: None       Past Medical History:   Diagnosis Date    A-fib ([facility])      ALANNA (acute kidney injury) (HCC) 6/15/2021    Arthritis     CPAP (continuous positive airway pressure) dependence     Irregular heart beat     Lymphedema     Sleep apnea     Urinary frequency     Wears glasses     Wears partial dentures     lower partial       Past Surgical History:   Procedure Laterality Date    ABLATION SAPHENOUS VEIN W/ RFA      COLONOSCOPY      JOINT REPLACEMENT  Left Hip 4 /21/2009    NERVE BLOCK Bilateral 7/18/2023    Procedure: BLOCK MEDIAL BRANCH B/L L4-L5 AND L5-S1 MBB #1;  Surgeon: Samson Godinez MD;  Location: [facility];  Service: Pain Management     NERVE BLOCK Bilateral 8/17/2023    Procedure: BLOCK MEDIAL BRANCH  B/L L4-5 and L5-S1 MBB #2;  Surgeon: Samson Godinez MD;  Location: [facility];  Service: Pain Management     [place] CYSTO INSERTION TRANSPROSTATIC IMPLANT SINGLE N/A 11/13/2017    Procedure: CYSTOSCOPY WITH INSERTION UROLIFT;  Surgeon: Deuce Bennett DO;  Location: [facility];  Service: Urology    RADIOFREQUENCY ABLATION Left 10/5/2023    Procedure: ABLATION RADIO FREQUENCY (RFA) LEFT L4-5 AND L5-S1 RFA;  Surgeon: Samson Godinez MD;  Location: [facility];  Service: Pain Management     RADIOFREQUENCY ABLATION Right 11/1/2023    Procedure: ABLATION RADIO FREQUENCY (RFA) RIGHT L4-5 AND L5-S1 RFA;  Surgeon: Samson Godinez MD;  Location: [facility];  Service: Pain Management     TONSILLECTOMY         Family History   Problem Relation Age of Onset    Heart disease Mother     Lymphoma Father     Cancer Father     Heart disease Sister     Hypertension Brother     Diabetes Neg Hx     Thyroid disease Neg Hx     Stroke Neg Hx      I have reviewed and agree with the history as documented.    E-Cigarette/Vaping    E-Cigarette Use Never User      E-Cigarette/Vaping Substances    Nicotine No     THC No     CBD No     Flavoring No     Other No     Unknown No      Social History     Tobacco Use    Smoking status: Never    Smokeless tobacco: Never   Vaping Use    Vaping  Use: Never used   Substance Use Topics    Alcohol use: Yes     Alcohol/week: 5.0 standard drinks of alcohol     Types: 3 Glasses of wine, 2 Standard drinks or equivalent per week     Comment: socially    Drug use: No       Review of Systems   Constitutional:  Negative for chills and fever.   Respiratory:  Positive for cough and shortness of breath.    Cardiovascular:  Positive for leg swelling. Negative for chest pain.   Gastrointestinal:  Negative for abdominal pain, nausea and vomiting.       Physical Exam  Physical Exam  Vitals and nursing note reviewed.   Constitutional:       General: He is not in acute distress.     Appearance: He is well-developed. He is not ill-appearing, toxic-appearing or diaphoretic.   HENT:      Head: Normocephalic and atraumatic.   Eyes:      General: No scleral icterus.     Conjunctiva/sclera:      Right eye: Right conjunctiva is not injected.      Left eye: Left conjunctiva is not injected.   Neck:      Vascular: No JVD.      Trachea: Trachea normal.   Cardiovascular:      Rate and Rhythm: Normal rate and regular rhythm.      Pulses: Normal pulses.      Heart sounds: Normal heart sounds. No murmur heard.     No friction rub.   Pulmonary:      Effort: Pulmonary effort is normal. No accessory muscle usage or respiratory distress.      Breath sounds: Normal breath sounds. No stridor. No wheezing, rhonchi or rales.   Chest:      Chest wall: No tenderness.   Abdominal:      General: There is no distension.      Palpations: Abdomen is soft. Abdomen is not rigid.      Tenderness: There is no abdominal tenderness. There is no guarding or rebound.   Musculoskeletal:      Cervical back: Normal range of motion.      Right lower leg: Edema present.      Left lower leg: Edema present.   Skin:     General: Skin is warm and dry.      Coloration: Skin is not pale.      Findings: No rash.   Neurological:      Mental Status: He is alert.      GCS: GCS eye subscore is 4. GCS verbal subscore is 5. GCS  motor subscore is 6.      Motor: No weakness (Grossly intact).         Vital Signs  ED Triage Vitals [12/07/23 1032]   Temperature Pulse Respirations Blood Pressure SpO2   97.8 °F (36.6 °C) 81 21 107/57 94 %      Temp Source Heart Rate Source Patient Position - Orthostatic VS BP Location FiO2 (%)   Oral Monitor Sitting Right arm --      Pain Score       No Pain           Vitals:    12/07/23 1032 12/07/23 1145 12/07/23 1200   BP: 107/57 122/61 146/72   Pulse: 81 79 82   Patient Position - Orthostatic VS: Sitting Sitting Sitting         Visual Acuity      ED Medications  Medications   furosemide (LASIX) injection 60 mg (60 mg Intravenous Given 12/7/23 1124)       Diagnostic Studies  Results Reviewed       Procedure Component Value Units Date/Time    B-Type Natriuretic Peptide(BNP) [[id number]]  (Normal) Collected: 12/07/23 1123    Lab Status: Final result Specimen: Blood from Arm, Right Updated: 12/07/23 1203     BNP 91 pg/mL     Basic metabolic panel [[id number]] Collected: 12/07/23 1123    Lab Status: Final result Specimen: Blood from Arm, Right Updated: 12/07/23 1157     Sodium 139 mmol/L      Potassium 3.7 mmol/L      Chloride 103 mmol/L      CO2 30 mmol/L      ANION GAP 6 mmol/L      BUN 15 mg/dL      Creatinine 0.91 mg/dL      Glucose 94 mg/dL      Calcium 9.2 mg/dL      eGFR 85 ml/min/1.73sq m     Narrative:      National Kidney Disease Foundation guidelines for Chronic Kidney Disease (CKD):     Stage 1 with normal or high GFR (GFR > 90 mL/min/1.73 square meters)    Stage 2 Mild CKD (GFR = 60-89 mL/min/1.73 square meters)    Stage 3A Moderate CKD (GFR = 45-59 mL/min/1.73 square meters)    Stage 3B Moderate CKD (GFR = 30-44 mL/min/1.73 square meters)    Stage 4 Severe CKD (GFR = 15-29 mL/min/1.73 square meters)    Stage 5 End Stage CKD (GFR <15 mL/min/1.73 square meters)  Note: GFR calculation is accurate only with a steady state creatinine    CBC and differential [[id number]]  (Abnormal) Collected: 12/07/23 1123     Lab Status: Final result Specimen: Blood from Arm, Right Updated: 12/07/23 1143     WBC 4.44 Thousand/uL      RBC 3.80 Million/uL      Hemoglobin 11.7 g/dL      Hematocrit 35.6 %      MCV 94 fL      MCH 30.8 pg      MCHC 32.9 g/dL      RDW 14.8 %      MPV 10.8 fL      Platelets 134 Thousands/uL      nRBC 0 /100 WBCs      Neutrophils Relative 66 %      Immat GRANS % 0 %      Lymphocytes Relative 23 %      Monocytes Relative 9 %      Eosinophils Relative 2 %      Basophils Relative 0 %      Neutrophils Absolute 2.90 Thousands/µL      Immature Grans Absolute 0.01 Thousand/uL      Lymphocytes Absolute 1.01 Thousands/µL      Monocytes Absolute 0.41 Thousand/µL      Eosinophils Absolute 0.10 Thousand/µL      Basophils Absolute 0.01 Thousands/µL                    XR chest 2 views   ED Interpretation by Crystal Gaspar DO (12/07 1217)   Interpreted by me as no large pleural effusion                 Procedures  ECG 12 Lead Documentation Only    Date/Time: 12/7/2023 11:33 AM    Performed by: Crystal Gaspar DO  Authorized by: Crystal Gaspar DO    Indications / Diagnosis:  SOB  ECG reviewed by me, the ED Provider: yes    Patient location:  Bedside  Rate:     ECG rate:  78    ECG rate assessment: normal    Rhythm:     Rhythm: sinus rhythm    Conduction:     Conduction: abnormal      Abnormal conduction: complete RBBB    ST segments:     ST segments:  Normal           ED Course  ED Course as of 12/07/23 1226   Thu Dec 07, 2023   1048 SpO2: 94 %   1146 WBC: 4.44  negative   1203 Basic metabolic panel  Normal                                             Medical Decision Making  SOB/LE swelling - Will check CBC to r/o anemia, BMP to r/o lyte abnormality, BNP to r/o CHF, EKG to r/o STEMI/ischemic changes, CXR to r/o PTX/PNA/pulmonary edema/effusion, give IV diuretic, consult cardiology, and admit.    Amount and/or Complexity of Data Reviewed  Labs: ordered. Decision-making details documented in ED Course.  Radiology:  ordered and independent interpretation performed.    Risk  Prescription drug management.  Decision regarding hospitalization.             Disposition  Final diagnoses:   Chronic diastolic heart failure (HCC)     Time reflects when diagnosis was documented in both MDM as applicable and the Disposition within this note       Time User Action Codes Description Comment    12/7/2023 10:47 AM Crystal Gasapr Add [I50.32] Chronic diastolic heart failure (HCC)           ED Disposition       ED Disposition   Admit    Condition   Stable    Date/Time   Thu Dec 7, 2023 12:16 PM    Comment   Case was discussed with Dr. Watkins and the patient's admission status was agreed to be Admission Status: inpatient status to the service of Dr. Dr. Watkins .               Follow-up Information    None         Patient's Medications   Discharge Prescriptions    No medications on file       No discharge procedures on file.    PDMP Review         Value Time User    PDMP Reviewed  Yes 11/20/2023  9:20 AM Barbara Kocher, CRNP            ED Provider  Electronically Signed by             Crystal Gaspar DO  12/07/23 9418

## 2023-12-07 NOTE — Clinical Note
Case was discussed with JHONATHAN and the patient's admission status was agreed to be Admission Status: inpatient status to the service of Dr. RING .

## 2023-12-07 NOTE — H&P
[facility]  H&P  Name: Leonardo Oviedo 68 y.o. male I MRN: [id number]  Unit/Bed#: ED-11 I Date of Admission: 12/7/2023   Date of Service: 12/7/2023 I Hospital Day: 0      Assessment/Plan   * CHF exacerbation (HCC)  Assessment & Plan  Wt Readings from Last 3 Encounters:   12/07/23 (!) 147 kg (323 lb 13.7 oz)   12/07/23 (!) 146 kg (322 lb 3.2 oz)   11/20/23 (!) 147 kg (324 lb)     Patient with progressive lower extremity edema. States worsened after recent discharge with steroids for suspected PNA  BNP on admission: 91  Most recent echo: The left ventricular ejection fraction is 65%. Systolic function is vigorous. Wall motion cannot be accurately assessed. Diastolic function is moderately abnormal, consistent with grade II (pseudonormal) relaxation.   Diurese with 60 mg IV Lasix twice daily with low threshold for diuretic drip per Dr. Prieto   Outpatient regimen: 20mg Lasix BID  Patient saturating well on room air   Monitor intake and output  Daily weights  Continue cardiac diet with 1500 cc fluid restriction  Cardiology consulted  Formal consultation by the inpatient cardiology team to be completed tomorrow, 12/8/23.             Bilateral lower extremity edema  Assessment & Plan  Most likely due to heart failure with addition of venous insufficiency and lymphedema.   Patient uses compression stockings at home  Continue to monitor   Raise legs when sitting   Compression stockings from home       Atrial fibrillation (HCC)  Assessment & Plan  Not currently in Afib  Continue xarelto for thromboembolic prophylaxis   Continue metoprolol 25mg daily     Venous ulcer (HCC)  Assessment & Plan  Follows with wound care outpatient - was supposed to follow up today for packing change   Consult for wound care placed    Obstructive sleep apnea on CPAP  Assessment & Plan  Noncompliant with CPAP           VTE Pharmacologic Prophylaxis:   High Risk (Score >/= 5) - Pharmacological DVT Prophylaxis Ordered:  rivaroxaban (Xarelto). Sequential Compression Devices Ordered.  Code Status: Prior Full code   Discussion with family: Updated  (wife) at bedside.    Anticipated Length of Stay: Patient will be admitted on an inpatient basis with an anticipated length of stay of greater than 2 midnights secondary to volume overload and IV diuresis .    Total Time Spent on Date of Encounter in care of patient: 55 mins. This time was spent on one or more of the following: performing physical exam; counseling and coordination of care; obtaining or reviewing history; documenting in the medical record; reviewing/ordering tests, medications or procedures; communicating with other healthcare professionals and discussing with patient's family/caregivers.    Chief Complaint: Lower extremity swelling     History of Present Illness:  Leonardo Oviedo is a 68 y.o. male with a PMH of Afib on xarelto, HF, lymphedema, venous insufficiency, ROBERT noncompliant on CPAP who presents from his cardiology appointment for lower extremity edema. Patient has been struggling with SOB for some and states that it is interfering with his everyday activities. Patient states that he was recently discharged from the hospital with steroids for suspected PNA and noticed that he had increased swelling. States he tried doubling his lasix dose at home. Sent from Cardiology office today with recommendations for IV lasix. Denies chest pain, N/V/D, numbness/tingling of extremities. Patient will be admitted for suspected heart failure exacerbation and IV diuresis.   Review of Systems:  Review of Systems   Constitutional:  Negative for chills and fever.   HENT: Negative.     Eyes: Negative.    Respiratory:  Negative for cough, chest tightness and shortness of breath.    Cardiovascular:  Positive for leg swelling. Negative for chest pain (bilateral) and palpitations.   Gastrointestinal:  Negative for diarrhea, nausea and vomiting.   Endocrine: Negative.     Genitourinary: Negative.    Musculoskeletal: Negative.    Skin: Negative.    Allergic/Immunologic: Negative.    Neurological: Negative.    Hematological: Negative.        Past Medical and Surgical History:   Past Medical History:   Diagnosis Date    A-fib (HCC)     ALANNA (acute kidney injury) (HCC) 6/15/2021    Arthritis     CPAP (continuous positive airway pressure) dependence     Irregular heart beat     Lymphedema     Sleep apnea     Urinary frequency     Wears glasses     Wears partial dentures     lower partial       Past Surgical History:   Procedure Laterality Date    ABLATION SAPHENOUS VEIN W/ RFA      COLONOSCOPY      JOINT REPLACEMENT  Left Hip 4 /21/2009    NERVE BLOCK Bilateral 7/18/2023    Procedure: BLOCK MEDIAL BRANCH B/L L4-L5 AND L5-S1 MBB #1;  Surgeon: Samson Godinez MD;  Location: [facility];  Service: Pain Management     NERVE BLOCK Bilateral 8/17/2023    Procedure: BLOCK MEDIAL BRANCH  B/L L4-5 and L5-S1 MBB #2;  Surgeon: Samson Godinez MD;  Location: [facility];  Service: Pain Management     ND CYSTO INSERTION TRANSPROSTATIC IMPLANT SINGLE N/A 11/13/2017    Procedure: CYSTOSCOPY WITH INSERTION UROLIFT;  Surgeon: Deuce Bennett DO;  Location: [facility];  Service: Urology    RADIOFREQUENCY ABLATION Left 10/5/2023    Procedure: ABLATION RADIO FREQUENCY (RFA) LEFT L4-5 AND L5-S1 RFA;  Surgeon: Samson Godinez MD;  Location: [facility];  Service: Pain Management     RADIOFREQUENCY ABLATION Right 11/1/2023    Procedure: ABLATION RADIO FREQUENCY (RFA) RIGHT L4-5 AND L5-S1 RFA;  Surgeon: Samson Godinez MD;  Location: [facility];  Service: Pain Management     TONSILLECTOMY         Meds/Allergies:  Prior to Admission medications    Medication Sig Start Date End Date Taking? Authorizing Provider   acetaminophen (TYLENOL) 500 mg tablet Take 500 mg by mouth every 6 (six) hours as needed for mild pain    Historical Provider, MD   alfuzosin (UROXATRAL) 10 mg 24 hr tablet Take 10 mg by  mouth daily    Historical Provider, MD   allopurinol (ZYLOPRIM) 300 mg tablet Take 300 mg by mouth daily 5/1/23   Historical Provider, MD   ammonium lactate (LAC-HYDRIN) 12 % lotion Apply topically 2 (two) times a day as needed for dry skin    Historical Provider, MD   Cyanocobalamin (VITAMIN B 12 PO) Take by mouth    Historical Provider, MD   Diclofenac Sodium (VOLTAREN) 1 % Apply 2 g topically 4 (four) times a day 9/12/22   Mahendra Martínez MD   dutasteride (AVODART) 0.5 mg capsule Take 0.5 mg by mouth daily 10/26/23   Historical Provider, MD   ferrous sulfate 325 (65 Fe) mg tablet Take 325 mg by mouth    Historical Provider, MD   Glucosamine-Chondroit-Vit C-Mn (Glucosamine 1500 Complex) CAPS Take by mouth    Historical Provider, MD   halobetasol (ULTRAVATE) 0.05 % ointment APPLY TO AFFECTED AREA ON LEG TWICE DAILY 9/22/20   Historical Provider, MD   L-ARGININE-500 PO Take 500 mg by mouth 2 (two) times a day     Historical Provider, MD   loteprednol etabonate (LOTEMAX) 0.5 % ophthalmic suspension 1 drop 4 (four) times a day    Historical Provider, MD   metoprolol succinate (TOPROL-XL) 25 mg 24 hr tablet Take 1 tablet (25 mg total) by mouth daily 6/28/23   Deo Prieto DO   multivitamin (THERAGRAN) TABS Take 1 tablet by mouth daily    Historical Provider, MD   Omega-3 Fatty Acids (fish oil) 1,000 mg Take 1,000 mg by mouth 2 (two) times a day    Historical Provider, MD   patient supplied medication Take 1 each by mouth 2 (two) times a day    Historical Provider, MD   predniSONE 10 mg tablet 6 tabs daily for 2 days, then 5 tabs for 2 days then 4 tabs for 2 days then 3 tabs for 2 days then 2 tab for 2 days then 1 tab for 2 days then stop stop 11/17/23   Vinny Patel DO   pregabalin (LYRICA) 100 mg capsule Take 1 capsule (100 mg total) by mouth 3 (three) times a day 1/29/21   Robin Rascon DO   rivaroxaban (Xarelto) 20 mg tablet Take 1 tablet (20 mg total) by mouth daily 6/28/23   Deo Jordan  DO Conner   spironolactone (ALDACTONE) 25 mg tablet Take 1 tablet (25 mg total) by mouth 2 (two) times a day  Patient taking differently: Take 25 mg by mouth daily 4/14/23   Deo Prieto DO   tadalafil (CIALIS) 10 MG tablet TAKE 1 TABLET EVERY 3 DAYS AS NEEDED  Patient not taking: Reported on 12/7/2023 10/25/23   Historical Provider, MD   tadalafil (CIALIS) 5 MG tablet Take 5 mg by mouth daily as needed for erectile dysfunction    Historical Provider, MD   torsemide (DEMADEX) 20 mg tablet Take 2 tablets (40 mg total) by mouth 2 (two) times a day  Patient taking differently: Take 20 mg by mouth 2 (two) times a day 10/2/23   Deo Prieto DO   torsemide (DEMADEX) 20 mg tablet Take 20 mg by mouth 2 (two) times a day    Historical Provider, MD   traMADol (ULTRAM) 50 mg tablet Take 1 tablet (50 mg total) by mouth 2 (two) times a day as needed for moderate pain 6/1/23   Barbara Kocher, CRNP   VITAMIN D PO Take 2,000 Units by mouth daily     Historical Provider, MD RACHEL have reviewed home medications with patient personally.    Allergies:   Allergies   Allergen Reactions    Penicillins Anaphylaxis    Sulfa Antibiotics Anaphylaxis    Levofloxacin Other (See Comments)       Social History:  Marital Status: /Civil Union   Patient Pre-hospital Living Situation: Home  Patient Pre-hospital Level of Mobility: walks  Patient Pre-hospital Diet Restrictions: None   Substance Use History:   Social History     Substance and Sexual Activity   Alcohol Use Yes    Alcohol/week: 5.0 standard drinks of alcohol    Types: 3 Glasses of wine, 2 Standard drinks or equivalent per week    Comment: socially     Social History     Tobacco Use   Smoking Status Never   Smokeless Tobacco Never     Social History     Substance and Sexual Activity   Drug Use No       Family History:  Family History   Problem Relation Age of Onset    Heart disease Mother     Lymphoma Father     Cancer Father     Heart disease Sister      Hypertension Brother     Diabetes Neg Hx     Thyroid disease Neg Hx     Stroke Neg Hx        Physical Exam:     Vitals:   Blood Pressure: 146/72 (12/07/23 1200)  Pulse: 82 (12/07/23 1200)  Temperature: 97.8 °F (36.6 °C) (12/07/23 1032)  Temp Source: Oral (12/07/23 1032)  Respirations: 21 (12/07/23 1200)  Weight - Scale: (!) 147 kg (323 lb 13.7 oz) (12/07/23 1032)  SpO2: 90 % (12/07/23 1200)    Physical Exam  Vitals and nursing note reviewed.   Constitutional:       General: He is not in acute distress.     Appearance: He is well-developed. He is ill-appearing. He is not toxic-appearing.   HENT:      Head: Normocephalic and atraumatic.   Eyes:      Conjunctiva/sclera: Conjunctivae normal.   Cardiovascular:      Rate and Rhythm: Normal rate and regular rhythm.      Heart sounds: No murmur heard.     No friction rub. No gallop.   Pulmonary:      Effort: Pulmonary effort is normal. No respiratory distress.      Breath sounds: Normal breath sounds. No wheezing or rales.   Abdominal:      Palpations: Abdomen is soft.      Tenderness: There is no abdominal tenderness.   Musculoskeletal:         General: No swelling.      Cervical back: Neck supple.      Right lower leg: Edema present.      Left lower leg: Edema present.   Skin:     General: Skin is warm and dry.      Capillary Refill: Capillary refill takes less than 2 seconds.   Neurological:      Mental Status: He is alert. Mental status is at baseline.   Psychiatric:         Mood and Affect: Mood normal.          Additional Data:     Lab Results:  Results from last 7 days   Lab Units 12/07/23  1123   WBC Thousand/uL 4.44   HEMOGLOBIN g/dL 11.7*   HEMATOCRIT % 35.6*   PLATELETS Thousands/uL 134*   NEUTROS PCT % 66   LYMPHS PCT % 23   MONOS PCT % 9   EOS PCT % 2     Results from last 7 days   Lab Units 12/07/23  1123   SODIUM mmol/L 139   POTASSIUM mmol/L 3.7   CHLORIDE mmol/L 103   CO2 mmol/L 30   BUN mg/dL 15   CREATININE mg/dL 0.91   ANION GAP mmol/L 6   CALCIUM mg/dL  9.2   GLUCOSE RANDOM mg/dL 94                       Lines/Drains:  Invasive Devices       Peripheral Intravenous Line  Duration             Peripheral IV 12/07/23 Right Antecubital <1 day                        Imaging: Reviewed radiology reports from this admission including: chest xray  XR chest 2 views   ED Interpretation by Crystal Gaspar DO (12/07 1217)   Interpreted by me as no large pleural effusion          EKG and Other Studies Reviewed on Admission:   EKG: NSR. HR 78.    ** Please Note: This note has been constructed using a voice recognition system. **

## 2023-12-07 NOTE — CASE MANAGEMENT
Case Management Assessment & Discharge Planning Note    Patient name Leonardo Oviedo  Location ED-11/ED-11 MRN [id number]  : 1955 Date 2023       Current Admission Date: 2023  Current Admission Diagnosis:SOB (shortness of breath)   Patient Active Problem List    Diagnosis Date Noted    Pneumonia 2023    Chronic pain syndrome 2023    Lumbar spondylosis 2023    Chronic bilateral low back pain without sciatica 2023    Venous ulcer (HCC) 2023    Atrial flutter (HCC) 2021    Iron deficiency 2021    Stage 3a chronic kidney disease (HCC) 06/15/2021    Anemia 06/15/2021    Primary osteoarthritis 06/15/2021    Encounter for cardioversion procedure 2021    SOB (shortness of breath) 2021    Chronic diastolic heart failure (HCC) 2021    Pulmonary hypertension (HCC) 2020    Bilateral lower extremity edema 10/27/2019    Atrial fibrillation (HCC) 10/26/2019    Obstructive sleep apnea on CPAP 10/26/2019    Leukopenia 10/26/2019    Weakness of right upper extremity 10/26/2019    Paresthesias 10/26/2019    Cervical pain (neck) 10/26/2019    Obesity, Class III, BMI 40-49.9 (morbid obesity) (HCC) 10/26/2019    Thrombocytopenia (HCC) 10/26/2019    Lymphedema 2019    Obesity, morbid (HCC) 2015    Venous insufficiency of both lower extremities 2015      LOS (days): 0  Geometric Mean LOS (GMLOS) (days):   Days to GMLOS:     OBJECTIVE:  PATIENT READMITTED TO HOSPITAL            Current admission status: Inpatient       [facility]erred Pharmacy:   [id number] [place] [facility] [address]  [address] [facility] [address]  Phone: [phone] Fax: [phone]    Primary Care Provider: Yolanda Merritt MD    [facility]  Secondary Insurance:     ASSESSMENT:  Active Health Care Proxies    There are no active Health Care Proxies on file.       Advance Directives  Does patient have a Health Care POA?: Yes  Does  patient have Advance Directives?: Yes  Advance Directives: Living will, Power of  for health care, Power of  for finance         Readmission Root Cause  30 Day Readmission: Yes  Who directed you to return to the hospital?: Specialist (Cardiologist)  Did you understand whom to contact if you had questions or problems?: Yes  Did you get your prescriptions before you left the hospital?: No (Pt states he was not rx meds)  Reason::  (States he did not have an rx to fill)  Were you able to get your prescriptions filled when you left the hospital?: No (See above)  Did you take your medications as prescribed?: Yes  Were you able to get to your follow-up appointments?: No (States he did not schedule any follow ups)  Reason:: Compliance  During previous admission, was a post-acute recommendation made?: No  Patient was readmitted due to: SOB, advised to return by cardiologist (Has CHF dx, may be related)  Action Plan: Evaluate, make post-acute referrals as appropriate    Patient Information  Admitted from:: Home  Mental Status: Alert  During Assessment patient was accompanied by: Spouse  Assessment information provided by:: Patient  Support Systems: Spouse/significant other, Daughter  County of Residence: [facility]  What city do you live in[place] [facility]  Home entry access options. Select all that apply.: Stairs  Number of steps to enter home.: 1  Do the steps have railings?: Yes  Type of Current Residence: [facility]  Living Arrangements: Lives w/ Spouse/significant other, Lives w/ Daughter  Is patient a ?: No    Activities of Daily Living Prior to Admission  Functional Status: Independent  Completes ADLs independently?: Yes  Ambulates independently?: Yes  Does patient use assisted devices?: No  Does patient currently own DME?: No  Does patient have a history of Outpatient Therapy (PT/OT)?: Yes (2x in 2019 for hip replacement and knee surgery in 2019 with [facility] ptot.. Also [facility] for shoulder at some point timeline  unclear)  Does the patient have a history of Short-Term Rehab?: No  Does patient have a history of HHC?: No  Does patient currently have HHC?: No         Patient Information Continued  Income Source: Pension/detention  Does patient have prescription coverage?: Yes  Does patient receive dialysis treatments?: No  Does patient have a history of substance abuse?: No  Does patient have a history of Mental Health Diagnosis?: No         Means of Transportation  Means of Transport to Appts:: Drives Self      Housing Stability: Low Risk  (12/7/2023)    Housing Stability Vital Sign     Unable to Pay for Housing in the Last Year: No     Number of Places Lived in the Last Year: 1     Unstable Housing in the Last Year: No   Food Insecurity: No Food Insecurity (12/7/2023)    Hunger Vital Sign     Worried About Running Out of Food in the Last Year: Never true     Ran Out of Food in the Last Year: Never true   Transportation Needs: No Transportation Needs (12/7/2023)    PRAPARE - Transportation     Lack of Transportation (Medical): No     Lack of Transportation (Non-Medical): No   Utilities: Not At Risk (12/7/2023)    [facility] Utilities     Threatened with loss of utilities: No       DISCHARGE DETAILS:    Discharge planning discussed with:: Patient  Freedom of Choice: Yes     Additional Comments: CM engaged pt at bedside to verify if pt has experienced any assessment changes since 11/14 admission. Pt is living with his wife and dtr in a ranch and is ind around the home and in his daily life, There are 7 stairs with a railing to enter his home. He endorses ho OPPT and denies HHA and STR. Pt pending admit for SOB and was directed to return by his cardiologist (possible CHF exac?). According to pt, he was not dc w rxs and never scheduled op fu, which could have contributed to readmission. Pt's wife will pick him up to dc home. CM to continue following.

## 2023-12-07 NOTE — ASSESSMENT & PLAN NOTE
Not currently in Afib  Continue xarelto for thromboembolic prophylaxis   Continue metoprolol 25mg daily

## 2023-12-07 NOTE — QUICK NOTE
Patient presented to the emergency department directly from the outpatient cardiology office with Dr. Prieto due to progressive shortness of breath, weight gain with lower extremity edema. Patient with suspected acute decompensated heart failure. Recommendation to initiate at least furosemide 60 mg IV twice daily with low threshold to initiate diuretic infusion. To consider inpatient versus outpatient [facility] as previous TTE unable to estimate pulmonary pressures.     - status post furosemide 60 mg IV x 1 upon arrival. Recommend to provide BID dosing today and assess response.    - recommend to resume outpatient AV starla blocker with Toprol-XL 25 mg daily and adjust as warranted pending heart rates. Continue anticoagulation with Xarelto 20 mg daily.   - Potassium 3.7, will provide K-Dur 40 mEq x 1. Check magnesium.    Formal consultation by the inpatient cardiology team to be completed tomorrow, 12/8/23.

## 2023-12-07 NOTE — ASSESSMENT & PLAN NOTE
Wt Readings from Last 3 Encounters:   12/07/23 (!) 147 kg (323 lb 13.7 oz)   12/07/23 (!) 146 kg (322 lb 3.2 oz)   11/20/23 (!) 147 kg (324 lb)     Patient with progressive lower extremity edema. States worsened after recent discharge with steroids for suspected PNA  BNP on admission: 91  Most recent echo: The left ventricular ejection fraction is 65%. Systolic function is vigorous. Wall motion cannot be accurately assessed. Diastolic function is moderately abnormal, consistent with grade II (pseudonormal) relaxation.   Diurese with 60 mg IV Lasix twice daily with low threshold for diuretic drip per Dr. Prieto   Outpatient regimen: 20mg Lasix BID  Patient saturating well on room air   Monitor intake and output  Daily weights  Continue cardiac diet with 1500 cc fluid restriction  Cardiology consulted  Formal consultation by the inpatient cardiology team to be completed tomorrow, 12/8/23.

## 2023-12-07 NOTE — ASSESSMENT & PLAN NOTE
Follows with wound care outpatient - was supposed to follow up today for packing change   Consult for wound care placed

## 2023-12-07 NOTE — PLAN OF CARE
Problem: Potential for Falls  Goal: Patient will remain free of falls  Description: INTERVENTIONS:  - Educate patient/family on patient safety including physical limitations  - Instruct patient to call for assistance with activity   - Consult OT/PT to assist with strengthening/mobility   - Keep Call bell within reach  - Keep bed low and locked with side rails adjusted as appropriate  - Keep care items and personal belongings within reach  - Initiate and maintain comfort rounds  - Make Fall Risk Sign visible to staff  - Offer Toileting every 2 Hours, in advance of need  - Initiate/Maintain bed alarm  - Obtain necessary fall risk management equipment:   - Apply yellow socks and bracelet for high fall risk patients  - Consider moving patient to room near nurses station  Outcome: Progressing     Problem: PAIN - ADULT  Goal: Verbalizes/displays adequate comfort level or baseline comfort level  Description: Interventions:  - Encourage patient to monitor pain and request assistance  - Assess pain using appropriate pain scale  - Administer analgesics based on type and severity of pain and evaluate response  - Implement non-pharmacological measures as appropriate and evaluate response  - Consider cultural and social influences on pain and pain management  - Notify physician/advanced practitioner if interventions unsuccessful or patient reports new pain  Outcome: Progressing     Problem: INFECTION - ADULT  Goal: Absence or prevention of progression during hospitalization  Description: INTERVENTIONS:  - Assess and monitor for signs and symptoms of infection  - Monitor lab/diagnostic results  - Monitor all insertion sites, i.e. indwelling lines, tubes, and drains  - Monitor endotracheal if appropriate and nasal secretions for changes in amount and color  - [place] appropriate cooling/warming therapies per order  - Administer medications as ordered  - Instruct and encourage patient and family to use good hand hygiene  technique  - Identify and instruct in appropriate isolation precautions for identified infection/condition  Outcome: Progressing  Goal: Absence of fever/infection during neutropenic period  Description: INTERVENTIONS:  - Monitor WBC    Outcome: Progressing     Problem: SAFETY ADULT  Goal: Patient will remain free of falls  Description: INTERVENTIONS:  - Educate patient/family on patient safety including physical limitations  - Instruct patient to call for assistance with activity   - Consult OT/PT to assist with strengthening/mobility   - Keep Call bell within reach  - Keep bed low and locked with side rails adjusted as appropriate  - Keep care items and personal belongings within reach  - Initiate and maintain comfort rounds  - Make Fall Risk Sign visible to staff  - Offer Toileting every 2 Hours, in advance of need  - Initiate/Maintain bed alarm  - Obtain necessary fall risk management equipment:   - Apply yellow socks and bracelet for high fall risk patients  - Consider moving patient to room near nurses station  Outcome: Progressing  Goal: Maintain or return to baseline ADL function  Description: INTERVENTIONS:  -  Assess patient's ability to carry out ADLs; assess patient's baseline for ADL function and identify physical deficits which impact ability to perform ADLs (bathing, care of mouth/teeth, toileting, grooming, dressing, etc.)  - Assess/evaluate cause of self-care deficits   - Assess range of motion  - Assess patient's mobility; develop plan if impaired  - Assess patient's need for assistive devices and provide as appropriate  - Encourage maximum independence but intervene and supervise when necessary  - Involve family in performance of ADLs  - Assess for home care needs following discharge   - Consider OT consult to assist with ADL evaluation and planning for discharge  - Provide patient education as appropriate  Outcome: Progressing  Goal: Maintains/Returns to pre admission functional  level  Description: INTERVENTIONS:  - Perform AM-PAC 6 Click Basic Mobility/ Daily Activity assessment daily.  - Set and communicate daily mobility goal to care team and patient/family/caregiver.   - Collaborate with rehabilitation services on mobility goals if consulted  - Perform Range of Motion 3 times a day.  - Reposition patient every 2 hours.  - Dangle patient 3 times a day  - Stand patient 3 times a day  - Ambulate patient 3 times a day  - Out of bed to chair 3 times a day   - Out of bed for meals 3 times a day  - Out of bed for toileting  - Record patient progress and toleration of activity level   Outcome: Progressing     Problem: DISCHARGE PLANNING  Goal: Discharge to home or other facility with appropriate resources  Description: INTERVENTIONS:  - Identify barriers to discharge w/patient and caregiver  - Arrange for needed discharge resources and transportation as appropriate  - Identify discharge learning needs (meds, wound care, etc.)  - Arrange for interpretive services to assist at discharge as needed  - Refer to Case Management Department for coordinating discharge planning if the patient needs post-hospital services based on physician/advanced practitioner order or complex needs related to functional status, cognitive ability, or social support system  Outcome: Progressing     Problem: Knowledge Deficit  Goal: Patient/family/caregiver demonstrates understanding of disease process, treatment plan, medications, and discharge instructions  Description: Complete learning assessment and assess knowledge base.  Interventions:  - Provide teaching at level of understanding  - Provide teaching via preferred learning methods  Outcome: Progressing     Problem: CARDIOVASCULAR - ADULT  Goal: Maintains optimal cardiac output and hemodynamic stability  Description: INTERVENTIONS:  - Monitor I/O, vital signs and rhythm  - Monitor for S/S and trends of decreased cardiac output  - Administer and titrate ordered  vasoactive medications to optimize hemodynamic stability  - Assess quality of pulses, skin color and temperature  - Assess for signs of decreased coronary artery perfusion  - Instruct patient to report change in severity of symptoms  Outcome: Progressing  Goal: Absence of cardiac dysrhythmias or at baseline rhythm  Description: INTERVENTIONS:  - Continuous cardiac monitoring, vital signs, obtain 12 lead EKG if ordered  - Administer antiarrhythmic and heart rate control medications as ordered  - Monitor electrolytes and administer replacement therapy as ordered  Outcome: Progressing     Problem: RESPIRATORY - ADULT  Goal: Achieves optimal ventilation and oxygenation  Description: INTERVENTIONS:  - Assess for changes in respiratory status  - Assess for changes in mentation and behavior  - Position to facilitate oxygenation and minimize respiratory effort  - Oxygen administered by appropriate delivery if ordered  - Initiate smoking cessation education as indicated  - Encourage broncho-pulmonary hygiene including cough, deep breathe, Incentive Spirometry  - Assess the need for suctioning and aspirate as needed  - Assess and instruct to report SOB or any respiratory difficulty  - Respiratory Therapy support as indicated  Outcome: Progressing

## 2023-12-07 NOTE — PROGRESS NOTES
Cardiology Office Note  MD Chelsie Anderson MD Elam Bernard, DO, [address] MD Nilda Bustillo DO, Bennie Goodman DO, [facility] [place]  ----------------------------------------------------------------  [address]  [address] [facility], [address] [facility] [address] 70 y.o. male MRN: [id number]  Unit/Bed#:  Encounter: [id number]      History of Present Illness: It was a pleasure to see Pedrito Lord in the office today for follow-up CV evaluation. He has a longstanding history of atrial fibrillation with prior failed ELAINE guided cardioversion, history of morbid obesity and pulmonary hypertension with chronic venous insufficiency/lymphedema. The patient has a known history of obstructive sleep apnea was noncompliant with his CPAP therapy. Over the course of many years he has been having lower extremity edema which has been believe secondary to lymphedema with chronic venous insufficiency. He has had venous ablation is in the past for his reflux disease. In October 2019, he was found have pulmonary hypertension with pulmonary artery systolic pressure is estimated at 50 mm Hg. We had initially seen him in late August 2020 due to increased fatigue and dyspnea on exertion. He has become somewhat short of breath with basic activity. Previously, he had been managed for his atrial fibrillation at [facility]. Echocardiogram, stress test and Holter monitor were ordered, but stress test and Holter were completed. Stress test was found to be negative for myocardial ischemia. Holter monitor demonstrated atrial fibrillation with an average heart rate of 92 beats per minute and rare VPCs. His weight trends unfortunately continue to go upward and he had dietary discretion. He seen by electrophysiology and recommended for a sleep study as well as an evaluation with advanced heart failure.   He was placed on CPAP and has been compliant since that time.  He also was seen by bariatric surgery and wish to undergo conservative options with dietary modifications. Despite increasing diuretic load, he he continue to gain approximately 30 lb and was subsequently sent to [facility] in [facility]. He was diuresed down to 285 lb. While hospitalized, his echocardiogram was performed showing normal left ventricular function. Following discharge, he was seen by electrophysiology and sent for a cardioversion in May 2021. The cardioversion was initially successful, but he reverted back to rate controlled atrial flutter. He has been placed on amiodarone and decreased down to 100 mg daily. On his dose of torsemide, he had acute kidney injury with a creatinine rise to 1.9. After holding his diuretics for several days, his creatinine came down to 1.1. He was then decreased on his torsemide to 20 mg b.i.d. And his weight trends continue to improve. He has been aggressive in his dietary modifications. He also underwent radiofrequency ablation for his atrial fibrillation/flutter. He underwent a procedure in June 2021 Overall, he has felt much improved. In July 2021, he ended up going back into atrial flutter. He was seen by electrophysiology who was recommending a repeat ablation study. He underwent the ablation in late July 2021 which was successful. He was seen in the office by EP following the procedure and had remained in sinus rhythm. He subsequently was discontinued on his amiodarone. In October to November 2022, patient developed some symptoms of shortness of breath with exertion. This symptom had been fairly new and the patient was sent for testing including a stress test, chest x-ray and echocardiogram.  Stress test was found to be negative for myocardial ischemia. His echocardiogram showed normal left-ventricular systolic function with mild pulmonary hypertension. There was evidence of some mild increase in pressures on the right side. Chest x-ray at the time was found to be grossly normal.  In July 2023, the patient developed 2 episodes of right-sided chest discomfort near his shoulder. The discomfort would change with rotating his shoulder. Due to the severity of one of the episodes, he presented to the emergency department and was diagnosed with musculoskeletal pain. Troponins were negative during that hospitalization. Additionally, he admitted to continued and progressive dyspnea on exertion a little higher than his baseline. He completed pulmonary function studies in October 2023 showing moderate level of restriction. The patient was then admitted for possible pneumonia in November 2023. He was treated with antibiotics. CT of the chest demonstrated bilateral airspace disease with groundglass opacities. Concern was for possible pneumonia versus pulmonary edema. He completed antibiotics. Following his hospitalization, he took steroids which resulted in some increased swelling in the lower extremity. He took some increased dose of torsemide with minimal improvement in the swelling. It is becoming difficult for him to do his activities of daily living due to his severity of his dyspnea. Review of Systems:  Review of Systems   Constitutional: Negative for decreased appetite, fever, malaise/fatigue, weight gain and weight loss. HENT:  Negative for congestion and sore throat. Eyes:  Negative for visual disturbance. Cardiovascular:  Positive for dyspnea on exertion and leg swelling. Negative for chest pain, near-syncope and palpitations. Respiratory:  Positive for shortness of breath. Negative for cough. Hematologic/Lymphatic: Negative for bleeding problem. Skin:  Negative for rash. Musculoskeletal:  Negative for myalgias and neck pain. Gastrointestinal:  Negative for abdominal pain and nausea. Neurological:  Negative for light-headedness and weakness. Psychiatric/Behavioral:  Negative for depression. Past Medical History:   Diagnosis Date    A-fib ([address])     ALANNA (acute kidney injury) ([address]) 6/15/2021    Arthritis     CPAP (continuous positive airway pressure) dependence     Irregular heart beat     Lymphedema     Sleep apnea     Urinary frequency     Wears glasses     Wears partial dentures     lower partial       Past Surgical History:   Procedure Laterality Date    ABLATION SAPHENOUS VEIN W/ RFA      COLONOSCOPY      JOINT REPLACEMENT  Left Hip 4 /21/2009    NERVE BLOCK Bilateral 7/18/2023    Procedure: BLOCK MEDIAL BRANCH B/L L4-L5 AND L5-S1 MBB #1;  Surgeon: Slava Yanez MD;  Location: [facility];  Service: Pain Management     NERVE BLOCK Bilateral 8/17/2023    Procedure: BLOCK MEDIAL BRANCH  B/L L4-5 and L5-S1 MBB #2;  Surgeon: Slava Yanez MD;  Location: [facility];  Service: Pain Management     CO CYSTO INSERTION TRANSPROSTATIC IMPLANT SINGLE N/A 11/13/2017    Procedure: CYSTOSCOPY WITH INSERTION UROLIFT;  Surgeon: Blanca Rodriguez DO;  Location: [facility];  Service: Urology    RADIOFREQUENCY ABLATION Left 10/5/2023    Procedure: ABLATION RADIO FREQUENCY (RFA) LEFT L4-5 AND L5-S1 RFA;  Surgeon: Slava Yanez MD;  Location: [facility];  Service: Pain Management     RADIOFREQUENCY ABLATION Right 11/1/2023    Procedure: ABLATION RADIO FREQUENCY (RFA) RIGHT L4-5 AND L5-S1 RFA;  Surgeon: Slava Yanez MD;  Location: [facility];  Service: Pain Management     TONSILLECTOMY         Social History     Socioeconomic History    Marital status: /Civil Union     Spouse name: Not on file    Number of children: Not on file    Years of education: Not on file    Highest education level: Not on file   Occupational History    Not on file   Tobacco Use    Smoking status: Never    Smokeless tobacco: Never   Vaping Use    Vaping Use: Never used   Substance and Sexual Activity    Alcohol use:  Yes     Alcohol/week: 5.0 standard drinks of alcohol     Types: 3 Glasses of wine, 2 Standard drinks or equivalent per week     Comment: socially    Drug use: No    Sexual activity: Not Currently     Partners: Female     Birth control/protection: Abstinence, Condom Male, Spermicide   Other Topics Concern    Not on file   Social History Narrative    Not on file     Social Determinants of Health     Financial Resource Strain: Not on file   Food Insecurity: No Food Insecurity (11/14/2023)    Hunger Vital Sign     Worried About Running Out of Food in the Last Year: Never true     Ran Out of Food in the Last Year: Never true   Transportation Needs: No Transportation Needs (11/14/2023)    PRAPARE - Transportation     Lack of Transportation (Medical): No     Lack of Transportation (Non-Medical):  No   Physical Activity: Not on file   Stress: Not on file   Social Connections: Not on file   Intimate Partner Violence: Not on file   Housing Stability: Unknown (11/14/2023)    Housing Stability Vital Sign     Unable to Pay for Housing in the Last Year: No     Number of Places Lived in the Last Year: Not on file     Unstable Housing in the Last Year: No       Family History   Problem Relation Age of Onset    Heart disease Mother     Lymphoma Father     Cancer Father     Heart disease Sister     Hypertension Brother     Diabetes Neg Hx     Thyroid disease Neg Hx     Stroke Neg Hx        Allergies   Allergen Reactions    Penicillins Anaphylaxis    Sulfa Antibiotics Anaphylaxis    Levofloxacin Other (See Comments)         Current Outpatient Medications:     acetaminophen (TYLENOL) 500 mg tablet, Take 500 mg by mouth every 6 (six) hours as needed for mild pain, Disp: , Rfl:     alfuzosin (UROXATRAL) 10 mg 24 hr tablet, Take 10 mg by mouth daily, Disp: , Rfl:     allopurinol (ZYLOPRIM) 300 mg tablet, Take 300 mg by mouth daily, Disp: , Rfl:     ammonium lactate (LAC-HYDRIN) 12 % lotion, Apply topically 2 (two) times a day as needed for dry skin, Disp: , Rfl:     Cyanocobalamin (VITAMIN B 12 PO), Take by mouth, Disp: , Rfl: Diclofenac Sodium (VOLTAREN) 1 %, Apply 2 g topically 4 (four) times a day, Disp: 100 g, Rfl: 0    dutasteride (AVODART) 0.5 mg capsule, Take 0.5 mg by mouth daily, Disp: , Rfl:     ferrous sulfate 325 (65 Fe) mg tablet, Take 325 mg by mouth, Disp: , Rfl:     Glucosamine-Chondroit-Vit C-Mn (Glucosamine 1500 Complex) CAPS, Take by mouth, Disp: , Rfl:     halobetasol (ULTRAVATE) 0.05 % ointment, APPLY TO AFFECTED AREA ON LEG TWICE DAILY, Disp: , Rfl:     L-ARGININE-500 PO, Take 500 mg by mouth 2 (two) times a day , Disp: , Rfl:     loteprednol etabonate (LOTEMAX) 0.5 % ophthalmic suspension, 1 drop 4 (four) times a day, Disp: , Rfl:     metoprolol succinate (TOPROL-XL) 25 mg 24 hr tablet, Take 1 tablet (25 mg total) by mouth daily, Disp: 90 tablet, Rfl: 2    multivitamin (THERAGRAN) TABS, Take 1 tablet by mouth daily, Disp: , Rfl:     Omega-3 Fatty Acids (fish oil) 1,000 mg, Take 1,000 mg by mouth 2 (two) times a day, Disp: , Rfl:     patient supplied medication, Take 1 each by mouth 2 (two) times a day, Disp: , Rfl:     predniSONE 10 mg tablet, 6 tabs daily for 2 days, then 5 tabs for 2 days then 4 tabs for 2 days then 3 tabs for 2 days then 2 tab for 2 days then 1 tab for 2 days then stop stop, Disp: 42 tablet, Rfl: 0    pregabalin (LYRICA) 100 mg capsule, Take 1 capsule (100 mg total) by mouth 3 (three) times a day, Disp: 42 capsule, Rfl: 0    rivaroxaban (Xarelto) 20 mg tablet, Take 1 tablet (20 mg total) by mouth daily, Disp: 90 tablet, Rfl: 2    spironolactone (ALDACTONE) 25 mg tablet, Take 1 tablet (25 mg total) by mouth 2 (two) times a day, Disp: 180 tablet, Rfl: 1    tadalafil (CIALIS) 10 MG tablet, TAKE 1 TABLET EVERY 3 DAYS AS NEEDED, Disp: , Rfl:     tadalafil (CIALIS) 5 MG tablet, Take 5 mg by mouth daily as needed for erectile dysfunction, Disp: , Rfl:     torsemide (DEMADEX) 20 mg tablet, Take 2 tablets (40 mg total) by mouth 2 (two) times a day, Disp: 180 tablet, Rfl: 2    traMADol (ULTRAM) 50 mg tablet, Take 1 tablet (50 mg total) by mouth 2 (two) times a day as needed for moderate pain, Disp: 60 tablet, Rfl: 1    VITAMIN D PO, Take 2,000 Units by mouth daily , Disp: , Rfl:     There were no vitals filed for this visit. PHYSICAL EXAMINATION:  Gen: Awake, Alert, NAD  Head/eyes: AT/NC, pupils equal and round, Anicteric  ENT: mmm  Neck: Supple, No elevated JVP, trachea midline  Resp: CTA bilaterally no w/r/r  CV: RRR +S1, S2, No m/r/g  Abd: Soft, obese, NT/ND + BS  Ext: bilateral lower extremities wrapped with +2 pitting edema bilaterally/lymphedema  Neuro:  Follows commands, moves all extermities  Psych: Appropriate affect, pleasant mood, cooperative attitude, non-combative  Skin: warm; no rash, erythema or venous stasis changes on exposed skin    --------------------------------------------------------------------------------  TREADMILL STRESS  No results found for this or any previous visit.   --------------------------------------------------------------------------------  NUCLEAR STRESS TEST:   Pharmacologic nuclear stress test negative for myocardial ischemia with gated EF 50%, 2020  --------------------------------------------------------------------------------  CATH:  No results found for this or any previous visit.  --------------------------------------------------------------------------------  ECHO:   Results for orders placed during the hospital encounter of 10/26/19   Echo complete with contrast if indicated    Narrative [address] [facility] [address]  [address] [place] [address]  [phone]    Transthoracic Echocardiogram  2D, M-mode, Doppler, and Color Doppler    Study date:  28-Oct-2019    Patient: Gagan Oliver  MR number: [id number]  Account number: [de-identified]  : 1955  Age: 71 years  Gender: Male  Status: Inpatient  Location: Bedside  Height: 69 in  Weight: 302 lb  BP: 141/ 88 mmHg    Indications: left sided paralysis    Diagnoses: 436 - CVA    Sonographer:  Ruel López RDCS, CCT  Referring Physician:  Jenae Burnham DO  Group:  [facility]  Interpreting Physician:  Stefano Mchugh DO    SUMMARY    LEFT VENTRICLE:  Systolic function was normal. Ejection fraction was estimated to be 55 %. This study was inadequate for the evaluation of regional wall motion. Wall thickness was mildly increased. RIGHT VENTRICLE:  The ventricle was dilated. Systolic function was normal.    MITRAL VALVE:  There was mild regurgitation. TRICUSPID VALVE:  There was mild regurgitation. Estimated peak PA pressure was 50 mmHg. HISTORY: PRIOR HISTORY: Patient has no history of cardiovascular disease. PROCEDURE: The procedure was performed at the bedside. This was a routine study. The transthoracic approach was used. The study included complete 2D imaging, M-mode, complete spectral Doppler, and color Doppler. The heart rate was 80 bpm,  at the start of the study. Intravenous contrast (Definity solution [1.3 ml Definity/8.7ml normal saline solution], 3 ml) was administered to opacify the left ventricle. Echocardiographic views were limited due to poor acoustic window  availability. This was a technically difficult study. LEFT VENTRICLE: Size was normal. Systolic function was normal. Ejection fraction was estimated to be 55 %. This study was inadequate for the evaluation of regional wall motion. Wall thickness was mildly increased. DOPPLER: The study was  not technically sufficient to allow evaluation of LV diastolic function. RIGHT VENTRICLE: The ventricle was dilated. Systolic function was normal.    LEFT ATRIUM: Size was normal.    RIGHT ATRIUM: Size was normal.    MITRAL VALVE: Valve structure was normal. There was normal leaflet separation. DOPPLER: The transmitral velocity was within the normal range. There was no evidence for stenosis. There was mild regurgitation. AORTIC VALVE: The valve was trileaflet.  Leaflets exhibited normal thickness and normal cuspal separation. DOPPLER: Transaortic velocity was within the normal range. There was no evidence for stenosis. There was no regurgitation. TRICUSPID VALVE: The valve structure was normal. There was normal leaflet separation. DOPPLER: The transtricuspid velocity was within the normal range. There was no evidence for stenosis. There was mild regurgitation. Estimated peak PA  pressure was 50 mmHg. PULMONIC VALVE: Leaflets exhibited normal thickness, no calcification, and normal cuspal separation. DOPPLER: The transpulmonic velocity was within the normal range. There was no regurgitation. PERICARDIUM: There was no pericardial effusion. The pericardium was normal in appearance. AORTA: The root exhibited normal size. SYSTEMIC VEINS: IVC: The inferior vena cava was not well visualized. SYSTEM MEASUREMENT TABLES    2D  %FS: 34.72 %  Ao Diam: 2.87 cm  EDV(Teich): 143.23 ml  EF(Teich): 63.36 %  ESV(Teich): 52.47 ml  IVSd: 1.15 cm  LA Diam: 4.16 cm  LVIDd: 5.43 cm  LVIDs: 3.55 cm  LVPWd: 1.04 cm  RWT: 0.38  SV(Teich): 90.75 ml    CW  AV Vmax: 1.34 m/s  AV maxP.13 mmHg  RAP: 0 mmHg  TR Vmax: 3.26 m/s  TR maxP.5 mmHg    PW  E' Sept: 0.09 m/s  MV E Terrance: 1.03 m/s  RVSP: 42.5 mmHg    [facility]    Prepared and electronically signed by    Pablo Colmenares DO  Signed 28-Oct-2019 10:37:38       LVEF 55%, mild LVH, paradoxical septal wall motion, mild RV dilatation, mild LA dilatation, mild to moderate RA dilatation, trace MR/TR, 2021  LVEF 65%, mild LVH, normal diastolic function, septal motion consistent with elevated PVR, mild RV dilatation with normal function, mild biatrial dilatation, trace MR/TR with PASP 37 mmHg (poor Doppler signal), 2023  --------------------------------------------------------------------------------  HOLTER  No results found for this or any previous visit.   No results found for this or any previous visit.  --------------------------------------------------------------------------------  CAROTIDS  No results found for this or any previous visit.   --------------------------------------------------------------------------------  ECGs:  No results found for this visit on 12/07/23. Lab Results   Component Value Date    WBC 9.85 11/16/2023    HGB 12.0 11/16/2023    HCT 37.7 11/16/2023    MCV 95 11/16/2023     11/16/2023      Lab Results   Component Value Date    SODIUM 138 11/16/2023    K 4.3 11/16/2023     11/16/2023    CO2 29 11/16/2023    BUN 25 11/16/2023    CREATININE 0.84 11/16/2023    GLUC 92 11/16/2023    CALCIUM 9.2 11/16/2023      Lab Results   Component Value Date    HGBA1C 5.4 06/08/2021      No results found for: "CHOL"  Lab Results   Component Value Date    HDL 54 04/19/2023    HDL 50 04/28/2022    HDL 55 10/27/2019     Lab Results   Component Value Date    LDLCALC 135 (H) 04/19/2023    LDLCALC 142 (H) 04/28/2022    LDLCALC 90 10/27/2019     Lab Results   Component Value Date    TRIG 193 (H) 04/19/2023    TRIG 118 04/28/2022    TRIG 93 10/27/2019     No results found for: "CHOLHDL"   Lab Results   Component Value Date    INR 1.54 (H) 07/28/2021    INR 2.26 (H) 06/02/2021    INR 1.59 (H) 01/22/2021    PROTIME 18.4 (H) 07/28/2021    PROTIME 24.9 (H) 06/02/2021    PROTIME 18.7 (H) 01/22/2021        1. Chronic diastolic heart failure (HCC)    2. Paroxysmal atrial flutter ([address])    3. Abnormal ECG    4. Obesity, morbid ([address])    5.  Pulmonary hypertension ([address])        IMPRESSION:  Acute on chronic diastolic heart failure  LVEF 65%, grade II diastolic dysfunction, October 2023  Paroxysmal atrial fibrillation/flutter on Xarelto and amiodarone; had long standing since before October 2019 - now in [address] [place] [address]  s/p DCCV in May 2021   s/p RFA ablation (June 2021 and July 2021)  Holter with AF, avg HR 92 bpm, rare VPCs, September 2020  History of bilateral venous insufficiency status post LE vein ablation  Abnormal ECG with bifascicular block  Pharmacologic nuclear stress test appears negative for myocardial ischemia, gated EF 65%, January 2023  Moderate pulmonary hypertension  Moderate restrictive lung disease  Lymphedema  Morbid obesity  ROBERT on 20/14 cm BiPAP    PLAN:  It was a pleasure to see Rigo Mcqueen in the office today for follow-up CV evaluation. He is here today with progressive shortness of breath and evidence of bilateral airspace disease on his CT scan in November 2023. While it may have been pneumonia at the time, he has had at least a 12 if not 20 pound weight gain over the course of the last several months. He also had significant increase in his swelling. This also happened off the back of taking additional steroids for possible pneumonia. His echocardiogram in October 2023 showed grade 2 diastolic dysfunction with normal ejection fraction. His pulmonary function studies do show moderate level of restriction. He tried taking additional doses of torsemide which did have some minimal improvement in his swelling, but did not resolve his symptoms. Patient continues to have a cough. While this can be reactive post pneumonia, I am unclear that the patient is experiencing pneumonia but rather heart failure. He is tolerating his other medications without any reported adverse effects. Based on his clinical presentation, I have the following recommendations:    1. Recommend the patient be evaluated in the emergency department for acute decompensation of heart failure and treatment with intravenous diuretics. Would initiate at least Lasix 60 mg IV twice daily with low threshold to initiate diuretic drip. 2.  Consultation to cardiology inpatient. 3.  Xarelto for thromboembolic prophylaxis. 4.  2D echocardiogram showed grade 2 diastolic dysfunction, but pulmonary pressures cannot be estimated.   Patient may benefit from the consideration of right heart catheterization at some point, inpatient versus outpatient. 5.  Continue Toprol-XL and spironolactone with heart failure. 6.  Recommend heart healthy diet low in sodium and carbohydrate. 7.  Consultation has been placed to pulmonary regarding his restrictive lung disease. While this may be related to habitus, would have him evaluate him for any additional treatment as clinically indicated. 8.  Would also have him see a colleague with advanced heart failure for completeness. 9.  Will follow-up with him after his hospital evaluation. As always, please not hesitate to call with any questions. Portions of the record may have been created with voice recognition software. Occasional wrong word or "sound a like" substitutions may have occurred due to the inherent limitations of voice recognition software. Read the chart carefully and recognize, using context, where substitutions have occurred.       Signed: Saturnino Padilla DO, [id number] [facility], [facility], [facility]

## 2023-12-07 NOTE — ASSESSMENT & PLAN NOTE
Most likely due to heart failure with addition of venous insufficiency and lymphedema.   Patient uses compression stockings at home  Continue to monitor   Raise legs when sitting   Compression stockings from home

## 2023-12-08 PROBLEM — G47.33 OBSTRUCTIVE SLEEP APNEA ON CPAP: Status: RESOLVED | Noted: 2019-10-26 | Resolved: 2023-12-08

## 2023-12-08 LAB
ANION GAP SERPL CALCULATED.3IONS-SCNC: 5 MMOL/L
BUN SERPL-MCNC: 15 MG/DL (ref 5–25)
CALCIUM SERPL-MCNC: 9.5 MG/DL (ref 8.4–10.2)
CHLORIDE SERPL-SCNC: 99 MMOL/L (ref 96–108)
CO2 SERPL-SCNC: 35 MMOL/L (ref 21–32)
CREAT SERPL-MCNC: 0.9 MG/DL (ref 0.6–1.3)
ERYTHROCYTE [DISTWIDTH] IN BLOOD BY AUTOMATED COUNT: 14.9 % (ref 11.6–15.1)
GFR SERPL CREATININE-BSD FRML MDRD: 86 ML/MIN/1.73SQ M
GLUCOSE SERPL-MCNC: 102 MG/DL (ref 65–140)
HCT VFR BLD AUTO: 39.5 % (ref 36.5–49.3)
HGB BLD-MCNC: 12.8 G/DL (ref 12–17)
MAGNESIUM SERPL-MCNC: 2 MG/DL (ref 1.9–2.7)
MCH RBC QN AUTO: 30.5 PG (ref 26.8–34.3)
MCHC RBC AUTO-ENTMCNC: 32.4 G/DL (ref 31.4–37.4)
MCV RBC AUTO: 94 FL (ref 82–98)
PLATELET # BLD AUTO: 145 THOUSANDS/UL (ref 149–390)
PMV BLD AUTO: 10.1 FL (ref 8.9–12.7)
POTASSIUM SERPL-SCNC: 3.7 MMOL/L (ref 3.5–5.3)
RBC # BLD AUTO: 4.2 MILLION/UL (ref 3.88–5.62)
SODIUM SERPL-SCNC: 139 MMOL/L (ref 135–147)
WBC # BLD AUTO: 4.75 THOUSAND/UL (ref 4.31–10.16)

## 2023-12-08 PROCEDURE — 99233 SBSQ HOSP IP/OBS HIGH 50: CPT | Performed by: INTERNAL MEDICINE

## 2023-12-08 PROCEDURE — 83735 ASSAY OF MAGNESIUM: CPT | Performed by: NURSE PRACTITIONER

## 2023-12-08 PROCEDURE — 99222 1ST HOSP IP/OBS MODERATE 55: CPT | Performed by: INTERNAL MEDICINE

## 2023-12-08 PROCEDURE — 80048 BASIC METABOLIC PNL TOTAL CA: CPT

## 2023-12-08 PROCEDURE — 85027 COMPLETE CBC AUTOMATED: CPT

## 2023-12-08 PROCEDURE — NC001 PR NO CHARGE: Performed by: INTERNAL MEDICINE

## 2023-12-08 RX ORDER — FUROSEMIDE 10 MG/ML
60 INJECTION INTRAMUSCULAR; INTRAVENOUS
Status: DISCONTINUED | OUTPATIENT
Start: 2023-12-09 | End: 2023-12-11

## 2023-12-08 RX ORDER — SPIRONOLACTONE 25 MG/1
25 TABLET ORAL 2 TIMES DAILY
Status: DISCONTINUED | OUTPATIENT
Start: 2023-12-08 | End: 2023-12-08

## 2023-12-08 RX ORDER — SPIRONOLACTONE 25 MG/1
25 TABLET ORAL 2 TIMES DAILY
Status: DISCONTINUED | OUTPATIENT
Start: 2023-12-08 | End: 2023-12-13 | Stop reason: HOSPADM

## 2023-12-08 RX ORDER — POTASSIUM CHLORIDE 20 MEQ/1
40 TABLET, EXTENDED RELEASE ORAL ONCE
Status: COMPLETED | OUTPATIENT
Start: 2023-12-08 | End: 2023-12-08

## 2023-12-08 RX ADMIN — FINASTERIDE 5 MG: 5 TABLET, FILM COATED ORAL at 08:18

## 2023-12-08 RX ADMIN — RIVAROXABAN 20 MG: 20 TABLET, FILM COATED ORAL at 08:17

## 2023-12-08 RX ADMIN — ALLOPURINOL 300 MG: 100 TABLET ORAL at 08:17

## 2023-12-08 RX ADMIN — FERROUS SULFATE TAB 325 MG (65 MG ELEMENTAL FE) 325 MG: 325 (65 FE) TAB at 08:17

## 2023-12-08 RX ADMIN — FUROSEMIDE 60 MG: 10 INJECTION, SOLUTION INTRAVENOUS at 08:17

## 2023-12-08 RX ADMIN — METOPROLOL SUCCINATE 25 MG: 25 TABLET, EXTENDED RELEASE ORAL at 08:17

## 2023-12-08 RX ADMIN — SPIRONOLACTONE 25 MG: 25 TABLET, FILM COATED ORAL at 08:17

## 2023-12-08 RX ADMIN — POTASSIUM CHLORIDE 40 MEQ: 1500 TABLET, EXTENDED RELEASE ORAL at 14:12

## 2023-12-08 RX ADMIN — SPIRONOLACTONE 25 MG: 25 TABLET, FILM COATED ORAL at 17:04

## 2023-12-08 NOTE — UTILIZATION REVIEW
Initial Clinical Review    Admission: Date/Time/Statement:   Admission Orders (From admission, onward)       Ordered        12/07/23 1222  INPATIENT ADMISSION  Once                          Orders Placed This Encounter   Procedures    INPATIENT ADMISSION     Standing Status:   Standing     Number of Occurrences:   1     Order Specific Question:   Level of Care     Answer:   Med Surg [16]     Order Specific Question:   Estimated length of stay     Answer:   More than 2 Midnights     Order Specific Question:   Certification     Answer:   I certify that inpatient services are medically necessary for this patient for a duration of greater than two midnights. See H&P and MD Progress Notes for additional information about the patient's course of treatment.     ED Arrival Information       Expected   -    Arrival   12/7/2023 10:24    Acuity   Urgent              Means of arrival   Walk-In    Escorted by   Family Member    Service   Hospitalist    Admission type   Emergency              Arrival complaint   Shortness of Breath             Chief Complaint   Patient presents with    Shortness of Breath     Pt at Dr. Prieto's office and tole to come to ED for excess fluid. C/o SOB w/ exertion and fatigue. Also reports open wound on L leg he has been seeing wound care for       Initial Presentation: 68 y.o. male to ED via walk-in from home   Present to ED with from his cardiology appointment for lower extremity edema. Patient has been struggling with SOB for some and states that it is interfering with his everyday activities.   PMHX Afib on xarelto, HF, lymphedema, venous insufficiency, ROBERT   Admitted to [facility] with DX: CHF exacerbation   on exam: tachypnea; (+) SOB; 3+ pitting edema bilateral lower extremities and overt fluid overload.   PLAN: cont iv lasix; monitor labs; tele monitoring; fluid restriction; cardiology consult      CARDIOLOGY CONSULT  Patient presented to the emergency department directly from the outpatient  cardiology office with Dr. Prieto due to progressive shortness of breath, weight gain with lower extremity edema. Patient with suspected acute decompensated heart failure. Recommendation to initiate at least furosemide 60 mg IV twice daily with low threshold to initiate diuretic infusion. To consider inpatient versus outpatient RHC as previous TTE unable to estimate pulmonary pressures. status post furosemide 60 mg IV x 1 upon arrival. Recommend to provide BID dosing today and assess response.  recommend to resume outpatient AV starla blocker with Toprol-XL 25 mg daily and adjust as warranted pending heart rates. Continue anticoagulation with Xarelto 20 mg daily. Potassium 3.7, will provide K-Dur 40 mEq x 1. Check magnesium.    Date: 12/8/23      Day 2  still with lower extremity edema, Still with   Shortness of breath /difficulty breathing, ambulating to the bathroom    Plan: cont iv lasix; monitor labs; tele monitoring; fluid restriction      Date: 12/9/23     Day 3: Has surpassed a 2nd midnight with active treatments and services, which include cont iv lasix; monitor labs;.      ED Triage Vitals [12/07/23 1032]   Temperature Pulse Respirations Blood Pressure SpO2   97.8 °F (36.6 °C) 81 21 107/57 94 %      Temp Source Heart Rate Source Patient Position - Orthostatic VS BP Location FiO2 (%)   Oral Monitor Sitting Right arm --      Pain Score       No Pain          Wt Readings from Last 1 Encounters:   12/09/23 (!) 141 kg (311 lb 4.6 oz)     Wt Readings from Last 3 Encounters:   12/08/23 (!) 142 kg (313 lb 15 oz)   12/07/23 (!) 146 kg (322 lb 3.2 oz)   11/20/23 (!) 147 kg (324 lb)     Additional Vital Signs:   Date/Time Temp Pulse Resp BP MAP (mmHg) SpO2 Calculated FIO2 (%) - Nasal Cannula Nasal Cannula O2 Flow Rate (L/min) O2 Device Patient Position - Orthostatic VS   12/08/23 21:13:50 98.5 °F (36.9 °C) -- 22 120/68 85 -- -- -- -- --   12/08/23 1945 -- -- -- -- -- 91 % -- -- None (Room air) --   12/08/23 15:50:19 --  77 -- 103/56 72 92 % -- -- -- --   12/08/23 14:38:34 98.1 °F (36.7 °C) 84 20 100/57 71 93 % -- -- None (Room air) Lying     Date/Time Temp Pulse Resp BP MAP (mmHg) SpO2 Calculated FIO2 (%) - Nasal Cannula Nasal Cannula O2 Flow Rate (L/min) O2 Device Patient Position - Orthostatic VS   12/08/23 0900 -- -- -- -- -- 95 % -- -- None (Room air) --   12/08/23 08:26:18 98.3 °F (36.8 °C) 77 20 136/73 94 95 % -- -- None (Room air) Sitting   12/07/23 22:59:21 97.9 °F (36.6 °C) 73 20 111/60 77 92 % 32 3 L/min Nasal cannula Lying   12/07/23 2049 -- -- -- -- -- 90 % 32 3 L/min Nasal cannula --   12/07/23 2000 -- -- -- -- -- 94 % -- -- None (Room air) --   12/07/23 19:39:48 98.1 °F (36.7 °C) 79 20 103/53 70 89 % Abnormal  -- -- None (Room air) Lying   12/07/23 1510 -- -- -- -- -- -- -- -- None (Room air) --   12/07/23 14:44:15 98 °F (36.7 °C) 78 16 146/71 96 95 % -- -- -- --   12/07/23 14:42:21 98 °F (36.7 °C) 80 16 146/71 96 94 % -- -- -- --   12/07/23 1408 -- 81 21 125/60 -- 95 % -- -- None (Room air) Lying   12/07/23 1200 -- 82 21 146/72 101 90 % -- -- None (Room air) Sitting   12/07/23 1149 -- -- -- -- -- -- -- -- None (Room air) --   12/07/23 1145 -- 79 21 122/61 88 95 % -- -- None (Room air) Sitting   12/07/23 1032 97.8 °F (36.6 °C) 81 21 107/57 -- 94 % -- -- None (Room air) Sitting     EKG: Sinus rhythm with Premature atrial complexes with Aberrant conduction  Right bundle branch block  Abnormal ECG  When compared with ECG of 13-NOV-2023 15:55,  Premature ventricular complexes are no longer Present  Aberrant conduction is now Present      Pertinent Labs/Diagnostic Test Results:   XR chest 2 views   ED Interpretation by Crystal Gaspar DO (12/07 1217)   Interpreted by me as no large pleural effusion      Final Result by Anjelica García MD (12/08 0925)      No acute cardiopulmonary disease.                  Workstation performed: [id number]              Results from last 7 days   Lab Units 12/09/23  0513 12/08/23  0934  12/07/23  1123   WBC Thousand/uL 4.09* 4.75 4.44   HEMOGLOBIN g/dL 11.8* 12.8 11.7*   HEMATOCRIT % 36.4* 39.5 35.6*   PLATELETS Thousands/uL 155 145* 134*   NEUTROS ABS Thousands/µL  --   --  2.90        Results from last 7 days   Lab Units 12/09/23  0513 12/08/23  0934 12/07/23  1123   SODIUM mmol/L 138 139 139   POTASSIUM mmol/L 4.2 3.7 3.7   CHLORIDE mmol/L 102 99 103   CO2 mmol/L 32 35* 30   ANION GAP mmol/L 4 5 6   BUN mg/dL 18 15 15   CREATININE mg/dL 0.87 0.90 0.91   EGFR ml/min/1.73sq m 88 86 85   CALCIUM mg/dL 9.1 9.5 9.2   MAGNESIUM mg/dL 2.1 2.0  --        Results from last 7 days   Lab Units 12/09/23  0513 12/08/23  0934 12/07/23  1123   GLUCOSE RANDOM mg/dL 101 102 94       Results from last 7 days   Lab Units 12/07/23  1123   BNP pg/mL 91       ED Treatment:   Medication Administration from 12/07/2023 1023 to 12/07/2023 1439         Date/Time Order Dose Route Action     12/07/2023 1124 EST furosemide (LASIX) injection 60 mg 60 mg Intravenous Given            Present on Admission:   Atrial fibrillation (HCC)   (Resolved) Obstructive sleep apnea on CPAP   Venous ulcer (HCC)   Bilateral lower extremity edema      Admitting Diagnosis: Chronic diastolic heart failure (HCC) [I50.32]  SOB (shortness of breath) [R06.02]    Age/Sex: 68 y.o. male    Admission Orders: SCDs; I/O; Daily wts; tele monitoring; cardiac diet; fluid restriction 1800    Scheduled Medications:  allopurinol, 300 mg, Oral, Daily  ferrous sulfate, 325 mg, Oral, Daily With Breakfast  finasteride, 5 mg, Oral, Daily  furosemide, 60 mg, Intravenous, BID (diuretic)  metoprolol succinate, 25 mg, Oral, Daily  rivaroxaban, 20 mg, Oral, Daily  spironolactone, 25 mg, Oral, Daily      Continuous IV Infusions: None     PRN Meds:  acetaminophen, 650 mg, Oral, Q4H PRN  ondansetron, 4 mg, Intravenous, Q6H PRN        IP CONSULT TO CARDIOLOGY    Network Utilization Review Department  ATTENTION: Please call with any questions or concerns to [phone] and  carefully listen to the prompts so that you are directed to the right person. All voicemails are confidential.   For Discharge needs, contact Care Management DC Support Team at [phone] opt. 2  Send all requests for admission clinical reviews, approved or denied determinations and any other requests to dedicated fax number below belonging to the campus where the patient is receiving treatment. List of dedicated fax numbers for the Facilities:  FACILITY NAME UR FAX NUMBER   ADMISSION DENIALS (Administrative/Medical Necessity) [phone]   DISCHARGE SUPPORT TEAM (NETWORK) [phone]   PARENT CHILD HEALTH (Maternity/NICU/Pediatrics) [phone]   [facility] [phone]   [facility] [phone]   [facility] [phone]   [facility] [phone]   [facility] [phone]   [facility] [phone]   [facility] [phone]   [facility] [phone]   [facility] [phone]   [facility] [phone]   [facility] [phone]

## 2023-12-08 NOTE — PROGRESS NOTES
Progress Note - Cardiology   Leonardo Oviedo 68 y.o. male MRN: [id number]  Unit/Bed#: [place] [address] -01 Encounter: [id number]    Assessment:    Acute on chronic diastolic congestive heart failure  Echo 10/11/2023: LVEF 65%, grade 2 diastolic dysfunction  Nuclear stress test, pharmacologic 1/23/2023: EF 65% negative for ischemia  Moderate pulmonary hypertension  Moderate risk of lung disease  Paroxysmal atrial fibrillation/flutter on Xarelto 20 mg daily anticoagulation, metoprolol succinate 25 mg daily with history prior cardioversion May 2021 and of RFA ablation June 2021 and July 2021  Lymphedema  Morbid obesity  Obstructive sleep apnea on BiPAP    Plan:    Continue furosemide 60 mg IV   Increased spironolactone to 25 mg twice daily  Monitor volume status, electrolytes and renal function closely    Interval history:  Weight down 10 pounds since yesterday    PROBLEM LIST:    Patient Active Problem List    Diagnosis Date Noted    CHF exacerbation (HCC) 12/07/2023    Pneumonia 11/14/2023    Chronic pain syndrome 05/04/2023    Lumbar spondylosis 05/04/2023    Chronic bilateral low back pain without sciatica 05/04/2023    Venous ulcer (HCC) 01/03/2023    Atrial flutter (HCC) 07/29/2021    Iron deficiency 06/16/2021    Stage 3a chronic kidney disease (HCC) 06/15/2021    Anemia 06/15/2021    Primary osteoarthritis 06/15/2021    Encounter for cardioversion procedure 05/06/2021    SOB (shortness of breath) 01/22/2021    Chronic diastolic heart failure (HCC) 01/22/2021    Pulmonary hypertension (HCC) 11/02/2020    Bilateral lower extremity edema 10/27/2019    Atrial fibrillation (HCC) 10/26/2019    Obstructive sleep apnea on CPAP 10/26/2019    Leukopenia 10/26/2019    Weakness of right upper extremity 10/26/2019    Paresthesias 10/26/2019    Cervical pain (neck) 10/26/2019    Obesity, Class III, BMI 40-49.9 (morbid obesity) (HCC) 10/26/2019    Thrombocytopenia (HCC) 10/26/2019    Obesity, morbid (HCC) 04/16/2015    Venous  "insufficiency of both lower extremities 02/24/2015       Vitals: /57   Pulse 84   Temp 98.1 °F (36.7 °C)   Resp 20   Ht 5' 9\" (1.753 m)   Wt (!) 142 kg (313 lb 15 oz)   SpO2 93%   BMI 46.36 kg/m²   PREVIOUS WEIGHTS:   Weight (last 2 days)       Date/Time Weight    12/08/23 0600 142 (313.94)    12/07/23 1510 147 (323.86)    12/07/23 1032 147 (323.86)            Wt Readings from Last 2 Encounters:   12/08/23 (!) 142 kg (313 lb 15 oz)   12/07/23 (!) 146 kg (322 lb 3.2 oz)       Labs/Data:        Results from last 7 days   Lab Units 12/08/23  0934 12/07/23  1123   WBC Thousand/uL 4.75 4.44   HEMOGLOBIN g/dL 12.8 11.7*   HEMATOCRIT % 39.5 35.6*   MCV fL 94 94   MCH pg 30.5 30.8   MCHC g/dL 32.4 32.9   PLATELETS Thousands/uL 145* 134*     Results from last 7 days   Lab Units 12/08/23  0934 12/07/23  1123   EGFR ml/min/1.73sq m 86 85   SODIUM mmol/L 139 139   POTASSIUM mmol/L 3.7 3.7   CHLORIDE mmol/L 99 103   CO2 mmol/L 35* 30   BUN mg/dL 15 15   CREATININE mg/dL 0.90 0.91     Results from last 7 days   Lab Units 12/08/23  0934 12/07/23  1123   CALCIUM mg/dL 9.5 9.2     Lab Results   Component Value Date    NTBNP 1,185 (H) 05/04/2021    NTBNP 1,536 (H) 01/22/2021     Lab Results   Component Value Date    CHOLESTEROL 228 (H) 04/19/2023    TRIG 193 (H) 04/19/2023    HDL 54 04/19/2023    LDLCALC 135 (H) 04/19/2023    HGBA1C 5.4 06/08/2021     Current Facility-Administered Medications:     acetaminophen (TYLENOL) tablet 650 mg, 650 mg, Oral, Q4H PRN, Cristiana Choudhary PA-C    allopurinol (ZYLOPRIM) tablet 300 mg, 300 mg, Oral, Daily, Cristiana Choudhary PA-C, 300 mg at 12/08/23 0817    ferrous sulfate tablet 325 mg, 325 mg, Oral, Daily With Breakfast, Cristiana Choudhary PA-C, 325 mg at 12/08/23 0817    finasteride (PROSCAR) tablet 5 mg, 5 mg, Oral, Daily, Cristiana Choudhary PA-C, 5 mg at 12/08/23 0818    furosemide (LASIX) injection 60 mg, 60 mg, Intravenous, BID (diuretic), Cristiana Choudhary PA-C, 60 mg at " 12/08/23 0817    metoprolol succinate (TOPROL-XL) 24 hr tablet 25 mg, 25 mg, Oral, Daily, Cristiana Choudhary PA-C, 25 mg at 12/08/23 0817    ondansetron (ZOFRAN) injection 4 mg, 4 mg, Intravenous, Q6H PRN, Cristiana Choudhary PA-C    rivaroxaban (XARELTO) tablet 20 mg, 20 mg, Oral, Daily, Cristiana Choudhary PA-C, 20 mg at 12/08/23 0817    spironolactone (ALDACTONE) tablet 25 mg, 25 mg, Oral, Daily, Cristiana Choudhary PA-C, 25 mg at 12/08/23 0817  Allergies   Allergen Reactions    Penicillins Anaphylaxis    Sulfa Antibiotics Anaphylaxis    Levofloxacin Other (See Comments)         Intake/Output Summary (Last 24 hours) at 12/8/2023 1444  Last data filed at 12/8/2023 1152  Gross per 24 hour   Intake 480 ml   Output 3450 ml   Net -2970 ml       Invasive Devices       Peripheral Intravenous Line  Duration             Peripheral IV 12/07/23 Dorsal (posterior);Left Hand <1 day                    Review of Systems   Constitutional:  Negative for activity change.   Respiratory:  Negative for cough, choking, chest tightness, shortness of breath, wheezing and stridor.    Cardiovascular:  Negative for chest pain, palpitations and leg swelling.   Musculoskeletal:  Negative for gait problem.   Skin:  Negative for color change.   Neurological:  Negative for dizziness, tremors, syncope, weakness, light-headedness and headaches.   Psychiatric/Behavioral:  Negative for agitation and confusion.        Physical Exam  Vitals reviewed.   Constitutional:       General: He is not in acute distress.     Appearance: He is well-developed.   HENT:      Head: Normocephalic and atraumatic.   Neck:      Thyroid: No thyromegaly.      Vascular: No carotid bruit or JVD.      Trachea: No tracheal deviation.   Cardiovascular:      Rate and Rhythm: Normal rate and regular rhythm.      Pulses: Normal pulses.      Heart sounds: Normal heart sounds. No murmur heard.     No friction rub. No gallop.      Comments: Open wound on left leg  Pulmonary:       "Effort: Pulmonary effort is normal. No respiratory distress.      Breath sounds: Normal breath sounds. No wheezing, rhonchi or rales.      Comments: Faint bibasilar Rales  Chest:      Chest wall: No tenderness.   Musculoskeletal:      Cervical back: Normal range of motion and neck supple.      Right lower le+ Pitting Edema present.      Left lower le+ Pitting Edema present.   Skin:     General: Skin is warm and dry.   Neurological:      General: No focal deficit present.      Mental Status: He is alert and oriented to person, place, and time.   Psychiatric:         Mood and Affect: Mood normal.         Behavior: Behavior normal.         Thought Content: Thought content normal.         Judgment: Judgment normal.         ======================================================     Imaging:   I have personally reviewed pertinent reports.   and I have personally reviewed pertinent films in PACS      EKG: Sinus rhythm with premature atrial contractions and right bundle branch block pattern.  Abnormal EKG.      Portions of the record may have been created with voice recognition software. Occasional wrong word or \"sound a like\" substitutions may have occurred due to the inherent limitations of voice recognition software. Read the chart carefully and recognize, using context, where substitutions have occurred.    "

## 2023-12-08 NOTE — WOUND OSTOMY CARE
Consult Note - Wound   Leonardo Oviedo 68 y.o. male MRN: [id number]  Unit/Bed#: [place] [address] -01 Encounter: [id number]      History and Present Illness:  68 year old male presented to the hospital with lower extremity swelling and shortness of breath.  Patient's history significant for CHF, lymphedema, left lower extremity ulcer, atrial fibrillation, obesity.    Assessment Findings:   Patient agreeable to assessment.  He is normally independent and continent of bowel and bladder.  However, had an episode of urinary incontinence this morning.  Patient declined assessment of buttocks, sacrum, and skin folds--denies any other skin issues at this time.  Patient is wearing unna boot to left lower extremity--this was removed (was due to be changed yesterday).  Right lower extremity with lymphedema wraps--these were soiled, removed, and washed/hung up to dry.  Bilateral heels intact.  Lower extremities with moderate edema and chronic venous changes--scaling, hyperpigmentation.    Left lateral pretibial venous ulcer--beefy red, full thickness with epithelialization.  Small serosanguinous drainage.  Caitlin-wound intact with scaling, edema, and hyperpigmentation.     Left pretibial blister--large serous fluid filled blister just medial to open wound.      See flowsheet for wound details.  Patient follows twice weekly with podiatry as an outpatient and has lymphedema wraps.      Wound Care Plan:   1-Hydraguard lotion to bilateral heels twice daily and as needed.  2-Elevate lower extremities for edema management.  Ensure heels float off of bed/chair surface to offload pressure.  3-Bariatric offloading air cushion in chair when out of bed.  4-Moisturize skin daily with skin nourishing lotion.  5-Encourage/remind patient to turn and reposition every 2 hours while in bed and weight shift frequently while in the chair to re-distribute pressure on skin.  Provide assistance as needed.  6-Left leg--cleanse wound with normal saline,  pat dry.  Apply lotion to intact skin on lower leg.  Apply Dermagran to open wound.  Cover wound and intact blister with ABD and wrap with roxann.  Change dressing every other day and as needed with soilage/dislodgement.    Wound care team to follow.  Plan of care reviewed with primary RN.  Patient should continue to follow-up with podiatry on discharge.    Wound 11/15/23 Venous Ulcer Pretibial Left;Lateral (Active)   Wound Image   12/08/23 1136   Wound Description Beefy red;Epithelialization 12/08/23 1136   Caitlin-wound Assessment Hyperpigmented;Scaly;Edema 12/08/23 1136   Wound Length (cm) 9 cm 12/08/23 1136   Wound Width (cm) 6 cm 12/08/23 1136   Wound Depth (cm) 0.2 cm 12/08/23 1136   Wound Surface Area (cm^2) 54 cm^2 12/08/23 1136   Wound Volume (cm^3) 10.8 cm^3 12/08/23 1136   Calculated Wound Volume (cm^3) 10.8 cm^3 12/08/23 1136   Change in Wound Size % -140 12/08/23 1136   Drainage Amount Small 12/08/23 1136   Drainage Description Serosanguineous 12/08/23 1136   Non-staged Wound Description Full thickness 12/08/23 1136   Treatments Cleansed;Elevated 12/08/23 1136   Dressing Dermagran gauze;ABD;Gauze 12/08/23 1136   Dressing Changed New 12/08/23 1136   Patient Tolerance Tolerated well 12/08/23 1136   Dressing Status Clean;Intact;Dry 12/08/23 1136       Wound 12/08/23 Other (comment) Pretibial Left (Active)   Wound Image   12/08/23 1139   Wound Description Dry 12/08/23 1139   Caitlin-wound Assessment Hyperpigmented;Scaly;Edema 12/08/23 1139   Wound Length (cm) 8 cm 12/08/23 1139   Wound Width (cm) 1.5 cm 12/08/23 1139   Wound Depth (cm) 0 cm 12/08/23 1139   Wound Surface Area (cm^2) 12 cm^2 12/08/23 1139   Wound Volume (cm^3) 0 cm^3 12/08/23 1139   Calculated Wound Volume (cm^3) 0 cm^3 12/08/23 1139   Drainage Amount None 12/08/23 1139   Treatments Cleansed;Elevated 12/08/23 1139   Dressing Non adherent;ABD;Gauze 12/08/23 1139   Dressing Changed New 12/08/23 1139   Patient Tolerance Tolerated well 12/08/23 1139    Dressing Status Clean;Dry;Intact 12/08/23 2092       Lily Sellers BSN, RN, CWON

## 2023-12-08 NOTE — CONSULTS
Consult - Cardiology   Leonardo Oviedo 68 y.o. male MRN: [id number]  Unit/Bed#: [place] [address] -[id number] Encounter: [id number]        Reason For Consult: Acute heart failure               ASSESSMENT:  Acute on chronic diastolic congestive heart failure   - TTE 10/11/23: LVEF 65%, grade 2 diastolic dysfunction, poor study quality as it was technically difficult.   - pharmacologic nuclear stress test 1/23/23: Post EF 65%, ECG negative for ischemia.   - prescribed outpatient diuretic Rx, torsemide 20 mg twice daily, spironolactone 25 mg twice daily.  Moderate pulmonary hypertension  Moderate restrictive lung disease  Paroxysmal atrial fibrillation and atrial flutter    - anticoagulation with Xarelto 20 mg daily.   - AV blocker Rx, Toprol-XL 25 mg daily.  - had long standing since before October 2019 now in SR.  - status post DCCV, May 2021.  - status post RFA ablation, June 2021 and July 2021.  Lymphedema  Morbid obesity  Obstructive sleep apnea on BiPAP    PLAN/ DISCUSSION:     Upon weight review, patient's weight in January of this year was 304 pounds. As noted, patient with progressive weight increase. Documented standing weight of 323 pounds yesterday. Most recent weight 313 pounds today.  Currently on furosemide 60 mg IV twice daily.  Currently on spironolactone 25 mg daily.  Continue Toprol-XL 25 mg daily, Xarelto 20 mg daily.  As mentioned in outpatient cardiologist note, patient may benefit from right heart catheterization inpatient versus outpatient to assess pulmonary pressures.  Monitor volume status closely with strict intake/output, daily weight.  Monitor renal function and electrolytes; maintain potassium > 4, magnesium > 2.  Potassium 3.7, will provide K-Dur 40 mEq x 1.   Check magnesium.     History Of Present Illness:  68-year-old male presented to [facility] directly from his outpatient Cardiologist appointment with Dr. Prieto due to progressive shortness of breath and weight gain with lower  extremity edema. Cardiologist suspected that patient was in acute decompensated heart failure thus recommendation for patient to present to the emergency department for evaluation. Additionally, upon review, patient with approximately 12 if not 20 pound weight gain over the past several months.    Upon assessment and evaluation, patient is resting comfortably in bed. Patient is without shortness of breath, chest pain, dizziness, lightheadedness, syncope, presyncope. Patient states that he is voiding well and notes improvement in his lower extremities.    Past Medical History:        Past Medical History:   Diagnosis Date    A-fib (HCC)     ALANNA (acute kidney injury) (HCC) 6/15/2021    Arthritis     CPAP (continuous positive airway pressure) dependence     Irregular heart beat     Lymphedema     Sleep apnea     Urinary frequency     Wears glasses     Wears partial dentures     lower partial      Past Surgical History:   Procedure Laterality Date    ABLATION SAPHENOUS VEIN W/ RFA      COLONOSCOPY      JOINT REPLACEMENT  Left Hip 4 /21/2009    NERVE BLOCK Bilateral 7/18/2023    Procedure: BLOCK MEDIAL BRANCH B/L L4-L5 AND L5-S1 MBB #1;  Surgeon: Samson Godinez MD;  Location: [facility];  Service: Pain Management     NERVE BLOCK Bilateral 8/17/2023    Procedure: BLOCK MEDIAL BRANCH  B/L L4-5 and L5-S1 MBB #2;  Surgeon: Samson Godinez MD;  Location: [facility];  Service: Pain Management     KY CYSTO INSERTION TRANSPROSTATIC IMPLANT SINGLE N/A 11/13/2017    Procedure: CYSTOSCOPY WITH INSERTION UROLIFT;  Surgeon: Deuce Bennett DO;  Location: [facility];  Service: Urology    RADIOFREQUENCY ABLATION Left 10/5/2023    Procedure: ABLATION RADIO FREQUENCY (RFA) LEFT L4-5 AND L5-S1 RFA;  Surgeon: Samson Godinez MD;  Location: [facility];  Service: Pain Management     RADIOFREQUENCY ABLATION Right 11/1/2023    Procedure: ABLATION RADIO FREQUENCY (RFA) RIGHT L4-5 AND L5-S1 RFA;  Surgeon: Samson Godinez MD;   Location: [facility];  Service: Pain Management     TONSILLECTOMY          Allergy:        Allergies   Allergen Reactions    Penicillins Anaphylaxis    Sulfa Antibiotics Anaphylaxis    Levofloxacin Other (See Comments)       Medications:       Prior to Admission medications    Medication Sig Start Date End Date Taking? Authorizing Provider   acetaminophen (TYLENOL) 500 mg tablet Take 500 mg by mouth every 6 (six) hours as needed for mild pain   Yes Historical Provider, MD   alfuzosin (UROXATRAL) 10 mg 24 hr tablet Take 10 mg by mouth daily   Yes Historical Provider, MD   allopurinol (ZYLOPRIM) 300 mg tablet Take 300 mg by mouth daily 5/1/23  Yes Historical Provider, MD   Diclofenac Sodium (VOLTAREN) 1 % Apply 2 g topically 4 (four) times a day 9/12/22  Yes Mahendra Martínez MD   dutasteride (AVODART) 0.5 mg capsule Take 0.5 mg by mouth daily 10/26/23  Yes Historical Provider, MD   loteprednol etabonate (LOTEMAX) 0.5 % ophthalmic suspension 1 drop 4 (four) times a day   Yes Historical Provider, MD   metoprolol succinate (TOPROL-XL) 25 mg 24 hr tablet Take 1 tablet (25 mg total) by mouth daily 6/28/23  Yes Deo Prieto, DO   multivitamin (THERAGRAN) TABS Take 1 tablet by mouth daily   Yes Historical Provider, MD   Omega-3 Fatty Acids (fish oil) 1,000 mg Take 1,000 mg by mouth 2 (two) times a day   Yes Historical Provider, MD   pregabalin (LYRICA) 100 mg capsule Take 1 capsule (100 mg total) by mouth 3 (three) times a day 1/29/21  Yes Robin Rascon,    rivaroxaban (Xarelto) 20 mg tablet Take 1 tablet (20 mg total) by mouth daily 6/28/23  Yes Deo Prieto DO   spironolactone (ALDACTONE) 25 mg tablet Take 1 tablet (25 mg total) by mouth 2 (two) times a day  Patient taking differently: Take 25 mg by mouth daily 4/14/23  Yes Deo Prieto DO   tadalafil (CIALIS) 10 MG tablet  10/25/23  Yes Historical Provider, MD   torsemide (DEMADEX) 20 mg tablet Take 2 tablets (40 mg total) by mouth 2 (two)  times a day  Patient taking differently: Take 20 mg by mouth 2 (two) times a day 10/2/23  Yes Deo Prieto DO   traMADol (ULTRAM) 50 mg tablet Take 1 tablet (50 mg total) by mouth 2 (two) times a day as needed for moderate pain 6/1/23  Yes Barbara Kocher, CRNP   ammonium lactate (LAC-HYDRIN) 12 % lotion Apply topically 2 (two) times a day as needed for dry skin    Historical Provider, MD   Cyanocobalamin (VITAMIN B 12 PO) Take by mouth    Historical Provider, MD   ferrous sulfate 325 (65 Fe) mg tablet Take 325 mg by mouth    Historical Provider, MD   Glucosamine-Chondroit-Vit C-Mn (Glucosamine 1500 Complex) CAPS Take by mouth    Historical Provider, MD   halobetasol (ULTRAVATE) 0.05 % ointment APPLY TO AFFECTED AREA ON LEG TWICE DAILY 9/22/20   Historical Provider, MD   L-ARGININE-500 PO Take 500 mg by mouth 2 (two) times a day     Historical Provider, MD   patient supplied medication Take 1 each by mouth 2 (two) times a day    Historical Provider, MD   predniSONE 10 mg tablet 6 tabs daily for 2 days, then 5 tabs for 2 days then 4 tabs for 2 days then 3 tabs for 2 days then 2 tab for 2 days then 1 tab for 2 days then stop stop  Patient not taking: Reported on 12/7/2023 11/17/23   Vinny Patel,    tadalafil (CIALIS) 5 MG tablet Take 5 mg by mouth daily as needed for erectile dysfunction    Historical Provider, MD   torsemide (DEMADEX) 20 mg tablet Take 20 mg by mouth 2 (two) times a day  Patient not taking: Reported on 12/7/2023    Historical Provider, MD   VITAMIN D PO Take 2,000 Units by mouth daily     Historical Provider, MD       Family History:     Family History   Problem Relation Age of Onset    Heart disease Mother     Lymphoma Father     Cancer Father     Heart disease Sister     Hypertension Brother     Diabetes Neg Hx     Thyroid disease Neg Hx     Stroke Neg Hx         Social History:       Social History     Socioeconomic History    Marital status: /Civil Union     Spouse name:  None    Number of children: None    Years of education: None    Highest education level: None   Occupational History    None   Tobacco Use    Smoking status: Never     Passive exposure: Never    Smokeless tobacco: Never   Vaping Use    Vaping Use: Never used   Substance and Sexual Activity    Alcohol use: Yes     Alcohol/week: 5.0 standard drinks of alcohol     Types: 3 Glasses of wine, 2 Standard drinks or equivalent per week     Comment: socially    Drug use: No    Sexual activity: Not Currently     Partners: Female     Birth control/protection: Abstinence, Condom Male, Spermicide   Other Topics Concern    None   Social History Narrative    None     Social Determinants of Health     Financial Resource Strain: Not on file   Food Insecurity: No Food Insecurity (12/7/2023)    Hunger Vital Sign     Worried About Running Out of Food in the Last Year: Never true     Ran Out of Food in the Last Year: Never true   Transportation Needs: No Transportation Needs (12/7/2023)    PRAPARE - Transportation     Lack of Transportation (Medical): No     Lack of Transportation (Non-Medical): No   Physical Activity: Not on file   Stress: Not on file   Social Connections: Not on file   Intimate Partner Violence: Not on file   Housing Stability: Low Risk  (12/7/2023)    Housing Stability Vital Sign     Unable to Pay for Housing in the Last Year: No     Number of Places Lived in the Last Year: 1     Unstable Housing in the Last Year: No       ROS:  Negative except otherwise aforementioned above  Remainder review of systems is negative    Exam:  General:  alert, oriented and in no distress, cooperative  Head: Normocephalic, atraumatic.  Eyes:  EOMI. Pupils - equal, round, reactive to accomodation. No icterus.  Oropharynx: moist and normal-appearing mucosa  Neck: supple, symmetrical, trachea midline   Heart: regular rate with controlled rhythm   Respiratory effort / Chest Inspection: unlabored  Lungs: diminished breath sounds bilateral  bases   Abdomen: obese, rounded, non-tender   Lower Limbs:  body habitus with suspected chronic edema and erythema    DATA:      ECG:                 Sinus rhythm with Premature atrial complexes with Aberrant conduction  Right bundle branch block  Abnormal ECG  When compared with ECG of 13-NOV-2023 15:55,  Premature ventricular complexes are no longer Present  Aberrant conduction is now Present  Confirmed by Hans Sr ([id number]) on 12/7/2023 12:57:48 PM            Echocardiogram completed on 10/11/23:           Left Ventricle: Left ventricular cavity size is normal. Wall thickness is normal. The left ventricular ejection fraction is 65%. Systolic function is vigorous. Wall motion cannot be accurately assessed. Diastolic function is moderately abnormal, consistent with grade II (pseudonormal) relaxation.    Right Ventricle: Right ventricular cavity size is normal. Systolic function is normal.    Study quality was poor. This was a technically difficult study    Ischemic Testing completed on 1/23/23:    Stress Function: Left ventricular function post-stress is normal. Post-stress ejection fraction is 65 %.    Perfusion: There are no perfusion defects.    Stress ECG: No ST deviation is noted. The ECG was negative for ischemia. The stress ECG is negative for ischemia after pharmacologic vasodilation, without reproduction of symptoms.     No evidence of ischemia or infarction by pharmacologic vasodilatory nuclear stress testing.          Weights:    Wt Readings from Last 3 Encounters:   12/08/23 (!) 142 kg (313 lb 15 oz)   12/07/23 (!) 146 kg (322 lb 3.2 oz)   11/20/23 (!) 147 kg (324 lb)   , Body mass index is 46.36 kg/m².         Lab Studies:             Results from last 7 days   Lab Units 12/07/23  1123   WBC Thousand/uL 4.44   HEMOGLOBIN g/dL 11.7*   HEMATOCRIT % 35.6*   PLATELETS Thousands/uL 134*   ,   Results from last 7 days   Lab Units 12/07/23  1123   POTASSIUM mmol/L 3.7   CHLORIDE mmol/L 103   CO2 mmol/L 30    BUN mg/dL 15   CREATININE mg/dL 0.91   CALCIUM mg/dL 9.2

## 2023-12-08 NOTE — NURSING NOTE
Patient declined wound care picture documentation due to Unna boot being in place and needing to be cutt off and reapplied right away. Supplies for Unna boot not available on unit. Wound care consult placed and to see tomorrow.

## 2023-12-08 NOTE — PLAN OF CARE
Problem: Potential for Falls  Goal: Patient will remain free of falls  Description: INTERVENTIONS:  - Educate patient/family on patient safety including physical limitations  - Instruct patient to call for assistance with activity   - Consult OT/PT to assist with strengthening/mobility   - Keep Call bell within reach  - Keep bed low and locked with side rails adjusted as appropriate  - Keep care items and personal belongings within reach  - Initiate and maintain comfort rounds  - Make Fall Risk Sign visible to staff  - Offer Toileting every 2 Hours, in advance of need  - Initiate/Maintain bed alarm  - Obtain necessary fall risk management equipment:   - Apply yellow socks and bracelet for high fall risk patients  - Consider moving patient to room near nurses station  Outcome: Progressing     Problem: PAIN - ADULT  Goal: Verbalizes/displays adequate comfort level or baseline comfort level  Description: Interventions:  - Encourage patient to monitor pain and request assistance  - Assess pain using appropriate pain scale  - Administer analgesics based on type and severity of pain and evaluate response  - Implement non-pharmacological measures as appropriate and evaluate response  - Consider cultural and social influences on pain and pain management  - Notify physician/advanced practitioner if interventions unsuccessful or patient reports new pain  Outcome: Progressing     Problem: INFECTION - ADULT  Goal: Absence or prevention of progression during hospitalization  Description: INTERVENTIONS:  - Assess and monitor for signs and symptoms of infection  - Monitor lab/diagnostic results  - Monitor all insertion sites, i.e. indwelling lines, tubes, and drains  - Monitor endotracheal if appropriate and nasal secretions for changes in amount and color  - Elizabeth appropriate cooling/warming therapies per order  - Administer medications as ordered  - Instruct and encourage patient and family to use good hand hygiene  technique  - Identify and instruct in appropriate isolation precautions for identified infection/condition  Outcome: Progressing  Goal: Absence of fever/infection during neutropenic period  Description: INTERVENTIONS:  - Monitor WBC    Outcome: Progressing     Problem: SAFETY ADULT  Goal: Patient will remain free of falls  Description: INTERVENTIONS:  - Educate patient/family on patient safety including physical limitations  - Instruct patient to call for assistance with activity   - Consult OT/PT to assist with strengthening/mobility   - Keep Call bell within reach  - Keep bed low and locked with side rails adjusted as appropriate  - Keep care items and personal belongings within reach  - Initiate and maintain comfort rounds  - Make Fall Risk Sign visible to staff  - Offer Toileting every 2 Hours, in advance of need  - Initiate/Maintain bed alarm  - Obtain necessary fall risk management equipment:   - Apply yellow socks and bracelet for high fall risk patients  - Consider moving patient to room near nurses station  Outcome: Progressing  Goal: Maintain or return to baseline ADL function  Description: INTERVENTIONS:  -  Assess patient's ability to carry out ADLs; assess patient's baseline for ADL function and identify physical deficits which impact ability to perform ADLs (bathing, care of mouth/teeth, toileting, grooming, dressing, etc.)  - Assess/evaluate cause of self-care deficits   - Assess range of motion  - Assess patient's mobility; develop plan if impaired  - Assess patient's need for assistive devices and provide as appropriate  - Encourage maximum independence but intervene and supervise when necessary  - Involve family in performance of ADLs  - Assess for home care needs following discharge   - Consider OT consult to assist with ADL evaluation and planning for discharge  - Provide patient education as appropriate  Outcome: Progressing  Goal: Maintains/Returns to pre admission functional  level  Description: INTERVENTIONS:  - Perform AM-PAC 6 Click Basic Mobility/ Daily Activity assessment daily.  - Set and communicate daily mobility goal to care team and patient/family/caregiver.   - Collaborate with rehabilitation services on mobility goals if consulted  - Perform Range of Motion 3 times a day.  - Reposition patient every 2 hours.  - Dangle patient 3 times a day  - Stand patient 3 times a day  - Ambulate patient 3 times a day  - Out of bed to chair 3 times a day   - Out of bed for meals 3 times a day  - Out of bed for toileting  - Record patient progress and toleration of activity level   Outcome: Progressing     Problem: DISCHARGE PLANNING  Goal: Discharge to home or other facility with appropriate resources  Description: INTERVENTIONS:  - Identify barriers to discharge w/patient and caregiver  - Arrange for needed discharge resources and transportation as appropriate  - Identify discharge learning needs (meds, wound care, etc.)  - Arrange for interpretive services to assist at discharge as needed  - Refer to Case Management Department for coordinating discharge planning if the patient needs post-hospital services based on physician/advanced practitioner order or complex needs related to functional status, cognitive ability, or social support system  Outcome: Progressing     Problem: Knowledge Deficit  Goal: Patient/family/caregiver demonstrates understanding of disease process, treatment plan, medications, and discharge instructions  Description: Complete learning assessment and assess knowledge base.  Interventions:  - Provide teaching at level of understanding  - Provide teaching via preferred learning methods  Outcome: Progressing     Problem: CARDIOVASCULAR - ADULT  Goal: Maintains optimal cardiac output and hemodynamic stability  Description: INTERVENTIONS:  - Monitor I/O, vital signs and rhythm  - Monitor for S/S and trends of decreased cardiac output  - Administer and titrate ordered  vasoactive medications to optimize hemodynamic stability  - Assess quality of pulses, skin color and temperature  - Assess for signs of decreased coronary artery perfusion  - Instruct patient to report change in severity of symptoms  Outcome: Progressing  Goal: Absence of cardiac dysrhythmias or at baseline rhythm  Description: INTERVENTIONS:  - Continuous cardiac monitoring, vital signs, obtain 12 lead EKG if ordered  - Administer antiarrhythmic and heart rate control medications as ordered  - Monitor electrolytes and administer replacement therapy as ordered  Outcome: Progressing     Problem: RESPIRATORY - ADULT  Goal: Achieves optimal ventilation and oxygenation  Description: INTERVENTIONS:  - Assess for changes in respiratory status  - Assess for changes in mentation and behavior  - Position to facilitate oxygenation and minimize respiratory effort  - Oxygen administered by appropriate delivery if ordered  - Initiate smoking cessation education as indicated  - Encourage broncho-pulmonary hygiene including cough, deep breathe, Incentive Spirometry  - Assess the need for suctioning and aspirate as needed  - Assess and instruct to report SOB or any respiratory difficulty  - Respiratory Therapy support as indicated  Outcome: Progressing

## 2023-12-08 NOTE — PROGRESS NOTES
[facility]  Progress Note  Name: Leonardo Oviedo I  MRN: [id number]  Unit/Bed#: [place] [address] -01 I Date of Admission: 12/7/2023   Date of Service: 12/8/2023 I Hospital Day: 1    Assessment/Plan   * CHF exacerbation (HCC)  Assessment & Plan  Wt Readings from Last 3 Encounters:   12/08/23 (!) 142 kg (313 lb 15 oz)   12/07/23 (!) 146 kg (322 lb 3.2 oz)   11/20/23 (!) 147 kg (324 lb)     Patient with progressive lower extremity edema. States worsened after recent discharge with steroids for suspected PNA  Possible acute on chronic diastolic heart failure  BNP on admission: 91  Continue IV diuresis with goal of net -1 L  BNP not markedly elevated but can be falsely low in patients with obesity  Cardiology recommendations reviewed  TTE 10/11/23: LVEF 65%, grade 2 diastolic dysfunction   Approximately 20 pound weight gain          Venous ulcer (HCC)  Assessment & Plan  Continue localized wound care  Leg elevation    Bilateral lower extremity edema  Assessment & Plan  Most likely due to heart failure with addition of venous insufficiency and lymphedema.   Patient uses compression stockings at home  Combination of lymphedema as well as diastolic heart failure      Atrial fibrillation (HCC)  Assessment & Plan  Ventricular rate controlled  Continue metoprolol  Anticoagulated with Xarelto    Obstructive sleep apnea on CPAP-resolved as of 12/8/2023  Assessment & Plan  Noncompliant with CPAP                   Hospital Course:     68-year-old male patient with history of bilateral lower extremity insufficiency, who presented from his cardiologist office with a chief complaint of lower extremity swelling.  Admitted for possible acute on chronic diastolic heart failure with approximately 12 to 20 pound weight gain    Assessment:      Principal Problem:    CHF exacerbation (HCC)  Active Problems:    Atrial fibrillation (HCC)    Bilateral lower extremity edema    Venous ulcer (HCC)      Plan:    Continue  IV diuresis with goal of net -1 L  Elevate lower extremities  Continue localized wound care       VTE Pharmacologic Prophylaxis:   Pharmacologic: Rivaroxaban (Xarelto)  Mechanical VTE Prophylaxis in Place: Yes    AM-PAC Basic Mobility:  Basic Mobility Inpatient Raw Score: 24    JH-HLM Achieved: 7: Walk 25 feet or more  JH-HLM Goal: 8: Walk 250 feet or more    HLM Goal listed above. Continue with multidisciplinary rounding and encourage appropriate mobility to improve upon HLM goals.         Patient Centered Rounds: Case discussed and reviewed with nursing    Discussions with Specialists or Other Care Team Provider: Case management    Education and Discussions with Family / Patient: Spouse at 6:23 pm    Time Spent for Care: 80 minutes.  More than 50% of total time spent on counseling and coordination of care as described above.    Current Length of Stay: 1 day(s)    Current Patient Status: Inpatient   Certification Statement: The patient will continue to require additional inpatient hospital stay due to CHF exacerbation    Discharge Plan / Estimated Discharge Date: 48 to 72 hours    Code Status: Level 1 - Full Code      Subjective:   Seen and examined, still with lower extremity edema, Still with   Shortness of breath /difficulty breathing, ambulating to the bathroom     A complete and comprehensive 14 point organ system review has been performed and all other systems are negative other than stated above.    Objective:     Vitals:   Temp (24hrs), Av.1 °F (36.7 °C), Min:97.9 °F (36.6 °C), Max:98.3 °F (36.8 °C)    Temp:  [97.9 °F (36.6 °C)-98.3 °F (36.8 °C)] 98.1 °F (36.7 °C)  HR:  [73-84] 77  Resp:  [20] 20  BP: (100-136)/(53-73) 103/56  SpO2:  [89 %-95 %] 92 %  Body mass index is 46.36 kg/m².     Input and Output Summary (last 24 hours):       Intake/Output Summary (Last 24 hours) at 2023 1821  Last data filed at 2023 1711  Gross per 24 hour   Intake 480 ml   Output 3400 ml   Net -2920 ml       Physical  Exam:     General: well appearing, no acute distress  HEENT: atraumatic, PERRLA, moist mucosa, normal pharynx, normal tonsils and adenoids, normal tongue, no fluid in sinuses  Neck: Trachea midline, no carotid bruit, no masses  Respiratory: normal chest wall expansion, CTA B, no r/r/w, no rubs  Cardiovascular: RRR, no m/r/g, Normal S1 and S2, plus S3, 8 cm of JVD  Abdomen: Soft, non-tender, non-distended, normal bowel sounds in all quadrants, no hepatosplenomegaly, no tympany  Rectal: deferred  Musculoskeletal: normal ROM in upper and lower extremities  Integumentary: warm, dry, and pink, with no rash, purpura, or petechia  Heme/Lymph: 3+ edema to the knee  Neurological: Cranial Nerves II-XII grossly intact  Psychiatric: cooperative with normal mood, affect, and cognition      Additional Data:     Labs:    Results from last 7 days   Lab Units 12/08/23  0934 12/07/23  1123   WBC Thousand/uL 4.75 4.44   HEMOGLOBIN g/dL 12.8 11.7*   HEMATOCRIT % 39.5 35.6*   PLATELETS Thousands/uL 145* 134*   NEUTROS PCT %  --  66   LYMPHS PCT %  --  23   MONOS PCT %  --  9   EOS PCT %  --  2     Results from last 7 days   Lab Units 12/08/23  0934   POTASSIUM mmol/L 3.7   CHLORIDE mmol/L 99   CO2 mmol/L 35*   BUN mg/dL 15   CREATININE mg/dL 0.90   CALCIUM mg/dL 9.5           * I Have Reviewed All Lab Data Listed Above.  * Additional Pertinent Lab Tests Reviewed: All Labs For Current Hospital Admission Reviewed    Imaging:    Imaging Reports Reviewed Today Include: Chest radiograph reviewed  Imaging Personally Reviewed by Myself Includes: Chest radiograph reviewed    Recent Cultures (last 7 days):           Last 24 Hours Medication List:   Current Facility-Administered Medications   Medication Dose Route Frequency Provider Last Rate    acetaminophen  650 mg Oral Q4H PRN Cristiana Choudhary PA-C      allopurinol  300 mg Oral Daily Cristiana Choudhary PA-C      ferrous sulfate  325 mg Oral Daily With Breakfast Cristiana Choudhary PA-C       finasteride  5 mg Oral Daily Cristiana Choudhary PA-C      [START ON 12/9/2023] furosemide  60 mg Intravenous BID (diuretic) Denys Viveros DO      metoprolol succinate  25 mg Oral Daily Cristiana Choudhary PA-C      ondansetron  4 mg Intravenous Q6H PRN Cristiana Choudhary PA-C      rivaroxaban  20 mg Oral Daily Cristiana Choudhary PA-C      spironolactone  25 mg Oral BID Denys Viveros DO         AM-PAC Basic Mobility:  Basic Mobility Inpatient Raw Score: 24    JH-HLM Achieved: 7: Walk 25 feet or more  JH-HLM Goal: 8: Walk 250 feet or more    HLM Goal listed above. Continue with multidisciplinary rounding and encourage appropriate mobility to improve upon HLM goals.     Today, Patient Was Seen By: Denys Viveros DO    ** Please Note: This note was completed in part utilizing [employer] software dictation.  Grammatical errors, random word insertions, spelling mistakes, and incomplete sentences may be an occasional consequence of this system secondary to software limitations, ambient noise, and hardware issues.  If you have any questions or concerns about the content, text, or information contained within the body of this dictation, please contact the provider for clarification. **

## 2023-12-08 NOTE — ASSESSMENT & PLAN NOTE
Most likely due to heart failure with addition of venous insufficiency and lymphedema.   Patient uses compression stockings at home  Combination of lymphedema as well as diastolic heart failure

## 2023-12-08 NOTE — PLAN OF CARE
Problem: Potential for Falls  Goal: Patient will remain free of falls  Description: INTERVENTIONS:  - Educate patient/family on patient safety including physical limitations  - Instruct patient to call for assistance with activity   - Consult OT/PT to assist with strengthening/mobility   - Keep Call bell within reach  - Keep bed low and locked with side rails adjusted as appropriate  - Keep care items and personal belongings within reach  - Initiate and maintain comfort rounds  - Make Fall Risk Sign visible to staff  - Offer Toileting every 2 Hours, in advance of need  - Initiate/Maintain bed alarm  - Obtain necessary fall risk management equipment:   - Apply yellow socks and bracelet for high fall risk patients  - Consider moving patient to room near nurses station  Outcome: Progressing     Problem: PAIN - ADULT  Goal: Verbalizes/displays adequate comfort level or baseline comfort level  Description: Interventions:  - Encourage patient to monitor pain and request assistance  - Assess pain using appropriate pain scale  - Administer analgesics based on type and severity of pain and evaluate response  - Implement non-pharmacological measures as appropriate and evaluate response  - Consider cultural and social influences on pain and pain management  - Notify physician/advanced practitioner if interventions unsuccessful or patient reports new pain  Outcome: Progressing     Problem: INFECTION - ADULT  Goal: Absence or prevention of progression during hospitalization  Description: INTERVENTIONS:  - Assess and monitor for signs and symptoms of infection  - Monitor lab/diagnostic results  - Monitor all insertion sites, i.e. indwelling lines, tubes, and drains  - Monitor endotracheal if appropriate and nasal secretions for changes in amount and color  - [place] appropriate cooling/warming therapies per order  - Administer medications as ordered  - Instruct and encourage patient and family to use good hand hygiene  technique  - Identify and instruct in appropriate isolation precautions for identified infection/condition  Outcome: Progressing  Goal: Absence of fever/infection during neutropenic period  Description: INTERVENTIONS:  - Monitor WBC    Outcome: Progressing     Problem: SAFETY ADULT  Goal: Patient will remain free of falls  Description: INTERVENTIONS:  - Educate patient/family on patient safety including physical limitations  - Instruct patient to call for assistance with activity   - Consult OT/PT to assist with strengthening/mobility   - Keep Call bell within reach  - Keep bed low and locked with side rails adjusted as appropriate  - Keep care items and personal belongings within reach  - Initiate and maintain comfort rounds  - Make Fall Risk Sign visible to staff  - Offer Toileting every 2 Hours, in advance of need  - Initiate/Maintain bed alarm  - Obtain necessary fall risk management equipment:   - Apply yellow socks and bracelet for high fall risk patients  - Consider moving patient to room near nurses station  Outcome: Progressing  Goal: Maintain or return to baseline ADL function  Description: INTERVENTIONS:  -  Assess patient's ability to carry out ADLs; assess patient's baseline for ADL function and identify physical deficits which impact ability to perform ADLs (bathing, care of mouth/teeth, toileting, grooming, dressing, etc.)  - Assess/evaluate cause of self-care deficits   - Assess range of motion  - Assess patient's mobility; develop plan if impaired  - Assess patient's need for assistive devices and provide as appropriate  - Encourage maximum independence but intervene and supervise when necessary  - Involve family in performance of ADLs  - Assess for home care needs following discharge   - Consider OT consult to assist with ADL evaluation and planning for discharge  - Provide patient education as appropriate  Outcome: Progressing  Goal: Maintains/Returns to pre admission functional  level  Description: INTERVENTIONS:  - Perform AM-PAC 6 Click Basic Mobility/ Daily Activity assessment daily.  - Set and communicate daily mobility goal to care team and patient/family/caregiver.   - Collaborate with rehabilitation services on mobility goals if consulted  - Perform Range of Motion 3 times a day.  - Reposition patient every 2 hours.  - Dangle patient 3 times a day  - Stand patient 3 times a day  - Ambulate patient 3 times a day  - Out of bed to chair 3 times a day   - Out of bed for meals 3 times a day  - Out of bed for toileting  - Record patient progress and toleration of activity level   Outcome: Progressing     Problem: DISCHARGE PLANNING  Goal: Discharge to home or other facility with appropriate resources  Description: INTERVENTIONS:  - Identify barriers to discharge w/patient and caregiver  - Arrange for needed discharge resources and transportation as appropriate  - Identify discharge learning needs (meds, wound care, etc.)  - Arrange for interpretive services to assist at discharge as needed  - Refer to Case Management Department for coordinating discharge planning if the patient needs post-hospital services based on physician/advanced practitioner order or complex needs related to functional status, cognitive ability, or social support system  Outcome: Progressing     Problem: Knowledge Deficit  Goal: Patient/family/caregiver demonstrates understanding of disease process, treatment plan, medications, and discharge instructions  Description: Complete learning assessment and assess knowledge base.  Interventions:  - Provide teaching at level of understanding  - Provide teaching via preferred learning methods  Outcome: Progressing     Problem: CARDIOVASCULAR - ADULT  Goal: Maintains optimal cardiac output and hemodynamic stability  Description: INTERVENTIONS:  - Monitor I/O, vital signs and rhythm  - Monitor for S/S and trends of decreased cardiac output  - Administer and titrate ordered  vasoactive medications to optimize hemodynamic stability  - Assess quality of pulses, skin color and temperature  - Assess for signs of decreased coronary artery perfusion  - Instruct patient to report change in severity of symptoms  Outcome: Progressing  Goal: Absence of cardiac dysrhythmias or at baseline rhythm  Description: INTERVENTIONS:  - Continuous cardiac monitoring, vital signs, obtain 12 lead EKG if ordered  - Administer antiarrhythmic and heart rate control medications as ordered  - Monitor electrolytes and administer replacement therapy as ordered  Outcome: Progressing     Problem: RESPIRATORY - ADULT  Goal: Achieves optimal ventilation and oxygenation  Description: INTERVENTIONS:  - Assess for changes in respiratory status  - Assess for changes in mentation and behavior  - Position to facilitate oxygenation and minimize respiratory effort  - Oxygen administered by appropriate delivery if ordered  - Initiate smoking cessation education as indicated  - Encourage broncho-pulmonary hygiene including cough, deep breathe, Incentive Spirometry  - Assess the need for suctioning and aspirate as needed  - Assess and instruct to report SOB or any respiratory difficulty  - Respiratory Therapy support as indicated  Outcome: Progressing

## 2023-12-08 NOTE — NURSING NOTE
RN assumed care of patient at this time. Agree with previous nurse assessment. Patient resting comfortably in bed at this time. Patient has no requests at this time. Call bell and belongings within reach. Will continue to monitor.

## 2023-12-08 NOTE — ASSESSMENT & PLAN NOTE
Wt Readings from Last 3 Encounters:   12/08/23 (!) 142 kg (313 lb 15 oz)   12/07/23 (!) 146 kg (322 lb 3.2 oz)   11/20/23 (!) 147 kg (324 lb)     Patient with progressive lower extremity edema. States worsened after recent discharge with steroids for suspected PNA  Possible acute on chronic diastolic heart failure  BNP on admission: 91  Continue IV diuresis with goal of net -1 L  BNP not markedly elevated but can be falsely low in patients with obesity  Cardiology recommendations reviewed  TTE 10/11/23: LVEF 65%, grade 2 diastolic dysfunction   Approximately 20 pound weight gain

## 2023-12-08 NOTE — DISCHARGE INSTR - OTHER ORDERS
Wound Care Plan:   1-Left leg--cleanse wound with normal saline, pat dry.  Apply lotion to intact skin on lower leg.  Apply Dermagran to open wound.  Cover wound and intact blister with ABD and wrap with roxann.  Change dressing every other day and as needed with soilage/dislodgement.  2-Continue wearing your compression wraps as instructed by your podiatrist prior to admission.

## 2023-12-09 LAB
ANION GAP SERPL CALCULATED.3IONS-SCNC: 4 MMOL/L
BUN SERPL-MCNC: 18 MG/DL (ref 5–25)
CALCIUM SERPL-MCNC: 9.1 MG/DL (ref 8.4–10.2)
CHLORIDE SERPL-SCNC: 102 MMOL/L (ref 96–108)
CO2 SERPL-SCNC: 32 MMOL/L (ref 21–32)
CREAT SERPL-MCNC: 0.87 MG/DL (ref 0.6–1.3)
ERYTHROCYTE [DISTWIDTH] IN BLOOD BY AUTOMATED COUNT: 14.8 % (ref 11.6–15.1)
GFR SERPL CREATININE-BSD FRML MDRD: 88 ML/MIN/1.73SQ M
GLUCOSE SERPL-MCNC: 101 MG/DL (ref 65–140)
HCT VFR BLD AUTO: 36.4 % (ref 36.5–49.3)
HGB BLD-MCNC: 11.8 G/DL (ref 12–17)
MAGNESIUM SERPL-MCNC: 2.1 MG/DL (ref 1.9–2.7)
MCH RBC QN AUTO: 30.6 PG (ref 26.8–34.3)
MCHC RBC AUTO-ENTMCNC: 32.4 G/DL (ref 31.4–37.4)
MCV RBC AUTO: 95 FL (ref 82–98)
PLATELET # BLD AUTO: 155 THOUSANDS/UL (ref 149–390)
PMV BLD AUTO: 10.8 FL (ref 8.9–12.7)
POTASSIUM SERPL-SCNC: 4.2 MMOL/L (ref 3.5–5.3)
RBC # BLD AUTO: 3.85 MILLION/UL (ref 3.88–5.62)
SODIUM SERPL-SCNC: 138 MMOL/L (ref 135–147)
WBC # BLD AUTO: 4.09 THOUSAND/UL (ref 4.31–10.16)

## 2023-12-09 PROCEDURE — 99232 SBSQ HOSP IP/OBS MODERATE 35: CPT | Performed by: INTERNAL MEDICINE

## 2023-12-09 PROCEDURE — 83735 ASSAY OF MAGNESIUM: CPT | Performed by: INTERNAL MEDICINE

## 2023-12-09 PROCEDURE — 85027 COMPLETE CBC AUTOMATED: CPT | Performed by: INTERNAL MEDICINE

## 2023-12-09 PROCEDURE — 99291 CRITICAL CARE FIRST HOUR: CPT | Performed by: INTERNAL MEDICINE

## 2023-12-09 PROCEDURE — 80048 BASIC METABOLIC PNL TOTAL CA: CPT | Performed by: INTERNAL MEDICINE

## 2023-12-09 RX ADMIN — FERROUS SULFATE TAB 325 MG (65 MG ELEMENTAL FE) 325 MG: 325 (65 FE) TAB at 07:25

## 2023-12-09 RX ADMIN — METOPROLOL SUCCINATE 25 MG: 25 TABLET, EXTENDED RELEASE ORAL at 08:34

## 2023-12-09 RX ADMIN — FUROSEMIDE 60 MG: 10 INJECTION, SOLUTION INTRAVENOUS at 07:25

## 2023-12-09 RX ADMIN — RIVAROXABAN 20 MG: 20 TABLET, FILM COATED ORAL at 08:34

## 2023-12-09 RX ADMIN — SPIRONOLACTONE 25 MG: 25 TABLET, FILM COATED ORAL at 08:34

## 2023-12-09 RX ADMIN — FUROSEMIDE 60 MG: 10 INJECTION, SOLUTION INTRAVENOUS at 15:19

## 2023-12-09 RX ADMIN — SPIRONOLACTONE 25 MG: 25 TABLET, FILM COATED ORAL at 17:04

## 2023-12-09 RX ADMIN — FINASTERIDE 5 MG: 5 TABLET, FILM COATED ORAL at 08:34

## 2023-12-09 RX ADMIN — ALLOPURINOL 300 MG: 100 TABLET ORAL at 08:34

## 2023-12-09 NOTE — PROGRESS NOTES
[facility]  Progress Note  Name: Leonardo Oviedo I  MRN: [id number]  Unit/Bed#: [place] [address] -01 I Date of Admission: 12/7/2023   Date of Service: 12/9/2023 I Hospital Day: 2    Assessment/Plan   * CHF exacerbation (HCC)  Assessment & Plan  Wt Readings from Last 3 Encounters:   12/09/23 (!) 141 kg (311 lb 4.6 oz)   12/07/23 (!) 146 kg (322 lb 3.2 oz)   11/20/23 (!) 147 kg (324 lb)     Patient with progressive lower extremity edema. States worsened after recent discharge with steroids for suspected PNA  Possible acute on chronic diastolic heart failure  BNP on admission: 91  Continue IV diuresis with goal of net -1 L  Currently on lasix 60mg BID  BNP not markedly elevated but can be falsely low in patients with obesity  Cardiology recommendations reviewed  TTE 10/11/23: LVEF 65%, grade 2 diastolic dysfunction   Approximately 20 pound weight gain  Down approximately 12 lb since admission with net output of 5.2L        Venous ulcer (HCC)  Assessment & Plan  Continue localized wound care  Leg elevation  Wound care consult    Bilateral lower extremity edema  Assessment & Plan  Most likely due to heart failure with addition of venous insufficiency and lymphedema.   Patient uses compression stockings at home  Combination of lymphedema as well as diastolic heart failure      Atrial fibrillation (HCC)  Assessment & Plan  Ventricular rate controlled  Continue metoprolol  Anticoagulated with Xarelto    Obstructive sleep apnea on CPAP-resolved as of 12/8/2023  Assessment & Plan  Noncompliant with CPAP             VTE Pharmacologic Prophylaxis:   High Risk (Score >/= 5) - Pharmacological DVT Prophylaxis Ordered: rivaroxaban (Xarelto). Sequential Compression Devices Ordered.    Mobility:   Basic Mobility Inpatient Raw Score: 24  JH-HLM Goal: 8: Walk 250 feet or more  JH-HLM Achieved: 7: Walk 25 feet or more  HLM Goal NOT achieved. Continue with multidisciplinary rounding and encourage appropriate  mobility to improve upon HLM goals.    Patient Centered Rounds: I performed bedside rounds with nursing staff today.   Discussions with Specialists or Other Care Team Provider: cardiology    Education and Discussions with Family / Patient: Patient declined call to .     Total Time Spent on Date of Encounter in care of patient: 25 mins. This time was spent on one or more of the following: performing physical exam; counseling and coordination of care; obtaining or reviewing history; documenting in the medical record; reviewing/ordering tests, medications or procedures; communicating with other healthcare professionals and discussing with patient's family/caregivers.    Current Length of Stay: 2 day(s)  Current Patient Status: Inpatient   Certification Statement: The patient will continue to require additional inpatient hospital stay due to diuresis  Discharge Plan: Anticipate discharge in 24-48 hrs to home.    Code Status: Level 1 - Full Code    Subjective:   The patient is seen resting comfortably in bed.  He reports that his breathing is slightly improved.  He denies any chest pain, nausea or vomiting, diarrhea or constipation.    Objective:     Vitals:   Temp (24hrs), Av.2 °F (36.8 °C), Min:97.9 °F (36.6 °C), Max:98.5 °F (36.9 °C)    Temp:  [97.9 °F (36.6 °C)-98.5 °F (36.9 °C)] 97.9 °F (36.6 °C)  HR:  [71-84] 71  Resp:  [20-22] 20  BP: (100-132)/(56-72) 132/72  SpO2:  [86 %-95 %] 86 %  Body mass index is 45.97 kg/m².     Input and Output Summary (last 24 hours):     Intake/Output Summary (Last 24 hours) at 2023 0847  Last data filed at 2023 0748  Gross per 24 hour   Intake --   Output 2050 ml   Net -2050 ml       Physical Exam:   Physical Exam  Vitals and nursing note reviewed.   Constitutional:       General: He is not in acute distress.     Appearance: Normal appearance. He is obese. He is not ill-appearing, toxic-appearing or diaphoretic.   HENT:      Head: Normocephalic and  atraumatic.   Cardiovascular:      Rate and Rhythm: Normal rate and regular rhythm.      Heart sounds: No murmur heard.     No friction rub. No gallop.   Pulmonary:      Effort: Pulmonary effort is normal. No respiratory distress.      Breath sounds: No wheezing, rhonchi or rales.      Comments: Mildly decreased breat sounds at bases  Abdominal:      General: Abdomen is flat. Bowel sounds are normal. There is no distension.      Palpations: Abdomen is soft.      Tenderness: There is no abdominal tenderness.   Musculoskeletal:      Right lower leg: Edema present.      Left lower leg: Edema present.      Comments: Left wound with dressing CDI   Skin:     General: Skin is warm and dry.      Coloration: Skin is not jaundiced or pale.   Neurological:      General: No focal deficit present.      Mental Status: He is alert. Mental status is at baseline.          Additional Data:     Labs:  Results from last 7 days   Lab Units 12/09/23  0513 12/08/23  0934 12/07/23  1123   WBC Thousand/uL 4.09*   < > 4.44   HEMOGLOBIN g/dL 11.8*   < > 11.7*   HEMATOCRIT % 36.4*   < > 35.6*   PLATELETS Thousands/uL 155   < > 134*   NEUTROS PCT %  --   --  66   LYMPHS PCT %  --   --  23   MONOS PCT %  --   --  9   EOS PCT %  --   --  2    < > = values in this interval not displayed.     Results from last 7 days   Lab Units 12/09/23  0513   SODIUM mmol/L 138   POTASSIUM mmol/L 4.2   CHLORIDE mmol/L 102   CO2 mmol/L 32   BUN mg/dL 18   CREATININE mg/dL 0.87   ANION GAP mmol/L 4   CALCIUM mg/dL 9.1   GLUCOSE RANDOM mg/dL 101                       Lines/Drains:  Invasive Devices       Peripheral Intravenous Line  Duration             Peripheral IV 12/07/23 Dorsal (posterior);Left Hand 1 day                          Imaging: Reviewed radiology reports from this admission including: chest xray    Recent Cultures (last 7 days):         Last 24 Hours Medication List:   Current Facility-Administered Medications   Medication Dose Route Frequency  Provider Last Rate    acetaminophen  650 mg Oral Q4H PRN Cristiana Choudhary PA-C      allopurinol  300 mg Oral Daily Cristiana Choudhary PA-C      ferrous sulfate  325 mg Oral Daily With Breakfast Cristiana Choudhary PA-C      finasteride  5 mg Oral Daily Cristiana Choudhary PA-C      furosemide  60 mg Intravenous BID (diuretic) Denys Viveros,       metoprolol succinate  25 mg Oral Daily Cristiana Choudhary PA-C      ondansetron  4 mg Intravenous Q6H PRN Cristiana Choudhary PA-C      rivaroxaban  20 mg Oral Daily Cristiana Choudhary PA-C      spironolactone  25 mg Oral BID Denys Viveros,           Today, Patient Was Seen By: Brandyn Paredes PA-C    **Please Note: This note may have been constructed using a voice recognition system.**

## 2023-12-09 NOTE — PROGRESS NOTES
Progress Note - Cardiology   Leonardo Oviedo 68 y.o. male MRN: [id number]  Unit/Bed#: [place] [address] -01 Encounter: [id number]        Problem List:  Principal Problem:    CHF exacerbation (HCC)  Active Problems:    Atrial fibrillation (HCC)    Bilateral lower extremity edema    Venous ulcer (HCC)      Assessment:  Acute on chronic diastolic congestive heart failure              - TTE 10/11/23: LVEF 65%, grade 2 diastolic dysfunction, poor study quality as it was technically difficult.              - pharmacologic nuclear stress test 1/23/23: Post EF 65%, ECG negative for ischemia.              - prescribed outpatient diuretic Rx, torsemide 20 mg twice daily, spironolactone 25 mg twice daily.  Moderate pulmonary hypertension  Moderate restrictive lung disease  Paroxysmal atrial fibrillation and atrial flutter               - anticoagulation with Xarelto 20 mg daily.              - AV blocker Rx, Toprol-XL 25 mg daily.  - had long standing since before October 2019 now in SR.  - status post DCCV, May 2021.  - status post RFA ablation, June 2021 and July 2021.  Lymphedema  Morbid obesity  Obstructive sleep apnea on BiPAP    Plan/ Discussion:  Down 2 lbs overnight  Net -5.3 L with -750 mL thus far today  Creatinine and electrolytes are stable  Continue IV lasix, if need be can increase to 80 twice a day  Goal dry weight around 305 pounds  Continue toprol, xarelto, aldactone  Daily BMP, trend weights and I/O    Subjective:  Feeling okay today.  About the same as compared to yesterday    Vitals:  Vitals:    12/09/23 0526 12/09/23 0542   Weight: (!) 141 kg (311 lb 4.6 oz) (!) 141 kg (311 lb 4.6 oz)   ,  Vitals:    12/08/23 2113 12/09/23 0526 12/09/23 0542 12/09/23 0748   BP: 120/68   132/72   BP Location:    Left arm   Pulse:    71   Resp: 22   20   Temp: 98.5 °F (36.9 °C)   97.9 °F (36.6 °C)   TempSrc:    Oral   SpO2:    (!) 86%   Weight:  (!) 141 kg (311 lb 4.6 oz) (!) 141 kg (311 lb 4.6 oz)    Height:            Exam:  General: Alert awake and oriented, no acute distress  Heart:  Regular rate and rhythm, no murmurs, Normal S1, no edema    Respiratory effort/ Lungs:  Breathing comfortably on room air, clear bilaterally without wheezing, rales, crackles   Abdominal: Non-tender to palpation, + bowel sounds, soft, no masses or distension  Lower Limbs:  No edema            Telemetry:       No longer on tele    Medications:    Current Facility-Administered Medications:     acetaminophen (TYLENOL) tablet 650 mg, 650 mg, Oral, Q4H PRN, Cristiana Choudhary PA-C    allopurinol (ZYLOPRIM) tablet 300 mg, 300 mg, Oral, Daily, Cristiana Choudhary PA-C, 300 mg at 12/09/23 0834    ferrous sulfate tablet 325 mg, 325 mg, Oral, Daily With Breakfast, Cristiana Choudhary PA-C, 325 mg at 12/09/23 0725    finasteride (PROSCAR) tablet 5 mg, 5 mg, Oral, Daily, Cristiana Choudhary PA-C, 5 mg at 12/09/23 0834    furosemide (LASIX) injection 60 mg, 60 mg, Intravenous, BID (diuretic), Denys Viveros DO, 60 mg at 12/09/23 0725    metoprolol succinate (TOPROL-XL) 24 hr tablet 25 mg, 25 mg, Oral, Daily, Cristiana Choudhary PA-C, 25 mg at 12/09/23 0834    ondansetron (ZOFRAN) injection 4 mg, 4 mg, Intravenous, Q6H PRN, Cristiana Choudhary PA-C    rivaroxaban (XARELTO) tablet 20 mg, 20 mg, Oral, Daily, Cristiana Choudhary PA-C, 20 mg at 12/09/23 0834    spironolactone (ALDACTONE) tablet 25 mg, 25 mg, Oral, BID, Denys Viveros DO, 25 mg at 12/09/23 0834      Labs/Data:        Results from last 7 days   Lab Units 12/09/23  0513 12/08/23  0934 12/07/23  1123   WBC Thousand/uL 4.09* 4.75 4.44   HEMOGLOBIN g/dL 11.8* 12.8 11.7*   HEMATOCRIT % 36.4* 39.5 35.6*   PLATELETS Thousands/uL 155 145* 134*     Results from last 7 days   Lab Units 12/09/23  0513 12/08/23  0934 12/07/23  1123   POTASSIUM mmol/L 4.2 3.7 3.7   CHLORIDE mmol/L 102 99 103   CO2 mmol/L 32 35* 30   BUN mg/dL 18 15 15   CREATININE mg/dL 0.87 0.90 0.91

## 2023-12-09 NOTE — PLAN OF CARE
Problem: Potential for Falls  Goal: Patient will remain free of falls  Description: INTERVENTIONS:  - Educate patient/family on patient safety including physical limitations  - Instruct patient to call for assistance with activity   - Consult OT/PT to assist with strengthening/mobility   - Keep Call bell within reach  - Keep bed low and locked with side rails adjusted as appropriate  - Keep care items and personal belongings within reach  - Initiate and maintain comfort rounds  - Make Fall Risk Sign visible to staff  - Offer Toileting every 2 Hours, in advance of need  - Initiate/Maintain bed alarm  - Obtain necessary fall risk management equipment:   - Apply yellow socks and bracelet for high fall risk patients  - Consider moving patient to room near nurses station  Outcome: Progressing     Problem: PAIN - ADULT  Goal: Verbalizes/displays adequate comfort level or baseline comfort level  Description: Interventions:  - Encourage patient to monitor pain and request assistance  - Assess pain using appropriate pain scale  - Administer analgesics based on type and severity of pain and evaluate response  - Implement non-pharmacological measures as appropriate and evaluate response  - Consider cultural and social influences on pain and pain management  - Notify physician/advanced practitioner if interventions unsuccessful or patient reports new pain  Outcome: Progressing     Problem: INFECTION - ADULT  Goal: Absence or prevention of progression during hospitalization  Description: INTERVENTIONS:  - Assess and monitor for signs and symptoms of infection  - Monitor lab/diagnostic results  - Monitor all insertion sites, i.e. indwelling lines, tubes, and drains  - Monitor endotracheal if appropriate and nasal secretions for changes in amount and color  - [place] appropriate cooling/warming therapies per order  - Administer medications as ordered  - Instruct and encourage patient and family to use good hand hygiene  technique  - Identify and instruct in appropriate isolation precautions for identified infection/condition  Outcome: Progressing  Goal: Absence of fever/infection during neutropenic period  Description: INTERVENTIONS:  - Monitor WBC    Outcome: Progressing     Problem: SAFETY ADULT  Goal: Patient will remain free of falls  Description: INTERVENTIONS:  - Educate patient/family on patient safety including physical limitations  - Instruct patient to call for assistance with activity   - Consult OT/PT to assist with strengthening/mobility   - Keep Call bell within reach  - Keep bed low and locked with side rails adjusted as appropriate  - Keep care items and personal belongings within reach  - Initiate and maintain comfort rounds  - Make Fall Risk Sign visible to staff  - Offer Toileting every 2 Hours, in advance of need  - Initiate/Maintain bed alarm  - Obtain necessary fall risk management equipment:   - Apply yellow socks and bracelet for high fall risk patients  - Consider moving patient to room near nurses station  Outcome: Progressing  Goal: Maintain or return to baseline ADL function  Description: INTERVENTIONS:  -  Assess patient's ability to carry out ADLs; assess patient's baseline for ADL function and identify physical deficits which impact ability to perform ADLs (bathing, care of mouth/teeth, toileting, grooming, dressing, etc.)  - Assess/evaluate cause of self-care deficits   - Assess range of motion  - Assess patient's mobility; develop plan if impaired  - Assess patient's need for assistive devices and provide as appropriate  - Encourage maximum independence but intervene and supervise when necessary  - Involve family in performance of ADLs  - Assess for home care needs following discharge   - Consider OT consult to assist with ADL evaluation and planning for discharge  - Provide patient education as appropriate  Outcome: Progressing  Goal: Maintains/Returns to pre admission functional  level  Description: INTERVENTIONS:  - Perform AM-PAC 6 Click Basic Mobility/ Daily Activity assessment daily.  - Set and communicate daily mobility goal to care team and patient/family/caregiver.   - Collaborate with rehabilitation services on mobility goals if consulted  - Perform Range of Motion 3 times a day.  - Reposition patient every 2 hours.  - Dangle patient 3 times a day  - Stand patient 3 times a day  - Ambulate patient 3 times a day  - Out of bed to chair 3 times a day   - Out of bed for meals 3 times a day  - Out of bed for toileting  - Record patient progress and toleration of activity level   Outcome: Progressing     Problem: DISCHARGE PLANNING  Goal: Discharge to home or other facility with appropriate resources  Description: INTERVENTIONS:  - Identify barriers to discharge w/patient and caregiver  - Arrange for needed discharge resources and transportation as appropriate  - Identify discharge learning needs (meds, wound care, etc.)  - Arrange for interpretive services to assist at discharge as needed  - Refer to Case Management Department for coordinating discharge planning if the patient needs post-hospital services based on physician/advanced practitioner order or complex needs related to functional status, cognitive ability, or social support system  Outcome: Progressing     Problem: Knowledge Deficit  Goal: Patient/family/caregiver demonstrates understanding of disease process, treatment plan, medications, and discharge instructions  Description: Complete learning assessment and assess knowledge base.  Interventions:  - Provide teaching at level of understanding  - Provide teaching via preferred learning methods  Outcome: Progressing     Problem: CARDIOVASCULAR - ADULT  Goal: Maintains optimal cardiac output and hemodynamic stability  Description: INTERVENTIONS:  - Monitor I/O, vital signs and rhythm  - Monitor for S/S and trends of decreased cardiac output  - Administer and titrate ordered  vasoactive medications to optimize hemodynamic stability  - Assess quality of pulses, skin color and temperature  - Assess for signs of decreased coronary artery perfusion  - Instruct patient to report change in severity of symptoms  Outcome: Progressing  Goal: Absence of cardiac dysrhythmias or at baseline rhythm  Description: INTERVENTIONS:  - Continuous cardiac monitoring, vital signs, obtain 12 lead EKG if ordered  - Administer antiarrhythmic and heart rate control medications as ordered  - Monitor electrolytes and administer replacement therapy as ordered  Outcome: Progressing     Problem: RESPIRATORY - ADULT  Goal: Achieves optimal ventilation and oxygenation  Description: INTERVENTIONS:  - Assess for changes in respiratory status  - Assess for changes in mentation and behavior  - Position to facilitate oxygenation and minimize respiratory effort  - Oxygen administered by appropriate delivery if ordered  - Initiate smoking cessation education as indicated  - Encourage broncho-pulmonary hygiene including cough, deep breathe, Incentive Spirometry  - Assess the need for suctioning and aspirate as needed  - Assess and instruct to report SOB or any respiratory difficulty  - Respiratory Therapy support as indicated  Outcome: Progressing

## 2023-12-09 NOTE — ASSESSMENT & PLAN NOTE
Wt Readings from Last 3 Encounters:   12/09/23 (!) 141 kg (311 lb 4.6 oz)   12/07/23 (!) 146 kg (322 lb 3.2 oz)   11/20/23 (!) 147 kg (324 lb)     Patient with progressive lower extremity edema. States worsened after recent discharge with steroids for suspected PNA  Possible acute on chronic diastolic heart failure  BNP on admission: 91  Continue IV diuresis with goal of net -1 L  Currently on lasix 60mg BID  BNP not markedly elevated but can be falsely low in patients with obesity  Cardiology recommendations reviewed  TTE 10/11/23: LVEF 65%, grade 2 diastolic dysfunction   Approximately 20 pound weight gain  Down approximately 12 lb since admission with net output of 5.2L

## 2023-12-09 NOTE — PLAN OF CARE
Problem: Potential for Falls  Goal: Patient will remain free of falls  Description: INTERVENTIONS:  - Educate patient/family on patient safety including physical limitations  - Instruct patient to call for assistance with activity   - Consult OT/PT to assist with strengthening/mobility   - Keep Call bell within reach  - Keep bed low and locked with side rails adjusted as appropriate  - Keep care items and personal belongings within reach  - Initiate and maintain comfort rounds  - Make Fall Risk Sign visible to staff  - Offer Toileting every 2 Hours, in advance of need  - Initiate/Maintain bed alarm  - Obtain necessary fall risk management equipment:   - Apply yellow socks and bracelet for high fall risk patients  - Consider moving patient to room near nurses station  Outcome: Progressing     Problem: PAIN - ADULT  Goal: Verbalizes/displays adequate comfort level or baseline comfort level  Description: Interventions:  - Encourage patient to monitor pain and request assistance  - Assess pain using appropriate pain scale  - Administer analgesics based on type and severity of pain and evaluate response  - Implement non-pharmacological measures as appropriate and evaluate response  - Consider cultural and social influences on pain and pain management  - Notify physician/advanced practitioner if interventions unsuccessful or patient reports new pain  Outcome: Progressing     Problem: INFECTION - ADULT  Goal: Absence or prevention of progression during hospitalization  Description: INTERVENTIONS:  - Assess and monitor for signs and symptoms of infection  - Monitor lab/diagnostic results  - Monitor all insertion sites, i.e. indwelling lines, tubes, and drains  - Monitor endotracheal if appropriate and nasal secretions for changes in amount and color  - [place] appropriate cooling/warming therapies per order  - Administer medications as ordered  - Instruct and encourage patient and family to use good hand hygiene  technique  - Identify and instruct in appropriate isolation precautions for identified infection/condition  Outcome: Progressing  Goal: Absence of fever/infection during neutropenic period  Description: INTERVENTIONS:  - Monitor WBC    Outcome: Progressing     Problem: SAFETY ADULT  Goal: Patient will remain free of falls  Description: INTERVENTIONS:  - Educate patient/family on patient safety including physical limitations  - Instruct patient to call for assistance with activity   - Consult OT/PT to assist with strengthening/mobility   - Keep Call bell within reach  - Keep bed low and locked with side rails adjusted as appropriate  - Keep care items and personal belongings within reach  - Initiate and maintain comfort rounds  - Make Fall Risk Sign visible to staff  - Offer Toileting every 2 Hours, in advance of need  - Initiate/Maintain bed alarm  - Obtain necessary fall risk management equipment:   - Apply yellow socks and bracelet for high fall risk patients  - Consider moving patient to room near nurses station  Outcome: Progressing  Goal: Maintain or return to baseline ADL function  Description: INTERVENTIONS:  -  Assess patient's ability to carry out ADLs; assess patient's baseline for ADL function and identify physical deficits which impact ability to perform ADLs (bathing, care of mouth/teeth, toileting, grooming, dressing, etc.)  - Assess/evaluate cause of self-care deficits   - Assess range of motion  - Assess patient's mobility; develop plan if impaired  - Assess patient's need for assistive devices and provide as appropriate  - Encourage maximum independence but intervene and supervise when necessary  - Involve family in performance of ADLs  - Assess for home care needs following discharge   - Consider OT consult to assist with ADL evaluation and planning for discharge  - Provide patient education as appropriate  Outcome: Progressing  Goal: Maintains/Returns to pre admission functional  level  Description: INTERVENTIONS:  - Perform AM-PAC 6 Click Basic Mobility/ Daily Activity assessment daily.  - Set and communicate daily mobility goal to care team and patient/family/caregiver.   - Collaborate with rehabilitation services on mobility goals if consulted  - Perform Range of Motion 3 times a day.  - Reposition patient every 2 hours.  - Dangle patient 3 times a day  - Stand patient 3 times a day  - Ambulate patient 3 times a day  - Out of bed to chair 3 times a day   - Out of bed for meals 3 times a day  - Out of bed for toileting  - Record patient progress and toleration of activity level   Outcome: Progressing     Problem: DISCHARGE PLANNING  Goal: Discharge to home or other facility with appropriate resources  Description: INTERVENTIONS:  - Identify barriers to discharge w/patient and caregiver  - Arrange for needed discharge resources and transportation as appropriate  - Identify discharge learning needs (meds, wound care, etc.)  - Arrange for interpretive services to assist at discharge as needed  - Refer to Case Management Department for coordinating discharge planning if the patient needs post-hospital services based on physician/advanced practitioner order or complex needs related to functional status, cognitive ability, or social support system  Outcome: Progressing     Problem: CARDIOVASCULAR - ADULT  Goal: Maintains optimal cardiac output and hemodynamic stability  Description: INTERVENTIONS:  - Monitor I/O, vital signs and rhythm  - Monitor for S/S and trends of decreased cardiac output  - Administer and titrate ordered vasoactive medications to optimize hemodynamic stability  - Assess quality of pulses, skin color and temperature  - Assess for signs of decreased coronary artery perfusion  - Instruct patient to report change in severity of symptoms  Outcome: Progressing  Goal: Absence of cardiac dysrhythmias or at baseline rhythm  Description: INTERVENTIONS:  - Continuous cardiac  monitoring, vital signs, obtain 12 lead EKG if ordered  - Administer antiarrhythmic and heart rate control medications as ordered  - Monitor electrolytes and administer replacement therapy as ordered  Outcome: Progressing     Problem: RESPIRATORY - ADULT  Goal: Achieves optimal ventilation and oxygenation  Description: INTERVENTIONS:  - Assess for changes in respiratory status  - Assess for changes in mentation and behavior  - Position to facilitate oxygenation and minimize respiratory effort  - Oxygen administered by appropriate delivery if ordered  - Initiate smoking cessation education as indicated  - Encourage broncho-pulmonary hygiene including cough, deep breathe, Incentive Spirometry  - Assess the need for suctioning and aspirate as needed  - Assess and instruct to report SOB or any respiratory difficulty  - Respiratory Therapy support as indicated  Outcome: Progressing

## 2023-12-09 NOTE — CASE MANAGEMENT
Case Management Discharge Planning Note    Patient name Leonardo Oviedo  Location [facility] M* MRN [id number]  : 1955 Date 2023       Current Admission Date: 2023  Current Admission Diagnosis:CHF exacerbation (HCC)   Patient Active Problem List    Diagnosis Date Noted    CHF exacerbation (HCC) 2023    Pneumonia 2023    Chronic pain syndrome 2023    Lumbar spondylosis 2023    Chronic bilateral low back pain without sciatica 2023    Venous ulcer (HCC) 2023    Atrial flutter (HCC) 2021    Iron deficiency 2021    Stage 3a chronic kidney disease (HCC) 06/15/2021    Anemia 06/15/2021    Primary osteoarthritis 06/15/2021    Encounter for cardioversion procedure 2021    SOB (shortness of breath) 2021    Chronic diastolic heart failure (HCC) 2021    Pulmonary hypertension (HCC) 2020    Bilateral lower extremity edema 10/27/2019    Atrial fibrillation (HCC) 10/26/2019    Leukopenia 10/26/2019    Weakness of right upper extremity 10/26/2019    Paresthesias 10/26/2019    Cervical pain (neck) 10/26/2019    Obesity, Class III, BMI 40-49.9 (morbid obesity) (HCC) 10/26/2019    Thrombocytopenia (HCC) 10/26/2019    Obesity, morbid (HCC) 2015    Venous insufficiency of both lower extremities 2015      LOS (days): 2  Geometric Mean LOS (GMLOS) (days): 4.70  Days to GMLOS:2.6     OBJECTIVE:  Risk of Unplanned Readmission Score: 14.67         Current admission status: Inpatient   Preferred Pharmacy:   [id number] - [place] - [facility] [address]  [address] [facility] [address]  Phone: [phone] Fax: [phone]    Primary Care Provider: Yolanda Merritt MD    Primary Insurance: [employer]  REP  Secondary Insurance:     DISCHARGE DETAILS:                                          Other Referral/Resources/Interventions Provided:  Interventions: HHC (CM offered [facility] (SN) for CHF monitoring/education on d/c however  pt declined not feeling it necessary at this time- understands should he change his mind prior to d/c to request CM assistance and after dc to contact PCP office)

## 2023-12-10 LAB
ANION GAP SERPL CALCULATED.3IONS-SCNC: 5 MMOL/L
BUN SERPL-MCNC: 20 MG/DL (ref 5–25)
CALCIUM SERPL-MCNC: 9.1 MG/DL (ref 8.4–10.2)
CHLORIDE SERPL-SCNC: 100 MMOL/L (ref 96–108)
CO2 SERPL-SCNC: 33 MMOL/L (ref 21–32)
CREAT SERPL-MCNC: 0.87 MG/DL (ref 0.6–1.3)
ERYTHROCYTE [DISTWIDTH] IN BLOOD BY AUTOMATED COUNT: 14.7 % (ref 11.6–15.1)
GFR SERPL CREATININE-BSD FRML MDRD: 88 ML/MIN/1.73SQ M
GLUCOSE SERPL-MCNC: 99 MG/DL (ref 65–140)
HCT VFR BLD AUTO: 37.7 % (ref 36.5–49.3)
HGB BLD-MCNC: 12.4 G/DL (ref 12–17)
MAGNESIUM SERPL-MCNC: 2.1 MG/DL (ref 1.9–2.7)
MCH RBC QN AUTO: 30.7 PG (ref 26.8–34.3)
MCHC RBC AUTO-ENTMCNC: 32.9 G/DL (ref 31.4–37.4)
MCV RBC AUTO: 93 FL (ref 82–98)
PLATELET # BLD AUTO: 179 THOUSANDS/UL (ref 149–390)
PMV BLD AUTO: 11.2 FL (ref 8.9–12.7)
POTASSIUM SERPL-SCNC: 4.3 MMOL/L (ref 3.5–5.3)
RBC # BLD AUTO: 4.04 MILLION/UL (ref 3.88–5.62)
SODIUM SERPL-SCNC: 138 MMOL/L (ref 135–147)
WBC # BLD AUTO: 4.61 THOUSAND/UL (ref 4.31–10.16)

## 2023-12-10 PROCEDURE — 85027 COMPLETE CBC AUTOMATED: CPT | Performed by: INTERNAL MEDICINE

## 2023-12-10 PROCEDURE — 99232 SBSQ HOSP IP/OBS MODERATE 35: CPT | Performed by: INTERNAL MEDICINE

## 2023-12-10 PROCEDURE — 80048 BASIC METABOLIC PNL TOTAL CA: CPT | Performed by: INTERNAL MEDICINE

## 2023-12-10 PROCEDURE — 83735 ASSAY OF MAGNESIUM: CPT | Performed by: INTERNAL MEDICINE

## 2023-12-10 RX ORDER — CALCIUM CARBONATE 500 MG/1
500 TABLET, CHEWABLE ORAL DAILY PRN
Status: DISCONTINUED | OUTPATIENT
Start: 2023-12-10 | End: 2023-12-13 | Stop reason: HOSPADM

## 2023-12-10 RX ADMIN — FINASTERIDE 5 MG: 5 TABLET, FILM COATED ORAL at 08:55

## 2023-12-10 RX ADMIN — FERROUS SULFATE TAB 325 MG (65 MG ELEMENTAL FE) 325 MG: 325 (65 FE) TAB at 08:54

## 2023-12-10 RX ADMIN — ALLOPURINOL 300 MG: 100 TABLET ORAL at 08:54

## 2023-12-10 RX ADMIN — RIVAROXABAN 20 MG: 20 TABLET, FILM COATED ORAL at 08:54

## 2023-12-10 RX ADMIN — FUROSEMIDE 60 MG: 10 INJECTION, SOLUTION INTRAVENOUS at 16:53

## 2023-12-10 RX ADMIN — METOPROLOL SUCCINATE 25 MG: 25 TABLET, EXTENDED RELEASE ORAL at 08:54

## 2023-12-10 RX ADMIN — SPIRONOLACTONE 25 MG: 25 TABLET, FILM COATED ORAL at 16:53

## 2023-12-10 RX ADMIN — SPIRONOLACTONE 25 MG: 25 TABLET, FILM COATED ORAL at 08:54

## 2023-12-10 RX ADMIN — FUROSEMIDE 60 MG: 10 INJECTION, SOLUTION INTRAVENOUS at 08:55

## 2023-12-10 NOTE — PLAN OF CARE
Problem: Potential for Falls  Goal: Patient will remain free of falls  Description: INTERVENTIONS:  - Educate patient/family on patient safety including physical limitations  - Instruct patient to call for assistance with activity   - Consult OT/PT to assist with strengthening/mobility   - Keep Call bell within reach  - Keep bed low and locked with side rails adjusted as appropriate  - Keep care items and personal belongings within reach  - Initiate and maintain comfort rounds  - Make Fall Risk Sign visible to staff  - Offer Toileting every 2 Hours, in advance of need  - Initiate/Maintain bed alarm  - Obtain necessary fall risk management equipment:   - Apply yellow socks and bracelet for high fall risk patients  - Consider moving patient to room near nurses station  Outcome: Progressing     Problem: PAIN - ADULT  Goal: Verbalizes/displays adequate comfort level or baseline comfort level  Description: Interventions:  - Encourage patient to monitor pain and request assistance  - Assess pain using appropriate pain scale  - Administer analgesics based on type and severity of pain and evaluate response  - Implement non-pharmacological measures as appropriate and evaluate response  - Consider cultural and social influences on pain and pain management  - Notify physician/advanced practitioner if interventions unsuccessful or patient reports new pain  Outcome: Progressing     Problem: INFECTION - ADULT  Goal: Absence or prevention of progression during hospitalization  Description: INTERVENTIONS:  - Assess and monitor for signs and symptoms of infection  - Monitor lab/diagnostic results  - Monitor all insertion sites, i.e. indwelling lines, tubes, and drains  - Monitor endotracheal if appropriate and nasal secretions for changes in amount and color  - [place] appropriate cooling/warming therapies per order  - Administer medications as ordered  - Instruct and encourage patient and family to use good hand hygiene  technique  - Identify and instruct in appropriate isolation precautions for identified infection/condition  Outcome: Progressing  Goal: Absence of fever/infection during neutropenic period  Description: INTERVENTIONS:  - Monitor WBC    Outcome: Progressing     Problem: SAFETY ADULT  Goal: Patient will remain free of falls  Description: INTERVENTIONS:  - Educate patient/family on patient safety including physical limitations  - Instruct patient to call for assistance with activity   - Consult OT/PT to assist with strengthening/mobility   - Keep Call bell within reach  - Keep bed low and locked with side rails adjusted as appropriate  - Keep care items and personal belongings within reach  - Initiate and maintain comfort rounds  - Make Fall Risk Sign visible to staff  - Offer Toileting every 2 Hours, in advance of need  - Initiate/Maintain bed alarm  - Obtain necessary fall risk management equipment:   - Apply yellow socks and bracelet for high fall risk patients  - Consider moving patient to room near nurses station  Outcome: Progressing  Goal: Maintain or return to baseline ADL function  Description: INTERVENTIONS:  -  Assess patient's ability to carry out ADLs; assess patient's baseline for ADL function and identify physical deficits which impact ability to perform ADLs (bathing, care of mouth/teeth, toileting, grooming, dressing, etc.)  - Assess/evaluate cause of self-care deficits   - Assess range of motion  - Assess patient's mobility; develop plan if impaired  - Assess patient's need for assistive devices and provide as appropriate  - Encourage maximum independence but intervene and supervise when necessary  - Involve family in performance of ADLs  - Assess for home care needs following discharge   - Consider OT consult to assist with ADL evaluation and planning for discharge  - Provide patient education as appropriate  Outcome: Progressing  Goal: Maintains/Returns to pre admission functional  level  Description: INTERVENTIONS:  - Perform AM-PAC 6 Click Basic Mobility/ Daily Activity assessment daily.  - Set and communicate daily mobility goal to care team and patient/family/caregiver.   - Collaborate with rehabilitation services on mobility goals if consulted  - Perform Range of Motion 3 times a day.  - Reposition patient every 2 hours.  - Dangle patient 3 times a day  - Stand patient 3 times a day  - Ambulate patient 3 times a day  - Out of bed to chair 3 times a day   - Out of bed for meals 3 times a day  - Out of bed for toileting  - Record patient progress and toleration of activity level   Outcome: Progressing     Problem: DISCHARGE PLANNING  Goal: Discharge to home or other facility with appropriate resources  Description: INTERVENTIONS:  - Identify barriers to discharge w/patient and caregiver  - Arrange for needed discharge resources and transportation as appropriate  - Identify discharge learning needs (meds, wound care, etc.)  - Arrange for interpretive services to assist at discharge as needed  - Refer to Case Management Department for coordinating discharge planning if the patient needs post-hospital services based on physician/advanced practitioner order or complex needs related to functional status, cognitive ability, or social support system  Outcome: Progressing     Problem: Knowledge Deficit  Goal: Patient/family/caregiver demonstrates understanding of disease process, treatment plan, medications, and discharge instructions  Description: Complete learning assessment and assess knowledge base.  Interventions:  - Provide teaching at level of understanding  - Provide teaching via preferred learning methods  Outcome: Progressing     Problem: CARDIOVASCULAR - ADULT  Goal: Maintains optimal cardiac output and hemodynamic stability  Description: INTERVENTIONS:  - Monitor I/O, vital signs and rhythm  - Monitor for S/S and trends of decreased cardiac output  - Administer and titrate ordered  vasoactive medications to optimize hemodynamic stability  - Assess quality of pulses, skin color and temperature  - Assess for signs of decreased coronary artery perfusion  - Instruct patient to report change in severity of symptoms  Outcome: Progressing  Goal: Absence of cardiac dysrhythmias or at baseline rhythm  Description: INTERVENTIONS:  - Continuous cardiac monitoring, vital signs, obtain 12 lead EKG if ordered  - Administer antiarrhythmic and heart rate control medications as ordered  - Monitor electrolytes and administer replacement therapy as ordered  Outcome: Progressing     Problem: RESPIRATORY - ADULT  Goal: Achieves optimal ventilation and oxygenation  Description: INTERVENTIONS:  - Assess for changes in respiratory status  - Assess for changes in mentation and behavior  - Position to facilitate oxygenation and minimize respiratory effort  - Oxygen administered by appropriate delivery if ordered  - Initiate smoking cessation education as indicated  - Encourage broncho-pulmonary hygiene including cough, deep breathe, Incentive Spirometry  - Assess the need for suctioning and aspirate as needed  - Assess and instruct to report SOB or any respiratory difficulty  - Respiratory Therapy support as indicated  Outcome: Progressing

## 2023-12-10 NOTE — PLAN OF CARE
Problem: Potential for Falls  Goal: Patient will remain free of falls  Description: INTERVENTIONS:  - Educate patient/family on patient safety including physical limitations  - Instruct patient to call for assistance with activity   - Consult OT/PT to assist with strengthening/mobility   - Keep Call bell within reach  - Keep bed low and locked with side rails adjusted as appropriate  - Keep care items and personal belongings within reach  - Initiate and maintain comfort rounds  - Make Fall Risk Sign visible to staff  - Offer Toileting every 2 Hours, in advance of need  - Initiate/Maintain bed alarm  - Obtain necessary fall risk management equipment:   - Apply yellow socks and bracelet for high fall risk patients  - Consider moving patient to room near nurses station  Outcome: Progressing     Problem: PAIN - ADULT  Goal: Verbalizes/displays adequate comfort level or baseline comfort level  Description: Interventions:  - Encourage patient to monitor pain and request assistance  - Assess pain using appropriate pain scale  - Administer analgesics based on type and severity of pain and evaluate response  - Implement non-pharmacological measures as appropriate and evaluate response  - Consider cultural and social influences on pain and pain management  - Notify physician/advanced practitioner if interventions unsuccessful or patient reports new pain  Outcome: Progressing     Problem: INFECTION - ADULT  Goal: Absence or prevention of progression during hospitalization  Description: INTERVENTIONS:  - Assess and monitor for signs and symptoms of infection  - Monitor lab/diagnostic results  - Monitor all insertion sites, i.e. indwelling lines, tubes, and drains  - Monitor endotracheal if appropriate and nasal secretions for changes in amount and color  - [place] appropriate cooling/warming therapies per order  - Administer medications as ordered  - Instruct and encourage patient and family to use good hand hygiene  technique  - Identify and instruct in appropriate isolation precautions for identified infection/condition  Outcome: Progressing  Goal: Absence of fever/infection during neutropenic period  Description: INTERVENTIONS:  - Monitor WBC     Outcome: Progressing     Problem: SAFETY ADULT  Goal: Patient will remain free of falls  Description: INTERVENTIONS:  - Educate patient/family on patient safety including physical limitations  - Instruct patient to call for assistance with activity   - Consult OT/PT to assist with strengthening/mobility   - Keep Call bell within reach  - Keep bed low and locked with side rails adjusted as appropriate  - Keep care items and personal belongings within reach  - Initiate and maintain comfort rounds  - Make Fall Risk Sign visible to staff  - Offer Toileting every 2 Hours, in advance of need  - Initiate/Maintain bed alarm  - Obtain necessary fall risk management equipment:   - Apply yellow socks and bracelet for high fall risk patients  - Consider moving patient to room near nurses station  Outcome: Progressing  Goal: Maintain or return to baseline ADL function  Description: INTERVENTIONS:  -  Assess patient's ability to carry out ADLs; assess patient's baseline for ADL function and identify physical deficits which impact ability to perform ADLs (bathing, care of mouth/teeth, toileting, grooming, dressing, etc.)  - Assess/evaluate cause of self-care deficits   - Assess range of motion  - Assess patient's mobility; develop plan if impaired  - Assess patient's need for assistive devices and provide as appropriate  - Encourage maximum independence but intervene and supervise when necessary  - Involve family in performance of ADLs  - Assess for home care needs following discharge   - Consider OT consult to assist with ADL evaluation and planning for discharge  - Provide patient education as appropriate  Outcome: Progressing  Goal: Maintains/Returns to pre admission functional  level  Description: INTERVENTIONS:  - Perform AM-PAC 6 Click Basic Mobility/ Daily Activity assessment daily.  - Set and communicate daily mobility goal to care team and patient/family/caregiver.   - Collaborate with rehabilitation services on mobility goals if consulted  - Perform Range of Motion 3 times a day.  - Reposition patient every 2 hours.  - Dangle patient 3 times a day  - Stand patient 3 times a day  - Ambulate patient 3 times a day  - Out of bed to chair 3 times a day   - Out of bed for meals 3 times a day  - Out of bed for toileting  - Record patient progress and toleration of activity level   Outcome: Progressing     Problem: DISCHARGE PLANNING  Goal: Discharge to home or other facility with appropriate resources  Description: INTERVENTIONS:  - Identify barriers to discharge w/patient and caregiver  - Arrange for needed discharge resources and transportation as appropriate  - Identify discharge learning needs (meds, wound care, etc.)  - Arrange for interpretive services to assist at discharge as needed  - Refer to Case Management Department for coordinating discharge planning if the patient needs post-hospital services based on physician/advanced practitioner order or complex needs related to functional status, cognitive ability, or social support system  Outcome: Progressing     Problem: Knowledge Deficit  Goal: Patient/family/caregiver demonstrates understanding of disease process, treatment plan, medications, and discharge instructions  Description: Complete learning assessment and assess knowledge base.  Interventions:  - Provide teaching at level of understanding  - Provide teaching via preferred learning methods  Outcome: Progressing     Problem: CARDIOVASCULAR - ADULT  Goal: Maintains optimal cardiac output and hemodynamic stability  Description: INTERVENTIONS:  - Monitor I/O, vital signs and rhythm  - Monitor for S/S and trends of decreased cardiac output  - Administer and titrate ordered  vasoactive medications to optimize hemodynamic stability  - Assess quality of pulses, skin color and temperature  - Assess for signs of decreased coronary artery perfusion  - Instruct patient to report change in severity of symptoms  Outcome: Progressing  Goal: Absence of cardiac dysrhythmias or at baseline rhythm  Description: INTERVENTIONS:  - Continuous cardiac monitoring, vital signs, obtain 12 lead EKG if ordered  - Administer antiarrhythmic and heart rate control medications as ordered  - Monitor electrolytes and administer replacement therapy as ordered  Outcome: Progressing     Problem: RESPIRATORY - ADULT  Goal: Achieves optimal ventilation and oxygenation  Description: INTERVENTIONS:  - Assess for changes in respiratory status  - Assess for changes in mentation and behavior  - Position to facilitate oxygenation and minimize respiratory effort  - Oxygen administered by appropriate delivery if ordered  - Initiate smoking cessation education as indicated  - Encourage broncho-pulmonary hygiene including cough, deep breathe, Incentive Spirometry  - Assess the need for suctioning and aspirate as needed  - Assess and instruct to report SOB or any respiratory difficulty  - Respiratory Therapy support as indicated  Outcome: Progressing

## 2023-12-10 NOTE — PROGRESS NOTES
[facility]  Progress Note  Name: Leonardo Oviedo I  MRN: [id number]  Unit/Bed#: [place] [address] -01 I Date of Admission: 12/7/2023   Date of Service: 12/10/2023 I Hospital Day: 3    Assessment/Plan   * CHF exacerbation (HCC)  Assessment & Plan  Wt Readings from Last 3 Encounters:   12/10/23 (!) 140 kg (309 lb 1.6 oz)   12/07/23 (!) 146 kg (322 lb 3.2 oz)   11/20/23 (!) 147 kg (324 lb)     Patient with progressive lower extremity edema. States worsened after recent discharge with steroids for suspected PNA  Possible acute on chronic diastolic heart failure  BNP on admission: 91  Continue IV diuresis with goal of net -1 L  Currently on lasix 60mg BID, continue for today with likely transition to oral diuretic tomorrow  BNP not markedly elevated but can be falsely low in patients with obesity  Cardiology recommendations reviewed  TTE 10/11/23: LVEF 65%, grade 2 diastolic dysfunction   Approximately 20 pound weight gain  Down approximately 14 lb since admission with net output of 7.7L  Dry weight approximately 305lbs        Venous ulcer (HCC)  Assessment & Plan  Continue localized wound care  Leg elevation  Wound care consulted    Bilateral lower extremity edema  Assessment & Plan  Most likely due to heart failure with addition of venous insufficiency and lymphedema.   Patient uses compression stockings at home  Combination of lymphedema as well as diastolic heart failure      Atrial fibrillation (HCC)  Assessment & Plan  Ventricular rate controlled  Continue metoprolol  Anticoagulated with Xarelto    Obstructive sleep apnea on CPAP-resolved as of 12/8/2023  Assessment & Plan  Noncompliant with CPAP             VTE Pharmacologic Prophylaxis:   Moderate Risk (Score 3-4) - Pharmacological DVT Prophylaxis Ordered: rivaroxaban (Xarelto).    Mobility:   Basic Mobility Inpatient Raw Score: 24  JH-HLM Goal: 8: Walk 250 feet or more  JH-HLM Achieved: 7: Walk 25 feet or more  HLM Goal NOT achieved.  Continue with multidisciplinary rounding and encourage appropriate mobility to improve upon HLM goals.    Patient Centered Rounds: I performed bedside rounds with nursing staff today.   Discussions with Specialists or Other Care Team Provider: none    Education and Discussions with Family / Patient: Patient declined call to .     Total Time Spent on Date of Encounter in care of patient: 25 mins. This time was spent on one or more of the following: performing physical exam; counseling and coordination of care; obtaining or reviewing history; documenting in the medical record; reviewing/ordering tests, medications or procedures; communicating with other healthcare professionals and discussing with patient's family/caregivers.    Current Length of Stay: 3 day(s)  Current Patient Status: Inpatient   Certification Statement: The patient will continue to require additional inpatient hospital stay due to chf, diuresis  Discharge Plan: Anticipate discharge in 24-48 hrs to home.    Code Status: Level 1 - Full Code    Subjective:   The patient is seen resting comfortably in bed.  He states that he feels about the same as he did yesterday.  He feels that his lower extremity swelling is about the same as it was yesterday as well.  He denies any nausea or vomiting, worsening shortness of breath, chest pain, or palpitations.    Objective:     Vitals:   Temp (24hrs), Av.4 °F (36.9 °C), Min:98.1 °F (36.7 °C), Max:98.6 °F (37 °C)    Temp:  [98.1 °F (36.7 °C)-98.6 °F (37 °C)] 98.6 °F (37 °C)  HR:  [73-81] 73  Resp:  [16-22] 16  BP: (108-131)/(54-75) 131/75  SpO2:  [92 %-95 %] 94 %  Body mass index is 45.65 kg/m².     Input and Output Summary (last 24 hours):     Intake/Output Summary (Last 24 hours) at 12/10/2023 1108  Last data filed at 12/10/2023 0515  Gross per 24 hour   Intake --   Output 2500 ml   Net -2500 ml       Physical Exam:   Physical Exam  Vitals and nursing note reviewed.   Constitutional:        General: He is not in acute distress.     Appearance: He is obese. He is not ill-appearing, toxic-appearing or diaphoretic.   HENT:      Head: Normocephalic and atraumatic.   Cardiovascular:      Rate and Rhythm: Normal rate and regular rhythm.      Heart sounds: No murmur heard.     No friction rub. No gallop.   Pulmonary:      Effort: Pulmonary effort is normal. No respiratory distress.      Breath sounds: Normal breath sounds. No wheezing, rhonchi or rales.   Abdominal:      General: Abdomen is flat. Bowel sounds are normal. There is no distension.      Palpations: Abdomen is soft.      Tenderness: There is no abdominal tenderness.   Musculoskeletal:      Right lower leg: Edema present.      Left lower leg: Edema present.      Comments: Left leg with dressing cdi   Skin:     General: Skin is warm and dry.      Coloration: Skin is not jaundiced or pale.   Neurological:      General: No focal deficit present.      Mental Status: He is alert. Mental status is at baseline.          Additional Data:     Labs:  Results from last 7 days   Lab Units 12/10/23  0512 12/08/23  0934 12/07/23  1123   WBC Thousand/uL 4.61   < > 4.44   HEMOGLOBIN g/dL 12.4   < > 11.7*   HEMATOCRIT % 37.7   < > 35.6*   PLATELETS Thousands/uL 179   < > 134*   NEUTROS PCT %  --   --  66   LYMPHS PCT %  --   --  23   MONOS PCT %  --   --  9   EOS PCT %  --   --  2    < > = values in this interval not displayed.     Results from last 7 days   Lab Units 12/10/23  0512   SODIUM mmol/L 138   POTASSIUM mmol/L 4.3   CHLORIDE mmol/L 100   CO2 mmol/L 33*   BUN mg/dL 20   CREATININE mg/dL 0.87   ANION GAP mmol/L 5   CALCIUM mg/dL 9.1   GLUCOSE RANDOM mg/dL 99                       Lines/Drains:  Invasive Devices       Peripheral Intravenous Line  Duration             Peripheral IV 12/07/23 Dorsal (posterior);Left Hand 2 days                          Imaging: No pertinent imaging reviewed.    Recent Cultures (last 7 days):         Last 24 Hours Medication  List:   Current Facility-Administered Medications   Medication Dose Route Frequency Provider Last Rate    acetaminophen  650 mg Oral Q4H PRN Cristiana Choudhary PA-C      allopurinol  300 mg Oral Daily Cristiana Choudhary PA-C      calcium carbonate  500 mg Oral Daily PRN aWlly Ca PA-C      ferrous sulfate  325 mg Oral Daily With Breakfast Cristiana Choudhary PA-C      finasteride  5 mg Oral Daily Cristiana Choudhary PA-C      furosemide  60 mg Intravenous BID (diuretic) Denys Viveros DO      metoprolol succinate  25 mg Oral Daily Cristiana Choudhary PA-C      ondansetron  4 mg Intravenous Q6H PRN Cristiana Choudhary PA-C      rivaroxaban  20 mg Oral Daily Cristiana Choudhary PA-C      spironolactone  25 mg Oral BID Denys Viveros DO          Today, Patient Was Seen By: Brandyn Paredes PA-C    **Please Note: This note may have been constructed using a voice recognition system.**

## 2023-12-10 NOTE — PROGRESS NOTES
Progress Note - Cardiology   Leonardo Oviedo 68 y.o. male MRN: [id number]  Unit/Bed#: [place] [address] -01 Encounter: [id number]        Problem List:  Principal Problem:    CHF exacerbation (HCC)  Active Problems:    Atrial fibrillation (HCC)    Bilateral lower extremity edema    Venous ulcer (HCC)      Assessment:  Acute on chronic diastolic congestive heart failure              - TTE 10/11/23: LVEF 65%, grade 2 diastolic dysfunction, poor study quality as it was technically difficult.              - pharmacologic nuclear stress test 1/23/23: Post EF 65%, ECG negative for ischemia.              - prescribed outpatient diuretic Rx, torsemide 20 mg twice daily, spironolactone 25 mg twice daily.  Moderate pulmonary hypertension  Moderate restrictive lung disease  Paroxysmal atrial fibrillation and atrial flutter               - anticoagulation with Xarelto 20 mg daily.              - AV blocker Rx, Toprol-XL 25 mg daily.  - had long standing since before October 2019 now in SR.  - status post DCCV, May 2021.  - status post RFA ablation, June 2021 and July 2021.  Lymphedema  Morbid obesity  Obstructive sleep apnea on BiPAP    Plan/ Discussion:  Weights continue to trend down and is having good urine output  Renal function and electrolytes are stable  Continue IV Lasix 60 twice daily  Continue Toprol-XL 25 daily  Continue Xarelto 20 daily  Continue Aldactone 25 daily  BMP in a.m.    Subjective:  Feeling about the same today compared to yesterday  Unsure if legs feel any better  Breathing is okay    Vitals:  Vitals:    12/09/23 0542 12/10/23 0531   Weight: (!) 141 kg (311 lb 4.6 oz) (!) 140 kg (309 lb 1.6 oz)   ,  Vitals:    12/09/23 2314 12/10/23 0100 12/10/23 0531 12/10/23 0812   BP: 108/54   131/75   BP Location:       Pulse: 78   73   Resp: 22   16   Temp: 98.5 °F (36.9 °C)   98.6 °F (37 °C)   TempSrc:       SpO2: 92% 95%  94%   Weight:   (!) 140 kg (309 lb 1.6 oz)    Height:           Exam:  General: Alert awake and  oriented, no acute distress  Heart:  Regular rate and rhythm, no murmurs, Normal S1, no edema    Respiratory effort/ Lungs:  Breathing comfortably on room air, clear bilaterally without wheezing, rales, crackles   Abdominal: Non-tender to palpation, + bowel sounds, soft, no masses or distension  Lower Limbs: 1+ pitting edema bilaterally           Telemetry:           Medications:    Current Facility-Administered Medications:     acetaminophen (TYLENOL) tablet 650 mg, 650 mg, Oral, Q4H PRN, Cristiana Cohudhary PA-C    allopurinol (ZYLOPRIM) tablet 300 mg, 300 mg, Oral, Daily, Cristiana Choudhary PA-C, 300 mg at 12/10/23 0854    calcium carbonate (TUMS) chewable tablet 500 mg, 500 mg, Oral, Daily PRN, Wally Ca PA-C    ferrous sulfate tablet 325 mg, 325 mg, Oral, Daily With Breakfast, Cristiana Choudhary PA-C, 325 mg at 12/10/23 0854    finasteride (PROSCAR) tablet 5 mg, 5 mg, Oral, Daily, Cristiana Choudhary PA-C, 5 mg at 12/10/23 0855    furosemide (LASIX) injection 60 mg, 60 mg, Intravenous, BID (diuretic), Denys Viveros DO, 60 mg at 12/10/23 0855    metoprolol succinate (TOPROL-XL) 24 hr tablet 25 mg, 25 mg, Oral, Daily, Cristiana Choudhary PA-C, 25 mg at 12/10/23 0854    ondansetron (ZOFRAN) injection 4 mg, 4 mg, Intravenous, Q6H PRN, Cristiana Choudhary PA-C    rivaroxaban (XARELTO) tablet 20 mg, 20 mg, Oral, Daily, Cristiana Choudhary PA-C, 20 mg at 12/10/23 0854    spironolactone (ALDACTONE) tablet 25 mg, 25 mg, Oral, BID, Denys Viveros DO, 25 mg at 12/10/23 0854      Labs/Data:        Results from last 7 days   Lab Units 12/10/23  0512 12/09/23  0513 12/08/23  0934   WBC Thousand/uL 4.61 4.09* 4.75   HEMOGLOBIN g/dL 12.4 11.8* 12.8   HEMATOCRIT % 37.7 36.4* 39.5   PLATELETS Thousands/uL 179 155 145*     Results from last 7 days   Lab Units 12/10/23  0512 12/09/23  0513 12/08/23  0934   POTASSIUM mmol/L 4.3 4.2 3.7   CHLORIDE mmol/L 100 102 99   CO2 mmol/L 33* 32 35*   BUN mg/dL 20 18 15   CREATININE mg/dL  0.87 0.87 0.90

## 2023-12-10 NOTE — ASSESSMENT & PLAN NOTE
Wt Readings from Last 3 Encounters:   12/10/23 (!) 140 kg (309 lb 1.6 oz)   12/07/23 (!) 146 kg (322 lb 3.2 oz)   11/20/23 (!) 147 kg (324 lb)     Patient with progressive lower extremity edema. States worsened after recent discharge with steroids for suspected PNA  Possible acute on chronic diastolic heart failure  BNP on admission: 91  Continue IV diuresis with goal of net -1 L  Currently on lasix 60mg BID, continue for today with likely transition to oral diuretic tomorrow  BNP not markedly elevated but can be falsely low in patients with obesity  Cardiology recommendations reviewed  TTE 10/11/23: LVEF 65%, grade 2 diastolic dysfunction   Approximately 20 pound weight gain  Down approximately 14 lb since admission with net output of 7.7L  Dry weight approximately 305lbs

## 2023-12-11 LAB
ANION GAP SERPL CALCULATED.3IONS-SCNC: 4 MMOL/L
BUN SERPL-MCNC: 20 MG/DL (ref 5–25)
CALCIUM SERPL-MCNC: 9.3 MG/DL (ref 8.4–10.2)
CHLORIDE SERPL-SCNC: 100 MMOL/L (ref 96–108)
CO2 SERPL-SCNC: 34 MMOL/L (ref 21–32)
CREAT SERPL-MCNC: 0.81 MG/DL (ref 0.6–1.3)
ERYTHROCYTE [DISTWIDTH] IN BLOOD BY AUTOMATED COUNT: 14.5 % (ref 11.6–15.1)
GFR SERPL CREATININE-BSD FRML MDRD: 90 ML/MIN/1.73SQ M
GLUCOSE SERPL-MCNC: 102 MG/DL (ref 65–140)
HCT VFR BLD AUTO: 38.3 % (ref 36.5–49.3)
HGB BLD-MCNC: 12.4 G/DL (ref 12–17)
MAGNESIUM SERPL-MCNC: 2.2 MG/DL (ref 1.9–2.7)
MCH RBC QN AUTO: 30.4 PG (ref 26.8–34.3)
MCHC RBC AUTO-ENTMCNC: 32.4 G/DL (ref 31.4–37.4)
MCV RBC AUTO: 94 FL (ref 82–98)
PLATELET # BLD AUTO: 164 THOUSANDS/UL (ref 149–390)
PMV BLD AUTO: 9.9 FL (ref 8.9–12.7)
POTASSIUM SERPL-SCNC: 3.9 MMOL/L (ref 3.5–5.3)
RBC # BLD AUTO: 4.08 MILLION/UL (ref 3.88–5.62)
SODIUM SERPL-SCNC: 138 MMOL/L (ref 135–147)
WBC # BLD AUTO: 4.31 THOUSAND/UL (ref 4.31–10.16)

## 2023-12-11 PROCEDURE — 83735 ASSAY OF MAGNESIUM: CPT | Performed by: INTERNAL MEDICINE

## 2023-12-11 PROCEDURE — 80048 BASIC METABOLIC PNL TOTAL CA: CPT | Performed by: INTERNAL MEDICINE

## 2023-12-11 PROCEDURE — 99233 SBSQ HOSP IP/OBS HIGH 50: CPT | Performed by: INTERNAL MEDICINE

## 2023-12-11 PROCEDURE — 85027 COMPLETE CBC AUTOMATED: CPT | Performed by: INTERNAL MEDICINE

## 2023-12-11 PROCEDURE — 99232 SBSQ HOSP IP/OBS MODERATE 35: CPT | Performed by: PHYSICIAN ASSISTANT

## 2023-12-11 RX ORDER — FUROSEMIDE 10 MG/ML
80 INJECTION INTRAMUSCULAR; INTRAVENOUS
Status: DISCONTINUED | OUTPATIENT
Start: 2023-12-11 | End: 2023-12-13

## 2023-12-11 RX ADMIN — FUROSEMIDE 80 MG: 10 INJECTION, SOLUTION INTRAVENOUS at 17:02

## 2023-12-11 RX ADMIN — FERROUS SULFATE TAB 325 MG (65 MG ELEMENTAL FE) 325 MG: 325 (65 FE) TAB at 09:55

## 2023-12-11 RX ADMIN — SPIRONOLACTONE 25 MG: 25 TABLET, FILM COATED ORAL at 09:55

## 2023-12-11 RX ADMIN — FUROSEMIDE 60 MG: 10 INJECTION, SOLUTION INTRAVENOUS at 09:55

## 2023-12-11 RX ADMIN — SPIRONOLACTONE 25 MG: 25 TABLET, FILM COATED ORAL at 17:02

## 2023-12-11 RX ADMIN — FINASTERIDE 5 MG: 5 TABLET, FILM COATED ORAL at 09:55

## 2023-12-11 RX ADMIN — METOPROLOL SUCCINATE 25 MG: 25 TABLET, EXTENDED RELEASE ORAL at 09:55

## 2023-12-11 RX ADMIN — RIVAROXABAN 20 MG: 20 TABLET, FILM COATED ORAL at 09:55

## 2023-12-11 RX ADMIN — ALLOPURINOL 300 MG: 100 TABLET ORAL at 09:55

## 2023-12-11 NOTE — PLAN OF CARE
Problem: Potential for Falls  Goal: Patient will remain free of falls  Description: INTERVENTIONS:  - Educate patient/family on patient safety including physical limitations  - Instruct patient to call for assistance with activity   - Consult OT/PT to assist with strengthening/mobility   - Keep Call bell within reach  - Keep bed low and locked with side rails adjusted as appropriate  - Keep care items and personal belongings within reach  - Initiate and maintain comfort rounds  - Make Fall Risk Sign visible to staff  - Offer Toileting every 2 Hours, in advance of need  - Initiate/Maintain bed alarm  - Obtain necessary fall risk management equipment:   - Apply yellow socks and bracelet for high fall risk patients  - Consider moving patient to room near nurses station  Outcome: Progressing     Problem: PAIN - ADULT  Goal: Verbalizes/displays adequate comfort level or baseline comfort level  Description: Interventions:  - Encourage patient to monitor pain and request assistance  - Assess pain using appropriate pain scale  - Administer analgesics based on type and severity of pain and evaluate response  - Implement non-pharmacological measures as appropriate and evaluate response  - Consider cultural and social influences on pain and pain management  - Notify physician/advanced practitioner if interventions unsuccessful or patient reports new pain  Outcome: Progressing     Problem: INFECTION - ADULT  Goal: Absence or prevention of progression during hospitalization  Description: INTERVENTIONS:  - Assess and monitor for signs and symptoms of infection  - Monitor lab/diagnostic results  - Monitor all insertion sites, i.e. indwelling lines, tubes, and drains  - Monitor endotracheal if appropriate and nasal secretions for changes in amount and color  - [place] appropriate cooling/warming therapies per order  - Administer medications as ordered  - Instruct and encourage patient and family to use good hand hygiene  technique  - Identify and instruct in appropriate isolation precautions for identified infection/condition  Outcome: Progressing  Goal: Absence of fever/infection during neutropenic period  Description: INTERVENTIONS:  - Monitor WBC    Outcome: Progressing     Problem: SAFETY ADULT  Goal: Patient will remain free of falls  Description: INTERVENTIONS:  - Educate patient/family on patient safety including physical limitations  - Instruct patient to call for assistance with activity   - Consult OT/PT to assist with strengthening/mobility   - Keep Call bell within reach  - Keep bed low and locked with side rails adjusted as appropriate  - Keep care items and personal belongings within reach  - Initiate and maintain comfort rounds  - Make Fall Risk Sign visible to staff  - Offer Toileting every 2 Hours, in advance of need  - Initiate/Maintain bed alarm  - Obtain necessary fall risk management equipment:   - Apply yellow socks and bracelet for high fall risk patients  - Consider moving patient to room near nurses station  Outcome: Progressing  Goal: Maintain or return to baseline ADL function  Description: INTERVENTIONS:  -  Assess patient's ability to carry out ADLs; assess patient's baseline for ADL function and identify physical deficits which impact ability to perform ADLs (bathing, care of mouth/teeth, toileting, grooming, dressing, etc.)  - Assess/evaluate cause of self-care deficits   - Assess range of motion  - Assess patient's mobility; develop plan if impaired  - Assess patient's need for assistive devices and provide as appropriate  - Encourage maximum independence but intervene and supervise when necessary  - Involve family in performance of ADLs  - Assess for home care needs following discharge   - Consider OT consult to assist with ADL evaluation and planning for discharge  - Provide patient education as appropriate  Outcome: Progressing  Goal: Maintains/Returns to pre admission functional  level  Description: INTERVENTIONS:  - Perform AM-PAC 6 Click Basic Mobility/ Daily Activity assessment daily.  - Set and communicate daily mobility goal to care team and patient/family/caregiver.   - Collaborate with rehabilitation services on mobility goals if consulted  - Perform Range of Motion 3 times a day.  - Reposition patient every 2 hours.  - Dangle patient 3 times a day  - Stand patient 3 times a day  - Ambulate patient 3 times a day  - Out of bed to chair 3 times a day   - Out of bed for meals 3 times a day  - Out of bed for toileting  - Record patient progress and toleration of activity level   Outcome: Progressing     Problem: DISCHARGE PLANNING  Goal: Discharge to home or other facility with appropriate resources  Description: INTERVENTIONS:  - Identify barriers to discharge w/patient and caregiver  - Arrange for needed discharge resources and transportation as appropriate  - Identify discharge learning needs (meds, wound care, etc.)  - Arrange for interpretive services to assist at discharge as needed  - Refer to Case Management Department for coordinating discharge planning if the patient needs post-hospital services based on physician/advanced practitioner order or complex needs related to functional status, cognitive ability, or social support system  Outcome: Progressing     Problem: Knowledge Deficit  Goal: Patient/family/caregiver demonstrates understanding of disease process, treatment plan, medications, and discharge instructions  Description: Complete learning assessment and assess knowledge base.  Interventions:  - Provide teaching at level of understanding  - Provide teaching via preferred learning methods  Outcome: Progressing     Problem: CARDIOVASCULAR - ADULT  Goal: Maintains optimal cardiac output and hemodynamic stability  Description: INTERVENTIONS:  - Monitor I/O, vital signs and rhythm  - Monitor for S/S and trends of decreased cardiac output  - Administer and titrate ordered  vasoactive medications to optimize hemodynamic stability  - Assess quality of pulses, skin color and temperature  - Assess for signs of decreased coronary artery perfusion  - Instruct patient to report change in severity of symptoms  Outcome: Progressing  Goal: Absence of cardiac dysrhythmias or at baseline rhythm  Description: INTERVENTIONS:  - Continuous cardiac monitoring, vital signs, obtain 12 lead EKG if ordered  - Administer antiarrhythmic and heart rate control medications as ordered  - Monitor electrolytes and administer replacement therapy as ordered  Outcome: Progressing     Problem: RESPIRATORY - ADULT  Goal: Achieves optimal ventilation and oxygenation  Description: INTERVENTIONS:  - Assess for changes in respiratory status  - Assess for changes in mentation and behavior  - Position to facilitate oxygenation and minimize respiratory effort  - Oxygen administered by appropriate delivery if ordered  - Initiate smoking cessation education as indicated  - Encourage broncho-pulmonary hygiene including cough, deep breathe, Incentive Spirometry  - Assess the need for suctioning and aspirate as needed  - Assess and instruct to report SOB or any respiratory difficulty  - Respiratory Therapy support as indicated  Outcome: Progressing     Problem: Prexisting or High Potential for Compromised Skin Integrity  Goal: Skin integrity is maintained or improved  Description: INTERVENTIONS:  - Identify patients at risk for skin breakdown  - Assess and monitor skin integrity  - Assess and monitor nutrition and hydration status  - Monitor labs   - Assess for incontinence   - Turn and reposition patient  - Assist with mobility/ambulation  - Relieve pressure over bony prominences  - Avoid friction and shearing  - Provide appropriate hygiene as needed including keeping skin clean and dry  - Evaluate need for skin moisturizer/barrier cream  - Collaborate with interdisciplinary team   - Patient/family teaching  - Consider wound  care consult   Outcome: Progressing

## 2023-12-11 NOTE — WOUND OSTOMY CARE
Consult Note - Wound   Leonardo Oviedo 68 y.o. male MRN: [id number]  Unit/Bed#: [place] [address] -01 Encounter: [id number]    Received re-consultation for left lower extremity wounds. No new skin/wound issues per Brandyn RENE.   Please refer to initial consultation on 12/8/2023.    Wound care team to follow weekly.      Lily PAREDES, RN, CWON

## 2023-12-11 NOTE — PROGRESS NOTES
Progress Note - Cardiology   Leonardo Oviedo 68 y.o. male MRN: [id number]  Unit/Bed#: [place] [address] -01 Encounter: [id number]    Assessment:  Acute on chronic diastolic congestive heart failure              - TTE 10/11/23: LVEF 65%, grade 2 diastolic dysfunction, poor study quality as it was technically difficult.              - pharmacologic nuclear stress test 1/23/23: Post EF 65%, ECG negative for ischemia.              - prescribed outpatient diuretic Rx, torsemide 20 mg twice daily, spironolactone 25 mg twice daily.  Moderate pulmonary hypertension  Moderate restrictive lung disease  Paroxysmal atrial fibrillation and atrial flutter               - anticoagulation with Xarelto 20 mg daily.              - AV blocker Rx, Toprol-XL 25 mg daily.  - had long standing since before October 2019 now in SR.  - status post DCCV, May 2021.  - status post RFA ablation, June 2021 and July 2021.  Lymphedema  Morbid obesity  Obstructive sleep apnea on BiPAP    Plan/ Discussion:  As previously mentioned, patient's weight earlier this year was 302- 304 lbs. Most recent today is 310 lbs.  Currently on furosemide 60 mg IV twice daily + spironolactone 25 mg twice daily. Will further increase furosemide to 80 mg IV twice daily.   Continue Toprol XL 25 mg daily.  Continue anticoagulation with Xarelto 20 mg daily.  Monitor volume status closely with strict intake/output, daily weight.  Monitor renal function and electrolytes; maintain potassium > 4, magnesium > 2.   Potassium stable at 3.9.  Magnesium stable at 2.2.     Subjective:  Patient seen and examined at the bedside. Patient without shortness of breath and chest pain.    Vitals:  Vitals:    12/10/23 0531 12/11/23 0600   Weight: (!) 140 kg (309 lb 1.6 oz) (!) 140 kg (308 lb 10.3 oz)   ,  Vitals:    12/10/23 2200 12/10/23 2245 12/11/23 0600 12/11/23 0727   BP:  116/64  146/76   BP Location:    Left arm   Pulse: 77   77   Resp:  19  20   Temp:  98 °F (36.7 °C)  98.3 °F (36.8  °C)   TempSrc:    Oral   SpO2: 94%   92%   Weight:   (!) 140 kg (308 lb 10.3 oz)    Height:           Exam:  General: alert awake and oriented, no acute distress  Heart: regular rate with controlled rhythm   Respiratory effort/ Lungs: on room air, mildly diminished at the bases   Abdominal: obese, rounded, non-tender  Lower Limbs: + bilateral lower extremity edema     Medications:    Current Facility-Administered Medications:     acetaminophen (TYLENOL) tablet 650 mg, 650 mg, Oral, Q4H PRN, Cristiana Choudhary PA-C    allopurinol (ZYLOPRIM) tablet 300 mg, 300 mg, Oral, Daily, Cristiana Choudhary PA-C, 300 mg at 12/10/23 0854    calcium carbonate (TUMS) chewable tablet 500 mg, 500 mg, Oral, Daily PRN, Wally Ca PA-C    ferrous sulfate tablet 325 mg, 325 mg, Oral, Daily With Breakfast, Cristiana Choudhary PA-C, 325 mg at 12/10/23 0854    finasteride (PROSCAR) tablet 5 mg, 5 mg, Oral, Daily, Cristiana Choudhary PA-C, 5 mg at 12/10/23 0855    furosemide (LASIX) injection 60 mg, 60 mg, Intravenous, BID (diuretic), Denys Viveros DO, 60 mg at 12/10/23 1653    metoprolol succinate (TOPROL-XL) 24 hr tablet 25 mg, 25 mg, Oral, Daily, Cristiana Choudhary PA-C, 25 mg at 12/10/23 0854    ondansetron (ZOFRAN) injection 4 mg, 4 mg, Intravenous, Q6H PRN, Cristiana Choudhary PA-C    rivaroxaban (XARELTO) tablet 20 mg, 20 mg, Oral, Daily, Cristiana Choudhary PA-C, 20 mg at 12/10/23 0854    spironolactone (ALDACTONE) tablet 25 mg, 25 mg, Oral, BID, Denys Viveros DO, 25 mg at 12/10/23 1653      Labs/Data:        Results from last 7 days   Lab Units 12/11/23  0526 12/10/23  0512 12/09/23  0513   WBC Thousand/uL 4.31 4.61 4.09*   HEMOGLOBIN g/dL 12.4 12.4 11.8*   HEMATOCRIT % 38.3 37.7 36.4*   PLATELETS Thousands/uL 164 179 155     Results from last 7 days   Lab Units 12/11/23  0526 12/10/23  0512 12/09/23  0513   POTASSIUM mmol/L 3.9 4.3 4.2   CHLORIDE mmol/L 100 100 102   CO2 mmol/L 34* 33* 32   BUN mg/dL 20 20 18   CREATININE  mg/dL 0.81 0.87 0.87

## 2023-12-11 NOTE — ASSESSMENT & PLAN NOTE
Wt Readings from Last 3 Encounters:   12/11/23 (!) 141 kg (310 lb 13.6 oz)   12/07/23 (!) 146 kg (322 lb 3.2 oz)   11/20/23 (!) 147 kg (324 lb)     Patient with progressive lower extremity edema. States worsened after recent discharge with steroids for suspected PNA  Possible acute on chronic diastolic heart failure  BNP on admission: 91  Continue IV diuresis with goal of net -1 L  Was on lasix 60mg BID, increase to 80mg BID today  BNP not markedly elevated but can be falsely low in patients with obesity  Cardiology recommendations reviewed  TTE 10/11/23: LVEF 65%, grade 2 diastolic dysfunction   Approximately 20 pound weight gain  Down approximately 14 lb since admission with net output of 9.3L  Dry weight approximately 302lbs

## 2023-12-11 NOTE — PLAN OF CARE
Problem: Potential for Falls  Goal: Patient will remain free of falls  Description: INTERVENTIONS:  - Educate patient/family on patient safety including physical limitations  - Instruct patient to call for assistance with activity   - Consult OT/PT to assist with strengthening/mobility   - Keep Call bell within reach  - Keep bed low and locked with side rails adjusted as appropriate  - Keep care items and personal belongings within reach  - Initiate and maintain comfort rounds  - Make Fall Risk Sign visible to staff  - Offer Toileting every 2 Hours, in advance of need  - Initiate/Maintain bed alarm  - Obtain necessary fall risk management equipment:   - Apply yellow socks and bracelet for high fall risk patients  - Consider moving patient to room near nurses station  Outcome: Progressing     Problem: PAIN - ADULT  Goal: Verbalizes/displays adequate comfort level or baseline comfort level  Description: Interventions:  - Encourage patient to monitor pain and request assistance  - Assess pain using appropriate pain scale  - Administer analgesics based on type and severity of pain and evaluate response  - Implement non-pharmacological measures as appropriate and evaluate response  - Consider cultural and social influences on pain and pain management  - Notify physician/advanced practitioner if interventions unsuccessful or patient reports new pain  Outcome: Progressing     Problem: INFECTION - ADULT  Goal: Absence or prevention of progression during hospitalization  Description: INTERVENTIONS:  - Assess and monitor for signs and symptoms of infection  - Monitor lab/diagnostic results  - Monitor all insertion sites, i.e. indwelling lines, tubes, and drains  - Monitor endotracheal if appropriate and nasal secretions for changes in amount and color  - [place] appropriate cooling/warming therapies per order  - Administer medications as ordered  - Instruct and encourage patient and family to use good hand hygiene  technique  - Identify and instruct in appropriate isolation precautions for identified infection/condition  Outcome: Progressing  Goal: Absence of fever/infection during neutropenic period  Description: INTERVENTIONS:  - Monitor WBC    Outcome: Progressing     Problem: SAFETY ADULT  Goal: Patient will remain free of falls  Description: INTERVENTIONS:  - Educate patient/family on patient safety including physical limitations  - Instruct patient to call for assistance with activity   - Consult OT/PT to assist with strengthening/mobility   - Keep Call bell within reach  - Keep bed low and locked with side rails adjusted as appropriate  - Keep care items and personal belongings within reach  - Initiate and maintain comfort rounds  - Make Fall Risk Sign visible to staff  - Offer Toileting every 2 Hours, in advance of need  - Initiate/Maintain bed alarm  - Obtain necessary fall risk management equipment:   - Apply yellow socks and bracelet for high fall risk patients  - Consider moving patient to room near nurses station  Outcome: Progressing  Goal: Maintain or return to baseline ADL function  Description: INTERVENTIONS:  -  Assess patient's ability to carry out ADLs; assess patient's baseline for ADL function and identify physical deficits which impact ability to perform ADLs (bathing, care of mouth/teeth, toileting, grooming, dressing, etc.)  - Assess/evaluate cause of self-care deficits   - Assess range of motion  - Assess patient's mobility; develop plan if impaired  - Assess patient's need for assistive devices and provide as appropriate  - Encourage maximum independence but intervene and supervise when necessary  - Involve family in performance of ADLs  - Assess for home care needs following discharge   - Consider OT consult to assist with ADL evaluation and planning for discharge  - Provide patient education as appropriate  Outcome: Progressing  Goal: Maintains/Returns to pre admission functional  level  Description: INTERVENTIONS:  - Perform AM-PAC 6 Click Basic Mobility/ Daily Activity assessment daily.  - Set and communicate daily mobility goal to care team and patient/family/caregiver.   - Collaborate with rehabilitation services on mobility goals if consulted  - Perform Range of Motion 3 times a day.  - Reposition patient every 2 hours.  - Dangle patient 3 times a day  - Stand patient 3 times a day  - Ambulate patient 3 times a day  - Out of bed to chair 3 times a day   - Out of bed for meals 3 times a day  - Out of bed for toileting  - Record patient progress and toleration of activity level   Outcome: Progressing     Problem: DISCHARGE PLANNING  Goal: Discharge to home or other facility with appropriate resources  Description: INTERVENTIONS:  - Identify barriers to discharge w/patient and caregiver  - Arrange for needed discharge resources and transportation as appropriate  - Identify discharge learning needs (meds, wound care, etc.)  - Arrange for interpretive services to assist at discharge as needed  - Refer to Case Management Department for coordinating discharge planning if the patient needs post-hospital services based on physician/advanced practitioner order or complex needs related to functional status, cognitive ability, or social support system  Outcome: Progressing     Problem: Knowledge Deficit  Goal: Patient/family/caregiver demonstrates understanding of disease process, treatment plan, medications, and discharge instructions  Description: Complete learning assessment and assess knowledge base.  Interventions:  - Provide teaching at level of understanding  - Provide teaching via preferred learning methods  Outcome: Progressing     Problem: CARDIOVASCULAR - ADULT  Goal: Maintains optimal cardiac output and hemodynamic stability  Description: INTERVENTIONS:  - Monitor I/O, vital signs and rhythm  - Monitor for S/S and trends of decreased cardiac output  - Administer and titrate ordered  vasoactive medications to optimize hemodynamic stability  - Assess quality of pulses, skin color and temperature  - Assess for signs of decreased coronary artery perfusion  - Instruct patient to report change in severity of symptoms  Outcome: Progressing  Goal: Absence of cardiac dysrhythmias or at baseline rhythm  Description: INTERVENTIONS:  - Continuous cardiac monitoring, vital signs, obtain 12 lead EKG if ordered  - Administer antiarrhythmic and heart rate control medications as ordered  - Monitor electrolytes and administer replacement therapy as ordered  Outcome: Progressing     Problem: RESPIRATORY - ADULT  Goal: Achieves optimal ventilation and oxygenation  Description: INTERVENTIONS:  - Assess for changes in respiratory status  - Assess for changes in mentation and behavior  - Position to facilitate oxygenation and minimize respiratory effort  - Oxygen administered by appropriate delivery if ordered  - Initiate smoking cessation education as indicated  - Encourage broncho-pulmonary hygiene including cough, deep breathe, Incentive Spirometry  - Assess the need for suctioning and aspirate as needed  - Assess and instruct to report SOB or any respiratory difficulty  - Respiratory Therapy support as indicated  Outcome: Progressing

## 2023-12-11 NOTE — RESTORATIVE TECHNICIAN NOTE
Restorative Technician Note      Patient Name: Leonardo Oviedo     Restorative Tech Visit Date: 12/11/23  Note Type: Mobility  Patient Position Upon Consult: Supine  Mobility / Activity Provided: assisted pt in ambulating the hallway  Activity Performed: Ambulated  Assistive Device: Other (Comment) (None)  Patient Position at End of Consult: Seated edge of bed; All needs within reach

## 2023-12-12 LAB
ANION GAP SERPL CALCULATED.3IONS-SCNC: 3 MMOL/L
BUN SERPL-MCNC: 23 MG/DL (ref 5–25)
CALCIUM SERPL-MCNC: 9.3 MG/DL (ref 8.4–10.2)
CHLORIDE SERPL-SCNC: 98 MMOL/L (ref 96–108)
CO2 SERPL-SCNC: 33 MMOL/L (ref 21–32)
CREAT SERPL-MCNC: 0.86 MG/DL (ref 0.6–1.3)
ERYTHROCYTE [DISTWIDTH] IN BLOOD BY AUTOMATED COUNT: 14.3 % (ref 11.6–15.1)
GFR SERPL CREATININE-BSD FRML MDRD: 88 ML/MIN/1.73SQ M
GLUCOSE SERPL-MCNC: 100 MG/DL (ref 65–140)
HCT VFR BLD AUTO: 38.5 % (ref 36.5–49.3)
HGB BLD-MCNC: 12.6 G/DL (ref 12–17)
MCH RBC QN AUTO: 30.6 PG (ref 26.8–34.3)
MCHC RBC AUTO-ENTMCNC: 32.7 G/DL (ref 31.4–37.4)
MCV RBC AUTO: 93 FL (ref 82–98)
PLATELET # BLD AUTO: 184 THOUSANDS/UL (ref 149–390)
PMV BLD AUTO: 10.5 FL (ref 8.9–12.7)
POTASSIUM SERPL-SCNC: 4.5 MMOL/L (ref 3.5–5.3)
RBC # BLD AUTO: 4.12 MILLION/UL (ref 3.88–5.62)
SODIUM SERPL-SCNC: 134 MMOL/L (ref 135–147)
WBC # BLD AUTO: 4.94 THOUSAND/UL (ref 4.31–10.16)

## 2023-12-12 PROCEDURE — 99233 SBSQ HOSP IP/OBS HIGH 50: CPT | Performed by: INTERNAL MEDICINE

## 2023-12-12 PROCEDURE — 85027 COMPLETE CBC AUTOMATED: CPT | Performed by: PHYSICIAN ASSISTANT

## 2023-12-12 PROCEDURE — 80048 BASIC METABOLIC PNL TOTAL CA: CPT | Performed by: PHYSICIAN ASSISTANT

## 2023-12-12 PROCEDURE — 99232 SBSQ HOSP IP/OBS MODERATE 35: CPT | Performed by: PHYSICIAN ASSISTANT

## 2023-12-12 RX ORDER — METOLAZONE 5 MG/1
2.5 TABLET ORAL ONCE
Status: COMPLETED | OUTPATIENT
Start: 2023-12-12 | End: 2023-12-12

## 2023-12-12 RX ADMIN — METOLAZONE 2.5 MG: 5 TABLET ORAL at 11:10

## 2023-12-12 RX ADMIN — FUROSEMIDE 80 MG: 10 INJECTION, SOLUTION INTRAVENOUS at 16:09

## 2023-12-12 RX ADMIN — SPIRONOLACTONE 25 MG: 25 TABLET, FILM COATED ORAL at 16:09

## 2023-12-12 RX ADMIN — METOPROLOL SUCCINATE 25 MG: 25 TABLET, EXTENDED RELEASE ORAL at 08:48

## 2023-12-12 RX ADMIN — SPIRONOLACTONE 25 MG: 25 TABLET, FILM COATED ORAL at 08:48

## 2023-12-12 RX ADMIN — FUROSEMIDE 80 MG: 10 INJECTION, SOLUTION INTRAVENOUS at 08:48

## 2023-12-12 RX ADMIN — ALLOPURINOL 300 MG: 100 TABLET ORAL at 08:48

## 2023-12-12 RX ADMIN — FINASTERIDE 5 MG: 5 TABLET, FILM COATED ORAL at 08:48

## 2023-12-12 RX ADMIN — RIVAROXABAN 20 MG: 20 TABLET, FILM COATED ORAL at 08:48

## 2023-12-12 RX ADMIN — FERROUS SULFATE TAB 325 MG (65 MG ELEMENTAL FE) 325 MG: 325 (65 FE) TAB at 08:48

## 2023-12-12 NOTE — PROGRESS NOTES
[facility]  Progress Note  Name: Leonardo Oviedo I  MRN: [id number]  Unit/Bed#: [place] [address] -01 I Date of Admission: 12/7/2023   Date of Service: 12/12/2023 I Hospital Day: 5    Assessment/Plan   * CHF exacerbation (HCC)  Assessment & Plan  Wt Readings from Last 3 Encounters:   12/12/23 (!) 140 kg (308 lb 12.8 oz)   12/07/23 (!) 146 kg (322 lb 3.2 oz)   11/20/23 (!) 147 kg (324 lb)     Patient with progressive lower extremity edema. States worsened after recent discharge with steroids for suspected PNA  Possible acute on chronic diastolic heart failure  BNP on admission: 91  Continue IV diuresis with goal of net -1 L  Continue IV lasix 80mg BID today  Metolazone added today to augment diuresis  BNP not markedly elevated but can be falsely low in patients with obesity  Cardiology recommendations reviewed  TTE 10/11/23: LVEF 65%, grade 2 diastolic dysfunction   Approximately 20 pound weight gain  Down approximately 14 lb since admission with net output of 10.2 L  Dry weight approximately 302lbs      Venous ulcer (HCC)  Assessment & Plan  Continue localized wound care  Leg elevation  Wound care consulted    Bilateral lower extremity edema  Assessment & Plan  Most likely due to heart failure with addition of venous insufficiency and lymphedema.   Patient uses compression stockings at home  Combination of lymphedema as well as diastolic heart failure      Atrial fibrillation (HCC)  Assessment & Plan  Ventricular rate controlled  Continue metoprolol  Anticoagulated with Xarelto         VTE Pharmacologic Prophylaxis:   High Risk (Score >/= 5) - Pharmacological DVT Prophylaxis Ordered: rivaroxaban (Xarelto). Sequential Compression Devices Ordered.    Mobility:   Basic Mobility Inpatient Raw Score: 24  JH-HLM Goal: 8: Walk 250 feet or more  JH-HLM Achieved: 8: Walk 250 feet ot more  HLM Goal achieved. Continue to encourage appropriate mobility.    Patient Centered Rounds: I performed bedside  rounds with nursing staff today.   Discussions with Specialists or Other Care Team Provider: none    Education and Discussions with Family / Patient: Patient declined call to .     Total Time Spent on Date of Encounter in care of patient: 25 mins. This time was spent on one or more of the following: performing physical exam; counseling and coordination of care; obtaining or reviewing history; documenting in the medical record; reviewing/ordering tests, medications or procedures; communicating with other healthcare professionals and discussing with patient's family/caregivers.    Current Length of Stay: 5 day(s)  Current Patient Status: Inpatient   Certification Statement: The patient will continue to require additional inpatient hospital stay due to diuresis  Discharge Plan: Anticipate discharge in 24-48 hrs to home.    Code Status: Level 1 - Full Code    Subjective:   The patient is seen resting in bed. He reports his urination has decreased. He denies any SOB or chest pain.    Objective:     Vitals:   Temp (24hrs), Av.1 °F (36.7 °C), Min:98 °F (36.7 °C), Max:98.2 °F (36.8 °C)    Temp:  [98 °F (36.7 °C)-98.2 °F (36.8 °C)] 98 °F (36.7 °C)  HR:  [76-85] 76  Resp:  [18-20] 20  BP: (104-108)/(58-61) 106/58  SpO2:  [89 %-95 %] 90 %  Body mass index is 45.6 kg/m².     Input and Output Summary (last 24 hours):     Intake/Output Summary (Last 24 hours) at 2023 1300  Last data filed at 2023 0500  Gross per 24 hour   Intake 960 ml   Output 1490 ml   Net -530 ml       Physical Exam:   Physical Exam  Vitals and nursing note reviewed.   Constitutional:       General: He is not in acute distress.     Appearance: Normal appearance. He is obese. He is not ill-appearing, toxic-appearing or diaphoretic.   HENT:      Head: Normocephalic and atraumatic.   Cardiovascular:      Rate and Rhythm: Normal rate and regular rhythm.      Heart sounds: No murmur heard.     No friction rub. No gallop.   Pulmonary:       Effort: Pulmonary effort is normal. No respiratory distress.      Breath sounds: Normal breath sounds. No wheezing, rhonchi or rales.   Abdominal:      General: Abdomen is flat. Bowel sounds are normal. There is no distension.      Palpations: Abdomen is soft.      Tenderness: There is no abdominal tenderness.   Musculoskeletal:      Right lower leg: Edema present.      Left lower leg: Edema present.   Skin:     General: Skin is warm and dry.      Coloration: Skin is not jaundiced or pale.   Neurological:      General: No focal deficit present.      Mental Status: He is alert. Mental status is at baseline.          Additional Data:     Labs:  Results from last 7 days   Lab Units 12/12/23  0451 12/08/23  0934 12/07/23  1123   WBC Thousand/uL 4.94   < > 4.44   HEMOGLOBIN g/dL 12.6   < > 11.7*   HEMATOCRIT % 38.5   < > 35.6*   PLATELETS Thousands/uL 184   < > 134*   NEUTROS PCT %  --   --  66   LYMPHS PCT %  --   --  23   MONOS PCT %  --   --  9   EOS PCT %  --   --  2    < > = values in this interval not displayed.     Results from last 7 days   Lab Units 12/12/23  0451   SODIUM mmol/L 134*   POTASSIUM mmol/L 4.5   CHLORIDE mmol/L 98   CO2 mmol/L 33*   BUN mg/dL 23   CREATININE mg/dL 0.86   ANION GAP mmol/L 3   CALCIUM mg/dL 9.3   GLUCOSE RANDOM mg/dL 100                       Lines/Drains:  Invasive Devices       Peripheral Intravenous Line  Duration             Peripheral IV 12/07/23 Dorsal (posterior);Left Hand 4 days    Peripheral IV 12/12/23 Dorsal (posterior);Left Forearm <1 day                          Imaging: No pertinent imaging reviewed.    Recent Cultures (last 7 days):         Last 24 Hours Medication List:   Current Facility-Administered Medications   Medication Dose Route Frequency Provider Last Rate    acetaminophen  650 mg Oral Q4H PRN Cristiana Choudhary PA-C      allopurinol  300 mg Oral Daily Cristiana Choudhary PA-C      calcium carbonate  500 mg Oral Daily PRN Wally Ca PA-C      ferrous  sulfate  325 mg Oral Daily With Breakfast Cristiana Choudhary PA-C      finasteride  5 mg Oral Daily Cristiana Choudhary PA-C      furosemide  80 mg Intravenous BID (diuretic) NONA [place]      metoprolol succinate  25 mg Oral Daily Cristiana Choudhary PA-C      ondansetron  4 mg Intravenous Q6H PRN Cristiana Choudhary PA-C      rivaroxaban  20 mg Oral Daily Cristiana Choudhary PA-C      spironolactone  25 mg Oral BID Denys Viveros, DO          Today, Patient Was Seen By: Brandyn Paredes PA-C    **Please Note: This note may have been constructed using a voice recognition system.**

## 2023-12-12 NOTE — ASSESSMENT & PLAN NOTE
Wt Readings from Last 3 Encounters:   12/12/23 (!) 140 kg (308 lb 12.8 oz)   12/07/23 (!) 146 kg (322 lb 3.2 oz)   11/20/23 (!) 147 kg (324 lb)     Patient with progressive lower extremity edema. States worsened after recent discharge with steroids for suspected PNA  Possible acute on chronic diastolic heart failure  BNP on admission: 91  Continue IV diuresis with goal of net -1 L  Continue IV lasix 80mg BID today  Metolazone added today to augment diuresis  BNP not markedly elevated but can be falsely low in patients with obesity  Cardiology recommendations reviewed  TTE 10/11/23: LVEF 65%, grade 2 diastolic dysfunction   Approximately 20 pound weight gain  Down approximately 14 lb since admission with net output of 10.2 L  Dry weight approximately 302lbs

## 2023-12-12 NOTE — PLAN OF CARE
Problem: Potential for Falls  Goal: Patient will remain free of falls  Description: INTERVENTIONS:  - Educate patient/family on patient safety including physical limitations  - Instruct patient to call for assistance with activity   - Consult OT/PT to assist with strengthening/mobility   - Keep Call bell within reach  - Keep bed low and locked with side rails adjusted as appropriate  - Keep care items and personal belongings within reach  - Initiate and maintain comfort rounds  - Make Fall Risk Sign visible to staff  - Offer Toileting every 2 Hours, in advance of need  - Initiate/Maintain bed alarm  - Obtain necessary fall risk management equipment:   - Apply yellow socks and bracelet for high fall risk patients  - Consider moving patient to room near nurses station  Outcome: Progressing     Problem: PAIN - ADULT  Goal: Verbalizes/displays adequate comfort level or baseline comfort level  Description: Interventions:  - Encourage patient to monitor pain and request assistance  - Assess pain using appropriate pain scale  - Administer analgesics based on type and severity of pain and evaluate response  - Implement non-pharmacological measures as appropriate and evaluate response  - Consider cultural and social influences on pain and pain management  - Notify physician/advanced practitioner if interventions unsuccessful or patient reports new pain  Outcome: Progressing     Problem: INFECTION - ADULT  Goal: Absence or prevention of progression during hospitalization  Description: INTERVENTIONS:  - Assess and monitor for signs and symptoms of infection  - Monitor lab/diagnostic results  - Monitor all insertion sites, i.e. indwelling lines, tubes, and drains  - Monitor endotracheal if appropriate and nasal secretions for changes in amount and color  - [place] appropriate cooling/warming therapies per order  - Administer medications as ordered  - Instruct and encourage patient and family to use good hand hygiene  technique  - Identify and instruct in appropriate isolation precautions for identified infection/condition  Outcome: Progressing  Goal: Absence of fever/infection during neutropenic period  Description: INTERVENTIONS:  - Monitor WBC    Outcome: Progressing     Problem: SAFETY ADULT  Goal: Patient will remain free of falls  Description: INTERVENTIONS:  - Educate patient/family on patient safety including physical limitations  - Instruct patient to call for assistance with activity   - Consult OT/PT to assist with strengthening/mobility   - Keep Call bell within reach  - Keep bed low and locked with side rails adjusted as appropriate  - Keep care items and personal belongings within reach  - Initiate and maintain comfort rounds  - Make Fall Risk Sign visible to staff  - Offer Toileting every 2 Hours, in advance of need  - Initiate/Maintain bed alarm  - Obtain necessary fall risk management equipment:   - Apply yellow socks and bracelet for high fall risk patients  - Consider moving patient to room near nurses station  Outcome: Progressing  Goal: Maintain or return to baseline ADL function  Description: INTERVENTIONS:  -  Assess patient's ability to carry out ADLs; assess patient's baseline for ADL function and identify physical deficits which impact ability to perform ADLs (bathing, care of mouth/teeth, toileting, grooming, dressing, etc.)  - Assess/evaluate cause of self-care deficits   - Assess range of motion  - Assess patient's mobility; develop plan if impaired  - Assess patient's need for assistive devices and provide as appropriate  - Encourage maximum independence but intervene and supervise when necessary  - Involve family in performance of ADLs  - Assess for home care needs following discharge   - Consider OT consult to assist with ADL evaluation and planning for discharge  - Provide patient education as appropriate  Outcome: Progressing  Goal: Maintains/Returns to pre admission functional  level  Description: INTERVENTIONS:  - Perform AM-PAC 6 Click Basic Mobility/ Daily Activity assessment daily.  - Set and communicate daily mobility goal to care team and patient/family/caregiver.   - Collaborate with rehabilitation services on mobility goals if consulted  - Perform Range of Motion 3 times a day.  - Reposition patient every 2 hours.  - Dangle patient 3 times a day  - Stand patient 3 times a day  - Ambulate patient 3 times a day  - Out of bed to chair 3 times a day   - Out of bed for meals 3 times a day  - Out of bed for toileting  - Record patient progress and toleration of activity level   Outcome: Progressing     Problem: DISCHARGE PLANNING  Goal: Discharge to home or other facility with appropriate resources  Description: INTERVENTIONS:  - Identify barriers to discharge w/patient and caregiver  - Arrange for needed discharge resources and transportation as appropriate  - Identify discharge learning needs (meds, wound care, etc.)  - Arrange for interpretive services to assist at discharge as needed  - Refer to Case Management Department for coordinating discharge planning if the patient needs post-hospital services based on physician/advanced practitioner order or complex needs related to functional status, cognitive ability, or social support system  Outcome: Progressing     Problem: Knowledge Deficit  Goal: Patient/family/caregiver demonstrates understanding of disease process, treatment plan, medications, and discharge instructions  Description: Complete learning assessment and assess knowledge base.  Interventions:  - Provide teaching at level of understanding  - Provide teaching via preferred learning methods  Outcome: Progressing     Problem: CARDIOVASCULAR - ADULT  Goal: Maintains optimal cardiac output and hemodynamic stability  Description: INTERVENTIONS:  - Monitor I/O, vital signs and rhythm  - Monitor for S/S and trends of decreased cardiac output  - Administer and titrate ordered  vasoactive medications to optimize hemodynamic stability  - Assess quality of pulses, skin color and temperature  - Assess for signs of decreased coronary artery perfusion  - Instruct patient to report change in severity of symptoms  Outcome: Progressing  Goal: Absence of cardiac dysrhythmias or at baseline rhythm  Description: INTERVENTIONS:  - Continuous cardiac monitoring, vital signs, obtain 12 lead EKG if ordered  - Administer antiarrhythmic and heart rate control medications as ordered  - Monitor electrolytes and administer replacement therapy as ordered  Outcome: Progressing     Problem: RESPIRATORY - ADULT  Goal: Achieves optimal ventilation and oxygenation  Description: INTERVENTIONS:  - Assess for changes in respiratory status  - Assess for changes in mentation and behavior  - Position to facilitate oxygenation and minimize respiratory effort  - Oxygen administered by appropriate delivery if ordered  - Initiate smoking cessation education as indicated  - Encourage broncho-pulmonary hygiene including cough, deep breathe, Incentive Spirometry  - Assess the need for suctioning and aspirate as needed  - Assess and instruct to report SOB or any respiratory difficulty  - Respiratory Therapy support as indicated  Outcome: Progressing     Problem: Prexisting or High Potential for Compromised Skin Integrity  Goal: Skin integrity is maintained or improved  Description: INTERVENTIONS:  - Identify patients at risk for skin breakdown  - Assess and monitor skin integrity  - Assess and monitor nutrition and hydration status  - Monitor labs   - Assess for incontinence   - Turn and reposition patient  - Assist with mobility/ambulation  - Relieve pressure over bony prominences  - Avoid friction and shearing  - Provide appropriate hygiene as needed including keeping skin clean and dry  - Evaluate need for skin moisturizer/barrier cream  - Collaborate with interdisciplinary team   - Patient/family teaching  - Consider wound  care consult   Outcome: Progressing

## 2023-12-12 NOTE — PROGRESS NOTES
"  Cardiology         Progress Note - Cardiology   Leonardo Oviedo 68 y.o. male MRN: [id number]  Unit/Bed#: [place] [address] -01 Encounter: [id number]          Assessment/Recommendations/Discussion:   Acute on chronic diastolic congestive heart failure              - TTE 10/11/23: LVEF 65%, grade 2 diastolic dysfunction, poor study quality as it was technically difficult.              - pharmacologic nuclear stress test 1/23/23: Post EF 65%, ECG negative for ischemia.              - prescribed outpatient diuretic Rx, torsemide 20 mg twice daily, spironolactone 25 mg twice daily.  Moderate pulmonary hypertension  Moderate restrictive lung disease  Paroxysmal atrial fibrillation and atrial flutter               - anticoagulation with Xarelto 20 mg daily.              - AV blocker Rx, Toprol-XL 25 mg daily.  - had long standing since before October 2019 now in SR.  - status post DCCV, May 2021.  - status post RFA ablation, June 2021 and July 2021.  Lymphedema  Morbid obesity  Obstructive sleep apnea on BiPAP        Continue furosemide 80 mg IV twice daily.  Hoping to transition to p.o. furosemide or torsemide tomorrow.  Will give 1 dose of metolazone 2.5 mg to enhance/optimize diuresis  Ambulate today to assess functional capacity, symptoms  Heart rate controlled, regular on exam, continue metoprolol, Xarelto  Continue spironolactone to support potassium and for hypertension/CHF                Subjective: Patient seen and examined, no complaints                Physical Exam:  GEN:  NAD  HEENT:  MMM, NCAT, pink conjunctiva, EOMI, nonicteric sclera  CV:  NO JVD/HJR, RR, NO M/R/G, +S1/S2, NO PARASTERNAL HEAVE/THRILL, mild LE EDEMA, NO HEPATIC SYSTOLIC PULSATION, WARM EXTREMITIES  RESP:  CTAB/L  ABD:  SOFT, NT, NO GROSS ORGANOMEGALY        Vitals:   /58   Pulse 76   Temp 98 °F (36.7 °C) (Oral)   Resp 20   Ht 5' 9\" (1.753 m)   Wt (!) 140 kg (308 lb 12.8 oz)   SpO2 90%   BMI 45.60 kg/m²   Vitals:    12/12/23 0500 " 12/12/23 0553   Weight: (!) 140 kg (308 lb 12.8 oz) (!) 140 kg (308 lb 12.8 oz)       Intake/Output Summary (Last 24 hours) at 12/12/2023 1010  Last data filed at 12/12/2023 0500  Gross per 24 hour   Intake 960 ml   Output 1490 ml   Net -530 ml       TELEMETRY: Off  Lab Results:  Results from last 7 days   Lab Units 12/12/23  0451   WBC Thousand/uL 4.94   HEMOGLOBIN g/dL 12.6   HEMATOCRIT % 38.5   PLATELETS Thousands/uL 184     Results from last 7 days   Lab Units 12/12/23  0451   POTASSIUM mmol/L 4.5   CHLORIDE mmol/L 98   CO2 mmol/L 33*   BUN mg/dL 23   CREATININE mg/dL 0.86   CALCIUM mg/dL 9.3     Results from last 7 days   Lab Units 12/12/23  0451   POTASSIUM mmol/L 4.5   CHLORIDE mmol/L 98   CO2 mmol/L 33*   BUN mg/dL 23   CREATININE mg/dL 0.86   CALCIUM mg/dL 9.3           Medications:    Current Facility-Administered Medications:     acetaminophen (TYLENOL) tablet 650 mg, 650 mg, Oral, Q4H PRN, Cristiana Choudhary PA-C    allopurinol (ZYLOPRIM) tablet 300 mg, 300 mg, Oral, Daily, Cristiana Choudhary PA-C, 300 mg at 12/12/23 0848    calcium carbonate (TUMS) chewable tablet 500 mg, 500 mg, Oral, Daily PRN, Wally Ca PA-C    ferrous sulfate tablet 325 mg, 325 mg, Oral, Daily With Breakfast, Cristiana Choudhary PA-C, 325 mg at 12/12/23 0848    finasteride (PROSCAR) tablet 5 mg, 5 mg, Oral, Daily, Cristiana Choudhary PA-C, 5 mg at 12/12/23 0848    furosemide (LASIX) injection 80 mg, 80 mg, Intravenous, BID (diuretic), NONA Adams, 80 mg at 12/12/23 0848    metoprolol succinate (TOPROL-XL) 24 hr tablet 25 mg, 25 mg, Oral, Daily, Cristiana Choudhary PA-C, 25 mg at 12/12/23 0848    ondansetron (ZOFRAN) injection 4 mg, 4 mg, Intravenous, Q6H PRN, Cristiana hCoudhary PA-C    rivaroxaban (XARELTO) tablet 20 mg, 20 mg, Oral, Daily, Cristiana Choudhary PA-C, 20 mg at 12/12/23 0848    spironolactone (ALDACTONE) tablet 25 mg, 25 mg, Oral, BID, Denys Viveros DO, 25 mg at 12/12/23 0848    This note was completed  in part utilizing [employer] Direct Software.  Grammatical errors, random word insertions, spelling mistakes, and incomplete sentences may be an occasional consequence of this system secondary to software limitations, ambient noise, and hardware issues.  If you have any questions or concerns about the content, text, or information contained within the body of this dictation, please contact the provider for clarification.

## 2023-12-13 VITALS
TEMPERATURE: 97.3 F | SYSTOLIC BLOOD PRESSURE: 121 MMHG | BODY MASS INDEX: 45.22 KG/M2 | HEART RATE: 90 BPM | RESPIRATION RATE: 18 BRPM | DIASTOLIC BLOOD PRESSURE: 75 MMHG | OXYGEN SATURATION: 93 % | HEIGHT: 69 IN | WEIGHT: 305.34 LBS

## 2023-12-13 LAB
ANION GAP SERPL CALCULATED.3IONS-SCNC: 8 MMOL/L
BUN SERPL-MCNC: 30 MG/DL (ref 5–25)
CALCIUM SERPL-MCNC: 9.7 MG/DL (ref 8.4–10.2)
CHLORIDE SERPL-SCNC: 96 MMOL/L (ref 96–108)
CO2 SERPL-SCNC: 33 MMOL/L (ref 21–32)
CREAT SERPL-MCNC: 1.01 MG/DL (ref 0.6–1.3)
ERYTHROCYTE [DISTWIDTH] IN BLOOD BY AUTOMATED COUNT: 14.2 % (ref 11.6–15.1)
GFR SERPL CREATININE-BSD FRML MDRD: 75 ML/MIN/1.73SQ M
GLUCOSE SERPL-MCNC: 107 MG/DL (ref 65–140)
HCT VFR BLD AUTO: 37.9 % (ref 36.5–49.3)
HGB BLD-MCNC: 13 G/DL (ref 12–17)
MAGNESIUM SERPL-MCNC: 2 MG/DL (ref 1.9–2.7)
MCH RBC QN AUTO: 31 PG (ref 26.8–34.3)
MCHC RBC AUTO-ENTMCNC: 34.3 G/DL (ref 31.4–37.4)
MCV RBC AUTO: 91 FL (ref 82–98)
PLATELET # BLD AUTO: 182 THOUSANDS/UL (ref 149–390)
PMV BLD AUTO: 9.8 FL (ref 8.9–12.7)
POTASSIUM SERPL-SCNC: 3.7 MMOL/L (ref 3.5–5.3)
RBC # BLD AUTO: 4.19 MILLION/UL (ref 3.88–5.62)
SODIUM SERPL-SCNC: 137 MMOL/L (ref 135–147)
WBC # BLD AUTO: 4.93 THOUSAND/UL (ref 4.31–10.16)

## 2023-12-13 PROCEDURE — 83735 ASSAY OF MAGNESIUM: CPT | Performed by: PHYSICIAN ASSISTANT

## 2023-12-13 PROCEDURE — 80048 BASIC METABOLIC PNL TOTAL CA: CPT | Performed by: PHYSICIAN ASSISTANT

## 2023-12-13 PROCEDURE — RECHECK: Performed by: PHYSICIAN ASSISTANT

## 2023-12-13 PROCEDURE — 85027 COMPLETE CBC AUTOMATED: CPT | Performed by: PHYSICIAN ASSISTANT

## 2023-12-13 PROCEDURE — 99239 HOSP IP/OBS DSCHRG MGMT >30: CPT | Performed by: PHYSICIAN ASSISTANT

## 2023-12-13 PROCEDURE — 99233 SBSQ HOSP IP/OBS HIGH 50: CPT | Performed by: INTERNAL MEDICINE

## 2023-12-13 RX ORDER — TORSEMIDE 20 MG/1
TABLET ORAL
Qty: 180 TABLET | Refills: 0 | Status: SHIPPED | OUTPATIENT
Start: 2023-12-13 | End: 2023-12-20 | Stop reason: SDUPTHER

## 2023-12-13 RX ORDER — TORSEMIDE 20 MG/1
40 TABLET ORAL DAILY
Status: DISCONTINUED | OUTPATIENT
Start: 2023-12-14 | End: 2023-12-13 | Stop reason: HOSPADM

## 2023-12-13 RX ORDER — TORSEMIDE 20 MG/1
20 TABLET ORAL
Status: DISCONTINUED | OUTPATIENT
Start: 2023-12-13 | End: 2023-12-13 | Stop reason: HOSPADM

## 2023-12-13 RX ADMIN — METOPROLOL SUCCINATE 25 MG: 25 TABLET, EXTENDED RELEASE ORAL at 09:46

## 2023-12-13 RX ADMIN — SPIRONOLACTONE 25 MG: 25 TABLET, FILM COATED ORAL at 09:46

## 2023-12-13 RX ADMIN — FINASTERIDE 5 MG: 5 TABLET, FILM COATED ORAL at 09:46

## 2023-12-13 RX ADMIN — FUROSEMIDE 80 MG: 10 INJECTION, SOLUTION INTRAVENOUS at 09:46

## 2023-12-13 RX ADMIN — ALLOPURINOL 300 MG: 100 TABLET ORAL at 09:46

## 2023-12-13 RX ADMIN — FERROUS SULFATE TAB 325 MG (65 MG ELEMENTAL FE) 325 MG: 325 (65 FE) TAB at 08:17

## 2023-12-13 RX ADMIN — RIVAROXABAN 20 MG: 20 TABLET, FILM COATED ORAL at 09:46

## 2023-12-13 NOTE — PLAN OF CARE
Problem: Potential for Falls  Goal: Patient will remain free of falls  Description: INTERVENTIONS:  - Educate patient/family on patient safety including physical limitations  - Instruct patient to call for assistance with activity   - Consult OT/PT to assist with strengthening/mobility   - Keep Call bell within reach  - Keep bed low and locked with side rails adjusted as appropriate  - Keep care items and personal belongings within reach  - Initiate and maintain comfort rounds  - Make Fall Risk Sign visible to staff  - Offer Toileting every 2 Hours, in advance of need  - Initiate/Maintain bed alarm  - Obtain necessary fall risk management equipment:   - Apply yellow socks and bracelet for high fall risk patients  - Consider moving patient to room near nurses station  Outcome: Adequate for Discharge     Problem: PAIN - ADULT  Goal: Verbalizes/displays adequate comfort level or baseline comfort level  Description: Interventions:  - Encourage patient to monitor pain and request assistance  - Assess pain using appropriate pain scale  - Administer analgesics based on type and severity of pain and evaluate response  - Implement non-pharmacological measures as appropriate and evaluate response  - Consider cultural and social influences on pain and pain management  - Notify physician/advanced practitioner if interventions unsuccessful or patient reports new pain  Outcome: Adequate for Discharge     Problem: INFECTION - ADULT  Goal: Absence or prevention of progression during hospitalization  Description: INTERVENTIONS:  - Assess and monitor for signs and symptoms of infection  - Monitor lab/diagnostic results  - Monitor all insertion sites, i.e. indwelling lines, tubes, and drains  - Monitor endotracheal if appropriate and nasal secretions for changes in amount and color  - [place] appropriate cooling/warming therapies per order  - Administer medications as ordered  - Instruct and encourage patient and family to use  good hand hygiene technique  - Identify and instruct in appropriate isolation precautions for identified infection/condition  Outcome: Adequate for Discharge  Goal: Absence of fever/infection during neutropenic period  Description: INTERVENTIONS:  - Monitor WBC    Outcome: Adequate for Discharge     Problem: SAFETY ADULT  Goal: Patient will remain free of falls  Description: INTERVENTIONS:  - Educate patient/family on patient safety including physical limitations  - Instruct patient to call for assistance with activity   - Consult OT/PT to assist with strengthening/mobility   - Keep Call bell within reach  - Keep bed low and locked with side rails adjusted as appropriate  - Keep care items and personal belongings within reach  - Initiate and maintain comfort rounds  - Make Fall Risk Sign visible to staff  - Offer Toileting every 2 Hours, in advance of need  - Initiate/Maintain bed alarm  - Obtain necessary fall risk management equipment:   - Apply yellow socks and bracelet for high fall risk patients  - Consider moving patient to room near nurses station  Outcome: Adequate for Discharge  Goal: Maintain or return to baseline ADL function  Description: INTERVENTIONS:  -  Assess patient's ability to carry out ADLs; assess patient's baseline for ADL function and identify physical deficits which impact ability to perform ADLs (bathing, care of mouth/teeth, toileting, grooming, dressing, etc.)  - Assess/evaluate cause of self-care deficits   - Assess range of motion  - Assess patient's mobility; develop plan if impaired  - Assess patient's need for assistive devices and provide as appropriate  - Encourage maximum independence but intervene and supervise when necessary  - Involve family in performance of ADLs  - Assess for home care needs following discharge   - Consider OT consult to assist with ADL evaluation and planning for discharge  - Provide patient education as appropriate  Outcome: Adequate for Discharge  Goal:  Maintains/Returns to pre admission functional level  Description: INTERVENTIONS:  - Perform AM-PAC 6 Click Basic Mobility/ Daily Activity assessment daily.  - Set and communicate daily mobility goal to care team and patient/family/caregiver.   - Collaborate with rehabilitation services on mobility goals if consulted  - Perform Range of Motion 3 times a day.  - Reposition patient every 2 hours.  - Dangle patient 3 times a day  - Stand patient 3 times a day  - Ambulate patient 3 times a day  - Out of bed to chair 3 times a day   - Out of bed for meals 3 times a day  - Out of bed for toileting  - Record patient progress and toleration of activity level   Outcome: Adequate for Discharge     Problem: DISCHARGE PLANNING  Goal: Discharge to home or other facility with appropriate resources  Description: INTERVENTIONS:  - Identify barriers to discharge w/patient and caregiver  - Arrange for needed discharge resources and transportation as appropriate  - Identify discharge learning needs (meds, wound care, etc.)  - Arrange for interpretive services to assist at discharge as needed  - Refer to Case Management Department for coordinating discharge planning if the patient needs post-hospital services based on physician/advanced practitioner order or complex needs related to functional status, cognitive ability, or social support system  Outcome: Adequate for Discharge     Problem: Knowledge Deficit  Goal: Patient/family/caregiver demonstrates understanding of disease process, treatment plan, medications, and discharge instructions  Description: Complete learning assessment and assess knowledge base.  Interventions:  - Provide teaching at level of understanding  - Provide teaching via preferred learning methods  Outcome: Adequate for Discharge     Problem: CARDIOVASCULAR - ADULT  Goal: Maintains optimal cardiac output and hemodynamic stability  Description: INTERVENTIONS:  - Monitor I/O, vital signs and rhythm  - Monitor for S/S  and trends of decreased cardiac output  - Administer and titrate ordered vasoactive medications to optimize hemodynamic stability  - Assess quality of pulses, skin color and temperature  - Assess for signs of decreased coronary artery perfusion  - Instruct patient to report change in severity of symptoms  Outcome: Adequate for Discharge  Goal: Absence of cardiac dysrhythmias or at baseline rhythm  Description: INTERVENTIONS:  - Continuous cardiac monitoring, vital signs, obtain 12 lead EKG if ordered  - Administer antiarrhythmic and heart rate control medications as ordered  - Monitor electrolytes and administer replacement therapy as ordered  Outcome: Adequate for Discharge     Problem: RESPIRATORY - ADULT  Goal: Achieves optimal ventilation and oxygenation  Description: INTERVENTIONS:  - Assess for changes in respiratory status  - Assess for changes in mentation and behavior  - Position to facilitate oxygenation and minimize respiratory effort  - Oxygen administered by appropriate delivery if ordered  - Initiate smoking cessation education as indicated  - Encourage broncho-pulmonary hygiene including cough, deep breathe, Incentive Spirometry  - Assess the need for suctioning and aspirate as needed  - Assess and instruct to report SOB or any respiratory difficulty  - Respiratory Therapy support as indicated  Outcome: Adequate for Discharge     Problem: Prexisting or High Potential for Compromised Skin Integrity  Goal: Skin integrity is maintained or improved  Description: INTERVENTIONS:  - Identify patients at risk for skin breakdown  - Assess and monitor skin integrity  - Assess and monitor nutrition and hydration status  - Monitor labs   - Assess for incontinence   - Turn and reposition patient  - Assist with mobility/ambulation  - Relieve pressure over bony prominences  - Avoid friction and shearing  - Provide appropriate hygiene as needed including keeping skin clean and dry  - Evaluate need for skin  moisturizer/barrier cream  - Collaborate with interdisciplinary team   - Patient/family teaching  - Consider wound care consult   Outcome: Adequate for Discharge

## 2023-12-13 NOTE — PROGRESS NOTES
"  Cardiology         Progress Note - Cardiology   Leonardo Oviedo 68 y.o. male MRN: [id number]  Unit/Bed#: [place] [address] -01 Encounter: [id number]          Assessment/Recommendations/Discussion:   Acute on chronic diastolic congestive heart failure              - TTE 10/11/23: LVEF 65%, grade 2 diastolic dysfunction, poor study quality as it was technically difficult.              - pharmacologic nuclear stress test 1/23/23: Post EF 65%, ECG negative for ischemia.              - prescribed outpatient diuretic Rx, torsemide 20 mg twice daily, spironolactone 25 mg twice daily.  Moderate pulmonary hypertension  Moderate restrictive lung disease  Paroxysmal atrial fibrillation and atrial flutter               - anticoagulation with Xarelto 20 mg daily.              - AV blocker Rx, Toprol-XL 25 mg daily.  - had long standing since before October 2019 now in SR.  - status post DCCV, May 2021.  - status post RFA ablation, June 2021 and July 2021.  Lymphedema  Morbid obesity  Obstructive sleep apnea on BiPAP      Volume status now compensated, will transition to torsemide 40 mg in a.m./20 mg in p.m.  Continue Xarelto coagulation and metoprolol.  Heart rate well-controlled  Continue spironolactone for hypertension  Will arrange post CHF discharge follow-up in our office within 1 week  Okay to discharge later today from cardiac standpoint                Subjective: Patient seen and examined, feels well, denies chest pain, shortness of breath                Physical Exam:  GEN:  NAD  HEENT:  MMM, NCAT, pink conjunctiva, EOMI, nonicteric sclera  CV:  NO JVD/HJR, RR, NO M/R/G, +S1/S2, NO PARASTERNAL HEAVE/THRILL, mild LE EDEMA, NO HEPATIC SYSTOLIC PULSATION, WARM EXTREMITIES  RESP:  CTAB/L  ABD:  SOFT, NT, NO GROSS ORGANOMEGALY        Vitals:   /75 (BP Location: Left arm)   Pulse 90   Temp (!) 97.3 °F (36.3 °C) (Oral)   Resp 18   Ht 5' 9\" (1.753 m)   Wt (!) 138 kg (305 lb 5.4 oz)   SpO2 93%   BMI 45.09 kg/m² "   Vitals:    12/13/23 0520 12/13/23 0535   Weight: (!) 138 kg (305 lb 5.4 oz) (!) 138 kg (305 lb 5.4 oz)       Intake/Output Summary (Last 24 hours) at 12/13/2023 1133  Last data filed at 12/13/2023 0100  Gross per 24 hour   Intake 1495 ml   Output 2750 ml   Net -1255 ml         Lab Results:  Results from last 7 days   Lab Units 12/13/23  0517   WBC Thousand/uL 4.93   HEMOGLOBIN g/dL 13.0   HEMATOCRIT % 37.9   PLATELETS Thousands/uL 182     Results from last 7 days   Lab Units 12/13/23  0517   POTASSIUM mmol/L 3.7   CHLORIDE mmol/L 96   CO2 mmol/L 33*   BUN mg/dL 30*   CREATININE mg/dL 1.01   CALCIUM mg/dL 9.7     Results from last 7 days   Lab Units 12/13/23  0517   POTASSIUM mmol/L 3.7   CHLORIDE mmol/L 96   CO2 mmol/L 33*   BUN mg/dL 30*   CREATININE mg/dL 1.01   CALCIUM mg/dL 9.7           Medications:    Current Facility-Administered Medications:     acetaminophen (TYLENOL) tablet 650 mg, 650 mg, Oral, Q4H PRN, Cristiana Choduhary PA-C    allopurinol (ZYLOPRIM) tablet 300 mg, 300 mg, Oral, Daily, Cristiana Choudhary PA-C, 300 mg at 12/13/23 0946    calcium carbonate (TUMS) chewable tablet 500 mg, 500 mg, Oral, Daily PRN, Wally Ca PA-C    ferrous sulfate tablet 325 mg, 325 mg, Oral, Daily With Breakfast, Cristiana Choudhary PA-C, 325 mg at 12/13/23 0817    finasteride (PROSCAR) tablet 5 mg, 5 mg, Oral, Daily, Cristiana Choudhary PA-C, 5 mg at 12/13/23 0946    furosemide (LASIX) injection 80 mg, 80 mg, Intravenous, BID (diuretic), NONA Adams, 80 mg at 12/13/23 0946    metoprolol succinate (TOPROL-XL) 24 hr tablet 25 mg, 25 mg, Oral, Daily, Cristiana Choudhary PA-C, 25 mg at 12/13/23 0946    ondansetron (ZOFRAN) injection 4 mg, 4 mg, Intravenous, Q6H PRN, Cristiana Choudhary PA-C    rivaroxaban (XARELTO) tablet 20 mg, 20 mg, Oral, Daily, Cristiana Choudhary PA-C, 20 mg at 12/13/23 0946    spironolactone (ALDACTONE) tablet 25 mg, 25 mg, Oral, BID, Denys Viveros DO, 25 mg at 12/13/23 0946    This note  was completed in part utilizing [employer] Software.  Grammatical errors, random word insertions, spelling mistakes, and incomplete sentences may be an occasional consequence of this system secondary to software limitations, ambient noise, and hardware issues.  If you have any questions or concerns about the content, text, or information contained within the body of this dictation, please contact the provider for clarification.

## 2023-12-13 NOTE — ASSESSMENT & PLAN NOTE
Wt Readings from Last 3 Encounters:   12/13/23 (!) 138 kg (305 lb 5.4 oz)   12/07/23 (!) 146 kg (322 lb 3.2 oz)   11/20/23 (!) 147 kg (324 lb)     Patient with progressive lower extremity edema. States worsened after recent discharge with steroids for suspected PNA  Possible acute on chronic diastolic heart failure  BNP on admission: 91  Continue IV diuresis with goal of net -1 L  Currently receiving IV Lasix 80 mg twice daily  Received dose of metolazone yesterday  BNP not markedly elevated but can be falsely low in patients with obesity  Appreciate cardiology recommendations  TTE 10/11/23: LVEF 65%, grade 2 diastolic dysfunction   Approximately 20 pound weight gain  Down approximately 14 lb since admission with net output of 11.4L, no changes in weight today  Dry weight approximately 302lbs

## 2023-12-13 NOTE — ASSESSMENT & PLAN NOTE
Wt Readings from Last 3 Encounters:   12/13/23 (!) 138 kg (305 lb 5.4 oz)   12/07/23 (!) 146 kg (322 lb 3.2 oz)   11/20/23 (!) 147 kg (324 lb)     Patient with progressive lower extremity edema. States worsened after recent discharge with steroids for suspected PNA  Possible acute on chronic diastolic heart failure  BNP on admission: 91  Continue IV diuresis with goal of net -1 L  Currently receiving IV Lasix 80 mg twice daily  Received dose of metolazone yesterday  Plan for transition to p.o. torsemide 40 mg every morning and 20 mg every afternoon  BNP not markedly elevated but can be falsely low in patients with obesity  Appreciate cardiology recommendations  TTE 10/11/23: LVEF 65%, grade 2 diastolic dysfunction   Approximately 20 pound weight gain  Down approximately 14 lb since admission with net output of 11.4L, no changes in weight today  Dry weight approximately 302lbs

## 2023-12-13 NOTE — PLAN OF CARE
Problem: Potential for Falls  Goal: Patient will remain free of falls  Description: INTERVENTIONS:  - Educate patient/family on patient safety including physical limitations  - Instruct patient to call for assistance with activity   - Consult OT/PT to assist with strengthening/mobility   - Keep Call bell within reach  - Keep bed low and locked with side rails adjusted as appropriate  - Keep care items and personal belongings within reach  - Initiate and maintain comfort rounds  - Make Fall Risk Sign visible to staff  - Offer Toileting every 2 Hours, in advance of need  - Initiate/Maintain bed alarm  - Obtain necessary fall risk management equipment:   - Apply yellow socks and bracelet for high fall risk patients  - Consider moving patient to room near nurses station  Outcome: Progressing     Problem: PAIN - ADULT  Goal: Verbalizes/displays adequate comfort level or baseline comfort level  Description: Interventions:  - Encourage patient to monitor pain and request assistance  - Assess pain using appropriate pain scale  - Administer analgesics based on type and severity of pain and evaluate response  - Implement non-pharmacological measures as appropriate and evaluate response  - Consider cultural and social influences on pain and pain management  - Notify physician/advanced practitioner if interventions unsuccessful or patient reports new pain  Outcome: Progressing     Problem: INFECTION - ADULT  Goal: Absence or prevention of progression during hospitalization  Description: INTERVENTIONS:  - Assess and monitor for signs and symptoms of infection  - Monitor lab/diagnostic results  - Monitor all insertion sites, i.e. indwelling lines, tubes, and drains  - Monitor endotracheal if appropriate and nasal secretions for changes in amount and color  - [place] appropriate cooling/warming therapies per order  - Administer medications as ordered  - Instruct and encourage patient and family to use good hand hygiene  technique  - Identify and instruct in appropriate isolation precautions for identified infection/condition  Outcome: Progressing  Goal: Absence of fever/infection during neutropenic period  Description: INTERVENTIONS:  - Monitor WBC    Outcome: Progressing     Problem: SAFETY ADULT  Goal: Patient will remain free of falls  Description: INTERVENTIONS:  - Educate patient/family on patient safety including physical limitations  - Instruct patient to call for assistance with activity   - Consult OT/PT to assist with strengthening/mobility   - Keep Call bell within reach  - Keep bed low and locked with side rails adjusted as appropriate  - Keep care items and personal belongings within reach  - Initiate and maintain comfort rounds  - Make Fall Risk Sign visible to staff  - Offer Toileting every 2 Hours, in advance of need  - Initiate/Maintain bed alarm  - Obtain necessary fall risk management equipment:   - Apply yellow socks and bracelet for high fall risk patients  - Consider moving patient to room near nurses station  Outcome: Progressing  Goal: Maintain or return to baseline ADL function  Description: INTERVENTIONS:  -  Assess patient's ability to carry out ADLs; assess patient's baseline for ADL function and identify physical deficits which impact ability to perform ADLs (bathing, care of mouth/teeth, toileting, grooming, dressing, etc.)  - Assess/evaluate cause of self-care deficits   - Assess range of motion  - Assess patient's mobility; develop plan if impaired  - Assess patient's need for assistive devices and provide as appropriate  - Encourage maximum independence but intervene and supervise when necessary  - Involve family in performance of ADLs  - Assess for home care needs following discharge   - Consider OT consult to assist with ADL evaluation and planning for discharge  - Provide patient education as appropriate  Outcome: Progressing  Goal: Maintains/Returns to pre admission functional  level  Description: INTERVENTIONS:  - Perform AM-PAC 6 Click Basic Mobility/ Daily Activity assessment daily.  - Set and communicate daily mobility goal to care team and patient/family/caregiver.   - Collaborate with rehabilitation services on mobility goals if consulted  - Perform Range of Motion 3 times a day.  - Reposition patient every 2 hours.  - Dangle patient 3 times a day  - Stand patient 3 times a day  - Ambulate patient 3 times a day  - Out of bed to chair 3 times a day   - Out of bed for meals 3 times a day  - Out of bed for toileting  - Record patient progress and toleration of activity level   Outcome: Progressing     Problem: DISCHARGE PLANNING  Goal: Discharge to home or other facility with appropriate resources  Description: INTERVENTIONS:  - Identify barriers to discharge w/patient and caregiver  - Arrange for needed discharge resources and transportation as appropriate  - Identify discharge learning needs (meds, wound care, etc.)  - Arrange for interpretive services to assist at discharge as needed  - Refer to Case Management Department for coordinating discharge planning if the patient needs post-hospital services based on physician/advanced practitioner order or complex needs related to functional status, cognitive ability, or social support system  Outcome: Progressing     Problem: Knowledge Deficit  Goal: Patient/family/caregiver demonstrates understanding of disease process, treatment plan, medications, and discharge instructions  Description: Complete learning assessment and assess knowledge base.  Interventions:  - Provide teaching at level of understanding  - Provide teaching via preferred learning methods  Outcome: Progressing     Problem: CARDIOVASCULAR - ADULT  Goal: Maintains optimal cardiac output and hemodynamic stability  Description: INTERVENTIONS:  - Monitor I/O, vital signs and rhythm  - Monitor for S/S and trends of decreased cardiac output  - Administer and titrate ordered  vasoactive medications to optimize hemodynamic stability  - Assess quality of pulses, skin color and temperature  - Assess for signs of decreased coronary artery perfusion  - Instruct patient to report change in severity of symptoms  Outcome: Progressing  Goal: Absence of cardiac dysrhythmias or at baseline rhythm  Description: INTERVENTIONS:  - Continuous cardiac monitoring, vital signs, obtain 12 lead EKG if ordered  - Administer antiarrhythmic and heart rate control medications as ordered  - Monitor electrolytes and administer replacement therapy as ordered  Outcome: Progressing     Problem: RESPIRATORY - ADULT  Goal: Achieves optimal ventilation and oxygenation  Description: INTERVENTIONS:  - Assess for changes in respiratory status  - Assess for changes in mentation and behavior  - Position to facilitate oxygenation and minimize respiratory effort  - Oxygen administered by appropriate delivery if ordered  - Initiate smoking cessation education as indicated  - Encourage broncho-pulmonary hygiene including cough, deep breathe, Incentive Spirometry  - Assess the need for suctioning and aspirate as needed  - Assess and instruct to report SOB or any respiratory difficulty  - Respiratory Therapy support as indicated  Outcome: Progressing     Problem: Prexisting or High Potential for Compromised Skin Integrity  Goal: Skin integrity is maintained or improved  Description: INTERVENTIONS:  - Identify patients at risk for skin breakdown  - Assess and monitor skin integrity  - Assess and monitor nutrition and hydration status  - Monitor labs   - Assess for incontinence   - Turn and reposition patient  - Assist with mobility/ambulation  - Relieve pressure over bony prominences  - Avoid friction and shearing  - Provide appropriate hygiene as needed including keeping skin clean and dry  - Evaluate need for skin moisturizer/barrier cream  - Collaborate with interdisciplinary team   - Patient/family teaching  - Consider wound  care consult   Outcome: Progressing

## 2023-12-13 NOTE — PROGRESS NOTES
[facility]  Progress Note  Name: Leonardo Oviedo I  MRN: [id number]  Unit/Bed#: [place] [address] -01 I Date of Admission: 12/7/2023   Date of Service: 12/13/2023 I Hospital Day: 6    Assessment/Plan   * CHF exacerbation (HCC)  Assessment & Plan  Wt Readings from Last 3 Encounters:   12/13/23 (!) 138 kg (305 lb 5.4 oz)   12/07/23 (!) 146 kg (322 lb 3.2 oz)   11/20/23 (!) 147 kg (324 lb)     Patient with progressive lower extremity edema. States worsened after recent discharge with steroids for suspected PNA  Possible acute on chronic diastolic heart failure  BNP on admission: 91  Continue IV diuresis with goal of net -1 L  Currently receiving IV Lasix 80 mg twice daily  Received dose of metolazone yesterday  BNP not markedly elevated but can be falsely low in patients with obesity  Appreciate cardiology recommendations  TTE 10/11/23: LVEF 65%, grade 2 diastolic dysfunction   Approximately 20 pound weight gain  Down approximately 14 lb since admission with net output of 11.4L, no changes in weight today  Dry weight approximately 302lbs      Venous ulcer (HCC)  Assessment & Plan  Continue localized wound care  Leg elevation  Continue local wound care    Bilateral lower extremity edema  Assessment & Plan  Most likely due to heart failure with addition of venous insufficiency and lymphedema.   Patient uses compression stockings at home  Combination of lymphedema as well as diastolic heart failure      Atrial fibrillation (HCC)  Assessment & Plan  Ventricular rate controlled  Continue metoprolol  Anticoagulated with Xarelto             VTE Pharmacologic Prophylaxis:   High Risk (Score >/= 5) - Pharmacological DVT Prophylaxis Ordered: rivaroxaban (Xarelto). Sequential Compression Devices Ordered.    Mobility:   Basic Mobility Inpatient Raw Score: 24  JH-HLM Goal: 8: Walk 250 feet or more  JH-HLM Achieved: 8: Walk 250 feet ot more  HLM Goal achieved. Continue to encourage appropriate  mobility.    Patient Centered Rounds: I performed bedside rounds with nursing staff today.   Discussions with Specialists or Other Care Team Provider: None    Education and Discussions with Family / Patient: Patient declined call to .     Total Time Spent on Date of Encounter in care of patient:  This time was spent on one or more of the following: performing physical exam; counseling and coordination of care; obtaining or reviewing history; documenting in the medical record; reviewing/ordering tests, medications or procedures; communicating with other healthcare professionals and discussing with patient's family/caregivers.    Current Length of Stay: 6 day(s)  Current Patient Status: Inpatient   Certification Statement: The patient will continue to require additional inpatient hospital stay due to CHF pending cardiology recommendations  Discharge Plan: Anticipate discharge later today or tomorrow to home.    Code Status: Level 1 - Full Code    Subjective:   Patient reports he is doing okay today.  Notes significant improvement since admission.  Denies any shortness of breath.  Notes his lower extremity edema has improved.    Objective:     Vitals:   Temp (24hrs), Av.2 °F (36.8 °C), Min:97.3 °F (36.3 °C), Max:98.7 °F (37.1 °C)    Temp:  [97.3 °F (36.3 °C)-98.7 °F (37.1 °C)] 97.3 °F (36.3 °C)  HR:  [79-90] 90  Resp:  [18-20] 18  BP: ()/(49-75) 121/75  SpO2:  [93 %-99 %] 93 %  Body mass index is 45.09 kg/m².     Input and Output Summary (last 24 hours):     Intake/Output Summary (Last 24 hours) at 2023 0825  Last data filed at 2023 0100  Gross per 24 hour   Intake 1495 ml   Output 2750 ml   Net -1255 ml       Physical Exam:   Physical Exam  Vitals reviewed.   Constitutional:       General: He is not in acute distress.     Appearance: He is obese.   HENT:      Head: Normocephalic and atraumatic.   Eyes:      General: No scleral icterus.     Conjunctiva/sclera: Conjunctivae normal.    Cardiovascular:      Rate and Rhythm: Normal rate and regular rhythm.      Heart sounds: No murmur heard.  Pulmonary:      Effort: Pulmonary effort is normal. No respiratory distress.      Breath sounds: Normal breath sounds. No wheezing or rales.   Abdominal:      General: Bowel sounds are normal. There is no distension.      Palpations: Abdomen is soft.      Tenderness: There is no abdominal tenderness.   Musculoskeletal:         General: No tenderness or deformity.      Cervical back: Neck supple.   Skin:     General: Skin is warm and dry.   Neurological:      Mental Status: He is alert and oriented to person, place, and time.   Psychiatric:         Mood and Affect: Mood normal.         Behavior: Behavior normal.          Additional Data:     Labs:  Results from last 7 days   Lab Units 12/13/23  0517 12/08/23  0934 12/07/23  1123   WBC Thousand/uL 4.93   < > 4.44   HEMOGLOBIN g/dL 13.0   < > 11.7*   HEMATOCRIT % 37.9   < > 35.6*   PLATELETS Thousands/uL 182   < > 134*   NEUTROS PCT %  --   --  66   LYMPHS PCT %  --   --  23   MONOS PCT %  --   --  9   EOS PCT %  --   --  2    < > = values in this interval not displayed.     Results from last 7 days   Lab Units 12/13/23  0517   SODIUM mmol/L 137   POTASSIUM mmol/L 3.7   CHLORIDE mmol/L 96   CO2 mmol/L 33*   BUN mg/dL 30*   CREATININE mg/dL 1.01   ANION GAP mmol/L 8   CALCIUM mg/dL 9.7   GLUCOSE RANDOM mg/dL 107                       Lines/Drains:  Invasive Devices       Peripheral Intravenous Line  Duration             Peripheral IV 12/07/23 Dorsal (posterior);Left Hand 5 days    Peripheral IV 12/12/23 Dorsal (posterior);Left Forearm 1 day                          Imaging: No pertinent imaging reviewed.    Recent Cultures (last 7 days):         Last 24 Hours Medication List:   Current Facility-Administered Medications   Medication Dose Route Frequency Provider Last Rate    acetaminophen  650 mg Oral Q4H PRN Cristiana Choudhary PA-C      allopurinol  300 mg Oral  Daily Cristiana Choudhary PA-C      calcium carbonate  500 mg Oral Daily PRN Wally Ca PA-C      ferrous sulfate  325 mg Oral Daily With Breakfast Cristiana Choudhary PA-C      finasteride  5 mg Oral Daily Cristiana Choudhary PA-C      furosemide  80 mg Intravenous BID (diuretic) NONA [place]      metoprolol succinate  25 mg Oral Daily Cristiana Choudhary PA-C      ondansetron  4 mg Intravenous Q6H PRN Cristiana Choudhary PA-C      rivaroxaban  20 mg Oral Daily Cristiana Choudhary PA-C      spironolactone  25 mg Oral BID Denys Viveros, DO          Today, Patient Was Seen By: India Taylor PA-C    **Please Note: This note may have been constructed using a voice recognition system.**

## 2023-12-13 NOTE — NURSING NOTE
Reviewed discharge instructions with patient. Verbalized understanding. Confirmed pharmacy for updated medication. No questions.

## 2023-12-13 NOTE — DISCHARGE SUMMARY
[facility]  Discharge- Leonardo Oviedo 1955, 68 y.o. male MRN: [id number]  Unit/Bed#: [place] [address] -01 Encounter: [id number]  Primary Care Provider: Yolanda Merritt MD   Date and time admitted to hospital: 12/7/2023 10:34 AM    * CHF exacerbation (HCC)  Assessment & Plan  Wt Readings from Last 3 Encounters:   12/13/23 (!) 138 kg (305 lb 5.4 oz)   12/07/23 (!) 146 kg (322 lb 3.2 oz)   11/20/23 (!) 147 kg (324 lb)     Patient with progressive lower extremity edema. States worsened after recent discharge with steroids for suspected PNA  Possible acute on chronic diastolic heart failure  BNP on admission: 91  Continue IV diuresis with goal of net -1 L  Currently receiving IV Lasix 80 mg twice daily  Received dose of metolazone yesterday  Plan for transition to p.o. torsemide 40 mg every morning and 20 mg every afternoon  BNP not markedly elevated but can be falsely low in patients with obesity  Appreciate cardiology recommendations  TTE 10/11/23: LVEF 65%, grade 2 diastolic dysfunction   Approximately 20 pound weight gain  Down approximately 14 lb since admission with net output of 11.4L, no changes in weight today  Dry weight approximately 302lbs      Venous ulcer (HCC)  Assessment & Plan  Continue localized wound care  Leg elevation  Continue local wound care    Bilateral lower extremity edema  Assessment & Plan  Most likely due to heart failure with addition of venous insufficiency and lymphedema.   Patient uses compression stockings at home  Combination of lymphedema as well as diastolic heart failure      Atrial fibrillation (HCC)  Assessment & Plan  Ventricular rate controlled  Continue metoprolol  Anticoagulated with Xarelto        Medical Problems       Resolved Problems  Date Reviewed: 12/13/2023            Resolved    Obstructive sleep apnea on CPAP 12/8/2023     Resolved by  Denys Viveros DO        Discharging Physician / Practitioner: India Taylor PA-C  PCP: Yolanda  MD Shaina  Admission Date:   Admission Orders (From admission, onward)       Ordered        12/07/23 1222  INPATIENT ADMISSION  Once                          Discharge Date: 12/13/23    Consultations During Hospital Stay:  Cardiology    Procedures Performed:   XR chest 2 views    Result Date: 12/8/2023  Impression: No acute cardiopulmonary disease.       Significant Findings / Test Results:   See above    Incidental Findings:   None      Test Results Pending at Discharge (will require follow up):   None     Outpatient Tests Requested:  Outpatient cardiology follow-up    Complications: None    Reason for Admission: CHF exacerbation    Hospital Course:   Leonardo Oviedo is a 68 y.o. male patient who originally presented to the hospital on 12/7/2023 due to fluid overload.  He was seen by cardiology and sent to the emergency room for evaluation.  He was started on IV diuretics and monitored closely.  He had significant improvement in his weight and urinary output during his hospital stay.  Cardiology monitored patient daily and ultimately patient was able to be switched to p.o. diuretics prior to discharge.      Please see above list of diagnoses and related plan for additional information.     Condition at Discharge: stable    Discharge Day Visit / Exam:   * Please refer to separate progress note for these details *      Discharge instructions/Information to patient and family:   See after visit summary for information provided to patient and family.      Provisions for Follow-Up Care:  See after visit summary for information related to follow-up care and any pertinent home health orders.      Mobility at time of Discharge:   Basic Mobility Inpatient Raw Score: 24  JH-HLM Goal: 8: Walk 250 feet or more  JH-HLM Achieved: 8: Walk 250 feet ot more  HLM Goal achieved. Continue to encourage appropriate mobility.     Disposition:   Home    Planned Readmission: None     Discharge Statement:  I spent 35 minutes discharging  the patient. This time was spent on the day of discharge. I had direct contact with the patient on the day of discharge. Greater than 50% of the total time was spent examining patient, answering all patient questions, arranging and discussing plan of care with patient as well as directly providing post-discharge instructions.  Additional time then spent on discharge activities.    Discharge Medications:  See after visit summary for reconciled discharge medications provided to patient and/or family.      **Please Note: This note may have been constructed using a voice recognition system**

## 2023-12-14 ENCOUNTER — PATIENT OUTREACH (OUTPATIENT)
Dept: CASE MANAGEMENT | Facility: HOSPITAL | Age: 68
End: 2023-12-14

## 2023-12-14 DIAGNOSIS — I50.33 ACUTE ON CHRONIC DIASTOLIC CONGESTIVE HEART FAILURE (HCC): Primary | ICD-10-CM

## 2023-12-14 NOTE — UTILIZATION REVIEW
NOTIFICATION OF ADMISSION DISCHARGE   This is a Notification of Discharge from [facility]. Please be advised that this patient has been discharge from our facility. Below you will find the admission and discharge date and time including the patient’s disposition.   UTILIZATION REVIEW CONTACT:  Selin Lee  Utilization   Network Utilization Review Department  Phone: [phone] x carefully listen to the prompts. All voicemails are confidential.  Email: [email or web address]     ADMISSION INFORMATION  PRESENTATION DATE: 12/7/2023 10:34 AM  OBERVATION ADMISSION DATE:   INPATIENT ADMISSION DATE: 12/7/23 12:22 PM   DISCHARGE DATE: 12/13/2023  5:30 PM   DISPOSITION:Home/Self Care    Network Utilization Review Department  ATTENTION: Please call with any questions or concerns to [phone] and carefully listen to the prompts so that you are directed to the right person. All voicemails are confidential.   For Discharge needs, contact Care Management DC Support Team at [phone] opt. 2  Send all requests for admission clinical reviews, approved or denied determinations and any other requests to dedicated fax number below belonging to the campus where the patient is receiving treatment. List of dedicated fax numbers for the Facilities:  FACILITY NAME UR FAX NUMBER   ADMISSION DENIALS (Administrative/Medical Necessity) [phone]   DISCHARGE SUPPORT TEAM ([facility]) [phone]   PARENT CHILD HEALTH (Maternity/NICU/Pediatrics) [phone]   [facility] [phone]   [facility] [phone]   [facility] [phone]   [facility] [phone]   [facility] [phone]   [facility] [phone]   [facility] [phone]   [facility] [phone]    [facility] [phone]   [facility] [phone]   [facility] [phone]

## 2023-12-15 ENCOUNTER — PATIENT OUTREACH (OUTPATIENT)
Dept: CASE MANAGEMENT | Facility: HOSPITAL | Age: 68
End: 2023-12-15

## 2023-12-15 NOTE — PROGRESS NOTES
Heart Failure Outpatient Care Coordinator Note: Call made to patient and role of HF OPCM reviewed and patient agreeable to outreach. Self-management/education and teach back:  Primary learner: Self  Following low sodium diet: Yes making some changes. Reviewed high sodium foods to avoid  Following fluid restriction:unaware and recommended 50-64 oz  Hospital discharge weight: 305.3  Weighing daily yes and recordinst home weight   300    Weight today:300  Monitoring symptoms: Yes  Any current symptoms: chronic LE edema  Knows when to call provider: reinforced  Medication reviewed and taking all as prescribed:Yes and aware of medication changes  Knows name of diuretic: Yes   Escalation plan: call cardiology and recommended he ask for prn dose at next office visit    Care Coordination:  Aware of cardiology follow up appointment: Yes  and made aware it is at [facility] office  Aware of PCP follow up appointment:needs  Transportation:drives  Social Support: Lives with wife and adult daughter, also has a son  Insurance/financial concerns:none  Home health care: no  Health literacy:fair  Engagement:good  Personal Goal:we discussed CPAP and he would like to be re-evaluated and try to get acclimated to it  Additional comments: Will follow weekly for now and contact information given.

## 2023-12-18 ENCOUNTER — APPOINTMENT (OUTPATIENT)
Dept: LAB | Facility: HOSPITAL | Age: 68
End: 2023-12-18
Payer: COMMERCIAL

## 2023-12-18 DIAGNOSIS — R25.2 CRAMPING OF HANDS: ICD-10-CM

## 2023-12-18 LAB — TSH SERPL DL<=0.05 MIU/L-ACNC: 4.13 UIU/ML (ref 0.45–4.5)

## 2023-12-18 PROCEDURE — 84443 ASSAY THYROID STIM HORMONE: CPT

## 2023-12-18 PROCEDURE — 36415 COLL VENOUS BLD VENIPUNCTURE: CPT

## 2023-12-19 NOTE — PROGRESS NOTES
Heart Failure Outpatient Progress Note - Leonardo Oviedo 68 y.o. male MRN: [id number]    @ Encounter: [id number]      Assessment/Plan:    Patient Active Problem List    Diagnosis Date Noted    CHF exacerbation (HCC) 12/07/2023    Pneumonia 11/14/2023    Chronic pain syndrome 05/04/2023    Lumbar spondylosis 05/04/2023    Chronic bilateral low back pain without sciatica 05/04/2023    Venous ulcer (HCC) 01/03/2023    Atrial flutter (HCC) 07/29/2021    Iron deficiency 06/16/2021    Stage 3a chronic kidney disease (HCC) 06/15/2021    Anemia 06/15/2021    Primary osteoarthritis 06/15/2021    Encounter for cardioversion procedure 05/06/2021    SOB (shortness of breath) 01/22/2021    Chronic diastolic heart failure (HCC) 01/22/2021    Pulmonary hypertension (HCC) 11/02/2020    Bilateral lower extremity edema 10/27/2019    Atrial fibrillation (HCC) 10/26/2019    Leukopenia 10/26/2019    Weakness of right upper extremity 10/26/2019    Paresthesias 10/26/2019    Cervical pain (neck) 10/26/2019    Obesity, Class III, BMI 40-49.9 (morbid obesity) (HCC) 10/26/2019    Thrombocytopenia (HCC) 10/26/2019    Obesity, morbid ([facility]) 04/16/2015    Venous insufficiency of both lower extremities 02/24/2015     Chronic HFpEF with elevated PASP. Gaining 1 lb per day since discharge. Has some worsening leg edema and elevated JVP.  Suspect due to inadequate diuretic dosing. Will give burst dose of Torsemide 60 mg BID x 3 days, followed by 40 mg BID thereafter. Check lab work next week.  Continued lifestyle management recommended, including weight loss, exercise, treatment of sleep apnea.     Weights : 319 lbs,317 lbs, 305 lbs at discharge, today,  312 lbs.     Volume management : Torsemide 40 mg BID,  Spironolactone 25 mg BID,     -TTE 10/11/23. LVEF 65% with grade II DD.   -PFT 10/9/23:  Results:  FEV1/FVC Ratio: 88 %  Forced Vital Capacity: 2.15 L           51 % predicted  FEV1: 1.90 L   60 % predicted  After administration of  bronchodilator FEV1: reduced 16%     Lung volumes by body plethysmography: Total Lung Capacity 58 % predicted Residual volume 73 % predicted     DLCO corrected for patients hemoglobin level: 65 %     Interpretation:     Moderate restrictive airflow defect on spirometry      No response to the administration to bronchodilator per ATS Standards     Moderately reduced TLC     Mildly Reduced  Diffusion     Restricted flow volume loops     -Pharmacologic nuclear stress test 1/2023: appears negative for myocardial ischemia, gated EF 65%   -TTE 1/2023: LVEF 65%, mild LVH, normal diastolic function, septal motion consistent with elevated PVR, mild RV dilatation with normal function, mild biatrial dilatation, trace MR/TR with PASP 37 mmHg (poor Doppler signal),       Chronic atrial fib. H/o prior failed DCCV. S/P atrial fib/flutter ablation x 2 2021.Repeated due to recurrence. Now maintaining SR  Rate Metoprolol succinate 25 mg daily  Rhythm Now off Amio.  AC Xarelto 20 mg daily    Morbid obesity. Has seen bariatrics. Hesitant about surgical interventions.   ROBERT. Not using . Referral placed to sleep medicine to discuss alternatives to CPAP as cannot tolerate.   Chronic venous insuff with lymphedema. H/O venous ablation     HPI:   HENRRY, 10/2/23:  It was a pleasure to see Leonardo Oviedo in the office today for follow-up CV evaluation.  He has a longstanding history of atrial fibrillation with prior failed ELAINE guided cardioversion, history of morbid obesity and pulmonary hypertension with chronic venous insufficiency/lymphedema.  The patient has a known history of obstructive sleep apnea was noncompliant with his CPAP therapy.  Over the course of many years he has been having lower extremity edema which has been believe secondary to lymphedema with chronic venous insufficiency.  He has had venous ablation is in the past for his reflux disease.  In October 2019, he was found have pulmonary hypertension with pulmonary artery  systolic pressure is estimated at 50 mm Hg.  We had initially seen him in late August 2020 due to increased fatigue and dyspnea on exertion.  He has become somewhat short of breath with basic activity.  Previously, he had been managed for his atrial fibrillation at [facility].       Echocardiogram, stress test and Holter monitor were ordered, but stress test and Holter were completed.  Stress test was found to be negative for myocardial ischemia.  Holter monitor demonstrated atrial fibrillation with an average heart rate of 92 beats per minute and rare VPCs.  His weight trends unfortunately continue to go upward and he had dietary discretion.  He seen by electrophysiology and recommended for a sleep study as well as an evaluation with advanced heart failure.  He was placed on CPAP and has been compliant since that time.  He also was seen by bariatric surgery and wish to undergo conservative options with dietary modifications.  Despite increasing diuretic load, he he continue to gain approximately 30 lb and was subsequently sent to [facility] in [place].  He was diuresed down to 285 lb.   While hospitalized, his echocardiogram was performed showing normal left ventricular function.      Following discharge, he was seen by electrophysiology and sent for a cardioversion in May 2021.  The cardioversion was initially successful, but he reverted back to rate controlled atrial flutter.  He has been placed on amiodarone and decreased down to 100 mg daily.  On his dose of torsemide, he had acute kidney injury with a creatinine rise to 1.9.  After holding his diuretics for several days, his creatinine came down to 1.1. He was then decreased on his torsemide to 20 mg b.i.d. And his weight trends continue to improve.  He has been aggressive in his dietary modifications.  He also underwent radiofrequency ablation for his atrial fibrillation/flutter.  He underwent a procedure in June 2021 Overall,  he has felt much improved.     In July 2021, he ended up going back into atrial flutter.  He was seen by electrophysiology who was recommending a repeat ablation study.  He underwent the ablation in late July 2021 which was successful.  He was seen in the office by EP following the procedure and had remained in sinus rhythm.  He subsequently was discontinued on his amiodarone.  In October to November 2022, patient developed some symptoms of shortness of breath with exertion.  This symptom had been fairly new and the patient was sent for testing including a stress test, chest x-ray and echocardiogram.  Stress test was found to be negative for myocardial ischemia.  His echocardiogram showed normal left-ventricular systolic function with mild pulmonary hypertension.  There was evidence of some mild increase in pressures on the right side.  Chest x-ray at the time was found to be grossly normal.  In July 2023, the patient developed 2 episodes of right-sided chest discomfort near his shoulder.  The discomfort would change with rotating his shoulder.  Due to the severity of one of the episodes, he presented to the emergency department and was diagnosed with musculoskeletal pain.  Troponins were negative during that hospitalization.  Additionally, he admitted to continued and progressive dyspnea on exertion a little higher than his baseline.  Admits to chronic lower extremity swelling with his lymphedema.  Denies orthopnea or paroxysmal nocturnal dyspnea.    10/23/23. Presents for follow up. Last seen by Dr. Prieto earlier in the month. Had complaints of weight gain and progressive dyspnea. His Torsemide dose was doubled. Follow up labs appeared stable. Repeat echo appeared largely unchanged from prior study. Feels ok today. His biggest complaint today is of worsening CHU over past few months. Says he had been going to the gym pretty faithfully until a month or two ago. Does not use CPAP. Still eating late at night and  making poor choices.     12/20/23. Presents for hospital follow up. Since last visit, admitted with PNA. Treated with antibiotics and steroids. Then seen by Dr. Prieto. Sent to the ED for volume overload. Diuresed and transitioned to Torsemide. Says since he left the hospital has gained 1 lb per day. Has been really watching his sodium intake and does not drink excessively. Feels his legs may be a little swollen. Otherwise not overly symptomatic but also not very active.     Past Medical History:   Diagnosis Date    A-fib ([facility])     ALANNA (acute kidney injury) ([facility]) 6/15/2021    Arthritis     CPAP (continuous positive airway pressure) dependence     Irregular heart beat     Lymphedema     Sleep apnea     Urinary frequency     Wears glasses     Wears partial dentures     lower partial       12 point ROS negative other than that stated in HPI    Allergies   Allergen Reactions    Penicillins Anaphylaxis    Sulfa Antibiotics Anaphylaxis    Levofloxacin Other (See Comments)     .    Current Outpatient Medications:     acetaminophen (TYLENOL) 500 mg tablet, Take 500 mg by mouth every 6 (six) hours as needed for mild pain, Disp: , Rfl:     alfuzosin (UROXATRAL) 10 mg 24 hr tablet, Take 10 mg by mouth daily, Disp: , Rfl:     allopurinol (ZYLOPRIM) 300 mg tablet, Take 300 mg by mouth daily, Disp: , Rfl:     ammonium lactate (LAC-HYDRIN) 12 % lotion, Apply topically 2 (two) times a day as needed for dry skin, Disp: , Rfl:     Cyanocobalamin (VITAMIN B 12 PO), Take by mouth, Disp: , Rfl:     Diclofenac Sodium (VOLTAREN) 1 %, Apply 2 g topically 4 (four) times a day, Disp: 100 g, Rfl: 0    dutasteride (AVODART) 0.5 mg capsule, Take 0.5 mg by mouth daily, Disp: , Rfl:     ferrous sulfate 325 (65 Fe) mg tablet, Take 325 mg by mouth, Disp: , Rfl:     Glucosamine-Chondroit-Vit C-Mn (Glucosamine 1500 Complex) CAPS, Take by mouth, Disp: , Rfl:     halobetasol (ULTRAVATE) 0.05 % ointment, APPLY TO AFFECTED AREA ON LEG TWICE DAILY, Disp:  , Rfl:     L-ARGININE-500 PO, Take 500 mg by mouth 2 (two) times a day , Disp: , Rfl:     loteprednol etabonate (LOTEMAX) 0.5 % ophthalmic suspension, 1 drop 4 (four) times a day, Disp: , Rfl:     metoprolol succinate (TOPROL-XL) 25 mg 24 hr tablet, Take 1 tablet (25 mg total) by mouth daily, Disp: 90 tablet, Rfl: 2    multivitamin (THERAGRAN) TABS, Take 1 tablet by mouth daily, Disp: , Rfl:     Omega-3 Fatty Acids (fish oil) 1,000 mg, Take 1,000 mg by mouth 2 (two) times a day, Disp: , Rfl:     patient supplied medication, Take 1 each by mouth 2 (two) times a day, Disp: , Rfl:     pregabalin (LYRICA) 100 mg capsule, Take 1 capsule (100 mg total) by mouth 3 (three) times a day, Disp: 42 capsule, Rfl: 0    rivaroxaban (Xarelto) 20 mg tablet, Take 1 tablet (20 mg total) by mouth daily, Disp: 90 tablet, Rfl: 2    spironolactone (ALDACTONE) 25 mg tablet, Take 1 tablet (25 mg total) by mouth 2 (two) times a day, Disp: 180 tablet, Rfl: 1    tadalafil (CIALIS) 10 MG tablet, , Disp: , Rfl:     tadalafil (CIALIS) 5 MG tablet, Take 5 mg by mouth daily as needed for erectile dysfunction, Disp: , Rfl:     torsemide (DEMADEX) 20 mg tablet, Take 2 tablets (40 mg total) by mouth in the morning AND 1 tablet (20 mg total) daily after lunch., Disp: 180 tablet, Rfl: 0    traMADol (ULTRAM) 50 mg tablet, Take 1 tablet (50 mg total) by mouth 2 (two) times a day as needed for moderate pain, Disp: 60 tablet, Rfl: 1    VITAMIN D PO, Take 2,000 Units by mouth daily , Disp: , Rfl:     Social History     Socioeconomic History    Marital status: /Civil Union     Spouse name: Not on file    Number of children: Not on file    Years of education: Not on file    Highest education level: Not on file   Occupational History    Not on file   Tobacco Use    Smoking status: Never     Passive exposure: Never    Smokeless tobacco: Never   Vaping Use    Vaping status: Never Used   Substance and Sexual Activity    Alcohol use: Yes     Alcohol/week:  5.0 standard drinks of alcohol     Types: 3 Glasses of wine, 2 Standard drinks or equivalent per week     Comment: socially    Drug use: No    Sexual activity: Not Currently     Partners: Female     Birth control/protection: Abstinence, Condom Male, Spermicide   Other Topics Concern    Not on file   Social History Narrative    Not on file     Social Determinants of Health     Financial Resource Strain: Low Risk  (8/24/2023)    Received from [facility]    Overall Financial Resource Strain (CARDIA)     Difficulty of Paying Living Expenses: Not hard at all   Food Insecurity: No Food Insecurity (12/7/2023)    Hunger Vital Sign     Worried About Running Out of Food in the Last Year: Never true     Ran Out of Food in the Last Year: Never true   Transportation Needs: No Transportation Needs (12/7/2023)    PRAPARE - Transportation     Lack of Transportation (Medical): No     Lack of Transportation (Non-Medical): No   Physical Activity: Inactive (8/24/2023)    Received from [facility]    Exercise Vital Sign     Days of Exercise per Week: 0 days     Minutes of Exercise per Session: 0 min   Stress: No Stress Concern Present (8/24/2023)    Received from [facility]    [employer] - Occupational Stress Questionnaire     Feeling of Stress : Not at all   Social Connections: Moderately Integrated (8/24/2023)    Received from [facility]    Social Connection and Isolation Panel [NHANES]     Frequency of Communication with Friends and Family: More than three times a week     Frequency of Social Gatherings with Friends and Family: More than three times a week     Attends Anabaptist Services: More than 4 times per year     Active Member of Clubs or Organizations: No     Attends Club or Organization Meetings: Patient refused     Marital Status:    Intimate Partner Violence: Not At Risk (8/24/2023)    [facility] from [facility]    Humiliation, Afraid, Rape, and Kick questionnaire     Fear of Current or Ex-Partner: No     Emotionally Abused: No     Physically Abused: No     Sexually Abused: No   Housing Stability: Low Risk  (12/7/2023)    Housing Stability Vital Sign     Unable to Pay for Housing in the Last Year: No     Number of Places Lived in the Last Year: 1     Unstable Housing in the Last Year: No       Family History   Problem Relation Age of Onset    Heart disease Mother     Lymphoma Father     Cancer Father     Heart disease Sister     Hypertension Brother     Diabetes Neg Hx     Thyroid disease Neg Hx     Stroke Neg Hx        Physical Exam:    Vitals: /70 (BP Location: Left arm, Patient Position: Sitting, Cuff Size: Large)   Pulse 74   Wt (!) 142 kg (312 lb)   SpO2 94%   BMI 46.07 kg/m²     Wt Readings from Last 3 Encounters:   12/13/23 (!) 138 kg (305 lb 5.4 oz)   12/07/23 (!) 146 kg (322 lb 3.2 oz)   11/20/23 (!) 147 kg (324 lb)       GEN: Leonardo Oviedo appears well, alert and oriented x 3, pleasant and cooperative   HEENT: pupils equal, round, and reactive to light; extraocular muscles intact  NECK: supple, no carotid bruits   HEART: regular rhythm, normal S1 and S2, no murmurs, clicks, gallops or rubs, JVP appears elevated  LUNGS: clear to auscultation bilaterally; no wheezes, rales, or rhonchi   ABDOMEN: normal bowel sounds, soft, no tenderness, + distention  EXTREMITIES: peripheral pulses normal; no clubbing, cyanosis, chronic lymphedema present  NEURO: no focal findings   SKIN: normal without suspicious lesions on exposed skin    Labs & Results:    Chemistry        Component Value Date/Time    K 3.7 12/13/2023 0517    K 4.6 09/03/2020 0805    CL 96 12/13/2023 0517     09/03/2020 0805    CO2 33 (H) 12/13/2023 0517    CO2 30 09/03/2020 0805    BUN 30 (H) 12/13/2023 0517    BUN 24 09/03/2020 0805    CREATININE 1.01 12/13/2023 0517        Component Value Date/Time    CALCIUM 9.7 12/13/2023  0517    CALCIUM 9.2 09/03/2020 0805    ALKPHOS 57 11/13/2023 1404    AST 35 11/13/2023 1404    ALT 14 11/13/2023 1404        Lab Results   Component Value Date    WBC 4.93 12/13/2023    HGB 13.0 12/13/2023    HCT 37.9 12/13/2023    MCV 91 12/13/2023     12/13/2023     Lab Results   Component Value Date    BNP 91 12/07/2023      Lab Results   Component Value Date    LDLCALC 135 (H) 04/19/2023     Lab Results   Component Value Date    ULO4GMMPEMPP 4.127 12/18/2023       EKG personally reviewed by NONA Cartwright.   No results found for this visit on 12/20/23.     Counseling / Coordination of Care  Total face to face time spent with patient 20 minutes.  An additional 10 minutes was spent for chart/data review and visit preparation.       Thank you for the opportunity to participate in the care of this patient.    NONA Cartwright

## 2023-12-20 ENCOUNTER — OFFICE VISIT (OUTPATIENT)
Dept: CARDIOLOGY CLINIC | Facility: CLINIC | Age: 68
End: 2023-12-20
Payer: COMMERCIAL

## 2023-12-20 VITALS
WEIGHT: 312 LBS | DIASTOLIC BLOOD PRESSURE: 70 MMHG | OXYGEN SATURATION: 94 % | HEART RATE: 74 BPM | SYSTOLIC BLOOD PRESSURE: 118 MMHG | BODY MASS INDEX: 46.07 KG/M2

## 2023-12-20 DIAGNOSIS — I50.32 CHRONIC DIASTOLIC HEART FAILURE (HCC): ICD-10-CM

## 2023-12-20 PROCEDURE — 99214 OFFICE O/P EST MOD 30 MIN: CPT | Performed by: NURSE PRACTITIONER

## 2023-12-20 RX ORDER — TORSEMIDE 20 MG/1
40 TABLET ORAL 2 TIMES DAILY
Qty: 180 TABLET | Refills: 3 | Status: SHIPPED | OUTPATIENT
Start: 2023-12-20

## 2023-12-20 NOTE — PATIENT INSTRUCTIONS
Weigh yourself daily  If you gain 3 lbs in one day or 5 lbs in one week, please call the office at [phone] and ask for a nurse or the heart failure nurse  Keep your sodium intake to <2 grams, (2000 mg) per day, and fluids <2 Liters (2000 ml) per day. This is around 6-7, 8 oz glasses of fluid per day    Increase your Torsemide to 60 mg twice daily for 3 days, then return to 40 mg twice daily  Get lab work to check kidney function and electrolytes.   I will price check Jardiance and Farxiga for you and we can discuss at next visit.

## 2023-12-29 ENCOUNTER — PATIENT OUTREACH (OUTPATIENT)
Dept: CASE MANAGEMENT | Facility: HOSPITAL | Age: 68
End: 2023-12-29

## 2023-12-29 DIAGNOSIS — I50.9 ACUTE ON CHRONIC CONGESTIVE HEART FAILURE, UNSPECIFIED HEART FAILURE TYPE (HCC): Primary | ICD-10-CM

## 2023-12-29 NOTE — PROGRESS NOTES
"Heart Failure Outpatient Care Coordinator Note: Chart notes reviewed and call made to patient. He had cardiology appointment last week, 12/20 and weight was up and provider increased his torsemide to 60 mg bid x 3 days and then maintenance back to 40 mg bid. Office weight was 312#, though patient reports was <310 on home scale. He states he did lose weight, down to 302# before holidays,  but has gained back to 306# today and states did have \"ham and lots of goodies\". He states is getting back on track and reading food labels. Denies SOB and edema \"same\". Encouraged adherence to diet and fluids. Added BMP recommended by cardiology to be done before his next visit next week. Will follow up.  " Patient signed out to me pending US result. Patient reporting increase in pain. 1 mg Dilaudid given. She remains hemodynamically appropriate. US shows a complex mass measuring 7 x 4.4 x 3.1 cm in the right adnexa with moderate amount of free fluid. Given this concerned for ruptured ectopic. Patient is RH + (12/16/2019). Type and screen sent. Rapid COVID obtained. Gyn consulted and will admit patient for treatment. Patient admitted in stable condition.       LOW SUSPICION FOR COVID 19.    Vitals:    10/27/20 2050 10/27/20 2135 10/27/20 2200 10/27/20 2230   BP: 139/82 (!) 144/97 (!) 146/90 123/60   Pulse: (!) 103 (!) 102 96 94   Resp: 16      Temp: 98.9 °F (37.2 °C)      TempSrc: Oral      SpO2: 98% 98% 98% 97%   Height: 5' 6\" (1.676 m)        ED Diagnoses        Final diagnoses    Vaginal bleeding in pregnancy          Pelvic pain during pregnancy          Ectopic pregnancy without intrauterine pregnancy, unspecified location                   Jean Claude Torres MD  10/28/20 0010

## 2024-01-02 ENCOUNTER — APPOINTMENT (OUTPATIENT)
Dept: LAB | Facility: HOSPITAL | Age: 69
End: 2024-01-02
Payer: COMMERCIAL

## 2024-01-02 DIAGNOSIS — I50.9 ACUTE ON CHRONIC CONGESTIVE HEART FAILURE, UNSPECIFIED HEART FAILURE TYPE (HCC): ICD-10-CM

## 2024-01-02 PROBLEM — D72.819 LEUKOPENIA: Status: RESOLVED | Noted: 2019-10-26 | Resolved: 2024-01-02

## 2024-01-02 PROBLEM — N18.31 STAGE 3A CHRONIC KIDNEY DISEASE (HCC): Status: RESOLVED | Noted: 2021-06-15 | Resolved: 2024-01-02

## 2024-01-02 PROBLEM — G89.4 CHRONIC PAIN SYNDROME: Chronic | Status: ACTIVE | Noted: 2023-05-04

## 2024-01-02 PROBLEM — R06.02 SOB (SHORTNESS OF BREATH): Status: RESOLVED | Noted: 2021-01-22 | Resolved: 2024-01-02

## 2024-01-02 PROBLEM — Z01.89 ENCOUNTER FOR CARDIOVERSION PROCEDURE: Status: RESOLVED | Noted: 2021-05-06 | Resolved: 2024-01-02

## 2024-01-02 LAB
ANION GAP SERPL CALCULATED.3IONS-SCNC: 8 MMOL/L
BUN SERPL-MCNC: 23 MG/DL (ref 5–25)
CALCIUM SERPL-MCNC: 9.7 MG/DL (ref 8.4–10.2)
CHLORIDE SERPL-SCNC: 98 MMOL/L (ref 96–108)
CO2 SERPL-SCNC: 30 MMOL/L (ref 21–32)
CREAT SERPL-MCNC: 0.95 MG/DL (ref 0.6–1.3)
GFR SERPL CREATININE-BSD FRML MDRD: 81 ML/MIN/1.73SQ M
GLUCOSE P FAST SERPL-MCNC: 107 MG/DL (ref 65–99)
POTASSIUM SERPL-SCNC: 3.9 MMOL/L (ref 3.5–5.3)
SODIUM SERPL-SCNC: 136 MMOL/L (ref 135–147)

## 2024-01-02 PROCEDURE — 36415 COLL VENOUS BLD VENIPUNCTURE: CPT

## 2024-01-02 PROCEDURE — 80048 BASIC METABOLIC PNL TOTAL CA: CPT

## 2024-01-03 ENCOUNTER — OFFICE VISIT (OUTPATIENT)
Dept: CARDIOLOGY CLINIC | Facility: CLINIC | Age: 69
End: 2024-01-03
Payer: COMMERCIAL

## 2024-01-03 VITALS
OXYGEN SATURATION: 94 % | HEIGHT: 69 IN | SYSTOLIC BLOOD PRESSURE: 106 MMHG | WEIGHT: 312 LBS | DIASTOLIC BLOOD PRESSURE: 70 MMHG | HEART RATE: 77 BPM | BODY MASS INDEX: 46.21 KG/M2

## 2024-01-03 DIAGNOSIS — I50.32 CHRONIC HEART FAILURE WITH PRESERVED EJECTION FRACTION (HFPEF) (HCC): Primary | ICD-10-CM

## 2024-01-03 DIAGNOSIS — Z71.9 ENCOUNTER FOR HEALTH EDUCATION: ICD-10-CM

## 2024-01-03 DIAGNOSIS — I48.91 ATRIAL FIBRILLATION AND FLUTTER (HCC): ICD-10-CM

## 2024-01-03 DIAGNOSIS — I48.92 ATRIAL FIBRILLATION AND FLUTTER (HCC): ICD-10-CM

## 2024-01-03 DIAGNOSIS — G47.33 OBSTRUCTIVE SLEEP APNEA: ICD-10-CM

## 2024-01-03 DIAGNOSIS — I83.009 VARICOSE VEINS OF UNSPECIFIED LOWER EXTREMITY WITH ULCER OF UNSPECIFIED SITE (CODE) (HCC): ICD-10-CM

## 2024-01-03 PROCEDURE — 99214 OFFICE O/P EST MOD 30 MIN: CPT | Performed by: PHYSICIAN ASSISTANT

## 2024-01-03 RX ORDER — TORSEMIDE 20 MG/1
40 TABLET ORAL 2 TIMES DAILY
Start: 2024-01-03

## 2024-01-03 NOTE — PROGRESS NOTES
"General Cardiology Outpatient Progress Note    Leonardo Oviedo 68 y.o. male   MRN: [id number]  Encounter: [id number]    Assessment:  Patient Active Problem List    Diagnosis Date Noted    Benign prostate hyperplasia     Pneumonia 11/14/2023    Chronic pain syndrome 05/04/2023    Lumbar spondylosis 05/04/2023    Chronic bilateral low back pain without sciatica 05/04/2023    Venous ulcer (HCC) 01/03/2023    Atrial flutter (HCC) 07/29/2021    Iron deficiency 06/16/2021    Anemia 06/15/2021    Primary osteoarthritis 06/15/2021    Chronic heart failure with preserved ejection fraction (HFpEF) ([facility]) 01/22/2021    Pulmonary hypertension (HCC) 11/02/2020    Bilateral lower extremity edema 10/27/2019    Atrial fibrillation (HCC) 10/26/2019    Weakness of right upper extremity 10/26/2019    Paresthesias 10/26/2019    Cervical pain (neck) 10/26/2019    Thrombocytopenia (HCC) 10/26/2019    Obesity, Class III, BMI 40-49.9 (morbid obesity) ([facility]) 04/16/2015    Venous insufficiency of both lower extremities 02/24/2015       Today's Plan:  Continue torsemide 40 mg BID.   Provided patient with \"Living With Heart Failure\" booklet in office today.  Can consider adding on SGLT2i in future for HFpEF (if affordable).   RTC in 2-3 weeks.     Plan:  Chronic HFpEF; LVEF 65%; LVIDd 5.4 cm; NYHA II; ACC/AHA Stage C   TTE 01/23/2023: LVEF 65%. LVIDd 5.4 cm. Mildly dilated RV. Mild CARTER.    Nuclear stress test 01/23/2023: \"No evidence of ischemia or infarction by pharmacologic vasodilatory nuclear stress testing.\"   TTE 10/11/2023: LVEF 65%. LVIDd 5.4 cm. Grade 2 DD. Normal RV. Normal IVC.    Weight of 312 lbs on 12/20. Today, weighs 312 lbs.    Most recent BMP from 01/02/2024: sodium 136; potassium 3.9; BUN 23; creatinine 0.95; eGFR 81.     Pharmacotherapies:  --Aldosterone Antagonist: spironolactone 25 mg BID.   --SGLT2 Inhibitor: No.   --Diuretic: torsemide 40 mg BID.     Atrial fibrillation / flutter   [id number] = 2 (age, " "HF).   Anticoagulation on Xarelto.    S/p atypical flutter ablation in 07/2021.   Rate control: metoprolol succinate 25 mg daily.   Rhythm control: None.    Obstructive sleep apnea  Chronic venous insufficiency with chronic lymphedema    HPI:   Leonardo Oviedo is a 68-year-old man with a PMH as above who presents to the office for follow-up. Follows with Dr. Prieto.     12/20/2023 with TH: \"Presents for hospital follow up. Since last visit, admitted with PNA. Treated with antibiotics and steroids. Then seen by Dr. Prieto. Sent to the ED for volume overload. Diuresed and transitioned to Torsemide. Says since he left the hospital has gained 1 lb per day. Has been really watching his sodium intake and does not drink excessively. Feels his legs may be a little swollen. Otherwise not overly symptomatic but also not very active.\" Advised to increase torsemide to 60 mg BID for 3 days and then increased standing dose to 40 mg BID.      01/03/2024: Patient presents for follow-up. No current cardiac complaints. Denies CP, SOB, CHU, worsening LE swelling, PND, and orthopnea. Is completing daily weights. Reports losing a few pounds while on 60 mg BID of torsemide. Since decreasing to torsemide 40 mg BID, report weights have been holding steady on home (around 304-305 lbs). Drinking 12-16 oz coffee plus ice water from his \"large mug\" (24-32 oz?) with ice water 2-3 times daily. Recently began reading nutrition labels more closely and also purchased Nu-Salt as a salt substitute. Provided patient with \"Living With Heart Failure\" booklet and provided detailed education on HF diet and medications.     Past Medical History:   Diagnosis Date    A-fib ([facility])     ALANNA (acute kidney injury) ([facility]) 06/15/2021    Arthritis     CHF exacerbation ([facility]) 12/07/2023    CPAP (continuous positive airway pressure) dependence     Encounter for cardioversion procedure 05/06/2021    Leukopenia 10/26/2019    Lymphedema     Sleep apnea     Urinary " frequency     Wears glasses     Wears partial dentures     lower partial       Review of Systems   Constitutional:  Negative for activity change, appetite change, fatigue and unexpected weight change.   Respiratory:  Negative for cough, chest tightness and shortness of breath.    Cardiovascular:  Positive for leg swelling (chronic). Negative for chest pain and palpitations.   Gastrointestinal:  Negative for abdominal pain, diarrhea and nausea.   Genitourinary:  Negative for decreased urine volume, dysuria and urgency.   Musculoskeletal: Negative.    Skin: Negative.    Neurological:  Negative for dizziness, syncope, weakness and light-headedness.   Psychiatric/Behavioral:  Negative for confusion and sleep disturbance. The patient is not nervous/anxious.        Allergies   Allergen Reactions    Penicillins Anaphylaxis    Sulfa Antibiotics Anaphylaxis    Levofloxacin Other (See Comments)         Current Outpatient Medications:     alfuzosin (UROXATRAL) 10 mg 24 hr tablet, Take 10 mg by mouth daily, Disp: , Rfl:     allopurinol (ZYLOPRIM) 300 mg tablet, Take 300 mg by mouth daily, Disp: , Rfl:     ammonium lactate (LAC-HYDRIN) 12 % lotion, Apply topically 2 (two) times a day as needed for dry skin, Disp: , Rfl:     Cyanocobalamin (VITAMIN B 12 PO), Take by mouth, Disp: , Rfl:     Diclofenac Sodium (VOLTAREN) 1 %, Apply 2 g topically 4 (four) times a day, Disp: 100 g, Rfl: 0    dutasteride (AVODART) 0.5 mg capsule, Take 0.5 mg by mouth daily, Disp: , Rfl:     ferrous sulfate 325 (65 Fe) mg tablet, Take 325 mg by mouth, Disp: , Rfl:     Glucosamine-Chondroit-Vit C-Mn (Glucosamine 1500 Complex) CAPS, Take by mouth, Disp: , Rfl:     halobetasol (ULTRAVATE) 0.05 % ointment, APPLY TO AFFECTED AREA ON LEG TWICE DAILY, Disp: , Rfl:     L-ARGININE-500 PO, Take 500 mg by mouth 2 (two) times a day , Disp: , Rfl:     loteprednol etabonate (LOTEMAX) 0.5 % ophthalmic suspension, 1 drop 4 (four) times a day, Disp: , Rfl:      metoprolol succinate (TOPROL-XL) 25 mg 24 hr tablet, Take 1 tablet (25 mg total) by mouth daily, Disp: 90 tablet, Rfl: 2    multivitamin (THERAGRAN) TABS, Take 1 tablet by mouth daily, Disp: , Rfl:     Omega-3 Fatty Acids (fish oil) 1,000 mg, Take 1,000 mg by mouth 2 (two) times a day, Disp: , Rfl:     patient supplied medication, Take 1 each by mouth 2 (two) times a day, Disp: , Rfl:     pregabalin (LYRICA) 100 mg capsule, Take 1 capsule (100 mg total) by mouth 3 (three) times a day, Disp: 42 capsule, Rfl: 0    rivaroxaban (Xarelto) 20 mg tablet, Take 1 tablet (20 mg total) by mouth daily, Disp: 90 tablet, Rfl: 2    spironolactone (ALDACTONE) 25 mg tablet, Take 1 tablet (25 mg total) by mouth 2 (two) times a day, Disp: 180 tablet, Rfl: 1    tadalafil (CIALIS) 5 MG tablet, Take 5 mg by mouth daily as needed for erectile dysfunction, Disp: , Rfl:     torsemide (DEMADEX) 20 mg tablet, Take 2 tablets (40 mg total) by mouth 2 (two) times a day, Disp: , Rfl:     traMADol (ULTRAM) 50 mg tablet, Take 1 tablet (50 mg total) by mouth 2 (two) times a day as needed for moderate pain, Disp: 60 tablet, Rfl: 1    VITAMIN D PO, Take 2,000 Units by mouth daily , Disp: , Rfl:     acetaminophen (TYLENOL) 500 mg tablet, Take 500 mg by mouth every 6 (six) hours as needed for mild pain (Patient not taking: Reported on 12/20/2023), Disp: , Rfl:     tadalafil (CIALIS) 10 MG tablet, , Disp: , Rfl:     Social History     Socioeconomic History    Marital status: /Civil Union     Spouse name: Not on file    Number of children: Not on file    Years of education: Not on file    Highest education level: Not on file   Occupational History    Not on file   Tobacco Use    Smoking status: Never     Passive exposure: Never    Smokeless tobacco: Never   Vaping Use    Vaping status: Never Used   Substance and Sexual Activity    Alcohol use: Yes     Alcohol/week: 5.0 standard drinks of alcohol     Types: 3 Glasses of wine, 2 Standard drinks or  equivalent per week     Comment: socially    Drug use: No    Sexual activity: Not Currently     Partners: Female     Birth control/protection: Abstinence, Condom Male, Spermicide   Other Topics Concern    Not on file   Social History Narrative    Not on file     Social Determinants of Health     Financial Resource Strain: Low Risk  (8/24/2023)    Received from [facility]    Overall Financial Resource Strain (CARDIA)     Difficulty of Paying Living Expenses: Not hard at all   Food Insecurity: No Food Insecurity (12/7/2023)    Hunger Vital Sign     Worried About Running Out of Food in the Last Year: Never true     Ran Out of Food in the Last Year: Never true   Transportation Needs: No Transportation Needs (12/7/2023)    PRAPARE - Transportation     Lack of Transportation (Medical): No     Lack of Transportation (Non-Medical): No   Physical Activity: Inactive (8/24/2023)    Received from [facility]    Exercise Vital Sign     Days of Exercise per Week: 0 days     Minutes of Exercise per Session: 0 min   Stress: No Stress Concern Present (8/24/2023)    Received from [facility]    Citizen of [place] of Occupational Health - Occupational Stress Questionnaire     Feeling of Stress : Not at all   Social Connections: Moderately Integrated (8/24/2023)    Received from [facility]    Social Connection and Isolation Panel [NHANES]     Frequency of Communication with Friends and Family: More than three times a week     Frequency of Social Gatherings with Friends and Family: More than three times a week     Attends Rastafarian Services: More than 4 times per year     Active Member of Clubs or Organizations: No     Attends Club or Organization Meetings: Patient refused     Marital Status:    Intimate Partner Violence: Not At Risk (8/24/2023)    Received from [facility]    Humiliation, Afraid, Rape, and Kick questionnaire     Fear of Current  "or Ex-Partner: No     Emotionally Abused: No     Physically Abused: No     Sexually Abused: No   Housing Stability: Low Risk  (12/7/2023)    Housing Stability Vital Sign     Unable to Pay for Housing in the Last Year: No     Number of Places Lived in the Last Year: 1     Unstable Housing in the Last Year: No     Family History   Problem Relation Age of Onset    Heart disease Mother     Lymphoma Father     Cancer Father     Heart disease Sister     Heart failure Sister         on diuretic    Hypertension Brother     Diabetes Neg Hx     Thyroid disease Neg Hx     Stroke Neg Hx        Vitals:   Blood pressure 106/70, pulse 77, height 5' 9\" (1.753 m), weight (!) 142 kg (312 lb), SpO2 94%.    Wt Readings from Last 10 Encounters:   01/03/24 (!) 142 kg (312 lb)   12/20/23 (!) 142 kg (312 lb)   12/13/23 (!) 138 kg (305 lb 5.4 oz)   12/07/23 (!) 146 kg (322 lb 3.2 oz)   11/20/23 (!) 147 kg (324 lb)   11/16/23 (!) 146 kg (321 lb 6.9 oz)   11/11/23 (!) 147 kg (325 lb)   11/01/23 (!) 141 kg (311 lb)   10/23/23 (!) 144 kg (317 lb)   10/11/23 (!) 140 kg (308 lb 10.3 oz)     Vitals:    01/03/24 1446   BP: 106/70   BP Location: Left arm   Patient Position: Sitting   Cuff Size: Large   Pulse: 77   SpO2: 94%   Weight: (!) 142 kg (312 lb)   Height: 5' 9\" (1.753 m)       Physical Exam  Vitals reviewed.   Constitutional:       General: He is awake. He is not in acute distress.     Appearance: Normal appearance. He is well-developed and overweight. He is not toxic-appearing or diaphoretic.   HENT:      Head: Normocephalic.      Nose: Nose normal.      Mouth/Throat:      Mouth: Mucous membranes are moist.   Eyes:      General: No scleral icterus.     Conjunctiva/sclera: Conjunctivae normal.   Neck:      Vascular: JVD (~9 cm) present.      Trachea: No tracheal deviation.   Cardiovascular:      Rate and Rhythm: Normal rate and regular rhythm. No extrasystoles are present.     Heart sounds: No murmur heard.  Pulmonary:      Effort: Pulmonary " effort is normal. No tachypnea, bradypnea or respiratory distress.      Breath sounds: Normal air entry. No decreased air movement. No decreased breath sounds or wheezing.   Abdominal:      General: There is distension.      Palpations: Abdomen is soft.      Tenderness: There is no abdominal tenderness.   Musculoskeletal:      Cervical back: Neck supple.      Right lower le+ Edema present.      Left lower le+ Edema present.   Skin:     General: Skin is warm and dry.      Coloration: Skin is not jaundiced or pale.   Neurological:      General: No focal deficit present.      Mental Status: He is alert and oriented to person, place, and time.   Psychiatric:         Attention and Perception: Attention normal.         Mood and Affect: Mood and affect normal.         Speech: Speech normal.         Behavior: Behavior normal. Behavior is cooperative.         Thought Content: Thought content normal.       Labs & Results:  Lab Results   Component Value Date    WBC 4.93 2023    HGB 13.0 2023    HCT 37.9 2023    MCV 91 2023     2023     Lab Results   Component Value Date    SODIUM 136 2024    K 3.9 2024    CL 98 2024    CO2 30 2024    BUN 23 2024    CREATININE 0.95 2024    GLUC 107 2023    CALCIUM 9.7 2024     Lab Results   Component Value Date    INR 1.54 (H) 2021    INR 2.26 (H) 2021    INR 1.59 (H) 2021    PROTIME 18.4 (H) 2021    PROTIME 24.9 (H) 2021    PROTIME 18.7 (H) 2021     Lab Results   Component Value Date    NTBNP 1,185 (H) 2021      Lab Results   Component Value Date    BNP 91 2023      Latha Anguiano PA-C

## 2024-01-03 NOTE — PATIENT INSTRUCTIONS
Continue on torsemide 40 mg twice daily.     Please weigh yourself every day (after emptying your bladder) and keep a detailed log of weights.   Contact the Heart Failure program at [phone] if you gain 3+ lbs overnight or 5+ lbs in 5-7 days.  Limit daily sodium/salt intake to 2000 mg daily to prevent fluid retention.  Avoid canned foods, fast food/Chinese food, and processed meats (hot dogs, lunch meat, and sausage etc.). Caution with condiments.  Limit fluid intake to 2000 mL or 2 liters (about 60-65 ounces) daily.  Avoid electrolyte replacement drinks (such as Gatorade, Pedialyte, Propel, Liquid IV, etc.).  Bring complete list of medications and log of daily weights to your follow-up appointment.

## 2024-01-10 ENCOUNTER — EVALUATION (OUTPATIENT)
Dept: PHYSICAL THERAPY | Facility: CLINIC | Age: 69
End: 2024-01-10
Payer: COMMERCIAL

## 2024-01-10 ENCOUNTER — CONSULT (OUTPATIENT)
Dept: PULMONOLOGY | Facility: CLINIC | Age: 69
End: 2024-01-10
Payer: COMMERCIAL

## 2024-01-10 VITALS
SYSTOLIC BLOOD PRESSURE: 124 MMHG | OXYGEN SATURATION: 94 % | BODY MASS INDEX: 46.65 KG/M2 | TEMPERATURE: 97.7 F | HEART RATE: 75 BPM | HEIGHT: 69 IN | DIASTOLIC BLOOD PRESSURE: 68 MMHG | WEIGHT: 315 LBS

## 2024-01-10 DIAGNOSIS — J98.4 RESTRICTIVE LUNG DISEASE: Primary | ICD-10-CM

## 2024-01-10 DIAGNOSIS — E66.01 OBESITY, CLASS III, BMI 40-49.9 (MORBID OBESITY) (HCC): ICD-10-CM

## 2024-01-10 DIAGNOSIS — I27.20 PULMONARY HYPERTENSION (HCC): ICD-10-CM

## 2024-01-10 DIAGNOSIS — G47.33 OBSTRUCTIVE SLEEP APNEA SYNDROME: ICD-10-CM

## 2024-01-10 DIAGNOSIS — G89.29 CHRONIC BILATERAL LOW BACK PAIN WITHOUT SCIATICA: ICD-10-CM

## 2024-01-10 DIAGNOSIS — M54.50 CHRONIC BILATERAL LOW BACK PAIN WITHOUT SCIATICA: ICD-10-CM

## 2024-01-10 DIAGNOSIS — G89.4 CHRONIC PAIN SYNDROME: ICD-10-CM

## 2024-01-10 DIAGNOSIS — M47.816 LUMBAR SPONDYLOSIS: Primary | ICD-10-CM

## 2024-01-10 PROBLEM — I89.0 LYMPHEDEMA: Status: ACTIVE | Noted: 2019-07-22

## 2024-01-10 PROCEDURE — 97161 PT EVAL LOW COMPLEX 20 MIN: CPT | Performed by: PHYSICAL MEDICINE & REHABILITATION

## 2024-01-10 PROCEDURE — 97530 THERAPEUTIC ACTIVITIES: CPT | Performed by: PHYSICAL MEDICINE & REHABILITATION

## 2024-01-10 PROCEDURE — 99205 OFFICE O/P NEW HI 60 MIN: CPT

## 2024-01-10 NOTE — PROGRESS NOTES
PT Evaluation     Today's date: 1/10/2024  Patient name: Leonardo Oviedo  : 1955  MRN: [id number]  Referring provider: Kocher, Barbara, CRNP  Dx:   Encounter Diagnosis     ICD-10-CM    1. Lumbar spondylosis  M47.816 Ambulatory Referral to Physical Therapy      2. Chronic pain syndrome  G89.4 Ambulatory Referral to Physical Therapy      3. Chronic bilateral low back pain without sciatica  M54.50 Ambulatory Referral to Physical Therapy    G89.29                      Assessment  Assessment details: Leonardo Oviedo is a 68 y.o. male who was referred to physical therapy for management of chronic LBP. Pertinent medical hx includes hx of L RODNEY with resultant LLD per pt; wears small heel lift on the R. He also has B LE lymphedema; wears compression stockings and is going to MD for ulcer on L leg. He also has L shoulder pain. He admits to general decreased activity over time, especially since he stopped going to [employer]. He notes reduced endurance and fatigue and SOB with activity (not worsening/changing per pt).  Primary impairments include reduced trunk and core strength and DLS, reduced LE strength and mm endurance, poor endurance/stamina, apprehension with L/S ROM, reduced LE flexibility, and general deconditioning.  Consequently, patient has difficulty completing ADLs including self care, walking, stairs, shopping, etc.  Leonardo would benefit from skilled intervention to address all deficits and improve functional capability.  Patient is a good candidate for therapy, pending compliance with HEP and consistent participation in physical therapy. Thank you for the referral and please do not hesitate to contact me with any questions or concerns regarding Leonardo's care!      Plan  Frequency:1-2x/week   Duration in weeks: 6-8 weeks    POC start date: 1/10/2024  POC end date:   24  Therapeutic exercise/activity, neuromuscular reeducation, manual therapy, and modalities.   Patient understands and agrees  to plan of care.    Goals  Short Term--4 weeks  1. Patient will demonstrate 2 point decrease in pain levels.  2. Patient will demonstrate 1/2 point increase in all deficient MMT scores.  3. Pt will report ability to stand/walk > 2 blocks without increase in back pain.     Long Term--By Discharge  1. Patient will achieve expected FOTO score.  2. Pt will be I with HEP.  3. Pt will report at least 50% improvement in walking tolerance vs SOC.     Patient's Goal: improve walking, improve flexibility           Symptom irritability: moderateUnderstanding of Dx/Px/POC: good   Prognosis: fair    Plan  Patient would benefit from: skilled physical therapy  Treatment plan discussed with: patient      Subjective Evaluation    History of Present Illness  Mechanism of injury: Pt notes chronic hx of LBP for quite some time. Notes he has been d/x with arthritis of L/S in the past. Had ablation done last year by Dr. Godinez; pt feels it did help some. Went for f/u with pain management in Nov. Referred to OPPT at this time. Notes no other past tx for his back pain. Takes tylenol prn. Does note hx of L RODNEY in 2009; did see a physician a few years ago at [facility] who noted he had a LLD with LLE longer than R; given a heel lift in R shoe which helped; got a new one off [employer] awhile ago that he still wears.   Pt notes present LBP pain comes/goes. Pain is min-none at rest/relaxing when sitting or laying. Pt notes pain is mostly worse with walking; Pain with walking > 2 blocks. Short distances of walking are OK per pt. Pt notes he is also limited with walking 2* his SOB/ fatigue; not sure if it is his back or fatigue limiting him more. Pain vs apprehension with bending over; very hesitant. Pain is 1* L L/S > R; can be on both sides. No sx into Les. No n/t or sensory changes. No night pain, unexplained weight loss, no recent trauma, no consitutional sx, no change in bowel/bladder function, and no saddle anesthesia. No progressive LE  weakness. No stiffness per pt. Does have B LE lymphedema; has ulcer on L leg; currently being treated for it. Pt notes his goal for OPPT is to get more flexible and be able to stand/walk longer periods.          Quality of life: excellent    Patient Goals  Patient/family treatment goals: walk more, less pain, bend over.    Pain  Current pain ratin  At best pain ratin  At worst pain ratin  Location: B L/S, L > R at times  Quality: dull ache  Relieving factors: rest, heat and medications  Aggravating factors: standing and walking (bending over)    Social Support  Steps to enter house: no  Stairs in house: yes (basement)   Lives with: spouse and adult children    Employment status: not working (retired)  Hand dominance: right      Diagnostic Tests  MRI studies: abnormal  Treatments  Previous treatment: medication (ablation)  Current treatment: physical therapy      GAIT:  Wide TAVON  Reduced hip/knee flex B with mild shuffling  Increased lateral flex with stance R > L  No AD    Transfers:   Slow/antalgic   Slow to rise , increased reliance on pushing up with Ues; pain L shoulder   Limited hip/trunk flexion 2* soft tissue approximation     Squat assess: NT  ¼ squat assess: NT  SLS: RLE: NT,   LLE: NT      Lumbar  % of normal   Flex. Finger tips to mid shins B, no pain/sx, limited by soft tissue approximation    Extn. WFL, no pain/sx    SB Left WFL no pain/sx   SB Right WFL no pain/sx   ROT Left 50% no pain/sx   ROT Right 50% no pain/sx    Repetitive testing: extension= NT        MMT         AROM          PROM    Hip       L       R        L           R      L     R   Flex. 4- 4- WFL, limited by soft tissue approximation WFL, limited by soft tissue approximation     Extn.   WFL, hip flexor tightness suggested by pt's posture and frequent sitting  WFL, hip flexor tightness suggested by pt's posture and frequent sitting      Abd.    Add. 4-4+    4+ 4-4+    4+ WFL WFL     IR.         ER.                       MMT     Knee         L        R   Flex. 4- 4-   Extn. 4--4 4--4                MMT    Ankle       L        R   PF 4+ 4+   DF. 4-4+ 4-4+   EHL     Inv.     Ever.     Great toe ext. 4-  4-    Posture:  Seated:  Forward head/rounded shoulders  Increased thoracic kyphosis   Slight hip/trunk ext 2* soft tissue approximation, worse witting unsupported  B scapular depression and protraction with mild winging   B LE edema     Standing:   Reduced lumbar lordosis   Slight lateral flex to R  L iliac crest slightly higher than R   R shoulder lower than L   ? LLD     Palpation:   No ttp noted of L/S Sps, Tps, or proximal mm at IE     Myotomes (L/R): WFL/WNL/ symmetrical; see above      Dermatome: (light touch- L/R):  intact B  B LE light touch sensation intact/symmetrical B  Wearing compression stockings B Legs      Reflexes:  (L/R) L4: Intact/WNL B        S1:    Diminished B       Babinski=   NT   Clonus= Negative B    Slump test: L=  negative, mm tightness noted   R=  negative, mm tightness noted     Straight leg raise:   L= NT     R= NT       Transverse abdominis: Bent knee fall out= TBA  supine march=TBA   Poor core activation/stabilization with bed mobility/transfers  Core weakness noted with supine to sit transfer; utilizes rolling to side with push/pulling from UEs to come to sitting with difficulty   Difficulty rising from chair; relies on UE support/assist   Will cont to assess          Hip:  TBA, limited by time and pt mobility restrictions/limitations at IE       SI joint: TBA, limited by time and pt mobility restrictions/limitations at IE              Precautions: Hx RODNEY 2009, hx afib, CHF, arthritis, chronic LBP, hx L/S ablation oct 2023, lymphedema with ulcer L Leg (being treated)  L shoulder pain       Re-eval Date: 2/9    Date 1/10/2024        Visit Count 1       FOTO 1/10/2024        Pain In See IE       Pain Out See IE           Manuals 1/10/2024                                        Neuro Re-Ed        TA training          TA with BKFO vs seated on disc with LAQ, shoulder flex, etc         TA with alt heel slide        Pallof press L/R                                 Ther Ex        Nustep UE/Les- ROM, conditioning, monitor vitals and RPE         Mini squats     Step ups fwd-->lateral         Hrs      LAQ        HS curls         SLR 3 way                                 Ther Activity 10' pt education regarding pathophysiology/pathoanatomy of present pain/sx and condition, role of PT in improving pain/sx and function, pt education regarding activity modification to avoid exacerbation of sx and delayed recovery; reviewed gradual avoidance of prolonged periods of inactivity such as sitting and to break up prolonged activity with short periods of light activity such as walking around the house, light chores, etc as tolerated (starting slowly/gradually); encouraged pt to monitor SOB/fatigue as well as back pain with activity and stop if sx are worsened                          Gait Training                        Modalities

## 2024-01-10 NOTE — PROGRESS NOTES
Consultation - Pulmonary Medicine   Leonardo Oviedo 68 y.o. male MRN: [id number]    Physician Requesting Consult: Deo Prieto DO  Reason for Consult: RLD, PH  Leonardo Oviedo is a 68 y.o. male with PMHx of PH, ROBERT previously on BiPAP, A-fib, HFpEF, BPH, lymphedema and obesity who presents for pulmonary evaluation.    Restrictive Lung Disease  - Patient has moderate restriction on PFTs with mild DLCO reduction.  Suspect these findings are likely 2/2 his habitus as ERV is very low.  As we do not have CT imaging of the chest when he is not sick will plan to proceed with HRCT to assure there is no underlying ILD that might explain his restriction.  He denies any symptoms of CTD at this time, will hold on further lab work until imaging is completed  - Order HRCT  - Plan for obesity as below  - Discussed the importance of daily routine exercise to maintain exercise tolerance.  - Follow-up in the office in 3 months  -     Ambulatory referral to Pulmonology  -     CT chest high resolution; Future    Pulmonary Hypertension  - Patient has evidence of mild pulmonary hypertension on prior echo from 1/2023, repeat echo from 10/2023 does not comment on RVSP, but did show normal RV size and function.  He is group 2/3 due to HFpEF and untreated ROBERT as well as obesity.  - HRCT as above to ensure there are not any other pulmonary conditions are contributing to his pulmonary hypertension.  Otherwise would plan to treat underlying conditions.  -     Ambulatory referral to Pulmonology    Obstructive Sleep Apnea  - Patient has ROBERT of unclear severity but was previously on BiPAP 20/14 cmH2O.  He is currently not using his machine but is willing to undergo repeat testing in order to start using his machine again.  It is not completely clear if the machine he has right now is 1 that he got in replacement from the recall or if he still has his recalled machine.  - Will obtain Split-night PSG -- split at AHI of 10, and start on 10 cm  CPAP with low threshold to switch to BiPAP  - Discussed with the patient the possible diagnosis, causes and conditions associated with SDB along with potential treatments.  - Encouraged healthy lifestyle with adequate sleep (7-9 hours per night), healthy balanced diet and routine exercise.  -     Split Study; Future    Class III Obesity BMI 40-49.9  -The patient's obesity is almost certainly contributing to some of his symptoms, and may be the cause of his restrictive deficits on his lung function testing specially given the ERV of only 8%.  Weight loss is very important to his future health.  -Will refer to weight management, he is not interested in any bariatric surgery and will prefer medical management alone.  -     Ambulatory Referral to Weight Management; Future    Thank you for allowing me to participate in the care of your patient.  If there are any questions regarding evaluation please feel free to reach out.     Return in about 3 months (around 4/10/2024).  ______________________________________________________________________    HPI:    Leonardo Oviedo is a 68 y.o. male with PMHx of PH, ROBERT previously on BiPAP, A-fib, HFpEF, BPH, lymphedema and obesity who presents for follow up.  The patient has previously seen pulmonary at [facility] in 2015 due to chronic cough was noted to have calcifications of the anterior epiglottis and retained secretions in the trachea on ENT evaluation, but he has not seen pulmonary since.  He was referred by his cardiologist due to history of restriction on PFTs and pulmonary hypertension noted on echo.  He was also recently hospitalized in December due to CHF exacerbation, which time his reported dry weight was 302 pounds.  Currently he reports he is back to baseline from his hospitalization.  He states initially he was gaining weight on discharge, but his diuretics were further adjusted and his weight is now stable.  Of note, at today's visit his weight is 316 pounds.    PFTs  completed in 10/2023 showed moderate restriction on spirometry with moderate reduced TLC and mildly reduced DLCO with an ERV of 8%.  He does have a CTA PE study of the chest from 11/2023 which showed patchy bilateral groundglass opacities in the RUL and mild bronchial wall thickening diffusely; however, he was acutely ill at this time.  The only other CT imaging of the chest is from 2015 at [facility] and was unremarkable from a pulmonary standpoint; however, the images are not available for review.  His latest echo was from 10/2023 and showed LVEF 65% with difficulty assessing wall motion, abnormal diastolic function, but normal RV size and function.  Prior echo in 1/2023 did show an elevated RVSP of 37 mmHg with mildly dilated RV but normal function and no evidence of systolic notching was in the RVOT Doppler waveform.    He reports he is never previously been diagnosed with any pulmonary diseases, denies any significant respiratory issues as a child.  He has never been on inhaler therapy before.  He states he can walk less than 1 block and less than 1 flight of steps before needing to stop due to a combination of both dyspnea and leg pain from his lymphedema.  He states he was recently at [place] with family, and required a scooter to get around as he could not keep up with his family.  He reports he has never previously been to cardiac/pulmonary rehab and does not perform any routine exercise at home.  He has been successful in losing weight before, and saw weight management in the distant past.  He states the reason he stopped going to weight management as he felt he was being recommended surgical procedures over medical management, and he is not interested in bariatric surgery at this time.  At this time he denies palpitations, chest pain, wheezing, cough, hemoptysis, unintentional weight loss and syncope.    He also has history of ROBERT and was previously on BiPAP 20/14 cmH2O, but had a recalled [employer] device.  His  device was replaced in 2022; however, he does not have supplies at this time and is not using the machine.  He is willing to undergo repeat sleep study to consider restarting the BiPAP for treatment of sleep apnea.    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    Current Medications:    Current Outpatient Medications:     acetaminophen (TYLENOL) 500 mg tablet, Take 500 mg by mouth every 6 (six) hours as needed for mild pain, Disp: , Rfl:     alfuzosin (UROXATRAL) 10 mg 24 hr tablet, Take 10 mg by mouth daily, Disp: , Rfl:     allopurinol (ZYLOPRIM) 300 mg tablet, Take 300 mg by mouth daily, Disp: , Rfl:     ammonium lactate (LAC-HYDRIN) 12 % lotion, Apply topically 2 (two) times a day as needed for dry skin, Disp: , Rfl:     Cyanocobalamin (VITAMIN B 12 PO), Take by mouth, Disp: , Rfl:     Diclofenac Sodium (VOLTAREN) 1 %, Apply 2 g topically 4 (four) times a day, Disp: 100 g, Rfl: 0    dutasteride (AVODART) 0.5 mg capsule, Take 0.5 mg by mouth daily, Disp: , Rfl:     ferrous sulfate 325 (65 Fe) mg tablet, Take 325 mg by mouth, Disp: , Rfl:     Glucosamine-Chondroit-Vit C-Mn (Glucosamine 1500 Complex) CAPS, Take by mouth, Disp: , Rfl:     halobetasol (ULTRAVATE) 0.05 % ointment, APPLY TO AFFECTED AREA ON LEG TWICE DAILY, Disp: , Rfl:     L-ARGININE-500 PO, Take 500 mg by mouth 2 (two) times a day , Disp: , Rfl:     loteprednol etabonate  (LOTEMAX) 0.5 % ophthalmic suspension, 1 drop 4 (four) times a day, Disp: , Rfl:     metoprolol succinate (TOPROL-XL) 25 mg 24 hr tablet, Take 1 tablet (25 mg total) by mouth daily, Disp: 90 tablet, Rfl: 2    Omega-3 Fatty Acids (fish oil) 1,000 mg, Take 1,000 mg by mouth 2 (two) times a day, Disp: , Rfl:     pregabalin (LYRICA) 100 mg capsule, Take 1 capsule (100 mg total) by mouth 3 (three) times a day, Disp: 42 capsule, Rfl: 0    rivaroxaban (Xarelto) 20 mg tablet, Take 1 tablet (20 mg total) by mouth daily, Disp: 90 tablet, Rfl: 2    spironolactone (ALDACTONE) 25 mg tablet, Take 1 tablet (25 mg total) by mouth 2 (two) times a day, Disp: 180 tablet, Rfl: 1    tadalafil (CIALIS) 10 MG tablet, , Disp: , Rfl:     tadalafil (CIALIS) 5 MG tablet, Take 5 mg by mouth daily as needed for erectile dysfunction, Disp: , Rfl:     torsemide (DEMADEX) 20 mg tablet, Take 2 tablets (40 mg total) by mouth 2 (two) times a day, Disp: , Rfl:     traMADol (ULTRAM) 50 mg tablet, Take 1 tablet (50 mg total) by mouth 2 (two) times a day as needed for moderate pain, Disp: 60 tablet, Rfl: 1    VITAMIN D PO, Take 2,000 Units by mouth daily , Disp: , Rfl:     multivitamin (THERAGRAN) TABS, Take 1 tablet by mouth daily, Disp: , Rfl:     patient supplied medication, Take 1 each by mouth 2 (two) times a day, Disp: , Rfl:     Historical Information   Past Medical History:   Diagnosis Date    A-fib ([facility])     ALANNA (acute kidney injury) ([facility]) 06/15/2021    Arthritis     CHF exacerbation ([facility]) 12/07/2023    CPAP (continuous positive airway pressure) dependence     Encounter for cardioversion procedure 05/06/2021    Leukopenia 10/26/2019    Lymphedema     Sleep apnea     Urinary frequency     Wears glasses     Wears partial dentures     lower partial     Past Surgical History:   Procedure Laterality Date    ABLATION SAPHENOUS VEIN W/ RFA      COLONOSCOPY      JOINT REPLACEMENT  Left Hip 4 /21/2009    NERVE BLOCK Bilateral 7/18/2023    Procedure:  "BLOCK MEDIAL BRANCH B/L L4-L5 AND L5-S1 MBB #1;  Surgeon: Samson Godinez MD;  Location: [facility];  Service: Pain Management     NERVE BLOCK Bilateral 8/17/2023    Procedure: BLOCK MEDIAL BRANCH  B/L L4-5 and L5-S1 MBB #2;  Surgeon: Samson Godinez MD;  Location: [facility];  Service: Pain Management     CT CYSTO INSERTION TRANSPROSTATIC IMPLANT SINGLE N/A 11/13/2017    Procedure: CYSTOSCOPY WITH INSERTION UROLIFT;  Surgeon: Deuce Bennett DO;  Location: [facility];  Service: Urology    RADIOFREQUENCY ABLATION Left 10/5/2023    Procedure: ABLATION RADIO FREQUENCY (RFA) LEFT L4-5 AND L5-S1 RFA;  Surgeon: Samson Godinez MD;  Location: [facility];  Service: Pain Management     RADIOFREQUENCY ABLATION Right 11/1/2023    Procedure: ABLATION RADIO FREQUENCY (RFA) RIGHT L4-5 AND L5-S1 RFA;  Surgeon: Samson Godinez MD;  Location: [facility];  Service: Pain Management     TONSILLECTOMY     Social History   Social History     Tobacco Use   Smoking Status Never    Passive exposure: Never   Smokeless Tobacco Never     Family History:   Family History   Problem Relation Age of Onset    Heart disease Mother     Lymphoma Father     Cancer Father     Heart disease Sister     Heart failure Sister         on diuretic    Hypertension Brother     Diabetes Neg Hx     Thyroid disease Neg Hx     Stroke Neg Hx      PhysicalExamination:  Vitals:   /68 (BP Location: Left arm, Patient Position: Sitting, Cuff Size: Standard)   Pulse 75   Temp 97.7 °F (36.5 °C) (Tympanic)   Ht 5' 9\" (1.753 m)   Wt (!) 143 kg (316 lb)   SpO2 94%   BMI 46.67 kg/m²   Body mass index is 46.67 kg/m².    Constitutional: NAD, well appearing, on room air, no conversational dyspnea, obese  Skin: Warm, dry, no rashes noted   Eyes: PERRL, normal conjunctiva  ENT: Nasal congestion absent, moist mucus membranes.  Neck: No JVD, trachea is midline, no adenopathy.  Resp: CTA B/L, no W/R/R, breath sounds are distant  Cardiac: RRR, +S1/S2, no " "M/R/G  Abdomen: Soft, NT/ND  Extremities: No digital clubbing or pedal edema  Neuro: AAOx3    Diagnostic Data:  Labs:  I personally reviewed the most recent laboratory data pertinent to today's visit    Lab Results   Component Value Date    WBC 4.93 12/13/2023    HGB 13.0 12/13/2023    HCT 37.9 12/13/2023    MCV 91 12/13/2023     12/13/2023     Lab Results   Component Value Date    CALCIUM 9.7 01/02/2024    K 3.9 01/02/2024    CO2 30 01/02/2024    CL 98 01/02/2024    BUN 23 01/02/2024    CREATININE 0.95 01/02/2024     No results found for: \"IGE\"  Lab Results   Component Value Date    ALT 14 11/13/2023    AST 35 11/13/2023    ALKPHOS 57 11/13/2023     PFT results:  The most recent pulmonary function tests were reviewed.  PFTs -- 10/9/2023  FEV1/FVC Ratio: 88 %  Forced Vital Capacity: 2.15 L           51 % predicted  FEV1: 1.90 L   60 % predicted  After administration of bronchodilator FEV1: reduced 16%  Lung volumes by body plethysmography: Total Lung Capacity 58 % predicted Residual volume 73 % predicted  DLCO corrected for patients hemoglobin level: 65 %  Interpretation:  Moderate restrictive airflow defect on spirometry   No response to the administration to bronchodilator per ATS Standards  Moderately reduced TLC  Mildly Reduced  Diffusion  Restricted flow volume loops    Imaging:  I personally reviewed the images in PACS pertinent to today's visit:  CXR 2 View -- 12/7/2023  No acute cardiopulmonary disease.    CTA PE Study -- 11/14/2023    Other studies:  2D Echo -- 10/11/2023    Left Ventricle: Left ventricular cavity size is normal. Wall thickness is normal. The left ventricular ejection fraction is 65%. Systolic function is vigorous. Wall motion cannot be accurately assessed. Diastolic function is moderately abnormal, consistent with grade II (pseudonormal) relaxation.    Right Ventricle: Right ventricular cavity size is normal. Systolic function is normal.    Study quality was poor. This was a " "technically difficult study    NM Stress Test -- 1/23/2023    Stress Function: Left ventricular function post-stress is normal. Post-stress ejection fraction is 65 %.    Perfusion: There are no perfusion defects.    Stress ECG: No ST deviation is noted. The ECG was negative for ischemia. The stress ECG is negative for ischemia after pharmacologic vasodilation, without reproduction of symptoms.    2D Echo -- 1/23/2023    Left Ventricle: Left ventricular cavity size is normal. Wall thickness is mildly increased. There is mild concentric hypertrophy. The left ventricular ejection fraction is 65%. Systolic function is normal. Wall motion is normal. Diastolic function is normal.    IVS: There is systolic flattening of the interventricular septum consistent with right ventricle pressure overload.    Right Ventricle: Right ventricular cavity size is mildly dilated. Systolic function is normal.    Left Atrium: The atrium is mildly dilated.    Right Atrium: The atrium is mildly dilated.    Tricuspid Valve: The right ventricular systolic pressure is mildly elevated. The estimated right ventricular systolic pressure (based on a poor doppler signal) is at least 37.00 mmHg.    Pulmonary Artery: There is no evidence of systolic notching of the RVOT Doppler waveform.     No evidence of ischemia or infarction by pharmacologic vasodilatory nuclear stress testing.    I have spent a total time of 60 minutes on 01/11/24 in caring for this patient including Patient and family education, Counseling / Coordination of care, Documenting in the medical record, Reviewing / ordering tests, medicine, procedures  , and Obtaining or reviewing history  .    Jaja Kulkarni MD  Pulmonary-Critical Care and Sleep Medicine  01/11/24    Portions of the record may have been created with voice recognition software. Occasional wrong word or \"sound a like\" substitutions may have occurred due to the inherent limitations of voice recognition software. Please " read the chart carefully and recognize, using context, where substitutions have occurred.

## 2024-01-10 NOTE — PATIENT INSTRUCTIONS
- Cat scan of the chest  - Referral to Weight management  - Start daily walks and attempt to increase your distance every 1-2 weeks  - An in lab sleep study has been ordered to evaluate for sleep apnea.  This test should be scheduled at your earliest convenience.  - Follow up in 3 months    - Sleep apnea is a serious sleep disorder that occurs when a person's breathing is interrupted during sleep.  People with untreated sleep apnea stop breathing repeatedly during sleep.  - The most common type of sleep apnea is obstructive sleep apnea.  - If left untreated, obstructive sleep apnea can result in a number of health problems including high blood pressure, stroke, irregular heart rhythms, heart failure, diabetes, obesity and heart attacks.  - Treatment options for obstructive sleep apnea may include positive airway pressure (PAP) therapy, oral appliances, hypoglossal nerve stimulator, nose/throat surgery, weight loss, side sleeping or avoidance of medications or substances that can relax the airway muscles (alcohol, benzodiazepines and opioids).  - Avoid driving is drowsy.  It is recommended that if you are dozing while driving that you do not drive until your sleepiness is appropriately treated.  - It is important to lead a healthy lifestyle with adequate sleep (7-9 hours per night), balanced diet and routine exercise.

## 2024-01-12 NOTE — PROGRESS NOTES
Heart Failure Outpatient Progress Note - Leonardo Oviedo 68 y.o. male MRN: [id number]    @ Encounter: [id number]      Assessment/Plan:    Patient Active Problem List    Diagnosis Date Noted    Benign prostate hyperplasia     Pneumonia 11/14/2023    Chronic pain syndrome 05/04/2023    Lumbar spondylosis 05/04/2023    Chronic bilateral low back pain without sciatica 05/04/2023    Venous ulcer (HCC) 01/03/2023    Atrial flutter (HCC) 07/29/2021    Iron deficiency 06/16/2021    Anemia 06/15/2021    Primary osteoarthritis 06/15/2021    Chronic heart failure with preserved ejection fraction (HFpEF) ([facility]) 01/22/2021    Pulmonary hypertension ([facility]) 11/02/2020    Bilateral lower extremity edema 10/27/2019    Atrial fibrillation (HCC) 10/26/2019    Weakness of right upper extremity 10/26/2019    Paresthesias 10/26/2019    Cervical pain (neck) 10/26/2019    Thrombocytopenia ([facility]) 10/26/2019    Lymphedema 07/22/2019    Obesity, Class III, BMI 40-49.9 (morbid obesity) ([facility]) 04/16/2015    Sleep apnea 04/16/2015    Venous insufficiency of both lower extremities 02/24/2015     Chronic HFpEF with elevated PASP. Exam somewhat limited by habitus, but appears compensated on current diuretic regimen. We will price check an SGLT2i. He will consider this if affordable. Lifestyle management discussions ongoing. Scheduled for repeat PSG and will see weight management in upcoming weeks.     Weights : 319 lbs,317 lbs, 305 lbs at discharge, 312 lbs, 312, today, 316 lbs.     Volume management : Torsemide 40 mg BID,  Spironolactone 25 mg BID,     -TTE 10/11/23. LVEF 65% with grade II DD.   -PFT 10/9/23:  Results:  FEV1/FVC Ratio: 88 %  Forced Vital Capacity: 2.15 L 51 % predicted  FEV1: 1.90 L   60 % predicted  After administration of bronchodilator FEV1: reduced 16%     Lung volumes by body plethysmography: Total Lung Capacity 58 % predicted Residual volume 73 % predicted     DLCO corrected for patients hemoglobin level: 65 %      Interpretation:     Moderate restrictive airflow defect on spirometry      No response to the administration to bronchodilator per ATS Standards     Moderately reduced TLC     Mildly Reduced  Diffusion     Restricted flow volume loops     -Pharmacologic nuclear stress test 1/2023: appears negative for myocardial ischemia, gated EF 65%   -TTE 1/2023: LVEF 65%, mild LVH, normal diastolic function, septal motion consistent with elevated PVR, mild RV dilatation with normal function, mild biatrial dilatation, trace MR/TR with PASP 37 mmHg (poor Doppler signal),       Chronic atrial fib. H/o prior failed DCCV. S/P atrial fib/flutter ablation x 2 2021.Repeated due to recurrence. Now maintaining SR  Rate Metoprolol succinate 25 mg daily  Rhythm Now off Amio.  AC Xarelto 20 mg daily    Morbid obesity. Has seen bariatrics. Hesitant about surgical interventions.   ROBERT. Not using . Referral placed to sleep medicine to discuss alternatives to CPAP as cannot tolerate.   Chronic venous insuff with lymphedema. H/O venous ablation     HPI:   HENRRY, 10/2/23:  It was a pleasure to see Leonardo Oviedo in the office today for follow-up CV evaluation.  He has a longstanding history of atrial fibrillation with prior failed ELAINE guided cardioversion, history of morbid obesity and pulmonary hypertension with chronic venous insufficiency/lymphedema.  The patient has a known history of obstructive sleep apnea was noncompliant with his CPAP therapy.  Over the course of many years he has been having lower extremity edema which has been believe secondary to lymphedema with chronic venous insufficiency.  He has had venous ablation is in the past for his reflux disease.  In October 2019, he was found have pulmonary hypertension with pulmonary artery systolic pressure is estimated at 50 mm Hg.  We had initially seen him in late August 2020 due to increased fatigue and dyspnea on exertion.  He has become somewhat short of breath with basic  activity.  Previously, he had been managed for his atrial fibrillation at [facility].       Echocardiogram, stress test and Holter monitor were ordered, but stress test and Holter were completed.  Stress test was found to be negative for myocardial ischemia.  Holter monitor demonstrated atrial fibrillation with an average heart rate of 92 beats per minute and rare VPCs.  His weight trends unfortunately continue to go upward and he had dietary discretion.  He seen by electrophysiology and recommended for a sleep study as well as an evaluation with advanced heart failure.  He was placed on CPAP and has been compliant since that time.  He also was seen by bariatric surgery and wish to undergo conservative options with dietary modifications.  Despite increasing diuretic load, he he continue to gain approximately 30 lb and was subsequently sent to [facility] in [place].  He was diuresed down to 285 lb.   While hospitalized, his echocardiogram was performed showing normal left ventricular function.      Following discharge, he was seen by electrophysiology and sent for a cardioversion in May 2021.  The cardioversion was initially successful, but he reverted back to rate controlled atrial flutter.  He has been placed on amiodarone and decreased down to 100 mg daily.  On his dose of torsemide, he had acute kidney injury with a creatinine rise to 1.9.  After holding his diuretics for several days, his creatinine came down to 1.1. He was then decreased on his torsemide to 20 mg b.i.d. And his weight trends continue to improve.  He has been aggressive in his dietary modifications.  He also underwent radiofrequency ablation for his atrial fibrillation/flutter.  He underwent a procedure in June 2021 Overall, he has felt much improved.     In July 2021, he ended up going back into atrial flutter.  He was seen by electrophysiology who was recommending a repeat ablation study.  He underwent the  ablation in late July 2021 which was successful.  He was seen in the office by EP following the procedure and had remained in sinus rhythm.  He subsequently was discontinued on his amiodarone.  In October to November 2022, patient developed some symptoms of shortness of breath with exertion.  This symptom had been fairly new and the patient was sent for testing including a stress test, chest x-ray and echocardiogram.  Stress test was found to be negative for myocardial ischemia.  His echocardiogram showed normal left-ventricular systolic function with mild pulmonary hypertension.  There was evidence of some mild increase in pressures on the right side.  Chest x-ray at the time was found to be grossly normal.  In July 2023, the patient developed 2 episodes of right-sided chest discomfort near his shoulder.  The discomfort would change with rotating his shoulder.  Due to the severity of one of the episodes, he presented to the emergency department and was diagnosed with musculoskeletal pain.  Troponins were negative during that hospitalization.  Additionally, he admitted to continued and progressive dyspnea on exertion a little higher than his baseline.  Admits to chronic lower extremity swelling with his lymphedema.  Denies orthopnea or paroxysmal nocturnal dyspnea.    10/23/23. Presents for follow up. Last seen by Dr. Prieto earlier in the month. Had complaints of weight gain and progressive dyspnea. His Torsemide dose was doubled. Follow up labs appeared stable. Repeat echo appeared largely unchanged from prior study. Feels ok today. His biggest complaint today is of worsening CHU over past few months. Says he had been going to the gym pretty faithfully until a month or two ago. Does not use CPAP. Still eating late at night and making poor choices.     12/20/23. Presents for hospital follow up. Since last visit, admitted with PNA. Treated with antibiotics and steroids. Then seen by Dr. Prieto. Sent to the ED for  "volume overload. Diuresed and transitioned to Torsemide. Says since he left the hospital has gained 1 lb per day. Has been really watching his sodium intake and does not drink excessively. Feels his legs may be a little swollen. Otherwise not overly symptomatic but also not very active.     01/03/2024: Patient presents for follow-up. No current cardiac complaints. Denies CP, SOB, CHU, worsening LE swelling, PND, and orthopnea. Is completing daily weights. Reports losing a few pounds while on 60 mg BID of torsemide. Since decreasing to torsemide 40 mg BID, report weights have been holding steady on home (around 304-305 lbs). Drinking 12-16 oz coffee plus ice water from his \"large mug\" (24-32 oz?) with ice water 2-3 times daily. Recently began reading nutrition labels more closely and also purchased Nu-Salt as a salt substitute. Provided patient with \"Living With Heart Failure\" booklet and provided detailed education on HF diet and medications.      1/17/24. Presents for follow up. Feels well. Does not feel he is retaining fluid. He is now scheduled for a repeat sleep study and will see our weight management team.        Past Medical History:   Diagnosis Date    A-fib ([facility])     ALANNA (acute kidney injury) ([facility]) 06/15/2021    Arthritis     CHF exacerbation ([facility]) 12/07/2023    CPAP (continuous positive airway pressure) dependence     Encounter for cardioversion procedure 05/06/2021    Leukopenia 10/26/2019    Lymphedema     Sleep apnea     Urinary frequency     Wears glasses     Wears partial dentures     lower partial       12 point ROS negative other than that stated in HPI    Allergies   Allergen Reactions    Penicillins Anaphylaxis    Sulfa Antibiotics Anaphylaxis    Levofloxacin Other (See Comments)     .    Current Outpatient Medications:     acetaminophen (TYLENOL) 500 mg tablet, Take 500 mg by mouth every 6 (six) hours as needed for mild pain, Disp: , Rfl:     alfuzosin (UROXATRAL) 10 mg 24 hr tablet, Take 10 mg " by mouth daily, Disp: , Rfl:     allopurinol (ZYLOPRIM) 300 mg tablet, Take 300 mg by mouth daily, Disp: , Rfl:     ammonium lactate (LAC-HYDRIN) 12 % lotion, Apply topically 2 (two) times a day as needed for dry skin, Disp: , Rfl:     Cyanocobalamin (VITAMIN B 12 PO), Take by mouth, Disp: , Rfl:     Diclofenac Sodium (VOLTAREN) 1 %, Apply 2 g topically 4 (four) times a day, Disp: 100 g, Rfl: 0    dutasteride (AVODART) 0.5 mg capsule, Take 0.5 mg by mouth daily, Disp: , Rfl:     ferrous sulfate 325 (65 Fe) mg tablet, Take 325 mg by mouth, Disp: , Rfl:     Glucosamine-Chondroit-Vit C-Mn (Glucosamine 1500 Complex) CAPS, Take by mouth, Disp: , Rfl:     halobetasol (ULTRAVATE) 0.05 % ointment, APPLY TO AFFECTED AREA ON LEG TWICE DAILY, Disp: , Rfl:     L-ARGININE-500 PO, Take 500 mg by mouth 2 (two) times a day , Disp: , Rfl:     loteprednol etabonate (LOTEMAX) 0.5 % ophthalmic suspension, 1 drop 4 (four) times a day, Disp: , Rfl:     metoprolol succinate (TOPROL-XL) 25 mg 24 hr tablet, Take 1 tablet (25 mg total) by mouth daily, Disp: 90 tablet, Rfl: 2    multivitamin (THERAGRAN) TABS, Take 1 tablet by mouth daily, Disp: , Rfl:     Omega-3 Fatty Acids (fish oil) 1,000 mg, Take 1,000 mg by mouth 2 (two) times a day, Disp: , Rfl:     patient supplied medication, Take 1 each by mouth 2 (two) times a day, Disp: , Rfl:     pregabalin (LYRICA) 100 mg capsule, Take 1 capsule (100 mg total) by mouth 3 (three) times a day, Disp: 42 capsule, Rfl: 0    rivaroxaban (Xarelto) 20 mg tablet, Take 1 tablet (20 mg total) by mouth daily, Disp: 90 tablet, Rfl: 2    spironolactone (ALDACTONE) 25 mg tablet, Take 1 tablet (25 mg total) by mouth 2 (two) times a day, Disp: 180 tablet, Rfl: 1    tadalafil (CIALIS) 10 MG tablet, , Disp: , Rfl:     tadalafil (CIALIS) 5 MG tablet, Take 5 mg by mouth daily as needed for erectile dysfunction, Disp: , Rfl:     torsemide (DEMADEX) 20 mg tablet, Take 2 tablets (40 mg total) by mouth 2 (two) times a  day, Disp: , Rfl:     traMADol (ULTRAM) 50 mg tablet, Take 1 tablet (50 mg total) by mouth 2 (two) times a day as needed for moderate pain, Disp: 60 tablet, Rfl: 1    VITAMIN D PO, Take 2,000 Units by mouth daily , Disp: , Rfl:     Social History     Socioeconomic History    Marital status: /Civil Union     Spouse name: Not on file    Number of children: Not on file    Years of education: Not on file    Highest education level: Not on file   Occupational History    Not on file   Tobacco Use    Smoking status: Never     Passive exposure: Never    Smokeless tobacco: Never   Vaping Use    Vaping status: Never Used   Substance and Sexual Activity    Alcohol use: Yes     Alcohol/week: 5.0 standard drinks of alcohol     Types: 3 Glasses of wine, 2 Standard drinks or equivalent per week     Comment: socially    Drug use: No    Sexual activity: Not Currently     Partners: Female     Birth control/protection: Abstinence, Condom Male, Spermicide   Other Topics Concern    Not on file   Social History Narrative    Not on file     Social Determinants of Health     Financial Resource Strain: Low Risk  (8/24/2023)    Received from [facility]    Overall Financial Resource Strain (CARDIA)     Difficulty of Paying Living Expenses: Not hard at all   Food Insecurity: No Food Insecurity (12/7/2023)    Hunger Vital Sign     Worried About Running Out of Food in the Last Year: Never true     Ran Out of Food in the Last Year: Never true   Transportation Needs: No Transportation Needs (12/7/2023)    PRAPARE - Transportation     Lack of Transportation (Medical): No     Lack of Transportation (Non-Medical): No   Physical Activity: Inactive (8/24/2023)    Received from [facility]    Exercise Vital Sign     Days of Exercise per Week: 0 days     Minutes of Exercise per Session: 0 min   Stress: No Stress Concern Present (8/24/2023)    Received from [facility] [place] of  "Occupational Health - Occupational Stress Questionnaire     Feeling of Stress : Not at all   Social Connections: Moderately Integrated (8/24/2023)    Received from [facility]    Social Connection and Isolation Panel [NHANES]     Frequency of Communication with Friends and Family: More than three times a week     Frequency of Social Gatherings with Friends and Family: More than three times a week     Attends Shinto Services: More than 4 times per year     Active Member of Clubs or Organizations: No     Attends Club or Organization Meetings: Patient refused     Marital Status:    Intimate Partner Violence: Not At Risk (8/24/2023)    Received from [facility]    Humiliation, Afraid, Rape, and Kick questionnaire     Fear of Current or Ex-Partner: No     Emotionally Abused: No     Physically Abused: No     Sexually Abused: No   Housing Stability: Low Risk  (12/7/2023)    Housing Stability Vital Sign     Unable to Pay for Housing in the Last Year: No     Number of Places Lived in the Last Year: 1     Unstable Housing in the Last Year: No       Family History   Problem Relation Age of Onset    Heart disease Mother     Lymphoma Father     Cancer Father     Heart disease Sister     Heart failure Sister         on diuretic    Hypertension Brother     Diabetes Neg Hx     Thyroid disease Neg Hx     Stroke Neg Hx        Physical Exam:    Vitals: /68 (BP Location: Left arm, Patient Position: Sitting, Cuff Size: Extra-Large)   Pulse 76 Comment: L radial  Ht 5' 9\" (1.753 m)   Wt (!) 143 kg (316 lb 4.8 oz)   SpO2 90%   BMI 46.71 kg/m²     Wt Readings from Last 3 Encounters:   01/10/24 (!) 143 kg (316 lb)   01/03/24 (!) 142 kg (312 lb)   12/20/23 (!) 142 kg (312 lb)       GEN: Leonardo Ovideo appears well, alert and oriented x 3, pleasant and cooperative   HEENT: pupils equal, round, and reactive to light; extraocular muscles intact  NECK: supple, no carotid bruits   HEART: " regular rhythm, normal S1 and S2, no murmurs, clicks, gallops or rubs, JVP sightly up  LUNGS: clear to auscultation bilaterally; no wheezes, rales, or rhonchi   ABDOMEN: normal bowel sounds, soft, no tenderness, + distention  EXTREMITIES: peripheral pulses normal; no clubbing, cyanosis, chronic lymphedema present  NEURO: no focal findings   SKIN: normal without suspicious lesions on exposed skin    Labs & Results:    Chemistry        Component Value Date/Time    K 3.9 01/02/2024 1116    K 4.6 09/03/2020 0805    CL 98 01/02/2024 1116     09/03/2020 0805    CO2 30 01/02/2024 1116    CO2 30 09/03/2020 0805    BUN 23 01/02/2024 1116    BUN 24 09/03/2020 0805    CREATININE 0.95 01/02/2024 1116        Component Value Date/Time    CALCIUM 9.7 01/02/2024 1116    CALCIUM 9.2 09/03/2020 0805    ALKPHOS 57 11/13/2023 1404    AST 35 11/13/2023 1404    ALT 14 11/13/2023 1404        Lab Results   Component Value Date    WBC 4.93 12/13/2023    HGB 13.0 12/13/2023    HCT 37.9 12/13/2023    MCV 91 12/13/2023     12/13/2023     Lab Results   Component Value Date    BNP 91 12/07/2023      Lab Results   Component Value Date    LDLCALC 135 (H) 04/19/2023     Lab Results   Component Value Date    VBQ1BIXFGQEE 4.127 12/18/2023       EKG personally reviewed by NONA Cartwright.   No results found for this visit on 01/17/24.     Counseling / Coordination of Care  Total face to face time spent with patient 20 minutes.  An additional 10 minutes was spent for chart/data review and visit preparation.       Thank you for the opportunity to participate in the care of this patient.    NONA Cartwright

## 2024-01-13 PROBLEM — J18.9 PNEUMONIA: Status: RESOLVED | Noted: 2023-11-14 | Resolved: 2024-01-13

## 2024-01-16 ENCOUNTER — HOSPITAL ENCOUNTER (OUTPATIENT)
Dept: CT IMAGING | Facility: HOSPITAL | Age: 69
Discharge: HOME | End: 2024-01-16
Payer: COMMERCIAL

## 2024-01-16 ENCOUNTER — OFFICE VISIT (OUTPATIENT)
Dept: PHYSICAL THERAPY | Facility: CLINIC | Age: 69
End: 2024-01-16
Payer: COMMERCIAL

## 2024-01-16 DIAGNOSIS — G89.4 CHRONIC PAIN SYNDROME: ICD-10-CM

## 2024-01-16 DIAGNOSIS — M54.50 CHRONIC BILATERAL LOW BACK PAIN WITHOUT SCIATICA: ICD-10-CM

## 2024-01-16 DIAGNOSIS — M47.816 LUMBAR SPONDYLOSIS: Primary | ICD-10-CM

## 2024-01-16 DIAGNOSIS — G89.29 CHRONIC BILATERAL LOW BACK PAIN WITHOUT SCIATICA: ICD-10-CM

## 2024-01-16 DIAGNOSIS — J98.4 RESTRICTIVE LUNG DISEASE: ICD-10-CM

## 2024-01-16 PROCEDURE — G1004 CDSM NDSC: HCPCS

## 2024-01-16 PROCEDURE — 71250 CT THORAX DX C-: CPT

## 2024-01-16 PROCEDURE — 97110 THERAPEUTIC EXERCISES: CPT

## 2024-01-16 PROCEDURE — 97112 NEUROMUSCULAR REEDUCATION: CPT

## 2024-01-16 NOTE — PROGRESS NOTES
"Daily Note     Today's date: 2024  Patient name: Leonardo Oviedo  : 1955  MRN: [id number]  Referring provider: Kocher, Barbara, CRNP  Dx:   Encounter Diagnosis     ICD-10-CM    1. Lumbar spondylosis  M47.816       2. Chronic pain syndrome  G89.4       3. Chronic bilateral low back pain without sciatica  M54.50     G89.29           Start Time: 1015  Stop Time: 1100  Total time in clinic (min): 45 minutes    Subjective: Pt reports that he has some discomfort in low back today if he twists a certain way but overall is feeling good.        Objective: See treatment diary below      Assessment: Tolerated treatment fair. Able to complete POC without incident. Requiring frequent verbal cues for proper mm engagement with TA training. Demonstrating overall decreased CV and muscular endurance. No increase in baseline pain levels at end of session; demonstrating moderate difficulty with overall functional mobility such as getting up on treatment table. Will continue to monitor and progress as tolerated.  Patient demonstrated fatigue post treatment and would benefit from continued PT      Plan: Continue per plan of care.  Progress treatment as tolerated.       Precautions: Hx RODNEY , hx afib, CHF, arthritis, chronic LBP, hx L/S ablation oct 2023, lymphedema with ulcer L Leg (being treated)  L shoulder pain       Re-eval Date:     Date 1/10/2024  1/16      Visit Count 1 2      FOTO 1/10/2024        Pain In See IE Discomfort low back      Pain Out See IE discomfort          Manuals 1/10/2024  1/16                                 *Head of table slightly elevated      Neuro Re-Ed        TA training   TA contract with PPT x20/3-5\"       TA with BKFO vs seated on disc with LAQ, shoulder flex, etc   TA contract with BKFO x10 ea/3\"           TA with alt heel slide  TA with heel slide x15 ea       Pallof press L/R                                 Ther Ex        Nustep UE/Les- ROM, conditioning, monitor vitals and RPE   " "L2 10'       Mini squats     Step ups fwd-->lateral         Hrs      LAQ        2x10 5\" hold ea      HS curls   X20 alt       SLR 3 way                                 Ther Activity 10' pt education regarding pathophysiology/pathoanatomy of present pain/sx and condition, role of PT in improving pain/sx and function, pt education regarding activity modification to avoid exacerbation of sx and delayed recovery; reviewed gradual avoidance of prolonged periods of inactivity such as sitting and to break up prolonged activity with short periods of light activity such as walking around the house, light chores, etc as tolerated (starting slowly/gradually); encouraged pt to monitor SOB/fatigue as well as back pain with activity and stop if sx are worsened                          Gait Training                        Modalities                                       "

## 2024-01-17 ENCOUNTER — TELEPHONE (OUTPATIENT)
Dept: CARDIOLOGY CLINIC | Facility: CLINIC | Age: 69
End: 2024-01-17

## 2024-01-17 ENCOUNTER — OFFICE VISIT (OUTPATIENT)
Dept: CARDIOLOGY CLINIC | Facility: CLINIC | Age: 69
End: 2024-01-17
Payer: COMMERCIAL

## 2024-01-17 VITALS
OXYGEN SATURATION: 90 % | WEIGHT: 315 LBS | HEIGHT: 69 IN | SYSTOLIC BLOOD PRESSURE: 118 MMHG | HEART RATE: 76 BPM | BODY MASS INDEX: 46.65 KG/M2 | DIASTOLIC BLOOD PRESSURE: 68 MMHG

## 2024-01-17 DIAGNOSIS — I50.32 CHRONIC DIASTOLIC HEART FAILURE (HCC): Primary | ICD-10-CM

## 2024-01-17 PROCEDURE — 99214 OFFICE O/P EST MOD 30 MIN: CPT | Performed by: NURSE PRACTITIONER

## 2024-01-17 NOTE — PATIENT INSTRUCTIONS
Weigh yourself daily  If you gain 3 lbs in one day or 5 lbs in one week, please call the office at [phone] and ask for a nurse or the heart failure nurse  Keep your sodium intake to <2 grams, (2000 mg) per day, and fluids <2 Liters (2000 ml) per day. This is around 6-7, 8 oz glasses of fluid per day    I will price check Jardiance and Farxiga

## 2024-01-18 ENCOUNTER — OFFICE VISIT (OUTPATIENT)
Dept: PHYSICAL THERAPY | Facility: CLINIC | Age: 69
End: 2024-01-18
Payer: COMMERCIAL

## 2024-01-18 DIAGNOSIS — G89.4 CHRONIC PAIN SYNDROME: ICD-10-CM

## 2024-01-18 DIAGNOSIS — G89.29 CHRONIC BILATERAL LOW BACK PAIN WITHOUT SCIATICA: ICD-10-CM

## 2024-01-18 DIAGNOSIS — M54.50 CHRONIC BILATERAL LOW BACK PAIN WITHOUT SCIATICA: ICD-10-CM

## 2024-01-18 DIAGNOSIS — M47.816 LUMBAR SPONDYLOSIS: Primary | ICD-10-CM

## 2024-01-18 PROCEDURE — 97110 THERAPEUTIC EXERCISES: CPT | Performed by: PHYSICAL MEDICINE & REHABILITATION

## 2024-01-18 PROCEDURE — 97112 NEUROMUSCULAR REEDUCATION: CPT | Performed by: PHYSICAL MEDICINE & REHABILITATION

## 2024-01-18 NOTE — PROGRESS NOTES
"Daily Note     Today's date: 2024  Patient name: Leonardo Oviedo  : 1955  MRN: [id number]  Referring provider: Kocher, Barbara, CRNP  Dx:   Encounter Diagnosis     ICD-10-CM    1. Lumbar spondylosis  M47.816       2. Chronic pain syndrome  G89.4       3. Chronic bilateral low back pain without sciatica  M54.50     G89.29                      Subjective: Pt notes the day it snowed he was sore; used his ; more LBP. Notes is \"fine\" today. Denies any present pain. Denies any adverse reaction to last session.       Objective: See treatment diary below      Assessment: Tolerated treatment well. Denied any increase in back pain with or following session. Pt with ankle/leg discomfort with supine TA exercises with knees bent; progressed program to included seated/standing trunk stabilization/mm contraction exercises as tolerated instead; tolerated pallof presses and standing anti trunk movements with stick well without incident; appropriate mm contraction and stabilization noted; limited by mm fatigue/endurance deficits. Progressed standing exercises to improve pt standing tolerance; tolerated well without noted incident. No complaints after session. Issued and reviewed HEP as noted; understanding noted. Patient demonstrated fatigue post treatment, exhibited good technique with therapeutic exercises, and would benefit from continued PT      Plan: Continue per plan of care.  Progress treatment as tolerated.       Precautions: Hx RODNEY , hx afib, CHF, arthritis, chronic LBP, hx L/S ablation oct 2023, lymphedema with ulcer L Leg (being treated)  L shoulder pain       Re-eval Date:     Date 1/10/2024  1/16 1/18     Visit Count 1 2 3     FOTO 1/10/2024        Pain In See IE Discomfort low back 0/10     Pain Out See IE discomfort 0/10         Manuals 1/10/2024  1/16  1/18                               *Head of table slightly elevated *Head of table slightly elevated- low mat table with wedge    " "  Neuro Re-Ed        TA training   TA contract with PPT x20/3-5\"  TA contract with PPT x20/3-5\"   Cues/feedback      TA with BKFO vs seated on disc with LAQ, shoulder flex, etc   TA contract with BKFO x10 ea/3\"           TA with alt heel slide  TA with heel slide x15 ea       Pallof press L/R    Seated EOT   Single blue  10-15x/5\" ea R/L   Cues/feedback         Standing anti trunk flex , ext, R rot, L rot with stick held at 90*  5-10 x ea direction with 5-10\" hold   Cues for posture                      Ther Ex        Nustep UE/Les- ROM, conditioning, monitor vitals and RPE   L2 10'  L2 11'      Mini squats     Step ups fwd-->lateral         Hrs      LAQ        2x10 5\" hold ea       1# 2x10/3\" ea      HS curls   X20 alt  1# 2x10/3\" ea standing      SLR 3 way    Abd, ext 1#  2x10/3\" ea   Cues for lumbopelvic stability/posture control  Standing          Hrs   2x10/3\"   Standing         *reviewed standing exercises for HEP and gave handout              Ther Activity 10' pt education regarding pathophysiology/pathoanatomy of present pain/sx and condition, role of PT in improving pain/sx and function, pt education regarding activity modification to avoid exacerbation of sx and delayed recovery; reviewed gradual avoidance of prolonged periods of inactivity such as sitting and to break up prolonged activity with short periods of light activity such as walking around the house, light chores, etc as tolerated (starting slowly/gradually); encouraged pt to monitor SOB/fatigue as well as back pain with activity and stop if sx are worsened                          Gait Training                        Modalities                            1/18 - HEP was issued and reviewed this date for above noted exercises (standing SLRs, HS curls, HR, LAQ, supine TA). Pt demonstrated understanding without incident and without questions/concerns. Will continue to update upcoming.                "

## 2024-01-19 ENCOUNTER — PATIENT OUTREACH (OUTPATIENT)
Dept: CASE MANAGEMENT | Facility: HOSPITAL | Age: 69
End: 2024-01-19

## 2024-01-19 ENCOUNTER — TELEPHONE (OUTPATIENT)
Dept: CARDIOLOGY CLINIC | Facility: CLINIC | Age: 69
End: 2024-01-19

## 2024-01-19 DIAGNOSIS — I50.32 CHRONIC HEART FAILURE WITH PRESERVED EJECTION FRACTION (HFPEF) (HCC): ICD-10-CM

## 2024-01-19 RX ORDER — TORSEMIDE 20 MG/1
60 TABLET ORAL DAILY
Qty: 60 TABLET | Refills: 2 | Status: SHIPPED | OUTPATIENT
Start: 2024-01-19

## 2024-01-19 NOTE — PROGRESS NOTES
"Heart Failure Outpatient Care Coordinator Note: Chart notes reviewed and call made to patient. He reports doing well. Home weight today 305#, denies SOb or edema and states feels current diuretics are \"doing the job\". He is participating in outpatient physical therapy post back procedure and has appointment scheduled for Sleep Medicine and Weight Management. No concerns or care needs. Will close CHF episode and remove self from care team.  "

## 2024-01-19 NOTE — TELEPHONE ENCOUNTER
Jardiance 10 mg no prior auth required   30 day supply $30.00    [facility]   ID -[id number]

## 2024-01-19 NOTE — TELEPHONE ENCOUNTER
Thanks. Can you please call patient and let him know cost. He can give me a call when/if he'd like to start it. Thanks

## 2024-01-19 NOTE — TELEPHONE ENCOUNTER
----- Message from NONA Cartwright sent at 1/19/2024 11:55 AM EST -----  Helen Cobb,     I am starting Bill on Jardiance 10 mg daily. I will need him to back off on his Torsemide to just 60 mg daily down from 40 mg BID. Will need BMP in about a week to recheck kidney function. Could you please call him with this update ? Thanks    Christin

## 2024-01-23 ENCOUNTER — OFFICE VISIT (OUTPATIENT)
Dept: PHYSICAL THERAPY | Facility: CLINIC | Age: 69
End: 2024-01-23
Payer: COMMERCIAL

## 2024-01-23 DIAGNOSIS — G89.29 CHRONIC BILATERAL LOW BACK PAIN WITHOUT SCIATICA: ICD-10-CM

## 2024-01-23 DIAGNOSIS — M54.50 CHRONIC BILATERAL LOW BACK PAIN WITHOUT SCIATICA: ICD-10-CM

## 2024-01-23 DIAGNOSIS — G89.4 CHRONIC PAIN SYNDROME: ICD-10-CM

## 2024-01-23 DIAGNOSIS — M47.816 LUMBAR SPONDYLOSIS: Primary | ICD-10-CM

## 2024-01-23 PROCEDURE — 97112 NEUROMUSCULAR REEDUCATION: CPT | Performed by: PHYSICAL MEDICINE & REHABILITATION

## 2024-01-23 PROCEDURE — 97110 THERAPEUTIC EXERCISES: CPT | Performed by: PHYSICAL MEDICINE & REHABILITATION

## 2024-01-23 NOTE — PROGRESS NOTES
"Daily Note     Today's date: 2024  Patient name: Leonardo Oviedo  : 1955  MRN: [id number]  Referring provider: Kocher, Barbara, CRNP  Dx:   Encounter Diagnosis     ICD-10-CM    1. Lumbar spondylosis  M47.816       2. Chronic pain syndrome  G89.4       3. Chronic bilateral low back pain without sciatica  M54.50     G89.29           Start Time: 915  Stop Time: 1005  Total time in clinic (min): 50 minutes    Subjective: Pt notes no new sx/complaints. Notes his back has been \"feeling better\" however notes he \"hasn't been doing much\" the last few days. No complaints after last session.       Objective: See treatment diary below      Assessment: Tolerated treatment well. Progressing well with program. No adverse reaction to tx and no complaints after session. Pt continues with proximal hip/trunk/core strength and endurance deficits. Appropriate mm fatigue with exercises. Cont to stand with forward flexed posture; sits slouched/rounded. Weakness in trunk/hip extensors. Fatigues quickly with anti trunk flexion/extension exercises standing. No complaints after session. Patient demonstrated fatigue post treatment and would benefit from continued PT      Plan: Continue per plan of care.  Progress treatment as tolerated.       Precautions: Hx RODNEY , hx afib, CHF, arthritis, chronic LBP, hx L/S ablation oct 2023, lymphedema with ulcer L Leg (being treated)  L shoulder pain       Re-eval Date:     Date 1/10/2024  1/16 1/18 1/23    Visit Count 1 2 3 4    FOTO 1/10/2024        Pain In See IE Discomfort low back 0/10 0/10    Pain Out See IE discomfort 0/10 010        Manuals 1/10/2024  1/16  1/18 1/23                              *Head of table slightly elevated *Head of table slightly elevated- low mat table with wedge      Neuro Re-Ed        TA training   TA contract with PPT x20/3-5\"  TA contract with PPT x20/3-5\"   Cues/feedback      TA with BKFO vs seated on disc with LAQ, shoulder flex, etc   TA contract " "with BKFO x10 ea/3\"           TA with alt heel slide  TA with heel slide x15 ea       Pallof press L/R    Seated EOT   Single blue  10-15x/5\" ea R/L   Cues/feedback  Seated EOT   Single blue  15x/5\" ea R/L   Cues/feedback        Standing anti trunk flex , ext, R rot, L rot with stick held at 90*  5-10 x ea direction with 5-10\" hold   Cues for posture  Standing anti trunk flex , ext, R rot, L rot with stick held at 90*  5-10 x ea direction with 5-10\" hold   Cues for posture                     Ther Ex        Nustep UE/Les- ROM, conditioning, monitor vitals and RPE   L2 10'  L2 11'  L2 11'     Mini squats     Step ups fwd-->lateral         Hrs      LAQ        2x10 5\" hold ea       1# 2x10/3\" ea        1# 2x10/3\" ea     HS curls   X20 alt  1# 2x10/3\" ea standing  1# 2x10/3\" ea standing     SLR 3 way    Abd, ext 1#  2x10/3\" ea   Cues for lumbopelvic stability/posture control  Standing   Abd, ext 1#  2x10/3\" ea   Cues for lumbopelvic stability/posture control  Standing    Standing march  Cues for TA and postural stability/awareness   1# 2x10 ea R/L       Hrs   2x10/3\"   Standing  Hrs   2x10/3\"   Standing        *reviewed standing exercises for HEP and gave handout              Ther Activity 10' pt education regarding pathophysiology/pathoanatomy of present pain/sx and condition, role of PT in improving pain/sx and function, pt education regarding activity modification to avoid exacerbation of sx and delayed recovery; reviewed gradual avoidance of prolonged periods of inactivity such as sitting and to break up prolonged activity with short periods of light activity such as walking around the house, light chores, etc as tolerated (starting slowly/gradually); encouraged pt to monitor SOB/fatigue as well as back pain with activity and stop if sx are worsened                          Gait Training                        Modalities                            1/18 - HEP was issued and reviewed this date for above noted " exercises (standing SLRs, HS curls, HR, LAQ, supine TA). Pt demonstrated understanding without incident and without questions/concerns. Will continue to update upcoming.

## 2024-01-24 ENCOUNTER — OFFICE VISIT (OUTPATIENT)
Dept: PHYSICAL THERAPY | Facility: CLINIC | Age: 69
End: 2024-01-24
Payer: COMMERCIAL

## 2024-01-24 DIAGNOSIS — M54.50 CHRONIC BILATERAL LOW BACK PAIN WITHOUT SCIATICA: ICD-10-CM

## 2024-01-24 DIAGNOSIS — G89.4 CHRONIC PAIN SYNDROME: ICD-10-CM

## 2024-01-24 DIAGNOSIS — M47.816 LUMBAR SPONDYLOSIS: Primary | ICD-10-CM

## 2024-01-24 DIAGNOSIS — G89.29 CHRONIC BILATERAL LOW BACK PAIN WITHOUT SCIATICA: ICD-10-CM

## 2024-01-24 PROCEDURE — 97112 NEUROMUSCULAR REEDUCATION: CPT | Performed by: PHYSICAL MEDICINE & REHABILITATION

## 2024-01-24 PROCEDURE — 97110 THERAPEUTIC EXERCISES: CPT | Performed by: PHYSICAL MEDICINE & REHABILITATION

## 2024-01-24 NOTE — PROGRESS NOTES
"Daily Note     Today's date: 2024  Patient name: Leonardo Oviedo  : 1955  MRN: [id number]  Referring provider: Kocher, Barbara, CRNP  Dx:   Encounter Diagnosis     ICD-10-CM    1. Lumbar spondylosis  M47.816       2. Chronic pain syndrome  G89.4       3. Chronic bilateral low back pain without sciatica  M54.50     G89.29                      Subjective: Pt with no new sx/complaints from yesterdays session. Denies pain at present.       Objective: See treatment diary below      Assessment: Tolerated treatment well. NO adverse reaction to session or following session. Pt with notable improvements in standing and exercise tolerance. Improved postural awareness in standing; able to stand erect for longer periods without cues/feedback. Added mini squats and step ups; appropriate mm fatigue only. Improving trunk strength/endurance with trunk stability training exercises. No complaints after session. Patient demonstrated fatigue post treatment and would benefit from continued PT      Plan: Continue per plan of care.  Progress treatment as tolerated.       Precautions: Hx RODNEY , hx afib, CHF, arthritis, chronic LBP, hx L/S ablation oct 2023, lymphedema with ulcer L Leg (being treated)  L shoulder pain       Re-eval Date:     Date 1/10/2024  1/16 1/18 1/23 1/24   Visit Count 1 2 3 4 5   FOTO 1/10/2024     NV   Pain In See IE Discomfort low back 0/10 0/10 0/10   Pain Out See IE discomfort 0/10 010 0/10       Manuals 1/10/2024  1/16  1/18 1/23 1/24                             *Head of table slightly elevated *Head of table slightly elevated- low mat table with wedge      Neuro Re-Ed        TA training   TA contract with PPT x20/3-5\"  TA contract with PPT x20/3-5\"   Cues/feedback      TA with BKFO vs seated on disc with LAQ, shoulder flex, etc   TA contract with BKFO x10 ea/3\"           TA with alt heel slide  TA with heel slide x15 ea       Pallof press L/R    Seated EOT   Single blue  10-15x/5\" ea R/L " "  Cues/feedback  Seated EOT   Single blue  15x/5\" ea R/L   Cues/feedback        Standing anti trunk flex , ext, R rot, L rot with stick held at 90*  5-10 x ea direction with 5-10\" hold   Cues for posture  Standing anti trunk flex , ext, R rot, L rot with stick held at 90*  5-10 x ea direction with 5-10\" hold   Cues for posture  Standing anti trunk flex , ext, R rot, L rot with stick held at 90*  10 x ea direction with 5-10\" ea hold   Cues for posture                    Ther Ex        Nustep UE/Les- ROM, conditioning, monitor vitals and RPE   L2 10'  L2 11'  L2 11'  L2 11'    Mini squats                               Step ups fwd-->lateral      2x10/3\"  Cues/feedback verbal/tactile cues for facilitation of gluts and quads, especially to stand erect , facilitation of proper hip positioning to reduce back strain     4in 2x10 ea R/L   Hrs      LAQ        2x10 5\" hold ea       1# 2x10/3\" ea        1# 2x10/3\" ea     HS curls   X20 alt  1# 2x10/3\" ea standing  1# 2x10/3\" ea standing     SLR 3 way    Abd, ext 1#  2x10/3\" ea   Cues for lumbopelvic stability/posture control  Standing   Abd, ext 1#  2x10/3\" ea   Cues for lumbopelvic stability/posture control  Standing    Standing march  Cues for TA and postural stability/awareness   1# 2x10 ea R/L Abd, ext 1#  2-3x10/3\" ea   Cues for lumbopelvic stability/posture control  Standing    Standing march  Cues for TA and postural stability/awareness   1# 2-3x10 ea R/L      Hrs   2x10/3\"   Standing  Hrs   2x10/3\"   Standing        *reviewed standing exercises for HEP and gave handout              Ther Activity 10' pt education regarding pathophysiology/pathoanatomy of present pain/sx and condition, role of PT in improving pain/sx and function, pt education regarding activity modification to avoid exacerbation of sx and delayed recovery; reviewed gradual avoidance of prolonged periods of inactivity such as sitting and to break up prolonged activity with short periods of light activity " such as walking around the house, light chores, etc as tolerated (starting slowly/gradually); encouraged pt to monitor SOB/fatigue as well as back pain with activity and stop if sx are worsened                          Gait Training                        Modalities                            1/18 - HEP was issued and reviewed this date for above noted exercises (standing SLRs, HS curls, HR, LAQ, supine TA). Pt demonstrated understanding without incident and without questions/concerns. Will continue to update upcoming.

## 2024-01-25 ENCOUNTER — APPOINTMENT (OUTPATIENT)
Dept: PHYSICAL THERAPY | Facility: CLINIC | Age: 69
End: 2024-01-25
Payer: COMMERCIAL

## 2024-01-29 ENCOUNTER — APPOINTMENT (OUTPATIENT)
Dept: LAB | Facility: HOSPITAL | Age: 69
End: 2024-01-29
Payer: COMMERCIAL

## 2024-01-29 DIAGNOSIS — I50.32 CHRONIC HEART FAILURE WITH PRESERVED EJECTION FRACTION (HFPEF) (HCC): ICD-10-CM

## 2024-01-29 LAB
ANION GAP SERPL CALCULATED.3IONS-SCNC: 9 MMOL/L
BUN SERPL-MCNC: 23 MG/DL (ref 5–25)
CALCIUM SERPL-MCNC: 9.6 MG/DL (ref 8.4–10.2)
CHLORIDE SERPL-SCNC: 98 MMOL/L (ref 96–108)
CO2 SERPL-SCNC: 29 MMOL/L (ref 21–32)
CREAT SERPL-MCNC: 1.06 MG/DL (ref 0.6–1.3)
GFR SERPL CREATININE-BSD FRML MDRD: 71 ML/MIN/1.73SQ M
GLUCOSE P FAST SERPL-MCNC: 103 MG/DL (ref 65–99)
POTASSIUM SERPL-SCNC: 3.7 MMOL/L (ref 3.5–5.3)
SODIUM SERPL-SCNC: 136 MMOL/L (ref 135–147)

## 2024-01-29 PROCEDURE — 80048 BASIC METABOLIC PNL TOTAL CA: CPT

## 2024-01-29 PROCEDURE — 36415 COLL VENOUS BLD VENIPUNCTURE: CPT

## 2024-01-30 ENCOUNTER — OFFICE VISIT (OUTPATIENT)
Dept: PHYSICAL THERAPY | Facility: CLINIC | Age: 69
End: 2024-01-30
Payer: COMMERCIAL

## 2024-01-30 ENCOUNTER — TELEPHONE (OUTPATIENT)
Dept: CARDIOLOGY CLINIC | Facility: CLINIC | Age: 69
End: 2024-01-30

## 2024-01-30 DIAGNOSIS — G89.29 CHRONIC BILATERAL LOW BACK PAIN WITHOUT SCIATICA: ICD-10-CM

## 2024-01-30 DIAGNOSIS — M54.50 CHRONIC BILATERAL LOW BACK PAIN WITHOUT SCIATICA: ICD-10-CM

## 2024-01-30 DIAGNOSIS — M47.816 LUMBAR SPONDYLOSIS: Primary | ICD-10-CM

## 2024-01-30 DIAGNOSIS — G89.4 CHRONIC PAIN SYNDROME: ICD-10-CM

## 2024-01-30 PROCEDURE — 97112 NEUROMUSCULAR REEDUCATION: CPT | Performed by: PHYSICAL MEDICINE & REHABILITATION

## 2024-01-30 PROCEDURE — 97110 THERAPEUTIC EXERCISES: CPT | Performed by: PHYSICAL MEDICINE & REHABILITATION

## 2024-01-30 NOTE — PROGRESS NOTES
"Daily Note     Today's date: 2024  Patient name: Leonardo Oviedo  : 1955  MRN: [id number]  Referring provider: Kocher, Barbara, CRNP  Dx:   Encounter Diagnosis     ICD-10-CM    1. Lumbar spondylosis  M47.816       2. Chronic pain syndrome  G89.4       3. Chronic bilateral low back pain without sciatica  M54.50     G89.29                      Subjective: Pt with no new sx/complaints. No complaints with PT/exercise program to date. Feels overall his back pain and mobility are improving gradually since SOC.       Objective: See treatment diary below      Assessment: Tolerated treatment well. Progressing well with program overall. Able to increase SLR and seated PREs to 1.5# without incident; improving mm strength and endurance. Increasing time on Nustep as well with improved stamina. Able to complete all standing exercises well without seated rest with improved standing tolerance. Good technique with mini squats with min cues. Challenged core DLS with unstable surface seated and NBOS standing; appropriate mm challenge only noted. NO complaints or pain/sx after session. Cont with strength and endurance deficits negatively impacting function. Patient demonstrated fatigue post treatment and would benefit from continued PT      Plan: Continue per plan of care.  Progress treatment as tolerated.       Precautions: Hx RODNEY , hx afib, CHF, arthritis, chronic LBP, hx L/S ablation oct 2023, lymphedema with ulcer L Leg (being treated)  L shoulder pain       Re-eval Date:     Date        Visit Count 6       FOTO NV       Pain In 0/10       Pain Out 0/10           Manuals                                        Neuro Re-Ed        TA training         TA with BKFO vs seated on disc with LAQ, shoulder flex, etc         TA with alt heel slide        Pallof press L/R  Seated on dynadisc low mat table   Single blue  15x/5\" ea R/L   Cues/feedback         Standing   Romberg  anti trunk flex , ext, R rot, L rot " "with stick held at 90*  10 x ea direction with 5-10\" ea hold   Cues for posture                        Ther Ex        Nustep UE/Les- ROM, conditioning, monitor vitals and RPE  L2 12'        Mini squats                               Step ups fwd-->lateral  2x10/3\"  Cues/feedback verbal/tactile cues for facilitation of gluts and quads, especially to stand erect , facilitation of proper hip positioning to reduce back strain     4in   Resume        Hrs      LAQ       1.5# 2x10/3\" ea        HS curls  1.5# 2x10/3\" ea        SLR 3 way  Abd, ext 1.5#  2x10/3\" ea   Cues for lumbopelvic stability/posture control  Standing    Standing march 1.5#  Cues for TA and postural stability/awareness   1# 2x10 ea R/L                               Ther Activity                        Gait Training                        Modalities                            1/18 - HEP was issued and reviewed this date for above noted exercises (standing SLRs, HS curls, HR, LAQ, supine TA). Pt demonstrated understanding without incident and without questions/concerns. Will continue to update upcoming.                      "

## 2024-01-30 NOTE — TELEPHONE ENCOUNTER
Called patient and gave results.       ----- Message from Francisca Boateng sent at 1/30/2024  1:42 PM EST -----    ----- Message -----  From: NONA Cartwright  Sent: 1/29/2024   9:53 AM EST  To: Cardiology [place] [facility]    Please call patient and let him know lab work shows stable kidney function. Thanks  ----- Message -----  From: Lab, Background User  Sent: 1/29/2024   9:41 AM EST  To: NONA Cartwright

## 2024-01-31 ENCOUNTER — HOSPITAL ENCOUNTER (OUTPATIENT)
Dept: SLEEP CENTER | Facility: HOSPITAL | Age: 69
Discharge: HOME | End: 2024-01-31
Payer: COMMERCIAL

## 2024-01-31 DIAGNOSIS — G47.33 OBSTRUCTIVE SLEEP APNEA SYNDROME: ICD-10-CM

## 2024-01-31 PROCEDURE — 95811 POLYSOM 6/>YRS CPAP 4/> PARM: CPT

## 2024-02-01 ENCOUNTER — OFFICE VISIT (OUTPATIENT)
Dept: PHYSICAL THERAPY | Facility: CLINIC | Age: 69
End: 2024-02-01
Payer: COMMERCIAL

## 2024-02-01 DIAGNOSIS — G89.4 CHRONIC PAIN SYNDROME: ICD-10-CM

## 2024-02-01 DIAGNOSIS — G89.29 CHRONIC BILATERAL LOW BACK PAIN WITHOUT SCIATICA: ICD-10-CM

## 2024-02-01 DIAGNOSIS — M54.50 CHRONIC BILATERAL LOW BACK PAIN WITHOUT SCIATICA: ICD-10-CM

## 2024-02-01 DIAGNOSIS — M47.816 LUMBAR SPONDYLOSIS: Primary | ICD-10-CM

## 2024-02-01 PROCEDURE — 97110 THERAPEUTIC EXERCISES: CPT | Performed by: PHYSICAL MEDICINE & REHABILITATION

## 2024-02-01 PROCEDURE — 97112 NEUROMUSCULAR REEDUCATION: CPT | Performed by: PHYSICAL MEDICINE & REHABILITATION

## 2024-02-01 NOTE — PROGRESS NOTES
Sleep Study Documentation    Pre-Sleep Study       Sleep testing procedure explained to patient:YES    Patient napped prior to study:NO    Caffeine:Dayshift worker after 12PM.  Caffeine use:NO    Alcohol:Nightshift workers after 7AM: Alcohol use:NO    Typical day for patient:YES       Study Documentation    Sleep Study Indications: The patient is here for snoring and a BMI>30. He has a previous history of CPAP use, however, he currently is not using his CPAP.     Sleep Study: Treatment   Optimal PAP pressure: 22cm/18cm  Leak:Small  Snore:Eliminated  REM Obtained:yes  Supplemental O2: no    Minimum SaO2 74  Baseline SaO2 88.5  PAP mask tried (list all) ResMed Airfit F20 medium.  PAP mask choice (final)ResMed Airfit F20 medium.  PAP mask type:full face  PAP pressure at which snoring was eliminated 7cm  Mode of Therapy:BiPAP  ETCO2:No  CPAP changed to BiPAP:Yes. If yes why Patient was having respiratory events at the CPAP pressure of 15cms.     Mode of Therapy:BiPAP    EKG abnormalities: no     EEG abnormalities: no    Were abnormal behaviors in sleep observed:NO    Is Total Sleep Study Recording Time < 2 hours: N/A    Is Total Sleep Study Recording Time > 2 hours but study is incomplete: N/A    Is Total Sleep Study Recording Time 6 hours or more but sleep was not obtained: NO    Patient classification: employed       Post-Sleep Study    Medication used at bedtime or during sleep study:YES other prescription medications    Patient reports time it took to fall asleep:less than 20 minutes    Patient reports waking up during study:1 to 2 times.  Patient reports returning to sleep without difficulty.    Patient reports sleeping 4 to 6 hours without dreaming.    Does the Patient feel this is a typical night of sleep:typical    Patient rated sleepiness: Not sleepy or tired    PAP treatment:yes: Post PAP treatment patient reports feeling unsure if a change is noted and  would wear PAP mask at home.

## 2024-02-01 NOTE — PROGRESS NOTES
"Daily Note     Today's date: 2024  Patient name: Leonardo Oviedo  : 1955  MRN: [id number]  Referring provider: Kocher, Barbara, CRNP  Dx:   Encounter Diagnosis     ICD-10-CM    1. Lumbar spondylosis  M47.816       2. Chronic pain syndrome  G89.4       3. Chronic bilateral low back pain without sciatica  M54.50     G89.29                      Subjective: Pt notes he is \"exhausted today\". Has been going since 6am. Denies back pain. No new sx/complaints. No adverse reaction to tx to date. Notes he was able to stand/walk more today without back pain like he used to have. Also was able to bend over at [employer] today without pain; notes he wouldn't have even tried that a few weeks ago        Objective: See treatment diary below      Assessment: Tolerated treatment well. Progressing well with program. Increasing reps/weights with improving strength/endurance without pain/sx. Able to stand majority of session without increase in back pain. Cont with strength deficits of the trunk and Les. Cont with overall endurance deficits and limited functional activity tolerance.  Patient demonstrated fatigue post treatment and would benefit from continued PT      Plan: Continue per plan of care.  Progress treatment as tolerated.       Precautions: Hx RODNEY , hx afib, CHF, arthritis, chronic LBP, hx L/S ablation oct 2023, lymphedema with ulcer L Leg (being treated)  L shoulder pain       Re-eval Date:     Date       Visit Count 6 7      FOTO NV       Pain In 0/10 0      Pain Out 0/10 0          Manuals                                       Neuro Re-Ed        TA training         TA with BKFO vs seated on disc with LAQ, shoulder flex, etc         TA with alt heel slide        Pallof press L/R  Seated on dynadisc low mat table   Single blue  15x/5\" ea R/L   Cues/feedback  Seated on dynadisc low mat table   Single blue  2x10/5\" ea R/L   Cues/feedback        Standing   Romberg  anti trunk flex , ext, " "R rot, L rot with stick held at 90*  10 x ea direction with 5-10\" ea hold   Cues for posture                        Ther Ex        Nustep UE/Les- ROM, conditioning, monitor vitals and RPE  L2 12'  L2 12'       Mini squats                               Step ups fwd-->lateral  2x10/3\"  Cues/feedback verbal/tactile cues for facilitation of gluts and quads, especially to stand erect , facilitation of proper hip positioning to reduce back strain     4in   Resume  2x10/3\"  Cues/feedback verbal/tactile cues for facilitation of gluts and quads, especially to stand erect , facilitation of proper hip positioning to reduce back strain    4 in   10x ea R/L        Hrs      LAQ       1.5# 2x10/3\" ea        1.5# 2x10/3\" ea       HS curls  1.5# 2x10/3\" ea        SLR 3 way  Abd, ext 1.5#  2x10/3\" ea   Cues for lumbopelvic stability/posture control  Standing    Standing march 1.5#  Cues for TA and postural stability/awareness   1# 2x10 ea R/L Abd, ext 1.5#  2-3x10/3\" ea   Cues for lumbopelvic stability/posture control  Standing      Standing march 1.5#  Cues for TA and postural stability/awareness   1# 2-3x10 ea R/L                              Ther Activity                        Gait Training                        Modalities                            1/18 - HEP was issued and reviewed this date for above noted exercises (standing SLRs, HS curls, HR, LAQ, supine TA). Pt demonstrated understanding without incident and without questions/concerns. Will continue to update upcoming.                        "

## 2024-02-04 DIAGNOSIS — G47.33 OBSTRUCTIVE SLEEP APNEA: Primary | ICD-10-CM

## 2024-02-04 DIAGNOSIS — E66.01 CLASS 3 SEVERE OBESITY WITH BODY MASS INDEX (BMI) OF 45.0 TO 49.9 IN ADULT, UNSPECIFIED OBESITY TYPE, UNSPECIFIED WHETHER SERIOUS COMORBIDITY PRESENT (HCC): ICD-10-CM

## 2024-02-04 PROBLEM — R06.83 SNORING: Status: ACTIVE | Noted: 2024-02-04

## 2024-02-05 ENCOUNTER — TELEPHONE (OUTPATIENT)
Dept: SLEEP CENTER | Facility: CLINIC | Age: 69
End: 2024-02-05

## 2024-02-05 ENCOUNTER — APPOINTMENT (OUTPATIENT)
Dept: PHYSICAL THERAPY | Facility: CLINIC | Age: 69
End: 2024-02-05
Payer: COMMERCIAL

## 2024-02-05 NOTE — TELEPHONE ENCOUNTER
Split study resulted and BiPAP ordered. ELIS also ordered to be done with BiPAP when patient receives machine.     Called patient and advised of study results, and DME orders.     Explained to patient that [facility] will contact him to schedule DME set up.     Compliance follow up scheduled for 4/17/24     Rx and clinicals for BiPAP dme and ELIS sent to [facility] via [place].

## 2024-02-06 ENCOUNTER — OFFICE VISIT (OUTPATIENT)
Dept: PHYSICAL THERAPY | Facility: CLINIC | Age: 69
End: 2024-02-06
Payer: COMMERCIAL

## 2024-02-06 DIAGNOSIS — G89.29 CHRONIC BILATERAL LOW BACK PAIN WITHOUT SCIATICA: ICD-10-CM

## 2024-02-06 DIAGNOSIS — M47.816 LUMBAR SPONDYLOSIS: Primary | ICD-10-CM

## 2024-02-06 DIAGNOSIS — M54.50 CHRONIC BILATERAL LOW BACK PAIN WITHOUT SCIATICA: ICD-10-CM

## 2024-02-06 DIAGNOSIS — G89.4 CHRONIC PAIN SYNDROME: ICD-10-CM

## 2024-02-06 PROCEDURE — 97110 THERAPEUTIC EXERCISES: CPT | Performed by: PHYSICAL MEDICINE & REHABILITATION

## 2024-02-06 PROCEDURE — 97112 NEUROMUSCULAR REEDUCATION: CPT | Performed by: PHYSICAL MEDICINE & REHABILITATION

## 2024-02-06 NOTE — PROGRESS NOTES
"Daily Note     Today's date: 2024  Patient name: Leonardo Oviedo  : 1955  MRN: [id number]  Referring provider: Kocher, Barbara, CRNP  Dx:   Encounter Diagnosis     ICD-10-CM    1. Lumbar spondylosis  M47.816       2. Chronic pain syndrome  G89.4       3. Chronic bilateral low back pain without sciatica  M54.50     G89.29                      Subjective: Pt notes no new sx/complaints. Had a large family party over the weekend. Notes his legs were tired, however his back did not bother him; feels before SOC with PT, this activity would have hurt his back.       Objective: See treatment diary below      Assessment: Tolerated treatment well. Pt cont to steadily progress with exercises in PT without adverse reaction/sx/pain etc. Added lateral step ups and increased reps with PREs without incident. Improvement in LE strength , trunk control, and overall stamina/endurance noted. Pt challenged with isometric trunk training with addition of foam /unstable pad to stand on; reduced postural stability; reduced balance. No complaints after session. Feels he may be ready to transition to HEP. Patient demonstrated fatigue post treatment and would benefit from continued PT      Plan: Continue per plan of care.  Progress treatment as tolerated.   Cont PT to next week then RE eval. Potential d/c to HEP.      Precautions: Hx RODNEY , hx afib, CHF, arthritis, chronic LBP, hx L/S ablation oct 2023, lymphedema with ulcer L Leg (being treated)  L shoulder pain       Re-eval Date:     Date      Visit Count 6 7 8     FOTO NV       Pain In 0/10 0 0     Pain Out 0/10 0 0         Manuals  2/6                                     Neuro Re-Ed        TA training         TA with BKFO vs seated on disc with LAQ, shoulder flex, etc         TA with alt heel slide        Pallof press L/R  Seated on [employer] low mat table   Single blue  15x/5\" ea R/L   Cues/feedback  Seated on [employer] low mat table   Single " "blue  2x10/5\" ea R/L   Cues/feedback        Standing   Romberg  anti trunk flex , ext, R rot, L rot with stick held at 90*  10 x ea direction with 5-10\" ea hold   Cues for posture   Standing   Romberg  Foam pad   anti trunk flex , ext, R rot, L rot with stick held at 90*  10 x ea direction with 5-10\" ea hold   Cues for posture                      Ther Ex        Nustep UE/Les- ROM, conditioning, monitor vitals and RPE  L2 12'  L2 12'  L2 12'      Mini squats                               Step ups fwd-->lateral  2x10/3\"  Cues/feedback verbal/tactile cues for facilitation of gluts and quads, especially to stand erect , facilitation of proper hip positioning to reduce back strain     4in   Resume  2x10/3\"  Cues/feedback verbal/tactile cues for facilitation of gluts and quads, especially to stand erect , facilitation of proper hip positioning to reduce back strain    4 in   10x ea R/L   3x10/3\"  Cues/feedback verbal/tactile cues for facilitation of gluts and quads, especially to stand erect , facilitation of proper hip positioning to reduce back strain    4 in   3x10 ea R/L     Hrs      LAQ       1.5# 2x10/3\" ea        1.5# 2x10/3\" ea  Lateral step up 4 in 2-3x10/2\" ea      HS curls  1.5# 2x10/3\" ea   1.5 3x10 ea      SLR 3 way  Abd, ext 1.5#  2x10/3\" ea   Cues for lumbopelvic stability/posture control  Standing    Standing march 1.5#  Cues for TA and postural stability/awareness   1# 2x10 ea R/L Abd, ext 1.5#  2-3x10/3\" ea   Cues for lumbopelvic stability/posture control  Standing      Standing march 1.5#  Cues for TA and postural stability/awareness   1# 2-3x10 ea R/L Abd, ext 1.5#  3x10/3\" ea   Cues for lumbopelvic stability/posture control  Standing    Standing march 1.5#  Cues for TA and postural stability/awareness   1# 3x10 ea R/L                             Ther Activity                        Gait Training                        Modalities                            1/18 - HEP was issued and reviewed this date " for above noted exercises (standing SLRs, HS curls, HR, LAQ, supine TA). Pt demonstrated understanding without incident and without questions/concerns. Will continue to update upcoming.

## 2024-02-08 ENCOUNTER — APPOINTMENT (OUTPATIENT)
Dept: PHYSICAL THERAPY | Facility: CLINIC | Age: 69
End: 2024-02-08
Payer: COMMERCIAL

## 2024-02-12 ENCOUNTER — TELEPHONE (OUTPATIENT)
Age: 69
End: 2024-02-12

## 2024-02-12 ENCOUNTER — APPOINTMENT (OUTPATIENT)
Dept: PHYSICAL THERAPY | Facility: CLINIC | Age: 69
End: 2024-02-12
Payer: COMMERCIAL

## 2024-02-12 NOTE — TELEPHONE ENCOUNTER
Accessed patients chart due to wife requesting to schedule appointment right after patients appointment for herself to see the same provider.

## 2024-02-13 ENCOUNTER — APPOINTMENT (OUTPATIENT)
Dept: PHYSICAL THERAPY | Facility: CLINIC | Age: 69
End: 2024-02-13
Payer: COMMERCIAL

## 2024-02-14 ENCOUNTER — APPOINTMENT (OUTPATIENT)
Dept: PHYSICAL THERAPY | Facility: CLINIC | Age: 69
End: 2024-02-14
Payer: COMMERCIAL

## 2024-02-14 ENCOUNTER — OFFICE VISIT (OUTPATIENT)
Dept: PHYSICAL THERAPY | Facility: CLINIC | Age: 69
End: 2024-02-14
Payer: COMMERCIAL

## 2024-02-14 DIAGNOSIS — M47.816 LUMBAR SPONDYLOSIS: Primary | ICD-10-CM

## 2024-02-14 DIAGNOSIS — G89.29 CHRONIC BILATERAL LOW BACK PAIN WITHOUT SCIATICA: ICD-10-CM

## 2024-02-14 DIAGNOSIS — M54.50 CHRONIC BILATERAL LOW BACK PAIN WITHOUT SCIATICA: ICD-10-CM

## 2024-02-14 DIAGNOSIS — G89.4 CHRONIC PAIN SYNDROME: ICD-10-CM

## 2024-02-14 PROCEDURE — 97112 NEUROMUSCULAR REEDUCATION: CPT | Performed by: PHYSICAL MEDICINE & REHABILITATION

## 2024-02-14 PROCEDURE — 97110 THERAPEUTIC EXERCISES: CPT | Performed by: PHYSICAL MEDICINE & REHABILITATION

## 2024-02-14 NOTE — PROGRESS NOTES
"Daily Note     Today's date: 2024  Patient name: Leonardo Oviedo  : 1955  MRN: [id number]  Referring provider: Kocher, Barbara, CRNP  Dx:   Encounter Diagnosis     ICD-10-CM    1. Lumbar spondylosis  M47.816       2. Chronic pain syndrome  G89.4       3. Chronic bilateral low back pain without sciatica  M54.50     G89.29                      Subjective: Pt with no new sx/complaints. Feels his back is better overall; notes overall about 90% improvement in overall back pain/sx to date. NO present LBP. Pleased with his progress. Remains concerned about cont'd wounds healing on B Les; seeing MD. Would like to transition to HEP end of this week.       Objective: See treatment diary below      Assessment: Tolerated treatment well.  Progressing well with program overall. Noted improvements in overall strength and endurance as well as functional mobility. Standing/walking longer periods without limitation due to his back. Completes all exercises in clinic now standing with min-no rest without back or LE pain/sx. Improved DLS and core endurance with progressive exercises. Improvement in postural awareness with exercises as well. No sx/complaints after session. Patient demonstrated fatigue post treatment and would benefit from continued PT      Plan: Continue per plan of care.  Plan to d/c to HEP end of this week.      Precautions: Hx RODNEY , hx afib, CHF, arthritis, chronic LBP, hx L/S ablation oct 2023, lymphedema with ulcer L Leg (being treated)  L shoulder pain       Re-eval Date:     Date     Visit Count 6 7 8 9    FOTO NV       Pain In 0/10 0 0 0    Pain Out 0/10 0 0 0        Manuals                                     Neuro Re-Ed        TA training         TA with BKFO vs seated on disc with LAQ, shoulder flex, etc         TA with alt heel slide        Pallof press L/R  Seated on [employer]RENT[employer] low mat table   Single blue  15x/5\" ea R/L   Cues/feedback  Seated on [employer] " "low mat table   Single blue  2x10/5\" ea R/L   Cues/feedback   Standing pallof press R/L  10-15x/10\" ea cues/feedback for DLS and postural control      Standing   Romberg  anti trunk flex , ext, R rot, L rot with stick held at 90*  10 x ea direction with 5-10\" ea hold   Cues for posture   Standing   Romberg  Foam pad   anti trunk flex , ext, R rot, L rot with stick held at 90*  10 x ea direction with 5-10\" ea hold   Cues for posture  Standing   Romberg  Foam pad   anti trunk flex , ext, R rot, L rot with stick held at 90*  10 x ea direction with 5-10\" ea hold   Cues for posture                     Ther Ex        Nustep UE/Les- ROM, conditioning, monitor vitals and RPE  L2 12'  L2 12'  L2 12'  L2 14'     Mini squats                               Step ups fwd-->lateral  2x10/3\"  Cues/feedback verbal/tactile cues for facilitation of gluts and quads, especially to stand erect , facilitation of proper hip positioning to reduce back strain     4in   Resume  2x10/3\"  Cues/feedback verbal/tactile cues for facilitation of gluts and quads, especially to stand erect , facilitation of proper hip positioning to reduce back strain    4 in   10x ea R/L   3x10/3\"  Cues/feedback verbal/tactile cues for facilitation of gluts and quads, especially to stand erect , facilitation of proper hip positioning to reduce back strain    4 in   3x10 ea R/L 3x10/3\"  Min cues                               6 in step   1-2x10 ea     Hrs          LAQ       1.5# 2x10/3\" ea        1.5# 2x10/3\" ea  Lateral step up 4 in 2-3x10/2\" ea  Lateral step up 4 in 10-15x/2\" ea         HS curls  1.5# 2x10/3\" ea   1.5 3x10 ea  1.5# 3x10 ea    SLR 3 way  Abd, ext 1.5#  2x10/3\" ea   Cues for lumbopelvic stability/posture control  Standing    Standing march 1.5#  Cues for TA and postural stability/awareness   1# 2x10 ea R/L Abd, ext 1.5#  2-3x10/3\" ea   Cues for lumbopelvic stability/posture control  Standing      Standing march 1.5#  Cues for TA and postural " "stability/awareness   1# 2-3x10 ea R/L Abd, ext 1.5#  3x10/3\" ea   Cues for lumbopelvic stability/posture control  Standing    Standing march 1.5#  Cues for TA and postural stability/awareness   1# 3x10 ea R/L Abd, ext 1.5#  3x10/3\" ea   Cues for lumbopelvic stability/posture control  Standing    Standing march 1.5#  Cues for TA and postural stability/awareness   1# 3x10 ea R/L                            Ther Activity                        Gait Training                        Modalities                            1/18 - HEP was issued and reviewed this date for above noted exercises (standing SLRs, HS curls, HR, LAQ, supine TA). Pt demonstrated understanding without incident and without questions/concerns. Will continue to update upcoming.                            "

## 2024-02-15 ENCOUNTER — APPOINTMENT (OUTPATIENT)
Dept: PHYSICAL THERAPY | Facility: CLINIC | Age: 69
End: 2024-02-15
Payer: COMMERCIAL

## 2024-02-16 DIAGNOSIS — I50.32 CHRONIC HEART FAILURE WITH PRESERVED EJECTION FRACTION (HFPEF) (HCC): ICD-10-CM

## 2024-02-16 RX ORDER — LEVOFLOXACIN 500 MG/1
TABLET, FILM COATED ORAL
COMMUNITY

## 2024-02-19 DIAGNOSIS — I48.4 ATYPICAL ATRIAL FLUTTER (HCC): ICD-10-CM

## 2024-02-19 RX ORDER — METOPROLOL SUCCINATE 25 MG/1
25 TABLET, EXTENDED RELEASE ORAL DAILY
Qty: 90 TABLET | Refills: 2 | Status: SHIPPED | OUTPATIENT
Start: 2024-02-19

## 2024-02-19 NOTE — TELEPHONE ENCOUNTER
Office received notification fax from [facility] stating that the patient is needing his metoprolol refilled. Mr. Oviedo is a patient of Dr. Prieto and LOV 12/07/2023.

## 2024-02-20 ENCOUNTER — OFFICE VISIT (OUTPATIENT)
Dept: PAIN MEDICINE | Facility: CLINIC | Age: 69
End: 2024-02-20
Payer: COMMERCIAL

## 2024-02-20 VITALS
DIASTOLIC BLOOD PRESSURE: 71 MMHG | HEIGHT: 69 IN | RESPIRATION RATE: 16 BRPM | BODY MASS INDEX: 45.2 KG/M2 | SYSTOLIC BLOOD PRESSURE: 122 MMHG | WEIGHT: 305.2 LBS | HEART RATE: 86 BPM

## 2024-02-20 DIAGNOSIS — G89.29 CHRONIC BILATERAL LOW BACK PAIN WITHOUT SCIATICA: ICD-10-CM

## 2024-02-20 DIAGNOSIS — G89.4 CHRONIC PAIN SYNDROME: Primary | Chronic | ICD-10-CM

## 2024-02-20 DIAGNOSIS — M54.50 CHRONIC BILATERAL LOW BACK PAIN WITHOUT SCIATICA: ICD-10-CM

## 2024-02-20 DIAGNOSIS — M47.816 LUMBAR SPONDYLOSIS: ICD-10-CM

## 2024-02-20 PROCEDURE — 99213 OFFICE O/P EST LOW 20 MIN: CPT | Performed by: NURSE PRACTITIONER

## 2024-02-20 NOTE — PROGRESS NOTES
Assessment:  1. Chronic pain syndrome    2. Chronic bilateral low back pain without sciatica    3. Lumbar spondylosis        Plan:  While the patient was in the office today, I did have a thorough conversation regarding their chronic pain syndrome, medication management, and treatment plan options.  Patient is being seen for a follow-up visit.  He reports that his pain has improved by about 98% following radiofrequency ablations.  He underwent a right-sided L4-5 and L5-S1 radiofrequency ablation on 11/1/2023.  The left side was performed on 10/5/2023.    Patient recently finished physical therapy for lumbar core strengthening/stretching.  We discussed the importance of a lifelong commitment to daily exercises geared toward lumbar core stretching and strengthening. The patient was agreeable and verbalized an understanding.    Patient reports that he has been using the tramadol very sparingly.  He tells me that he has only taken it, maybe once since our last visit.  I advised him that at this point he can discontinue tramadol.  Use Tylenol on an as-needed basis.    Follow-up in 3 months.    History of Present Illness:  The patient is a 68 y.o. male who presents for a follow up office visit in regards to Back Pain.   The patient’s current symptoms include complaints of low back pain.  Current pain level is a 1/10.  Quality pain is described as dull, aching.    Current pain medications includes: Patient has prescription for tramadol, but uses it on a strictly as needed basis.  He uses Tylenol if needed.     I have personally reviewed and/or updated the patient's past medical history, past surgical history, family history, social history, current medications, allergies, and vital signs today.         Review of Systems  Review of Systems   Musculoskeletal:  Positive for back pain and gait problem.   All other systems reviewed and are negative.          Past Medical History:   Diagnosis Date    A-fib (HCC)     ALANNA (acute  kidney injury) ([facility]) 06/15/2021    Arthritis     CHF exacerbation ([facility]) 12/07/2023    CPAP (continuous positive airway pressure) dependence     Encounter for cardioversion procedure 05/06/2021    Leukopenia 10/26/2019    Lymphedema     Sleep apnea     Urinary frequency     Wears glasses     Wears partial dentures     lower partial       Past Surgical History:   Procedure Laterality Date    ABLATION SAPHENOUS VEIN W/ RFA      COLONOSCOPY      JOINT REPLACEMENT  Left Hip 4 /21/2009    NERVE BLOCK Bilateral 7/18/2023    Procedure: BLOCK MEDIAL BRANCH B/L L4-L5 AND L5-S1 MBB #1;  Surgeon: Samson Godinez MD;  Location: [facility];  Service: Pain Management     NERVE BLOCK Bilateral 8/17/2023    Procedure: BLOCK MEDIAL BRANCH  B/L L4-5 and L5-S1 MBB #2;  Surgeon: Samson Godinez MD;  Location: [facility];  Service: Pain Management     NH CYSTO INSERTION TRANSPROSTATIC IMPLANT SINGLE N/A 11/13/2017    Procedure: CYSTOSCOPY WITH INSERTION UROLIFT;  Surgeon: Deuce Bennett DO;  Location: [facility];  Service: Urology    RADIOFREQUENCY ABLATION Left 10/5/2023    Procedure: ABLATION RADIO FREQUENCY (RFA) LEFT L4-5 AND L5-S1 RFA;  Surgeon: Samson Godinez MD;  Location: [facility];  Service: Pain Management     RADIOFREQUENCY ABLATION Right 11/1/2023    Procedure: ABLATION RADIO FREQUENCY (RFA) RIGHT L4-5 AND L5-S1 RFA;  Surgeon: Samson Godinez MD;  Location: [facility];  Service: Pain Management     TONSILLECTOMY         Family History   Problem Relation Age of Onset    Heart disease Mother     Lymphoma Father     Cancer Father     Heart disease Sister     Heart failure Sister         on diuretic    Hypertension Brother     Diabetes Neg Hx     Thyroid disease Neg Hx     Stroke Neg Hx        Social History     Occupational History    Not on file   Tobacco Use    Smoking status: Never     Passive exposure: Never    Smokeless tobacco: Never   Vaping Use    Vaping status: Never Used   Substance and Sexual  Activity    Alcohol use: Yes     Alcohol/week: 5.0 standard drinks of alcohol     Types: 3 Glasses of wine, 2 Standard drinks or equivalent per week     Comment: socially    Drug use: No    Sexual activity: Not Currently     Partners: Female     Birth control/protection: Abstinence, Condom Male, Spermicide         Current Outpatient Medications:     acetaminophen (TYLENOL) 500 mg tablet, Take 500 mg by mouth every 6 (six) hours as needed for mild pain, Disp: , Rfl:     alfuzosin (UROXATRAL) 10 mg 24 hr tablet, Take 10 mg by mouth daily, Disp: , Rfl:     allopurinol (ZYLOPRIM) 300 mg tablet, Take 300 mg by mouth daily, Disp: , Rfl:     ammonium lactate (LAC-HYDRIN) 12 % lotion, Apply topically 2 (two) times a day as needed for dry skin, Disp: , Rfl:     Cyanocobalamin (VITAMIN B 12 PO), Take by mouth, Disp: , Rfl:     Diclofenac Sodium (VOLTAREN) 1 %, Apply 2 g topically 4 (four) times a day, Disp: 100 g, Rfl: 0    dutasteride (AVODART) 0.5 mg capsule, Take 0.5 mg by mouth daily, Disp: , Rfl:     Empagliflozin (Jardiance) 10 MG TABS tablet, Take 1 tablet (10 mg total) by mouth every morning, Disp: 90 tablet, Rfl: 1    ferrous sulfate 325 (65 Fe) mg tablet, Take 325 mg by mouth, Disp: , Rfl:     Glucosamine-Chondroit-Vit C-Mn (Glucosamine 1500 Complex) CAPS, Take by mouth, Disp: , Rfl:     halobetasol (ULTRAVATE) 0.05 % ointment, APPLY TO AFFECTED AREA ON LEG TWICE DAILY, Disp: , Rfl:     L-ARGININE-500 PO, Take 500 mg by mouth 2 (two) times a day , Disp: , Rfl:     levofloxacin (LEVAQUIN) 500 mg tablet, take 1 tablet by mouth once daily for 10 days, Disp: , Rfl:     loteprednol etabonate (LOTEMAX) 0.5 % ophthalmic suspension, 1 drop 4 (four) times a day, Disp: , Rfl:     metoprolol succinate (TOPROL-XL) 25 mg 24 hr tablet, Take 1 tablet (25 mg total) by mouth daily, Disp: 90 tablet, Rfl: 2    multivitamin (THERAGRAN) TABS, Take 1 tablet by mouth daily, Disp: , Rfl:     Omega-3 Fatty Acids (fish oil) 1,000 mg, Take  "1,000 mg by mouth 2 (two) times a day, Disp: , Rfl:     patient supplied medication, Take 1 each by mouth 2 (two) times a day, Disp: , Rfl:     pregabalin (LYRICA) 100 mg capsule, Take 1 capsule (100 mg total) by mouth 3 (three) times a day, Disp: 42 capsule, Rfl: 0    rivaroxaban (Xarelto) 20 mg tablet, Take 1 tablet (20 mg total) by mouth daily, Disp: 90 tablet, Rfl: 2    spironolactone (ALDACTONE) 25 mg tablet, Take 1 tablet (25 mg total) by mouth 2 (two) times a day (Patient taking differently: Take 25 mg by mouth daily), Disp: 180 tablet, Rfl: 1    tadalafil (CIALIS) 10 MG tablet, , Disp: , Rfl:     tadalafil (CIALIS) 5 MG tablet, Take 5 mg by mouth daily as needed for erectile dysfunction, Disp: , Rfl:     torsemide (DEMADEX) 20 mg tablet, Take 3 tablets (60 mg total) by mouth daily, Disp: 60 tablet, Rfl: 2    traMADol (ULTRAM) 50 mg tablet, Take 1 tablet (50 mg total) by mouth 2 (two) times a day as needed for moderate pain, Disp: 60 tablet, Rfl: 1    VITAMIN D PO, Take 2,000 Units by mouth daily , Disp: , Rfl:     Allergies   Allergen Reactions    Penicillins Anaphylaxis    Sulfa Antibiotics Anaphylaxis    Levofloxacin Other (See Comments)       Physical Exam:    /71   Pulse 86   Resp 16   Ht 5' 9\" (1.753 m)   Wt (!) 138 kg (305 lb 3.2 oz)   BMI 45.07 kg/m²     Constitutional:normal, well developed, well nourished, alert, in no distress and non-toxic and no overt pain behavior. and obese  Eyes:anicteric  HEENT:grossly intact  Neck:supple, symmetric, trachea midline and no masses   Pulmonary:even and unlabored  Cardiovascular:No edema or pitting edema present  Skin:Normal without rashes or lesions and well hydrated  Psychiatric:Mood and affect appropriate  Neurologic:Cranial Nerves II-XII grossly intact  Musculoskeletal: Gait is slow and guarded.    Imaging        Study Result    Narrative & Impression   MRI LUMBAR SPINE WITHOUT CONTRAST     INDICATION: G89.4: Chronic pain syndrome  M47.816: " Spondylosis without myelopathy or radiculopathy, lumbar region  M54.50: Low back pain, unspecified  G89.29: Other chronic pain.     COMPARISON: X-rays dated 3/8/2023     TECHNIQUE:  Multiplanar, multisequence imaging of the lumbar spine was performed. .        IMAGE QUALITY:  Diagnostic     FINDINGS:     VERTEBRAL BODIES:  There are 5 lumbar type vertebral bodies. Dextroscoliosis of the thoracolumbar spine. No spondylolisthesis or spondylolysis. No compression fracture. There is a chronic Schmorl's node within the inferior endplate of the L3 vertebral   body. Normal marrow signal is identified within the visualized bony structures.  No discrete marrow lesion.     SACRUM:  Normal signal within the sacrum. No evidence of insufficiency or stress fracture.     DISTAL CORD AND CONUS:  Normal size and signal within the distal cord and conus.     PARASPINAL SOFT TISSUES:  Paraspinal soft tissues are unremarkable.     LOWER THORACIC DISC SPACES:  Normal disc height and signal.  No disc herniation, canal stenosis or foraminal narrowing.     LUMBAR DISC SPACES:     L1-L2:  Normal.     L2-L3: Disc desiccation. Mild diffuse annular bulging. There is a far lateral disc osteophyte complex on the left and mild facet arthropathy. No significant central canal stenosis. Mild left-sided foraminal narrowing.     L3-L4: Disc desiccation and loss of disc height with mild annular bulging and facet arthropathy. No significant canal stenosis. Mild foraminal narrowing.     L4-L5: Loss of disc height with mild annular bulging and disc desiccation. Mild facet arthropathy. There is no canal stenosis. Slight right-sided foraminal narrowing without discrete nerve impingement.     L5-S1: Disc desiccation and loss of disc height with mild annular bulging and facet arthropathy. No canal stenosis. Minor foraminal narrowing without foraminal nerve impingement, primarily right-sided.     OTHER FINDINGS:  None.     IMPRESSION:     Smooth dextroscoliosis  of the thoracolumbar spine with multilevel lumbar noncompressive degenerative change.        Workstation performed: [id number]         No orders to display       No orders of the defined types were placed in this encounter.

## 2024-02-22 ENCOUNTER — CONSULT (OUTPATIENT)
Dept: BARIATRICS | Facility: CLINIC | Age: 69
End: 2024-02-22
Payer: COMMERCIAL

## 2024-02-22 VITALS
DIASTOLIC BLOOD PRESSURE: 78 MMHG | OXYGEN SATURATION: 97 % | HEIGHT: 69 IN | BODY MASS INDEX: 45.08 KG/M2 | RESPIRATION RATE: 20 BRPM | WEIGHT: 304.4 LBS | SYSTOLIC BLOOD PRESSURE: 116 MMHG | HEART RATE: 102 BPM | TEMPERATURE: 98.2 F

## 2024-02-22 DIAGNOSIS — G47.33 OBSTRUCTIVE SLEEP APNEA: ICD-10-CM

## 2024-02-22 DIAGNOSIS — I50.33 ACUTE ON CHRONIC DIASTOLIC CONGESTIVE HEART FAILURE (HCC): ICD-10-CM

## 2024-02-22 DIAGNOSIS — I87.2 VENOUS INSUFFICIENCY OF BOTH LOWER EXTREMITIES: ICD-10-CM

## 2024-02-22 DIAGNOSIS — E66.01 OBESITY, CLASS III, BMI 40-49.9 (MORBID OBESITY) (HCC): Primary | ICD-10-CM

## 2024-02-22 DIAGNOSIS — I50.32 CHRONIC HEART FAILURE WITH PRESERVED EJECTION FRACTION (HFPEF) (HCC): ICD-10-CM

## 2024-02-22 DIAGNOSIS — R73.03 PREDIABETES: ICD-10-CM

## 2024-02-22 PROCEDURE — 99204 OFFICE O/P NEW MOD 45 MIN: CPT | Performed by: PHYSICIAN ASSISTANT

## 2024-02-22 NOTE — PATIENT INSTRUCTIONS
Goals:    Food log (ie.) www.[email or web address],[employer].com,[email or web address],[email or web address],etc.   No sugary beverages. At least 64oz of water daily.  Increase physical activity by 10 minutes daily. Gradually increase physical activity to a goal of 5 days per week for 30 minutes of MODERATE intensity PLUS 2 days per week of FULL BODY resistance training  9887-1270 calories per day  5-10 servings of fruits and vegetables per day  25-35 grams of dietary fiber per day

## 2024-02-22 NOTE — PROGRESS NOTES
Assessment/Plan:    Obesity, Class III, BMI 40-49.9 (morbid obesity) ([facility])  -Discussed options of HealthyCORE-Intensive Lifestyle Intervention Program, Very Low Calorie Diet-VLCD, Conservative Program, Waldemar-En-Y Gastric Bypass, and Vertical Sleeve Gastrectomy and the role of weight loss medications.  -Initial weight loss goal of 5-10% weight loss for improved health  -Screening labs: reviewed CMP, Lipid panel, TSH. Check HgbA1c  -Patient is interested in pursuing conservative program  -Calorie goals, sample menu, portion size guidelines, and food logging reviewed with the patient.      Obstructive sleep apnea  -recently got Bipap  -can improve with weight loss  -continue ongoing follow up with sleep medicine    Chronic heart failure with preserved ejection fraction (HFpEF) ([facility])  -Hx PAF  -managed by cardiology  -On Xarelto, Toprol XL, Torsemide and Aldactone  -Recently started Jardiance about 1 month ago      Goals:    Food log (ie.) www.[email or web address].com,sparkpeople.com,[email or web address].com,etc.   No sugary beverages. At least 64oz of water daily.  Increase physical activity by 10 minutes daily. Gradually increase physical activity to a goal of 5 days per week for 30 minutes of MODERATE intensity PLUS 2 days per week of FULL BODY resistance training  8396-2442 calories per day  5-10 servings of fruits and vegetables per day  25-35 grams of dietary fiber per day      Follow up in approximately 3 months with Non-Surgical Physician/Advanced Practitioner.    Diagnoses and all orders for this visit:    Obesity, Class III, BMI 40-49.9 (morbid obesity) ([facility])  -     Ambulatory Referral to Weight Management    Prediabetes  -     Hemoglobin A1C; Future    Obstructive sleep apnea    Chronic heart failure with preserved ejection fraction (HFpEF) ([facility])    Venous insufficiency of both lower extremities          Subjective:   Chief Complaint   Patient presents with    Consult       Patient ID: Leonardo Ovideo  is a 68  y.o. male with excess weight/obesity here to pursue weight managment.    Past Medical History:   Diagnosis Date    A-fib ([facility])     ALANNA (acute kidney injury) ([facility]) 06/15/2021    Arthritis     CHF exacerbation ([facility]) 12/07/2023    CPAP (continuous positive airway pressure) dependence     Encounter for cardioversion procedure 05/06/2021    Leukopenia 10/26/2019    Lymphedema     Sleep apnea     Urinary frequency     Wears glasses     Wears partial dentures     lower partial         HPI:  Obesity/Excess Weight:  Severity: Severe  Onset:  since teenager    Modifiers:  Weight Watchers - had some success with in person meeting, [employer] - lost 100 lbs, Exercise  Contributing factors: Insufficient time to make appropriate lifestyle changes  Associated symptoms: increased shortness of breath, wounds on LE, decreases general mobility    Goals: Lose 50 lbs by August, LTG lose 100 lbs  Highest: 334 lbs    Hydration: water at least 64oz, coffee +/- shot of Liquor,   ETOH: 1 glass of wine per night  Exercise: denies  Occupation: retired, prior restaurant owner  Sleep: 8 hours, wakes frequently to urinate, Just got new BiPAP machine  Smoking: denies    Colonoscopy: no reports in system. Reports up to date. Reminded to discuss with PCP    The following portions of the patient's history were reviewed and updated as appropriate: allergies, current medications, past family history, past medical history, past social history, past surgical history, and problem list.    Review of Systems   Constitutional:  Negative for chills and fever.   HENT:  Negative for sore throat.    Respiratory:  Positive for cough and shortness of breath.    Gastrointestinal:  Negative for abdominal pain, nausea and vomiting.   Genitourinary:  Negative for dysuria.   Musculoskeletal:  Negative for arthralgias and back pain.   Skin:  Positive for wound.   Neurological:  Negative for dizziness and headaches.   Psychiatric/Behavioral:  Negative for dysphoric mood.  "The patient is not nervous/anxious.        Objective:    /78 (BP Location: Right arm, Patient Position: Sitting, Cuff Size: Large)   Pulse 102   Temp 98.2 °F (36.8 °C)   Resp 20   Ht 5' 9\" (1.753 m)   Wt (!) 138 kg (304 lb 6.4 oz)   SpO2 97%   BMI 44.95 kg/m²     Physical Exam  Vitals and nursing note reviewed.      Constitutional   General appearance: Abnormal.  well developed and morbidly obese.   Eyes No conjunctival pallor.   Ears, Nose, Mouth, and Throat Oral mucosa moist.   Pulmonary   Respiratory effort: No increased work of breathing or signs of respiratory distress.    Auscultation of lungs: Clear to auscultation, equal breath sounds bilaterally, no wheezes, no rales, no rhonci.    Cardiovascular   Auscultation of heart: Normal rate and rhythm, normal S1 and S2, without murmurs.    Examination of extremities for edema and/or varicosities: + edema bilateral LE  Abdomen   Abdomen: Abnormal.  The abdomen was obese. Bowel sounds were normal. The abdomen was soft and nontender.   Musculoskeletal   Gait and station: Normal.    Psychiatric   Orientation to person, place and time: Normal.    Affect: appropriate   "

## 2024-02-22 NOTE — ASSESSMENT & PLAN NOTE
-Discussed options of HealthyCORE-Intensive Lifestyle Intervention Program, Very Low Calorie Diet-VLCD, Conservative Program, Waldemar-En-Y Gastric Bypass, and Vertical Sleeve Gastrectomy and the role of weight loss medications.  -Initial weight loss goal of 5-10% weight loss for improved health  -Screening labs: reviewed CMP, Lipid panel, TSH. Check HgbA1c  -Patient is interested in pursuing conservative program  -Calorie goals, sample menu, portion size guidelines, and food logging reviewed with the patient.

## 2024-02-22 NOTE — TELEPHONE ENCOUNTER
Fax refill request from [facility] 90 day supply script for  Xarelto 20mg

## 2024-02-28 ENCOUNTER — OFFICE VISIT (OUTPATIENT)
Dept: CARDIOLOGY CLINIC | Facility: CLINIC | Age: 69
End: 2024-02-28
Payer: COMMERCIAL

## 2024-02-28 VITALS
SYSTOLIC BLOOD PRESSURE: 110 MMHG | BODY MASS INDEX: 44.83 KG/M2 | WEIGHT: 303.6 LBS | DIASTOLIC BLOOD PRESSURE: 72 MMHG | HEART RATE: 72 BPM

## 2024-02-28 DIAGNOSIS — R94.31 ABNORMAL ECG: ICD-10-CM

## 2024-02-28 DIAGNOSIS — I48.92 PAROXYSMAL ATRIAL FLUTTER (HCC): ICD-10-CM

## 2024-02-28 DIAGNOSIS — I50.32 CHRONIC DIASTOLIC HEART FAILURE (HCC): Primary | ICD-10-CM

## 2024-02-28 DIAGNOSIS — E66.01 OBESITY, MORBID (HCC): ICD-10-CM

## 2024-02-28 PROCEDURE — 99214 OFFICE O/P EST MOD 30 MIN: CPT | Performed by: INTERNAL MEDICINE

## 2024-02-28 RX ORDER — COLCHICINE 0.6 MG/1
CAPSULE ORAL AS NEEDED
COMMUNITY

## 2024-02-28 NOTE — PROGRESS NOTES
Cardiology Office Note  MD Blayne Curry MD Jason Kaplan, DO, [facility]  MD Alistair Burgess DO, [facility]  Damon Kim DO, [facility]  ----------------------------------------------------------------  [address] [address]    Leonardo Oviedo 68 y.o. male MRN: [id number]  Unit/Bed#:  Encounter: [id number]      History of Present Illness:  It was a pleasure to see Leonardo Oviedo in the office today for follow-up CV evaluation.  He has a longstanding history of atrial fibrillation with prior failed ELAINE guided cardioversion, history of morbid obesity and pulmonary hypertension with chronic venous insufficiency/lymphedema.  The patient has a known history of obstructive sleep apnea was noncompliant with his CPAP therapy.  Over the course of many years he has been having lower extremity edema which has been believe secondary to lymphedema with chronic venous insufficiency.  He has had venous ablation is in the past for his reflux disease.  In October 2019, he was found have pulmonary hypertension with pulmonary artery systolic pressure is estimated at 50 mm Hg.  We had initially seen him in late August 2020 due to increased fatigue and dyspnea on exertion.  He has become somewhat short of breath with basic activity.  Previously, he had been managed for his atrial fibrillation at [facility].      Echocardiogram, stress test and Holter monitor were ordered, but stress test and Holter were completed.  Stress test was found to be negative for myocardial ischemia.  Holter monitor demonstrated atrial fibrillation with an average heart rate of 92 beats per minute and rare VPCs.  His weight trends unfortunately continue to go upward and he had dietary discretion.  He seen by electrophysiology and recommended for a sleep study as well as an evaluation with advanced heart failure.  He was placed on CPAP and has been compliant since that  time.  He also was seen by bariatric surgery and wish to undergo conservative options with dietary modifications.  Despite increasing diuretic load, he he continue to gain approximately 30 lb and was subsequently sent to [facility] in [place].  He was diuresed down to 285 lb.   While hospitalized, his echocardiogram was performed showing normal left ventricular function.     Following discharge, he was seen by electrophysiology and sent for a cardioversion in May 2021.  The cardioversion was initially successful, but he reverted back to rate controlled atrial flutter.  He has been placed on amiodarone and decreased down to 100 mg daily.  On his dose of torsemide, he had acute kidney injury with a creatinine rise to 1.9.  After holding his diuretics for several days, his creatinine came down to 1.1. He was then decreased on his torsemide to 20 mg b.i.d. And his weight trends continue to improve.  He has been aggressive in his dietary modifications.  He also underwent radiofrequency ablation for his atrial fibrillation/flutter.  He underwent a procedure in June 2021 Overall, he has felt much improved.    In July 2021, he ended up going back into atrial flutter.  He was seen by electrophysiology who was recommending a repeat ablation study.  He underwent the ablation in late July 2021 which was successful.  He was seen in the office by EP following the procedure and had remained in sinus rhythm.  He subsequently was discontinued on his amiodarone.  In October to November 2022, patient developed some symptoms of shortness of breath with exertion.  This symptom had been fairly new and the patient was sent for testing including a stress test, chest x-ray and echocardiogram.  Stress test was found to be negative for myocardial ischemia.  His echocardiogram showed normal left-ventricular systolic function with mild pulmonary hypertension.  There was evidence of some mild increase in pressures on the right side.   Chest x-ray at the time was found to be grossly normal.  In July 2023, the patient developed 2 episodes of right-sided chest discomfort near his shoulder.  The discomfort would change with rotating his shoulder.  Due to the severity of one of the episodes, he presented to the emergency department and was diagnosed with musculoskeletal pain.  Troponins were negative during that hospitalization.  Additionally, he admitted to continued and progressive dyspnea on exertion a little higher than his baseline.He completed pulmonary function studies in October 2023 showing moderate level of restriction.  The patient was then admitted for possible pneumonia in November 2023.  He was treated with antibiotics.  CT of the chest demonstrated bilateral airspace disease with groundglass opacities.  Concern was for possible pneumonia versus pulmonary edema.  He completed antibiotics.    Following his hospitalization, he took steroids which resulted in some increased swelling in the lower extremity.  He took some increased dose of torsemide with minimal improvement in the swelling.  I have recommended him for hospital admission for his diastolic heart failure in December 2023.  He ended up being placed on diuretic drip with significant drop in weights.  His weights came down to 305 pounds and remained stable progressing down to 303 pounds today.       Review of Systems:  Review of Systems   Constitutional: Negative for decreased appetite, fever, malaise/fatigue, weight gain and weight loss.   HENT:  Negative for congestion and sore throat.    Eyes:  Negative for visual disturbance.   Cardiovascular:  Positive for leg swelling. Negative for chest pain, dyspnea on exertion, near-syncope and palpitations.   Respiratory:  Negative for cough and shortness of breath.    Hematologic/Lymphatic: Negative for bleeding problem.   Skin:  Negative for rash.   Musculoskeletal:  Negative for myalgias and neck pain.   Gastrointestinal:  Negative  for abdominal pain and nausea.   Neurological:  Negative for light-headedness and weakness.   Psychiatric/Behavioral:  Negative for depression.        Past Medical History:   Diagnosis Date    A-fib (HCC)     ALANNA (acute kidney injury) (HCC) 06/15/2021    Arthritis     CHF exacerbation (HCC) 12/07/2023    CPAP (continuous positive airway pressure) dependence     Encounter for cardioversion procedure 05/06/2021    Leukopenia 10/26/2019    Lymphedema     Sleep apnea     Urinary frequency     Wears glasses     Wears partial dentures     lower partial       Past Surgical History:   Procedure Laterality Date    ABLATION SAPHENOUS VEIN W/ RFA      COLONOSCOPY      JOINT REPLACEMENT  Left Hip 4 /21/2009    NERVE BLOCK Bilateral 7/18/2023    Procedure: BLOCK MEDIAL BRANCH B/L L4-L5 AND L5-S1 MBB #1;  Surgeon: Samson Godinez MD;  Location: [facility];  Service: Pain Management     NERVE BLOCK Bilateral 8/17/2023    Procedure: BLOCK MEDIAL BRANCH  B/L L4-5 and L5-S1 MBB #2;  Surgeon: Samson Godinez MD;  Location: [facility];  Service: Pain Management     MA CYSTO INSERTION TRANSPROSTATIC IMPLANT SINGLE N/A 11/13/2017    Procedure: CYSTOSCOPY WITH INSERTION UROLIFT;  Surgeon: Deuce Bennett DO;  Location: [facility];  Service: Urology    RADIOFREQUENCY ABLATION Left 10/5/2023    Procedure: ABLATION RADIO FREQUENCY (RFA) LEFT L4-5 AND L5-S1 RFA;  Surgeon: Samson Godinez MD;  Location: [facility];  Service: Pain Management     RADIOFREQUENCY ABLATION Right 11/1/2023    Procedure: ABLATION RADIO FREQUENCY (RFA) RIGHT L4-5 AND L5-S1 RFA;  Surgeon: Samson Godinez MD;  Location: [facility];  Service: Pain Management     TONSILLECTOMY         Social History     Socioeconomic History    Marital status: /Civil Union     Spouse name: None    Number of children: None    Years of education: None    Highest education level: None   Occupational History    None   Tobacco Use    Smoking status: Never     Passive  exposure: Never    Smokeless tobacco: Never   Vaping Use    Vaping status: Never Used   Substance and Sexual Activity    Alcohol use: Yes     Alcohol/week: 5.0 standard drinks of alcohol     Types: 3 Glasses of wine, 2 Standard drinks or equivalent per week     Comment: socially    Drug use: No    Sexual activity: Not Currently     Partners: Female     Birth control/protection: Abstinence, Condom Male, Spermicide   Other Topics Concern    None   Social History Narrative    None     Social Determinants of Health     Financial Resource Strain: Low Risk  (8/24/2023)    Received from [facility]    Overall Financial Resource Strain (CARDIA)     Difficulty of Paying Living Expenses: Not hard at all   Food Insecurity: No Food Insecurity (12/7/2023)    Hunger Vital Sign     Worried About Running Out of Food in the Last Year: Never true     Ran Out of Food in the Last Year: Never true   Transportation Needs: No Transportation Needs (12/7/2023)    PRAPARE - Transportation     Lack of Transportation (Medical): No     Lack of Transportation (Non-Medical): No   Physical Activity: Inactive (8/24/2023)    Received from [facility]    Exercise Vital Sign     Days of Exercise per Week: 0 days     Minutes of Exercise per Session: 0 min   Stress: No Stress Concern Present (8/24/2023)    Received from [facility]    [employer] [place] [employer] - Occupational Stress Questionnaire     Feeling of Stress : Not at all   Social Connections: Moderately Integrated (8/24/2023)    Received from [facility]    Social Connection and Isolation Panel [NHANES]     Frequency of Communication with Friends and Family: More than three times a week     Frequency of Social Gatherings with Friends and Family: More than three times a week     Attends Latter day Services: More than 4 times per year     Active Member of Clubs or Organizations: No     Attends Club or Organization  Meetings: Patient declined     Marital Status:    Intimate Partner Violence: Not At Risk (8/24/2023)    Received from [facility]    Humiliation, Afraid, Rape, and Kick questionnaire     Fear of Current or Ex-Partner: No     Emotionally Abused: No     Physically Abused: No     Sexually Abused: No   Housing Stability: Low Risk  (12/7/2023)    Housing Stability Vital Sign     Unable to Pay for Housing in the Last Year: No     Number of Places Lived in the Last Year: 1     Unstable Housing in the Last Year: No       Family History   Problem Relation Age of Onset    Heart disease Mother     Lymphoma Father     Cancer Father     Obesity Sister     Heart disease Sister     Heart failure Sister         on diuretic    Obesity Brother     Hypertension Brother     Diabetes Neg Hx     Thyroid disease Neg Hx     Stroke Neg Hx        Allergies   Allergen Reactions    Penicillins Anaphylaxis    Sulfa Antibiotics Anaphylaxis    Levofloxacin Other (See Comments)         Current Outpatient Medications:     acetaminophen (TYLENOL) 500 mg tablet, Take 500 mg by mouth every 6 (six) hours as needed for mild pain, Disp: , Rfl:     alfuzosin (UROXATRAL) 10 mg 24 hr tablet, Take 10 mg by mouth daily, Disp: , Rfl:     allopurinol (ZYLOPRIM) 300 mg tablet, Take 300 mg by mouth daily, Disp: , Rfl:     ammonium lactate (LAC-HYDRIN) 12 % lotion, Apply topically 2 (two) times a day as needed for dry skin, Disp: , Rfl:     Colchicine 0.6 MG CAPS, Take by mouth if needed, Disp: , Rfl:     Diclofenac Sodium (VOLTAREN) 1 %, Apply 2 g topically 4 (four) times a day, Disp: 100 g, Rfl: 0    dutasteride (AVODART) 0.5 mg capsule, Take 0.5 mg by mouth daily, Disp: , Rfl:     Empagliflozin (Jardiance) 10 MG TABS tablet, Take 1 tablet (10 mg total) by mouth every morning, Disp: 90 tablet, Rfl: 1    ferrous sulfate 325 (65 Fe) mg tablet, Take 325 mg by mouth, Disp: , Rfl:     Glucosamine-Chondroit-Vit C-Mn (Glucosamine 1500 Complex)  CAPS, Take by mouth, Disp: , Rfl:     halobetasol (ULTRAVATE) 0.05 % ointment, APPLY TO AFFECTED AREA ON LEG TWICE DAILY, Disp: , Rfl:     L-ARGININE-500 PO, Take 500 mg by mouth 2 (two) times a day , Disp: , Rfl:     levofloxacin (LEVAQUIN) 500 mg tablet, take 1 tablet by mouth once daily for 10 days, Disp: , Rfl:     loteprednol etabonate (LOTEMAX) 0.5 % ophthalmic suspension, 1 drop 4 (four) times a day, Disp: , Rfl:     metoprolol succinate (TOPROL-XL) 25 mg 24 hr tablet, Take 1 tablet (25 mg total) by mouth daily, Disp: 90 tablet, Rfl: 2    multivitamin (THERAGRAN) TABS, Take 1 tablet by mouth daily, Disp: , Rfl:     Omega-3 Fatty Acids (fish oil) 1,000 mg, Take 1,000 mg by mouth 2 (two) times a day, Disp: , Rfl:     patient supplied medication, Take 1 each by mouth 2 (two) times a day, Disp: , Rfl:     pregabalin (LYRICA) 100 mg capsule, Take 1 capsule (100 mg total) by mouth 3 (three) times a day, Disp: 42 capsule, Rfl: 0    rivaroxaban (Xarelto) 20 mg tablet, Take 1 tablet (20 mg total) by mouth daily, Disp: 90 tablet, Rfl: 2    spironolactone (ALDACTONE) 25 mg tablet, Take 1 tablet (25 mg total) by mouth 2 (two) times a day (Patient taking differently: Take 25 mg by mouth daily), Disp: 180 tablet, Rfl: 1    tadalafil (CIALIS) 5 MG tablet, Take 5 mg by mouth daily as needed for erectile dysfunction, Disp: , Rfl:     torsemide (DEMADEX) 20 mg tablet, Take 3 tablets (60 mg total) by mouth daily (Patient taking differently: Take 20 mg by mouth daily 2 times daily), Disp: 60 tablet, Rfl: 2    traMADol (ULTRAM) 50 mg tablet, Take 1 tablet (50 mg total) by mouth 2 (two) times a day as needed for moderate pain, Disp: 60 tablet, Rfl: 1    VITAMIN D PO, Take 2,000 Units by mouth daily , Disp: , Rfl:     Cyanocobalamin (VITAMIN B 12 PO), Take by mouth (Patient not taking: Reported on 2/28/2024), Disp: , Rfl:     tadalafil (CIALIS) 10 MG tablet, , Disp: , Rfl:     Vitals:    02/28/24 1053   BP: 110/72   BP Location:  Left arm   Patient Position: Sitting   Cuff Size: Large   Pulse: 72   Weight: (!) 138 kg (303 lb 9.6 oz)         PHYSICAL EXAMINATION:  Gen: Awake, Alert, NAD  Head/eyes: AT/NC, pupils equal and round, Anicteric  ENT: mmm  Neck: Supple, No elevated JVP, trachea midline  Resp: CTA bilaterally no w/r/r  CV: RRR +S1, S2, No m/r/g  Abd: Soft, obese, NT/ND + BS  Ext: bilateral lower extremities wrapped with +lymphedema  Neuro: Follows commands, moves all extermities  Psych: Appropriate affect, normal mood, pleasant attitude, non-combative  Skin: warm; no rash, erythema or venous stasis changes on exposed skin    --------------------------------------------------------------------------------  TREADMILL STRESS  No results found for this or any previous visit.   --------------------------------------------------------------------------------  NUCLEAR STRESS TEST:   Pharmacologic nuclear stress test negative for myocardial ischemia with gated EF 50%, 2020  --------------------------------------------------------------------------------  CATH:  No results found for this or any previous visit.  --------------------------------------------------------------------------------  ECHO:   Results for orders placed during the hospital encounter of 10/26/19   Echo complete with contrast if indicated    Narrative [address] [id number] [phone]    Transthoracic Echocardiogram  2D, M-mode, Doppler, and Color Doppler    Study date:  28-Oct-2019    Patient: CORDELL JIMNEEZ  MR number: [id number]  Account number: [id number]  : 1955  Age: 64 years  Gender: Male  Status: Inpatient  Location: Bedside  Height: 69 in  Weight: 302 lb  BP: 141/ 88 mmHg    Indications: left sided paralysis    Diagnoses: 436 - CVA    Sonographer:  Lucia Hernandez [facility], [facility]  Referring Physician:  Damon Vaughn DO  Group:  [facility]  Interpreting Physician:   Daniele Cerna,     SUMMARY    LEFT VENTRICLE:  Systolic function was normal. Ejection fraction was estimated to be 55 %.  This study was inadequate for the evaluation of regional wall motion.  Wall thickness was mildly increased.    RIGHT VENTRICLE:  The ventricle was dilated.  Systolic function was normal.    MITRAL VALVE:  There was mild regurgitation.    TRICUSPID VALVE:  There was mild regurgitation.  Estimated peak PA pressure was 50 mmHg.    HISTORY: PRIOR HISTORY: Patient has no history of cardiovascular disease.    PROCEDURE: The procedure was performed at the bedside. This was a routine study. The transthoracic approach was used. The study included complete 2D imaging, M-mode, complete spectral Doppler, and color Doppler. The heart rate was 80 bpm,  at the start of the study. Intravenous contrast (Definity solution [1.3 ml Definity/8.7ml normal saline solution], 3 ml) was administered to opacify the left ventricle. Echocardiographic views were limited due to poor acoustic window  availability. This was a technically difficult study.    LEFT VENTRICLE: Size was normal. Systolic function was normal. Ejection fraction was estimated to be 55 %. This study was inadequate for the evaluation of regional wall motion. Wall thickness was mildly increased. DOPPLER: The study was  not technically sufficient to allow evaluation of LV diastolic function.    RIGHT VENTRICLE: The ventricle was dilated. Systolic function was normal.    LEFT ATRIUM: Size was normal.    RIGHT ATRIUM: Size was normal.    MITRAL VALVE: Valve structure was normal. There was normal leaflet separation. DOPPLER: The transmitral velocity was within the normal range. There was no evidence for stenosis. There was mild regurgitation.    AORTIC VALVE: The valve was trileaflet. Leaflets exhibited normal thickness and normal cuspal separation. DOPPLER: Transaortic velocity was within the normal range. There was no evidence for stenosis. There was  no regurgitation.    TRICUSPID VALVE: The valve structure was normal. There was normal leaflet separation. DOPPLER: The transtricuspid velocity was within the normal range. There was no evidence for stenosis. There was mild regurgitation. Estimated peak PA  pressure was 50 mmHg.    PULMONIC VALVE: Leaflets exhibited normal thickness, no calcification, and normal cuspal separation. DOPPLER: The transpulmonic velocity was within the normal range. There was no regurgitation.    PERICARDIUM: There was no pericardial effusion. The pericardium was normal in appearance.    AORTA: The root exhibited normal size.    SYSTEMIC VEINS: IVC: The inferior vena cava was not well visualized.    SYSTEM MEASUREMENT TABLES    2D  %FS: 34.72 %  Ao Diam: 2.87 cm  EDV(Teich): 143.23 ml  EF(Teich): 63.36 %  ESV(Teich): 52.47 ml  IVSd: 1.15 cm  LA Diam: 4.16 cm  LVIDd: 5.43 cm  LVIDs: 3.55 cm  LVPWd: 1.04 cm  RWT: 0.38  SV(Teich): 90.75 ml    CW  AV Vmax: 1.34 m/s  AV maxP.13 mmHg  RAP: 0 mmHg  TR Vmax: 3.26 m/s  TR maxP.5 mmHg    PW  E' Sept: 0.09 m/s  MV E Terrance: 1.03 m/s  RVSP: 42.5 mmHg    [facility]    Prepared and electronically signed by    Daniele Cerna DO  Signed 28-Oct-2019 10:37:38       LVEF 55%, mild LVH, paradoxical septal wall motion, mild RV dilatation, mild LA dilatation, mild to moderate RA dilatation, trace MR/TR, 2021  LVEF 65%, mild LVH, normal diastolic function, septal motion consistent with elevated PVR, mild RV dilatation with normal function, mild biatrial dilatation, trace MR/TR with PASP 37 mmHg (poor Doppler signal), 2023  --------------------------------------------------------------------------------  HOLTER  No results found for this or any previous visit.  No results found for this or any previous visit.  --------------------------------------------------------------------------------  CAROTIDS  No results found for this or  "any previous visit.   --------------------------------------------------------------------------------  ECGs:  No results found for this visit on 02/28/24.       Lab Results   Component Value Date    WBC 4.93 12/13/2023    HGB 13.0 12/13/2023    HCT 37.9 12/13/2023    MCV 91 12/13/2023     12/13/2023      Lab Results   Component Value Date    SODIUM 136 01/29/2024    K 3.7 01/29/2024    CL 98 01/29/2024    CO2 29 01/29/2024    BUN 23 01/29/2024    CREATININE 1.06 01/29/2024    GLUC 107 12/13/2023    CALCIUM 9.6 01/29/2024      Lab Results   Component Value Date    HGBA1C 5.4 06/08/2021      No results found for: \"CHOL\"  Lab Results   Component Value Date    HDL 54 04/19/2023    HDL 50 04/28/2022    HDL 55 10/27/2019     Lab Results   Component Value Date    LDLCALC 135 (H) 04/19/2023    LDLCALC 142 (H) 04/28/2022    LDLCALC 90 10/27/2019     Lab Results   Component Value Date    TRIG 193 (H) 04/19/2023    TRIG 118 04/28/2022    TRIG 93 10/27/2019     No results found for: \"CHOLHDL\"   Lab Results   Component Value Date    INR 1.54 (H) 07/28/2021    INR 2.26 (H) 06/02/2021    INR 1.59 (H) 01/22/2021    PROTIME 18.4 (H) 07/28/2021    PROTIME 24.9 (H) 06/02/2021    PROTIME 18.7 (H) 01/22/2021        1. Chronic diastolic heart failure (HCC)  -     Basic metabolic panel; Future  -     B-Type Natriuretic Peptide(BNP); Future  -     Magnesium    2. Paroxysmal atrial flutter (HCC)    3. Obesity, morbid (HCC)    4. Abnormal ECG          IMPRESSION:  Chronic diastolic heart failure  LVEF 65%, grade II diastolic dysfunction, October 2023  Paroxysmal atrial fibrillation/flutter on Xarelto and amiodarone; had long standing since before October 2019 - now in SR  s/p DCCV in May 2021   s/p RFA ablation (June 2021 and July 2021)  Holter with AF, avg HR 92 bpm, rare VPCs, September 2020  History of bilateral venous insufficiency status post LE vein ablation  Abnormal ECG with bifascicular block  Pharmacologic nuclear stress " "test appears negative for myocardial ischemia, gated EF 65%, January 2023  Moderate pulmonary hypertension  Moderate restrictive lung disease  Lymphedema  Morbid obesity  ROBERT on 20/14 cm BiPAP    PLAN:  It was a pleasure to see eLonardo in the office today for follow-up CV evaluation.  Overall, the patient has lost a substantial amount of weight and is now at his dry weight around 303 pounds.  He has his chronic lymphedema, but no significant increase in lower extremity swelling.  There is no chest pain concerning for angina and no signs or symptoms of heart failure.  Clinically he examines to be euvolemic.  Blood pressure and heart rate are currently stable.  The patient is tolerating his current medications without any reported adverse effects.  He can perform greater than 4 METS on daily basis without significant exertional symptoms.  Based on his clinical presentation, I have the following recommendations:    1.  Maintain torsemide 40 mg twice daily with spironolactone 50 mg daily.  Should he gain greater than 3 pounds in a day or 5 pounds in 1 to 2 weeks, recommend calling the office for further titration of diuretic medication.  2.  Recommend heart healthy diet low in sodium and carbohydrate.  Salt restriction of 1500 mg of sodium per day  3.  Would encourage 30 minutes a day, 5 days a week of moderate intensity activity to build cardiovascular endurance.  4.  Continue Jardiance.  Discussed the possibility of Ozempic with the patient and recommended reaching out to primary care to see if he is a reasonable candidate.  5.  Continue Toprol-XL.  6.  He is otherwise up-to-date with CV testing.  7.  We will follow-up with him in 4 months to reassess his progress.    As always, please do not hesitate to call with any questions.    Portions of the record may have been created with voice recognition software. Occasional wrong word or \"sound a like\" substitutions may have occurred due to the inherent limitations of " voice recognition software. Read the chart carefully and recognize, using context, where substitutions have occurred.      Signed: Deo Prieto DO, FACC, COLTON, FACP

## 2024-02-29 ENCOUNTER — APPOINTMENT (OUTPATIENT)
Dept: LAB | Facility: HOSPITAL | Age: 69
End: 2024-02-29
Payer: COMMERCIAL

## 2024-02-29 DIAGNOSIS — I50.32 CHRONIC DIASTOLIC HEART FAILURE (HCC): ICD-10-CM

## 2024-02-29 DIAGNOSIS — R73.03 PREDIABETES: ICD-10-CM

## 2024-02-29 LAB
ANION GAP SERPL CALCULATED.3IONS-SCNC: 9 MMOL/L
BNP SERPL-MCNC: 60 PG/ML (ref 0–100)
BUN SERPL-MCNC: 29 MG/DL (ref 5–25)
CALCIUM SERPL-MCNC: 10.1 MG/DL (ref 8.4–10.2)
CHLORIDE SERPL-SCNC: 99 MMOL/L (ref 96–108)
CO2 SERPL-SCNC: 26 MMOL/L (ref 21–32)
CREAT SERPL-MCNC: 1.23 MG/DL (ref 0.6–1.3)
EST. AVERAGE GLUCOSE BLD GHB EST-MCNC: 126 MG/DL
GFR SERPL CREATININE-BSD FRML MDRD: 59 ML/MIN/1.73SQ M
GLUCOSE P FAST SERPL-MCNC: 104 MG/DL (ref 65–99)
HBA1C MFR BLD: 6 %
MAGNESIUM SERPL-MCNC: 2.4 MG/DL (ref 1.9–2.7)
POTASSIUM SERPL-SCNC: 4.1 MMOL/L (ref 3.5–5.3)
SODIUM SERPL-SCNC: 134 MMOL/L (ref 135–147)

## 2024-02-29 PROCEDURE — 36415 COLL VENOUS BLD VENIPUNCTURE: CPT

## 2024-02-29 PROCEDURE — 80048 BASIC METABOLIC PNL TOTAL CA: CPT

## 2024-02-29 PROCEDURE — 83880 ASSAY OF NATRIURETIC PEPTIDE: CPT

## 2024-02-29 PROCEDURE — 83036 HEMOGLOBIN GLYCOSYLATED A1C: CPT

## 2024-02-29 PROCEDURE — 83735 ASSAY OF MAGNESIUM: CPT | Performed by: INTERNAL MEDICINE

## 2024-03-06 ENCOUNTER — TELEPHONE (OUTPATIENT)
Dept: SLEEP CENTER | Facility: CLINIC | Age: 69
End: 2024-03-06

## 2024-03-11 ENCOUNTER — TELEPHONE (OUTPATIENT)
Age: 69
End: 2024-03-11

## 2024-03-11 NOTE — ASSESSMENT & PLAN NOTE
-Hx PAF  -managed by cardiology  -On Xarelto, Toprol XL, Torsemide and Aldactone  -Recently started Jardiance about 1 month ago

## 2024-03-11 NOTE — ASSESSMENT & PLAN NOTE
-recently got Bipap  -can improve with weight loss  -continue ongoing follow up with sleep medicine

## 2024-03-11 NOTE — TELEPHONE ENCOUNTER
Patient called in to request an rx for compounded Semaglutide drops for under the tongue. He is requesting it be sent to [facility], [address] [place].  Please call the patient with any questions or if the provider needs to see him in person before his 5/16 already scheduled appt.  Thank you.

## 2024-04-29 ENCOUNTER — OFFICE VISIT (OUTPATIENT)
Dept: PULMONOLOGY | Facility: CLINIC | Age: 69
End: 2024-04-29
Payer: COMMERCIAL

## 2024-04-29 VITALS
DIASTOLIC BLOOD PRESSURE: 74 MMHG | SYSTOLIC BLOOD PRESSURE: 116 MMHG | HEIGHT: 69 IN | HEART RATE: 75 BPM | WEIGHT: 290 LBS | OXYGEN SATURATION: 95 % | BODY MASS INDEX: 42.95 KG/M2 | TEMPERATURE: 97.9 F

## 2024-04-29 DIAGNOSIS — G47.33 OSA (OBSTRUCTIVE SLEEP APNEA): ICD-10-CM

## 2024-04-29 DIAGNOSIS — J98.4 RESTRICTIVE LUNG DISEASE: Primary | ICD-10-CM

## 2024-04-29 DIAGNOSIS — I27.20 PULMONARY HYPERTENSION (HCC): ICD-10-CM

## 2024-04-29 DIAGNOSIS — E66.01 OBESITY, CLASS III, BMI 40-49.9 (MORBID OBESITY) (HCC): ICD-10-CM

## 2024-04-29 PROCEDURE — 99214 OFFICE O/P EST MOD 30 MIN: CPT

## 2024-04-29 NOTE — PATIENT INSTRUCTIONS
Continue PAP Therapy  - Continue BiPAP use every night  - Great job with weight loss.  - I will request your BiPAP data from [facility]  - Start taking daily walks  Remember to clean your mask and equipment regularly, as directed.  You should be eligible for new supplies approximately every 3-6 months, depending on your insurance coverage. Contact your Durable Medical Equipment (DME) company for new supplies as needed.  Follow up in 5 months    Care and Maintenance  Headgear should be washed as needed. Daily inspection and weekly washings are recommended. Do not disassemble the straps. Machine wash in warm water, making sure to attach Velcro hooks and tabs before washing. Line dry or machine dry on a low setting.  Masks should be washed every day. Daily inspection is recommended. Leave the mask and tubing attached. Gently wash the mask with a soft cloth using warm water and mild detergent, concentrating on the mask cushion flaps. DO NOT use alcohol or bleach. Rinse thoroughly and air dry.  Tubing and headgear should be washed weekly. Daily inspection is recommended. Wash in warm water and mild detergent and rinse thoroughly. Hook the tubing to the machine and blow until dry.  Humidifier should be washed daily and filled with DISTILLED water before use. Wash with warm water and mild detergent. Disinfect weekly by soaking with a solution of 1 part white vinegar and 3 parts water for 30 minutes. Rinse thoroughly and air dry.  Disposable filters should be replaced once a month. Wash reusable foam filters with warm water and mild detergent at least once a month. Rinse thoroughly and dry with paper towels.  Avoid  that contain fragrance or conditioners, as these will leave a residue.  NEVER iron any soft goods.    Insurance Requirements  Your insurance requires a face-to-face follow up visit within a 31-90 day period after starting PAP therapy.  Your insurance requires compliance with your PAP device, which is  at least 4 hours per night for 70% of the time. This must be done over a 30 day period and must occur within the initial 31-90 day period after starting CPAP.  Your insurance also requires at least yearly follow ups to continue to pay for CPAP supplies.     PAP Supply Guidelines  Below are the guidelines for reordering your supplies. You will be responsible for your deductible, co payments, and out of pocket expenses.    Item Frequency   Nasal Mask (no headgear) 1 every 3 months   Nasal Mask Cushion 1 every 2 weeks   Full Face Mask (no headgear) 1 every 3 months   Full Face Mask Cushion 1 every month   Nasal Pillows 1 every 2 weeks   Headgear 1 every 6 months   hin Strap 1 every 6 months   ramiro 1 every 3 months   Filters: Reusable 1 every 6 months   Filters: Disposable 1 every 2 weeks   Humidifier Chamber(disposable) 1 every 6 months

## 2024-04-29 NOTE — PROGRESS NOTES
Progress note - Pulmonary Medicine   Leonardo Oviedo 68 y.o. male MRN: [id number]       Impression & Plan:   Leonardo Oviedo is a 68 y.o. male with PMHx of RLD, PH, ROBERT on BiPAP, A-fib, HFpEF, BPH, lymphedema and obesity  who presents for follow-up.    ROBERT (Obstructive Sleep Apnea)  Suspected Obesity Hypoventilation  - Patient has a history of moderate ROBERT most recently studied on split-night PSG in 2/2024 (AHI 19.6, supine AHI 44.0, REM AHI N/A, O2 simone 71%, TST <90% 262.4 min) and is currently prescribed auto BiPAP EPAP min 18 cmH2O, IPAP max 25 cmH2O and pressure support 5 cmH2O.  He is coming today for his initial compliance visit and is getting good benefit from using the device without any significant complaints at this time.  - Attempted to review patient's therapy compliance data in care ; however, his device is not currently linked with his account.  Will asked staff to obtain data download from his DME ([employer]).  -The patient's overnight oximetry did show 10 minutes of total time <= 88%, will evaluate therapy and compliance data first if residual AHI is elevated will plan to adjust settings, if residual AHI is WNL would plan to add 2 L O2 with BiPAP.  In either case would plan to repeat overnight oximetry after changes made.  -Continue auto BiPAP EPAP min 18 cmH2O, IPAP max 25 cmH2O and pressure support 5 cmH2O with a full-face mask.  - Refill of supplies will be sent to the patient's DME.  - Explained the importance of keeping the machine clean, and that they should be eligible for refills of supplies every 3-6 months depending on insurance.  We also discussed the insurance compliance requirements.  - Encouraged healthy lifestyle with adequate sleep (7-9 hours per night), healthy balanced diet and routine exercise.  Explained the importance of avoiding driving while drowsy.  - Follow-up in 5 months.  -     PAP DME Resupply/Reorder    Restrictive Lung Disease  Obesity, Class III, BMI  40-49.9  -Patient was found to have moderate restriction on PFTs with mild DLCO reduction in 10/2023, it was felt this is likely 2/2 habitus given severely reduced ERV and HRCT without any evidence of ILD.  The patient has had some improvement in his dyspnea with weight loss and is now able to do a flight of steps without stopping.  - Recommend continued attempts at weight loss, congratulated him on his success thus far.  - Consider repeating PFTs after further weight loss to assure resolution of restrictive deficit.  - Strongly recommend continued daily activity to maintain exercise tolerance and help with weight loss.    Pulmonary Hypertension  - The patient was noted to have mild elevation of RVSP on TTE from 1/2023, but was not able to be assessed on repeat TTE from 10/2023 however RV size and function was felt to be normal.  Suspect his pH is likely group 2/3 due to HFpEF and ROBERT/OHS.  - The patient's dyspnea is improving with weight loss, will plan to repeat echocardiogram in roughly 6 months, will order at next OV in 5 months.    Return in about 5 months (around 9/29/2024).  ______________________________________________________________________    HPI:    Leonardo Oviedo is a 68 y.o. male with PMHx of RLD, PH, ROBERT on BiPAP, A-fib, HFpEF, BPH, lymphedema and obesity  who presents for follow-up.  The patient was last seen in the office on 1/10/2024 at which time plan was for HRCT, split-night PSG and referral to weight management.  His HRCT did not show any evidence of ILD, but did show air trapping.  Split-night sleep study showed moderate ROBERT and recommended auto BiPAP EPAP minimum 18 cmH2O, IPAP maximum 25 cmH2O and pressure support 5 cmH2O which was prescribed for the patient along with overnight oximetry.  He completed an overnight oximetry on BiPAP which showed roughly 10 minutes with saturation <= 80%.  He did begin working on weight loss and is down 26 pounds since last office visit.  Overall he  reports his breathing has improved with his loss of weight and he feels less short of breath with activity.  He estimates he can go 1 block on flat ground or 1 flight of steps before needing to stop, and states he was not able to do this few months ago.  He notes he is doing well on his BiPAP and has no acute complaints about the device at this time including pressure intolerance, rainout in his mask on poor mask fit or aerophagia.  He has good benefit from the device and wakes up feeling refreshed in the morning with usage.  Currently he denies chest pain, palpitations, wheezing, cough, unintentional weight loss, syncope or hemoptysis.    While he is a never smoker, he does have some remote history of cigar use and states he has not smoked cigars anytime recently.    Current Medications:    Current Outpatient Medications:     acetaminophen (TYLENOL) 500 mg tablet, Take 500 mg by mouth every 6 (six) hours as needed for mild pain, Disp: , Rfl:     alfuzosin (UROXATRAL) 10 mg 24 hr tablet, Take 10 mg by mouth daily, Disp: , Rfl:     allopurinol (ZYLOPRIM) 300 mg tablet, Take 300 mg by mouth daily, Disp: , Rfl:     ammonium lactate (LAC-HYDRIN) 12 % lotion, Apply topically 2 (two) times a day as needed for dry skin, Disp: , Rfl:     Colchicine 0.6 MG CAPS, Take by mouth if needed, Disp: , Rfl:     Cyanocobalamin (VITAMIN B 12 PO), Take by mouth (Patient not taking: Reported on 2/28/2024), Disp: , Rfl:     Diclofenac Sodium (VOLTAREN) 1 %, Apply 2 g topically 4 (four) times a day, Disp: 100 g, Rfl: 0    dutasteride (AVODART) 0.5 mg capsule, Take 0.5 mg by mouth daily, Disp: , Rfl:     Empagliflozin (Jardiance) 10 MG TABS tablet, Take 1 tablet (10 mg total) by mouth every morning, Disp: 90 tablet, Rfl: 1    ferrous sulfate 325 (65 Fe) mg tablet, Take 325 mg by mouth, Disp: , Rfl:     Glucosamine-Chondroit-Vit C-Mn (Glucosamine 1500 Complex) CAPS, Take by mouth, Disp: , Rfl:     halobetasol (ULTRAVATE) 0.05 % ointment,  APPLY TO AFFECTED AREA ON LEG TWICE DAILY, Disp: , Rfl:     L-ARGININE-500 PO, Take 500 mg by mouth 2 (two) times a day , Disp: , Rfl:     levofloxacin (LEVAQUIN) 500 mg tablet, take 1 tablet by mouth once daily for 10 days, Disp: , Rfl:     loteprednol etabonate (LOTEMAX) 0.5 % ophthalmic suspension, 1 drop 4 (four) times a day, Disp: , Rfl:     metoprolol succinate (TOPROL-XL) 25 mg 24 hr tablet, Take 1 tablet (25 mg total) by mouth daily, Disp: 90 tablet, Rfl: 2    multivitamin (THERAGRAN) TABS, Take 1 tablet by mouth daily, Disp: , Rfl:     Omega-3 Fatty Acids (fish oil) 1,000 mg, Take 1,000 mg by mouth 2 (two) times a day, Disp: , Rfl:     patient supplied medication, Take 1 each by mouth 2 (two) times a day, Disp: , Rfl:     pregabalin (LYRICA) 100 mg capsule, Take 1 capsule (100 mg total) by mouth 3 (three) times a day, Disp: 42 capsule, Rfl: 0    rivaroxaban (Xarelto) 20 mg tablet, Take 1 tablet (20 mg total) by mouth daily, Disp: 90 tablet, Rfl: 2    spironolactone (ALDACTONE) 25 mg tablet, Take 1 tablet (25 mg total) by mouth 2 (two) times a day (Patient taking differently: Take 25 mg by mouth daily), Disp: 180 tablet, Rfl: 1    tadalafil (CIALIS) 10 MG tablet, , Disp: , Rfl:     tadalafil (CIALIS) 5 MG tablet, Take 5 mg by mouth daily as needed for erectile dysfunction, Disp: , Rfl:     torsemide (DEMADEX) 20 mg tablet, Take 3 tablets (60 mg total) by mouth daily (Patient taking differently: Take 20 mg by mouth daily 2 times daily), Disp: 60 tablet, Rfl: 2    traMADol (ULTRAM) 50 mg tablet, Take 1 tablet (50 mg total) by mouth 2 (two) times a day as needed for moderate pain, Disp: 60 tablet, Rfl: 1    VITAMIN D PO, Take 2,000 Units by mouth daily , Disp: , Rfl:     Review of Systems:  Review of Systems  10-point system review completed, all of which are negative except as mentioned above.    Past medical history, surgical history, and family history were reviewed and updated as appropriate    Social  "history updates:  Social History     Tobacco Use   Smoking Status Never    Passive exposure: Never   Smokeless Tobacco Never     PhysicalExamination:  Vitals:   /74 (BP Location: Left arm, Patient Position: Sitting, Cuff Size: Standard)   Pulse 75   Temp 97.9 °F (36.6 °C) (Temporal)   Ht 5' 9\" (1.753 m)   Wt 132 kg (290 lb)   SpO2 95%   BMI 42.83 kg/m²   Body mass index is 42.83 kg/m².    Constitutional: NAD, well appearing, on room air, no conversational dyspnea   Skin: Warm, dry, no rashes noted   Eyes: PERRL, normal conjunctiva  ENT: Nasal congestion absent, moist mucus membranes.  Neck: No JVD, trachea is midline, no adenopathy.  Resp: distant breath sounds, but CTA B/L, no W/R/R   Cardiac: RRR, +S1/S2, no M/R/G  Abdomen: Soft, NT/ND  Extremities: No digital clubbing, LLE bandaged, RLE with compression stocking.  Neuro: AAOx3    Diagnostic Data:  Labs:  I personally reviewed the most recent laboratory data pertinent to today's visit    Lab Results   Component Value Date    WBC 4.93 12/13/2023    HGB 13.0 12/13/2023    HCT 37.9 12/13/2023    MCV 91 12/13/2023     12/13/2023     Lab Results   Component Value Date    SODIUM 134 (L) 02/29/2024    K 4.1 02/29/2024    CO2 26 02/29/2024    CL 99 02/29/2024    BUN 29 (H) 02/29/2024    CREATININE 1.23 02/29/2024    CALCIUM 10.1 02/29/2024     PFT results:  The most recent pulmonary function tests were reviewed.  PFTs -- 10/9/2023  FEV1/FVC Ratio: 88 %  Forced Vital Capacity: 2.15 L           51 % predicted  FEV1: 1.90 L   60 % predicted  After administration of bronchodilator FEV1: reduced 16%  Lung volumes by body plethysmography: Total Lung Capacity 58 % predicted Residual volume 73 % predicted  DLCO corrected for patients hemoglobin level: 65 %  Interpretation:  Moderate restrictive airflow defect on spirometry   No response to the administration to bronchodilator per ATS Standards  Moderately reduced TLC  Mildly Reduced  Diffusion  Restricted flow " volume loops     Imaging:  I personally reviewed the images in PACS pertinent to today's visit:  HRCT -- 1/16/2024  1. Interval resolution of previous groundglass opacities compatible with a prior infectious/inflammatory process.  2. Air trapping and small airways disease.  3. No interstitial pulmonary fibrosis.  4. Enlarged main pulmonary artery, which may represent pulmonary hypertension.     Other studies:  2D Echo -- 10/11/2023    Left Ventricle: Left ventricular cavity size is normal. Wall thickness is normal. The left ventricular ejection fraction is 65%. Systolic function is vigorous. Wall motion cannot be accurately assessed. Diastolic function is moderately abnormal, consistent with grade II (pseudonormal) relaxation.    Right Ventricle: Right ventricular cavity size is normal. Systolic function is normal.    Study quality was poor. This was a technically difficult study     NM Stress Test -- 1/23/2023    Stress Function: Left ventricular function post-stress is normal. Post-stress ejection fraction is 65 %.    Perfusion: There are no perfusion defects.    Stress ECG: No ST deviation is noted. The ECG was negative for ischemia. The stress ECG is negative for ischemia after pharmacologic vasodilation, without reproduction of symptoms.     2D Echo -- 1/23/2023    Left Ventricle: Left ventricular cavity size is normal. Wall thickness is mildly increased. There is mild concentric hypertrophy. The left ventricular ejection fraction is 65%. Systolic function is normal. Wall motion is normal. Diastolic function is normal.    IVS: There is systolic flattening of the interventricular septum consistent with right ventricle pressure overload.    Right Ventricle: Right ventricular cavity size is mildly dilated. Systolic function is normal.    Left Atrium: The atrium is mildly dilated.    Right Atrium: The atrium is mildly dilated.    Tricuspid Valve: The right ventricular systolic pressure is mildly elevated. The  "estimated right ventricular systolic pressure (based on a poor doppler signal) is at least 37.00 mmHg.    Pulmonary Artery: There is no evidence of systolic notching of the RVOT Doppler waveform.     No evidence of ischemia or infarction by pharmacologic vasodilatory nuclear stress testing.    I have spent a total time of 30 minutes on 04/29/24 in caring for this patient including Instructions for management, Patient and family education, Importance of tx compliance, Counseling / Coordination of care, Documenting in the medical record, Reviewing / ordering tests, medicine, procedures  , and Obtaining or reviewing history  .    Jaja Kulkarni MD  Pulmonary-Critical Care and Sleep Medicine  04/29/24    Portions of the record may have been created with voice recognition software. Occasional wrong word or \"sound a like\" substitutions may have occurred due to the inherent limitations of voice recognition software. Please read the chart carefully and recognize, using context, where substitutions have occurred.  "

## 2024-04-30 ENCOUNTER — TELEPHONE (OUTPATIENT)
Dept: PULMONOLOGY | Facility: CLINIC | Age: 69
End: 2024-04-30

## 2024-05-01 DIAGNOSIS — I50.33 ACUTE ON CHRONIC DIASTOLIC CONGESTIVE HEART FAILURE (HCC): ICD-10-CM

## 2024-05-03 RX ORDER — RIVAROXABAN 20 MG/1
20 TABLET, FILM COATED ORAL DAILY
Qty: 90 TABLET | Refills: 2 | Status: SHIPPED | OUTPATIENT
Start: 2024-05-03

## 2024-05-07 LAB
DME PARACHUTE DELIVERY DATE ACTUAL: NORMAL
DME PARACHUTE DELIVERY DATE REQUESTED: NORMAL
DME PARACHUTE ITEM DESCRIPTION: NORMAL
DME PARACHUTE ORDER STATUS: NORMAL
DME PARACHUTE SUPPLIER NAME: NORMAL
DME PARACHUTE SUPPLIER PHONE: NORMAL

## 2024-05-15 RX ORDER — DOXYCYCLINE HYCLATE 100 MG
TABLET ORAL
COMMUNITY
Start: 2024-03-25

## 2024-05-17 ENCOUNTER — OFFICE VISIT (OUTPATIENT)
Dept: PAIN MEDICINE | Facility: CLINIC | Age: 69
End: 2024-05-17
Payer: COMMERCIAL

## 2024-05-17 VITALS
BODY MASS INDEX: 42.03 KG/M2 | RESPIRATION RATE: 16 BRPM | WEIGHT: 283.8 LBS | HEART RATE: 76 BPM | SYSTOLIC BLOOD PRESSURE: 114 MMHG | DIASTOLIC BLOOD PRESSURE: 73 MMHG | HEIGHT: 69 IN

## 2024-05-17 DIAGNOSIS — G89.29 CHRONIC BILATERAL LOW BACK PAIN WITHOUT SCIATICA: ICD-10-CM

## 2024-05-17 DIAGNOSIS — M54.50 CHRONIC BILATERAL LOW BACK PAIN WITHOUT SCIATICA: ICD-10-CM

## 2024-05-17 DIAGNOSIS — M47.816 LUMBAR SPONDYLOSIS: ICD-10-CM

## 2024-05-17 DIAGNOSIS — G89.4 CHRONIC PAIN SYNDROME: Primary | Chronic | ICD-10-CM

## 2024-05-17 PROCEDURE — 99213 OFFICE O/P EST LOW 20 MIN: CPT | Performed by: NURSE PRACTITIONER

## 2024-05-17 RX ORDER — SEMAGLUTIDE 0.68 MG/ML
0.5 INJECTION, SOLUTION SUBCUTANEOUS
COMMUNITY
Start: 2024-05-15

## 2024-05-17 NOTE — PROGRESS NOTES
Assessment:  1. Chronic pain syndrome    2. Chronic bilateral low back pain without sciatica    3. Lumbar spondylosis        Plan:  While the patient was in the office today, I did have a thorough conversation regarding their chronic pain syndrome, medication management, and treatment plan options.  Patient is being seen for a follow-up visit.  Overall, he reports that his pain has greatly improved.  He is reporting 90% improvement in his pain.  He is no longer taking tramadol.    I discussed with the patient that at this point time he can follow up with our office on an as-needed basis. I did review the patient that if his pain symptoms should change, worsen, and/or if he would experience any new symptoms he would like to be evaluated for, he should give our office a call. The patient was agreeable and verbalized an understanding.    My impressions and treatment recommendations were discussed in detail with the patient who verbalized understanding and had no further questions.  Discharge instructions were provided. I personally saw and examined the patient and I agree with the above discussed plan of care.    No orders of the defined types were placed in this encounter.    New Medications Ordered This Visit   Medications    doxycycline hyclate (VIBRA-TABS) 100 mg tablet     Sig: take 1 tablet by mouth twice a day for 7 days    Ozempic, 0.25 or 0.5 MG/DOSE, 2 MG/3ML injection pen     Sig: Inject 0.5 mg under the skin       History of Present Illness:  Leonardo Oveido is a 68 y.o. male who presents for a follow up office visit in regards to Back Pain.   The patient is denying any complaints of pain at this time.  Current pain level is a 0/10.  He stopped tramadol.    I have personally reviewed and/or updated the patient's past medical history, past surgical history, family history, social history, current medications, allergies, and vital signs today.     Review of Systems   Constitutional: Negative.    HENT:  Negative.     Eyes: Negative.    Respiratory: Negative.     Cardiovascular: Negative.    Gastrointestinal: Negative.    Endocrine: Negative.    Genitourinary: Negative.    Musculoskeletal: Negative.    Skin: Negative.    Allergic/Immunologic: Negative.    Neurological: Negative.    Hematological: Negative.    Psychiatric/Behavioral: Negative.     All other systems reviewed and are negative.      Patient Active Problem List   Diagnosis    Atrial fibrillation (HCC)    Weakness of right upper extremity    Paresthesias    Cervical pain (neck)    Obesity, Class III, BMI 40-49.9 (morbid obesity) ([facility])    Thrombocytopenia (HCC)    Bilateral lower extremity edema    Venous insufficiency of both lower extremities    Pulmonary hypertension (HCC)    Chronic heart failure with preserved ejection fraction (HFpEF) ([facility])    Anemia    Primary osteoarthritis    Iron deficiency    Atrial flutter (HCC)    Venous ulcer ([facility])    Chronic pain syndrome    Lumbar spondylosis    Chronic bilateral low back pain without sciatica    Benign prostate hyperplasia    Sleep apnea    Lymphedema    Obstructive sleep apnea    Snoring    ROBERT (obstructive sleep apnea)       Past Medical History:   Diagnosis Date    A-fib ([facility])     ALANNA (acute kidney injury) ([facility]) 06/15/2021    Arthritis     CHF exacerbation ([facility]) 12/07/2023    CPAP (continuous positive airway pressure) dependence     Encounter for cardioversion procedure 05/06/2021    Leukopenia 10/26/2019    Lymphedema     Sleep apnea     Urinary frequency     Wears glasses     Wears partial dentures     lower partial       Past Surgical History:   Procedure Laterality Date    ABLATION SAPHENOUS VEIN W/ RFA      COLONOSCOPY      JOINT REPLACEMENT  Left Hip 4 /21/2009    NERVE BLOCK Bilateral 7/18/2023    Procedure: BLOCK MEDIAL BRANCH B/L L4-L5 AND L5-S1 MBB #1;  Surgeon: Samson Godinez MD;  Location: [facility];  Service: Pain Management     NERVE BLOCK Bilateral 8/17/2023    Procedure: BLOCK  MEDIAL BRANCH  B/L L4-5 and L5-S1 MBB #2;  Surgeon: Samson Godinez MD;  Location: [facility];  Service: Pain Management     VA CYSTO INSERTION TRANSPROSTATIC IMPLANT SINGLE N/A 11/13/2017    Procedure: CYSTOSCOPY WITH INSERTION UROLIFT;  Surgeon: Deuce Bennett DO;  Location: [facility];  Service: Urology    RADIOFREQUENCY ABLATION Left 10/5/2023    Procedure: ABLATION RADIO FREQUENCY (RFA) LEFT L4-5 AND L5-S1 RFA;  Surgeon: Samson Godinez MD;  Location: [facility];  Service: Pain Management     RADIOFREQUENCY ABLATION Right 11/1/2023    Procedure: ABLATION RADIO FREQUENCY (RFA) RIGHT L4-5 AND L5-S1 RFA;  Surgeon: Samson Godinez MD;  Location: [facility];  Service: Pain Management     TONSILLECTOMY         Family History   Problem Relation Age of Onset    Heart disease Mother     Lymphoma Father     Cancer Father     Obesity Sister     Heart disease Sister     Heart failure Sister         on diuretic    Obesity Brother     Hypertension Brother     Diabetes Neg Hx     Thyroid disease Neg Hx     Stroke Neg Hx        Social History     Occupational History    Not on file   Tobacco Use    Smoking status: Never     Passive exposure: Never    Smokeless tobacco: Never   Vaping Use    Vaping status: Never Used   Substance and Sexual Activity    Alcohol use: Yes     Alcohol/week: 5.0 standard drinks of alcohol     Types: 3 Glasses of wine, 2 Standard drinks or equivalent per week     Comment: socially    Drug use: No    Sexual activity: Not Currently     Partners: Female     Birth control/protection: Abstinence, Condom Male, Spermicide       Current Outpatient Medications on File Prior to Visit   Medication Sig    acetaminophen (TYLENOL) 500 mg tablet Take 500 mg by mouth every 6 (six) hours as needed for mild pain    alfuzosin (UROXATRAL) 10 mg 24 hr tablet Take 10 mg by mouth daily    allopurinol (ZYLOPRIM) 300 mg tablet Take 300 mg by mouth daily    ammonium lactate (LAC-HYDRIN) 12 % lotion Apply  topically 2 (two) times a day as needed for dry skin    Colchicine 0.6 MG CAPS Take by mouth if needed    Diclofenac Sodium (VOLTAREN) 1 % Apply 2 g topically 4 (four) times a day    doxycycline hyclate (VIBRA-TABS) 100 mg tablet take 1 tablet by mouth twice a day for 7 days    dutasteride (AVODART) 0.5 mg capsule Take 0.5 mg by mouth daily    Empagliflozin (Jardiance) 10 MG TABS tablet Take 1 tablet (10 mg total) by mouth every morning    ferrous sulfate 325 (65 Fe) mg tablet Take 325 mg by mouth    Glucosamine-Chondroit-Vit C-Mn (Glucosamine 1500 Complex) CAPS Take by mouth    halobetasol (ULTRAVATE) 0.05 % ointment APPLY TO AFFECTED AREA ON LEG TWICE DAILY    L-ARGININE-500 PO Take 500 mg by mouth 2 (two) times a day     levofloxacin (LEVAQUIN) 500 mg tablet take 1 tablet by mouth once daily for 10 days    loteprednol etabonate (LOTEMAX) 0.5 % ophthalmic suspension 1 drop 4 (four) times a day    metoprolol succinate (TOPROL-XL) 25 mg 24 hr tablet Take 1 tablet (25 mg total) by mouth daily    multivitamin (THERAGRAN) TABS Take 1 tablet by mouth daily    Omega-3 Fatty Acids (fish oil) 1,000 mg Take 1,000 mg by mouth 2 (two) times a day    Ozempic, 0.25 or 0.5 MG/DOSE, 2 MG/3ML injection pen Inject 0.5 mg under the skin    patient supplied medication Take 1 each by mouth 2 (two) times a day    pregabalin (LYRICA) 100 mg capsule Take 1 capsule (100 mg total) by mouth 3 (three) times a day    spironolactone (ALDACTONE) 25 mg tablet Take 1 tablet (25 mg total) by mouth 2 (two) times a day (Patient taking differently: Take 25 mg by mouth daily)    tadalafil (CIALIS) 5 MG tablet Take 5 mg by mouth daily as needed for erectile dysfunction    torsemide (DEMADEX) 20 mg tablet Take 3 tablets (60 mg total) by mouth daily (Patient taking differently: Take 20 mg by mouth daily 2 times daily)    VITAMIN D PO Take 2,000 Units by mouth daily     Xarelto 20 MG tablet TAKE 1 TABLET DAILY    [DISCONTINUED] traMADol (ULTRAM) 50 mg  "tablet Take 1 tablet (50 mg total) by mouth 2 (two) times a day as needed for moderate pain    Cyanocobalamin (VITAMIN B 12 PO) Take by mouth (Patient not taking: Reported on 2/28/2024)    tadalafil (CIALIS) 10 MG tablet  (Patient not taking: Reported on 2/28/2024)     No current facility-administered medications on file prior to visit.       Allergies   Allergen Reactions    Penicillins Anaphylaxis    Sulfa Antibiotics Anaphylaxis    Levofloxacin Other (See Comments)       Physical Exam:    /73   Pulse 76   Resp 16   Ht 5' 9\" (1.753 m)   Wt 129 kg (283 lb 12.8 oz)   BMI 41.91 kg/m²     Constitutional:normal, well developed, well nourished, alert, in no distress and non-toxic and no overt pain behavior.  Eyes:anicteric  HEENT:grossly intact  Neck:supple, symmetric, trachea midline and no masses   Pulmonary:even and unlabored  Cardiovascular:No edema or pitting edema present  Skin:Normal without rashes or lesions and well hydrated  Psychiatric:Mood and affect appropriate  Neurologic:Cranial Nerves II-XII grossly intact  Musculoskeletal:normal    Imaging    "

## 2024-07-03 ENCOUNTER — HOSPITAL ENCOUNTER (EMERGENCY)
Facility: HOSPITAL | Age: 69
Discharge: HOME | End: 2024-07-03
Attending: EMERGENCY MEDICINE
Payer: COMMERCIAL

## 2024-07-03 ENCOUNTER — APPOINTMENT (OUTPATIENT)
Dept: LAB | Facility: HOSPITAL | Age: 69
End: 2024-07-03
Payer: COMMERCIAL

## 2024-07-03 ENCOUNTER — APPOINTMENT (EMERGENCY)
Dept: CT IMAGING | Facility: HOSPITAL | Age: 69
End: 2024-07-03
Payer: COMMERCIAL

## 2024-07-03 ENCOUNTER — OFFICE VISIT (OUTPATIENT)
Dept: CARDIOLOGY CLINIC | Facility: CLINIC | Age: 69
End: 2024-07-03
Payer: COMMERCIAL

## 2024-07-03 ENCOUNTER — APPOINTMENT (EMERGENCY)
Dept: RADIOLOGY | Facility: HOSPITAL | Age: 69
End: 2024-07-03
Payer: COMMERCIAL

## 2024-07-03 VITALS
SYSTOLIC BLOOD PRESSURE: 106 MMHG | OXYGEN SATURATION: 94 % | WEIGHT: 263 LBS | BODY MASS INDEX: 38.95 KG/M2 | HEART RATE: 74 BPM | DIASTOLIC BLOOD PRESSURE: 60 MMHG | HEIGHT: 69 IN

## 2024-07-03 VITALS
RESPIRATION RATE: 16 BRPM | OXYGEN SATURATION: 92 % | DIASTOLIC BLOOD PRESSURE: 62 MMHG | TEMPERATURE: 98.4 F | SYSTOLIC BLOOD PRESSURE: 108 MMHG | HEART RATE: 60 BPM

## 2024-07-03 DIAGNOSIS — R94.31 ABNORMAL ECG: ICD-10-CM

## 2024-07-03 DIAGNOSIS — S30.1XXA HEMATOMA OF LEFT FLANK: ICD-10-CM

## 2024-07-03 DIAGNOSIS — I48.92 PAROXYSMAL ATRIAL FLUTTER (HCC): ICD-10-CM

## 2024-07-03 DIAGNOSIS — E66.01 OBESITY, MORBID (HCC): ICD-10-CM
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
## 2024-07-03 DIAGNOSIS — I50.32 CHRONIC DIASTOLIC HEART FAILURE (HCC): ICD-10-CM

## 2024-07-03 DIAGNOSIS — I50.32 CHRONIC DIASTOLIC HEART FAILURE (HCC): Primary | ICD-10-CM

## 2024-07-03 DIAGNOSIS — I27.20 PULMONARY HYPERTENSION (HCC): ICD-10-CM

## 2024-07-03 DIAGNOSIS — S22.39XA RIB FRACTURE: ICD-10-CM

## 2024-07-03 LAB
ABO GROUP BLD: NORMAL
ALBUMIN SERPL BCG-MCNC: 4.4 G/DL (ref 3.5–5)
ALBUMIN SERPL BCG-MCNC: 4.5 G/DL (ref 3.5–5)
ALP SERPL-CCNC: 60 U/L (ref 34–104)
ALP SERPL-CCNC: 65 U/L (ref 34–104)
ALT SERPL W P-5'-P-CCNC: 10 U/L (ref 7–52)
ALT SERPL W P-5'-P-CCNC: 8 U/L (ref 7–52)
ANION GAP SERPL CALCULATED.3IONS-SCNC: 5 MMOL/L (ref 4–13)
ANION GAP SERPL CALCULATED.3IONS-SCNC: 8 MMOL/L (ref 4–13)
APTT PPP: 65 SECONDS (ref 23–37)
APTT PPP: 65 SECONDS (ref 23–37)
AST SERPL W P-5'-P-CCNC: 18 U/L (ref 13–39)
AST SERPL W P-5'-P-CCNC: 18 U/L (ref 13–39)
BASOPHILS # BLD AUTO: 0.01 THOUSANDS/ÂΜL (ref 0–0.1)
BASOPHILS # BLD AUTO: 0.02 THOUSANDS/ÂΜL (ref 0–0.1)
BASOPHILS NFR BLD AUTO: 0 % (ref 0–1)
BASOPHILS NFR BLD AUTO: 0 % (ref 0–1)
BILIRUB SERPL-MCNC: 0.79 MG/DL (ref 0.2–1)
BILIRUB SERPL-MCNC: 0.82 MG/DL (ref 0.2–1)
BLD GP AB SCN SERPL QL: NEGATIVE
BUN SERPL-MCNC: 32 MG/DL (ref 5–25)
BUN SERPL-MCNC: 33 MG/DL (ref 5–25)
CALCIUM SERPL-MCNC: 10 MG/DL (ref 8.4–10.2)
CALCIUM SERPL-MCNC: 10.1 MG/DL (ref 8.4–10.2)
CARDIAC TROPONIN I PNL SERPL HS: 10 NG/L (ref 8–18)
CHLORIDE SERPL-SCNC: 95 MMOL/L (ref 96–108)
CHLORIDE SERPL-SCNC: 96 MMOL/L (ref 96–108)
CK SERPL-CCNC: 77 U/L (ref 39–308)
CO2 SERPL-SCNC: 30 MMOL/L (ref 21–32)
CO2 SERPL-SCNC: 32 MMOL/L (ref 21–32)
CREAT SERPL-MCNC: 1.5 MG/DL (ref 0.6–1.3)
CREAT SERPL-MCNC: 1.53 MG/DL (ref 0.6–1.3)
EOSINOPHIL # BLD AUTO: 0.12 THOUSAND/ÂΜL (ref 0–0.61)
EOSINOPHIL # BLD AUTO: 0.12 THOUSAND/ÂΜL (ref 0–0.61)
EOSINOPHIL NFR BLD AUTO: 2 % (ref 0–6)
EOSINOPHIL NFR BLD AUTO: 3 % (ref 0–6)
ERYTHROCYTE [DISTWIDTH] IN BLOOD BY AUTOMATED COUNT: 15.5 % (ref 11.6–15.1)
ERYTHROCYTE [DISTWIDTH] IN BLOOD BY AUTOMATED COUNT: 15.5 % (ref 11.6–15.1)
GFR SERPL CREATININE-BSD FRML MDRD: 45 ML/MIN/1.73SQ M
GFR SERPL CREATININE-BSD FRML MDRD: 46 ML/MIN/1.73SQ M
GLUCOSE SERPL-MCNC: 89 MG/DL (ref 65–140)
GLUCOSE SERPL-MCNC: 91 MG/DL (ref 65–140)
HCT VFR BLD AUTO: 39.1 % (ref 36.5–49.3)
HCT VFR BLD AUTO: 39.7 % (ref 36.5–49.3)
HGB BLD-MCNC: 13.1 G/DL (ref 12–17)
HGB BLD-MCNC: 13.7 G/DL (ref 12–17)
IMM GRANULOCYTES # BLD AUTO: 0.01 THOUSAND/UL (ref 0–0.2)
IMM GRANULOCYTES # BLD AUTO: 0.01 THOUSAND/UL (ref 0–0.2)
IMM GRANULOCYTES NFR BLD AUTO: 0 % (ref 0–2)
IMM GRANULOCYTES NFR BLD AUTO: 0 % (ref 0–2)
INR PPP: 2.38 (ref 0.84–1.19)
INR PPP: 2.97 (ref 0.84–1.19)
LYMPHOCYTES # BLD AUTO: 1.08 THOUSANDS/ÂΜL (ref 0.6–4.47)
LYMPHOCYTES # BLD AUTO: 1.18 THOUSANDS/ÂΜL (ref 0.6–4.47)
LYMPHOCYTES NFR BLD AUTO: 21 % (ref 14–44)
LYMPHOCYTES NFR BLD AUTO: 25 % (ref 14–44)
MCH RBC QN AUTO: 30.2 PG (ref 26.8–34.3)
MCH RBC QN AUTO: 31.2 PG (ref 26.8–34.3)
MCHC RBC AUTO-ENTMCNC: 33.5 G/DL (ref 31.4–37.4)
MCHC RBC AUTO-ENTMCNC: 34.5 G/DL (ref 31.4–37.4)
MCV RBC AUTO: 90 FL (ref 82–98)
MCV RBC AUTO: 90 FL (ref 82–98)
MONOCYTES # BLD AUTO: 0.48 THOUSAND/ÂΜL (ref 0.17–1.22)
MONOCYTES # BLD AUTO: 0.51 THOUSAND/ÂΜL (ref 0.17–1.22)
MONOCYTES NFR BLD AUTO: 10 % (ref 4–12)
MONOCYTES NFR BLD AUTO: 10 % (ref 4–12)
NEUTROPHILS # BLD AUTO: 2.85 THOUSANDS/ÂΜL (ref 1.85–7.62)
NEUTROPHILS # BLD AUTO: 3.5 THOUSANDS/ÂΜL (ref 1.85–7.62)
NEUTS SEG NFR BLD AUTO: 62 % (ref 43–75)
NEUTS SEG NFR BLD AUTO: 67 % (ref 43–75)
NRBC BLD AUTO-RTO: 0 /100 WBCS
NRBC BLD AUTO-RTO: 0 /100 WBCS
PLATELET # BLD AUTO: 171 THOUSANDS/UL (ref 149–390)
PLATELET # BLD AUTO: 173 THOUSANDS/UL (ref 149–390)
PMV BLD AUTO: 11.1 FL (ref 8.9–12.7)
PMV BLD AUTO: 11.2 FL (ref 8.9–12.7)
POTASSIUM SERPL-SCNC: 4.5 MMOL/L (ref 3.5–5.3)
POTASSIUM SERPL-SCNC: 4.6 MMOL/L (ref 3.5–5.3)
PROT SERPL-MCNC: 7.7 G/DL (ref 6.4–8.4)
PROT SERPL-MCNC: 7.9 G/DL (ref 6.4–8.4)
PROTHROMBIN TIME: 25.5 SECONDS (ref 11.6–14.5)
PROTHROMBIN TIME: 31 SECONDS (ref 11.6–14.5)
RBC # BLD AUTO: 4.34 MILLION/UL (ref 3.88–5.62)
RBC # BLD AUTO: 4.39 MILLION/UL (ref 3.88–5.62)
RH BLD: POSITIVE
SODIUM SERPL-SCNC: 133 MMOL/L (ref 135–147)
SODIUM SERPL-SCNC: 133 MMOL/L (ref 135–147)
SPECIMEN EXPIRATION DATE: NORMAL
WBC # BLD AUTO: 4.66 THOUSAND/UL (ref 4.31–10.16)
WBC # BLD AUTO: 5.23 THOUSAND/UL (ref 4.31–10.16)

## 2024-07-03 PROCEDURE — 86901 BLOOD TYPING SEROLOGIC RH(D): CPT | Performed by: EMERGENCY MEDICINE

## 2024-07-03 PROCEDURE — 85025 COMPLETE CBC W/AUTO DIFF WBC: CPT

## 2024-07-03 PROCEDURE — 71045 X-RAY EXAM CHEST 1 VIEW: CPT

## 2024-07-03 PROCEDURE — 36415 COLL VENOUS BLD VENIPUNCTURE: CPT

## 2024-07-03 PROCEDURE — 93005 ELECTROCARDIOGRAM TRACING: CPT

## 2024-07-03 PROCEDURE — 86900 BLOOD TYPING SEROLOGIC ABO: CPT | Performed by: EMERGENCY MEDICINE

## 2024-07-03 PROCEDURE — 74177 CT ABD & PELVIS W/CONTRAST: CPT

## 2024-07-03 PROCEDURE — 86850 RBC ANTIBODY SCREEN: CPT | Performed by: EMERGENCY MEDICINE

## 2024-07-03 PROCEDURE — 85730 THROMBOPLASTIN TIME PARTIAL: CPT

## 2024-07-03 PROCEDURE — 85025 COMPLETE CBC W/AUTO DIFF WBC: CPT | Performed by: EMERGENCY MEDICINE

## 2024-07-03 PROCEDURE — 71260 CT THORAX DX C+: CPT

## 2024-07-03 PROCEDURE — 70450 CT HEAD/BRAIN W/O DYE: CPT

## 2024-07-03 PROCEDURE — 72125 CT NECK SPINE W/O DYE: CPT

## 2024-07-03 PROCEDURE — 80053 COMPREHEN METABOLIC PANEL: CPT | Performed by: EMERGENCY MEDICINE

## 2024-07-03 PROCEDURE — 82550 ASSAY OF CK (CPK): CPT | Performed by: EMERGENCY MEDICINE

## 2024-07-03 PROCEDURE — 85610 PROTHROMBIN TIME: CPT | Performed by: EMERGENCY MEDICINE

## 2024-07-03 PROCEDURE — 99214 OFFICE O/P EST MOD 30 MIN: CPT | Performed by: INTERNAL MEDICINE

## 2024-07-03 PROCEDURE — 80053 COMPREHEN METABOLIC PANEL: CPT

## 2024-07-03 PROCEDURE — 85730 THROMBOPLASTIN TIME PARTIAL: CPT | Performed by: EMERGENCY MEDICINE

## 2024-07-03 PROCEDURE — 85610 PROTHROMBIN TIME: CPT

## 2024-07-03 PROCEDURE — 84484 ASSAY OF TROPONIN QUANT: CPT | Performed by: EMERGENCY MEDICINE

## 2024-07-03 PROCEDURE — 99285 EMERGENCY DEPT VISIT HI MDM: CPT | Performed by: EMERGENCY MEDICINE

## 2024-07-03 RX ORDER — TRAMADOL HYDROCHLORIDE 50 MG/1
50 TABLET ORAL EVERY 6 HOURS PRN
COMMUNITY

## 2024-07-03 RX ORDER — ACETAMINOPHEN 325 MG/1
975 TABLET ORAL ONCE
Status: COMPLETED | OUTPATIENT
Start: 2024-07-03 | End: 2024-07-03

## 2024-07-03 RX ADMIN — IOHEXOL 100 ML: 350 INJECTION, SOLUTION INTRAVENOUS at 17:13

## 2024-07-03 RX ADMIN — ACETAMINOPHEN 975 MG: 325 TABLET, FILM COATED ORAL at 19:38

## 2024-07-03 NOTE — PROGRESS NOTES
Cardiology Office Note  MD Blayne Curry MD Jason Kaplan, DO, [facility]  MD Alistair Burgess DO, [facility]  Damon Kim DO, [facility]  ----------------------------------------------------------------  [address] [address]    Leonardo Oviedo 68 y.o. male MRN: [id number]  Unit/Bed#:  Encounter: [id number]      History of Present Illness:  It was a pleasure to see Leonardo Oviedo in the office today for follow-up CV evaluation.  He has a longstanding history of atrial fibrillation with prior failed ELAINE guided cardioversion, history of morbid obesity and pulmonary hypertension with chronic venous insufficiency/lymphedema.  The patient has a known history of obstructive sleep apnea was noncompliant with his CPAP therapy.  Over the course of many years he has been having lower extremity edema which has been believe secondary to lymphedema with chronic venous insufficiency.  He has had venous ablation is in the past for his reflux disease.  In October 2019, he was found have pulmonary hypertension with pulmonary artery systolic pressure is estimated at 50 mm Hg.  We had initially seen him in late August 2020 due to increased fatigue and dyspnea on exertion.  He has become somewhat short of breath with basic activity.  Previously, he had been managed for his atrial fibrillation at [facility].      Echocardiogram, stress test and Holter monitor were ordered, but stress test and Holter were completed.  Stress test was found to be negative for myocardial ischemia.  Holter monitor demonstrated atrial fibrillation with an average heart rate of 92 beats per minute and rare VPCs.  His weight trends unfortunately continue to go upward and he had dietary discretion.  He seen by electrophysiology and recommended for a sleep study as well as an evaluation with advanced heart failure.  He was placed on CPAP and has been compliant since that  time.  He also was seen by bariatric surgery and wish to undergo conservative options with dietary modifications.  Despite increasing diuretic load, he he continue to gain approximately 30 lb and was subsequently sent to [facility] in [place].  He was diuresed down to 285 lb.   While hospitalized, his echocardiogram was performed showing normal left ventricular function.     Following discharge, he was seen by electrophysiology and sent for a cardioversion in May 2021.  The cardioversion was initially successful, but he reverted back to rate controlled atrial flutter.  He has been placed on amiodarone and decreased down to 100 mg daily.  On his dose of torsemide, he had acute kidney injury with a creatinine rise to 1.9.  After holding his diuretics for several days, his creatinine came down to 1.1. He was then decreased on his torsemide to 20 mg b.i.d. And his weight trends continue to improve.  He has been aggressive in his dietary modifications.  He also underwent radiofrequency ablation for his atrial fibrillation/flutter.  He underwent a procedure in June 2021 Overall, he has felt much improved.    In July 2021, he ended up going back into atrial flutter.  He was seen by electrophysiology who was recommending a repeat ablation study.  He underwent the ablation in late July 2021 which was successful.  He was seen in the office by EP following the procedure and had remained in sinus rhythm.  He subsequently was discontinued on his amiodarone.  In October to November 2022, patient developed some symptoms of shortness of breath with exertion.  This symptom had been fairly new and the patient was sent for testing including a stress test, chest x-ray and echocardiogram.  Stress test was found to be negative for myocardial ischemia.  His echocardiogram showed normal left-ventricular systolic function with mild pulmonary hypertension.  There was evidence of some mild increase in pressures on the right side.   Chest x-ray at the time was found to be grossly normal.  In July 2023, the patient developed 2 episodes of right-sided chest discomfort near his shoulder.  The discomfort would change with rotating his shoulder.  Due to the severity of one of the episodes, he presented to the emergency department and was diagnosed with musculoskeletal pain.  Troponins were negative during that hospitalization.  Additionally, he admitted to continued and progressive dyspnea on exertion a little higher than his baseline.He completed pulmonary function studies in October 2023 showing moderate level of restriction.  The patient was then admitted for possible pneumonia in November 2023.  He was treated with antibiotics.  CT of the chest demonstrated bilateral airspace disease with groundglass opacities.  Concern was for possible pneumonia versus pulmonary edema.  He completed antibiotics.    Following his hospitalization, he took steroids which resulted in some increased swelling in the lower extremity.  He took some increased dose of torsemide with minimal improvement in the swelling.  I have recommended him for hospital admission for his diastolic heart failure in December 2023.  He ended up being placed on diuretic drip with significant drop in weights.  Even initiated Ozempic and continued to lose weight.  He is down to 263 pounds in the office today as of July 2023.  His energy level has improved, his peripheral edema has improved and his breathing is the best that it has been.     Review of Systems:  Review of Systems   Constitutional: Negative for decreased appetite, fever, malaise/fatigue, weight gain and weight loss.   HENT:  Negative for congestion and sore throat.    Eyes:  Negative for visual disturbance.   Cardiovascular:  Positive for leg swelling. Negative for chest pain, dyspnea on exertion, near-syncope and palpitations.   Respiratory:  Negative for cough and shortness of breath.    Hematologic/Lymphatic: Negative for  bleeding problem.   Skin:  Negative for rash.   Musculoskeletal:  Negative for myalgias and neck pain.   Gastrointestinal:  Negative for abdominal pain and nausea.   Neurological:  Negative for light-headedness and weakness.   Psychiatric/Behavioral:  Negative for depression.        Past Medical History:   Diagnosis Date    A-fib ([facility])     ALANNA (acute kidney injury) (Formerly [facility]) 06/15/2021    Arthritis     CHF exacerbation ([facility]) 12/07/2023    CPAP (continuous positive airway pressure) dependence     Encounter for cardioversion procedure 05/06/2021    Leukopenia 10/26/2019    Lymphedema     Sleep apnea     Urinary frequency     Wears glasses     Wears partial dentures     lower partial       Past Surgical History:   Procedure Laterality Date    ABLATION SAPHENOUS VEIN W/ RFA      COLONOSCOPY      JOINT REPLACEMENT  Left Hip 4 /21/2009    NERVE BLOCK Bilateral 7/18/2023    Procedure: BLOCK MEDIAL BRANCH B/L L4-L5 AND L5-S1 MBB #1;  Surgeon: Samson Godinez MD;  Location: [facility];  Service: Pain Management     NERVE BLOCK Bilateral 8/17/2023    Procedure: BLOCK MEDIAL BRANCH  B/L L4-5 and L5-S1 MBB #2;  Surgeon: Samson Godinez MD;  Location: [facility];  Service: Pain Management     TX CYSTO INSERTION TRANSPROSTATIC IMPLANT SINGLE N/A 11/13/2017    Procedure: CYSTOSCOPY WITH INSERTION UROLIFT;  Surgeon: Deuce Bennett DO;  Location: [facility];  Service: Urology    RADIOFREQUENCY ABLATION Left 10/5/2023    Procedure: ABLATION RADIO FREQUENCY (RFA) LEFT L4-5 AND L5-S1 RFA;  Surgeon: Samson Godinez MD;  Location: [facility];  Service: Pain Management     RADIOFREQUENCY ABLATION Right 11/1/2023    Procedure: ABLATION RADIO FREQUENCY (RFA) RIGHT L4-5 AND L5-S1 RFA;  Surgeon: Samson Godinez MD;  Location: [facility];  Service: Pain Management     TONSILLECTOMY         Social History     Socioeconomic History    Marital status: /Civil Union     Spouse name: Not on file    Number of children: Not on  file    Years of education: Not on file    Highest education level: Not on file   Occupational History    Not on file   Tobacco Use    Smoking status: Never     Passive exposure: Never    Smokeless tobacco: Never   Vaping Use    Vaping status: Never Used   Substance and Sexual Activity    Alcohol use: Yes     Alcohol/week: 5.0 standard drinks of alcohol     Types: 3 Glasses of wine, 2 Standard drinks or equivalent per week     Comment: socially    Drug use: No    Sexual activity: Not Currently     Partners: Female     Birth control/protection: Abstinence, Condom Male, Spermicide   Other Topics Concern    Not on file   Social History Narrative    Not on file     Social Determinants of Health     Financial Resource Strain: Low Risk  (8/24/2023)    Received from [facility], [facility]    Overall Financial Resource Strain (CARDIA)     Difficulty of Paying Living Expenses: Not hard at all   Food Insecurity: No Food Insecurity (12/7/2023)    Hunger Vital Sign     Worried About Running Out of Food in the Last Year: Never true     Ran Out of Food in the Last Year: Never true   Transportation Needs: No Transportation Needs (12/7/2023)    PRAPARE - Transportation     Lack of Transportation (Medical): No     Lack of Transportation (Non-Medical): No   Physical Activity: Inactive (8/24/2023)    Received from [facility]    Exercise Vital Sign     Days of Exercise per Week: 0 days     Minutes of Exercise per Session: 0 min   Stress: No Stress Concern Present (8/24/2023)    Received from [facility], [facility]    [employer] - Occupational Stress Questionnaire     Feeling of Stress : Not at all   Social Connections: Unknown (6/18/2024)    Received from [facility]    Social Connections     How often do you feel lonely or isolated from those around you? (Adult - for ages 18 years and over): Not on file   Intimate  Partner Violence: Not At Risk (8/24/2023)    Received from [facility], [facility]    Humiliation, Afraid, Rape, and Kick questionnaire     Fear of Current or Ex-Partner: No     Emotionally Abused: No     Physically Abused: No     Sexually Abused: No   Housing Stability: Low Risk  (12/7/2023)    Housing Stability Vital Sign     Unable to Pay for Housing in the Last Year: No     Number of Times Moved in the Last Year: 1     Homeless in the Last Year: No       Family History   Problem Relation Age of Onset    Heart disease Mother     Lymphoma Father     Cancer Father     Obesity Sister     Heart disease Sister     Heart failure Sister         on diuretic    Obesity Brother     Hypertension Brother     Diabetes Neg Hx     Thyroid disease Neg Hx     Stroke Neg Hx        Allergies   Allergen Reactions    Penicillins Anaphylaxis    Sulfa Antibiotics Anaphylaxis    Levofloxacin Other (See Comments)         Current Outpatient Medications:     acetaminophen (TYLENOL) 500 mg tablet, Take 500 mg by mouth every 6 (six) hours as needed for mild pain, Disp: , Rfl:     alfuzosin (UROXATRAL) 10 mg 24 hr tablet, Take 10 mg by mouth daily, Disp: , Rfl:     allopurinol (ZYLOPRIM) 300 mg tablet, Take 300 mg by mouth daily, Disp: , Rfl:     ammonium lactate (LAC-HYDRIN) 12 % lotion, Apply topically 2 (two) times a day as needed for dry skin, Disp: , Rfl:     Colchicine 0.6 MG CAPS, Take by mouth if needed, Disp: , Rfl:     Cyanocobalamin (VITAMIN B 12 PO), Take by mouth (Patient not taking: Reported on 2/28/2024), Disp: , Rfl:     Diclofenac Sodium (VOLTAREN) 1 %, Apply 2 g topically 4 (four) times a day, Disp: 100 g, Rfl: 0    doxycycline hyclate (VIBRA-TABS) 100 mg tablet, take 1 tablet by mouth twice a day for 7 days, Disp: , Rfl:     dutasteride (AVODART) 0.5 mg capsule, Take 0.5 mg by mouth daily, Disp: , Rfl:     Empagliflozin (Jardiance) 10 MG TABS tablet, Take 1 tablet (10 mg total) by mouth  every morning, Disp: 90 tablet, Rfl: 1    ferrous sulfate 325 (65 Fe) mg tablet, Take 325 mg by mouth, Disp: , Rfl:     Glucosamine-Chondroit-Vit C-Mn (Glucosamine 1500 Complex) CAPS, Take by mouth, Disp: , Rfl:     halobetasol (ULTRAVATE) 0.05 % ointment, APPLY TO AFFECTED AREA ON LEG TWICE DAILY, Disp: , Rfl:     L-ARGININE-500 PO, Take 500 mg by mouth 2 (two) times a day , Disp: , Rfl:     levofloxacin (LEVAQUIN) 500 mg tablet, take 1 tablet by mouth once daily for 10 days, Disp: , Rfl:     loteprednol etabonate (LOTEMAX) 0.5 % ophthalmic suspension, 1 drop 4 (four) times a day, Disp: , Rfl:     metoprolol succinate (TOPROL-XL) 25 mg 24 hr tablet, Take 1 tablet (25 mg total) by mouth daily, Disp: 90 tablet, Rfl: 2    multivitamin (THERAGRAN) TABS, Take 1 tablet by mouth daily, Disp: , Rfl:     Omega-3 Fatty Acids (fish oil) 1,000 mg, Take 1,000 mg by mouth 2 (two) times a day, Disp: , Rfl:     Ozempic, 0.25 or 0.5 MG/DOSE, 2 MG/3ML injection pen, Inject 0.5 mg under the skin, Disp: , Rfl:     patient supplied medication, Take 1 each by mouth 2 (two) times a day, Disp: , Rfl:     pregabalin (LYRICA) 100 mg capsule, Take 1 capsule (100 mg total) by mouth 3 (three) times a day, Disp: 42 capsule, Rfl: 0    spironolactone (ALDACTONE) 25 mg tablet, Take 1 tablet (25 mg total) by mouth 2 (two) times a day (Patient taking differently: Take 25 mg by mouth daily), Disp: 180 tablet, Rfl: 1    tadalafil (CIALIS) 10 MG tablet, , Disp: , Rfl:     tadalafil (CIALIS) 5 MG tablet, Take 5 mg by mouth daily as needed for erectile dysfunction, Disp: , Rfl:     torsemide (DEMADEX) 20 mg tablet, Take 3 tablets (60 mg total) by mouth daily (Patient taking differently: Take 20 mg by mouth daily 2 times daily), Disp: 60 tablet, Rfl: 2    VITAMIN D PO, Take 2,000 Units by mouth daily , Disp: , Rfl:     Xarelto 20 MG tablet, TAKE 1 TABLET DAILY, Disp: 90 tablet, Rfl: 2    There were no vitals filed for this visit.        PHYSICAL  EXAMINATION:  Gen: Awake, Alert, NAD  Head/eyes: AT/NC, pupils equal and round, Anicteric  ENT: mmm  Neck: Supple, No elevated JVP, trachea midline  Resp: CTA bilaterally no w/r/r  CV: RRR +S1, S2, No m/r/g  Abd: Soft, obese, NT/ND + BS  Ext: bilateral lower extremities wrapped with +lymphedema  Neuro: Follows commands, moves all extermities  Psych: Appropriate affect, happy mood, pleasant attitude, non-combative  Skin: warm; no rash, erythema or venous stasis changes on exposed skin    --------------------------------------------------------------------------------  TREADMILL STRESS  No results found for this or any previous visit.   --------------------------------------------------------------------------------  NUCLEAR STRESS TEST:   Pharmacologic nuclear stress test negative for myocardial ischemia with gated EF 50%, 2020  --------------------------------------------------------------------------------  CATH:  No results found for this or any previous visit.  --------------------------------------------------------------------------------  ECHO:   Results for orders placed during the hospital encounter of 10/26/19   Echo complete with contrast [place] [address]  [phone]    Transthoracic Echocardiogram  2D, M-mode, Doppler, and Color Doppler    Study date:  28-Oct-2019    Patient: CORDELL JIMENEZ  MR number: [id number]  Account number: [id number]  : 1955  Age: 64 years  Gender: Male  Status: Inpatient  Location: Bedside  Height: 69 in  Weight: 302 lb  BP: 141/ 88 mmHg    Indications: left sided paralysis    Diagnoses: 436 - CVA    Sonographer:  Lucia Hernandez RDCS, CCT  Referring Physician:  Damon Vaughn DO  Group:  [facility]  Interpreting Physician:  Daniele Cerna DO    SUMMARY    LEFT VENTRICLE:  Systolic function was normal. Ejection fraction was estimated to be 55  %.  This study was inadequate for the evaluation of regional wall motion.  Wall thickness was mildly increased.    RIGHT VENTRICLE:  The ventricle was dilated.  Systolic function was normal.    MITRAL VALVE:  There was mild regurgitation.    TRICUSPID VALVE:  There was mild regurgitation.  Estimated peak PA pressure was 50 mmHg.    HISTORY: PRIOR HISTORY: Patient has no history of cardiovascular disease.    PROCEDURE: The procedure was performed at the bedside. This was a routine study. The transthoracic approach was used. The study included complete 2D imaging, M-mode, complete spectral Doppler, and color Doppler. The heart rate was 80 bpm,  at the start of the study. Intravenous contrast (Definity solution [1.3 ml Definity/8.7ml normal saline solution], 3 ml) was administered to opacify the left ventricle. Echocardiographic views were limited due to poor acoustic window  availability. This was a technically difficult study.    LEFT VENTRICLE: Size was normal. Systolic function was normal. Ejection fraction was estimated to be 55 %. This study was inadequate for the evaluation of regional wall motion. Wall thickness was mildly increased. DOPPLER: The study was  not technically sufficient to allow evaluation of LV diastolic function.    RIGHT VENTRICLE: The ventricle was dilated. Systolic function was normal.    LEFT ATRIUM: Size was normal.    RIGHT ATRIUM: Size was normal.    MITRAL VALVE: Valve structure was normal. There was normal leaflet separation. DOPPLER: The transmitral velocity was within the normal range. There was no evidence for stenosis. There was mild regurgitation.    AORTIC VALVE: The valve was trileaflet. Leaflets exhibited normal thickness and normal cuspal separation. DOPPLER: Transaortic velocity was within the normal range. There was no evidence for stenosis. There was no regurgitation.    TRICUSPID VALVE: The valve structure was normal. There was normal leaflet separation. DOPPLER: The  transtricuspid velocity was within the normal range. There was no evidence for stenosis. There was mild regurgitation. Estimated peak PA  pressure was 50 mmHg.    PULMONIC VALVE: Leaflets exhibited normal thickness, no calcification, and normal cuspal separation. DOPPLER: The transpulmonic velocity was within the normal range. There was no regurgitation.    PERICARDIUM: There was no pericardial effusion. The pericardium was normal in appearance.    AORTA: The root exhibited normal size.    SYSTEMIC VEINS: IVC: The inferior vena cava was not well visualized.    SYSTEM MEASUREMENT TABLES    2D  %FS: 34.72 %  Ao Diam: 2.87 cm  EDV(Teich): 143.23 ml  EF(Teich): 63.36 %  ESV(Teich): 52.47 ml  IVSd: 1.15 cm  LA Diam: 4.16 cm  LVIDd: 5.43 cm  LVIDs: 3.55 cm  LVPWd: 1.04 cm  RWT: 0.38  SV(Teich): 90.75 ml    CW  AV Vmax: 1.34 m/s  AV maxP.13 mmHg  RAP: 0 mmHg  TR Vmax: 3.26 m/s  TR maxP.5 mmHg    PW  E' Sept: 0.09 m/s  MV E Terrance: 1.03 m/s  RVSP: 42.5 mmHg    [facility] Accredited Ech[facility]    Prepared and electronically signed by    Daniele Cerna DO  Signed 28-Oct-2019 10:37:38       LVEF 55%, mild LVH, paradoxical septal wall motion, mild RV dilatation, mild LA dilatation, mild to moderate RA dilatation, trace MR/TR, 2021  LVEF 65%, mild LVH, normal diastolic function, septal motion consistent with elevated PVR, mild RV dilatation with normal function, mild biatrial dilatation, trace MR/TR with PASP 37 mmHg (poor Doppler signal), 2023  --------------------------------------------------------------------------------  HOLTER  No results found for this or any previous visit.  No results found for this or any previous visit.  --------------------------------------------------------------------------------  CAROTIDS  No results found for this or any previous visit.   --------------------------------------------------------------------------------  ECGs:  No results  "found for this visit on 07/03/24.       Lab Results   Component Value Date    WBC 4.93 12/13/2023    HGB 13.0 12/13/2023    HCT 37.9 12/13/2023    MCV 91 12/13/2023     12/13/2023      Lab Results   Component Value Date    SODIUM 134 (L) 02/29/2024    K 4.1 02/29/2024    CL 99 02/29/2024    CO2 26 02/29/2024    BUN 29 (H) 02/29/2024    CREATININE 1.23 02/29/2024    GLUC 107 12/13/2023    CALCIUM 10.1 02/29/2024      Lab Results   Component Value Date    HGBA1C 6.0 (H) 02/29/2024      No results found for: \"CHOL\"  Lab Results   Component Value Date    HDL 54 04/19/2023    HDL 50 04/28/2022    HDL 55 10/27/2019     Lab Results   Component Value Date    LDLCALC 135 (H) 04/19/2023    LDLCALC 142 (H) 04/28/2022    LDLCALC 90 10/27/2019     Lab Results   Component Value Date    TRIG 193 (H) 04/19/2023    TRIG 118 04/28/2022    TRIG 93 10/27/2019     No results found for: \"CHOLHDL\"   Lab Results   Component Value Date    INR 1.54 (H) 07/28/2021    INR 2.26 (H) 06/02/2021    INR 1.59 (H) 01/22/2021    PROTIME 18.4 (H) 07/28/2021    PROTIME 24.9 (H) 06/02/2021    PROTIME 18.7 (H) 01/22/2021        1. Chronic diastolic heart failure (HCC)  2. Paroxysmal atrial flutter (HCC)  3. Obesity, morbid (HCC)  4. Abnormal ECG  5. Pulmonary hypertension (HCC)          IMPRESSION:  Chronic diastolic heart failure  LVEF 65%, grade II diastolic dysfunction, October 2023  Paroxysmal atrial fibrillation/flutter on Xarelto and amiodarone; had long standing since before October 2019 - now in SR  s/p DCCV in May 2021   s/p RFA ablation (June 2021 and July 2021)  Holter with AF, avg HR 92 bpm, rare VPCs, September 2020  History of bilateral venous insufficiency status post LE vein ablation  Abnormal ECG with bifascicular block  Pharmacologic nuclear stress test appears negative for myocardial ischemia, gated EF 65%, January 2023  Moderate pulmonary hypertension  Moderate restrictive lung disease  Lymphedema  Morbid obesity  ROBERT on 20/14 " cm BiPAP    PLAN:  It was a pleasure to see Leonardo in the office today for follow-up CV evaluation.  He is here today following his initiation of Ozempic with significant weight loss.  He has no chest pain concerning for angina and no signs or symptoms of heart failure.  Likely he examines to be euvolemic.  Patient is down from 330 pounds down to 263 pounds in the office today.  He has no signs or symptoms of decompensated heart failure and clinically examines to be euvolemic.  Blood pressure and heart rate are currently stable.  He is tolerating his current medications without any reported adverse effects.  Patient can perform greater than 4 METS on daily basis without significant exertional symptoms.  Based on his clinical presentation, I have the following recommendations:    1.  Patient admits to recent fall as of Sunday.  Recommend either emergent emergency department evaluation for stat labs with immediate reaching out to primary care to evaluate patient for CT scan of the abdomen pelvis.  He feels well, but has elected against ED evaluation.  He will be seeing primary care today.  I have ordered stat labs including CBC, CMP, PT, PTT and INR.  2.  Recommend heart healthy diet low in sodium and carbohydrate.  Salt restriction to under 1500 mg recommended.  3.  Would encourage 30 minutes a day, 5 days a week of moderate intensity activity to build cardiovascular endurance.  4.  Continue Jardiance, spironolactone and torsemide.  5.  Continue Toprol-XL for antihypertensive control.  6.  Xarelto for thromboembolic prophylaxis  7.  He is otherwise up-to-date with CV testing.  8.  Follow-up with primary care today or if he has any significant lightheadedness or progression of symptoms, recommend seeking immediate medical attention/dial 911.  9.  We will follow-up with him in 6 months to reassess his progress.    As always, please do not hesitate to call with any questions.    Portions of the record may have been  "created with voice recognition software. Occasional wrong word or \"sound a like\" substitutions may have occurred due to the inherent limitations of voice recognition software. Read the chart carefully and recognize, using context, where substitutions have occurred.      Signed: Deo Prieto DO, FACC, COLTON, FACP  "

## 2024-07-03 NOTE — ED PROVIDER NOTES
Emergency Department Trauma Note  Leonardo Oviedo 68 y.o. male MRN: [id number]  Unit/Bed#: RM23/RM23 Encounter: [id number]      Trauma Alert: Trauma Acuity: Trauma Evaluation  Model of Arrival: Mode of Arrival: Other (Comment) (POV) via    Trauma Team: Current Providers  Attending Provider: Mima Middleton MD  Registered Nurse: Zoey Henry RN  Consultants:     None      History of Present Illness     Chief Complaint:   Chief Complaint   Patient presents with    Fall     Patient reports legs went numb due to sitting on the toilet too long. Reports went to walk too quick after getting up and falling Sunday. Reports he hit his L side/lower back on the tub. Denies headstrike/LOC, + thinners.      HPI:  Leonardo Oviedo is a 68 y.o. male who presents with see below.  Mechanism:Details of Incident: patient fell Sunday hitting L side/lower back on tub Injury Date: 06/30/24   Injury Occurence Location - Specify County: [facility]    68-year-old male with baseline states that if he sits too long on the toilet his legs go numb this occurred to him 3 days ago he does have a grab bar that he stands and holds until the numbness resolves but he took several steps forward a little bit too early and he sustained a fall onto his left side hitting his shower/tub and closure he was able to get up with bruising on his left side but he was able to continue with his usual activities on the day of injury the following day which is 2 days ago he had trouble getting out of bed.  He is anticoagulated on Xarelto for atrial fibrillation.  Patient is denying any headache or neck pain there is no visual changes no numbness or paresthesias he complains of left lower back and flank pain.  States that time he bends and tries to reach something to lift it up such as a grocery bag he has worsening left low back pain and is unable to pick it up.  Has no weakness to his hands he is denying any neck pain denies any shortness of breath  chest pain or cough he is denying any gross hematuria pain is exacerbated by twisting had no new or worsening labs leg swelling he has lymphedema no leg weakness no history of bowel or bladder incontinence last Td 2021  Past medical history atrial fib on Xarelto CHF with preserved ejection fraction paresthesias weakness to the right upper extremity with patient states has resolved bilateral lower extremity edema/lymphedema pulmonary hypertension obstructive sleep apnea  Past surgical history includes radiofrequency ablation to the lower spine ablation of the saphenous vein L RODNEY       Review of Systems   Constitutional:  Positive for activity change. Negative for appetite change, chills and fever.   HENT:  Negative for congestion, ear pain, rhinorrhea, sneezing and sore throat.    Eyes:  Negative for discharge and visual disturbance.   Respiratory:  Negative for cough and shortness of breath.    Cardiovascular:  Negative for chest pain and leg swelling.   Gastrointestinal:  Negative for abdominal pain, blood in stool, diarrhea, nausea and vomiting.   Endocrine: Negative for polyuria.   Genitourinary:  Positive for flank pain. Negative for dysuria, frequency and urgency.   Musculoskeletal:  Positive for back pain (left low back pain). Negative for myalgias.   Skin:  Negative for rash.   Neurological:  Positive for weakness (trouble bending and  things). Negative for dizziness, light-headedness, numbness and headaches.   Hematological:  Negative for adenopathy.   Psychiatric/Behavioral:  Negative for confusion.    All other systems reviewed and are negative.      Historical Information     Immunizations:   Immunization History   Administered Date(s) Administered    COVID-19 MODERNA VACC 0.5 ML IM 02/11/2021, 02/11/2021, 02/11/2021, 03/12/2021, 03/12/2021, 03/12/2021, 11/29/2021, 11/29/2021    COVID-19 Moderna Vac BIVALENT 12 Yr+ IM 0.5 ML 11/03/2022    COVID-19 Pfizer mRNA vacc PF gloria-sucrose 12 yr and older  (Comirnaty) 02/26/2024    H1N1, All Formulations 01/20/2010, 01/20/2010    INFLUENZA 10/17/2007, 10/17/2007, 10/17/2008, 10/17/2008, 11/05/2010, 11/05/2010, 11/07/2014, 11/07/2014, 09/27/2018, 09/27/2018, 10/22/2019, 10/22/2019, 09/14/2021, 09/14/2021, 09/14/2021, 10/15/2022, 09/06/2023    Influenza, Seasonal Vaccine, Quadrivalent, Adjuvanted, .5e 09/27/2020    Pneumococcal Conjugate 13-Valent 06/08/2021, 06/08/2021    Pneumococcal Polysaccharide PPV23 11/10/2014, 11/10/2014, 05/01/2023    TD (adult) Preservative Free 06/16/2021    Td (adult), adsorbed 06/16/2021, 06/16/2021    Tdap 11/17/2017    Varicella 11/07/2014, 11/07/2014    Zoster Vaccine Recombinant 06/16/2021, 09/14/2021, 09/14/2021, 09/14/2021       Past Medical History:   Diagnosis Date    A-fib ([facility])     ALANNA (acute kidney injury) ([facility]) 06/15/2021    Arthritis     CHF exacerbation ([facility]) 12/07/2023    CPAP (continuous positive airway pressure) dependence     Encounter for cardioversion procedure 05/06/2021    Leukopenia 10/26/2019    Lymphedema     Sleep apnea     Urinary frequency     Wears glasses     Wears partial dentures     lower partial       Family History   Problem Relation Age of Onset    Heart disease Mother     Lymphoma Father     Cancer Father     Obesity Sister     Heart disease Sister     Heart failure Sister         on diuretic    Obesity Brother     Hypertension Brother     Diabetes Neg Hx     Thyroid disease Neg Hx     Stroke Neg Hx      Past Surgical History:   Procedure Laterality Date    ABLATION SAPHENOUS VEIN W/ RFA      COLONOSCOPY      JOINT REPLACEMENT  Left Hip 4 /21/2009    NERVE BLOCK Bilateral 7/18/2023    Procedure: BLOCK MEDIAL BRANCH B/L L4-L5 AND L5-S1 MBB #1;  Surgeon: Samson Godinez MD;  Location: [facility];  Service: Pain Management     NERVE BLOCK Bilateral 8/17/2023    Procedure: BLOCK MEDIAL BRANCH  B/L L4-5 and L5-S1 MBB #2;  Surgeon: Samson Godinez MD;  Location: [facility];  Service: Pain Management      IL CYSTO INSERTION TRANSPROSTATIC IMPLANT SINGLE N/A 11/13/2017    Procedure: CYSTOSCOPY WITH INSERTION UROLIFT;  Surgeon: Deuce Bennett DO;  Location: [facility];  Service: Urology    RADIOFREQUENCY ABLATION Left 10/5/2023    Procedure: ABLATION RADIO FREQUENCY (RFA) LEFT L4-5 AND L5-S1 RFA;  Surgeon: Samson Godinez MD;  Location: [facility];  Service: Pain Management     RADIOFREQUENCY ABLATION Right 11/1/2023    Procedure: ABLATION RADIO FREQUENCY (RFA) RIGHT L4-5 AND L5-S1 RFA;  Surgeon: Samson Godinez MD;  Location: [facility];  Service: Pain Management     TONSILLECTOMY       Social History     Tobacco Use    Smoking status: Never     Passive exposure: Never    Smokeless tobacco: Never   Vaping Use    Vaping status: Never Used   Substance Use Topics    Alcohol use: Yes     Alcohol/week: 5.0 standard drinks of alcohol     Types: 3 Glasses of wine, 2 Standard drinks or equivalent per week     Comment: socially    Drug use: No     E-Cigarette/Vaping    E-Cigarette Use Never User      E-Cigarette/Vaping Substances    Nicotine No     THC No     CBD No     Flavoring No     Other No     Unknown No        Family History: non-contributory    Meds/Allergies   Prior to Admission Medications   Prescriptions Last Dose Informant Patient Reported? Taking?   Colchicine 0.6 MG CAPS  Self Yes No   Sig: Take by mouth if needed   Cyanocobalamin (VITAMIN B 12 PO)  Self Yes No   Sig: Take by mouth   Diclofenac Sodium (VOLTAREN) 1 %  Self No No   Sig: Apply 2 g topically 4 (four) times a day   Empagliflozin (Jardiance) 10 MG TABS tablet  Self No No   Sig: Take 1 tablet (10 mg total) by mouth every morning   Glucosamine-Chondroit-Vit C-Mn (Glucosamine 1500 Complex) CAPS  Self Yes No   Sig: Take by mouth   L-ARGININE-500 PO  Self Yes No   Sig: Take 500 mg by mouth 2 (two) times a day    Omega-3 Fatty Acids (fish oil) 1,000 mg  Self Yes No   Sig: Take 1,000 mg by mouth 2 (two) times a day   Ozempic, 0.25 or 0.5  MG/DOSE, 2 MG/3ML injection pen  Self Yes No   Sig: Inject 0.5 mg under the skin every 7 days   VITAMIN D PO  Self Yes No   Sig: Take 2,000 Units by mouth daily    Xarelto 20 MG tablet  Self No No   Sig: TAKE 1 TABLET DAILY   acetaminophen (TYLENOL) 500 mg tablet  Self Yes No   Sig: Take 500 mg by mouth every 6 (six) hours as needed for mild pain   alfuzosin (UROXATRAL) 10 mg 24 hr tablet  Self Yes No   Sig: Take 10 mg by mouth daily   allopurinol (ZYLOPRIM) 300 mg tablet  Self Yes No   Sig: Take 300 mg by mouth daily   ammonium lactate (LAC-HYDRIN) 12 % lotion  Self Yes No   Sig: Apply topically 2 (two) times a day as needed for dry skin   doxycycline hyclate (VIBRA-TABS) 100 mg tablet  Self Yes No   Sig: take 1 tablet by mouth twice a day for 7 days   dutasteride (AVODART) 0.5 mg capsule  Self Yes No   Sig: Take 0.5 mg by mouth daily   ferrous sulfate 325 (65 Fe) mg tablet  Self Yes No   Sig: Take 325 mg by mouth   halobetasol (ULTRAVATE) 0.05 % ointment  Self Yes No   Sig: APPLY TO AFFECTED AREA ON LEG TWICE DAILY   levofloxacin (LEVAQUIN) 500 mg tablet  Self Yes No   Sig: take 1 tablet by mouth once daily for 10 days   loteprednol etabonate (LOTEMAX) 0.5 % ophthalmic suspension  Self Yes No   Si drop 4 (four) times a day   metoprolol succinate (TOPROL-XL) 25 mg 24 hr tablet  Self No No   Sig: Take 1 tablet (25 mg total) by mouth daily   multivitamin (THERAGRAN) TABS  Self Yes No   Sig: Take 1 tablet by mouth daily   patient supplied medication  Self Yes No   Sig: Take 1 each by mouth 2 (two) times a day   pregabalin (LYRICA) 100 mg capsule  Self No No   Sig: Take 1 capsule (100 mg total) by mouth 3 (three) times a day   spironolactone (ALDACTONE) 25 mg tablet  Self No No   Sig: Take 1 tablet (25 mg total) by mouth 2 (two) times a day   tadalafil (CIALIS) 10 MG tablet  Self Yes No   Patient not taking: Reported on 2024   tadalafil (CIALIS) 5 MG tablet  Self Yes No   Sig: Take 5 mg by mouth daily as  needed for erectile dysfunction   torsemide (DEMADEX) 20 mg tablet  Self No No   Sig: Take 3 tablets (60 mg total) by mouth daily   Patient taking differently: Take 20 mg by mouth daily 2 times daily   traMADol (ULTRAM) 50 mg tablet  Self Yes No   Sig: Take 50 mg by mouth every 6 (six) hours as needed for moderate pain      Facility-Administered Medications: None       Allergies   Allergen Reactions    Penicillins Anaphylaxis    Sulfa Antibiotics Anaphylaxis    Levofloxacin Other (See Comments)       PHYSICAL EXAM    PE limited by: N/A    Objective   Vitals:   First set: Temperature: 98.4 °F (36.9 °C) (07/03/24 1629)  Pulse: 68 (07/03/24 1629)  Respirations: 18 (07/03/24 1629)  Blood Pressure: 114/55 (07/03/24 1629)  SpO2: 93 % (07/03/24 1629)    Primary Survey:   (A) Airway: sp[eaks full sentences  (B) Breathing: BS equal bilaterally   (C) Circulation: Pulses:   normal  (D) Disabliity:  GCS Total:  15  (E) Expose:  Completed    Secondary Survey: (Click on Physical Exam tab above)  Physical Exam  Vitals and nursing note reviewed.   Constitutional:       General: He is not in acute distress.     Appearance: He is not ill-appearing, toxic-appearing or diaphoretic.   HENT:      Head: Normocephalic.      Right Ear: Tympanic membrane, ear canal and external ear normal. There is impacted cerumen (partially).      Left Ear: Tympanic membrane, ear canal and external ear normal.      Nose: Nose normal. No congestion or rhinorrhea.      Mouth/Throat:      Mouth: Mucous membranes are moist.      Pharynx: No oropharyngeal exudate or posterior oropharyngeal erythema.   Eyes:      General:         Right eye: No discharge.         Left eye: No discharge.      Extraocular Movements: Extraocular movements intact.      Conjunctiva/sclera: Conjunctivae normal.      Pupils: Pupils are equal, round, and reactive to light.      Comments: 3 mm equal   Cardiovascular:      Rate and Rhythm: Normal rate and regular rhythm.   Pulmonary:       Effort: No respiratory distress.      Breath sounds: No stridor. No wheezing, rhonchi or rales.      Comments: Distant BS sats 93% on RA  Chest:      Chest wall: No tenderness.   Abdominal:      General: Bowel sounds are normal. There is no distension.      Palpations: Abdomen is soft.      Tenderness: There is no abdominal tenderness. There is no right CVA tenderness, left CVA tenderness or guarding.      Comments: No midline tenderness to T spine; midline tendneress to lower L spine,    Musculoskeletal:      Cervical back: Normal range of motion and neck supple. No rigidity or tenderness.      Right lower leg: Edema present.      Left lower leg: Edema present.      Comments: Wraps to bilateral lower extremities; pelvis is stable to rock there is no greater trochanter tenderness patient has a 10 x 6 cm left iliac crest hematoma; there is no reproducible tenderness to the bilateral lower extremities patient can flex and extend the knees range of motion the ankles is intact.   Neurological:      General: No focal deficit present.      Mental Status: He is alert and oriented to person, place, and time.      Cranial Nerves: No cranial nerve deficit.      Sensory: No sensory deficit.      Motor: No weakness.      Coordination: Coordination normal.      Deep Tendon Reflexes: Reflexes normal.      Comments: GCS 15; Equal handgrips no pronator drift; trace biceps reflex absent patellar reflex toes are downgoing bilaterally.  Patient has intact great toe dorsiflexion as well as plantar dorsiflexion bilaterally.         Cervical spine cleared by clinical criteria? No (imaging required)      Invasive Devices       None                   Lab Results:   Results Reviewed       Procedure Component Value Units Date/Time    High Sensitivity Troponin I Random [[id number]]  (Normal) Collected: 07/03/24 1653    Lab Status: Final result Specimen: Blood from Arm, Right Updated: 07/03/24 4885     HS TnI random 10 ng/L     Comprehensive  metabolic panel [[id number]]  (Abnormal) Collected: 07/03/24 1653    Lab Status: Final result Specimen: Blood from Arm, Right Updated: 07/03/24 1730     Sodium 133 mmol/L      Potassium 4.5 mmol/L      Chloride 95 mmol/L      CO2 30 mmol/L      ANION GAP 8 mmol/L      BUN 33 mg/dL      Creatinine 1.53 mg/dL      Glucose 89 mg/dL      Calcium 10.1 mg/dL      AST 18 U/L      ALT 8 U/L      Alkaline Phosphatase 60 U/L      Total Protein 7.7 g/dL      Albumin 4.4 g/dL      Total Bilirubin 0.82 mg/dL      eGFR 45 ml/min/1.73sq m     Narrative:      National Kid[employer] Disease Foundation guidelines for Chronic Kidney Disease (CKD):     Stage 1 with normal or high GFR (GFR > 90 mL/min/1.73 square meters)    Stage 2 Mild CKD (GFR = 60-89 mL/min/1.73 square meters)    Stage 3A Moderate CKD (GFR = 45-59 mL/min/1.73 square meters)    Stage 3B Moderate CKD (GFR = 30-44 mL/min/1.73 square meters)    Stage 4 Severe CKD (GFR = 15-29 mL/min/1.73 square meters)    Stage 5 End Stage CKD (GFR <15 mL/min/1.73 square meters)  Note: GFR calculation is accurate only with a steady state creatinine    CK [[id number]]  (Normal) Collected: 07/03/24 1653    Lab Status: Final result Specimen: Blood from Arm, Right Updated: 07/03/24 1730     Total CK 77 U/L     Protime-INR [[id number]]  (Abnormal) Collected: 07/03/24 1653    Lab Status: Final result Specimen: Blood from Arm, Right Updated: 07/03/24 1715     Protime 25.5 seconds      INR 2.38    APTT [[id number]]  (Abnormal) Collected: 07/03/24 1653    Lab Status: Final result Specimen: Blood from Arm, Right Updated: 07/03/24 1715     PTT 65 seconds     CBC and differential [[id number]]  (Abnormal) Collected: 07/03/24 1653    Lab Status: Final result Specimen: Blood from Arm, Right Updated: 07/03/24 1704     WBC 4.66 Thousand/uL      RBC 4.34 Million/uL      Hemoglobin 13.1 g/dL      Hematocrit 39.1 %      MCV 90 fL      MCH 30.2 pg      MCHC 33.5 g/dL      RDW 15.5 %      MPV 11.1 fL      Platelets 171  Thousands/uL      nRBC 0 /100 WBCs      Segmented % 62 %      Immature Grans % 0 %      Lymphocytes % 25 %      Monocytes % 10 %      Eosinophils Relative 3 %      Basophils Relative 0 %      Absolute Neutrophils 2.85 Thousands/µL      Absolute Immature Grans 0.01 Thousand/uL      Absolute Lymphocytes 1.18 Thousands/µL      Absolute Monocytes 0.48 Thousand/µL      Eosinophils Absolute 0.12 Thousand/µL      Basophils Absolute 0.02 Thousands/µL                    Imaging Studies:   Direct to CT: No  CT head without contrast   Final Result by Abby Reynoso MD (07/03 1813)      No acute intracranial pathology. In particular, no intracranial hemorrhage or calvarial fracture.                     Workstation performed: [id number]         CT chest abdomen pelvis w contrast   Final Result by Abby Reynoso MD (07/03 1812)      1) Acute, nondisplaced fracture of the left 11th rib posteriorly.      2) Subcutaneous tissue in the left lateral abdominal wall, likely representing a contusion with a 1.1 cm hematoma.      3) No other acute thoracic, abdominal or pelvic trauma.      4) Additional findings as above.      The study was marked in EPIC for immediate notification.         Workstation performed: [id number]         CT spine cervical without contrast   Final Result by Abby Reynoso MD (07/03 1813)      No cervical spine fracture or traumatic malalignment.                     Workstation performed: [id number]         XR Trauma chest portable   ED Interpretation by Mima Middleton MD (07/03 3120)   Per my independent interpretation. Radiologist to provide formal read. No acute process no significant change from 12/7/23 no PTX      Final Result by Abby Reynoso MD (07/03 1674)      No acute pulmonary pathology.      No obvious displaced fractures within limitation of supine AP chest technique.      Findings are concordant with preliminary interpretation provided in the Emergency Department.                   Workstation performed: [id number]               Procedures  ECG 12 Lead Documentation Only    Date/Time: 7/3/2024 5:30 PM    Performed by: Mima Middleton MD  Authorized by: Mima Middleton MD    Indications / Diagnosis:  Flank hematoma  ECG reviewed by me, the ED Provider: yes    Patient location:  ED  Previous ECG:     Previous ECG:  Compared to current    Comparison ECG info:  12/7/23 1118    Similarity:  No change  Rate:     ECG rate:  65    ECG rate assessment: normal    Rhythm:     Rhythm: sinus rhythm    QRS:     QRS axis:  Left  Comments:      Qtc 455; 1st degree AVB; LAFB no acute ischemic chanages           ED Course  ED Course as of 07/04/24 0247   Wed Jul 03, 2024   1839 HB stable; mild irma; ck normal    1926 Per RN ambulates with steady gait reviewed labs and CT findings no hypotension here. Reviewed CT findings of nondisplaced rib fracture and left flank hematoma. Hb stable has mild irma recommend f/u with PMD for recheck discussed incentive spirometer patient declines; discussed pain management declines narocotics do not recommend NSAIDS secondary to xarelto administer tylenol           Medical Decision Making  Mdm: Patient sustains a fall onto his left side/flank 3 days ago he is anticoagulated on Xarelto.  He is having significant difficulties with left flank left lower back pain and difficulty bending and picking up things.  Patient does have a hematoma just superior  left iliac crest region.  Patient will undergo CT of the head without contrast to evaluate for subdural hematoma CT neck to assess for C-spine fracture will proceed with CT of the chest abdomen pelvis with contrast to evaluate for solid organ injury vascular injury and assess if there is any blush to the hematoma that the patient has.  Check CK for rhabdomyolysis.  Evaluate    Amount and/or Complexity of Data Reviewed  Labs: ordered.  Radiology: ordered and independent interpretation performed.    Risk  OTC  drugs.  Prescription drug management.                Disposition  [facility] One Transfer: No  Final diagnoses:   Fall - 6/30/2024   Hematoma of left flank   Rib fracture - left 11th posteriorly nondisplaced     Time reflects when diagnosis was documented in both MDM as applicable and the Disposition within this note       Time User Action Codes Description Comment    7/3/2024  6:40 PM Mima Middleton Add [W19.XXXA] Fall     7/3/2024  6:41 PM Mima Middleton Modify [W19.XXXA] Fall 6/30/2024    7/3/2024  6:41 PM Mima Middleton Add [S30.1XXA] Hematoma of left flank     7/3/2024  6:42 PM Mima Middleton Add [S22.39XA] Rib fracture     7/3/2024  6:42 PM Mima Middleton Modify [S22.39XA] Rib fracture left 11th posteriorly nondisplaced          ED Disposition       ED Disposition   Discharge    Condition   Stable    Date/Time   Wed Jul 3, 2024  7:27 PM    Comment   Leonardo Oviedo discharge to home/self care.                   Follow-up Information       Follow up With Specialties Details Why Contact Info    Yolanda Merritt MD Internal Medicine Call in 2 days recheck of kidney numbers [address] [facility]  [address]  [place] [address]  [phone]            Discharge Medication List as of 7/3/2024  7:33 PM        CONTINUE these medications which have NOT CHANGED    Details   acetaminophen (TYLENOL) 500 mg tablet Take 500 mg by mouth every 6 (six) hours as needed for mild pain, Historical Med      alfuzosin (UROXATRAL) 10 mg 24 hr tablet Take 10 mg by mouth daily, Historical Med      allopurinol (ZYLOPRIM) 300 mg tablet Take 300 mg by mouth daily, Starting Mon 5/1/2023, Historical Med      ammonium lactate (LAC-HYDRIN) 12 % lotion Apply topically 2 (two) times a day as needed for dry skin, Historical Med      Colchicine 0.6 MG CAPS Take by mouth if needed, Historical Med      Cyanocobalamin (VITAMIN B 12 PO) Take by mouth, Historical Med      Diclofenac Sodium (VOLTAREN) 1 % Apply 2 g topically 4 (four) times a  day, Starting Mon 9/12/2022, Print      doxycycline hyclate (VIBRA-TABS) 100 mg tablet take 1 tablet by mouth twice a day for 7 days, Historical Med      dutasteride (AVODART) 0.5 mg capsule Take 0.5 mg by mouth daily, Starting Thu 10/26/2023, Historical Med      Empagliflozin (Jardiance) 10 MG TABS tablet Take 1 tablet (10 mg total) by mouth every morning, Starting Fri 2/16/2024, Normal      ferrous sulfate 325 (65 Fe) mg tablet Take 325 mg by mouth, Historical Med      Glucosamine-Chondroit-Vit C-Mn (Glucosamine 1500 Complex) CAPS Take by mouth, Historical Med      halobetasol (ULTRAVATE) 0.05 % ointment APPLY TO AFFECTED AREA ON LEG TWICE DAILY, Historical Med      L-ARGININE-500 PO Take 500 mg by mouth 2 (two) times a day , Historical Med      levofloxacin (LEVAQUIN) 500 mg tablet take 1 tablet by mouth once daily for 10 days, Historical Med      loteprednol etabonate (LOTEMAX) 0.5 % ophthalmic suspension 1 drop 4 (four) times a day, Historical Med      metoprolol succinate (TOPROL-XL) 25 mg 24 hr tablet Take 1 tablet (25 mg total) by mouth daily, Starting Mon 2/19/2024, Normal      multivitamin (THERAGRAN) TABS Take 1 tablet by mouth daily, Historical Med      Omega-3 Fatty Acids (fish oil) 1,000 mg Take 1,000 mg by mouth 2 (two) times a day, Historical Med      Ozempic, 0.25 or 0.5 MG/DOSE, 2 MG/3ML injection pen Inject 0.5 mg under the skin every 7 days, Starting Wed 5/15/2024, Historical Med      patient supplied medication Take 1 each by mouth 2 (two) times a day, Historical Med      pregabalin (LYRICA) 100 mg capsule Take 1 capsule (100 mg total) by mouth 3 (three) times a day, Starting Fri 1/29/2021, Normal      spironolactone (ALDACTONE) 25 mg tablet Take 1 tablet (25 mg total) by mouth 2 (two) times a day, Starting Fri 4/14/2023, Normal      !! tadalafil (CIALIS) 10 MG tablet Historical Med      !! tadalafil (CIALIS) 5 MG tablet Take 5 mg by mouth daily as needed for erectile dysfunction, Historical Med       torsemide (DEMADEX) 20 mg tablet Take 3 tablets (60 mg total) by mouth daily, Starting Fri 1/19/2024, Normal      traMADol (ULTRAM) 50 mg tablet Take 50 mg by mouth every 6 (six) hours as needed for moderate pain, Historical Med      VITAMIN D PO Take 2,000 Units by mouth daily , Historical Med      Xarelto 20 MG tablet TAKE 1 TABLET DAILY, Starting Fri 5/3/2024, Normal       !! - Potential duplicate medications found. Please discuss with provider.        No discharge procedures on file.    PDMP Review         Value Time User    PDMP Reviewed  Yes 11/20/2023  9:20 AM Barbara Kocher, CRNP            ED Provider  Electronically Signed by           Mima Middleton MD  07/04/24 [id number]

## 2024-07-03 NOTE — DISCHARGE INSTRUCTIONS
Be careful with positions change  Tylenol 650mg every 6 hours as needed for pain, fever (max 3000mg in 24 hours)   Avoid ibuprofen naprosyn because you are on xarelto  Deep breath hourly   Activity as tolerated  Return with any signs of head injury (CT scan head negative) increased shortness of breathing lightheadedness chest pain red urine or any new or worsening symptoms

## 2024-07-13 LAB
ATRIAL RATE: 65 BPM
P AXIS: 77 DEGREES
PR INTERVAL: 220 MS
QRS AXIS: -68 DEGREES
QRSD INTERVAL: 156 MS
QT INTERVAL: 438 MS
QTC INTERVAL: 455 MS
T WAVE AXIS: 16 DEGREES
VENTRICULAR RATE: 65 BPM

## 2024-07-22 DIAGNOSIS — I50.32 CHRONIC HEART FAILURE WITH PRESERVED EJECTION FRACTION (HFPEF) (HCC): ICD-10-CM

## 2024-07-23 RX ORDER — EMPAGLIFLOZIN 10 MG/1
10 TABLET, FILM COATED ORAL EVERY MORNING
Qty: 90 TABLET | Refills: 1 | Status: SHIPPED | OUTPATIENT
Start: 2024-07-23

## 2024-08-13 ENCOUNTER — APPOINTMENT (EMERGENCY)
Dept: RADIOLOGY | Facility: HOSPITAL | Age: 69
End: 2024-08-13
Payer: COMMERCIAL

## 2024-08-13 ENCOUNTER — HOSPITAL ENCOUNTER (EMERGENCY)
Facility: HOSPITAL | Age: 69
Discharge: HOME | End: 2024-08-13
Payer: COMMERCIAL

## 2024-08-13 VITALS
SYSTOLIC BLOOD PRESSURE: 114 MMHG | HEART RATE: 67 BPM | BODY MASS INDEX: 38.4 KG/M2 | WEIGHT: 259.26 LBS | TEMPERATURE: 98.6 F | OXYGEN SATURATION: 94 % | HEIGHT: 69 IN | RESPIRATION RATE: 18 BRPM | DIASTOLIC BLOOD PRESSURE: 60 MMHG

## 2024-08-13 DIAGNOSIS — R11.0 NAUSEA: ICD-10-CM

## 2024-08-13 DIAGNOSIS — R53.1 GENERALIZED WEAKNESS: Primary | ICD-10-CM

## 2024-08-13 LAB
ALBUMIN SERPL BCG-MCNC: 4.2 G/DL (ref 3.5–5)
ALP SERPL-CCNC: 57 U/L (ref 34–104)
ALT SERPL W P-5'-P-CCNC: 7 U/L (ref 7–52)
ANION GAP SERPL CALCULATED.3IONS-SCNC: 9 MMOL/L (ref 4–13)
AST SERPL W P-5'-P-CCNC: 16 U/L (ref 13–39)
ATRIAL RATE: 75 BPM
BACTERIA UR QL AUTO: NORMAL /HPF
BASOPHILS # BLD AUTO: 0.02 THOUSANDS/ÂΜL (ref 0–0.1)
BASOPHILS NFR BLD AUTO: 1 % (ref 0–1)
BILIRUB SERPL-MCNC: 0.58 MG/DL (ref 0.2–1)
BILIRUB UR QL STRIP: NEGATIVE
BNP SERPL-MCNC: 104 PG/ML (ref 0–100)
BUN SERPL-MCNC: 20 MG/DL (ref 5–25)
CALCIUM SERPL-MCNC: 9.8 MG/DL (ref 8.4–10.2)
CHLORIDE SERPL-SCNC: 101 MMOL/L (ref 96–108)
CLARITY UR: CLEAR
CO2 SERPL-SCNC: 25 MMOL/L (ref 21–32)
COLOR UR: YELLOW
CREAT SERPL-MCNC: 1.06 MG/DL (ref 0.6–1.3)
EOSINOPHIL # BLD AUTO: 0.03 THOUSAND/ÂΜL (ref 0–0.61)
EOSINOPHIL NFR BLD AUTO: 1 % (ref 0–6)
ERYTHROCYTE [DISTWIDTH] IN BLOOD BY AUTOMATED COUNT: 15.1 % (ref 11.6–15.1)
FLUAV RNA RESP QL NAA+PROBE: NEGATIVE
FLUBV RNA RESP QL NAA+PROBE: NEGATIVE
GFR SERPL CREATININE-BSD FRML MDRD: 71 ML/MIN/1.73SQ M
GLUCOSE SERPL-MCNC: 108 MG/DL (ref 65–140)
GLUCOSE UR STRIP-MCNC: ABNORMAL MG/DL
HCT VFR BLD AUTO: 42.1 % (ref 36.5–49.3)
HGB BLD-MCNC: 14 G/DL (ref 12–17)
HGB UR QL STRIP.AUTO: NEGATIVE
IMM GRANULOCYTES # BLD AUTO: 0.01 THOUSAND/UL (ref 0–0.2)
IMM GRANULOCYTES NFR BLD AUTO: 0 % (ref 0–2)
KETONES UR STRIP-MCNC: NEGATIVE MG/DL
LACTATE SERPL-SCNC: 0.8 MMOL/L (ref 0.5–2)
LEUKOCYTE ESTERASE UR QL STRIP: NEGATIVE
LIPASE SERPL-CCNC: 65 U/L (ref 11–82)
LYMPHOCYTES # BLD AUTO: 0.42 THOUSANDS/ÂΜL (ref 0.6–4.47)
LYMPHOCYTES NFR BLD AUTO: 11 % (ref 14–44)
MAGNESIUM SERPL-MCNC: 2.2 MG/DL (ref 1.9–2.7)
MCH RBC QN AUTO: 30.8 PG (ref 26.8–34.3)
MCHC RBC AUTO-ENTMCNC: 33.3 G/DL (ref 31.4–37.4)
MCV RBC AUTO: 93 FL (ref 82–98)
MONOCYTES # BLD AUTO: 0.5 THOUSAND/ÂΜL (ref 0.17–1.22)
MONOCYTES NFR BLD AUTO: 13 % (ref 4–12)
NEUTROPHILS # BLD AUTO: 2.97 THOUSANDS/ÂΜL (ref 1.85–7.62)
NEUTS SEG NFR BLD AUTO: 74 % (ref 43–75)
NITRITE UR QL STRIP: NEGATIVE
NON-SQ EPI CELLS URNS QL MICRO: NORMAL /HPF
NRBC BLD AUTO-RTO: 0 /100 WBCS
P AXIS: 118 DEGREES
PH UR STRIP.AUTO: 6 [PH]
PHOSPHATE SERPL-MCNC: 2.7 MG/DL (ref 2.3–4.1)
PLATELET # BLD AUTO: 148 THOUSANDS/UL (ref 149–390)
PMV BLD AUTO: 12.1 FL (ref 8.9–12.7)
POTASSIUM SERPL-SCNC: 3.9 MMOL/L (ref 3.5–5.3)
PR INTERVAL: 204 MS
PROCALCITONIN SERPL-MCNC: 0.18 NG/ML
PROT SERPL-MCNC: 7.3 G/DL (ref 6.4–8.4)
PROT UR STRIP-MCNC: ABNORMAL MG/DL
QRS AXIS: 227 DEGREES
QRSD INTERVAL: 156 MS
QT INTERVAL: 390 MS
QTC INTERVAL: 435 MS
RBC # BLD AUTO: 4.55 MILLION/UL (ref 3.88–5.62)
RBC #/AREA URNS AUTO: NORMAL /HPF
RSV RNA RESP QL NAA+PROBE: NEGATIVE
SARS-COV-2 RNA RESP QL NAA+PROBE: NEGATIVE
SODIUM SERPL-SCNC: 135 MMOL/L (ref 135–147)
SP GR UR STRIP.AUTO: 1.01 (ref 1–1.03)
T WAVE AXIS: 17 DEGREES
UROBILINOGEN UR STRIP-ACNC: <2 MG/DL
VENTRICULAR RATE: 75 BPM
WBC # BLD AUTO: 3.95 THOUSAND/UL (ref 4.31–10.16)
WBC #/AREA URNS AUTO: NORMAL /HPF

## 2024-08-13 PROCEDURE — 93005 ELECTROCARDIOGRAM TRACING: CPT

## 2024-08-13 PROCEDURE — 99285 EMERGENCY DEPT VISIT HI MDM: CPT

## 2024-08-13 PROCEDURE — 83690 ASSAY OF LIPASE: CPT

## 2024-08-13 PROCEDURE — 80053 COMPREHEN METABOLIC PANEL: CPT

## 2024-08-13 PROCEDURE — 0241U HB NFCT DS VIR RESP RNA 4 TRGT: CPT

## 2024-08-13 PROCEDURE — 81001 URINALYSIS AUTO W/SCOPE: CPT

## 2024-08-13 PROCEDURE — 71045 X-RAY EXAM CHEST 1 VIEW: CPT

## 2024-08-13 PROCEDURE — 84100 ASSAY OF PHOSPHORUS: CPT

## 2024-08-13 PROCEDURE — 83880 ASSAY OF NATRIURETIC PEPTIDE: CPT

## 2024-08-13 PROCEDURE — 83735 ASSAY OF MAGNESIUM: CPT

## 2024-08-13 PROCEDURE — 84145 PROCALCITONIN (PCT): CPT

## 2024-08-13 PROCEDURE — 36415 COLL VENOUS BLD VENIPUNCTURE: CPT

## 2024-08-13 PROCEDURE — 93010 ELECTROCARDIOGRAM REPORT: CPT | Performed by: INTERNAL MEDICINE

## 2024-08-13 PROCEDURE — 85025 COMPLETE CBC W/AUTO DIFF WBC: CPT

## 2024-08-13 PROCEDURE — 96374 THER/PROPH/DIAG INJ IV PUSH: CPT

## 2024-08-13 PROCEDURE — 83605 ASSAY OF LACTIC ACID: CPT

## 2024-08-13 RX ORDER — ONDANSETRON 2 MG/ML
4 INJECTION INTRAMUSCULAR; INTRAVENOUS ONCE
Status: COMPLETED | OUTPATIENT
Start: 2024-08-13 | End: 2024-08-13

## 2024-08-13 RX ORDER — ONDANSETRON 4 MG/1
4 TABLET, FILM COATED ORAL EVERY 6 HOURS
Qty: 12 TABLET | Refills: 0 | Status: SHIPPED | OUTPATIENT
Start: 2024-08-13

## 2024-08-13 RX ADMIN — ONDANSETRON 4 MG: 2 INJECTION INTRAMUSCULAR; INTRAVENOUS at 04:53

## 2024-08-13 NOTE — ED PROVIDER NOTES
History  Chief Complaint   Patient presents with    Weakness - Generalized     Pt presents with increasing weakness and nausea since yesterday     This is a 68-year-old male who has a known history of atrial fibrillation (on Xarelto), CHF with pEF, and chronic paresthesias chornic Lower extremity edema, pulmonary hypertension, and ROBERT. He is presenting today for concern of generalized weakness.  Patient reports that yesterday he woke up feeling more fatigued than usual.  He states throughout the day he began to feel nauseous and was dry heaving at times.  He went to bed last night and then woke up again this morning with dry heaves again.  He states that he continues to feel nauseous.  Intermittently throughout the day yesterday he was experiencing some chills and subjective fever though did not actually take his temperature.  Patient does report ongoing symptoms of generalized weakness with no known sick contacts.  He is denying any sore throat, cough, congestion, shortness of breath, chest pain, abdominal pain, change in urine or bowel habits, and states that he had a normal bowel movement yesterday.  He has chronic lower extremity edema and says that it is at baseline to possibly improved from normal.  He does believe that he was eating and drinking less than normal over the last day or so.  He does continue to take his torsemide as prescribed daily.  He has lost approximately 70 pounds over the last several months intentionally so it is difficult for him to decipher whether he is near his dry weight.  He is denying any focal neurologic deficits, including any numbness, or unilateral weakness.  He does not believe that he has been losing blood anywhere.  He is denying any dysuria, hematuria, or urinary urgency.        Prior to Admission Medications   Prescriptions Last Dose Informant Patient Reported? Taking?   Colchicine 0.6 MG CAPS  Self Yes No   Sig: Take by mouth if needed   Cyanocobalamin (VITAMIN B 12 PO)   Self Yes No   Sig: Take by mouth   Diclofenac Sodium (VOLTAREN) 1 %  Self No No   Sig: Apply 2 g topically 4 (four) times a day   Glucosamine-Chondroit-Vit C-Mn (Glucosamine 1500 Complex) CAPS  Self Yes No   Sig: Take by mouth   Jardiance 10 MG TABS tablet   No No   Sig: take 1 tablet by mouth every morning   L-ARGININE-500 PO  Self Yes No   Sig: Take 500 mg by mouth 2 (two) times a day    Omega-3 Fatty Acids (fish oil) 1,000 mg  Self Yes No   Sig: Take 1,000 mg by mouth 2 (two) times a day   Ozempic, 0.25 or 0.5 MG/DOSE, 2 MG/3ML injection pen  Self Yes No   Sig: Inject 0.5 mg under the skin every 7 days   VITAMIN D PO  Self Yes No   Sig: Take 2,000 Units by mouth daily    Xarelto 20 MG tablet  Self No No   Sig: TAKE 1 TABLET DAILY   acetaminophen (TYLENOL) 500 mg tablet  Self Yes No   Sig: Take 500 mg by mouth every 6 (six) hours as needed for mild pain   alfuzosin (UROXATRAL) 10 mg 24 hr tablet  Self Yes No   Sig: Take 10 mg by mouth daily   allopurinol (ZYLOPRIM) 300 mg tablet  Self Yes No   Sig: Take 300 mg by mouth daily   ammonium lactate (LAC-HYDRIN) 12 % lotion  Self Yes No   Sig: Apply topically 2 (two) times a day as needed for dry skin   doxycycline hyclate (VIBRA-TABS) 100 mg tablet  Self Yes No   Sig: take 1 tablet by mouth twice a day for 7 days   dutasteride (AVODART) 0.5 mg capsule  Self Yes No   Sig: Take 0.5 mg by mouth daily   ferrous sulfate 325 (65 Fe) mg tablet  Self Yes No   Sig: Take 325 mg by mouth   halobetasol (ULTRAVATE) 0.05 % ointment  Self Yes No   Sig: APPLY TO AFFECTED AREA ON LEG TWICE DAILY   levofloxacin (LEVAQUIN) 500 mg tablet  Self Yes No   Sig: take 1 tablet by mouth once daily for 10 days   loteprednol etabonate (LOTEMAX) 0.5 % ophthalmic suspension  Self Yes No   Si drop 4 (four) times a day   metoprolol succinate (TOPROL-XL) 25 mg 24 hr tablet  Self No No   Sig: Take 1 tablet (25 mg total) by mouth daily   multivitamin (THERAGRAN) TABS  Self Yes No   Sig: Take 1 tablet  by mouth daily   patient supplied medication  Self Yes No   Sig: Take 1 each by mouth 2 (two) times a day   pregabalin (LYRICA) 100 mg capsule  Self No No   Sig: Take 1 capsule (100 mg total) by mouth 3 (three) times a day   spironolactone (ALDACTONE) 25 mg tablet  Self No No   Sig: Take 1 tablet (25 mg total) by mouth 2 (two) times a day   tadalafil (CIALIS) 10 MG tablet  Self Yes No   Patient not taking: Reported on 2/28/2024   tadalafil (CIALIS) 5 MG tablet  Self Yes No   Sig: Take 5 mg by mouth daily as needed for erectile dysfunction   torsemide (DEMADEX) 20 mg tablet  Self No No   Sig: Take 3 tablets (60 mg total) by mouth daily   Patient taking differently: Take 20 mg by mouth daily 2 times daily   traMADol (ULTRAM) 50 mg tablet  Self Yes No   Sig: Take 50 mg by mouth every 6 (six) hours as needed for moderate pain      Facility-Administered Medications: None       Past Medical History:   Diagnosis Date    A-fib ([facility])     ALANNA (acute kidney injury) ([facility]) 06/15/2021    Arthritis     CHF exacerbation ([facility]) 12/07/2023    CPAP (continuous positive airway pressure) dependence     Encounter for cardioversion procedure 05/06/2021    Leukopenia 10/26/2019    Lymphedema     Sleep apnea     Urinary frequency     Wears glasses     Wears partial dentures     lower partial       Past Surgical History:   Procedure Laterality Date    ABLATION SAPHENOUS VEIN W/ RFA      COLONOSCOPY      JOINT REPLACEMENT  Left Hip 4 /21/2009    NERVE BLOCK Bilateral 7/18/2023    Procedure: BLOCK MEDIAL BRANCH B/L L4-L5 AND L5-S1 MBB #1;  Surgeon: Samson Godinez MD;  Location: [facility];  Service: Pain Management     NERVE BLOCK Bilateral 8/17/2023    Procedure: BLOCK MEDIAL BRANCH  B/L L4-5 and L5-S1 MBB #2;  Surgeon: Samson Godinez MD;  Location: [facility];  Service: Pain Management     AL CYSTO INSERTION TRANSPROSTATIC IMPLANT SINGLE N/A 11/13/2017    Procedure: CYSTOSCOPY WITH INSERTION UROLIFT;  Surgeon: Deuce Bennett DO;   Location: [facility];  Service: Urology    RADIOFREQUENCY ABLATION Left 10/5/2023    Procedure: ABLATION RADIO FREQUENCY (RFA) LEFT L4-5 AND L5-S1 RFA;  Surgeon: Samson Godinez MD;  Location: [facility];  Service: Pain Management     RADIOFREQUENCY ABLATION Right 11/1/2023    Procedure: ABLATION RADIO FREQUENCY (RFA) RIGHT L4-5 AND L5-S1 RFA;  Surgeon: Samson Godinez MD;  Location: [facility];  Service: Pain Management     TONSILLECTOMY         Family History   Problem Relation Age of Onset    Heart disease Mother     Lymphoma Father     Cancer Father     Obesity Sister     Heart disease Sister     Heart failure Sister         on diuretic    Obesity Brother     Hypertension Brother     Diabetes Neg Hx     Thyroid disease Neg Hx     Stroke Neg Hx      I have reviewed and agree with the history as documented.    E-Cigarette/Vaping    E-Cigarette Use Never User      E-Cigarette/Vaping Substances    Nicotine No     THC No     CBD No     Flavoring No     Other No     Unknown No      Social History     Tobacco Use    Smoking status: Never     Passive exposure: Never    Smokeless tobacco: Never   Vaping Use    Vaping status: Never Used   Substance Use Topics    Alcohol use: Yes     Alcohol/week: 5.0 standard drinks of alcohol     Types: 3 Glasses of wine, 2 Standard drinks or equivalent per week     Comment: socially    Drug use: No       Review of Systems   Constitutional:  Positive for activity change, appetite change, chills, fatigue and fever.   HENT:  Negative for congestion and sore throat.    Eyes:  Negative for pain and visual disturbance.   Respiratory:  Negative for cough, chest tightness and shortness of breath.    Cardiovascular:  Negative for chest pain and palpitations.   Gastrointestinal:  Positive for nausea. Negative for abdominal pain, blood in stool, constipation, diarrhea and vomiting.   Genitourinary:  Negative for dysuria, flank pain and hematuria.   Musculoskeletal:  Negative for  arthralgias, back pain and neck pain.   Skin:  Negative for color change and rash.   Neurological:  Positive for weakness. Negative for dizziness, syncope and light-headedness.   Hematological:  Negative for adenopathy. Does not bruise/bleed easily.   All other systems reviewed and are negative.      Physical Exam  Physical Exam  Vitals and nursing note reviewed.   Constitutional:       General: He is not in acute distress.     Appearance: He is well-developed. He is obese. He is not toxic-appearing or diaphoretic.   HENT:      Head: Normocephalic and atraumatic.      Right Ear: External ear normal.      Left Ear: External ear normal.      Nose: Nose normal. No congestion or rhinorrhea.      Mouth/Throat:      Mouth: Mucous membranes are moist.      Pharynx: No oropharyngeal exudate or posterior oropharyngeal erythema.   Eyes:      General: No scleral icterus.     Extraocular Movements: Extraocular movements intact.      Conjunctiva/sclera: Conjunctivae normal.      Pupils: Pupils are equal, round, and reactive to light.   Cardiovascular:      Rate and Rhythm: Normal rate. Rhythm irregular.      Pulses: Normal pulses.      Heart sounds: No murmur heard.  Pulmonary:      Effort: Pulmonary effort is normal. No respiratory distress.      Breath sounds: Normal breath sounds. No wheezing or rales.   Abdominal:      Palpations: Abdomen is soft. There is no mass.      Tenderness: There is no abdominal tenderness. There is no right CVA tenderness, left CVA tenderness or guarding.      Hernia: No hernia is present.   Musculoskeletal:         General: No swelling. Normal range of motion.      Cervical back: Normal range of motion and neck supple.      Right lower leg: Edema (3+) present.      Left lower leg: Edema (3+) present.   Skin:     General: Skin is warm and dry.      Capillary Refill: Capillary refill takes less than 2 seconds.   Neurological:      General: No focal deficit present.      Mental Status: He is alert and  oriented to person, place, and time.   Psychiatric:         Mood and Affect: Mood normal.         Behavior: Behavior normal.         Vital Signs  ED Triage Vitals [08/13/24 0431]   Temperature Pulse Respirations Blood Pressure SpO2   99.1 °F (37.3 °C) 80 18 125/60 94 %      Temp Source Heart Rate Source Patient Position - Orthostatic VS BP Location FiO2 (%)   Temporal Monitor Lying Left arm --      Pain Score       No Pain           Vitals:    08/13/24 0431 08/13/24 0500 08/13/24 0608   BP: 125/60 120/60 114/60   Pulse: 80 74 67   Patient Position - Orthostatic VS: Lying Sitting          Visual Acuity  Visual Acuity      Flowsheet Row Most Recent Value   L Pupil Size (mm) 3   R Pupil Size (mm) 3            ED Medications  Medications   ondansetron (ZOFRAN) injection 4 mg (4 mg Intravenous Given 8/13/24 0453)       Diagnostic Studies  Results Reviewed       Procedure Component Value Units Date/Time    Urine Microscopic [[id number]]  (Normal) Collected: 08/13/24 0537    Lab Status: Final result Specimen: Urine, Clean Catch Updated: 08/13/24 0603     RBC, UA None Seen /hpf      WBC, UA 0-1 /hpf      Epithelial Cells None Seen /hpf      Bacteria, UA None Seen /hpf     UA w Reflex to Microscopic w Reflex to Culture [[id number]]  (Abnormal) Collected: 08/13/24 0537    Lab Status: Final result Specimen: Urine, Clean Catch Updated: 08/13/24 0549     Color, UA Yellow     Clarity, UA Clear     Specific Gravity, UA 1.015     pH, UA 6.0     Leukocytes, UA Negative     Nitrite, UA Negative     Protein, UA Trace mg/dl      Glucose, UA 1000 (1%) mg/dl      Ketones, UA Negative mg/dl      Urobilinogen, UA <2.0 mg/dl      Bilirubin, UA Negative     Occult Blood, UA Negative    FLU/RSV/COVID - if FLU/RSV clinically relevant [[id number]]  (Normal) Collected: 08/13/24 0448    Lab Status: Final result Specimen: Nares from Nose Updated: 08/13/24 0542     SARS-CoV-2 Negative     INFLUENZA A PCR Negative     INFLUENZA B PCR Negative     RSV  PCR Negative    Narrative:      FOR PEDIATRIC PATIENTS - copy/paste COVID Guidelines URL to browser: [email or web address]    SARS-CoV-2 assay is a Nucleic Acid Amplification assay intended for the  qualitative detection of nucleic acid from SARS-CoV-2 in nasopharyngeal  swabs. Results are for the presumptive identification of SARS-CoV-2 RNA.    Positive results are indicative of infection with SARS-CoV-2, the virus  causing COVID-19, but do not rule out bacterial infection or co-infection  with other viruses. Laboratories within the [place] and its  territories are required to report all positive results to the appropriate  public health authorities. Negative results do not preclude SARS-CoV-2  infection and should not be used as the sole basis for treatment or other  patient management decisions. Negative results must be combined with  clinical observations, patient history, and epidemiological information.  This test has not been FDA cleared or approved.    This test has been authorized by FDA under an Emergency Use Authorization  (EUA). This test is only authorized for the duration of time the  declaration that circumstances exist justifying the authorization of the  emergency use of an in vitro diagnostic tests for detection of SARS-CoV-2  virus and/or diagnosis of COVID-19 infection under section 564(b)(1) of  the Act, 21 U.S.C. 360bbb-3(b)(1), unless the authorization is terminated  or revoked sooner. The test has been validated but independent review by FDA  and CLIA is pending.    Test performed using [employer]: This RT-PCR assay targets N2,  a region unique to SARS-CoV-2. A conserved region in the E-gene was chosen  for pan-Sarbecovirus detection which includes SARS-CoV-2.    According to CMS-2020-01-R, this platform meets the definition of high-throughput technology.    B-Type Natriuretic Peptide(BNP) [[id number]]  (Abnormal) Collected:  08/13/24 0448    Lab Status: Final result Specimen: Blood from Arm, Right Updated: 08/13/24 0526      pg/mL     Procalcitonin [[id number]]  (Normal) Collected: 08/13/24 0448    Lab Status: Final result Specimen: Blood from Arm, Right Updated: 08/13/24 0521     Procalcitonin 0.18 ng/ml     Magnesium [[id number]]  (Normal) Collected: 08/13/24 0448    Lab Status: Final result Specimen: Blood from Arm, Right Updated: 08/13/24 0512     Magnesium 2.2 mg/dL     Comprehensive metabolic panel [[id number]] Collected: 08/13/24 0448    Lab Status: Final result Specimen: Blood from Arm, Right Updated: 08/13/24 0512     Sodium 135 mmol/L      Potassium 3.9 mmol/L      Chloride 101 mmol/L      CO2 25 mmol/L      ANION GAP 9 mmol/L      BUN 20 mg/dL      Creatinine 1.06 mg/dL      Glucose 108 mg/dL      Calcium 9.8 mg/dL      AST 16 U/L      ALT 7 U/L      Alkaline Phosphatase 57 U/L      Total Protein 7.3 g/dL      Albumin 4.2 g/dL      Total Bilirubin 0.58 mg/dL      eGFR 71 ml/min/1.73sq m     Narrative:      National Kidney Disease Foundation guidelines for Chronic Kidney Disease (CKD):     Stage 1 with normal or high GFR (GFR > 90 mL/min/1.73 square meters)    Stage 2 Mild CKD (GFR = 60-89 mL/min/1.73 square meters)    Stage 3A Moderate CKD (GFR = 45-59 mL/min/1.73 square meters)    Stage 3B Moderate CKD (GFR = 30-44 mL/min/1.73 square meters)    Stage 4 Severe CKD (GFR = 15-29 mL/min/1.73 square meters)    Stage 5 End Stage CKD (GFR <15 mL/min/1.73 square meters)  Note: GFR calculation is accurate only with a steady state creatinine    Lipase [[id number]]  (Normal) Collected: 08/13/24 0448    Lab Status: Final result Specimen: Blood from Arm, Right Updated: 08/13/24 0512     Lipase 65 u/L     Phosphorus [[id number]]  (Normal) Collected: 08/13/24 0448    Lab Status: Final result Specimen: Blood from Arm, Right Updated: 08/13/24 0512     Phosphorus 2.7 mg/dL     Lactic acid, plasma (w/reflex if result > 2.0) [[id number]]   (Normal) Collected: 08/13/24 0448    Lab Status: Final result Specimen: Blood from Arm, Right Updated: 08/13/24 0510     LACTIC ACID 0.8 mmol/L     Narrative:      Result may be elevated if tourniquet was used during collection.    CBC and differential [[id number]]  (Abnormal) Collected: 08/13/24 0448    Lab Status: Final result Specimen: Blood from Arm, Right Updated: 08/13/24 0504     WBC 3.95 Thousand/uL      RBC 4.55 Million/uL      Hemoglobin 14.0 g/dL      Hematocrit 42.1 %      MCV 93 fL      MCH 30.8 pg      MCHC 33.3 g/dL      RDW 15.1 %      MPV 12.1 fL      Platelets 148 Thousands/uL      nRBC 0 /100 WBCs      Segmented % 74 %      Immature Grans % 0 %      Lymphocytes % 11 %      Monocytes % 13 %      Eosinophils Relative 1 %      Basophils Relative 1 %      Absolute Neutrophils 2.97 Thousands/µL      Absolute Immature Grans 0.01 Thousand/uL      Absolute Lymphocytes 0.42 Thousands/µL      Absolute Monocytes 0.50 Thousand/µL      Eosinophils Absolute 0.03 Thousand/µL      Basophils Absolute 0.02 Thousands/µL                    XR chest portable   ED Interpretation by Lamine Muller MD (08/13 0510)   No effusion, no volume overload, no pneumonia                 Procedures  Procedures         ED Course  ED Course as of 08/13/24 0618   Tue Aug 13, 2024   0450 ECG interpreted by me.  Date: 8/13/2024.  Time: 0448.  Rate: 75 beats per.  Axis: Rightward.  There is evidence of a right bundle branch block.  No ST elevations or depressions evident otherwise.  Interpretation: Abnormal ECG, normal sinus rhythm but with right bundle branch block.  Similar to prior from 7/3/2024.   0510 LACTIC ACID: 0.8   0524 Procalcitonin: 0.18   0525 Patient ambulatory to the bathroom at this time with no difficulty with ambulation.  He has been tolerating p.o. fluids without any vomiting and says that the Zofran significantly improved his nausea.                                 SBIRT 20yo+      Flowsheet Row Most Recent Value    Initial Alcohol Screen: US AUDIT-C     1. How often do you have a drink containing alcohol? 0 Filed at: 08/13/2024 0430   2. How many drinks containing alcohol do you have on a typical day you are drinking?  0 Filed at: 08/13/2024 0430   3b. FEMALE Any Age, or MALE 65+: How often do you have 4 or more drinks on one occassion? 0 Filed at: 08/13/2024 0430   Audit-C Score 0 Filed at: 08/13/2024 0430   RAINA: How many times in the past year have you...    Used an illegal drug or used a prescription medication for non-medical reasons? Never Filed at: 08/13/2024 0430                      Medical Decision Making  Medical complexity: This is a 68-year-old male with A-fib on Eliquis, CHF, pulmonary hypertension, ROBERT, and prior history of anemia who is presenting with generalized weakness that is worsening over the past 2 days.  Also was endorsing some chills and possible subjective fever as well as nausea and dry heaves.  No abdominal pain, no abdominal tenderness on examination.  Continues to take torsemide.  At risk for significant metabolic disturbances which could cause some generalized weakness.  Also at risk for infection.  Will screen procalcitonin, lactate, and urine study as well as chest x-ray to evaluate further for possible infectious etiologies.  Will also screen for viral illness with a viral panel.  Will screen EKG for any sign of acute ischemia.    Reassessment/disposition: Patient's lab work did not reveal any acute anemia, or severe metabolic disturbances.  He has a very slight leukopenia today and usually runs low on his white blood cell count even when otherwise healthy.  He also demonstrates a lymphocytopenia relatively.  His symptoms could possibly be due to a developing stomach virus or similar illness.  Otherwise his blood work does not indicate any secondary signs of an acute serious bacterial infection.  At this time no indication for antibiotic treatment, or hospitalization as patient has normal  vital signs, no tenderness or pain on examination, he is tolerating p.o., ambulatory and mentating at baseline with no respiratory distress.  He will be discharged with close follow-up with his primary doctor and to monitor his symptoms closely at home.                 Disposition  Final diagnoses:   Generalized weakness   Nausea     Time reflects when diagnosis was documented in both MDM as applicable and the Disposition within this note       Time User Action Codes Description Comment    8/13/2024  5:54 AM Lamine Muller Add [R53.1] Generalized weakness     8/13/2024  5:54 AM Lamine Muller Add [R11.0] Nausea           ED Disposition       ED Disposition   Discharge    Condition   Stable    Date/Time   Tue Aug 13, 2024 0554    Comment   Leonardo DRUMMOND Ivelisse discharge to home/self care.                   Follow-up Information       Follow up With Specialties Details Why Contact Info Additional Information    Yolanda Merritt MD Internal Medicine In 1 day  [address]  [address]  [facility] [address]  [phone]       [facility] Emergency Department Emergency Medicine Go to  If symptoms worsen, As needed [address] [facility] [address]  [phone] [facility] Emergency Department, [address], [place], [address]            Discharge Medication List as of 8/13/2024  6:04 AM        START taking these medications    Details   ondansetron (ZOFRAN) 4 mg tablet Take 1 tablet (4 mg total) by mouth every 6 (six) hours, Starting Tue 8/13/2024, Normal           CONTINUE these medications which have NOT CHANGED    Details   acetaminophen (TYLENOL) 500 mg tablet Take 500 mg by mouth every 6 (six) hours as needed for mild pain, Historical Med      alfuzosin (UROXATRAL) 10 mg 24 hr tablet Take 10 mg by mouth daily, Historical Med      allopurinol (ZYLOPRIM) 300 mg tablet Take 300 mg by mouth daily, Starting Mon 5/1/2023, Historical Med      ammonium lactate  (LAC-HYDRIN) 12 % lotion Apply topically 2 (two) times a day as needed for dry skin, Historical Med      Colchicine 0.6 MG CAPS Take by mouth if needed, Historical Med      Cyanocobalamin (VITAMIN B 12 PO) Take by mouth, Historical Med      Diclofenac Sodium (VOLTAREN) 1 % Apply 2 g topically 4 (four) times a day, Starting Mon 9/12/2022, Print      doxycycline hyclate (VIBRA-TABS) 100 mg tablet take 1 tablet by mouth twice a day for 7 days, Historical Med      dutasteride (AVODART) 0.5 mg capsule Take 0.5 mg by mouth daily, Starting Thu 10/26/2023, Historical Med      ferrous sulfate 325 (65 Fe) mg tablet Take 325 mg by mouth, Historical Med      Glucosamine-Chondroit-Vit C-Mn (Glucosamine 1500 Complex) CAPS Take by mouth, Historical Med      halobetasol (ULTRAVATE) 0.05 % ointment APPLY TO AFFECTED AREA ON LEG TWICE DAILY, Historical Med      Jardiance 10 MG TABS tablet take 1 tablet by mouth every morning, Starting Tue 7/23/2024, Normal      L-ARGININE-500 PO Take 500 mg by mouth 2 (two) times a day , Historical Med      levofloxacin (LEVAQUIN) 500 mg tablet take 1 tablet by mouth once daily for 10 days, Historical Med      loteprednol etabonate (LOTEMAX) 0.5 % ophthalmic suspension 1 drop 4 (four) times a day, Historical Med      metoprolol succinate (TOPROL-XL) 25 mg 24 hr tablet Take 1 tablet (25 mg total) by mouth daily, Starting Mon 2/19/2024, Normal      multivitamin (THERAGRAN) TABS Take 1 tablet by mouth daily, Historical Med      Omega-3 Fatty Acids (fish oil) 1,000 mg Take 1,000 mg by mouth 2 (two) times a day, Historical Med      Ozempic, 0.25 or 0.5 MG/DOSE, 2 MG/3ML injection pen Inject 0.5 mg under the skin every 7 days, Starting Wed 5/15/2024, Historical Med      patient supplied medication Take 1 each by mouth 2 (two) times a day, Historical Med      pregabalin (LYRICA) 100 mg capsule Take 1 capsule (100 mg total) by mouth 3 (three) times a day, Starting Fri 1/29/2021, Normal      spironolactone  (ALDACTONE) 25 mg tablet Take 1 tablet (25 mg total) by mouth 2 (two) times a day, Starting Fri 4/14/2023, Normal      !! tadalafil (CIALIS) 10 MG tablet Historical Med      !! tadalafil (CIALIS) 5 MG tablet Take 5 mg by mouth daily as needed for erectile dysfunction, Historical Med      torsemide (DEMADEX) 20 mg tablet Take 3 tablets (60 mg total) by mouth daily, Starting Fri 1/19/2024, Normal      traMADol (ULTRAM) 50 mg tablet Take 50 mg by mouth every 6 (six) hours as needed for moderate pain, Historical Med      VITAMIN D PO Take 2,000 Units by mouth daily , Historical Med      Xarelto 20 MG tablet TAKE 1 TABLET DAILY, Starting Fri 5/3/2024, Normal       !! - Potential duplicate medications found. Please discuss with provider.          No discharge procedures on file.    PDMP Review         Value Time User    PDMP Reviewed  Yes 11/20/2023  9:20 AM Barbara Kocher, CRNP            ED Provider  Electronically Signed by             Lamine Muller MD  08/13/24 0618

## 2024-08-13 NOTE — DISCHARGE INSTRUCTIONS
Please  and take your antinausea medication only when needed, not to exceed 3 doses in a day.  Follow-up with your family doctor today especially regarding your decreased p.o. intake and your torsemide regimen.  Should be monitoring your symptoms closely at home especially your intake so that you do not get dehydrated or cause kidney injury.      In the meantime, please make sure you are drinking and eating enough daily.  If your symptoms are rapidly getting worse, especially fever, chest pain, shortness of breath, or abdominal pain, come back to the emergency department.

## 2024-09-07 ENCOUNTER — APPOINTMENT (OUTPATIENT)
Dept: LAB | Facility: HOSPITAL | Age: 69
End: 2024-09-07
Payer: COMMERCIAL

## 2024-09-07 DIAGNOSIS — R73.03 PRE-DIABETES: ICD-10-CM

## 2024-09-07 DIAGNOSIS — E78.2 MIXED HYPERLIPIDEMIA: ICD-10-CM

## 2024-09-07 LAB
CHOLEST SERPL-MCNC: 174 MG/DL
EST. AVERAGE GLUCOSE BLD GHB EST-MCNC: 111 MG/DL
HBA1C MFR BLD: 5.5 %
HDLC SERPL-MCNC: 39 MG/DL
LDLC SERPL CALC-MCNC: 104 MG/DL (ref 0–100)
NONHDLC SERPL-MCNC: 135 MG/DL
TRIGL SERPL-MCNC: 153 MG/DL

## 2024-09-07 PROCEDURE — 80061 LIPID PANEL: CPT

## 2024-09-07 PROCEDURE — 83036 HEMOGLOBIN GLYCOSYLATED A1C: CPT

## 2024-09-07 PROCEDURE — 36415 COLL VENOUS BLD VENIPUNCTURE: CPT

## 2024-10-04 ENCOUNTER — HOSPITAL ENCOUNTER (OUTPATIENT)
Dept: RADIOLOGY | Facility: HOSPITAL | Age: 69
Discharge: HOME | End: 2024-10-04
Payer: COMMERCIAL

## 2024-10-04 DIAGNOSIS — M25.552 LEFT HIP PAIN: ICD-10-CM

## 2024-10-04 PROCEDURE — 73502 X-RAY EXAM HIP UNI 2-3 VIEWS: CPT

## 2024-10-17 ENCOUNTER — OFFICE VISIT (OUTPATIENT)
Dept: PULMONOLOGY | Facility: CLINIC | Age: 69
End: 2024-10-17
Payer: COMMERCIAL

## 2024-10-17 VITALS
OXYGEN SATURATION: 96 % | WEIGHT: 248 LBS | BODY MASS INDEX: 36.73 KG/M2 | HEART RATE: 75 BPM | TEMPERATURE: 98.2 F | HEIGHT: 69 IN | DIASTOLIC BLOOD PRESSURE: 66 MMHG | SYSTOLIC BLOOD PRESSURE: 103 MMHG

## 2024-10-17 DIAGNOSIS — E66.01 CLASS 2 SEVERE OBESITY DUE TO EXCESS CALORIES WITH SERIOUS COMORBIDITY AND BODY MASS INDEX (BMI) OF 36.0 TO 36.9 IN ADULT (HCC): ICD-10-CM

## 2024-10-17 DIAGNOSIS — G47.33 OSA (OBSTRUCTIVE SLEEP APNEA): ICD-10-CM

## 2024-10-17 DIAGNOSIS — E66.812 CLASS 2 SEVERE OBESITY DUE TO EXCESS CALORIES WITH SERIOUS COMORBIDITY AND BODY MASS INDEX (BMI) OF 36.0 TO 36.9 IN ADULT (HCC): ICD-10-CM

## 2024-10-17 DIAGNOSIS — J98.4 RESTRICTIVE LUNG DISEASE: Primary | ICD-10-CM

## 2024-10-17 DIAGNOSIS — I27.20 PULMONARY HYPERTENSION (HCC): ICD-10-CM

## 2024-10-17 PROBLEM — R29.898 WEAKNESS OF RIGHT UPPER EXTREMITY: Status: RESOLVED | Noted: 2019-10-26 | Resolved: 2024-10-17

## 2024-10-17 PROCEDURE — 99214 OFFICE O/P EST MOD 30 MIN: CPT

## 2024-10-17 NOTE — PROGRESS NOTES
Progress note - Pulmonary Medicine   Leonardo Oviedo 69 y.o. male MRN: [id number]       Impression & Plan:   Leonardo Oviedo is a 69 y.o. male with PMHx of RLD, PH, ROBERT on BiPAP, A-fib, HFpEF, BPH, lymphedema and obesity who presents for follow-up.    ROBERT (Obstructive Sleep Apnea)  - The patient has a history of moderate ROBERT most recently studied on split-night PSG in 2/2024 (AHI 19.6, supine AHI 44.0, REM AHI N/A, O2 simone 71%, TST <90% 262.4 min), his last known settings were auto BiPAP EPAP min 18 cmH2O, IPAP max 25 cmH2O and pressure support 5 cmH2O.  He has lost a large amount of weight and is now having difficulty with aerophagia and pressure intolerance.  - Serum bicarbonate has improved to < 27 mmol/L.  - Therapy and compliance data was not available at today's visit, will asked the patient to call [employer] back with his SN.  - Will plan to lower pressures once therapy and compliance data can be evaluated.  - Explained the importance of keeping the machine clean, and that they should be eligible for refills of supplies every 3-6 months depending on insurance.  We also discussed the insurance compliance requirements.  - Encouraged healthy lifestyle with adequate sleep (7-9 hours per night), healthy balanced diet and routine exercise.  Explained the importance of avoiding driving while drowsy.  - Follow-up in 6 months    Restrictive Lung Disease  - Likely due to habitus, the patient has lost significant weight and no longer has any CHU.  Prior HRCT without any evidence of ILD.  - Recommend continued weight loss.    Pulmonary Hypertension  - Previous echo with mild RVSP elevation, most recent echocardiogram from 10/2023 was unable to assess RVSP.  Likely combined group 2/3 from HFpEF and ROBERT.  - Will repeat echocardiogram, it is possible with his weight loss that the mild elevation in his RVSP could have resolved.  - Continue with treatment of ROBERT as above.  -     Echo complete w/ contrast if indicated;  Future    Class 2 Obesity  - Congratulated the patient on his continued weight loss, he is continuing to use Ozempic for further weight reduction.    Return in about 6 months (around 4/17/2025).  ______________________________________________________________________    HPI:    Leonardo Oviedo is a 69 y.o. male with PMHx of the patient was last seen in the office on 4/29/2024 at which time plan was for repeat echo in 6 months, continued weight loss and continued use of auto BiPAP.  He is lost an additional 42 pounds since his last office visit and notes that now he is having trouble with his BiPAP.  He states he is waking up with gas pain and aerophagia with belching and bloating in the morning.  Due to the side effects he is not consistently using his BiPAP anymore and estimates he is only using it 3 times a week with the fullface mask.  There is been significant difficulty with getting his therapy and compliance data from his DME ([employer]) and data is not currently available during today's visit.  His last known settings were EPAP min 18 cmH2O, IPAP max 25 cmH2O and pressure support 5 cmH2O, but he did not bring his device today's visit.  Otherwise he reports with continued weight loss his dyspnea on exertion has essentially resolved.  He is now exercising at the gym where he does 3 miles walking and weightlifting in addition to doing work around the house such as gardening over the spring and summer.  He does note that he has had a course of prednisone since last office visit although this was for hip pain and denies any steroids or antibiotics for respiratory purposes.    Overall he is lost about 95 pounds on Ozempic.    Current Medications:    Current Outpatient Medications:     acetaminophen (TYLENOL) 500 mg tablet, Take 500 mg by mouth every 6 (six) hours as needed for mild pain, Disp: , Rfl:     alfuzosin (UROXATRAL) 10 mg 24 hr tablet, Take 10 mg by mouth daily, Disp: , Rfl:     allopurinol (ZYLOPRIM)  300 mg tablet, Take 300 mg by mouth daily, Disp: , Rfl:     ammonium lactate (LAC-HYDRIN) 12 % lotion, Apply topically 2 (two) times a day as needed for dry skin, Disp: , Rfl:     Colchicine 0.6 MG CAPS, Take by mouth if needed, Disp: , Rfl:     Cyanocobalamin (VITAMIN B 12 PO), Take by mouth, Disp: , Rfl:     Diclofenac Sodium (VOLTAREN) 1 %, Apply 2 g topically 4 (four) times a day, Disp: 100 g, Rfl: 0    doxycycline hyclate (VIBRA-TABS) 100 mg tablet, take 1 tablet by mouth twice a day for 7 days, Disp: , Rfl:     dutasteride (AVODART) 0.5 mg capsule, Take 0.5 mg by mouth daily, Disp: , Rfl:     ferrous sulfate 325 (65 Fe) mg tablet, Take 325 mg by mouth, Disp: , Rfl:     Glucosamine-Chondroit-Vit C-Mn (Glucosamine 1500 Complex) CAPS, Take by mouth, Disp: , Rfl:     halobetasol (ULTRAVATE) 0.05 % ointment, APPLY TO AFFECTED AREA ON LEG TWICE DAILY, Disp: , Rfl:     Jardiance 10 MG TABS tablet, take 1 tablet by mouth every morning, Disp: 90 tablet, Rfl: 1    L-ARGININE-500 PO, Take 500 mg by mouth 2 (two) times a day , Disp: , Rfl:     levofloxacin (LEVAQUIN) 500 mg tablet, take 1 tablet by mouth once daily for 10 days, Disp: , Rfl:     loteprednol etabonate (LOTEMAX) 0.5 % ophthalmic suspension, 1 drop 4 (four) times a day, Disp: , Rfl:     methylPREDNISolone 4 MG tablet therapy pack, Use as directed on package, Disp: 21 tablet, Rfl: 0    metoprolol succinate (TOPROL-XL) 25 mg 24 hr tablet, Take 1 tablet (25 mg total) by mouth daily, Disp: 90 tablet, Rfl: 2    multivitamin (THERAGRAN) TABS, Take 1 tablet by mouth daily, Disp: , Rfl:     Omega-3 Fatty Acids (fish oil) 1,000 mg, Take 1,000 mg by mouth 2 (two) times a day, Disp: , Rfl:     ondansetron (ZOFRAN) 4 mg tablet, Take 1 tablet (4 mg total) by mouth every 6 (six) hours, Disp: 12 tablet, Rfl: 0    Ozempic, 0.25 or 0.5 MG/DOSE, 2 MG/3ML injection pen, Inject 0.5 mg under the skin every 7 days, Disp: , Rfl:     patient supplied medication, Take 1 each by mouth  "2 (two) times a day, Disp: , Rfl:     pregabalin (LYRICA) 100 mg capsule, Take 1 capsule (100 mg total) by mouth 3 (three) times a day, Disp: 42 capsule, Rfl: 0    spironolactone (ALDACTONE) 25 mg tablet, Take 1 tablet (25 mg total) by mouth 2 (two) times a day, Disp: 180 tablet, Rfl: 1    tadalafil (CIALIS) 10 MG tablet, , Disp: , Rfl:     tadalafil (CIALIS) 5 MG tablet, Take 5 mg by mouth daily as needed for erectile dysfunction, Disp: , Rfl:     torsemide (DEMADEX) 20 mg tablet, Take 3 tablets (60 mg total) by mouth daily (Patient taking differently: Take 20 mg by mouth daily 2 times daily), Disp: 60 tablet, Rfl: 2    traMADol (ULTRAM) 50 mg tablet, Take 50 mg by mouth every 6 (six) hours as needed for moderate pain, Disp: , Rfl:     VITAMIN D PO, Take 2,000 Units by mouth daily , Disp: , Rfl:     Xarelto 20 MG tablet, TAKE 1 TABLET DAILY, Disp: 90 tablet, Rfl: 2    Review of Systems:  Review of Systems  10-point system review completed, all of which are negative except as mentioned above.    Past medical history, surgical history, and family history were reviewed and updated as appropriate    Social history updates:  Social History     Tobacco Use   Smoking Status Never    Passive exposure: Never   Smokeless Tobacco Never     PhysicalExamination:  Vitals:   /66 (BP Location: Left arm, Patient Position: Sitting, Cuff Size: Extra-Large)   Pulse 75   Temp 98.2 °F (36.8 °C) (Temporal)   Ht 5' 9\" (1.753 m)   Wt 112 kg (248 lb)   SpO2 96%   BMI 36.62 kg/m²   Body mass index is 36.62 kg/m².    Constitutional: NAD, on room air, no conversational dyspnea   Skin: Warm, dry, no rashes noted   Eyes: PERRL, normal conjunctiva  ENT: Nasal congestion absent, moist mucus membranes.  Neck: No JVD, trachea is midline, no adenopathy.  Resp: CTA B/L, no W/R/R   Cardiac: RRR, +S1/S2, no M/R/G  Abdomen: Soft, NT/ND  Extremities: No digital clubbing, trace B/L pedal edema  Neuro: AAOx3    Diagnostic Data:  Labs:  I " personally reviewed the most recent laboratory data pertinent to today's visit    Lab Results   Component Value Date    WBC 3.95 (L) 08/13/2024    HGB 14.0 08/13/2024    HCT 42.1 08/13/2024    MCV 93 08/13/2024     (L) 08/13/2024     Lab Results   Component Value Date    SODIUM 135 08/13/2024    K 3.9 08/13/2024    CO2 25 08/13/2024     08/13/2024    BUN 20 08/13/2024    CREATININE 1.06 08/13/2024    CALCIUM 9.8 08/13/2024     PFT results:  The most recent pulmonary function tests were reviewed.  PFTs -- 10/9/2023  FEV1/FVC Ratio: 88 %  Forced Vital Capacity: 2.15 L           51 % predicted  FEV1: 1.90 L   60 % predicted  After administration of bronchodilator FEV1: reduced 16%  Lung volumes by body plethysmography: Total Lung Capacity 58 % predicted Residual volume 73 % predicted  DLCO corrected for patients hemoglobin level: 65 %  Interpretation:  Moderate restrictive airflow defect on spirometry   No response to the administration to bronchodilator per ATS Standards  Moderately reduced TLC  Mildly Reduced  Diffusion  Restricted flow volume loops     Imaging:  I personally reviewed the images in PACS pertinent to today's visit:  HRCT -- 1/16/2024  1. Interval resolution of previous groundglass opacities compatible with a prior infectious/inflammatory process.  2. Air trapping and small airways disease.  3. No interstitial pulmonary fibrosis.  4. Enlarged main pulmonary artery, which may represent pulmonary hypertension.     Other studies:  2D Echo -- 10/11/2023    Left Ventricle: Left ventricular cavity size is normal. Wall thickness is normal. The left ventricular ejection fraction is 65%. Systolic function is vigorous. Wall motion cannot be accurately assessed. Diastolic function is moderately abnormal, consistent with grade II (pseudonormal) relaxation.    Right Ventricle: Right ventricular cavity size is normal. Systolic function is normal.    Study quality was poor. This was a technically  "difficult study     NM Stress Test -- 1/23/2023    Stress Function: Left ventricular function post-stress is normal. Post-stress ejection fraction is 65 %.    Perfusion: There are no perfusion defects.    Stress ECG: No ST deviation is noted. The ECG was negative for ischemia. The stress ECG is negative for ischemia after pharmacologic vasodilation, without reproduction of symptoms.     2D Echo -- 1/23/2023    Left Ventricle: Left ventricular cavity size is normal. Wall thickness is mildly increased. There is mild concentric hypertrophy. The left ventricular ejection fraction is 65%. Systolic function is normal. Wall motion is normal. Diastolic function is normal.    IVS: There is systolic flattening of the interventricular septum consistent with right ventricle pressure overload.    Right Ventricle: Right ventricular cavity size is mildly dilated. Systolic function is normal.    Left Atrium: The atrium is mildly dilated.    Right Atrium: The atrium is mildly dilated.    Tricuspid Valve: The right ventricular systolic pressure is mildly elevated. The estimated right ventricular systolic pressure (based on a poor doppler signal) is at least 37.00 mmHg.    Pulmonary Artery: There is no evidence of systolic notching of the RVOT Doppler waveform.      Jaja Kulkarni MD  Pulmonary-Critical Care and Sleep Medicine  11/04/24    Portions of the record may have been created with voice recognition software. Occasional wrong word or \"sound a like\" substitutions may have occurred due to the inherent limitations of voice recognition software. Please read the chart carefully and recognize, using context, where substitutions have occurred.  "

## 2024-10-17 NOTE — PATIENT INSTRUCTIONS
Continue PAP Therapy  - Continue with BiPAP, please call the office with the serial number of your device after which I can lower the pressures  - Ultrasound of the heart (echocardiogram)  Remember to clean your mask and equipment regularly, as directed.  You should be eligible for new supplies approximately every 3-6 months, depending on your insurance coverage. Contact your Durable Medical Equipment (DME) company for new supplies as needed.  Follow up in 6 months    Care and Maintenance  Headgear should be washed as needed. Daily inspection and weekly washings are recommended. Do not disassemble the straps. Machine wash in warm water, making sure to attach Velcro hooks and tabs before washing. Line dry or machine dry on a low setting.  Masks should be washed every day. Daily inspection is recommended. Leave the mask and tubing attached. Gently wash the mask with a soft cloth using warm water and mild detergent, concentrating on the mask cushion flaps. DO NOT use alcohol or bleach. Rinse thoroughly and air dry.  Tubing and headgear should be washed weekly. Daily inspection is recommended. Wash in warm water and mild detergent and rinse thoroughly. Hook the tubing to the machine and blow until dry.  Humidifier should be washed daily and filled with DISTILLED water before use. Wash with warm water and mild detergent. Disinfect weekly by soaking with a solution of 1 part white vinegar and 3 parts water for 30 minutes. Rinse thoroughly and air dry.  Disposable filters should be replaced once a month. Wash reusable foam filters with warm water and mild detergent at least once a month. Rinse thoroughly and dry with paper towels.  Avoid  that contain fragrance or conditioners, as these will leave a residue.  NEVER iron any soft goods.    Insurance Requirements  Your insurance requires a face-to-face follow up visit within a 31-90 day period after starting PAP therapy.  Your insurance requires compliance with your  PAP device, which is at least 4 hours per night for 70% of the time. This must be done over a 30 day period and must occur within the initial 31-90 day period after starting CPAP.  Your insurance also requires at least yearly follow ups to continue to pay for CPAP supplies.     PAP Supply Guidelines  Below are the guidelines for reordering your supplies. You will be responsible for your deductible, co payments, and out of pocket expenses.    Item Frequency   Nasal Mask (no headgear) 1 every 3 months   Nasal Mask Cushion 1 every 2 weeks   Full Face Mask (no headgear) 1 every 3 months   Full Face Mask Cushion 1 every month   Nasal Pillows 1 every 2 weeks   Headgear 1 every 6 months   hin Strap 1 every 6 months   ramiro 1 every 3 months   Filters: Reusable 1 every 6 months   Filters: Disposable 1 every 2 weeks   Humidifier Chamber(disposable) 1 every 6 months

## 2024-10-29 ENCOUNTER — OFFICE VISIT (OUTPATIENT)
Age: 69
End: 2024-10-29
Payer: COMMERCIAL

## 2024-10-29 VITALS
HEART RATE: 61 BPM | WEIGHT: 248 LBS | BODY MASS INDEX: 36.73 KG/M2 | DIASTOLIC BLOOD PRESSURE: 73 MMHG | HEIGHT: 69 IN | SYSTOLIC BLOOD PRESSURE: 118 MMHG

## 2024-10-29 DIAGNOSIS — M51.360 DEGENERATION OF INTERVERTEBRAL DISC OF LUMBAR REGION WITH DISCOGENIC BACK PAIN: ICD-10-CM

## 2024-10-29 DIAGNOSIS — G89.4 CHRONIC PAIN SYNDROME: ICD-10-CM

## 2024-10-29 DIAGNOSIS — M47.816 LUMBAR SPONDYLOSIS: Primary | ICD-10-CM

## 2024-10-29 PROCEDURE — 99214 OFFICE O/P EST MOD 30 MIN: CPT | Performed by: ANESTHESIOLOGY

## 2024-10-29 RX ORDER — METHYLPREDNISOLONE 4 MG/1
TABLET ORAL
Qty: 21 TABLET | Refills: 0 | Status: SHIPPED | OUTPATIENT
Start: 2024-10-29

## 2024-10-29 NOTE — PROGRESS NOTES
Assessment:  1. Lumbar spondylosis    2. Degeneration of intervertebral disc of lumbar region with discogenic back pain    3. Chronic pain syndrome        Plan:  Patient is a 69-year-old male with complaints of left-sided low back pain with chronic patient secondary lumbar degenerative disease, lumbar spondylosis presents to office for follow-up visit.  Patient had RFA L4-L5 L5-S1 on November 2023.  It appears that patient's facet agenic low back pain has returned.  Patient did report good relief with Voltaren gel on his low back.  Patient reported low back pain was exacerbated by extensive workout regimen.  1.  Provide patient with a Medrol Dosepak  2.  Follow-up in 1 month patient's low back pain returns we will consider schedule patient for radiofrequency ablation      History of Present Illness:  The patient is a 69 y.o. male who presents for a follow up office visit in regards to Back Pain.   The patient’s current symptoms include 8 out of 10 intermittent left-sided low back pain worse in the morning time.    Current pain medications includes: None.        I have personally reviewed and/or updated the patient's past medical history, past surgical history, family history, social history, current medications, allergies, and vital signs today.         Review of Systems  Review of Systems   Constitutional:  Negative for unexpected weight change.   HENT:  Negative for hearing loss.    Eyes:  Negative for visual disturbance.   Respiratory:  Negative for shortness of breath.    Cardiovascular:  Negative for leg swelling.   Gastrointestinal:  Negative for constipation.   Endocrine: Negative for polyuria.   Genitourinary:  Negative for difficulty urinating.   Musculoskeletal:  Positive for gait problem. Negative for joint swelling and myalgias.        Decreased range of motion   Skin:  Negative for rash.   Neurological:  Negative for weakness and headaches.   Psychiatric/Behavioral:  Negative for decreased  concentration.    All other systems reviewed and are negative.      Past Medical History:   Diagnosis Date    A-fib (HCC)     ALANNA (acute kidney injury) (HCC) 06/15/2021    Arthritis     CHF exacerbation (HCC) 12/07/2023    CPAP (continuous positive airway pressure) dependence     Encounter for cardioversion procedure 05/06/2021    Leukopenia 10/26/2019    Lymphedema     Sleep apnea     Urinary frequency     Wears glasses     Wears partial dentures     lower partial       Past Surgical History:   Procedure Laterality Date    ABLATION SAPHENOUS VEIN W/ RFA      COLONOSCOPY      JOINT REPLACEMENT  Left Hip 4 /21/2009    NERVE BLOCK Bilateral 7/18/2023    Procedure: BLOCK MEDIAL BRANCH B/L L4-L5 AND L5-S1 MBB #1;  Surgeon: Samson Godinez MD;  Location: [facility];  Service: Pain Management     NERVE BLOCK Bilateral 8/17/2023    Procedure: BLOCK MEDIAL BRANCH  B/L L4-5 and L5-S1 MBB #2;  Surgeon: Samson Godinez MD;  Location: [facility];  Service: Pain Management     [place] CYSTO INSERTION TRANSPROSTATIC IMPLANT SINGLE N/A 11/13/2017    Procedure: CYSTOSCOPY WITH INSERTION UROLIFT;  Surgeon: Deuce Bennett DO;  Location: [facility];  Service: Urology    RADIOFREQUENCY ABLATION Left 10/5/2023    Procedure: ABLATION RADIO FREQUENCY (RFA) LEFT L4-5 AND L5-S1 RFA;  Surgeon: Samson Godinez MD;  Location: [facility];  Service: Pain Management     RADIOFREQUENCY ABLATION Right 11/1/2023    Procedure: ABLATION RADIO FREQUENCY (RFA) RIGHT L4-5 AND L5-S1 RFA;  Surgeon: Samson Godinez MD;  Location: [facility];  Service: Pain Management     TONSILLECTOMY         Family History   Problem Relation Age of Onset    Heart disease Mother     Lymphoma Father     Cancer Father     Obesity Sister     Heart disease Sister     Heart failure Sister         on diuretic    Obesity Brother     Hypertension Brother     Diabetes Neg Hx     Thyroid disease Neg Hx     Stroke Neg Hx        Social History     Occupational History     Not on file   Tobacco Use    Smoking status: Never     Passive exposure: Never    Smokeless tobacco: Never   Vaping Use    Vaping status: Never Used   Substance and Sexual Activity    Alcohol use: Yes     Alcohol/week: 5.0 standard drinks of alcohol     Types: 3 Glasses of wine, 2 Standard drinks or equivalent per week     Comment: socially    Drug use: No    Sexual activity: Not Currently     Partners: Female     Birth control/protection: Abstinence, Condom Male, Spermicide         Current Outpatient Medications:     acetaminophen (TYLENOL) 500 mg tablet, Take 500 mg by mouth every 6 (six) hours as needed for mild pain, Disp: , Rfl:     alfuzosin (UROXATRAL) 10 mg 24 hr tablet, Take 10 mg by mouth daily, Disp: , Rfl:     allopurinol (ZYLOPRIM) 300 mg tablet, Take 300 mg by mouth daily, Disp: , Rfl:     ammonium lactate (LAC-HYDRIN) 12 % lotion, Apply topically 2 (two) times a day as needed for dry skin, Disp: , Rfl:     Colchicine 0.6 MG CAPS, Take by mouth if needed, Disp: , Rfl:     Cyanocobalamin (VITAMIN B 12 PO), Take by mouth, Disp: , Rfl:     Diclofenac Sodium (VOLTAREN) 1 %, Apply 2 g topically 4 (four) times a day, Disp: 100 g, Rfl: 0    doxycycline hyclate (VIBRA-TABS) 100 mg tablet, take 1 tablet by mouth twice a day for 7 days, Disp: , Rfl:     dutasteride (AVODART) 0.5 mg capsule, Take 0.5 mg by mouth daily, Disp: , Rfl:     ferrous sulfate 325 (65 Fe) mg tablet, Take 325 mg by mouth, Disp: , Rfl:     Glucosamine-Chondroit-Vit C-Mn (Glucosamine 1500 Complex) CAPS, Take by mouth, Disp: , Rfl:     halobetasol (ULTRAVATE) 0.05 % ointment, APPLY TO AFFECTED AREA ON LEG TWICE DAILY, Disp: , Rfl:     Jardiance 10 MG TABS tablet, take 1 tablet by mouth every morning, Disp: 90 tablet, Rfl: 1    L-ARGININE-500 PO, Take 500 mg by mouth 2 (two) times a day , Disp: , Rfl:     levofloxacin (LEVAQUIN) 500 mg tablet, take 1 tablet by mouth once daily for 10 days, Disp: , Rfl:     loteprednol etabonate (LOTEMAX)  "0.5 % ophthalmic suspension, 1 drop 4 (four) times a day, Disp: , Rfl:     metoprolol succinate (TOPROL-XL) 25 mg 24 hr tablet, Take 1 tablet (25 mg total) by mouth daily, Disp: 90 tablet, Rfl: 2    multivitamin (THERAGRAN) TABS, Take 1 tablet by mouth daily, Disp: , Rfl:     Omega-3 Fatty Acids (fish oil) 1,000 mg, Take 1,000 mg by mouth 2 (two) times a day, Disp: , Rfl:     ondansetron (ZOFRAN) 4 mg tablet, Take 1 tablet (4 mg total) by mouth every 6 (six) hours, Disp: 12 tablet, Rfl: 0    Ozempic, 0.25 or 0.5 MG/DOSE, 2 MG/3ML injection pen, Inject 0.5 mg under the skin every 7 days, Disp: , Rfl:     patient supplied medication, Take 1 each by mouth 2 (two) times a day, Disp: , Rfl:     pregabalin (LYRICA) 100 mg capsule, Take 1 capsule (100 mg total) by mouth 3 (three) times a day, Disp: 42 capsule, Rfl: 0    spironolactone (ALDACTONE) 25 mg tablet, Take 1 tablet (25 mg total) by mouth 2 (two) times a day, Disp: 180 tablet, Rfl: 1    tadalafil (CIALIS) 5 MG tablet, Take 5 mg by mouth daily as needed for erectile dysfunction, Disp: , Rfl:     torsemide (DEMADEX) 20 mg tablet, Take 3 tablets (60 mg total) by mouth daily (Patient taking differently: Take 20 mg by mouth daily 2 times daily), Disp: 60 tablet, Rfl: 2    traMADol (ULTRAM) 50 mg tablet, Take 50 mg by mouth every 6 (six) hours as needed for moderate pain, Disp: , Rfl:     VITAMIN D PO, Take 2,000 Units by mouth daily , Disp: , Rfl:     Xarelto 20 MG tablet, TAKE 1 TABLET DAILY, Disp: 90 tablet, Rfl: 2    tadalafil (CIALIS) 10 MG tablet, , Disp: , Rfl:     Allergies   Allergen Reactions    Penicillins Anaphylaxis    Sulfa Antibiotics Anaphylaxis    Levofloxacin Other (See Comments)       Physical Exam:    /73 Comment: right arm  Pulse 61   Ht 5' 9\" (1.753 m)   Wt 112 kg (248 lb)   BMI 36.62 kg/m²     Constitutional:normal, well developed, well nourished, alert, in no distress and non-toxic and no overt pain behavior. and " obese  Eyes:anicteric  HEENT:grossly intact  Neck:supple, symmetric, trachea midline and no masses   Pulmonary:even and unlabored  Cardiovascular:No edema or pitting edema present  Skin:Normal without rashes or lesions and well hydrated  Psychiatric:Mood and affect appropriate  Neurologic:Cranial Nerves II-XII grossly intact  Musculoskeletal:antalgic    Imaging  No orders to display       No orders of the defined types were placed in this encounter.

## 2024-10-29 NOTE — PATIENT INSTRUCTIONS
Patient Education     Core Strengthening Exercises on Back or on Hands and Knees   About this topic   Your core muscles are in your chest, back, buttock, and stomach area. They are your abdominal, back, and pelvis muscles. These muscles help keep your body stable when using your arms or legs. They also help with balance and posture. There are many exercises you can do to keep these muscles strong.  If you have back problems like a compression fracture or a ruptured disc, doing some of these exercises could make your problem worse. Some of these exercises may cause lower back pain.  General   Before starting with a program, ask your doctor if you are healthy enough to do these exercises. Your doctor may have you work with a , chiropractor, or physical therapist to make a safe exercise program to meet your needs.  Strengthening Exercises   Strengthening exercises keep your muscles firm and strong. Start by repeating each exercise 2 to 3 times. Work up to doing each exercise 10 times. Try to do the exercises 2 to 3 times each day. Hold each exercise for 3 to 5 seconds. Do all exercises slowly.  Hip lifts ? Lie on your back with your knees bent and feet flat on the floor. Tighten your stomach muscles and push your heels into the floor to lift your buttocks off the floor. Relax.  Pelvic tilts ? Lie on your back with your knees bent and feet flat on the floor. Tighten your stomach muscles and press your lower back down to the floor. Relax.  Straight leg raises lying down ? Lie on your back with one leg straight. Bend your other knee so the foot is flat on the bed. Keeping your leg straight, lift the leg up to the level of your other knee. Lower it back down. Repeat with the other leg.  Knee flex lying down ? Lie on your back with both knees bent and your feet flat on the floor. Tighten your belly muscles. Raise one leg up and back down as if you are marching in slow motion. Keep belly muscles tight while you move  your leg. Switch legs. To make this exercise harder, raise both arms straight up in the air. Tighten your belly muscles. When you raise one leg up, reach the opposite arm over your head. Switch, moving the opposite arm and leg until you have done 10 repetitions on each side.  Abdominal crunches ? Lie on your back with both knees bent. Keep your feet flat on the floor. Place your hands in one of these positions. Try starting with the first position since it is the easiest. As you get better, use the other positions to make it harder.  Crunches with arms at sides.  Crunches with arms across chest.  Crunches with arms behind head. Be careful not to interlock your fingers behind your neck or head while doing crunches. This may add tension to your neck and cause strain.  Look at the ceiling. Tighten your belly muscles and lift your shoulders and upper back off the floor. Breathe out while you are doing this. Lower your shoulders to the floor. Breathe in while you are doing this. Relax your belly muscles all the way before starting another crunch.  Arm and leg lifts on hands and knees ? Start on your hands and knees. With all of these exercises, keep your back as level as possible. If you are having trouble with this, you may want to put a small object on your back such as a book. If it falls off, you are not keeping your back level enough during the exercise.  Lift one arm up to shoulder level and hold. Lower it back down. Now, lift up the other arm and hold.  Lift one leg up and kick it straight out until it is in line with your back and hold. Lower it back down. Now, lift up the other leg and hold.  Lift one arm and the OPPOSITE leg up at the same time and hold. Lower them down. Now, repeat using the other arm and leg. This is a very hard exercise. It may take time to be able to do this.               What will the results be?   Stronger core  Better balance  More toned belly and back muscles  Easier to do daily  activities  Better sports performance  Less low back pain  Helpful tips   Stay active and work out to keep your muscles strong and flexible.  Keep a healthy weight to avoid putting too much stress on your spine. Eat a healthy diet to keep your muscles healthy.  Be sure you do not hold your breath when exercising. This can raise your blood pressure. If you tend to hold your breath, try counting out loud when exercising. If any exercise bothers you, stop right away.  Try walking or cycling at an easy pace for a few minutes to warm up your muscles. Do this again after exercising.  Exercise may be slightly uncomfortable, but you should not have sharp pains. If you do get sharp pains, stop what you are doing. If the sharp pains continue, call your doctor.  Last Reviewed Date   2021-03-18  Consumer Information Use and Disclaimer   This generalized information is a limited summary of diagnosis, treatment, and/or medication information. It is not meant to be comprehensive and should be used as a tool to help the user understand and/or assess potential diagnostic and treatment options. It does NOT include all information about conditions, treatments, medications, side effects, or risks that may apply to a specific patient. It is not intended to be medical advice or a substitute for the medical advice, diagnosis, or treatment of a health care provider based on the health care provider's examination and assessment of a patient’s specific and unique circumstances. Patients must speak with a health care provider for complete information about their health, medical questions, and treatment options, including any risks or benefits regarding use of medications. This information does not endorse any treatments or medications as safe, effective, or approved for treating a specific patient. [employer] and its affiliates disclaim any warranty or liability relating to this information or the use thereof. The use of this information  is governed by the Terms of Use, available at https://www.[employer]tersYotomouwer.com/en/know/clinical-effectiveness-terms   Copyright   Copyright © 2024 [employer] and its affiliates and/or licensors. All rights reserved.

## 2024-10-31 ENCOUNTER — APPOINTMENT (OUTPATIENT)
Dept: LAB | Facility: HOSPITAL | Age: 69
End: 2024-10-31
Payer: COMMERCIAL

## 2024-10-31 DIAGNOSIS — N52.9 IMPOTENCE OF ORGANIC ORIGIN: ICD-10-CM

## 2024-10-31 DIAGNOSIS — N13.8 ENLARGED PROSTATE WITH URINARY OBSTRUCTION: ICD-10-CM

## 2024-10-31 DIAGNOSIS — N40.1 ENLARGED PROSTATE WITH URINARY OBSTRUCTION: ICD-10-CM

## 2024-10-31 LAB
PSA SERPL-MCNC: 0.54 NG/ML (ref 0–4)
TESTOST SERPL-MSCNC: 399 NG/DL

## 2024-10-31 PROCEDURE — 84403 ASSAY OF TOTAL TESTOSTERONE: CPT

## 2024-10-31 PROCEDURE — 36415 COLL VENOUS BLD VENIPUNCTURE: CPT

## 2024-10-31 PROCEDURE — G0103 PSA SCREENING: HCPCS

## 2024-11-05 ENCOUNTER — HOSPITAL ENCOUNTER (OUTPATIENT)
Dept: NON INVASIVE DIAGNOSTICS | Facility: HOSPITAL | Age: 69
Discharge: HOME | End: 2024-11-05
Payer: COMMERCIAL

## 2024-11-05 ENCOUNTER — TELEPHONE (OUTPATIENT)
Dept: PULMONOLOGY | Facility: CLINIC | Age: 69
End: 2024-11-05

## 2024-11-05 VITALS
BODY MASS INDEX: 36.73 KG/M2 | SYSTOLIC BLOOD PRESSURE: 118 MMHG | HEIGHT: 69 IN | DIASTOLIC BLOOD PRESSURE: 73 MMHG | HEART RATE: 61 BPM | WEIGHT: 248 LBS

## 2024-11-05 DIAGNOSIS — I27.20 PULMONARY HYPERTENSION (HCC): ICD-10-CM

## 2024-11-05 DIAGNOSIS — G47.33 OBSTRUCTIVE SLEEP APNEA: Primary | ICD-10-CM

## 2024-11-05 LAB
AORTIC ROOT: 3.4 CM
AORTIC VALVE MEAN VELOCITY: 9 M/S
APICAL FOUR CHAMBER EJECTION FRACTION: 60 %
ASCENDING AORTA: 3.5 CM
AV LVOT MEAN GRADIENT: 4 MMHG
AV LVOT PEAK GRADIENT: 9 MMHG
AV MEAN GRADIENT: 4 MMHG
AV PEAK GRADIENT: 8 MMHG
AV VELOCITY RATIO: 1.05
BSA FOR ECHO PROCEDURE: 2.26 M2
DME PARACHUTE DELIVERY DATE REQUESTED: NORMAL
DME PARACHUTE ITEM DESCRIPTION: NORMAL
DME PARACHUTE ORDER STATUS: NORMAL
DME PARACHUTE SUPPLIER NAME: NORMAL
DME PARACHUTE SUPPLIER PHONE: NORMAL
DOP CALC AO PEAK VEL: 1.39 M/S
DOP CALC AO VTI: 32.03 CM
DOP CALC LVOT PEAK VEL VTI: 33 CM
DOP CALC LVOT PEAK VEL: 1.46 M/S
E WAVE DECELERATION TIME: 160 MS
E/A RATIO: 1.98
FRACTIONAL SHORTENING: 49 (ref 28–44)
INTERVENTRICULAR SEPTUM IN DIASTOLE (PARASTERNAL SHORT AXIS VIEW): 1.2 CM
INTERVENTRICULAR SEPTUM: 1.2 CM (ref 0.6–1.1)
LAAS-AP2: 21.9 CM2
LAAS-AP4: 22.2 CM2
LEFT ATRIUM SIZE: 4.5 CM
LEFT ATRIUM VOLUME (MOD BIPLANE): 69 ML
LEFT ATRIUM VOLUME INDEX (MOD BIPLANE): 30.5 ML/M2
LEFT INTERNAL DIMENSION IN SYSTOLE: 2.7 CM (ref 2.1–4)
LEFT VENTRICULAR INTERNAL DIMENSION IN DIASTOLE: 5.3 CM (ref 3.5–6)
LEFT VENTRICULAR POSTERIOR WALL IN END DIASTOLE: 1 CM
LEFT VENTRICULAR STROKE VOLUME: 107 ML
LVSV (TEICH): 107 ML
MV E'TISSUE VEL-LAT: 12 CM/S
MV E'TISSUE VEL-SEP: 8 CM/S
MV PEAK A VEL: 0.58 M/S
MV PEAK E VEL: 115 CM/S
MV STENOSIS PRESSURE HALF TIME: 47 MS
MV VALVE AREA P 1/2 METHOD: 4.68
RA PRESSURE ESTIMATED: 8 MMHG
RIGHT ATRIUM AREA SYSTOLE A4C: 23.5 CM2
RIGHT VENTRICLE ID DIMENSION: 5.1 CM
RV PSP: 22 MMHG
SL CV LEFT ATRIUM LENGTH A2C: 6.2 CM
SL CV LV EF: 60
SL CV PED ECHO LEFT VENTRICLE DIASTOLIC VOLUME (MOD BIPLANE) 2D: 134 ML
SL CV PED ECHO LEFT VENTRICLE SYSTOLIC VOLUME (MOD BIPLANE) 2D: 28 ML
TR MAX PG: 14 MMHG
TR PEAK VELOCITY: 1.9 M/S
TRICUSPID ANNULAR PLANE SYSTOLIC EXCURSION: 2.3 CM
TRICUSPID VALVE PEAK REGURGITATION VELOCITY: 1.87 M/S

## 2024-11-05 PROCEDURE — 93306 TTE W/DOPPLER COMPLETE: CPT

## 2024-11-05 PROCEDURE — 93306 TTE W/DOPPLER COMPLETE: CPT | Performed by: INTERNAL MEDICINE

## 2024-11-14 ENCOUNTER — OFFICE VISIT (OUTPATIENT)
Age: 69
End: 2024-11-14
Payer: COMMERCIAL

## 2024-11-14 VITALS
HEIGHT: 69 IN | SYSTOLIC BLOOD PRESSURE: 116 MMHG | HEART RATE: 71 BPM | BODY MASS INDEX: 36.14 KG/M2 | WEIGHT: 244 LBS | DIASTOLIC BLOOD PRESSURE: 73 MMHG

## 2024-11-14 DIAGNOSIS — G89.4 CHRONIC PAIN SYNDROME: Primary | Chronic | ICD-10-CM

## 2024-11-14 DIAGNOSIS — M54.50 CHRONIC BILATERAL LOW BACK PAIN WITHOUT SCIATICA: ICD-10-CM

## 2024-11-14 DIAGNOSIS — G89.29 CHRONIC BILATERAL LOW BACK PAIN WITHOUT SCIATICA: ICD-10-CM

## 2024-11-14 DIAGNOSIS — M47.816 LUMBAR SPONDYLOSIS: ICD-10-CM

## 2024-11-14 LAB
DME PARACHUTE DELIVERY DATE REQUESTED: NORMAL
DME PARACHUTE ITEM DESCRIPTION: NORMAL
DME PARACHUTE ORDER STATUS: NORMAL
DME PARACHUTE SUPPLIER NAME: NORMAL
DME PARACHUTE SUPPLIER PHONE: NORMAL

## 2024-11-14 PROCEDURE — 99214 OFFICE O/P EST MOD 30 MIN: CPT | Performed by: NURSE PRACTITIONER

## 2024-11-14 PROCEDURE — G2211 COMPLEX E/M VISIT ADD ON: HCPCS | Performed by: NURSE PRACTITIONER

## 2024-11-14 RX ORDER — SODIUM CHLORIDE, SODIUM LACTATE, POTASSIUM CHLORIDE, CALCIUM CHLORIDE 600; 310; 30; 20 MG/100ML; MG/100ML; MG/100ML; MG/100ML
125 INJECTION, SOLUTION INTRAVENOUS CONTINUOUS
OUTPATIENT
Start: 2024-11-14

## 2024-11-14 NOTE — PROGRESS NOTES
Assessment:  1. Chronic pain syndrome    2. Chronic bilateral low back pain without sciatica    3. Lumbar spondylosis        Plan:  While the patient was in the office today, I did have a thorough conversation regarding their chronic pain syndrome, medication management, and treatment plan options.  Patient is being seen for a follow-up visit.  He has been experiencing increased low back pain again.  He previously underwent bilateral L4-5 and L5-S1 radiofrequency ablations.  Right side was performed on 11/1/2023.  Left side was performed on 11/13/2023.  Pain was up to 90% improved for the first 6 months after the injection and remained 50% improved until recently.  Activities of daily living were improved by more than 50%.    At this point, we will plan to repeat left and right L4-5 and L5-S1 radiofrequency ablations.    Follow-up after radiofrequency ablations.    History of Present Illness:    The patient is a 69 y.o. male.  The patient currently reports complaints of low back pain.  Current pain level is a 10/10.  Quality pain is described as sharp.    Current pain medications includes: None .     I have personally reviewed and/or updated the patient's past medical history, past surgical history, family history, social history, current medications, allergies, and vital signs today.       Review of Systems:    Review of Systems   Constitutional:  Negative for chills and fever.   HENT:  Negative for ear pain and sore throat.    Eyes:  Negative for pain and visual disturbance.   Respiratory:  Negative for cough and shortness of breath.    Cardiovascular:  Negative for chest pain and palpitations.   Gastrointestinal:  Negative for abdominal pain and vomiting.   Genitourinary:  Negative for dysuria and hematuria.   Musculoskeletal:  Positive for gait problem. Negative for arthralgias and back pain.        Decreased ROM   Skin:  Negative for color change and rash.   Neurological:  Negative for seizures and syncope.    All other systems reviewed and are negative.        Past Medical History:   Diagnosis Date    A-fib (HCC)     ALANNA (acute kidney injury) (HCC) 06/15/2021    Arthritis     CHF exacerbation (HCC) 12/07/2023    CPAP (continuous positive airway pressure) dependence     Encounter for cardioversion procedure 05/06/2021    Leukopenia 10/26/2019    Lymphedema     Sleep apnea     Urinary frequency     Wears glasses     Wears partial dentures     lower partial       Past Surgical History:   Procedure Laterality Date    ABLATION SAPHENOUS VEIN W/ RFA      COLONOSCOPY      JOINT REPLACEMENT  Left Hip 4 /21/2009    NERVE BLOCK Bilateral 7/18/2023    Procedure: BLOCK MEDIAL BRANCH B/L L4-L5 AND L5-S1 MBB #1;  Surgeon: Samson Godinez MD;  Location: [facility];  Service: Pain Management     NERVE BLOCK Bilateral 8/17/2023    Procedure: BLOCK MEDIAL BRANCH  B/L L4-5 and L5-S1 MBB #2;  Surgeon: Samson Godinez MD;  Location: [facility];  Service: Pain Management     [place] CYSTO INSERTION TRANSPROSTATIC IMPLANT SINGLE N/A 11/13/2017    Procedure: CYSTOSCOPY WITH INSERTION UROLIFT;  Surgeon: Deuce Bennett DO;  Location: [facility];  Service: Urology    RADIOFREQUENCY ABLATION Left 10/5/2023    Procedure: ABLATION RADIO FREQUENCY (RFA) LEFT L4-5 AND L5-S1 RFA;  Surgeon: Samson Godinez MD;  Location: [facility];  Service: Pain Management     RADIOFREQUENCY ABLATION Right 11/1/2023    Procedure: ABLATION RADIO FREQUENCY (RFA) RIGHT L4-5 AND L5-S1 RFA;  Surgeon: Samson Godinez MD;  Location: [facility];  Service: Pain Management     TONSILLECTOMY         Family History   Problem Relation Age of Onset    Heart disease Mother     Lymphoma Father     Cancer Father     Obesity Sister     Heart disease Sister     Heart failure Sister         on diuretic    Obesity Brother     Hypertension Brother     Diabetes Neg Hx     Thyroid disease Neg Hx     Stroke Neg Hx        Social History     Occupational History    Not on file    Tobacco Use    Smoking status: Never     Passive exposure: Never    Smokeless tobacco: Never   Vaping Use    Vaping status: Never Used   Substance and Sexual Activity    Alcohol use: Yes     Alcohol/week: 5.0 standard drinks of alcohol     Types: 3 Glasses of wine, 2 Standard drinks or equivalent per week     Comment: socially    Drug use: No    Sexual activity: Not Currently     Partners: Female     Birth control/protection: Abstinence, Condom Male, Spermicide         Current Outpatient Medications:     acetaminophen (TYLENOL) 500 mg tablet, Take 500 mg by mouth every 6 (six) hours as needed for mild pain, Disp: , Rfl:     alfuzosin (UROXATRAL) 10 mg 24 hr tablet, Take 10 mg by mouth daily, Disp: , Rfl:     allopurinol (ZYLOPRIM) 300 mg tablet, Take 300 mg by mouth daily, Disp: , Rfl:     ammonium lactate (LAC-HYDRIN) 12 % lotion, Apply topically 2 (two) times a day as needed for dry skin, Disp: , Rfl:     Colchicine 0.6 MG CAPS, Take by mouth if needed, Disp: , Rfl:     Cyanocobalamin (VITAMIN B 12 PO), Take by mouth, Disp: , Rfl:     Diclofenac Sodium (VOLTAREN) 1 %, Apply 2 g topically 4 (four) times a day, Disp: 100 g, Rfl: 0    doxycycline hyclate (VIBRA-TABS) 100 mg tablet, take 1 tablet by mouth twice a day for 7 days, Disp: , Rfl:     dutasteride (AVODART) 0.5 mg capsule, Take 0.5 mg by mouth daily, Disp: , Rfl:     ferrous sulfate 325 (65 Fe) mg tablet, Take 325 mg by mouth, Disp: , Rfl:     Glucosamine-Chondroit-Vit C-Mn (Glucosamine 1500 Complex) CAPS, Take by mouth, Disp: , Rfl:     halobetasol (ULTRAVATE) 0.05 % ointment, APPLY TO AFFECTED AREA ON LEG TWICE DAILY, Disp: , Rfl:     Jardiance 10 MG TABS tablet, take 1 tablet by mouth every morning, Disp: 90 tablet, Rfl: 1    L-ARGININE-500 PO, Take 500 mg by mouth 2 (two) times a day , Disp: , Rfl:     levofloxacin (LEVAQUIN) 500 mg tablet, take 1 tablet by mouth once daily for 10 days, Disp: , Rfl:     loteprednol etabonate (LOTEMAX) 0.5 % ophthalmic  "suspension, 1 drop 4 (four) times a day, Disp: , Rfl:     metoprolol succinate (TOPROL-XL) 25 mg 24 hr tablet, Take 1 tablet (25 mg total) by mouth daily, Disp: 90 tablet, Rfl: 2    multivitamin (THERAGRAN) TABS, Take 1 tablet by mouth daily, Disp: , Rfl:     Omega-3 Fatty Acids (fish oil) 1,000 mg, Take 1,000 mg by mouth 2 (two) times a day, Disp: , Rfl:     ondansetron (ZOFRAN) 4 mg tablet, Take 1 tablet (4 mg total) by mouth every 6 (six) hours, Disp: 12 tablet, Rfl: 0    Ozempic, 0.25 or 0.5 MG/DOSE, 2 MG/3ML injection pen, Inject 0.5 mg under the skin every 7 days, Disp: , Rfl:     patient supplied medication, Take 1 each by mouth 2 (two) times a day, Disp: , Rfl:     pregabalin (LYRICA) 100 mg capsule, Take 1 capsule (100 mg total) by mouth 3 (three) times a day, Disp: 42 capsule, Rfl: 0    spironolactone (ALDACTONE) 25 mg tablet, Take 1 tablet (25 mg total) by mouth 2 (two) times a day, Disp: 180 tablet, Rfl: 1    tadalafil (CIALIS) 5 MG tablet, Take 5 mg by mouth daily as needed for erectile dysfunction, Disp: , Rfl:     torsemide (DEMADEX) 20 mg tablet, Take 3 tablets (60 mg total) by mouth daily, Disp: 60 tablet, Rfl: 2    traMADol (ULTRAM) 50 mg tablet, Take 50 mg by mouth every 6 (six) hours as needed for moderate pain, Disp: , Rfl:     VITAMIN D PO, Take 2,000 Units by mouth daily , Disp: , Rfl:     Xarelto 20 MG tablet, TAKE 1 TABLET DAILY, Disp: 90 tablet, Rfl: 2    methylPREDNISolone 4 MG tablet therapy pack, Use as directed on package (Patient not taking: Reported on 11/14/2024), Disp: 21 tablet, Rfl: 0    tadalafil (CIALIS) 10 MG tablet, , Disp: , Rfl:     Allergies   Allergen Reactions    Penicillins Anaphylaxis    Sulfa Antibiotics Anaphylaxis    Levofloxacin Other (See Comments)       Physical Exam:    /73 (BP Location: Left arm, Patient Position: Sitting, Cuff Size: Large)   Pulse 71   Ht 5' 9\" (1.753 m)   Wt 111 kg (244 lb)   BMI 36.03 kg/m²     Constitutional:normal, well developed, " well nourished, alert, in no distress and non-toxic and no overt pain behavior.  Eyes:anicteric  HEENT:grossly intact  Neck:supple, symmetric, trachea midline and no masses   Pulmonary:even and unlabored  Cardiovascular:No edema or pitting edema present  Skin:Normal without rashes or lesions and well hydrated  Psychiatric:Mood and affect appropriate  Neurologic:Cranial Nerves II-XII grossly intact  Musculoskeletal: Gait is slow and guarded.  Range of motion of the lumbar spine is limited in all planes.  There is tenderness bilaterally L4-S1.  There are lumbar paraspinal muscle spasms present.      Imaging  No orders to display         No orders of the defined types were placed in this encounter.

## 2024-11-14 NOTE — H&P (VIEW-ONLY)
Assessment:  1. Chronic pain syndrome    2. Chronic bilateral low back pain without sciatica    3. Lumbar spondylosis        Plan:  While the patient was in the office today, I did have a thorough conversation regarding their chronic pain syndrome, medication management, and treatment plan options.  Patient is being seen for a follow-up visit.  He has been experiencing increased low back pain again.  He previously underwent bilateral L4-5 and L5-S1 radiofrequency ablations.  Right side was performed on 11/1/2023.  Left side was performed on 11/13/2023.  Pain was up to 90% improved for the first 6 months after the injection and remained 50% improved until recently.  Activities of daily living were improved by more than 50%.    At this point, we will plan to repeat left and right L4-5 and L5-S1 radiofrequency ablations.    Follow-up after radiofrequency ablations.    History of Present Illness:    The patient is a 69 y.o. male.  The patient currently reports complaints of low back pain.  Current pain level is a 10/10.  Quality pain is described as sharp.    Current pain medications includes: None .     I have personally reviewed and/or updated the patient's past medical history, past surgical history, family history, social history, current medications, allergies, and vital signs today.       Review of Systems:    Review of Systems   Constitutional:  Negative for chills and fever.   HENT:  Negative for ear pain and sore throat.    Eyes:  Negative for pain and visual disturbance.   Respiratory:  Negative for cough and shortness of breath.    Cardiovascular:  Negative for chest pain and palpitations.   Gastrointestinal:  Negative for abdominal pain and vomiting.   Genitourinary:  Negative for dysuria and hematuria.   Musculoskeletal:  Positive for gait problem. Negative for arthralgias and back pain.        Decreased ROM   Skin:  Negative for color change and rash.   Neurological:  Negative for seizures and syncope.    All other systems reviewed and are negative.        Past Medical History:   Diagnosis Date    A-fib (HCC)     ALANNA (acute kidney injury) (HCC) 06/15/2021    Arthritis     CHF exacerbation (HCC) 12/07/2023    CPAP (continuous positive airway pressure) dependence     Encounter for cardioversion procedure 05/06/2021    Leukopenia 10/26/2019    Lymphedema     Sleep apnea     Urinary frequency     Wears glasses     Wears partial dentures     lower partial       Past Surgical History:   Procedure Laterality Date    ABLATION SAPHENOUS VEIN W/ RFA      COLONOSCOPY      JOINT REPLACEMENT  Left Hip 4 /21/2009    NERVE BLOCK Bilateral 7/18/2023    Procedure: BLOCK MEDIAL BRANCH B/L L4-L5 AND L5-S1 MBB #1;  Surgeon: Samson Godinez MD;  [facility]: [facility];  Service: Pain Management     NERVE BLOCK Bilateral 8/17/2023    Procedure: BLOCK MEDIAL BRANCH  B/L L4-5 and L5-S1 MBB #2;  Surgeon: Samson Godinez MD;  [facility]: [facility];  Service: Pain Management     [place] CYSTO INSERTION TRANSPROSTATIC IMPLANT SINGLE N/A 11/13/2017    Procedure: CYSTOSCOPY WITH INSERTION UROLIFT;  Surgeon: Deuce Bennett DO;  [facility];  Service: Urology    RADIOFREQUENCY ABLATION Left 10/5/2023    Procedure: ABLATION RADIO FREQUENCY (RFA) LEFT L4-5 AND L5-S1 RFA;  Surgeon: Samson Godinez MD;  [facility]: [facility];  Service: Pain Management     RADIOFREQUENCY ABLATION Right 11/1/2023    Procedure: ABLATION RADIO FREQUENCY (RFA) RIGHT L4-5 AND L5-S1 RFA;  Surgeon: Samson Godinez MD;  Location: [facility];  Service: Pain Management     TONSILLECTOMY         Family History   Problem Relation Age of Onset    Heart disease Mother     Lymphoma Father     Cancer Father     Obesity Sister     Heart disease Sister     Heart failure Sister         on diuretic    Obesity Brother     Hypertension Brother     Diabetes Neg Hx     Thyroid disease Neg Hx     Stroke Neg Hx        Social History     Occupational History    Not on file    Tobacco Use    Smoking status: Never     Passive exposure: Never    Smokeless tobacco: Never   Vaping Use    Vaping status: Never Used   Substance and Sexual Activity    Alcohol use: Yes     Alcohol/week: 5.0 standard drinks of alcohol     Types: 3 Glasses of wine, 2 Standard drinks or equivalent per week     Comment: socially    Drug use: No    Sexual activity: Not Currently     Partners: Female     Birth control/protection: Abstinence, Condom Male, Spermicide         Current Outpatient Medications:     acetaminophen (TYLENOL) 500 mg tablet, Take 500 mg by mouth every 6 (six) hours as needed for mild pain, Disp: , Rfl:     alfuzosin (UROXATRAL) 10 mg 24 hr tablet, Take 10 mg by mouth daily, Disp: , Rfl:     allopurinol (ZYLOPRIM) 300 mg tablet, Take 300 mg by mouth daily, Disp: , Rfl:     ammonium lactate (LAC-HYDRIN) 12 % lotion, Apply topically 2 (two) times a day as needed for dry skin, Disp: , Rfl:     Colchicine 0.6 MG CAPS, Take by mouth if needed, Disp: , Rfl:     Cyanocobalamin (VITAMIN B 12 PO), Take by mouth, Disp: , Rfl:     Diclofenac Sodium (VOLTAREN) 1 %, Apply 2 g topically 4 (four) times a day, Disp: 100 g, Rfl: 0    doxycycline hyclate (VIBRA-TABS) 100 mg tablet, take 1 tablet by mouth twice a day for 7 days, Disp: , Rfl:     dutasteride (AVODART) 0.5 mg capsule, Take 0.5 mg by mouth daily, Disp: , Rfl:     ferrous sulfate 325 (65 Fe) mg tablet, Take 325 mg by mouth, Disp: , Rfl:     Glucosamine-Chondroit-Vit C-Mn (Glucosamine 1500 Complex) CAPS, Take by mouth, Disp: , Rfl:     halobetasol (ULTRAVATE) 0.05 % ointment, APPLY TO AFFECTED AREA ON LEG TWICE DAILY, Disp: , Rfl:     Jardiance 10 MG TABS tablet, take 1 tablet by mouth every morning, Disp: 90 tablet, Rfl: 1    L-ARGININE-500 PO, Take 500 mg by mouth 2 (two) times a day , Disp: , Rfl:     levofloxacin (LEVAQUIN) 500 mg tablet, take 1 tablet by mouth once daily for 10 days, Disp: , Rfl:     loteprednol etabonate (LOTEMAX) 0.5 % ophthalmic  "suspension, 1 drop 4 (four) times a day, Disp: , Rfl:     metoprolol succinate (TOPROL-XL) 25 mg 24 hr tablet, Take 1 tablet (25 mg total) by mouth daily, Disp: 90 tablet, Rfl: 2    multivitamin (THERAGRAN) TABS, Take 1 tablet by mouth daily, Disp: , Rfl:     Omega-3 Fatty Acids (fish oil) 1,000 mg, Take 1,000 mg by mouth 2 (two) times a day, Disp: , Rfl:     ondansetron (ZOFRAN) 4 mg tablet, Take 1 tablet (4 mg total) by mouth every 6 (six) hours, Disp: 12 tablet, Rfl: 0    Ozempic, 0.25 or 0.5 MG/DOSE, 2 MG/3ML injection pen, Inject 0.5 mg under the skin every 7 days, Disp: , Rfl:     patient supplied medication, Take 1 each by mouth 2 (two) times a day, Disp: , Rfl:     pregabalin (LYRICA) 100 mg capsule, Take 1 capsule (100 mg total) by mouth 3 (three) times a day, Disp: 42 capsule, Rfl: 0    spironolactone (ALDACTONE) 25 mg tablet, Take 1 tablet (25 mg total) by mouth 2 (two) times a day, Disp: 180 tablet, Rfl: 1    tadalafil (CIALIS) 5 MG tablet, Take 5 mg by mouth daily as needed for erectile dysfunction, Disp: , Rfl:     torsemide (DEMADEX) 20 mg tablet, Take 3 tablets (60 mg total) by mouth daily, Disp: 60 tablet, Rfl: 2    traMADol (ULTRAM) 50 mg tablet, Take 50 mg by mouth every 6 (six) hours as needed for moderate pain, Disp: , Rfl:     VITAMIN D PO, Take 2,000 Units by mouth daily , Disp: , Rfl:     Xarelto 20 MG tablet, TAKE 1 TABLET DAILY, Disp: 90 tablet, Rfl: 2    methylPREDNISolone 4 MG tablet therapy pack, Use as directed on package (Patient not taking: Reported on 11/14/2024), Disp: 21 tablet, Rfl: 0    tadalafil (CIALIS) 10 MG tablet, , Disp: , Rfl:     Allergies   Allergen Reactions    Penicillins Anaphylaxis    Sulfa Antibiotics Anaphylaxis    Levofloxacin Other (See Comments)       Physical Exam:    /73 (BP Location: Left arm, Patient Position: Sitting, Cuff Size: Large)   Pulse 71   Ht 5' 9\" (1.753 m)   Wt 111 kg (244 lb)   BMI 36.03 kg/m²     Constitutional:normal, well developed, " well nourished, alert, in no distress and non-toxic and no overt pain behavior.  Eyes:anicteric  HEENT:grossly intact  Neck:supple, symmetric, trachea midline and no masses   Pulmonary:even and unlabored  Cardiovascular:No edema or pitting edema present  Skin:Normal without rashes or lesions and well hydrated  Psychiatric:Mood and affect appropriate  Neurologic:Cranial Nerves II-XII grossly intact  Musculoskeletal: Gait is slow and guarded.  Range of motion of the lumbar spine is limited in all planes.  There is tenderness bilaterally L4-S1.  There are lumbar paraspinal muscle spasms present.      Imaging  No orders to display         No orders of the defined types were placed in this encounter.

## 2024-11-20 DIAGNOSIS — I48.4 ATYPICAL ATRIAL FLUTTER (HCC): ICD-10-CM

## 2024-11-20 NOTE — TELEPHONE ENCOUNTER
Office received notification from [facility] stating that the patient is needing his metoprolol refilled. The patient is a patient of Dr. Prieto.

## 2024-11-21 ENCOUNTER — APPOINTMENT (OUTPATIENT)
Dept: RADIOLOGY | Facility: HOSPITAL | Age: 69
End: 2024-11-21
Payer: COMMERCIAL

## 2024-11-21 ENCOUNTER — HOSPITAL ENCOUNTER (OUTPATIENT)
Facility: HOSPITAL | Age: 69
Setting detail: OUTPATIENT SURGERY
Discharge: HOME | End: 2024-11-21
Attending: ANESTHESIOLOGY | Admitting: ANESTHESIOLOGY
Payer: COMMERCIAL

## 2024-11-21 ENCOUNTER — TELEPHONE (OUTPATIENT)
Dept: PAIN MEDICINE | Facility: CLINIC | Age: 69
End: 2024-11-21

## 2024-11-21 VITALS
DIASTOLIC BLOOD PRESSURE: 70 MMHG | RESPIRATION RATE: 18 BRPM | OXYGEN SATURATION: 99 % | SYSTOLIC BLOOD PRESSURE: 121 MMHG | WEIGHT: 240 LBS | TEMPERATURE: 97.3 F | HEART RATE: 71 BPM | BODY MASS INDEX: 35.55 KG/M2 | HEIGHT: 69 IN

## 2024-11-21 DIAGNOSIS — G89.29 CHRONIC BILATERAL LOW BACK PAIN WITHOUT SCIATICA: ICD-10-CM

## 2024-11-21 DIAGNOSIS — R60.0 BILATERAL LOWER EXTREMITY EDEMA: ICD-10-CM

## 2024-11-21 DIAGNOSIS — M51.360 DEGENERATION OF INTERVERTEBRAL DISC OF LUMBAR REGION WITH DISCOGENIC BACK PAIN: Primary | ICD-10-CM

## 2024-11-21 DIAGNOSIS — G89.4 CHRONIC PAIN SYNDROME: ICD-10-CM

## 2024-11-21 DIAGNOSIS — M54.50 CHRONIC BILATERAL LOW BACK PAIN WITHOUT SCIATICA: ICD-10-CM

## 2024-11-21 DIAGNOSIS — R20.2 PARESTHESIAS: ICD-10-CM

## 2024-11-21 DIAGNOSIS — M47.816 LUMBAR SPONDYLOSIS: ICD-10-CM

## 2024-11-21 PROCEDURE — 64635 DESTROY LUMB/SAC FACET JNT: CPT | Performed by: ANESTHESIOLOGY

## 2024-11-21 PROCEDURE — 64636 DESTROY L/S FACET JNT ADDL: CPT | Performed by: ANESTHESIOLOGY

## 2024-11-21 RX ORDER — LIDOCAINE HYDROCHLORIDE 10 MG/ML
INJECTION, SOLUTION EPIDURAL; INFILTRATION; INTRACAUDAL; PERINEURAL AS NEEDED
Status: DISCONTINUED | OUTPATIENT
Start: 2024-11-21 | End: 2024-11-21 | Stop reason: HOSPADM

## 2024-11-21 RX ORDER — LIDOCAINE HYDROCHLORIDE 20 MG/ML
INJECTION, SOLUTION EPIDURAL; INFILTRATION; INTRACAUDAL; PERINEURAL AS NEEDED
Status: DISCONTINUED | OUTPATIENT
Start: 2024-11-21 | End: 2024-11-21 | Stop reason: HOSPADM

## 2024-11-21 RX ORDER — METOPROLOL SUCCINATE 25 MG/1
25 TABLET, EXTENDED RELEASE ORAL DAILY
Qty: 90 TABLET | Refills: 1 | Status: SHIPPED | OUTPATIENT
Start: 2024-11-21

## 2024-11-21 NOTE — INTERVAL H&P NOTE
H&P reviewed. After examining the patient I find no changes in the patients condition since the H&P had been written.    Vitals:    11/21/24 1313   BP: 114/64   Pulse: 71   Resp: 18   Temp: 97.8 °F (36.6 °C)   SpO2: 94%

## 2024-11-21 NOTE — OP NOTE
OPERATIVE REPORT  PATIENT NAME: Leonardo Oviedo    :  1955  MRN: [id number]  Pt Location: [facility] 01    SURGERY DATE: 2024    Surgeons and Role:     * Samson Godinez MD - Primary    Preop Diagnosis:  Chronic pain syndrome [G89.4]  Chronic bilateral low back pain without sciatica [M54.50, G89.29]  Lumbar spondylosis [M47.816]    Post-Op Diagnosis Codes:     * Chronic pain syndrome [G89.4]     * Chronic bilateral low back pain without sciatica [M54.50, G89.29]     * Lumbar spondylosis [M47.816]    Procedure(s):  Left - ABLATION RADIO FREQUENCY (RFA) left L4-5 and L5-S1    Specimen(s):  * No specimens in log *    Estimated Blood Loss:   Minimal    Drains:  * No LDAs found *    Anesthesia Type:   Local    Operative Indications:  Chronic pain syndrome [G89.4]  Chronic bilateral low back pain without sciatica [M54.50, G89.29]  Lumbar spondylosis [M47.816]      Operative Findings:  same      Complications:   None    Procedure and Technique:  Fluoroscopically-guided Radiofrequency denervation of the left L4-L5 and L5-S1 facet joint(s)       After discussing the risks, benefits, and alternatives to the procedure, the patient expressed understanding and wished to proceed.  The patient was brought to the fluoroscopy suite and placed in the prone position.  Procedural pause conducted to verify:  correct patient identity, procedure to be performed and as applicable, correct side and site, correct patient position, and availability of implants, special equipment and special requirements.  Using fluoroscopy, the junction of the transverse process and superior articulating process of the left L4, 5, S1 levels were identified and marked.  The skin was sterilely prepped and draped in the usual fashion using Chloraprep skin prep.  The skin and subcutaneous tissue were anesthetized with 0.5 % lidocaine.   Using fluoroscopic guidance, a 100 mm 18 gauge RFK RF cannula with a 15 mm active tip was advanced to each  target.  This was confirmed using PA, lateral and oblique fluoroscopic views.  Motor testing was performed at 2Hz up to 2.5 volts, and the measured tissue impedances were satisfactory.  With final needle positioning, there was no evidence of radicular stimulation.  Prior to lesioning, 1cc of 2% lidocaine was injected at each site.  After waiting 2 minutes for local anesthesia to take effect, lesioning was performed at 90 degrees Celsius for 90 seconds. A slight repositioning of the needles was performed an lesioning was again performed at 90 degreees Celsius for 90 seconds. After RF treatment, each site received 1cc of 0.25% bupivacaine. The patient tolerated the procedure well and there were no apparent complications.  After appropriate observation, the patient was dismissed from the clinic in good condition under their own power.    I was present for the entire procedure.    Patient Disposition:  hemodynamically stable             SIGNATURE: Samson Godinez MD  DATE: November 21, 2024  TIME: 1:51 PM

## 2024-11-21 NOTE — TELEPHONE ENCOUNTER
Left L4-5 and L5-S1 RFA at [facility] with Dr Godinez 11/21/24    Next procedure is 1/2/25 , right Jose A

## 2024-11-21 NOTE — DISCHARGE INSTR - AVS FIRST PAGE

## 2024-11-25 NOTE — TELEPHONE ENCOUNTER
Vinay...    S/w pt s/p Lt L4-S1 RFA on 11/21/24 RM @ [facility]. Pt stated needle sites look good, denies S/S of infect, denies fever, (+) soreness and denies sun burn like sensation. Pt states current pain 6/10 aching throughout weekend, needing cane this weekend for prolonged ambul. Advised pt if they have any further pain to take OTC/prescribed meds and/or use ice/heat and that it can take 4-6wks to see full effect. Confirmed nxt appt w/ pt 1/2/25 Rt L4-S1 RFA @ [facility]. Pt verbalized understanding.

## 2025-01-02 ENCOUNTER — APPOINTMENT (OUTPATIENT)
Dept: RADIOLOGY | Facility: HOSPITAL | Age: 70
End: 2025-01-02
Payer: COMMERCIAL

## 2025-01-02 ENCOUNTER — HOSPITAL ENCOUNTER (OUTPATIENT)
Facility: HOSPITAL | Age: 70
Setting detail: OUTPATIENT SURGERY
Discharge: HOME | End: 2025-01-02
Attending: ANESTHESIOLOGY | Admitting: ANESTHESIOLOGY
Payer: COMMERCIAL

## 2025-01-02 ENCOUNTER — TELEPHONE (OUTPATIENT)
Dept: PAIN MEDICINE | Facility: CLINIC | Age: 70
End: 2025-01-02

## 2025-01-02 VITALS
RESPIRATION RATE: 18 BRPM | TEMPERATURE: 96.8 F | DIASTOLIC BLOOD PRESSURE: 70 MMHG | WEIGHT: 240 LBS | HEIGHT: 69 IN | OXYGEN SATURATION: 95 % | HEART RATE: 64 BPM | SYSTOLIC BLOOD PRESSURE: 119 MMHG | BODY MASS INDEX: 35.55 KG/M2

## 2025-01-02 DIAGNOSIS — M47.816 LUMBAR SPONDYLOSIS: ICD-10-CM

## 2025-01-02 DIAGNOSIS — M54.50 CHRONIC BILATERAL LOW BACK PAIN WITHOUT SCIATICA: ICD-10-CM

## 2025-01-02 DIAGNOSIS — M51.360 DEGENERATION OF INTERVERTEBRAL DISC OF LUMBAR REGION WITH DISCOGENIC BACK PAIN: Primary | ICD-10-CM

## 2025-01-02 DIAGNOSIS — G89.29 CHRONIC BILATERAL LOW BACK PAIN WITHOUT SCIATICA: ICD-10-CM

## 2025-01-02 PROCEDURE — 64636 DESTROY L/S FACET JNT ADDL: CPT | Performed by: ANESTHESIOLOGY

## 2025-01-02 PROCEDURE — 64635 DESTROY LUMB/SAC FACET JNT: CPT | Performed by: ANESTHESIOLOGY

## 2025-01-02 RX ORDER — LIDOCAINE HYDROCHLORIDE 20 MG/ML
INJECTION, SOLUTION EPIDURAL; INFILTRATION; INTRACAUDAL; PERINEURAL AS NEEDED
Status: DISCONTINUED | OUTPATIENT
Start: 2025-01-02 | End: 2025-01-02 | Stop reason: HOSPADM

## 2025-01-02 RX ORDER — LIDOCAINE HYDROCHLORIDE 10 MG/ML
INJECTION, SOLUTION EPIDURAL; INFILTRATION; INTRACAUDAL; PERINEURAL AS NEEDED
Status: DISCONTINUED | OUTPATIENT
Start: 2025-01-02 | End: 2025-01-02 | Stop reason: HOSPADM

## 2025-01-02 RX ORDER — SODIUM CHLORIDE, SODIUM LACTATE, POTASSIUM CHLORIDE, CALCIUM CHLORIDE 600; 310; 30; 20 MG/100ML; MG/100ML; MG/100ML; MG/100ML
125 INJECTION, SOLUTION INTRAVENOUS CONTINUOUS
Status: DISCONTINUED | OUTPATIENT
Start: 2025-01-02 | End: 2025-01-02

## 2025-01-02 NOTE — H&P
Assessment:  1. Degeneration of intervertebral disc of lumbar region with discogenic back pain  FL spine and pain procedure    FL spine and pain procedure      2. Chronic bilateral low back pain without sciatica  FL spine and pain procedure    FL spine and pain procedure      3. Lumbar spondylosis  FL spine and pain procedure    FL spine and pain procedure          Plan:  Leonardo Oviedo is a 69 y.o. male with complaints of low back presents to surgical center for procedure.  We will perform a Procedure(s) (LRB):  ABLATION RADIO FREQUENCY (RFA)L4-5 and L5-S1 RFA (N/A)   2. Follow-up 1 month after injection    Complete risks and benefits including bleeding, infection, tissue reaction, nerve injury and allergic reaction were discussed. The approach was demonstrated using models and literature was provided. Verbal and written consent was obtained.    My impressions and treatment recommendations were discussed in detail with the patient who verbalized understanding and had no further questions.  Discharge instructions were provided. I personally saw and examined the patient and I agree with the above discussed plan of care.    Orders Placed This Encounter   Procedures    FL spine and pain procedure     Standing Status:   Standing     Number of Occurrences:   1     Reason for Exam::   L4-L5, L5-S1 RFA;  RIGHT     Anticoagulant hold needed?:   NA     New Medications Ordered This Visit   Medications    lactated ringers infusion       History of Present Illness:  Leonardo Oviedo is a 69 y.o. male who presents for a follow up office visit in regards to low back pain.   The patient’s current symptoms include intermittent sharp and stabbing from pain without particular time pattern.      I have personally reviewed and/or updated the patient's past medical history, past surgical history, family history, social history, current medications, allergies, and vital signs today.     Review of Systems   Musculoskeletal:  Positive  for arthralgias and back pain.   All other systems reviewed and are negative.      Patient Active Problem List   Diagnosis    Atrial fibrillation (HCC)    Paresthesias    Cervical pain (neck)    Obesity, Class III, BMI 40-49.9 (morbid obesity) (HCC)    Thrombocytopenia (HCC)    Bilateral lower extremity edema    Venous insufficiency of both lower extremities    Pulmonary hypertension (HCC)    Chronic heart failure with preserved ejection fraction (HFpEF) (HCC)    Anemia    Primary osteoarthritis    Iron deficiency    Atrial flutter (HCC)    Venous ulcer (HCC)    Chronic pain syndrome    Lumbar spondylosis    Chronic bilateral low back pain without sciatica    Benign prostate hyperplasia    Sleep apnea    Lymphedema    Obstructive sleep apnea    Snoring    ROBERT (obstructive sleep apnea)    Degeneration of intervertebral disc of lumbar region with discogenic back pain       Past Medical History:   Diagnosis Date    A-fib (HCC)     ALANNA (acute kidney injury) ([facility]) 06/15/2021    Arthritis     CHF exacerbation ([facility]) 12/07/2023    CPAP (continuous positive airway pressure) dependence     Encounter for cardioversion procedure 05/06/2021    Leukopenia 10/26/2019    Lymphedema     Sleep apnea     Urinary frequency     Wears glasses     Wears partial dentures     lower partial       Past Surgical History:   Procedure Laterality Date    ABLATION SAPHENOUS VEIN W/ RFA      COLONOSCOPY      JOINT REPLACEMENT  Left Hip 4 /21/2009    NERVE BLOCK Bilateral 7/18/2023    Procedure: BLOCK MEDIAL BRANCH B/L L4-L5 AND L5-S1 MBB #1;  Surgeon: Samson Godinez MD;  Location: [facility];  Service: Pain Management     NERVE BLOCK Bilateral 8/17/2023    Procedure: BLOCK MEDIAL BRANCH  B/L L4-5 and L5-S1 MBB #2;  Surgeon: Samson Godinez MD;  Location: [facility];  Service: Pain Management     TN CYSTO INSERTION TRANSPROSTATIC IMPLANT SINGLE N/A 11/13/2017    Procedure: CYSTOSCOPY WITH INSERTION UROLIFT;  Surgeon: Deuce Bennett DO;   Location: [facility];  Service: Urology    RADIOFREQUENCY ABLATION Left 10/5/2023    Procedure: ABLATION RADIO FREQUENCY (RFA) LEFT L4-5 AND L5-S1 RFA;  Surgeon: Samson Godinez MD;  Location: [facility];  Service: Pain Management     RADIOFREQUENCY ABLATION Right 11/1/2023    Procedure: ABLATION RADIO FREQUENCY (RFA) RIGHT L4-5 AND L5-S1 RFA;  Surgeon: Samson Godinez MD;  Location: [facility];  Service: Pain Management     RADIOFREQUENCY ABLATION Left 11/21/2024    Procedure: ABLATION RADIO FREQUENCY (RFA) left L4-5 and L5-S1;  Surgeon: Samson Godinez MD;  Location: [facility];  Service: Pain Management     TONSILLECTOMY         Family History   Problem Relation Age of Onset    Heart disease Mother     Lymphoma Father     Cancer Father     Obesity Sister     Heart disease Sister     Heart failure Sister         on diuretic    Obesity Brother     Hypertension Brother     Diabetes Neg Hx     Thyroid disease Neg Hx     Stroke Neg Hx        Social History     Occupational History    Not on file   Tobacco Use    Smoking status: Never     Passive exposure: Never    Smokeless tobacco: Never   Vaping Use    Vaping status: Never Used   Substance and Sexual Activity    Alcohol use: Yes     Alcohol/week: 5.0 standard drinks of alcohol     Types: 3 Glasses of wine, 2 Standard drinks or equivalent per week     Comment: socially    Drug use: No    Sexual activity: Not Currently     Partners: Female     Birth control/protection: Abstinence, Condom Male, Spermicide       No current facility-administered medications on file prior to encounter.     Current Outpatient Medications on File Prior to Encounter   Medication Sig    alfuzosin (UROXATRAL) 10 mg 24 hr tablet Take 10 mg by mouth daily    allopurinol (ZYLOPRIM) 300 mg tablet Take 300 mg by mouth daily    ammonium lactate (LAC-HYDRIN) 12 % lotion Apply topically 2 (two) times a day as needed for dry skin    Colchicine 0.6 MG CAPS Take by mouth if needed     dutasteride (AVODART) 0.5 mg capsule Take 0.5 mg by mouth daily    ferrous sulfate 325 (65 Fe) mg tablet Take 325 mg by mouth    Glucosamine-Chondroit-Vit C-Mn (Glucosamine 1500 Complex) CAPS Take by mouth    Jardiance 10 MG TABS tablet take 1 tablet by mouth every morning    L-ARGININE-500 PO Take 500 mg by mouth 2 (two) times a day     loteprednol etabonate (LOTEMAX) 0.5 % ophthalmic suspension 1 drop 4 (four) times a day    Omega-3 Fatty Acids (fish oil) 1,000 mg Take 1,000 mg by mouth 2 (two) times a day    Ozempic, 0.25 or 0.5 MG/DOSE, 2 MG/3ML injection pen Inject 0.5 mg under the skin every 7 days    patient supplied medication Take 1 each by mouth 2 (two) times a day    pregabalin (LYRICA) 100 mg capsule Take 1 capsule (100 mg total) by mouth 3 (three) times a day    spironolactone (ALDACTONE) 25 mg tablet Take 1 tablet (25 mg total) by mouth 2 (two) times a day    torsemide (DEMADEX) 20 mg tablet Take 3 tablets (60 mg total) by mouth daily    VITAMIN D PO Take 2,000 Units by mouth daily     Xarelto 20 MG tablet TAKE 1 TABLET DAILY    acetaminophen (TYLENOL) 500 mg tablet Take 500 mg by mouth every 6 (six) hours as needed for mild pain    Diclofenac Sodium (VOLTAREN) 1 % Apply 2 g topically 4 (four) times a day    doxycycline hyclate (VIBRA-TABS) 100 mg tablet take 1 tablet by mouth twice a day for 7 days    halobetasol (ULTRAVATE) 0.05 % ointment APPLY TO AFFECTED AREA ON LEG TWICE DAILY    multivitamin (THERAGRAN) TABS Take 1 tablet by mouth daily    ondansetron (ZOFRAN) 4 mg tablet Take 1 tablet (4 mg total) by mouth every 6 (six) hours    tadalafil (CIALIS) 5 MG tablet Take 5 mg by mouth daily as needed for erectile dysfunction    traMADol (ULTRAM) 50 mg tablet Take 50 mg by mouth every 6 (six) hours as needed for moderate pain       Allergies   Allergen Reactions    Penicillins Anaphylaxis    Sulfa Antibiotics Anaphylaxis    Levofloxacin Other (See Comments)       Physical Exam:    /70   Pulse 72  "  Temp (!) 96.2 °F (35.7 °C) (Tympanic)   Resp 20   Ht 5' 9\" (1.753 m)   Wt 109 kg (240 lb)   SpO2 94%   BMI 35.44 kg/m²     Constitutional:normal, well developed, well nourished, alert, in no distress and non-toxic and no overt pain behavior. and obese  Eyes:anicteric  HEENT:grossly intact  Neck:supple, symmetric, trachea midline and no masses   Pulmonary:even and unlabored  Cardiovascular:No edema or pitting edema present  Skin:Normal without rashes or lesions and well hydrated  Psychiatric:Mood and affect appropriate  Neurologic:Cranial Nerves II-XII grossly intact  Musculoskeletal:antalgic        "

## 2025-01-02 NOTE — OP NOTE
OPERATIVE REPORT  PATIENT NAME: Leonardo Oviedo    :  1955  MRN: [id number]  Pt Location: [facility] IR    SURGERY DATE: 2025    Surgeons and Role:     * Smason Godinez MD - Primary    Preop Diagnosis:  Chronic pain syndrome [G89.4]  Chronic bilateral low back pain without sciatica [M54.50, G89.29]  Lumbar spondylosis [M47.816]    Post-Op Diagnosis Codes:     * Chronic pain syndrome [G89.4]     * Chronic bilateral low back pain without sciatica [M54.50, G89.29]     * Lumbar spondylosis [M47.816]    Procedure(s):  Right - ABLATION RADIO FREQUENCY (RFA)L4-5 and L5-S1 RFA    Specimen(s):  * No specimens in log *    Estimated Blood Loss:   Minimal    Drains:  * No LDAs found *    Anesthesia Type:   Local    Operative Indications:  Chronic pain syndrome [G89.4]  Chronic bilateral low back pain without sciatica [M54.50, G89.29]  Lumbar spondylosis [M47.816]      Operative Findings:  same      Complications:   None    Procedure and Technique:  Fluoroscopically-guided Radiofrequency denervation of the right L4-L5 and L5-S1 facet joint(s)       After discussing the risks, benefits, and alternatives to the procedure, the patient expressed understanding and wished to proceed.  The patient was brought to the fluoroscopy suite and placed in the prone position.  Procedural pause conducted to verify:  correct patient identity, procedure to be performed and as applicable, correct side and site, correct patient position, and availability of implants, special equipment and special requirements.  Using fluoroscopy, the junction of the transverse process and superior articulating process of the right L4, 5, S1 levels were identified and marked.  The skin was sterilely prepped and draped in the usual fashion using Chloraprep skin prep.  The skin and subcutaneous tissue were anesthetized with 0.5 % lidocaine.   Using fluoroscopic guidance, a 100 mm 18 gauge RFK RF cannula with a 10 mm active tip was advanced to each  target.  This was confirmed using PA, lateral and oblique fluoroscopic views.  Motor testing was performed at 2Hz up to 2.5 volts, and the measured tissue impedances were satisfactory.  With final needle positioning, there was no evidence of radicular stimulation.  Prior to lesioning, 1cc of 2% lidocaine was injected at each site.  After waiting 2 minutes for local anesthesia to take effect, lesioning was performed at 90 degrees Celsius for 90 seconds. A slight repositioning of the needles was performed an lesioning was again performed at 90 degreees Celsius for 90 seconds. After RF treatment, each site received 1cc of 0.25% bupivacaine. The patient tolerated the procedure well and there were no apparent complications.  After appropriate observation, the patient was dismissed from the clinic in good condition under their own power.    I was present for the entire procedure.    Patient Disposition:  hemodynamically stable             SIGNATURE: Samson Godinez MD  DATE: January 2, 2025  TIME: 12:15 PM

## 2025-01-02 NOTE — DISCHARGE INSTR - AVS FIRST PAGE

## 2025-01-02 NOTE — TELEPHONE ENCOUNTER
Pt s/p Rt L4-5 and L5-S1 RFA on 1/2/25 RM @ [facility]    F/u visit on 2/5/25 BK  @1000 in [place].    Pls c/b pt 1/3/25

## 2025-01-03 ENCOUNTER — OFFICE VISIT (OUTPATIENT)
Dept: CARDIOLOGY CLINIC | Facility: CLINIC | Age: 70
End: 2025-01-03
Payer: COMMERCIAL

## 2025-01-03 VITALS
BODY MASS INDEX: 37.33 KG/M2 | HEART RATE: 68 BPM | DIASTOLIC BLOOD PRESSURE: 68 MMHG | SYSTOLIC BLOOD PRESSURE: 114 MMHG | WEIGHT: 252.8 LBS

## 2025-01-03 DIAGNOSIS — R94.31 ABNORMAL ECG: ICD-10-CM

## 2025-01-03 DIAGNOSIS — E66.01 SEVERE OBESITY (BMI 35.0-39.9) WITH COMORBIDITY (HCC): ICD-10-CM

## 2025-01-03 DIAGNOSIS — I48.92 PAROXYSMAL ATRIAL FLUTTER (HCC): ICD-10-CM

## 2025-01-03 DIAGNOSIS — I50.32 CHRONIC HEART FAILURE WITH PRESERVED EJECTION FRACTION (HFPEF) (HCC): Primary | ICD-10-CM

## 2025-01-03 PROCEDURE — 99214 OFFICE O/P EST MOD 30 MIN: CPT | Performed by: INTERNAL MEDICINE

## 2025-01-03 NOTE — PROGRESS NOTES
Cardiology Office Note  MD Blayne Curry MD Jason Kaplan, DO, [facility]  MD Alistair Burgess DO, [facility]  Damon Kim DO, [facility]  ----------------------------------------------------------------  [address] [address]    Leonardo Oviedo 69 y.o. male MRN: [id number]  Unit/Bed#:  Encounter: [id number]      History of Present Illness:  It was a pleasure to see Leonardo Oviedo in the office today for follow-up CV evaluation.  He has a longstanding history of atrial fibrillation with prior failed ELAINE guided cardioversion, history of morbid obesity and pulmonary hypertension with chronic venous insufficiency/lymphedema.  The patient has a known history of obstructive sleep apnea was noncompliant with his CPAP therapy.  Over the course of many years he has been having lower extremity edema which has been believe secondary to lymphedema with chronic venous insufficiency.  He has had venous ablation is in the past for his reflux disease.  In October 2019, he was found have pulmonary hypertension with pulmonary artery systolic pressure is estimated at 50 mm Hg.  We had initially seen him in late August 2020 due to increased fatigue and dyspnea on exertion.  He has become somewhat short of breath with basic activity.  Previously, he had been managed for his atrial fibrillation at [facility].      Echocardiogram, stress test and Holter monitor were ordered, but stress test and Holter were completed.  Stress test was found to be negative for myocardial ischemia.  Holter monitor demonstrated atrial fibrillation with an average heart rate of 92 beats per minute and rare VPCs.  His weight trends unfortunately continue to go upward and he had dietary discretion.  He seen by electrophysiology and recommended for a sleep study as well as an evaluation with advanced heart failure.  He was placed on CPAP and has been compliant since that  time.  He also was seen by bariatric surgery and wish to undergo conservative options with dietary modifications.  Despite increasing diuretic load, he he continue to gain approximately 30 lb and was subsequently sent to [facility] in [place].  He was diuresed down to 285 lb.   While hospitalized, his echocardiogram was performed showing normal left ventricular function.     Following discharge, he was seen by electrophysiology and sent for a cardioversion in May 2021.  The cardioversion was initially successful, but he reverted back to rate controlled atrial flutter.  He has been placed on amiodarone and decreased down to 100 mg daily.  On his dose of torsemide, he had acute kidney injury with a creatinine rise to 1.9.  After holding his diuretics for several days, his creatinine came down to 1.1. He was then decreased on his torsemide to 20 mg b.i.d. And his weight trends continue to improve.  He has been aggressive in his dietary modifications.  He also underwent radiofrequency ablation for his atrial fibrillation/flutter.  He underwent a procedure in June 2021 Overall, he has felt much improved.    In July 2021, he ended up going back into atrial flutter.  He was seen by electrophysiology who was recommending a repeat ablation study.  He underwent the ablation in late July 2021 which was successful.  He was seen in the office by EP following the procedure and had remained in sinus rhythm.  He subsequently was discontinued on his amiodarone.  In October to November 2022, patient developed some symptoms of shortness of breath with exertion.  This symptom had been fairly new and the patient was sent for testing including a stress test, chest x-ray and echocardiogram.  Stress test was found to be negative for myocardial ischemia.  His echocardiogram showed normal left-ventricular systolic function with mild pulmonary hypertension.  There was evidence of some mild increase in pressures on the right side.   Chest x-ray at the time was found to be grossly normal.  In July 2023, the patient developed 2 episodes of right-sided chest discomfort near his shoulder.  The discomfort would change with rotating his shoulder.  Due to the severity of one of the episodes, he presented to the emergency department and was diagnosed with musculoskeletal pain.  Troponins were negative during that hospitalization.  Additionally, he admitted to continued and progressive dyspnea on exertion a little higher than his baseline.He completed pulmonary function studies in October 2023 showing moderate level of restriction.  The patient was then admitted for possible pneumonia in November 2023.  He was treated with antibiotics.  CT of the chest demonstrated bilateral airspace disease with groundglass opacities.  Concern was for possible pneumonia versus pulmonary edema.  He completed antibiotics.    Following his hospitalization, he took steroids which resulted in some increased swelling in the lower extremity.  He took some increased dose of torsemide with minimal improvement in the swelling.  I have recommended him for hospital admission for his diastolic heart failure in December 2023.  He ended up being placed on diuretic drip with significant drop in weights.  Even initiated Ozempic and continued to lose weight.  He is down to 252 pounds in the office today as of January 2025.  His energy level has improved, his peripheral edema has improved and his breathing is the best that it has been.  He reports his chronic shortness of breath appears to be stable.      Review of Systems:  Review of Systems   Constitutional: Negative for decreased appetite, fever, malaise/fatigue, weight gain and weight loss.   HENT:  Negative for congestion and sore throat.    Eyes:  Negative for visual disturbance.   Cardiovascular:  Positive for leg swelling. Negative for chest pain, dyspnea on exertion, near-syncope and palpitations.   Respiratory:  Negative for  cough and shortness of breath.    Hematologic/Lymphatic: Negative for bleeding problem.   Skin:  Negative for rash.   Musculoskeletal:  Negative for myalgias and neck pain.   Gastrointestinal:  Negative for abdominal pain and nausea.   Neurological:  Negative for light-headedness and weakness.   Psychiatric/Behavioral:  Negative for depression.        Past Medical History:   Diagnosis Date    A-fib ([facility])     ALANNA (acute kidney injury) ([facility]) 06/15/2021    Arthritis     CHF exacerbation ([facility]) 12/07/2023    CPAP (continuous positive airway pressure) dependence     Encounter for cardioversion procedure 05/06/2021    Leukopenia 10/26/2019    Lymphedema     Sleep apnea     Urinary frequency     Wears glasses     Wears partial dentures     lower partial       Past Surgical History:   Procedure Laterality Date    ABLATION SAPHENOUS VEIN W/ RFA      COLONOSCOPY      JOINT REPLACEMENT  Left Hip 4 /21/2009    NERVE BLOCK Bilateral 7/18/2023    Procedure: BLOCK MEDIAL BRANCH B/L L4-L5 AND L5-S1 MBB #1;  Surgeon: Samson Godinez MD;  Location: [facility];  Service: Pain Management     NERVE BLOCK Bilateral 8/17/2023    Procedure: BLOCK MEDIAL BRANCH  B/L L4-5 and L5-S1 MBB #2;  Surgeon: Samson Godinez MD;  Location: [facility];  Service: Pain Management     CT CYSTO INSERTION TRANSPROSTATIC IMPLANT SINGLE N/A 11/13/2017    Procedure: CYSTOSCOPY WITH INSERTION UROLIFT;  Surgeon: Deuce Bennett DO;  Location: [facility];  Service: Urology    RADIOFREQUENCY ABLATION Left 10/5/2023    Procedure: ABLATION RADIO FREQUENCY (RFA) LEFT L4-5 AND L5-S1 RFA;  Surgeon: Samson Godinez MD;  Location: [facility];  Service: Pain Management     RADIOFREQUENCY ABLATION Right 11/1/2023    Procedure: ABLATION RADIO FREQUENCY (RFA) RIGHT L4-5 AND L5-S1 RFA;  Surgeon: Samson Godinez MD;  Location: [facility];  Service: Pain Management     RADIOFREQUENCY ABLATION Left 11/21/2024    Procedure: ABLATION RADIO FREQUENCY (RFA) left  L4-5 and L5-S1;  Surgeon: Samson Godinez MD;  Location: [facility];  Service: Pain Management     TONSILLECTOMY         Social History     Socioeconomic History    Marital status: /Civil Union     Spouse name: Not on file    Number of children: Not on file    Years of education: Not on file    Highest education level: Not on file   Occupational History    Not on file   Tobacco Use    Smoking status: Never     Passive exposure: Never    Smokeless tobacco: Never   Vaping Use    Vaping status: Never Used   Substance and Sexual Activity    Alcohol use: Yes     Alcohol/week: 5.0 standard drinks of alcohol     Types: 3 Glasses of wine, 2 Standard drinks or equivalent per week     Comment: socially    Drug use: No    Sexual activity: Not Currently     Partners: Female     Birth control/protection: Abstinence, Condom Male, Spermicide   Other Topics Concern    Not on file   Social History Narrative    Not on file     Social Drivers of Health     Financial Resource Strain: Low Risk  (8/24/2023)    [facility]    Overall Financial Resource Strain (CARDIA)     Difficulty of Paying Living Expenses: Not hard at all   Food Insecurity: No Food Insecurity (12/7/2023)    Nursing - Inadequate Food Risk Classification     Worried About Running Out of Food in the Last Year: Never true     Ran Out of Food in the Last Year: Never true     Ran Out of Food in the Last Year: Not on file   Transportation Needs: No Transportation Needs (12/7/2023)    PRAPARE - Transportation     Lack of Transportation (Medical): No     Lack of Transportation (Non-Medical): No   Physical Activity: Inactive (8/24/2023)    Received from [facility]    Exercise Vital Sign     Days of Exercise per Week: 0 days     Minutes of Exercise per Session: 0 min   Stress: No Stress Concern Present (8/24/2023)    [facility]     [facility] - Occupational Stress Questionnaire     Feeling of Stress : Not at all   Social Connections: Unknown (6/18/2024)    Received from [facility]     How often do you feel lonely or isolated from those around you? (Adult - for ages 18 years and over): Not on file   Intimate Partner Violence: Not At Risk (8/24/2023)    [facility]    Humiliation, Afraid, Rape, and Kick questionnaire     Fear of Current or Ex-Partner: No     Emotionally Abused: No     Physically Abused: No     Sexually Abused: No   Housing Stability: Low Risk  (12/7/2023)    Housing Stability Vital Sign     Unable to Pay for Housing in the Last Year: No     Number of Times Moved in the Last Year: 1     Homeless in the Last Year: No       Family History   Problem Relation Age of Onset    Heart disease Mother     Lymphoma Father     Cancer Father     Obesity Sister     Heart disease Sister     Heart failure Sister         on diuretic    Obesity Brother     Hypertension Brother     Diabetes Neg Hx     Thyroid disease Neg Hx     Stroke Neg Hx        Allergies   Allergen Reactions    Penicillins Anaphylaxis    Sulfa Antibiotics Anaphylaxis    Levofloxacin Other (See Comments)         Current Outpatient Medications:     acetaminophen (TYLENOL) 500 mg tablet, Take 500 mg by mouth every 6 (six) hours as needed for mild pain, Disp: , Rfl:     alfuzosin (UROXATRAL) 10 mg 24 hr tablet, Take 10 mg by mouth daily, Disp: , Rfl:     allopurinol (ZYLOPRIM) 300 mg tablet, Take 300 mg by mouth daily, Disp: , Rfl:     ammonium lactate (LAC-HYDRIN) 12 % lotion, Apply topically 2 (two) times a day as needed for dry skin, Disp: , Rfl:     Colchicine 0.6 MG CAPS, Take by mouth if needed, Disp: , Rfl:     Diclofenac Sodium (VOLTAREN) 1 %, Apply 2 g topically 4 (four) times a day, Disp: 100 g, Rfl: 0    dutasteride (AVODART) 0.5 mg capsule, Take 0.5 mg by  mouth daily, Disp: , Rfl:     ferrous sulfate 325 (65 Fe) mg tablet, Take 325 mg by mouth, Disp: , Rfl:     Glucosamine-Chondroit-Vit C-Mn (Glucosamine 1500 Complex) CAPS, Take by mouth, Disp: , Rfl:     halobetasol (ULTRAVATE) 0.05 % ointment, APPLY TO AFFECTED AREA ON LEG TWICE DAILY, Disp: , Rfl:     Jardiance 10 MG TABS tablet, take 1 tablet by mouth every morning, Disp: 90 tablet, Rfl: 1    L-ARGININE-500 PO, Take 500 mg by mouth 2 (two) times a day , Disp: , Rfl:     loteprednol etabonate (LOTEMAX) 0.5 % ophthalmic suspension, 1 drop 4 (four) times a day, Disp: , Rfl:     metoprolol succinate (TOPROL-XL) 25 mg 24 hr tablet, Take 1 tablet (25 mg total) by mouth daily, Disp: 90 tablet, Rfl: 1    multivitamin (THERAGRAN) TABS, Take 1 tablet by mouth daily, Disp: , Rfl:     Omega-3 Fatty Acids (fish oil) 1,000 mg, Take 1,000 mg by mouth 2 (two) times a day, Disp: , Rfl:     ondansetron (ZOFRAN) 4 mg tablet, Take 1 tablet (4 mg total) by mouth every 6 (six) hours, Disp: 12 tablet, Rfl: 0    Ozempic, 0.25 or 0.5 MG/DOSE, 2 MG/3ML injection pen, Inject 0.5 mg under the skin every 7 days, Disp: , Rfl:     patient supplied medication, Take 1 each by mouth 2 (two) times a day, Disp: , Rfl:     pregabalin (LYRICA) 100 mg capsule, Take 1 capsule (100 mg total) by mouth 3 (three) times a day, Disp: 42 capsule, Rfl: 0    spironolactone (ALDACTONE) 25 mg tablet, Take 1 tablet (25 mg total) by mouth 2 (two) times a day, Disp: 180 tablet, Rfl: 1    torsemide (DEMADEX) 20 mg tablet, Take 3 tablets (60 mg total) by mouth daily, Disp: 60 tablet, Rfl: 2    traMADol (ULTRAM) 50 mg tablet, Take 50 mg by mouth every 6 (six) hours as needed for moderate pain, Disp: , Rfl:     VITAMIN D PO, Take 2,000 Units by mouth daily , Disp: , Rfl:     Xarelto 20 MG tablet, TAKE 1 TABLET DAILY, Disp: 90 tablet, Rfl: 2    doxycycline hyclate (VIBRA-TABS) 100 mg tablet, take 1 tablet by mouth twice a day for 7 days (Patient not taking: Reported on  1/3/2025), Disp: , Rfl:     tadalafil (CIALIS) 5 MG tablet, Take 5 mg by mouth daily as needed for erectile dysfunction (Patient not taking: Reported on 1/3/2025), Disp: , Rfl:   No current facility-administered medications for this visit.    Vitals:    25 0927   BP: 114/68   BP Location: Left arm   Patient Position: Sitting   Cuff Size: Large   Pulse: 68   Weight: 115 kg (252 lb 12.8 oz)       PHYSICAL EXAMINATION:  Gen: Awake, Alert, NAD  Head/eyes: AT/NC, pupils equal and round, Anicteric  ENT: mmm  Neck: Supple, No elevated JVP, trachea midline  Resp: CTA bilaterally no w/r/r  CV: RRR +S1, S2, No m/r/g  Abd: Soft, obese, NT/ND + BS  Ext: bilateral lower extremities wrapped with +lymphedema  Neuro: Follows commands, moves all extermities  Psych: Appropriate affect, pleasant mood, pleasant attitude, non-combative  Skin: warm; no rash, erythema or venous stasis changes on exposed skin    --------------------------------------------------------------------------------  TREADMILL STRESS  No results found for this or any previous visit.   --------------------------------------------------------------------------------  NUCLEAR STRESS TEST:   Pharmacologic nuclear stress test negative for myocardial ischemia with gated EF 50%, 2020  --------------------------------------------------------------------------------  CATH:  No results found for this or any previous visit.  --------------------------------------------------------------------------------  ECHO:   Results for orders placed during the hospital encounter of 10/26/19   Echo complete with contrast if indicated    Narrative [address] [address]  [phone]    Transthoracic Echocardiogram  2D, M-mode, Doppler, and Color Doppler    Study date:  28-Oct-2019    Patient: CORDELL JIMENEZ  MR number: [id number]  Account number: [id number]  : 1955  Age: 64 years  Gender:  Male  Status: Inpatient  Location: Bedside  Height: 69 in  Weight: 302 lb  BP: 141/ 88 mmHg    Indications: left sided paralysis    Diagnoses: 436 - CVA    Sonographer:  Lucia Hernandez RDCS, CCT  Referring Physician:  Damon Vaughn DO  Group:  [facility]  Interpreting Physician:  Daniele Cerna DO    SUMMARY    LEFT VENTRICLE:  Systolic function was normal. Ejection fraction was estimated to be 55 %.  This study was inadequate for the evaluation of regional wall motion.  Wall thickness was mildly increased.    RIGHT VENTRICLE:  The ventricle was dilated.  Systolic function was normal.    MITRAL VALVE:  There was mild regurgitation.    TRICUSPID VALVE:  There was mild regurgitation.  Estimated peak PA pressure was 50 mmHg.    HISTORY: PRIOR HISTORY: Patient has no history of cardiovascular disease.    PROCEDURE: The procedure was performed at the bedside. This was a routine study. The transthoracic approach was used. The study included complete 2D imaging, M-mode, complete spectral Doppler, and color Doppler. The heart rate was 80 bpm,  at the start of the study. Intravenous contrast (Definity solution [1.3 ml Definity/8.7ml normal saline solution], 3 ml) was administered to opacify the left ventricle. Echocardiographic views were limited due to poor acoustic window  availability. This was a technically difficult study.    LEFT VENTRICLE: Size was normal. Systolic function was normal. Ejection fraction was estimated to be 55 %. This study was inadequate for the evaluation of regional wall motion. Wall thickness was mildly increased. DOPPLER: The study was  not technically sufficient to allow evaluation of LV diastolic function.    RIGHT VENTRICLE: The ventricle was dilated. Systolic function was normal.    LEFT ATRIUM: Size was normal.    RIGHT ATRIUM: Size was normal.    MITRAL VALVE: Valve structure was normal. There was normal leaflet separation. DOPPLER: The transmitral velocity was  within the normal range. There was no evidence for stenosis. There was mild regurgitation.    AORTIC VALVE: The valve was trileaflet. Leaflets exhibited normal thickness and normal cuspal separation. DOPPLER: Transaortic velocity was within the normal range. There was no evidence for stenosis. There was no regurgitation.    TRICUSPID VALVE: The valve structure was normal. There was normal leaflet separation. DOPPLER: The transtricuspid velocity was within the normal range. There was no evidence for stenosis. There was mild regurgitation. Estimated peak PA  pressure was 50 mmHg.    PULMONIC VALVE: Leaflets exhibited normal thickness, no calcification, and normal cuspal separation. DOPPLER: The transpulmonic velocity was within the normal range. There was no regurgitation.    PERICARDIUM: There was no pericardial effusion. The pericardium was normal in appearance.    AORTA: The root exhibited normal size.    SYSTEMIC VEINS: IVC: The inferior vena cava was not well visualized.    SYSTEM MEASUREMENT TABLES    2D  %FS: 34.72 %  Ao Diam: 2.87 cm  EDV(Teich): 143.23 ml  EF(Teich): 63.36 %  ESV(Teich): 52.47 ml  IVSd: 1.15 cm  LA Diam: 4.16 cm  LVIDd: 5.43 cm  LVIDs: 3.55 cm  LVPWd: 1.04 cm  RWT: 0.38  SV(Teich): 90.75 ml    CW  AV Vmax: 1.34 m/s  AV maxP.13 mmHg  RAP: 0 mmHg  TR Vmax: 3.26 m/s  TR maxP.5 mmHg    PW  E' Sept: 0.09 m/s  MV E Terrance: 1.03 m/s  RVSP: 42.5 mmHg    [facility] Accredited Echocardiography Laboratory    Prepared and electronically signed by    Daniele Cerna DO  Signed 28-Oct-2019 10:37:38       LVEF 55%, mild LVH, paradoxical septal wall motion, mild RV dilatation, mild LA dilatation, mild to moderate RA dilatation, trace MR/TR, 2021  LVEF 65%, mild LVH, normal diastolic function, septal motion consistent with elevated PVR, mild RV dilatation with normal function, mild biatrial dilatation, trace MR/TR with PASP 37 mmHg (poor Doppler signal), January  "2023  --------------------------------------------------------------------------------  HOLTER  No results found for this or any previous visit.  No results found for this or any previous visit.  --------------------------------------------------------------------------------  CAROTIDS  No results found for this or any previous visit.   --------------------------------------------------------------------------------  ECGs:  No results found for this visit on 01/03/25.       Lab Results   Component Value Date    WBC 3.95 (L) 08/13/2024    HGB 14.0 08/13/2024    HCT 42.1 08/13/2024    MCV 93 08/13/2024     (L) 08/13/2024      Lab Results   Component Value Date    SODIUM 135 08/13/2024    K 3.9 08/13/2024     08/13/2024    CO2 25 08/13/2024    BUN 20 08/13/2024    CREATININE 1.06 08/13/2024    GLUC 108 08/13/2024    CALCIUM 9.8 08/13/2024      Lab Results   Component Value Date    HGBA1C 5.5 09/07/2024      No results found for: \"CHOL\"  Lab Results   Component Value Date    HDL 39 (L) 09/07/2024    HDL 54 04/19/2023    HDL 50 04/28/2022     Lab Results   Component Value Date    LDLCALC 104 (H) 09/07/2024    LDLCALC 135 (H) 04/19/2023    LDLCALC 142 (H) 04/28/2022     Lab Results   Component Value Date    TRIG 153 (H) 09/07/2024    TRIG 193 (H) 04/19/2023    TRIG 118 04/28/2022     No results found for: \"CHOLHDL\"   Lab Results   Component Value Date    INR 2.38 (H) 07/03/2024    INR 2.97 (H) 07/03/2024    INR 1.54 (H) 07/28/2021    PROTIME 25.5 (H) 07/03/2024    PROTIME 31.0 (H) 07/03/2024    PROTIME 18.4 (H) 07/28/2021        1. Chronic heart failure with preserved ejection fraction (HFpEF) (HCC)  2. Paroxysmal atrial flutter (HCC)  3. Abnormal ECG  4. Severe obesity (BMI 35.0-39.9) with comorbidity (HCC)      IMPRESSION:  Chronic HFpEF  LVEF 65%, grade II diastolic dysfunction, October 2023  Paroxysmal atrial fibrillation/flutter on Xarelto; had long standing since before October 2019 - now in SR  s/p " DCCV in May 2021   s/p RFA ablation (June 2021 and July 2021)  Holter with AF, avg HR 92 bpm, rare VPCs, September 2020  History of bilateral venous insufficiency status post LE vein ablation  Abnormal ECG with bifascicular block  Pharmacologic nuclear stress test appears negative for myocardial ischemia, gated EF 65%, January 2023  Moderate pulmonary hypertension  Moderate restrictive lung disease  Lymphedema  Severe obesity  ROBERT on 20/14 cm BiPAP    PLAN:  It was a pleasure to see Leonardo in the office today for follow-up CV evaluation.  He is here today with continued weight loss and no significant change in his chronic shortness of breath.  He has been dealing with arthritic issues, but plans to continue to increase his physical activity in the coming days.  His weights have decreased from 330 pounds down to 252 pounds.  He has no chest pain concerning for angina and no signs or symptoms of heart failure.  Clinically he examines to be euvolemic.  He is tolerating his current medications without any reported adverse effects including his Ozempic.  He can perform greater than 4 METS on a daily basis without significant exertional symptoms.  Blood pressure and heart rate are currently stable.  Based on his clinical presentation, I have the following recommendations:    1.  Recommend 30 minutes a day, 5 days a week of moderate intensity activity to build cardiovascular endurance.  2.  Would encourage a heart healthy diet low in sodium and carbohydrate.  Recommend salt restriction of less than 1500 mg/day.  3.  Continue current antihypertensive regimen including spironolactone.  4.  Continue torsemide with chronic lower extremity edema/lymphedema.  5.  Continue Jardiance for blood glucose management and heart failure.  Blood glucose management as per primary care.  6.  Xarelto for thromboembolic prophylaxis  7.  As he has had no significant changes in any of his symptoms, no additional CV testing at this time.  8.   "We will follow-up with him in 6 months to reassess his progress.    As always, please do not hesitate to call with any questions.    Portions of the record may have been created with voice recognition software. Occasional wrong word or \"sound a like\" substitutions may have occurred due to the inherent limitations of voice recognition software. Read the chart carefully and recognize, using context, where substitutions have occurred.      Signed: Deo Prieto DO, FACC, FASE, FASNC, FACP  "

## 2025-01-06 NOTE — TELEPHONE ENCOUNTER
Vinay..    S/w pt s/p Rt L4-5, L5-S1 RFA on 1/2/25 RM. Pt stated needle sites look good, denies S/S of infect, denies fever, denies soreness and denies sun burn like sensation. Pt denies pain at this time. Advised pt if they have any further pain to take OTC/prescribed meds and/or use ice/heat and that it can take 4-6wks to see full effect. Confirmed nxt appt w/ pt 2/5/25 @ 1000 BK @ [place]. Pt verbalized understanding and apprec of call.

## 2025-01-15 DIAGNOSIS — I50.32 CHRONIC HEART FAILURE WITH PRESERVED EJECTION FRACTION (HFPEF) (HCC): ICD-10-CM

## 2025-01-16 RX ORDER — EMPAGLIFLOZIN 10 MG/1
10 TABLET, FILM COATED ORAL EVERY MORNING
Qty: 90 TABLET | Refills: 1 | Status: SHIPPED | OUTPATIENT
Start: 2025-01-16

## 2025-02-05 ENCOUNTER — OFFICE VISIT (OUTPATIENT)
Dept: PAIN MEDICINE | Facility: CLINIC | Age: 70
End: 2025-02-05
Payer: COMMERCIAL

## 2025-02-05 VITALS
BODY MASS INDEX: 37.62 KG/M2 | OXYGEN SATURATION: 96 % | HEART RATE: 61 BPM | RESPIRATION RATE: 16 BRPM | WEIGHT: 254 LBS | HEIGHT: 69 IN | TEMPERATURE: 97.9 F

## 2025-02-05 DIAGNOSIS — M54.50 CHRONIC BILATERAL LOW BACK PAIN WITHOUT SCIATICA: ICD-10-CM

## 2025-02-05 DIAGNOSIS — G89.4 CHRONIC PAIN SYNDROME: Primary | Chronic | ICD-10-CM

## 2025-02-05 DIAGNOSIS — G89.29 CHRONIC BILATERAL LOW BACK PAIN WITHOUT SCIATICA: ICD-10-CM

## 2025-02-05 DIAGNOSIS — M47.816 LUMBAR SPONDYLOSIS: ICD-10-CM

## 2025-02-05 PROCEDURE — 99213 OFFICE O/P EST LOW 20 MIN: CPT | Performed by: NURSE PRACTITIONER

## 2025-02-05 NOTE — PROGRESS NOTES
Assessment:  1. Chronic pain syndrome    2. Chronic bilateral low back pain without sciatica    3. Lumbar spondylosis        Plan:  While the patient was in the office today, I did have a thorough conversation regarding their chronic pain syndrome, medication management, and treatment plan options.  Patient is being seen for a follow-up visit.  He underwent a left-sided L4-5 L5-S1 radiofrequency ablation on 11/21/2024.  The right side was performed on 1/2/2025.  Patient is reporting about 95% improvement in his typical low back pain.  Pain has become very minimal.    Continue with home exercise program for lumbar core strengthening/stretching.    I discussed with the patient that at this point time he can follow up with our office on an as-needed basis. I did review the patient that if his pain symptoms should change, worsen, and/or if he would experience any new symptoms he would like to be evaluated for, he should give our office a call. The patient was agreeable and verbalized an understanding.      History of Present Illness:  The patient is a 69 y.o. male who presents for a follow up office visit in regards to Back Pain.   The patient’s current symptoms include complaints of low back pain.  Current pain level is 1/10.  Quality of pain is described as dull and aching.    Current pain medications includes: None.  \    I have personally reviewed and/or updated the patient's past medical history, past surgical history, family history, social history, current medications, allergies, and vital signs today.         Review of Systems  Review of Systems   Constitutional: Negative.    HENT: Negative.     Eyes: Negative.    Respiratory: Negative.     Cardiovascular: Negative.    Gastrointestinal: Negative.    Endocrine: Negative.    Genitourinary: Negative.    Musculoskeletal:  Positive for back pain.        Joint stiffness   Skin: Negative.    Allergic/Immunologic: Negative.    Neurological: Negative.    Hematological:  Negative.    Psychiatric/Behavioral: Negative.             Past Medical History:   Diagnosis Date    A-fib (HCC)     ALANNA (acute kidney injury) (HCC) 06/15/2021    Arthritis     CHF exacerbation (HCC) 12/07/2023    CPAP (continuous positive airway pressure) dependence     Encounter for cardioversion procedure 05/06/2021    Leukopenia 10/26/2019    Lymphedema     Sleep apnea     Urinary frequency     Wears glasses     Wears partial dentures     lower partial       Past Surgical History:   Procedure Laterality Date    ABLATION SAPHENOUS VEIN W/ RFA      COLONOSCOPY      JOINT REPLACEMENT  Left Hip 4 /21/2009    NERVE BLOCK Bilateral 7/18/2023    Procedure: BLOCK MEDIAL BRANCH B/L L4-L5 AND L5-S1 MBB #1;  Surgeon: Samson Godinez MD;  [facility];  Service: Pain Management     NERVE BLOCK Bilateral 8/17/2023    Procedure: BLOCK MEDIAL BRANCH  B/L L4-5 and L5-S1 MBB #2;  Surgeon: Samson Godinez MD;  [facility];  Service: Pain Management     [place] CYSTO INSERTION TRANSPROSTATIC IMPLANT SINGLE N/A 11/13/2017    Procedure: CYSTOSCOPY WITH INSERTION UROLIFT;  Surgeon: Deuce Bennett DO;  [facility];  Service: Urology    RADIOFREQUENCY ABLATION Left 10/5/2023    Procedure: ABLATION RADIO FREQUENCY (RFA) LEFT L4-5 AND L5-S1 RFA;  Surgeon: Samson Godinez MD;  [facility];  Service: Pain Management     RADIOFREQUENCY ABLATION Right 11/1/2023    Procedure: ABLATION RADIO FREQUENCY (RFA) RIGHT L4-5 AND L5-S1 RFA;  Surgeon: Samson Godinez MD;  [facility];  Service: Pain Management     RADIOFREQUENCY ABLATION Left 11/21/2024    Procedure: ABLATION RADIO FREQUENCY (RFA) left L4-5 and L5-S1;  Surgeon: Samson Godinez MD;  [facility];  Service: Pain Management     RADIOFREQUENCY ABLATION Right 1/2/2025    Procedure: ABLATION RADIO FREQUENCY (RFA)L4-5 and L5-S1 RFA;  Surgeon: Samson Godinez MD;  [facility];  Service: Pain Management      TONSILLECTOMY         Family History   Problem Relation Age of Onset    Heart disease Mother     Lymphoma Father     Cancer Father     Obesity Sister     Heart disease Sister     Heart failure Sister         on diuretic    Obesity Brother     Hypertension Brother     Diabetes Neg Hx     Thyroid disease Neg Hx     Stroke Neg Hx        Social History     Occupational History    Not on file   Tobacco Use    Smoking status: Never     Passive exposure: Never    Smokeless tobacco: Never   Vaping Use    Vaping status: Never Used   Substance and Sexual Activity    Alcohol use: Yes     Alcohol/week: 5.0 standard drinks of alcohol     Types: 3 Glasses of wine, 2 Standard drinks or equivalent per week     Comment: socially    Drug use: No    Sexual activity: Not Currently     Partners: Female     Birth control/protection: Abstinence, Condom Male, Spermicide         Current Outpatient Medications:     acetaminophen (TYLENOL) 500 mg tablet, Take 500 mg by mouth every 6 (six) hours as needed for mild pain, Disp: , Rfl:     alfuzosin (UROXATRAL) 10 mg 24 hr tablet, Take 10 mg by mouth daily, Disp: , Rfl:     allopurinol (ZYLOPRIM) 300 mg tablet, Take 300 mg by mouth daily, Disp: , Rfl:     ammonium lactate (LAC-HYDRIN) 12 % lotion, Apply topically 2 (two) times a day as needed for dry skin, Disp: , Rfl:     Colchicine 0.6 MG CAPS, Take by mouth if needed, Disp: , Rfl:     dutasteride (AVODART) 0.5 mg capsule, Take 0.5 mg by mouth daily, Disp: , Rfl:     ferrous sulfate 325 (65 Fe) mg tablet, Take 325 mg by mouth, Disp: , Rfl:     Glucosamine-Chondroit-Vit C-Mn (Glucosamine 1500 Complex) CAPS, Take by mouth, Disp: , Rfl:     halobetasol (ULTRAVATE) 0.05 % ointment, APPLY TO AFFECTED AREA ON LEG TWICE DAILY, Disp: , Rfl:     Jardiance 10 MG TABS tablet, take 1 tablet by mouth every morning, Disp: 90 tablet, Rfl: 1    L-ARGININE-500 PO, Take 500 mg by mouth 2 (two) times a day , Disp: , Rfl:     loteprednol etabonate (LOTEMAX) 0.5 %  ophthalmic suspension, 1 drop 4 (four) times a day, Disp: , Rfl:     metoprolol succinate (TOPROL-XL) 25 mg 24 hr tablet, Take 1 tablet (25 mg total) by mouth daily, Disp: 90 tablet, Rfl: 1    multivitamin (THERAGRAN) TABS, Take 1 tablet by mouth daily, Disp: , Rfl:     Omega-3 Fatty Acids (fish oil) 1,000 mg, Take 1,000 mg by mouth 2 (two) times a day, Disp: , Rfl:     ondansetron (ZOFRAN) 4 mg tablet, Take 1 tablet (4 mg total) by mouth every 6 (six) hours, Disp: 12 tablet, Rfl: 0    Ozempic, 0.25 or 0.5 MG/DOSE, 2 MG/3ML injection pen, Inject 0.5 mg under the skin every 7 days, Disp: , Rfl:     patient supplied medication, Take 1 each by mouth 2 (two) times a day, Disp: , Rfl:     pregabalin (LYRICA) 100 mg capsule, Take 1 capsule (100 mg total) by mouth 3 (three) times a day, Disp: 42 capsule, Rfl: 0    spironolactone (ALDACTONE) 25 mg tablet, Take 1 tablet (25 mg total) by mouth 2 (two) times a day, Disp: 180 tablet, Rfl: 1    torsemide (DEMADEX) 20 mg tablet, Take 3 tablets (60 mg total) by mouth daily, Disp: 60 tablet, Rfl: 2    traMADol (ULTRAM) 50 mg tablet, Take 50 mg by mouth every 6 (six) hours as needed for moderate pain, Disp: , Rfl:     VITAMIN D PO, Take 2,000 Units by mouth daily , Disp: , Rfl:     Xarelto 20 MG tablet, TAKE 1 TABLET DAILY, Disp: 90 tablet, Rfl: 2    Diclofenac Sodium (VOLTAREN) 1 %, Apply 2 g topically 4 (four) times a day (Patient not taking: Reported on 2/5/2025), Disp: 100 g, Rfl: 0    doxycycline hyclate (VIBRA-TABS) 100 mg tablet, take 1 tablet by mouth twice a day for 7 days (Patient not taking: Reported on 1/3/2025), Disp: , Rfl:     tadalafil (CIALIS) 5 MG tablet, Take 5 mg by mouth daily as needed for erectile dysfunction (Patient not taking: Reported on 1/3/2025), Disp: , Rfl:     Allergies   Allergen Reactions    Penicillins Anaphylaxis    Sulfa Antibiotics Anaphylaxis    Levofloxacin Other (See Comments)       Physical Exam:    Pulse 61   Temp 97.9 °F (36.6 °C)  "(Temporal)   Resp 16   Ht 5' 9\" (1.753 m)   Wt 115 kg (254 lb)   SpO2 96%   BMI 37.51 kg/m²     Constitutional:normal, well developed, well nourished, alert, in no distress and non-toxic and no overt pain behavior.  Eyes:anicteric  HEENT:grossly intact  Neck:supple, symmetric, trachea midline and no masses   Pulmonary:even and unlabored  Cardiovascular:No edema or pitting edema present  Skin:Normal without rashes or lesions and well hydrated  Psychiatric:Mood and affect appropriate  Neurologic:Cranial Nerves II-XII grossly intact  Musculoskeletal:normal    Imaging  No orders to display       No orders of the defined types were placed in this encounter.       Study Result    Narrative & Impression   MRI LUMBAR SPINE WITHOUT CONTRAST     INDICATION: G89.4: Chronic pain syndrome  M47.816: Spondylosis without myelopathy or radiculopathy, lumbar region  M54.50: Low back pain, unspecified  G89.29: Other chronic pain.     COMPARISON: X-rays dated 3/8/2023     TECHNIQUE:  Multiplanar, multisequence imaging of the lumbar spine was performed. .        IMAGE QUALITY:  Diagnostic     FINDINGS:     VERTEBRAL BODIES:  There are 5 lumbar type vertebral bodies. Dextroscoliosis of the thoracolumbar spine. No spondylolisthesis or spondylolysis. No compression fracture. There is a chronic Schmorl's node within the inferior endplate of the L3 vertebral   body. Normal marrow signal is identified within the visualized bony structures.  No discrete marrow lesion.     SACRUM:  Normal signal within the sacrum. No evidence of insufficiency or stress fracture.     DISTAL CORD AND CONUS:  Normal size and signal within the distal cord and conus.     PARASPINAL SOFT TISSUES:  Paraspinal soft tissues are unremarkable.     LOWER THORACIC DISC SPACES:  Normal disc height and signal.  No disc herniation, canal stenosis or foraminal narrowing.     LUMBAR DISC SPACES:     L1-L2:  Normal.     L2-L3: Disc desiccation. Mild diffuse annular bulging. " There is a far lateral disc osteophyte complex on the left and mild facet arthropathy. No significant central canal stenosis. Mild left-sided foraminal narrowing.     L3-L4: Disc desiccation and loss of disc height with mild annular bulging and facet arthropathy. No significant canal stenosis. Mild foraminal narrowing.     L4-L5: Loss of disc height with mild annular bulging and disc desiccation. Mild facet arthropathy. There is no canal stenosis. Slight right-sided foraminal narrowing without discrete nerve impingement.     L5-S1: Disc desiccation and loss of disc height with mild annular bulging and facet arthropathy. No canal stenosis. Minor foraminal narrowing without foraminal nerve impingement, primarily right-sided.     OTHER FINDINGS:  None.     IMPRESSION:     Smooth dextroscoliosis of the thoracolumbar spine with multilevel lumbar noncompressive degenerative change.        Workstation performed: [id number]

## 2025-02-17 DIAGNOSIS — I48.4 ATYPICAL ATRIAL FLUTTER (HCC): ICD-10-CM

## 2025-02-17 DIAGNOSIS — I50.33 ACUTE ON CHRONIC DIASTOLIC CONGESTIVE HEART FAILURE (HCC): ICD-10-CM

## 2025-02-18 RX ORDER — RIVAROXABAN 20 MG/1
20 TABLET, FILM COATED ORAL DAILY
Qty: 90 TABLET | Refills: 0 | Status: SHIPPED | OUTPATIENT
Start: 2025-02-18

## 2025-02-18 RX ORDER — METOPROLOL SUCCINATE 25 MG/1
25 TABLET, EXTENDED RELEASE ORAL DAILY
Qty: 90 TABLET | Refills: 0 | Status: SHIPPED | OUTPATIENT
Start: 2025-02-18

## 2025-02-27 ENCOUNTER — OFFICE VISIT (OUTPATIENT)
Dept: URGENT CARE | Facility: CLINIC | Age: 70
End: 2025-02-27
Payer: COMMERCIAL

## 2025-02-27 VITALS
DIASTOLIC BLOOD PRESSURE: 69 MMHG | OXYGEN SATURATION: 96 % | SYSTOLIC BLOOD PRESSURE: 117 MMHG | HEART RATE: 58 BPM | RESPIRATION RATE: 18 BRPM | TEMPERATURE: 98.1 F

## 2025-02-27 DIAGNOSIS — I83.899 BLEEDING FROM VARICOSE VEIN: Primary | ICD-10-CM

## 2025-02-27 PROCEDURE — S9083 URGENT CARE CENTER GLOBAL: HCPCS

## 2025-02-27 PROCEDURE — 99214 OFFICE O/P EST MOD 30 MIN: CPT

## 2025-02-27 NOTE — PROGRESS NOTES
[facility]        NAME: Leonardo Oviedo is a 69 y.o. male  : 1955    MRN: [id number]  DATE: 2025  TIME: 1:26 PM    Assessment and Plan   Bleeding from varicose vein [I83.899]  1. Bleeding from varicose vein  Transfer to other facility        Adt21 completed and pt sent to [facility] for further eval of his bleeding from varicosity of left medial malleolar region. Pt denies injury or trauma.  Patient does take daily Xarelto blood thinner and took his daily dose today.  Patient was at the local [email or web address] and another participant pointed out to him that he was bleeding. Left sock and shoe noted to have significant amount of blood contained within when removed from patient. Pt denies pain to area. Lymphedema compression stocking removed also. All belongings removed from patient (minus the sock as he instructed staff to dispose of it) were bagged and sent with patient to the hospital on EMS transfer. Bleeding noted to be controlled on transfer. Dressing applied is noted to be clean, dry and intact when care transferred to EMS ([facility]).     Patient required immediate staff attention x3 to mitigate bleeding/further blood loss.  Manual pressure applied to area until shoe, sock, and lymphedema compression sleeve could be removed from the left lower extremity.  Once removed, Surgifoam was applied by the provider to the area and held in place with manual pressure x 3 minutes.  Area was then covered with 10 stacked 4 x 4's, ABD pad, and secured with compression Ace wrap.  Patient tolerated well and without discomfort or incident.    Patient Instructions     Pt sent to ER via ambulance for eval of his bleeding     Chief Complaint     Chief Complaint   Patient presents with    Ankle Injury     1300 Came in with large amounts of bleeding from left ankle/foot  Blood spurting out in a thin stream when area uncovered  Ambulance called for transport to ER  1320  Arrived and took  patient         History of Present Illness       69-year-old male with significant past medical history presents to this clinic for uncontrolled bleeding from his left inner ankle area.  Patient reports he was at a local [employer] gym and another participant pointed out to him that he was experiencing bleeding.  Patient then noted his shoe and sock to be saturated with blood and presented to this clinic for further evaluation.  On removal of patient's sock and lymphedema compression sleeve on the left lower leg blood was noted to be squirting in a small stream from the left inner ankle region.  Bleeding was controlled with manual pressure initially.  Patient's shoe, sock, and lymphedema compression sleeve were all removed from the area.  Patient denies any dizziness, visual changes, headache, feelings of lightheadedness, syncope, or near syncope.        Review of Systems   Review of Systems   Skin:  Positive for wound.         Current Medications       Current Outpatient Medications:     acetaminophen (TYLENOL) 500 mg tablet, Take 500 mg by mouth every 6 (six) hours as needed for mild pain, Disp: , Rfl:     alfuzosin (UROXATRAL) 10 mg 24 hr tablet, Take 10 mg by mouth daily, Disp: , Rfl:     allopurinol (ZYLOPRIM) 300 mg tablet, Take 300 mg by mouth daily, Disp: , Rfl:     ammonium lactate (LAC-HYDRIN) 12 % lotion, Apply topically 2 (two) times a day as needed for dry skin, Disp: , Rfl:     Colchicine 0.6 MG CAPS, Take by mouth if needed, Disp: , Rfl:     Diclofenac Sodium (VOLTAREN) 1 %, Apply 2 g topically 4 (four) times a day (Patient not taking: Reported on 2/5/2025), Disp: 100 g, Rfl: 0    doxycycline hyclate (VIBRA-TABS) 100 mg tablet, take 1 tablet by mouth twice a day for 7 days (Patient not taking: Reported on 1/3/2025), Disp: , Rfl:     dutasteride (AVODART) 0.5 mg capsule, Take 0.5 mg by mouth daily, Disp: , Rfl:     ferrous sulfate 325 (65 Fe) mg tablet, Take 325 mg by mouth, Disp: , Rfl:      Glucosamine-Chondroit-Vit C-Mn (Glucosamine 1500 Complex) CAPS, Take by mouth, Disp: , Rfl:     halobetasol (ULTRAVATE) 0.05 % ointment, APPLY TO AFFECTED AREA ON LEG TWICE DAILY, Disp: , Rfl:     Jardiance 10 MG TABS tablet, take 1 tablet by mouth every morning, Disp: 90 tablet, Rfl: 1    L-ARGININE-500 PO, Take 500 mg by mouth 2 (two) times a day , Disp: , Rfl:     loteprednol etabonate (LOTEMAX) 0.5 % ophthalmic suspension, 1 drop 4 (four) times a day, Disp: , Rfl:     metoprolol succinate (TOPROL-XL) 25 mg 24 hr tablet, TAKE 1 TABLET DAILY, Disp: 90 tablet, Rfl: 0    multivitamin (THERAGRAN) TABS, Take 1 tablet by mouth daily, Disp: , Rfl:     Omega-3 Fatty Acids (fish oil) 1,000 mg, Take 1,000 mg by mouth 2 (two) times a day, Disp: , Rfl:     ondansetron (ZOFRAN) 4 mg tablet, Take 1 tablet (4 mg total) by mouth every 6 (six) hours, Disp: 12 tablet, Rfl: 0    Ozempic, 0.25 or 0.5 MG/DOSE, 2 MG/3ML injection pen, Inject 0.5 mg under the skin every 7 days, Disp: , Rfl:     patient supplied medication, Take 1 each by mouth 2 (two) times a day, Disp: , Rfl:     pregabalin (LYRICA) 100 mg capsule, Take 1 capsule (100 mg total) by mouth 3 (three) times a day, Disp: 42 capsule, Rfl: 0    rivaroxaban (Xarelto) 20 mg tablet, TAKE 1 TABLET DAILY, Disp: 90 tablet, Rfl: 0    spironolactone (ALDACTONE) 25 mg tablet, Take 1 tablet (25 mg total) by mouth 2 (two) times a day, Disp: 180 tablet, Rfl: 1    tadalafil (CIALIS) 5 MG tablet, Take 5 mg by mouth daily as needed for erectile dysfunction (Patient not taking: Reported on 1/3/2025), Disp: , Rfl:     torsemide (DEMADEX) 20 mg tablet, Take 3 tablets (60 mg total) by mouth daily, Disp: 60 tablet, Rfl: 2    traMADol (ULTRAM) 50 mg tablet, Take 50 mg by mouth every 6 (six) hours as needed for moderate pain, Disp: , Rfl:     VITAMIN D PO, Take 2,000 Units by mouth daily , Disp: , Rfl:     Current Allergies     Allergies as of 02/27/2025 - Reviewed 02/27/2025   Allergen Reaction  Noted    Penicillins Anaphylaxis 11/09/2017    Sulfa antibiotics Anaphylaxis 11/09/2017    Levofloxacin Other (See Comments) 11/22/2022            The following portions of the patient's history were reviewed and updated as appropriate: allergies, current medications, past family history, past medical history, past social history, past surgical history and problem list.     Past Medical History:   Diagnosis Date    A-fib ([facility])     ALANNA (acute kidney injury) ([facility]) 06/15/2021    Arthritis     CHF exacerbation ([facility]) 12/07/2023    CPAP (continuous positive airway pressure) dependence     Encounter for cardioversion procedure 05/06/2021    Leukopenia 10/26/2019    Lymphedema     Sleep apnea     Urinary frequency     Wears glasses     Wears partial dentures     lower partial       Past Surgical History:   Procedure Laterality Date    ABLATION SAPHENOUS VEIN W/ RFA      COLONOSCOPY      JOINT REPLACEMENT  Left Hip 4 /21/2009    NERVE BLOCK Bilateral 7/18/2023    Procedure: BLOCK MEDIAL BRANCH B/L L4-L5 AND L5-S1 MBB #1;  Surgeon: Samson Godinez MD;  [facility];  Service: Pain Management     NERVE BLOCK Bilateral 8/17/2023    Procedure: BLOCK MEDIAL BRANCH  B/L L4-5 and L5-S1 MBB #2;  Surgeon: Samson Godinez MD;  [facility];  Service: Pain Management     [place] CYSTO INSERTION TRANSPROSTATIC IMPLANT SINGLE N/A 11/13/2017    Procedure: CYSTOSCOPY WITH INSERTION UROLIFT;  Surgeon: Deuce Bennett DO;  [facility];  Service: Urology    RADIOFREQUENCY ABLATION Left 10/5/2023    Procedure: ABLATION RADIO FREQUENCY (RFA) LEFT L4-5 AND L5-S1 RFA;  Surgeon: Samson Godinez MD;  [facility];  Service: Pain Management     RADIOFREQUENCY ABLATION Right 11/1/2023    Procedure: ABLATION RADIO FREQUENCY (RFA) RIGHT L4-5 AND L5-S1 RFA;  Surgeon: Samson Godinez MD;  [facility];  Service: Pain Management     RADIOFREQUENCY ABLATION Left 11/21/2024    Procedure: ABLATION RADIO  FREQUENCY (RFA) left L4-5 and L5-S1;  Surgeon: Samson Godinez MD;  [facility];  Service: Pain Management     RADIOFREQUENCY ABLATION Right 1/2/2025    Procedure: ABLATION RADIO FREQUENCY (RFA)L4-5 and L5-S1 RFA;  Surgeon: Samson Godinez MD;  [facility];  Service: Pain Management     TONSILLECTOMY         Family History   Problem Relation Age of Onset    Heart disease Mother     Lymphoma Father     Cancer Father     Obesity Sister     Heart disease Sister     Heart failure Sister         on diuretic    Obesity Brother     Hypertension Brother     Diabetes Neg Hx     Thyroid disease Neg Hx     Stroke Neg Hx          Medications have been verified.        Objective   /69   Pulse 58   Temp 98.1 °F (36.7 °C)   Resp 18   SpO2 96%        Physical Exam     Physical Exam  Vitals and nursing note reviewed.   Constitutional:       Appearance: Normal appearance. He is obese.   HENT:      Head: Normocephalic and atraumatic.   Cardiovascular:      Rate and Rhythm: Normal rate.      Pulses: Normal pulses.   Pulmonary:      Effort: Pulmonary effort is normal.   Musculoskeletal:        Feet:       Comments: Small varicose vein noted to be squirting blood and a small stream when uncovered.   Skin:     General: Skin is warm.      Capillary Refill: Capillary refill takes less than 2 seconds.   Neurological:      General: No focal deficit present.      Mental Status: He is alert and oriented to person, place, and time.

## 2025-03-04 DIAGNOSIS — I83.009 VARICOSE VEINS OF UNSPECIFIED LOWER EXTREMITY WITH ULCER OF UNSPECIFIED SITE (CODE) (HCC): Primary | ICD-10-CM

## 2025-03-10 ENCOUNTER — APPOINTMENT (OUTPATIENT)
Dept: LAB | Facility: HOSPITAL | Age: 70
End: 2025-03-10
Payer: COMMERCIAL

## 2025-03-10 DIAGNOSIS — I48.0 PAROXYSMAL ATRIAL FIBRILLATION (HCC): ICD-10-CM

## 2025-03-10 LAB
ANION GAP SERPL CALCULATED.3IONS-SCNC: 7 MMOL/L (ref 4–13)
BASOPHILS # BLD AUTO: 0.03 THOUSANDS/ÂΜL (ref 0–0.1)
BASOPHILS NFR BLD AUTO: 1 % (ref 0–1)
BUN SERPL-MCNC: 31 MG/DL (ref 5–25)
CALCIUM SERPL-MCNC: 9.4 MG/DL (ref 8.4–10.2)
CHLORIDE SERPL-SCNC: 99 MMOL/L (ref 96–108)
CO2 SERPL-SCNC: 30 MMOL/L (ref 21–32)
CREAT SERPL-MCNC: 1.32 MG/DL (ref 0.6–1.3)
EOSINOPHIL # BLD AUTO: 0.1 THOUSAND/ÂΜL (ref 0–0.61)
EOSINOPHIL NFR BLD AUTO: 3 % (ref 0–6)
ERYTHROCYTE [DISTWIDTH] IN BLOOD BY AUTOMATED COUNT: 13.9 % (ref 11.6–15.1)
GFR SERPL CREATININE-BSD FRML MDRD: 54 ML/MIN/1.73SQ M
GLUCOSE P FAST SERPL-MCNC: 87 MG/DL (ref 65–99)
HCT VFR BLD AUTO: 39 % (ref 36.5–49.3)
HGB BLD-MCNC: 13.1 G/DL (ref 12–17)
IMM GRANULOCYTES # BLD AUTO: 0.01 THOUSAND/UL (ref 0–0.2)
IMM GRANULOCYTES NFR BLD AUTO: 0 % (ref 0–2)
LYMPHOCYTES # BLD AUTO: 0.94 THOUSANDS/ÂΜL (ref 0.6–4.47)
LYMPHOCYTES NFR BLD AUTO: 25 % (ref 14–44)
MCH RBC QN AUTO: 31 PG (ref 26.8–34.3)
MCHC RBC AUTO-ENTMCNC: 33.6 G/DL (ref 31.4–37.4)
MCV RBC AUTO: 92 FL (ref 82–98)
MONOCYTES # BLD AUTO: 0.38 THOUSAND/ÂΜL (ref 0.17–1.22)
MONOCYTES NFR BLD AUTO: 10 % (ref 4–12)
NEUTROPHILS # BLD AUTO: 2.34 THOUSANDS/ÂΜL (ref 1.85–7.62)
NEUTS SEG NFR BLD AUTO: 61 % (ref 43–75)
NRBC BLD AUTO-RTO: 0 /100 WBCS
PLATELET # BLD AUTO: 150 THOUSANDS/UL (ref 149–390)
PMV BLD AUTO: 12.1 FL (ref 8.9–12.7)
POTASSIUM SERPL-SCNC: 4.3 MMOL/L (ref 3.5–5.3)
RBC # BLD AUTO: 4.22 MILLION/UL (ref 3.88–5.62)
SODIUM SERPL-SCNC: 136 MMOL/L (ref 135–147)
WBC # BLD AUTO: 3.8 THOUSAND/UL (ref 4.31–10.16)

## 2025-03-10 PROCEDURE — 85025 COMPLETE CBC W/AUTO DIFF WBC: CPT

## 2025-03-10 PROCEDURE — 36415 COLL VENOUS BLD VENIPUNCTURE: CPT

## 2025-03-10 PROCEDURE — 80048 BASIC METABOLIC PNL TOTAL CA: CPT

## 2025-03-13 ENCOUNTER — CONSULT (OUTPATIENT)
Dept: VASCULAR SURGERY | Facility: CLINIC | Age: 70
End: 2025-03-13
Payer: COMMERCIAL

## 2025-03-13 VITALS
OXYGEN SATURATION: 95 % | BODY MASS INDEX: 35.7 KG/M2 | TEMPERATURE: 98.4 F | SYSTOLIC BLOOD PRESSURE: 110 MMHG | HEIGHT: 69 IN | WEIGHT: 241 LBS | DIASTOLIC BLOOD PRESSURE: 70 MMHG | HEART RATE: 61 BPM

## 2025-03-13 DIAGNOSIS — I83.892 BLEEDING FROM VARICOSE VEINS OF LEFT LOWER EXTREMITY: Primary | ICD-10-CM

## 2025-03-13 DIAGNOSIS — I83.009 VARICOSE VEINS OF UNSPECIFIED LOWER EXTREMITY WITH ULCER OF UNSPECIFIED SITE (CODE) (HCC): ICD-10-CM

## 2025-03-13 PROCEDURE — 99203 OFFICE O/P NEW LOW 30 MIN: CPT | Performed by: NURSE PRACTITIONER

## 2025-03-18 ENCOUNTER — HOSPITAL ENCOUNTER (EMERGENCY)
Facility: HOSPITAL | Age: 70
Discharge: HOME | End: 2025-03-18
Attending: EMERGENCY MEDICINE | Admitting: EMERGENCY MEDICINE
Payer: COMMERCIAL

## 2025-03-18 ENCOUNTER — APPOINTMENT (EMERGENCY)
Dept: RADIOLOGY | Facility: HOSPITAL | Age: 70
End: 2025-03-18
Payer: COMMERCIAL

## 2025-03-18 VITALS
DIASTOLIC BLOOD PRESSURE: 57 MMHG | RESPIRATION RATE: 18 BRPM | OXYGEN SATURATION: 94 % | TEMPERATURE: 99.8 F | HEART RATE: 67 BPM | SYSTOLIC BLOOD PRESSURE: 106 MMHG

## 2025-03-18 DIAGNOSIS — R11.2 NAUSEA VOMITING AND DIARRHEA: Primary | ICD-10-CM

## 2025-03-18 DIAGNOSIS — R19.7 NAUSEA VOMITING AND DIARRHEA: Primary | ICD-10-CM

## 2025-03-18 LAB
ALBUMIN SERPL BCG-MCNC: 4.2 G/DL (ref 3.5–5)
ALP SERPL-CCNC: 55 U/L (ref 34–104)
ALT SERPL W P-5'-P-CCNC: 8 U/L (ref 7–52)
ANION GAP SERPL CALCULATED.3IONS-SCNC: 7 MMOL/L (ref 4–13)
ANISOCYTOSIS BLD QL SMEAR: PRESENT
AST SERPL W P-5'-P-CCNC: 16 U/L (ref 13–39)
BASOPHILS # BLD MANUAL: 0 THOUSAND/UL (ref 0–0.1)
BASOPHILS NFR MAR MANUAL: 0 % (ref 0–1)
BILIRUB SERPL-MCNC: 0.81 MG/DL (ref 0.2–1)
BILIRUB UR QL STRIP: NEGATIVE
BUN SERPL-MCNC: 27 MG/DL (ref 5–25)
CALCIUM SERPL-MCNC: 9.4 MG/DL (ref 8.4–10.2)
CHLORIDE SERPL-SCNC: 98 MMOL/L (ref 96–108)
CLARITY UR: CLEAR
CO2 SERPL-SCNC: 28 MMOL/L (ref 21–32)
COLOR UR: YELLOW
CREAT SERPL-MCNC: 1.21 MG/DL (ref 0.6–1.3)
EOSINOPHIL # BLD MANUAL: 0.05 THOUSAND/UL (ref 0–0.4)
EOSINOPHIL NFR BLD MANUAL: 1 % (ref 0–6)
ERYTHROCYTE [DISTWIDTH] IN BLOOD BY AUTOMATED COUNT: 14.1 % (ref 11.6–15.1)
FLUAV RNA RESP QL NAA+PROBE: NEGATIVE
FLUBV RNA RESP QL NAA+PROBE: NEGATIVE
GFR SERPL CREATININE-BSD FRML MDRD: 60 ML/MIN/1.73SQ M
GLUCOSE SERPL-MCNC: 126 MG/DL (ref 65–140)
GLUCOSE UR STRIP-MCNC: ABNORMAL MG/DL
HCT VFR BLD AUTO: 41.5 % (ref 36.5–49.3)
HGB BLD-MCNC: 14 G/DL (ref 12–17)
HGB UR QL STRIP.AUTO: NEGATIVE
KETONES UR STRIP-MCNC: ABNORMAL MG/DL
LEUKOCYTE ESTERASE UR QL STRIP: NEGATIVE
LYMPHOCYTES # BLD AUTO: 0.24 THOUSAND/UL (ref 0.6–4.47)
LYMPHOCYTES # BLD AUTO: 5 % (ref 14–44)
MCH RBC QN AUTO: 30.9 PG (ref 26.8–34.3)
MCHC RBC AUTO-ENTMCNC: 33.7 G/DL (ref 31.4–37.4)
MCV RBC AUTO: 92 FL (ref 82–98)
MONOCYTES # BLD AUTO: 0.05 THOUSAND/UL (ref 0–1.22)
MONOCYTES NFR BLD: 1 % (ref 4–12)
NEUTROPHILS # BLD MANUAL: 4.4 THOUSAND/UL (ref 1.85–7.62)
NEUTS SEG NFR BLD AUTO: 93 % (ref 43–75)
NITRITE UR QL STRIP: NEGATIVE
PH UR STRIP.AUTO: 5 [PH]
PLATELET # BLD AUTO: 124 THOUSANDS/UL (ref 149–390)
PLATELET BLD QL SMEAR: ABNORMAL
PLATELET CLUMP BLD QL SMEAR: PRESENT
PMV BLD AUTO: 11.9 FL (ref 8.9–12.7)
POTASSIUM SERPL-SCNC: 3.7 MMOL/L (ref 3.5–5.3)
PROCALCITONIN SERPL-MCNC: 0.42 NG/ML
PROT SERPL-MCNC: 7.3 G/DL (ref 6.4–8.4)
PROT UR STRIP-MCNC: NEGATIVE MG/DL
RBC # BLD AUTO: 4.53 MILLION/UL (ref 3.88–5.62)
RBC MORPH BLD: PRESENT
RSV RNA RESP QL NAA+PROBE: NEGATIVE
SARS-COV-2 RNA RESP QL NAA+PROBE: NEGATIVE
SODIUM SERPL-SCNC: 133 MMOL/L (ref 135–147)
SP GR UR STRIP.AUTO: 1.01 (ref 1–1.03)
UROBILINOGEN UR STRIP-ACNC: <2 MG/DL
WBC # BLD AUTO: 4.73 THOUSAND/UL (ref 4.31–10.16)

## 2025-03-18 PROCEDURE — 36415 COLL VENOUS BLD VENIPUNCTURE: CPT | Performed by: EMERGENCY MEDICINE

## 2025-03-18 PROCEDURE — 80053 COMPREHEN METABOLIC PANEL: CPT | Performed by: EMERGENCY MEDICINE

## 2025-03-18 PROCEDURE — 85027 COMPLETE CBC AUTOMATED: CPT | Performed by: EMERGENCY MEDICINE

## 2025-03-18 PROCEDURE — 93005 ELECTROCARDIOGRAM TRACING: CPT

## 2025-03-18 PROCEDURE — 84145 PROCALCITONIN (PCT): CPT | Performed by: EMERGENCY MEDICINE

## 2025-03-18 PROCEDURE — 96374 THER/PROPH/DIAG INJ IV PUSH: CPT

## 2025-03-18 PROCEDURE — 99284 EMERGENCY DEPT VISIT MOD MDM: CPT

## 2025-03-18 PROCEDURE — 85007 BL SMEAR W/DIFF WBC COUNT: CPT | Performed by: EMERGENCY MEDICINE

## 2025-03-18 PROCEDURE — 99285 EMERGENCY DEPT VISIT HI MDM: CPT | Performed by: EMERGENCY MEDICINE

## 2025-03-18 PROCEDURE — 71046 X-RAY EXAM CHEST 2 VIEWS: CPT

## 2025-03-18 PROCEDURE — 0241U HB NFCT DS VIR RESP RNA 4 TRGT: CPT | Performed by: EMERGENCY MEDICINE

## 2025-03-18 PROCEDURE — 96361 HYDRATE IV INFUSION ADD-ON: CPT

## 2025-03-18 RX ORDER — ONDANSETRON 2 MG/ML
4 INJECTION INTRAMUSCULAR; INTRAVENOUS ONCE
Status: COMPLETED | OUTPATIENT
Start: 2025-03-18 | End: 2025-03-18

## 2025-03-18 RX ORDER — ONDANSETRON 4 MG/1
4 TABLET, ORALLY DISINTEGRATING ORAL EVERY 6 HOURS PRN
Qty: 20 TABLET | Refills: 0 | Status: SHIPPED | OUTPATIENT
Start: 2025-03-18

## 2025-03-18 RX ADMIN — SODIUM CHLORIDE 500 ML: 0.9 INJECTION, SOLUTION INTRAVENOUS at 03:50

## 2025-03-18 RX ADMIN — ONDANSETRON 4 MG: 2 INJECTION INTRAMUSCULAR; INTRAVENOUS at 02:42

## 2025-03-18 NOTE — ED PROVIDER NOTES
Time reflects when diagnosis was documented in both MDM as applicable and the Disposition within this note       Time User Action Codes Description Comment    3/18/2025  5:26 AM RooksAshley King Add [R11.2,  R19.7] Nausea vomiting and diarrhea           ED Disposition       ED Disposition   Discharge    Condition   Stable    Date/Time   Tue Mar 18, 2025  5:26 AM    Comment   Leonardo Oviedo discharge to home/self care.                   Assessment & Plan       Medical Decision Making  Amount and/or Complexity of Data Reviewed  Labs: ordered.  Radiology: ordered.    Risk  Prescription drug management.      69-year-old male with history of A-fib who presents for evaluation of fevers and vomiting and diarrhea for 2 days, patient is overall well-appearing, afebrile here, vitals normal.  Likely viral gastroenteritis.  Will check labs to evaluate for anemia or leukocytosis, CMP to evaluate for metabolic abnormality, viral swab, chest x-ray to evaluate for pneumonia, UA to evaluate for UTI.  Will treat with antiemetics and fluids and reassess.  Reviewed labs, no marked abnormalities.  Viral swab negative.  UA negative for UTI.  Chest x-ray reviewed and interpreted, no acute cardiopulmonary disease.  Reassessed patient, he states his nausea has improved, he is tolerating p.o.  He feels comfortable with plan for discharge.  Discussed with patient strict return precautions.  Patient expressed understanding and was agreeable for discharge.       Medications   ondansetron (ZOFRAN) injection 4 mg (4 mg Intravenous Given 3/18/25 0242)   sodium chloride 0.9 % bolus 500 mL (0 mL Intravenous Stopped 3/18/25 0530)       ED Risk Strat Scores                                                History of Present Illness       Chief Complaint   Patient presents with    Vomiting     Pt reports that while at the gym yesterday he began not feeling right. Pt reports he had chills and was fatigued. Last night pt reports episodes of vomiting. Pt  reports that he has had fevers and nausea since. Denies any pain.       Past Medical History:   Diagnosis Date    A-fib (HCC)     ALANNA (acute kidney injury) (HCC) 06/15/2021    Arthritis     CHF exacerbation (HCC) 12/07/2023    CPAP (continuous positive airway pressure) dependence     Encounter for cardioversion procedure 05/06/2021    Leukopenia 10/26/2019    Lymphedema     Sleep apnea     Urinary frequency     Wears glasses     Wears partial dentures     lower partial      Past Surgical History:   Procedure Laterality Date    ABLATION SAPHENOUS VEIN W/ RFA      COLONOSCOPY      JOINT REPLACEMENT  Left Hip 4 /21/2009    NERVE BLOCK Bilateral 7/18/2023    Procedure: BLOCK MEDIAL BRANCH B/L L4-L5 AND L5-S1 MBB #1;  Surgeon: Samson Godinez MD;  Location: [facility];  Service: Pain Management     NERVE BLOCK Bilateral 8/17/2023    Procedure: BLOCK MEDIAL BRANCH  B/L L4-5 and L5-S1 MBB #2;  Surgeon: Samson Godinez MD;  Location: [facility];  Service: Pain Management     [place] CYSTO INSERTION TRANSPROSTATIC IMPLANT SINGLE N/A 11/13/2017    Procedure: CYSTOSCOPY WITH INSERTION UROLIFT;  Surgeon: Deuce Bennett DO;  Location: [facility];  Service: Urology    RADIOFREQUENCY ABLATION Left 10/5/2023    Procedure: ABLATION RADIO FREQUENCY (RFA) LEFT L4-5 AND L5-S1 RFA;  Surgeon: Samson Godinez MD;  Location: [facility];  Service: Pain Management     RADIOFREQUENCY ABLATION Right 11/1/2023    Procedure: ABLATION RADIO FREQUENCY (RFA) RIGHT L4-5 AND L5-S1 RFA;  Surgeon: Samson Godinez MD;  Location: [facility];  Service: Pain Management     RADIOFREQUENCY ABLATION Left 11/21/2024    Procedure: ABLATION RADIO FREQUENCY (RFA) left L4-5 and L5-S1;  Surgeon: Samson Godinez MD;  Location: [facility];  Service: Pain Management     RADIOFREQUENCY ABLATION Right 1/2/2025    Procedure: ABLATION RADIO FREQUENCY (RFA)L4-5 and L5-S1 RFA;  Surgeon: Samson Godinez MD;  Location: [facility];  Service: Pain  Management     TONSILLECTOMY        Family History   Problem Relation Age of Onset    Heart disease Mother     Lymphoma Father     Cancer Father     Obesity Sister     Heart disease Sister     Heart failure Sister         on diuretic    Obesity Brother     Hypertension Brother     Diabetes Neg Hx     Thyroid disease Neg Hx     Stroke Neg Hx       Social History     Tobacco Use    Smoking status: Never     Passive exposure: Never    Smokeless tobacco: Never   Vaping Use    Vaping status: Never Used   Substance Use Topics    Alcohol use: Yes     Alcohol/week: 5.0 standard drinks of alcohol     Types: 3 Glasses of wine, 2 Standard drinks or equivalent per week     Comment: socially    Drug use: No      E-Cigarette/Vaping    E-Cigarette Use Never User       E-Cigarette/Vaping Substances    Nicotine No     THC No     CBD No     Flavoring No     Other No     Unknown No       I have reviewed and agree with the history as documented.     HPI    69-year-old male with history of A-fib who presents for evaluation of fevers and vomiting.  Patient states he started with fevers and chills yesterday.  He has been having nausea, vomiting, and diarrhea.  He denies any abdominal pain.  Denies chest pain or shortness of breath.  Denies cough or congestion.  Denies any rashes.  Denies urinary symptoms.  Denies any known recent sick contacts.    Review of Systems   Constitutional:  Positive for chills and fever. Negative for appetite change.   HENT:  Negative for congestion, rhinorrhea and sore throat.    Respiratory:  Negative for cough and shortness of breath.    Cardiovascular:  Negative for chest pain.   Gastrointestinal:  Positive for diarrhea, nausea and vomiting. Negative for abdominal pain.   Genitourinary:  Negative for dysuria, frequency, hematuria and urgency.   Musculoskeletal:  Negative for arthralgias and myalgias.   Skin:  Negative for rash.   Neurological:  Negative for dizziness, weakness, light-headedness, numbness and  headaches.   All other systems reviewed and are negative.          Objective       ED Triage Vitals [03/18/25 0224]   Temperature Pulse Blood Pressure Respirations SpO2 Patient Position - Orthostatic VS   99.5 °F (37.5 °C) 76 101/54 18 94 % --      Temp Source Heart Rate Source BP Location FiO2 (%) Pain Score    Temporal Monitor Left arm -- No Pain      Vitals      Date and Time Temp Pulse SpO2 Resp BP Pain Score FACES Pain Rating User   03/18/25 0530 99.8 °F (37.7 °C) 67 94 % 18 106/57 No Pain -- LC   03/18/25 0400 99 °F (37.2 °C) 65 94 % 18 125/60 No Pain -- LC   03/18/25 0224 99.5 °F (37.5 °C) 76 94 % 18 101/54 No Pain -- CD            Physical Exam  Vitals and nursing note reviewed.   Constitutional:       General: He is not in acute distress.     Appearance: Normal appearance. He is well-developed. He is obese. He is not ill-appearing, toxic-appearing or diaphoretic.   HENT:      Head: Normocephalic and atraumatic.      Right Ear: External ear normal.      Left Ear: External ear normal.      Nose: Nose normal.      Mouth/Throat:      Mouth: Mucous membranes are moist.      Pharynx: Oropharynx is clear.   Eyes:      Extraocular Movements: Extraocular movements intact.      Conjunctiva/sclera: Conjunctivae normal.   Cardiovascular:      Rate and Rhythm: Normal rate and regular rhythm.      Pulses: Normal pulses.      Heart sounds: Normal heart sounds. No murmur heard.     No friction rub. No gallop.   Pulmonary:      Effort: Pulmonary effort is normal. No respiratory distress.      Breath sounds: Normal breath sounds. No wheezing or rales.   Abdominal:      General: There is no distension.      Palpations: Abdomen is soft.      Tenderness: There is no abdominal tenderness. There is no guarding or rebound.   Musculoskeletal:         General: No tenderness.      Cervical back: Neck supple.      Right lower leg: No edema.      Left lower leg: No edema.   Skin:     General: Skin is warm and dry.      Coloration: Skin  is not pale.      Findings: No rash.   Neurological:      General: No focal deficit present.      Mental Status: He is alert and oriented to person, place, and time.      Cranial Nerves: No cranial nerve deficit.      Sensory: No sensory deficit.      Motor: No weakness.   Psychiatric:         Mood and Affect: Mood normal.         Behavior: Behavior normal.         Results Reviewed       Procedure Component Value Units Date/Time    UA w Reflex to Microscopic w Reflex to Culture [[id number]]  (Abnormal) Collected: 03/18/25 0504    Lab Status: Final result Specimen: Urine, Clean Catch Updated: 03/18/25 0515     Color, UA Yellow     Clarity, UA Clear     Specific Gravity, UA 1.015     pH, UA 5.0     Leukocytes, UA Negative     Nitrite, UA Negative     Protein, UA Negative mg/dl      Glucose, UA 1000 (1%) mg/dl      Ketones, UA Trace mg/dl      Urobilinogen, UA <2.0 mg/dl      Bilirubin, UA Negative     Occult Blood, UA Negative    Manual Differential(PHLEBS Do Not Order) [[id number]]  (Abnormal) Collected: 03/18/25 0243    Lab Status: Final result Specimen: Blood from Arm, Right Updated: 03/18/25 0439     Segmented % 93 %      Lymphocytes % 5 %      Monocytes % 1 %      Eosinophils % 1 %      Basophils % 0 %      Absolute Neutrophils 4.40 Thousand/uL      Absolute Lymphocytes 0.24 Thousand/uL      Absolute Monocytes 0.05 Thousand/uL      Absolute Eosinophils 0.05 Thousand/uL      Absolute Basophils 0.00 Thousand/uL      Total Counted --     RBC Morphology Present     Platelet Estimate Decreased     Clumped Platelets Present     Anisocytosis Present    FLU/RSV/COVID - if FLU/RSV clinically relevant (2hr TAT) [[id number]]  (Normal) Collected: 03/18/25 0243    Lab Status: Final result Specimen: Nares from Nose Updated: 03/18/25 0344     SARS-CoV-2 Negative     INFLUENZA A PCR Negative     INFLUENZA B PCR Negative     RSV PCR Negative    Narrative:      This test has been performed using the CoV-2/Flu/RSV plus assay on the  [employer] Gene[employer]pert platform. This test has been validated by the  and verified by the performing laboratory.     This test is designed to amplify and detect the following: nucleocapsid (N), envelope (E), and RNA-dependent RNA polymerase (RdRP) genes of the SARS-CoV-2 genome; matrix (M), basic polymerase (PB2), and acidic protein (PA) segments of the influenza A genome; matrix (M) and non-structural protein (NS) segments of the influenza B genome, and the nucleocapsid genes of RSV A and RSV B.     Positive results are indicative of the presence of Flu A, Flu B, RSV, and/or SARS-CoV-2 RNA. Positive results for SARS-CoV-2 or suspected novel influenza should be reported to state, local, or federal health departments according to local reporting requirements.      All results should be assessed in conjunction with clinical presentation and other laboratory markers for clinical management.     FOR PEDIATRIC PATIENTS - copy/paste COVID Guidelines URL to browser: [email or web address]       Procalcitonin [[id number]]  (Abnormal) Collected: 03/18/25 0243    Lab Status: Final result Specimen: Blood from Arm, Right Updated: 03/18/25 0328     Procalcitonin 0.42 ng/ml     Comprehensive metabolic panel [[id number]]  (Abnormal) Collected: 03/18/25 0243    Lab Status: Final result Specimen: Blood from Arm, Right Updated: 03/18/25 0320     Sodium 133 mmol/L      Potassium 3.7 mmol/L      Chloride 98 mmol/L      CO2 28 mmol/L      ANION GAP 7 mmol/L      BUN 27 mg/dL      Creatinine 1.21 mg/dL      Glucose 126 mg/dL      Calcium 9.4 mg/dL      AST 16 U/L      ALT 8 U/L      Alkaline Phosphatase 55 U/L      Total Protein 7.3 g/dL      Albumin 4.2 g/dL      Total Bilirubin 0.81 mg/dL      eGFR 60 ml/min/1.73sq m     Narrative:      National Kid[employer] Disease Foundation guidelines for Chronic Kidney Disease (CKD):     Stage 1 with normal or high GFR (GFR > 90 mL/min/1.73 square  meters)    Stage 2 Mild CKD (GFR = 60-89 mL/min/1.73 square meters)    Stage 3A Moderate CKD (GFR = 45-59 mL/min/1.73 square meters)    Stage 3B Moderate CKD (GFR = 30-44 mL/min/1.73 square meters)    Stage 4 Severe CKD (GFR = 15-29 mL/min/1.73 square meters)    Stage 5 End Stage CKD (GFR <15 mL/min/1.73 square meters)  Note: GFR calculation is accurate only with a steady state creatinine    CBC and differential [[id number]]  (Abnormal) Collected: 03/18/25 0243    Lab Status: Final result Specimen: Blood from Arm, Right Updated: 03/18/25 0304     WBC 4.73 Thousand/uL      RBC 4.53 Million/uL      Hemoglobin 14.0 g/dL      Hematocrit 41.5 %      MCV 92 fL      MCH 30.9 pg      MCHC 33.7 g/dL      RDW 14.1 %      MPV 11.9 fL      Platelets 124 Thousands/uL     Narrative:      This is an appended report.  These results have been appended to a previously verified report.            XR chest 2 views    (Results Pending)       ECG 12 Lead Documentation Only    Date/Time: 3/18/2025 3:44 AM    Performed by: Ashley Mathew MD  Authorized by: Ashley Mathew MD    Indications / Diagnosis:  Vomiting  ECG reviewed by me, the ED Provider: yes    Patient location:  ED  Previous ECG:     Previous ECG:  Compared to current    Similarity:  No change    Comparison to cardiac monitor: Yes    Interpretation:     Interpretation: non-specific    Rate:     ECG rate:  73    ECG rate assessment: normal    Rhythm:     Rhythm: sinus rhythm    Ectopy:     Ectopy: none    QRS:     QRS axis:  Normal    QRS intervals:  Wide  Conduction:     Conduction: abnormal      Abnormal conduction: complete RBBB    ST segments:     ST segments:  Normal  T waves:     T waves: normal        ED Medication and Procedure Management   Prior to Admission Medications   Prescriptions Last Dose Informant Patient Reported? Taking?   Colchicine 0.6 MG CAPS  Self Yes No   Sig: Take by mouth if needed   Diclofenac Sodium (VOLTAREN) 1 %  Self No No   Sig: Apply 2 g  topically 4 (four) times a day   Patient not taking: Reported on 2025   Glucosamine-Chondroit-Vit C-Mn (Glucosamine 1500 Complex) CAPS  Self Yes No   Sig: Take by mouth   Jardiance 10 MG TABS tablet  Self No No   Sig: take 1 tablet by mouth every morning   L-ARGININE-500 PO  Self Yes No   Sig: Take 500 mg by mouth 2 (two) times a day    Omega-3 Fatty Acids (fish oil) 1,000 mg  Self Yes No   Sig: Take 1,000 mg by mouth 2 (two) times a day   Ozempic, 0.25 or 0.5 MG/DOSE, 2 MG/3ML injection pen  Self Yes No   Sig: Inject 0.5 mg under the skin every 7 days   VITAMIN D PO  Self Yes No   Sig: Take 2,000 Units by mouth daily    acetaminophen (TYLENOL) 500 mg tablet  Self Yes No   Sig: Take 500 mg by mouth every 6 (six) hours as needed for mild pain   alfuzosin (UROXATRAL) 10 mg 24 hr tablet  Self Yes No   Sig: Take 10 mg by mouth daily   allopurinol (ZYLOPRIM) 300 mg tablet  Self Yes No   Sig: Take 300 mg by mouth daily   ammonium lactate (LAC-HYDRIN) 12 % lotion  Self Yes No   Sig: Apply topically 2 (two) times a day as needed for dry skin   doxycycline hyclate (VIBRA-TABS) 100 mg tablet  Self Yes No   Sig: take 1 tablet by mouth twice a day for 7 days   Patient not taking: Reported on 1/3/2025   dutasteride (AVODART) 0.5 mg capsule  Self Yes No   Sig: Take 0.5 mg by mouth daily   ferrous sulfate 325 (65 Fe) mg tablet  Self Yes No   Sig: Take 325 mg by mouth   halobetasol (ULTRAVATE) 0.05 % ointment  Self Yes No   Sig: APPLY TO AFFECTED AREA ON LEG TWICE DAILY   loteprednol etabonate (LOTEMAX) 0.5 % ophthalmic suspension  Self Yes No   Si drop 4 (four) times a day   metoprolol succinate (TOPROL-XL) 25 mg 24 hr tablet  Self No No   Sig: TAKE 1 TABLET DAILY   multivitamin (THERAGRAN) TABS  Self Yes No   Sig: Take 1 tablet by mouth daily   ondansetron (ZOFRAN) 4 mg tablet  Self No No   Sig: Take 1 tablet (4 mg total) by mouth every 6 (six) hours   Patient not taking: Reported on 3/13/2025   patient supplied  medication  Self Yes No   Sig: Take 1 each by mouth 2 (two) times a day   pregabalin (LYRICA) 100 mg capsule  Self No No   Sig: Take 1 capsule (100 mg total) by mouth 3 (three) times a day   rivaroxaban (Xarelto) 20 mg tablet  Self No No   Sig: TAKE 1 TABLET DAILY   spironolactone (ALDACTONE) 25 mg tablet  Self No No   Sig: Take 1 tablet (25 mg total) by mouth 2 (two) times a day   tadalafil (CIALIS) 5 MG tablet  Self Yes No   Sig: Take 5 mg by mouth daily as needed for erectile dysfunction   Patient not taking: Reported on 1/3/2025   torsemide (DEMADEX) 20 mg tablet  Self No No   Sig: Take 3 tablets (60 mg total) by mouth daily   traMADol (ULTRAM) 50 mg tablet  Self Yes No   Sig: Take 50 mg by mouth every 6 (six) hours as needed for moderate pain      Facility-Administered Medications: None     Discharge Medication List as of 3/18/2025  5:28 AM        START taking these medications    Details   ondansetron (ZOFRAN-ODT) 4 mg disintegrating tablet Take 1 tablet (4 mg total) by mouth every 6 (six) hours as needed for nausea or vomiting, Starting Tue 3/18/2025, Normal           CONTINUE these medications which have NOT CHANGED    Details   acetaminophen (TYLENOL) 500 mg tablet Take 500 mg by mouth every 6 (six) hours as needed for mild pain, Historical Med      alfuzosin (UROXATRAL) 10 mg 24 hr tablet Take 10 mg by mouth daily, Historical Med      allopurinol (ZYLOPRIM) 300 mg tablet Take 300 mg by mouth daily, Starting Mon 5/1/2023, Historical Med      ammonium lactate (LAC-HYDRIN) 12 % lotion Apply topically 2 (two) times a day as needed for dry skin, Historical Med      Colchicine 0.6 MG CAPS Take by mouth if needed, Historical Med      Diclofenac Sodium (VOLTAREN) 1 % Apply 2 g topically 4 (four) times a day, Starting Mon 9/12/2022, Print      doxycycline hyclate (VIBRA-TABS) 100 mg tablet take 1 tablet by mouth twice a day for 7 days, Historical Med      dutasteride (AVODART) 0.5 mg capsule Take 0.5 mg by mouth  daily, Starting Thu 10/26/2023, Historical Med      ferrous sulfate 325 (65 Fe) mg tablet Take 325 mg by mouth, Historical Med      Glucosamine-Chondroit-Vit C-Mn (Glucosamine 1500 Complex) CAPS Take by mouth, Historical Med      halobetasol (ULTRAVATE) 0.05 % ointment APPLY TO AFFECTED AREA ON LEG TWICE DAILY, Historical Med      Jardiance 10 MG TABS tablet take 1 tablet by mouth every morning, Starting Thu 1/16/2025, Normal      L-ARGININE-500 PO Take 500 mg by mouth 2 (two) times a day , Historical Med      loteprednol etabonate (LOTEMAX) 0.5 % ophthalmic suspension 1 drop 4 (four) times a day, Historical Med      metoprolol succinate (TOPROL-XL) 25 mg 24 hr tablet TAKE 1 TABLET DAILY, Starting Tue 2/18/2025, Normal      multivitamin (THERAGRAN) TABS Take 1 tablet by mouth daily, Historical Med      Omega-3 Fatty Acids (fish oil) 1,000 mg Take 1,000 mg by mouth 2 (two) times a day, Historical Med      ondansetron (ZOFRAN) 4 mg tablet Take 1 tablet (4 mg total) by mouth every 6 (six) hours, Starting Tue 8/13/2024, Normal      Ozempic, 0.25 or 0.5 MG/DOSE, 2 MG/3ML injection pen Inject 0.5 mg under the skin every 7 days, Starting Wed 5/15/2024, Historical Med      patient supplied medication Take 1 each by mouth 2 (two) times a day, Historical Med      pregabalin (LYRICA) 100 mg capsule Take 1 capsule (100 mg total) by mouth 3 (three) times a day, Starting Fri 1/29/2021, Normal      rivaroxaban (Xarelto) 20 mg tablet TAKE 1 TABLET DAILY, Starting Tue 2/18/2025, Normal      spironolactone (ALDACTONE) 25 mg tablet Take 1 tablet (25 mg total) by mouth 2 (two) times a day, Starting Fri 4/14/2023, Normal      tadalafil (CIALIS) 5 MG tablet Take 5 mg by mouth daily as needed for erectile dysfunction, Historical Med      torsemide (DEMADEX) 20 mg tablet Take 3 tablets (60 mg total) by mouth daily, Starting Fri 1/19/2024, Normal      traMADol (ULTRAM) 50 mg tablet Take 50 mg by mouth every 6 (six) hours as needed for  moderate pain, Historical Med      VITAMIN D PO Take 2,000 Units by mouth daily , Historical Med           No discharge procedures on file.  ED SEPSIS DOCUMENTATION   Time reflects when diagnosis was documented in both MDM as applicable and the Disposition within this note       Time User Action Codes Description Comment    3/18/2025  5:26 AM Ashley Mathew Add [R11.2,  R19.7] Nausea vomiting and diarrhea                  Ashley Mathew MD  03/18/25 0737

## 2025-03-18 NOTE — DISCHARGE INSTRUCTIONS
Please continue to drink fluids to stay hydrated, please take zofran every 6 hours as needed for nausea.

## 2025-03-20 LAB
ATRIAL RATE: 73 BPM
PR INTERVAL: 216 MS
QRS AXIS: 232 DEGREES
QRSD INTERVAL: 160 MS
QT INTERVAL: 410 MS
QTC INTERVAL: 451 MS
T WAVE AXIS: 15 DEGREES
VENTRICULAR RATE: 73 BPM

## 2025-03-20 PROCEDURE — 93010 ELECTROCARDIOGRAM REPORT: CPT | Performed by: INTERNAL MEDICINE

## 2025-04-10 ENCOUNTER — HOSPITAL ENCOUNTER (OUTPATIENT)
Dept: NON INVASIVE DIAGNOSTICS | Facility: HOSPITAL | Age: 70
Discharge: HOME | End: 2025-04-10
Payer: COMMERCIAL

## 2025-04-10 ENCOUNTER — OFFICE VISIT (OUTPATIENT)
Dept: VASCULAR SURGERY | Facility: CLINIC | Age: 70
End: 2025-04-10
Payer: COMMERCIAL

## 2025-04-10 VITALS
DIASTOLIC BLOOD PRESSURE: 58 MMHG | BODY MASS INDEX: 34.36 KG/M2 | WEIGHT: 232 LBS | HEIGHT: 69 IN | HEART RATE: 63 BPM | OXYGEN SATURATION: 96 % | SYSTOLIC BLOOD PRESSURE: 98 MMHG

## 2025-04-10 DIAGNOSIS — E66.01 SEVERE OBESITY (BMI 35.0-39.9) WITH COMORBIDITY (HCC): Primary | ICD-10-CM

## 2025-04-10 DIAGNOSIS — I83.892 BLEEDING FROM VARICOSE VEINS OF LEFT LOWER EXTREMITY: ICD-10-CM

## 2025-04-10 DIAGNOSIS — I87.2 VENOUS INSUFFICIENCY OF BOTH LOWER EXTREMITIES: Primary | ICD-10-CM

## 2025-04-10 DIAGNOSIS — E78.5 DYSLIPIDEMIA: ICD-10-CM

## 2025-04-10 PROCEDURE — 93971 EXTREMITY STUDY: CPT

## 2025-04-10 PROCEDURE — 99213 OFFICE O/P EST LOW 20 MIN: CPT | Performed by: SURGERY

## 2025-04-10 PROCEDURE — 93971 EXTREMITY STUDY: CPT | Performed by: SURGERY

## 2025-04-10 RX ORDER — ROSUVASTATIN CALCIUM 5 MG/1
5 TABLET, COATED ORAL DAILY
Qty: 90 TABLET | Refills: 3 | Status: SHIPPED | OUTPATIENT
Start: 2025-04-10 | End: 2025-07-09

## 2025-04-10 RX ORDER — FLUTICASONE PROPIONATE 50 MCG
2 SPRAY, SUSPENSION (ML) NASAL DAILY
COMMUNITY
Start: 2025-04-03

## 2025-04-10 NOTE — ASSESSMENT & PLAN NOTE
69-year-old male with a history of lymphedema and bilateral venous insufficiency.  Patient has a history of bilateral GSV and SSV ablation.  Patient states he has been losing weight which has helped with his lymphedema and venous insufficiency.  He states he had a bleeding event a few months ago from one of the veins on his left ankle and is concerned about another event happening again.  He had seen our nurse practitioner in the office for sclerotherapy and she sent him to me to evaluate for any further need for superficial venous intervention prior to sclerotherapy.    Patient had an insufficiency study which I reviewed he does have deep venous insufficiency and no sign of recannulation of his great saphenous vein.  There is some small reflux in his distal calf however I do not feel as though this is significant enough that it needs intervention prior to sclerotherapy.  From my standpoint, okay to proceed with sclero.  Will have him return to see our vascular NP in the office

## 2025-04-10 NOTE — PROGRESS NOTES
Name: Leonardo Oviedo      : 1955      MRN: [id number]  Encounter Provider: Damon Powell MD  Encounter Date: 4/10/2025   Encounter department: [facility] [place]  :  Assessment & Plan  Venous insufficiency of both lower extremities  69-year-old male with a history of lymphedema and bilateral venous insufficiency.  Patient has a history of bilateral GSV and SSV ablation.  Patient states he has been losing weight which has helped with his lymphedema and venous insufficiency.  He states he had a bleeding event a few months ago from one of the veins on his left ankle and is concerned about another event happening again.  He had seen our nurse practitioner in the office for sclerotherapy and she sent him to me to evaluate for any further need for superficial venous intervention prior to sclerotherapy.    Patient had an insufficiency study which I reviewed he does have deep venous insufficiency and no sign of recannulation of his great saphenous vein.  There is some small reflux in his distal calf however I do not feel as though this is significant enough that it needs intervention prior to sclerotherapy.  From my standpoint, okay to proceed with sclero.  Will have him return to see our vascular NP in the office           History of Present Illness   HPI  Leonardo Oviedo is a 69 y.o. male who presents     Patient is here for result review of LEVDR 04/10/2025.Patient wear compression sock daily for 12 hours or more. Patient has bulging vein on his left foot. Patient denies any open wounds.     History obtained from: patient    Review of Systems   Constitutional: Negative.    HENT: Negative.     Eyes: Negative.    Respiratory: Negative.     Cardiovascular: Negative.    Gastrointestinal: Negative.    Endocrine: Negative.    Genitourinary: Negative.    Musculoskeletal: Negative.    Skin:  Positive for color change (bilateral lower legs).   Allergic/Immunologic: Negative.    Neurological:  "Negative.    Hematological: Negative.    Psychiatric/Behavioral: Negative.       Medical History Reviewed by provider this encounter:  Tobacco  Allergies  Meds  Problems  Med Hx  Surg Hx  Fam Hx     .     Objective   BP 98/58 (BP Location: Left arm, Patient Position: Sitting, Cuff Size: Standard)   Pulse 63   Ht 5' 9\" (1.753 m)   Wt 105 kg (232 lb)   SpO2 96%   BMI 34.26 kg/m²      Physical Exam  Vitals and nursing note reviewed.   Constitutional:       Appearance: He is well-developed.   HENT:      Head: Normocephalic and atraumatic.   Eyes:      Conjunctiva/sclera: Conjunctivae normal.   Cardiovascular:      Rate and Rhythm: Normal rate and regular rhythm.   Pulmonary:      Effort: Pulmonary effort is normal.      Breath sounds: Normal breath sounds.   Abdominal:      Palpations: Abdomen is soft.      Tenderness: There is no abdominal tenderness.   Musculoskeletal:      Cervical back: Neck supple.   Skin:     Comments: Hyperpigmentation of the bilateral lower legs  Left foot with prominent thin walled reticular veins   Neurological:      General: No focal deficit present.      Mental Status: He is alert and oriented to person, place, and time.   Psychiatric:         Mood and Affect: Mood normal.         Administrative Statements   I have spent a total time of 25 minutes in caring for this patient on the day of the visit/encounter including Diagnostic results, Prognosis, Risks and benefits of tx options, Instructions for management, Patient and family education, Importance of tx compliance, Risk factor reductions, Impressions, Counseling / Coordination of care, Documenting in the medical record, Reviewing/placing orders in the medical record (including tests, medications, and/or procedures), and Obtaining or reviewing history  .  "

## 2025-04-11 ENCOUNTER — TELEPHONE (OUTPATIENT)
Age: 70
End: 2025-04-11

## 2025-04-11 NOTE — TELEPHONE ENCOUNTER
Patient is calling because he tried to use his CPAP last week for two nights in a row. He was getting 2-3 hours of sleep and then had to wake up because he felt like he was getting a lot of air in his stomach. Patient then would sit up and feel nauseated and like he was about to vomit. He then feels like he releases a lot of air after sitting up. Patient does not like calling [facility] as he feels they are of no help and he always plays phone tag with them. Patient would like to know what to do prior to his appointment.      Please advise.

## 2025-04-11 NOTE — TELEPHONE ENCOUNTER
Called and talked to patient to let him know Dr Kulkarni is not able to see any data because patient has been unsuccessful in reaching someone in [employer] to connect his machine to obtain compliance. I encouraged patient to do so for upcoming appt so Dr Kulkarni can review data. I also mentioned possibly calling and requesting an appointment in person since patient is frustrated with the service received over the phone.

## 2025-04-15 ENCOUNTER — TELEPHONE (OUTPATIENT)
Dept: VASCULAR SURGERY | Facility: CLINIC | Age: 70
End: 2025-04-15

## 2025-04-18 ENCOUNTER — CLINICAL SUPPORT (OUTPATIENT)
Dept: VASCULAR SURGERY | Facility: CLINIC | Age: 70
End: 2025-04-18
Payer: COMMERCIAL

## 2025-04-18 VITALS
DIASTOLIC BLOOD PRESSURE: 56 MMHG | BODY MASS INDEX: 34.51 KG/M2 | HEART RATE: 64 BPM | WEIGHT: 233 LBS | HEIGHT: 69 IN | SYSTOLIC BLOOD PRESSURE: 106 MMHG

## 2025-04-18 DIAGNOSIS — I83.892 BLEEDING FROM VARICOSE VEINS OF LEFT LOWER EXTREMITY: Primary | ICD-10-CM

## 2025-04-18 PROCEDURE — 36470 NJX SCLRSNT 1 INCMPTNT VEIN: CPT | Performed by: NURSE PRACTITIONER

## 2025-04-18 NOTE — PROGRESS NOTES
Name: Leonardo Oviedo      : 1955      MRN: [id number]  Encounter Provider: NONA Hunt  Encounter Date: 3/13/2025   Encounter department: [facility] [place]  :  Assessment & Plan  Varicose veins of unspecified lower extremity with ulcer of unspecified site (CODE) (HCC)    Orders:    Ambulatory referral to Vascular Surgery    Bleeding from varicose veins of left lower extremity  69-year-old male with A-fib on Xarelto, HFpEF, osteoarthritis, degenerative disc disease lumbar spine, chronic low back pain with sciatica, BLE lymphedema,  BLE venous insufficiency with chronic stasis changes s/p bilateral GSV, SSV and accessory vein ablation ', spontaneous bleeding L medial ankle vein prompting ED visit , anemia.     Medial ankle the site of prior bleeding with shear overlying skin, high risk for recurrent rupture.  Patient is on Xarelto for A-fib.  Bleeding occurred spontaneously as he arrived to the gym, prior to to starting work out.  He is evaluated in the ED  and now presents for vascular evaluation    Patient previously saw Dr. Mccabe in  for chronic venous insufficiency, bilateral lower extremity swelling.  He has a history of ulcerations and now bleeding episode.  He has lost over 100 pounds with GLP 1, diet and exercise.  He is compliant with compression.  He is gone to lymphedema clinic and uses lymphedema pumps.    -Recommended evaluation with venous reflux study to evaluate his GSV has recanalized in light of bleeding episode and compliance with compression  -Follow-up with vascular surgeon for study to review  -If no surgical intervention recommend patient return with me for medical sclerotherapy  -Will submit for authorization  -For scheduling: Asclera 1% solution, 2 mL, 60 minutes    Orders:    VAS Lower extremity venous insufficiency duplex, Single leg w/ measurements; Future        History of Present Illness   Leonardo Oviedo is a 69 y.o. male who presents  "the office for evaluation of left medial ankle bleeding.  He was evaluated in the ED 2/27 for the same.  He arrived at the gym, prior to starting workout bystander told him he was bleeding.  He reports a \"pool of blood\" at his left foot and filling his sneaker.  He was evaluated in the ED and referred for vascular evaluation.  Previously saw Dr. Mccabe in 2020, at that time GSV remained ablated on imaging from prior intervention in 2018.  Patient has since lost 100 pounds with diet and exercise.  He has lymphedema pumps and frequency of lymphedema clinic.  He wears CircAid compression wraps regularly.  He is on Xarelto for A-fib.   History obtained from: patient    Review of Systems   Cardiovascular:  Positive for leg swelling.   Skin:  Positive for color change.   Hematological:  Bruises/bleeds easily.     Medical History Reviewed by provider this encounter:  Tobacco  Allergies  Meds  Problems  Med Hx  Surg Hx  Fam Hx     .     Objective   I have reviewed and made appropriate changes to the review of systems input by the medical assistant.    Vitals:    03/13/25 1414   BP: 110/70   BP Location: Left arm   Patient Position: Sitting   Cuff Size: Large   Pulse: 61   Temp: 98.4 °F (36.9 °C)   TempSrc: Temporal   SpO2: 95%   Weight: 109 kg (241 lb)   Height: 5' 9\" (1.753 m)       Patient Active Problem List   Diagnosis    Atrial fibrillation (HCC)    Paresthesias    Cervical pain (neck)    Obesity, Class III, BMI 40-49.9 (morbid obesity)    Thrombocytopenia (HCC)    Bilateral lower extremity edema    Venous insufficiency of both lower extremities    Pulmonary hypertension (HCC)    Chronic heart failure with preserved ejection fraction (HFpEF) (HCC)    Anemia    Primary osteoarthritis    Iron deficiency    Atrial flutter (HCC)    Venous ulcer (HCC)    Chronic pain syndrome    Lumbar spondylosis    Chronic bilateral low back pain without sciatica    Benign prostate hyperplasia    Sleep apnea    Lymphedema    " Obstructive sleep apnea    Snoring    ROBERT (obstructive sleep apnea)    Degeneration of intervertebral disc of lumbar region with discogenic back pain    Bleeding from varicose veins of left lower extremity       Past Surgical History:   Procedure Laterality Date    ABLATION SAPHENOUS VEIN W/ RFA      COLONOSCOPY      JOINT REPLACEMENT  Left Hip 4 /21/2009    NERVE BLOCK Bilateral 7/18/2023    Procedure: BLOCK MEDIAL BRANCH B/L L4-L5 AND L5-S1 MBB #1;  Surgeon: Samson Godinez MD;  Location: [facility];  Service: Pain Management     NERVE BLOCK Bilateral 8/17/2023    Procedure: BLOCK MEDIAL BRANCH  B/L L4-5 and L5-S1 MBB #2;  Surgeon: Samson Godinez MD;  Location: [facility];  Service: Pain Management     WI CYSTO INSERTION TRANSPROSTATIC IMPLANT SINGLE N/A 11/13/2017    Procedure: CYSTOSCOPY WITH INSERTION UROLIFT;  Surgeon: Deuce Bennett DO;  Location: [facility];  Service: Urology    RADIOFREQUENCY ABLATION Left 10/5/2023    Procedure: ABLATION RADIO FREQUENCY (RFA) LEFT L4-5 AND L5-S1 RFA;  Surgeon: Samson Godinez MD;  Location: [facility];  Service: Pain Management     RADIOFREQUENCY ABLATION Right 11/1/2023    Procedure: ABLATION RADIO FREQUENCY (RFA) RIGHT L4-5 AND L5-S1 RFA;  Surgeon: Samson Godinez MD;  Location: [facility];  Service: Pain Management     RADIOFREQUENCY ABLATION Left 11/21/2024    Procedure: ABLATION RADIO FREQUENCY (RFA) left L4-5 and L5-S1;  Surgeon: Samson Godinez MD;  Location: [facility];  Service: Pain Management     RADIOFREQUENCY ABLATION Right 1/2/2025    Procedure: ABLATION RADIO FREQUENCY (RFA)L4-5 and L5-S1 RFA;  Surgeon: Samson Godinez MD;  Location: [facility];  Service: Pain Management     TONSILLECTOMY         Family History   Problem Relation Age of Onset    Heart disease Mother     Lymphoma Father     Cancer Father     Obesity Sister     Heart disease Sister     Heart failure Sister         on diuretic    Obesity Brother     Hypertension  Brother     Diabetes Neg Hx     Thyroid disease Neg Hx     Stroke Neg Hx        Social History     Socioeconomic History    Marital status: /Civil Union     Spouse name: Not on file    Number of children: Not on file    Years of education: Not on file    Highest education level: Not on file   Occupational History    Not on file   Tobacco Use    Smoking status: Never     Passive exposure: Never    Smokeless tobacco: Never   Vaping Use    Vaping status: Never Used   Substance and Sexual Activity    Alcohol use: Yes     Alcohol/week: 5.0 standard drinks of alcohol     Types: 3 Glasses of wine, 2 Standard drinks or equivalent per week     Comment: socially    Drug use: No    Sexual activity: Not Currently     Partners: Female     Birth control/protection: Abstinence, Condom Male, Spermicide   Other Topics Concern    Not on file   Social History Narrative    Not on file     Social Drivers of Health     Financial Resource Strain: Not At Risk (4/3/2025)    Received from [facility]    Financial Insecurity     In the last 12 months did you skip medications to save money?: No     In the last 12 months was there a time when you needed to see a doctor but could not because of cost?: No   Food Insecurity: No Food Insecurity (12/7/2023)    Nursing - Inadequate Food Risk Classification     Worried About Running Out of Food in the Last Year: Never true     Ran Out of Food in the Last Year: Never true     Ran Out of Food in the Last Year: Not on file   Transportation Needs: No Transportation Needs (12/7/2023)    PRAPARE - Transportation     Lack of Transportation (Medical): No     Lack of Transportation (Non-Medical): No   Physical Activity: Inactive (8/24/2023)    Received from [facility]    Exercise Vital Sign     Days of Exercise per Week: 0 days     Minutes of Exercise per Session: 0 min   Stress: No Stress Concern Present (8/24/2023)    Received from [facility],  [facility]    [employer] - Occupational Stress Questionnaire     Feeling of Stress : Not at all   Social Connections: Unknown (6/18/2024)    Received from [facility]     How often do you feel lonely or isolated from those around you? (Adult - for ages 18 years and over): Not on file   Intimate Partner Violence: Not At Risk (8/24/2023)    Received from [facility], [facility]    Humiliation, Afraid, Rape, and Kick questionnaire     Fear of Current or Ex-Partner: No     Emotionally Abused: No     Physically Abused: No     Sexually Abused: No   Housing Stability: Low Risk  (12/7/2023)    Housing Stability Vital Sign     Unable to Pay for Housing in the Last Year: No     Number of Times Moved in the Last Year: 1     Homeless in the Last Year: No       Allergies   Allergen Reactions    Penicillins Anaphylaxis    Sulfa Antibiotics Anaphylaxis    Levofloxacin Other (See Comments)         Current Outpatient Medications:     alfuzosin (UROXATRAL) 10 mg 24 hr tablet, Take 10 mg by mouth daily, Disp: , Rfl:     allopurinol (ZYLOPRIM) 300 mg tablet, Take 300 mg by mouth daily, Disp: , Rfl:     ammonium lactate (LAC-HYDRIN) 12 % lotion, Apply topically 2 (two) times a day as needed for dry skin, Disp: , Rfl:     Colchicine 0.6 MG CAPS, Take by mouth if needed, Disp: , Rfl:     dutasteride (AVODART) 0.5 mg capsule, Take 0.5 mg by mouth daily, Disp: , Rfl:     ferrous sulfate 325 (65 Fe) mg tablet, Take 325 mg by mouth, Disp: , Rfl:     Glucosamine-Chondroit-Vit C-Mn (Glucosamine 1500 Complex) CAPS, Take by mouth, Disp: , Rfl:     halobetasol (ULTRAVATE) 0.05 % ointment, APPLY TO AFFECTED AREA ON LEG TWICE DAILY, Disp: , Rfl:     Jardiance 10 MG TABS tablet, take 1 tablet by mouth every morning, Disp: 90 tablet, Rfl: 1    L-ARGININE-500 PO, Take 500 mg by mouth 2 (two) times a day , Disp: , Rfl:     loteprednol etabonate  (LOTEMAX) 0.5 % ophthalmic suspension, 1 drop 4 (four) times a day, Disp: , Rfl:     metoprolol succinate (TOPROL-XL) 25 mg 24 hr tablet, TAKE 1 TABLET DAILY, Disp: 90 tablet, Rfl: 0    multivitamin (THERAGRAN) TABS, Take 1 tablet by mouth daily, Disp: , Rfl:     Omega-3 Fatty Acids (fish oil) 1,000 mg, Take 1,000 mg by mouth 2 (two) times a day, Disp: , Rfl:     Ozempic, 0.25 or 0.5 MG/DOSE, 2 MG/3ML injection pen, Inject 0.5 mg under the skin every 7 days, Disp: , Rfl:     patient supplied medication, Take 1 each by mouth 2 (two) times a day, Disp: , Rfl:     pregabalin (LYRICA) 100 mg capsule, Take 1 capsule (100 mg total) by mouth 3 (three) times a day, Disp: 42 capsule, Rfl: 0    rivaroxaban (Xarelto) 20 mg tablet, TAKE 1 TABLET DAILY, Disp: 90 tablet, Rfl: 0    spironolactone (ALDACTONE) 25 mg tablet, Take 1 tablet (25 mg total) by mouth 2 (two) times a day, Disp: 180 tablet, Rfl: 1    torsemide (DEMADEX) 20 mg tablet, Take 3 tablets (60 mg total) by mouth daily, Disp: 60 tablet, Rfl: 2    traMADol (ULTRAM) 50 mg tablet, Take 50 mg by mouth every 6 (six) hours as needed for moderate pain, Disp: , Rfl:     VITAMIN D PO, Take 2,000 Units by mouth daily , Disp: , Rfl:     acetaminophen (TYLENOL) 500 mg tablet, Take 500 mg by mouth every 6 (six) hours as needed for mild pain, Disp: , Rfl:     Diclofenac Sodium (VOLTAREN) 1 %, Apply 2 g topically 4 (four) times a day (Patient not taking: Reported on 2/5/2025), Disp: 100 g, Rfl: 0    doxycycline hyclate (VIBRA-TABS) 100 mg tablet, take 1 tablet by mouth twice a day for 7 days (Patient not taking: Reported on 1/3/2025), Disp: , Rfl:     fluticasone (FLONASE) 50 mcg/act nasal spray, 2 sprays into each nostril daily, Disp: , Rfl:     ondansetron (ZOFRAN) 4 mg tablet, Take 1 tablet (4 mg total) by mouth every 6 (six) hours (Patient not taking: Reported on 3/13/2025), Disp: 12 tablet, Rfl: 0    ondansetron (ZOFRAN-ODT) 4 mg disintegrating tablet, Take 1 tablet (4 mg  "total) by mouth every 6 (six) hours as needed for nausea or vomiting, Disp: 20 tablet, Rfl: 0    rosuvastatin (CRESTOR) 5 mg tablet, Take 1 tablet (5 mg total) by mouth daily, Disp: 90 tablet, Rfl: 3    tadalafil (CIALIS) 5 MG tablet, Take 5 mg by mouth daily as needed for erectile dysfunction (Patient not taking: Reported on 1/3/2025), Disp: , Rfl:     /70 (BP Location: Left arm, Patient Position: Sitting, Cuff Size: Large)   Pulse 61   Temp 98.4 °F (36.9 °C) (Temporal)   Ht 5' 9\" (1.753 m)   Wt 109 kg (241 lb)   SpO2 95%   BMI 35.59 kg/m²      Physical Exam  Vitals and nursing note reviewed.   Constitutional:       Appearance: Normal appearance.   HENT:      Head: Normocephalic and atraumatic.   Eyes:      Extraocular Movements: Extraocular movements intact.   Cardiovascular:      Pulses:           Dorsalis pedis pulses are 2+ on the right side and 2+ on the left side.        Posterior tibial pulses are 2+ on the right side and 2+ on the left side.      Heart sounds: Normal heart sounds.   Pulmonary:      Effort: Pulmonary effort is normal.   Musculoskeletal:         General: Swelling present.   Skin:     General: Skin is warm.      Comments: Left medial ankle vein with shear overlying skin at site of prior bleeding.  Multiple prominent veins left dorsal foot, concerning for bleeding episode.  See clinical image   Neurological:      General: No focal deficit present.      Mental Status: He is alert and oriented to person, place, and time.   Psychiatric:         Behavior: Behavior normal.                 "

## 2025-04-18 NOTE — PROGRESS NOTES
Name: Leonardo Oviedo      : 1955      MRN: [id number]  Encounter Provider: NONA Hunt  Encounter Date: 2025   Encounter department: [facility]  :  Assessment & Plan  Bleeding from varicose veins of left lower extremity  69-year-old male with A-fib on Xarelto, HFpEF, osteoarthritis, degenerative disc disease lumbar spine, chronic low back pain with sciatica, BLE lymphedema,  BLE venous insufficiency with chronic stasis changes s/p bilateral GSV, SSV and accessory vein ablation ', spontaneous bleeding L medial ankle vein prompting ED visit , anemia.    Patient was evaluated with venous reflux study which showed deep venous insufficiency and short segment GSV incompetency at distal calf, remainder of GSV closure at the proximal thigh to mid calf. No surgical intervention indicated or recommended.  Patient was recommended for medical sclerotherapy.    He presents today for medical sclerotherapy of prior bleeding site at left medial ankle     - Medically necessary sclerotherapy. Asclera 1% solution, 2 mL, foam technique treated left medial ankle vein, site of prior bleeding with good blanching  -Compression held after injection.  Dressing applied and double layer -Tubigrip sleeve of the dorsal foot to ankle.  He is utilizing CircAid wraps for the remainder of the limb  -Leave compression sock and dressing in place for the next 42 to 72 hours.  Then dressing and compression can be removed.  Cleanse the skin.  Wear compression morning total night thereafter  -Follow-up in the office in 6 weeks to reevaluate injection site         History of Present Illness   Leonardo Oviedo is a 69 y.o. male who presents today for medical sclerotherapy of bleeding veins left medial ankle.  Patient is compliant with CircAid wraps, utilizes compression pumps and has lost over 100 pounds with diet and exercise.  Bleeding occurrence at left medial ankle vein occurred spontaneously in  "February.  He was evaluated with a venous reflux study which showed deep venous insufficiency, left GSV proximal thigh to mid calf remains closed, short segment and distal calf competent.  No surgical intervention was recommended.  Patient returns today for medical sclerotherapy    History obtained from: patient    Review of Systems   Cardiovascular:  Positive for leg swelling.   Skin:  Positive for color change. Negative for wound.   Hematological:  Bruises/bleeds easily.     Procedure  Superficial prominent veins.  Patient advised or risks of pain upon injection, redness and swelling after treatment, bruising and bleeding, discoloration   Affected areas on the  were treated with intravascular injections of 2 cc of  1% Asclera, foam tecnique using a 25G syringe per ’s protocol.    The patient tolerated the procedure well with minimal erythema and edema. Immediate pressure was applied with cotton balls secured with tape at the injection sites. Compression stockings of applied to the treated leg.       Medical History Reviewed by provider this encounter:  Tobacco  Allergies  Meds  Problems  Med Hx  Surg Hx  Fam Hx     .     Objective   I have reviewed and made appropriate changes to the review of systems input by the medical assistant.    Vitals:    04/18/25 1042   BP: 106/56   BP Location: Right arm   Patient Position: Sitting   Cuff Size: Standard   Pulse: 64   Weight: 106 kg (233 lb)   Height: 5' 9\" (1.753 m)       Patient Active Problem List   Diagnosis    Atrial fibrillation (HCC)    Paresthesias    Cervical pain (neck)    Obesity, Class III, BMI 40-49.9 (morbid obesity)    Thrombocytopenia (HCC)    Bilateral lower extremity edema    Venous insufficiency of both lower extremities    Pulmonary hypertension (HCC)    Chronic heart failure with preserved ejection fraction (HFpEF) (HCC)    Anemia    Primary osteoarthritis    Iron deficiency    Atrial flutter (HCC)    Venous ulcer (HCC)    Chronic " pain syndrome    Lumbar spondylosis    Chronic bilateral low back pain without sciatica    Benign prostate hyperplasia    Sleep apnea    Lymphedema    Obstructive sleep apnea    Snoring    ROBERT (obstructive sleep apnea)    Degeneration of intervertebral disc of lumbar region with discogenic back pain    Bleeding from varicose veins of left lower extremity       Past Surgical History:   Procedure Laterality Date    ABLATION SAPHENOUS VEIN W/ RFA      COLONOSCOPY      JOINT REPLACEMENT  Left Hip 4 /21/2009    NERVE BLOCK Bilateral 7/18/2023    Procedure: BLOCK MEDIAL BRANCH B/L L4-L5 AND L5-S1 MBB #1;  Surgeon: Samson Godinez MD;  Location: [facility];  Service: Pain Management     NERVE BLOCK Bilateral 8/17/2023    Procedure: BLOCK MEDIAL BRANCH  B/L L4-5 and L5-S1 MBB #2;  Surgeon: Samson Godinez MD;  Location: [facility];  Service: Pain Management     SC CYSTO INSERTION TRANSPROSTATIC IMPLANT SINGLE N/A 11/13/2017    Procedure: CYSTOSCOPY WITH INSERTION UROLIFT;  Surgeon: Deuce Bennett DO;  Location: [facility];  Service: Urology    RADIOFREQUENCY ABLATION Left 10/5/2023    Procedure: ABLATION RADIO FREQUENCY (RFA) LEFT L4-5 AND L5-S1 RFA;  Surgeon: Samson Godinez MD;  Location: [facility];  Service: Pain Management     RADIOFREQUENCY ABLATION Right 11/1/2023    Procedure: ABLATION RADIO FREQUENCY (RFA) RIGHT L4-5 AND L5-S1 RFA;  Surgeon: Samson Godinez MD;  Location: [facility];  Service: Pain Management     RADIOFREQUENCY ABLATION Left 11/21/2024    Procedure: ABLATION RADIO FREQUENCY (RFA) left L4-5 and L5-S1;  Surgeon: Samson Godinez MD;  Location: [facility];  Service: Pain Management     RADIOFREQUENCY ABLATION Right 1/2/2025    Procedure: ABLATION RADIO FREQUENCY (RFA)L4-5 and L5-S1 RFA;  Surgeon: Samson Godinez MD;  Location: [facility];  Service: Pain Management     TONSILLECTOMY         Family History   Problem Relation Age of Onset    Heart disease Mother     Lymphoma  Father     Cancer Father     Obesity Sister     Heart disease Sister     Heart failure Sister         on diuretic    Obesity Brother     Hypertension Brother     Diabetes Neg Hx     Thyroid disease Neg Hx     Stroke Neg Hx        Social History     Socioeconomic History    Marital status: /Civil Union     Spouse name: Not on file    Number of children: Not on file    Years of education: Not on file    Highest education level: Not on file   Occupational History    Not on file   Tobacco Use    Smoking status: Never     Passive exposure: Never    Smokeless tobacco: Never   Vaping Use    Vaping status: Never Used   Substance and Sexual Activity    Alcohol use: Yes     Alcohol/week: 5.0 standard drinks of alcohol     Types: 3 Glasses of wine, 2 Standard drinks or equivalent per week     Comment: socially    Drug use: No    Sexual activity: Not Currently     Partners: Female     Birth control/protection: Abstinence, Condom Male, Spermicide   Other Topics Concern    Not on file   Social History Narrative    Not on file     Social Drivers of Health     Financial Resource Strain: Not At Risk (4/3/2025)    Received from [facility]    Financial Insecurity     In the last 12 months did you skip medications to save money?: No     In the last 12 months was there a time when you needed to see a doctor but could not because of cost?: No   Food Insecurity: No Food Insecurity (12/7/2023)    Nursing - Inadequate Food Risk Classification     Worried About Running Out of Food in the Last Year: Never true     Ran Out of Food in the Last Year: Never true     Ran Out of Food in the Last Year: Not on file   Transportation Needs: No Transportation Needs (12/7/2023)    PRAPARE - Transportation     Lack of Transportation (Medical): No     Lack of Transportation (Non-Medical): No   Physical Activity: Inactive (8/24/2023)    Received from [facility]    Exercise Vital Sign     Days of Exercise per Week:  0 days     Minutes of Exercise per Session: 0 min   Stress: No Stress Concern Present (8/24/2023)    Received from [facility], [facility]    [employer] [place] - Occupational Stress Questionnaire     Feeling of Stress : Not at all   Social Connections: Unknown (6/18/2024)    Received from [employer]     How often do you feel lonely or isolated from those around you? (Adult - for ages 18 years and over): Not on file   Intimate Partner Violence: Not At Risk (8/24/2023)    Received from [facility], [facility]    Humiliation, Afraid, Rape, and Kick questionnaire     Fear of Current or Ex-Partner: No     Emotionally Abused: No     Physically Abused: No     Sexually Abused: No   Housing Stability: Low Risk  (12/7/2023)    Housing Stability Vital Sign     Unable to Pay for Housing in the Last Year: No     Number of Times Moved in the Last Year: 1     Homeless in the Last Year: No       Allergies   Allergen Reactions    Penicillins Anaphylaxis    Sulfa Antibiotics Anaphylaxis    Levofloxacin Other (See Comments)         Current Outpatient Medications:     acetaminophen (TYLENOL) 500 mg tablet, Take 500 mg by mouth every 6 (six) hours as needed for mild pain, Disp: , Rfl:     alfuzosin (UROXATRAL) 10 mg 24 hr tablet, Take 10 mg by mouth daily, Disp: , Rfl:     allopurinol (ZYLOPRIM) 300 mg tablet, Take 300 mg by mouth daily, Disp: , Rfl:     ammonium lactate (LAC-HYDRIN) 12 % lotion, Apply topically 2 (two) times a day as needed for dry skin, Disp: , Rfl:     Colchicine 0.6 MG CAPS, Take by mouth if needed, Disp: , Rfl:     dutasteride (AVODART) 0.5 mg capsule, Take 0.5 mg by mouth daily, Disp: , Rfl:     ferrous sulfate 325 (65 Fe) mg tablet, Take 325 mg by mouth, Disp: , Rfl:     fluticasone (FLONASE) 50 mcg/act nasal spray, 2 sprays into each nostril daily, Disp: , Rfl:     Glucosamine-Chondroit-Vit C-Mn  (Glucosamine 1500 Complex) CAPS, Take by mouth, Disp: , Rfl:     halobetasol (ULTRAVATE) 0.05 % ointment, APPLY TO AFFECTED AREA ON LEG TWICE DAILY, Disp: , Rfl:     Jardiance 10 MG TABS tablet, take 1 tablet by mouth every morning, Disp: 90 tablet, Rfl: 1    L-ARGININE-500 PO, Take 500 mg by mouth 2 (two) times a day , Disp: , Rfl:     loteprednol etabonate (LOTEMAX) 0.5 % ophthalmic suspension, 1 drop 4 (four) times a day, Disp: , Rfl:     metoprolol succinate (TOPROL-XL) 25 mg 24 hr tablet, TAKE 1 TABLET DAILY, Disp: 90 tablet, Rfl: 0    multivitamin (THERAGRAN) TABS, Take 1 tablet by mouth daily, Disp: , Rfl:     Omega-3 Fatty Acids (fish oil) 1,000 mg, Take 1,000 mg by mouth 2 (two) times a day, Disp: , Rfl:     ondansetron (ZOFRAN-ODT) 4 mg disintegrating tablet, Take 1 tablet (4 mg total) by mouth every 6 (six) hours as needed for nausea or vomiting, Disp: 20 tablet, Rfl: 0    Ozempic, 0.25 or 0.5 MG/DOSE, 2 MG/3ML injection pen, Inject 0.5 mg under the skin every 7 days, Disp: , Rfl:     patient supplied medication, Take 1 each by mouth 2 (two) times a day, Disp: , Rfl:     pregabalin (LYRICA) 100 mg capsule, Take 1 capsule (100 mg total) by mouth 3 (three) times a day, Disp: 42 capsule, Rfl: 0    rivaroxaban (Xarelto) 20 mg tablet, TAKE 1 TABLET DAILY, Disp: 90 tablet, Rfl: 0    rosuvastatin (CRESTOR) 5 mg tablet, Take 1 tablet (5 mg total) by mouth daily, Disp: 90 tablet, Rfl: 3    spironolactone (ALDACTONE) 25 mg tablet, Take 1 tablet (25 mg total) by mouth 2 (two) times a day, Disp: 180 tablet, Rfl: 1    torsemide (DEMADEX) 20 mg tablet, Take 3 tablets (60 mg total) by mouth daily, Disp: 60 tablet, Rfl: 2    traMADol (ULTRAM) 50 mg tablet, Take 50 mg by mouth every 6 (six) hours as needed for moderate pain, Disp: , Rfl:     VITAMIN D PO, Take 2,000 Units by mouth daily , Disp: , Rfl:     Diclofenac Sodium (VOLTAREN) 1 %, Apply 2 g topically 4 (four) times a day (Patient not taking: Reported on 2/5/2025),  "Disp: 100 g, Rfl: 0    doxycycline hyclate (VIBRA-TABS) 100 mg tablet, take 1 tablet by mouth twice a day for 7 days (Patient not taking: Reported on 1/3/2025), Disp: , Rfl:     ondansetron (ZOFRAN) 4 mg tablet, Take 1 tablet (4 mg total) by mouth every 6 (six) hours (Patient not taking: Reported on 3/13/2025), Disp: 12 tablet, Rfl: 0    tadalafil (CIALIS) 5 MG tablet, Take 5 mg by mouth daily as needed for erectile dysfunction (Patient not taking: Reported on 1/3/2025), Disp: , Rfl:     /56 (BP Location: Right arm, Patient Position: Sitting, Cuff Size: Standard)   Pulse 64   Ht 5' 9\" (1.753 m)   Wt 106 kg (233 lb)   BMI 34.41 kg/m²      Physical Exam  Vitals and nursing note reviewed.   Constitutional:       Appearance: Normal appearance.   HENT:      Head: Normocephalic and atraumatic.   Eyes:      Extraocular Movements: Extraocular movements intact.   Pulmonary:      Effort: Pulmonary effort is normal.   Musculoskeletal:         General: Swelling present.      Comments: Left medial ankle pain prominent with shear overlying skin at site of prior bleeding   Skin:     General: Skin is warm.   Neurological:      General: No focal deficit present.      Mental Status: He is alert and oriented to person, place, and time.   Psychiatric:         Mood and Affect: Mood normal.         Behavior: Behavior normal.           "

## 2025-04-18 NOTE — ASSESSMENT & PLAN NOTE
69-year-old male with A-fib on Xarelto, HFpEF, osteoarthritis, degenerative disc disease lumbar spine, chronic low back pain with sciatica, BLE lymphedema,  BLE venous insufficiency with chronic stasis changes s/p bilateral GSV, SSV and accessory vein ablation '18, spontaneous bleeding L medial ankle vein prompting ED visit 2/27, anemia.    Patient was evaluated with venous reflux study which showed deep venous insufficiency and short segment GSV incompetency at distal calf, remainder of GSV closure at the proximal thigh to mid calf. No surgical intervention indicated or recommended.  Patient was recommended for medical sclerotherapy.    He presents today for medical sclerotherapy of prior bleeding site at left medial ankle     - Medically necessary sclerotherapy. Asclera 1% solution, 2 mL, foam technique treated left medial ankle vein, site of prior bleeding with good blanching  -Compression held after injection.  Dressing applied and double layer -Tubigrip sleeve of the dorsal foot to ankle.  He is utilizing CircAid wraps for the remainder of the limb  -Leave compression sock and dressing in place for the next 42 to 72 hours.  Then dressing and compression can be removed.  Cleanse the skin.  Wear compression morning total night thereafter  -Follow-up in the office in 6 weeks to reevaluate injection site

## 2025-04-18 NOTE — PROGRESS NOTES
SCLEROTHERAPY PROCEDURE NOTE -  VASCULAR      Assessment/Plan:   ***       {Assess/PlanSmartLinks:79416}      Subjective:      Patient ID: Leonardo Oviedo is a 69 y.o. male      Indications for Procedure: Patient presents with *** dilated reticular veins and telangiectasia on the legs.     Objective      Exam: On bilateral lower extremities, dilated reticular veins and telangiectasias are present symmetrically without findings of chronic venous insufficiency.             Superficial prominent veins.  Patient advised or risks of pain upon injection, redness and swelling after treatment, bruising and bleeding, necrosis/ulceration, allergy, discoloration and the likelihood that multiple treatments may be necessary and clearance may not be complete.  Affected areas on the *** were treated with intravascular injections of *** cc of  {% solution:73495} Asclera using a 32G syringe per ’s protocol.    The patient tolerated the procedure well with minimal erythema and edema. Immediate pressure was applied with cotton balls secured with tape at the injection sites. Compression stockings of 20-30mm Hg were applied to the treated legs. The patient was advised to wear compression stockings during the day for the next *** weeks.     Follow up in ***

## 2025-04-18 NOTE — ASSESSMENT & PLAN NOTE
69-year-old male with A-fib on Xarelto, HFpEF, osteoarthritis, degenerative disc disease lumbar spine, chronic low back pain with sciatica, BLE lymphedema,  BLE venous insufficiency with chronic stasis changes s/p bilateral GSV, SSV and accessory vein ablation '18, spontaneous bleeding L medial ankle vein prompting ED visit 2/27, anemia.     Medial ankle the site of prior bleeding with shear overlying skin, high risk for recurrent rupture.  Patient is on Xarelto for A-fib.  Bleeding occurred spontaneously as he arrived to the gym, prior to to starting work out.  He is evaluated in the ED 2/27 and now presents for vascular evaluation    Patient previously saw Dr. Mccabe in 2020 for chronic venous insufficiency, bilateral lower extremity swelling.  He has a history of ulcerations and now bleeding episode.  He has lost over 100 pounds with GLP 1, diet and exercise.  He is compliant with compression.  He is gone to lymphedema clinic and uses lymphedema pumps.    -Recommended evaluation with venous reflux study to evaluate his GSV has recanalized in light of bleeding episode and compliance with compression  -Follow-up with vascular surgeon for study to review  -If no surgical intervention recommend patient return with me for medical sclerotherapy  -Will submit for authorization  -For scheduling: Asclera 1% solution, 2 mL, 60 minutes    Orders:    VAS Lower extremity venous insufficiency duplex, Single leg w/ measurements; Future

## 2025-04-28 ENCOUNTER — OFFICE VISIT (OUTPATIENT)
Dept: PULMONOLOGY | Facility: CLINIC | Age: 70
End: 2025-04-28
Payer: COMMERCIAL

## 2025-04-28 VITALS
HEART RATE: 73 BPM | HEIGHT: 69 IN | SYSTOLIC BLOOD PRESSURE: 99 MMHG | WEIGHT: 232 LBS | BODY MASS INDEX: 34.36 KG/M2 | DIASTOLIC BLOOD PRESSURE: 63 MMHG | TEMPERATURE: 97.9 F | OXYGEN SATURATION: 100 %

## 2025-04-28 DIAGNOSIS — J98.4 RESTRICTIVE LUNG DISEASE: ICD-10-CM

## 2025-04-28 DIAGNOSIS — G47.33 OSA (OBSTRUCTIVE SLEEP APNEA): Primary | ICD-10-CM

## 2025-04-28 DIAGNOSIS — I27.20 PULMONARY HYPERTENSION (HCC): ICD-10-CM

## 2025-04-28 DIAGNOSIS — E66.811 CLASS 1 OBESITY: ICD-10-CM

## 2025-04-28 PROCEDURE — 99214 OFFICE O/P EST MOD 30 MIN: CPT

## 2025-04-28 NOTE — PROGRESS NOTES
Progress note - Pulmonary Medicine   Leonardo Oviedo 69 y.o. male MRN: [id number]       Impression & Plan:   Leonardo Oviedo is a 69 y.o. male with PMHx of RLD, ROBERT on BiPAP, A-fib, HFpEF, BPH, lymphedema and obesity who presents for follow-up.    ROBERT (obstructive sleep apnea)  - The patient has a history of moderate ROBERT most recently studied on split-night PSG in 2/2024 (AHI 19.6, supine AHI 44.0, REM AHI N/A, O2 simone 71%, TST <90% 262.4 min) and on his device in the office today he is set to IPAP max 20 cmH2O, EPAP min 14 cmH2O, pressure support 2-6 cmH2O, it is unclear when the settings were changed, but he continues to have significant aerophagia and intolerance to the BiPAP since he lost weight.  - Therapy and compliance data reviewed: residual AHI 1.3, compliance 0% with 100% use and no significant mask leak ntoed.  - Manually adjusted his device to auto-BiPAP EPAP min 5 cmH2O, IPAP max 15 cmH2O, PS 4 cmH2O with a full-face mask.  - Given significant weight loss since last study it is feasible his sleep apnea has resolved, will repeat PSG.  - Refill of supplies will be sent to the patient's DME.  - Explained the importance of keeping the machine clean, and that they should be eligible for refills of supplies every 3-6 months depending on insurance.  We also discussed the insurance compliance requirements.  - Encouraged healthy lifestyle with adequate sleep (7-9 hours per night), healthy balanced diet and routine exercise.  Explained the importance of avoiding driving while drowsy.  - Follow-up in 6 months.  -     Diagnostic Polysomnogram; Future    Restrictive lung disease  - Previously seen on PFTs and was felt to be 2/2 obesity with HRCT being unremarkable.  He has now lost significant weight and is no longer having any dyspnea.    Pulmonary hypertension (HCC)  - Prior echo with mild elevation of RVSP which was felt to be combined group 2/3 from HFpEF, ROBERT and obesity.  Most recent echo shows  normalization of RVSP likely due to weight loss.    Class 1 obesity  - Continuing to work on weight loss, currently on ozempic.    Return in about 6 months (around 10/28/2025).  ______________________________________________________________________    HPI:    Leonardo Oviedo is a 69 y.o. male with PMHx of RLD, ROEBRT on BiPAP, A-fib, HFpEF, BPH, lymphedema and obesity who presents for follow-up.  Patient was last seen in the office on 10/17/2024 at which time plan was to lower BiPAP pressures once compliance data was available as he was having aerophagia after weight loss, check echo and continue to lose weight.  His echocardiogram showed normalization of RVSP and pressure change was placed for EPAP min 10 cmH2O, IPAP max 25 cmH2O, pressure support 5 cmH2O.  He reports that he is doing well from respiratory standpoint and denies any dyspnea, cough, wheezing at this time.  He is continue to work on weight loss and has lost an additional 16 pounds since last office visit with a total of 88 pounds loss since last sleep study.  He states he continues to have difficulty with the BiPAP and can tolerate the pressures when he first puts it on, but within 2 hours they feel too high and wake him up.  Often he will take off the mask and fall back to sleep without issue.  He states sleeping off the BiPAP he wakes feeling refreshed with no significant issues and does not wake himself up with any snoring.  He is still waking up twice a night to use the restroom, but attributes this to his water pills and fluid intake.  Review of systems is otherwise negative.    Bedtime: 2030  Wake Time: 0700  SOL: a few minutes  Awakenings: 2x a night for the bathroom  Naps: denies    Current Medications:    Current Outpatient Medications:     acetaminophen (TYLENOL) 500 mg tablet, Take 500 mg by mouth every 6 (six) hours as needed for mild pain, Disp: , Rfl:     alfuzosin (UROXATRAL) 10 mg 24 hr tablet, Take 10 mg by mouth daily, Disp: , Rfl:      allopurinol (ZYLOPRIM) 300 mg tablet, Take 300 mg by mouth daily, Disp: , Rfl:     ammonium lactate (LAC-HYDRIN) 12 % lotion, Apply topically 2 (two) times a day as needed for dry skin, Disp: , Rfl:     Colchicine 0.6 MG CAPS, Take by mouth if needed, Disp: , Rfl:     Diclofenac Sodium (VOLTAREN) 1 %, Apply 2 g topically 4 (four) times a day (Patient not taking: Reported on 2/5/2025), Disp: 100 g, Rfl: 0    doxycycline hyclate (VIBRA-TABS) 100 mg tablet, take 1 tablet by mouth twice a day for 7 days (Patient not taking: Reported on 1/3/2025), Disp: , Rfl:     dutasteride (AVODART) 0.5 mg capsule, Take 0.5 mg by mouth daily, Disp: , Rfl:     ferrous sulfate 325 (65 Fe) mg tablet, Take 325 mg by mouth, Disp: , Rfl:     fluticasone (FLONASE) 50 mcg/act nasal spray, 2 sprays into each nostril daily, Disp: , Rfl:     Glucosamine-Chondroit-Vit C-Mn (Glucosamine 1500 Complex) CAPS, Take by mouth, Disp: , Rfl:     halobetasol (ULTRAVATE) 0.05 % ointment, APPLY TO AFFECTED AREA ON LEG TWICE DAILY, Disp: , Rfl:     Jardiance 10 MG TABS tablet, take 1 tablet by mouth every morning, Disp: 90 tablet, Rfl: 1    L-ARGININE-500 PO, Take 500 mg by mouth 2 (two) times a day , Disp: , Rfl:     loteprednol etabonate (LOTEMAX) 0.5 % ophthalmic suspension, 1 drop 4 (four) times a day, Disp: , Rfl:     metoprolol succinate (TOPROL-XL) 25 mg 24 hr tablet, TAKE 1 TABLET DAILY, Disp: 90 tablet, Rfl: 0    multivitamin (THERAGRAN) TABS, Take 1 tablet by mouth daily, Disp: , Rfl:     Omega-3 Fatty Acids (fish oil) 1,000 mg, Take 1,000 mg by mouth 2 (two) times a day, Disp: , Rfl:     ondansetron (ZOFRAN) 4 mg tablet, Take 1 tablet (4 mg total) by mouth every 6 (six) hours (Patient not taking: Reported on 3/13/2025), Disp: 12 tablet, Rfl: 0    ondansetron (ZOFRAN-ODT) 4 mg disintegrating tablet, Take 1 tablet (4 mg total) by mouth every 6 (six) hours as needed for nausea or vomiting, Disp: 20 tablet, Rfl: 0    Ozempic, 0.25 or 0.5 MG/DOSE, 2  "MG/3ML injection pen, Inject 0.5 mg under the skin every 7 days, Disp: , Rfl:     patient supplied medication, Take 1 each by mouth 2 (two) times a day, Disp: , Rfl:     pregabalin (LYRICA) 100 mg capsule, Take 1 capsule (100 mg total) by mouth 3 (three) times a day, Disp: 42 capsule, Rfl: 0    rivaroxaban (Xarelto) 20 mg tablet, TAKE 1 TABLET DAILY, Disp: 90 tablet, Rfl: 0    rosuvastatin (CRESTOR) 5 mg tablet, Take 1 tablet (5 mg total) by mouth daily, Disp: 90 tablet, Rfl: 3    spironolactone (ALDACTONE) 25 mg tablet, Take 1 tablet (25 mg total) by mouth 2 (two) times a day, Disp: 180 tablet, Rfl: 1    tadalafil (CIALIS) 5 MG tablet, Take 5 mg by mouth daily as needed for erectile dysfunction (Patient not taking: Reported on 1/3/2025), Disp: , Rfl:     torsemide (DEMADEX) 20 mg tablet, Take 3 tablets (60 mg total) by mouth daily, Disp: 60 tablet, Rfl: 2    traMADol (ULTRAM) 50 mg tablet, Take 50 mg by mouth every 6 (six) hours as needed for moderate pain, Disp: , Rfl:     VITAMIN D PO, Take 2,000 Units by mouth daily , Disp: , Rfl:     Review of Systems:  Review of Systems  10-point system review completed, all of which are negative except as mentioned above.    Past medical history, surgical history, and family history were reviewed and updated as appropriate    Social history updates:  Social History     Tobacco Use   Smoking Status Never    Passive exposure: Never   Smokeless Tobacco Never     PhysicalExamination:  Vitals:   BP 99/63 (BP Location: Left arm, Patient Position: Sitting, Cuff Size: Adult)   Pulse 73   Temp 97.9 °F (36.6 °C) (Temporal)   Ht 5' 9\" (1.753 m)   Wt 105 kg (232 lb)   SpO2 100%   BMI 34.26 kg/m²   Body mass index is 34.26 kg/m².    Constitutional: NAD, on room air, no conversational dyspnea   Skin: Warm, dry, no rashes noted   Eyes: PERRL, normal conjunctiva  ENT: Nasal congestion absent, moist mucus membranes.  Neck: No JVD, trachea is midline, no adenopathy.  Resp: CTA B/L, no " W/R/R   Cardiac: RRR, +S1/S2, no M/R/G  Extremities: No digital clubbing or pedal edema  Neuro: AAOx3    Diagnostic Data:  Labs:  I personally reviewed the most recent laboratory data pertinent to today's visit    Lab Results   Component Value Date    WBC 4.73 03/18/2025    HGB 14.0 03/18/2025    HCT 41.5 03/18/2025    MCV 92 03/18/2025     (L) 03/18/2025     Lab Results   Component Value Date    SODIUM 133 (L) 03/18/2025    K 3.7 03/18/2025    CO2 28 03/18/2025    CL 98 03/18/2025    BUN 27 (H) 03/18/2025    CREATININE 1.21 03/18/2025    CALCIUM 9.4 03/18/2025     PFT results:  The most recent pulmonary function tests were reviewed.  PFTs -- 10/9/2023  FEV1/FVC Ratio: 88 %  Forced Vital Capacity: 2.15 L           51 % predicted  FEV1: 1.90 L   60 % predicted  After administration of bronchodilator FEV1: reduced 16%  Lung volumes by body plethysmography: Total Lung Capacity 58 % predicted Residual volume 73 % predicted  DLCO corrected for patients hemoglobin level: 65 %  Interpretation:  Moderate restrictive airflow defect on spirometry   No response to the administration to bronchodilator per ATS Standards  Moderately reduced TLC  Mildly Reduced  Diffusion  Restricted flow volume loops     Imaging:  I personally reviewed the images in PACS pertinent to today's visit:  CT C/A/P -- 7/3/2024  1) Acute, nondisplaced fracture of the left 11th rib posteriorly.  2) Subcutaneous tissue in the left lateral abdominal wall, likely representing a contusion with a 1.1 cm hematoma.  3) No other acute thoracic, abdominal or pelvic trauma.  4) Additional findings as above.    HRCT -- 1/16/2024  1. Interval resolution of previous groundglass opacities compatible with a prior infectious/inflammatory process.  2. Air trapping and small airways disease.  3. No interstitial pulmonary fibrosis.  4. Enlarged main pulmonary artery, which may represent pulmonary hypertension.     Other studies:  2D Echo -- 11/5/2024    Left  "Ventricle: Left ventricular cavity size is normal. Wall thickness is mildly increased. The left ventricular ejection fraction is 60%. Systolic function is normal. Wall motion is normal. Diastolic function is normal.    Right Ventricle: Right ventricular cavity size is mildly dilated. Systolic function is normal.    Left Atrium: The atrium is mildly dilated.    Right Atrium: The atrium is mildly dilated.    Mitral Valve: There is mild regurgitation.    Tricuspid Valve: There is mild regurgitation. The estimated right ventricular systolic pressure is 22.00 mmHg.    Split Night PSG -- 1/31/2024 (Wt 316.0 lbs)  AHI - 19.6  Sup AHI - 44.0  REM AHI - NA  O2 Guru - 71.0%  TST < 90% - 262.4 min  PLMI - 0.0  PLM Antelmo - 0.0  Tested Range - 4 to 22/18 cmH2O  Recommended Pressure - 22/18 cmH2O  AHI at Rec Pressure - 2.2      Jaja Kulkarni MD  Pulmonary-Critical Care and Sleep Medicine  04/28/25    Portions of the record may have been created with voice recognition software. Occasional wrong word or \"sound a like\" substitutions may have occurred due to the inherent limitations of voice recognition software. Please read the chart carefully and recognize, using context, where substitutions have occurred.  "

## 2025-05-05 ENCOUNTER — HOSPITAL ENCOUNTER (OUTPATIENT)
Dept: SLEEP CENTER | Facility: HOSPITAL | Age: 70
Discharge: HOME | End: 2025-05-05
Payer: COMMERCIAL

## 2025-05-05 DIAGNOSIS — G47.33 OSA (OBSTRUCTIVE SLEEP APNEA): ICD-10-CM

## 2025-05-05 PROCEDURE — 95810 POLYSOM 6/> YRS 4/> PARAM: CPT

## 2025-05-06 NOTE — PROGRESS NOTES
Sleep Study Documentation    Pre-Sleep Study       Sleep testing procedure explained to patient:YES    Patient napped prior to study:NO    Caffeine:Dayshift worker after 12PM.  Caffeine use:NO    Alcohol:Dayshift workers after 5PM: Alcohol use:NO    Typical day for patient:YES       Study Documentation    Sleep Study Indications: patient is a current cpap user with recent significant weight loss.     Sleep Study: Diagnostic   Snore:Mild (Occasional)  Supplemental O2: no    O2 flow rate (L/min) range N/A  O2 flow rate (L/min) final N/A  Minimum SaO2 90  Baseline SaO2 70    Mode of Therapy:N/A    EKG abnormalities: no     EEG abnormalities: no    Were abnormal behaviors in sleep observed:NO    Is Total Sleep Study Recording Time < 2 hours: N/A    Is Total Sleep Study Recording Time > 2 hours but study is incomplete: N/A    Is Total Sleep Study Recording Time 6 hours or more but sleep was not obtained: NO    Patient classification: retired       Post-Sleep Study    Medication used at bedtime or during sleep study:YES other prescription medications    Patient reports time it took to fall asleep:20 to 30 minutes    Patient reports waking up during study:1 to 2 times.  Patient reports returning to sleep in 10 to 30 minutes.    Patient reports sleeping 6 to 8 hours with dreaming.    Does the Patient feel this is a typical night of sleep:worse than usual    Patient rated sleepiness: Not sleepy or tired    PAP treatment:no.

## 2025-05-14 ENCOUNTER — VBI (OUTPATIENT)
Dept: ADMINISTRATIVE | Facility: OTHER | Age: 70
End: 2025-05-14

## 2025-05-14 NOTE — TELEPHONE ENCOUNTER
05/14/25 3:12 PM     Chart reviewed for CRC: Colonoscopy ; nothing is submitted to the patient's insurance at this time.     Petra Harrison   [facility]

## 2025-05-19 ENCOUNTER — RESULTS FOLLOW-UP (OUTPATIENT)
Dept: PULMONOLOGY | Facility: CLINIC | Age: 70
End: 2025-05-19

## 2025-05-29 ENCOUNTER — OFFICE VISIT (OUTPATIENT)
Dept: VASCULAR SURGERY | Facility: CLINIC | Age: 70
End: 2025-05-29
Payer: COMMERCIAL

## 2025-05-29 VITALS
HEIGHT: 69 IN | HEART RATE: 70 BPM | BODY MASS INDEX: 33.77 KG/M2 | DIASTOLIC BLOOD PRESSURE: 70 MMHG | SYSTOLIC BLOOD PRESSURE: 100 MMHG | TEMPERATURE: 98.4 F | OXYGEN SATURATION: 95 % | WEIGHT: 228 LBS

## 2025-05-29 DIAGNOSIS — I83.892 BLEEDING FROM VARICOSE VEINS OF LEFT LOWER EXTREMITY: Primary | ICD-10-CM

## 2025-05-29 PROCEDURE — 99213 OFFICE O/P EST LOW 20 MIN: CPT | Performed by: NURSE PRACTITIONER

## 2025-05-29 NOTE — PROGRESS NOTES
Name: Leonardo Oviedo      : 1955      MRN: [id number]  Encounter Provider: NONA Hunt  Encounter Date: 2025   Encounter department: [facility]  :  Assessment & Plan  Bleeding from varicose veins of left lower extremity  69-year-old male with A-fib on Xarelto, HFpEF, osteoarthritis, degenerative disc disease lumbar spine, chronic low back pain with sciatica, BLE lymphedema,  BLE venous insufficiency with chronic stasis changes s/p bilateral GSV, SSV and accessory vein ablation , spontaneous bleeding L medial ankle vein s/p medical sclerotherapy  (me)    He returns the office today to recheck injection site left lower extremity    -Complete resolution of left medial ankle vein following injection sclerotherapy.  Good response  -Continue compression wraps, compression pumps, exercise and maintaining weight  -He does have a similar-appearing vein on right dorsal foot.  Appears high risk for rupture especially while on anticoagulation for atrial fibrillation.  Right dorsal foot vein are prominent, bubbling above the surface, shear overlying skin and high risk for bleeding  -Recommended medical sclerotherapy of right dorsal foot veins  -Will submit for insurance authorization for medical sclerotherapy right foot  -For scheduling: Asclera 1% solution, up to 4 mL, obtain insurance authorization, 60 minutes                History of Present Illness   Leonardo Oviedo is a 69 y.o. male who presents to the office to recheck left lower extremity following medical sclerotherapy.  Complete resolution of vein at site of prior injection.     Patient presents for a 6wk post op inj.   History obtained from: patient    Review of Systems   Constitutional: Negative.    HENT: Negative.     Eyes: Negative.    Respiratory: Negative.     Cardiovascular: Negative.    Gastrointestinal: Negative.    Endocrine: Negative.    Genitourinary: Negative.    Musculoskeletal: Negative.    Skin:  "Negative.    Allergic/Immunologic: Negative.    Neurological: Negative.    Hematological: Negative.    Psychiatric/Behavioral: Negative.       Medical History Reviewed by provider this encounter:  Tobacco  Allergies  Meds  Problems  Med Hx  Surg Hx  Fam Hx     .     Objective   I have reviewed and made appropriate changes to the review of systems input by the medical assistant.    Vitals:    05/29/25 1351   BP: 100/70   BP Location: Left arm   Patient Position: Sitting   Cuff Size: Large   Pulse: 70   Temp: 98.4 °F (36.9 °C)   TempSrc: Temporal   SpO2: 95%   Weight: 103 kg (228 lb)   Height: 5' 9\" (1.753 m)       Problem List[1]    Past Surgical History[2]    Family History[3]    Social History     Socioeconomic History    Marital status: /Civil Union     Spouse name: Not on file    Number of children: Not on file    Years of education: Not on file    Highest education level: Not on file   Occupational History    Not on file   Tobacco Use    Smoking status: Never     Passive exposure: Never    Smokeless tobacco: Never   Vaping Use    Vaping status: Never Used   Substance and Sexual Activity    Alcohol use: Yes     Alcohol/week: 5.0 standard drinks of alcohol     Types: 3 Glasses of wine, 2 Standard drinks or equivalent per week     Comment: socially    Drug use: No    Sexual activity: Not Currently     Partners: Female     Birth control/protection: Abstinence, Condom Male, Spermicide   Other Topics Concern    Not on file   Social History Narrative    Not on file     Social Drivers of Health     Financial Resource Strain: Not At Risk (4/3/2025)    Received from [facility]    Financial Insecurity     In the last 12 months did you skip medications to save money?: No     In the last 12 months was there a time when you needed to see a doctor but could not because of cost?: No   Food Insecurity: No Food Insecurity (12/7/2023)    Nursing - Inadequate Food Risk Classification     Worried " "About Running Out of Food in the Last Year: Never true     Ran Out of Food in the Last Year: Never true     Ran Out of Food in the Last Year: Not on file   Transportation Needs: No Transportation Needs (12/7/2023)    PRAPARE - Transportation     Lack of Transportation (Medical): No     Lack of Transportation (Non-Medical): No   Physical Activity: Inactive (8/24/2023)    Received from [facility]    Exercise Vital Sign     Days of Exercise per Week: 0 days     Minutes of Exercise per Session: 0 min   Stress: No Stress Concern Present (8/24/2023)    Received from [facility]    [employer] - Occupational Stress Questionnaire     Feeling of Stress : Not at all   Social Connections: Unknown (6/18/2024)    Received from [facility]     How often do you feel lonely or isolated from those around you? (Adult - for ages 18 years and over): Not on file   Intimate Partner Violence: Not At Risk (8/24/2023)    Received from [facility]    Humiliation, Afraid, Rape, and Kick questionnaire     Fear of Current or Ex-Partner: No     Emotionally Abused: No     Physically Abused: No     Sexually Abused: No   Housing Stability: Low Risk  (12/7/2023)    Housing Stability Vital Sign     Unable to Pay for Housing in the Last Year: No     Number of Times Moved in the Last Year: 1     Homeless in the Last Year: No       Allergies[4]    Current Medications[5]    /70 (BP Location: Left arm, Patient Position: Sitting, Cuff Size: Large)   Pulse 70   Temp 98.4 °F (36.9 °C) (Temporal)   Ht 5' 9\" (1.753 m)   Wt 103 kg (228 lb)   SpO2 95%   BMI 33.67 kg/m²      Physical Exam  Vitals and nursing note reviewed. Exam conducted with a chaperone present.   Constitutional:       Appearance: Normal appearance.   HENT:      Head: Normocephalic and atraumatic.     Eyes:      Extraocular Movements: Extraocular movements intact.     Pulmonary:      " Effort: Pulmonary effort is normal.     Neurological:      Mental Status: He is alert and oriented to person, place, and time.         Administrative Statements   I have spent a total time of 20 minutes in caring for this patient on the day of the visit/encounter including Prognosis, Risks and benefits of tx options, Instructions for management, Patient and family education, Importance of tx compliance, Risk factor reductions, Impressions, Counseling / Coordination of care, and Documenting in the medical record.         [1]   Patient Active Problem List  Diagnosis    Atrial fibrillation (HCC)    Paresthesias    Cervical pain (neck)    Obesity, Class III, BMI 40-49.9 (morbid obesity)    Thrombocytopenia (HCC)    Bilateral lower extremity edema    Venous insufficiency of both lower extremities    Pulmonary hypertension (HCC)    Chronic heart failure with preserved ejection fraction (HFpEF) (HCC)    Anemia    Primary osteoarthritis    Iron deficiency    Atrial flutter (HCC)    Venous ulcer (HCC)    Chronic pain syndrome    Lumbar spondylosis    Chronic bilateral low back pain without sciatica    Benign prostate hyperplasia    Sleep apnea    Lymphedema    Obstructive sleep apnea    Snoring    ROBERT (obstructive sleep apnea)    Degeneration of intervertebral disc of lumbar region with discogenic back pain    Bleeding from varicose veins of left lower extremity   [2]   Past Surgical History:  Procedure Laterality Date    ABLATION SAPHENOUS VEIN W/ RFA      COLONOSCOPY      JOINT REPLACEMENT  Left Hip 4 /21/2009    NERVE BLOCK Bilateral 7/18/2023    Procedure: BLOCK MEDIAL BRANCH B/L L4-L5 AND L5-S1 MBB #1;  Surgeon: Samson Godinez MD;  Location: [facility];  Service: Pain Management     NERVE BLOCK Bilateral 8/17/2023    Procedure: BLOCK MEDIAL BRANCH  B/L L4-5 and L5-S1 MBB #2;  Surgeon: Samson Godinez MD;  Location: [facility];  Service: Pain Management     SC CYSTO INSERTION TRANSPROSTATIC IMPLANT SINGLE N/A  11/13/2017    Procedure: CYSTOSCOPY WITH INSERTION UROLIFT;  Surgeon: Deuce Bennett DO;  Location: [facility];  Service: Urology    RADIOFREQUENCY ABLATION Left 10/5/2023    Procedure: ABLATION RADIO FREQUENCY (RFA) LEFT L4-5 AND L5-S1 RFA;  Surgeon: Samson Godinez MD;  Location: [facility];  Service: Pain Management     RADIOFREQUENCY ABLATION Right 11/1/2023    Procedure: ABLATION RADIO FREQUENCY (RFA) RIGHT L4-5 AND L5-S1 RFA;  Surgeon: Samson Godinez MD;  Location: [facility];  Service: Pain Management     RADIOFREQUENCY ABLATION Left 11/21/2024    Procedure: ABLATION RADIO FREQUENCY (RFA) left L4-5 and L5-S1;  Surgeon: Samson Godinez MD;  Location: [facility];  Service: Pain Management     RADIOFREQUENCY ABLATION Right 1/2/2025    Procedure: ABLATION RADIO FREQUENCY (RFA)L4-5 and L5-S1 RFA;  Surgeon: Samson Godinez MD;  Location: [facility];  Service: Pain Management     TONSILLECTOMY     [3]   Family History  Problem Relation Name Age of Onset    Heart disease Mother      Lymphoma Father Julian Oviedo Sr.     Cancer Father Julian Oviedo Sr.     Obesity Sister      Heart disease Sister      Heart failure Sister          on diuretic    Obesity Brother Julian Oviedo Jr.     Hypertension Brother Julian Oviedo Jr.     Diabetes Neg Hx      Thyroid disease Neg Hx      Stroke Neg Hx     [4]   Allergies  Allergen Reactions    Penicillins Anaphylaxis    Sulfa Antibiotics Anaphylaxis    Levofloxacin Other (See Comments)   [5]   Current Outpatient Medications:     acetaminophen (TYLENOL) 500 mg tablet, Take 500 mg by mouth every 6 (six) hours as needed for mild pain, Disp: , Rfl:     alfuzosin (UROXATRAL) 10 mg 24 hr tablet, Take 10 mg by mouth in the morning., Disp: , Rfl:     allopurinol (ZYLOPRIM) 300 mg tablet, Take 300 mg by mouth in the morning., Disp: , Rfl:     ammonium lactate (LAC-HYDRIN) 12 % lotion, Apply topically as needed in the morning and as needed in the evening for dry  skin., Disp: , Rfl:     Colchicine 0.6 MG CAPS, Take by mouth if needed, Disp: , Rfl:     dutasteride (AVODART) 0.5 mg capsule, Take 0.5 mg by mouth in the morning., Disp: , Rfl:     ferrous sulfate 325 (65 Fe) mg tablet, Take 325 mg by mouth, Disp: , Rfl:     fluticasone (FLONASE) 50 mcg/act nasal spray, 2 sprays into each nostril in the morning., Disp: , Rfl:     Glucosamine-Chondroit-Vit C-Mn (Glucosamine 1500 Complex) CAPS, Take by mouth, Disp: , Rfl:     halobetasol (ULTRAVATE) 0.05 % ointment, , Disp: , Rfl:     Jardiance 10 MG TABS tablet, take 1 tablet by mouth every morning, Disp: 90 tablet, Rfl: 1    L-ARGININE-500 PO, Take 500 mg by mouth in the morning and 500 mg in the evening., Disp: , Rfl:     loteprednol etabonate (LOTEMAX) 0.5 % ophthalmic suspension, 1 drop in the morning and 1 drop at noon and 1 drop in the evening and 1 drop before bedtime., Disp: , Rfl:     metoprolol succinate (TOPROL-XL) 25 mg 24 hr tablet, TAKE 1 TABLET DAILY, Disp: 90 tablet, Rfl: 0    multivitamin (THERAGRAN) TABS, Take 1 tablet by mouth in the morning., Disp: , Rfl:     Omega-3 Fatty Acids (fish oil) 1,000 mg, Take 1,000 mg by mouth in the morning and 1,000 mg in the evening., Disp: , Rfl:     ondansetron (ZOFRAN-ODT) 4 mg disintegrating tablet, Take 1 tablet (4 mg total) by mouth every 6 (six) hours as needed for nausea or vomiting, Disp: 20 tablet, Rfl: 0    Ozempic, 0.25 or 0.5 MG/DOSE, 2 MG/3ML injection pen, Inject 0.5 mg under the skin every 7 days, Disp: , Rfl:     patient supplied medication, Take 1 each by mouth in the morning and 1 each in the evening., Disp: , Rfl:     pregabalin (LYRICA) 100 mg capsule, Take 1 capsule (100 mg total) by mouth 3 (three) times a day, Disp: 42 capsule, Rfl: 0    rivaroxaban (Xarelto) 20 mg tablet, TAKE 1 TABLET DAILY, Disp: 90 tablet, Rfl: 0    rosuvastatin (CRESTOR) 5 mg tablet, Take 1 tablet (5 mg total) by mouth daily, Disp: 90 tablet, Rfl: 3    spironolactone (ALDACTONE) 25 mg  tablet, Take 1 tablet (25 mg total) by mouth 2 (two) times a day, Disp: 180 tablet, Rfl: 1    tadalafil (CIALIS) 5 MG tablet, Take 5 mg by mouth daily as needed for erectile dysfunction, Disp: , Rfl:     torsemide (DEMADEX) 20 mg tablet, Take 3 tablets (60 mg total) by mouth daily, Disp: 60 tablet, Rfl: 2    traMADol (ULTRAM) 50 mg tablet, Take 50 mg by mouth every 6 (six) hours as needed for moderate pain, Disp: , Rfl:     VITAMIN D PO, Take 2,000 Units by mouth in the morning., Disp: , Rfl:     Diclofenac Sodium (VOLTAREN) 1 %, Apply 2 g topically 4 (four) times a day (Patient not taking: Reported on 2/5/2025), Disp: 100 g, Rfl: 0    doxycycline hyclate (VIBRA-TABS) 100 mg tablet, take 1 tablet by mouth twice a day for 7 days (Patient not taking: Reported on 1/3/2025), Disp: , Rfl:     ondansetron (ZOFRAN) 4 mg tablet, Take 1 tablet (4 mg total) by mouth every 6 (six) hours (Patient not taking: Reported on 3/13/2025), Disp: 12 tablet, Rfl: 0

## 2025-05-29 NOTE — ASSESSMENT & PLAN NOTE
69-year-old male with A-fib on Xarelto, HFpEF, osteoarthritis, degenerative disc disease lumbar spine, chronic low back pain with sciatica, BLE lymphedema,  BLE venous insufficiency with chronic stasis changes s/p bilateral GSV, SSV and accessory vein ablation '18, spontaneous bleeding L medial ankle vein s/p medical sclerotherapy 4/18 (me)    He returns the office today to recheck injection site left lower extremity    -Complete resolution of left medial ankle vein following injection sclerotherapy.  Good response  -Continue compression wraps, compression pumps, exercise and maintaining weight  -He does have a similar-appearing vein on right dorsal foot.  Appears high risk for rupture especially while on anticoagulation for atrial fibrillation.  Right dorsal foot vein are prominent, bubbling above the surface, shear overlying skin and high risk for bleeding  -Recommended medical sclerotherapy of right dorsal foot veins  -Will submit for insurance authorization for medical sclerotherapy right foot  -For scheduling: Asclera 1% solution, up to 4 mL, obtain insurance authorization, 60 minutes

## 2025-05-30 DIAGNOSIS — I50.33 ACUTE ON CHRONIC DIASTOLIC CONGESTIVE HEART FAILURE (HCC): ICD-10-CM

## 2025-05-30 DIAGNOSIS — I48.4 ATYPICAL ATRIAL FLUTTER (HCC): ICD-10-CM

## 2025-06-01 RX ORDER — METOPROLOL SUCCINATE 25 MG/1
25 TABLET, EXTENDED RELEASE ORAL DAILY
Qty: 90 TABLET | Refills: 1 | Status: SHIPPED | OUTPATIENT
Start: 2025-06-01

## 2025-06-01 RX ORDER — RIVAROXABAN 20 MG/1
20 TABLET, FILM COATED ORAL DAILY
Qty: 90 TABLET | Refills: 1 | Status: SHIPPED | OUTPATIENT
Start: 2025-06-01

## 2025-07-17 ENCOUNTER — TELEPHONE (OUTPATIENT)
Age: 70
End: 2025-07-17

## 2025-07-17 NOTE — TELEPHONE ENCOUNTER
Caller:  Mendoza    Doctor: Kell Hogan     Reason for call: Patient calling in to reschedule injection appt that's scheduled for today 7/17/2025. Called office and was advised a call back will be made to patient due to it being a hour slot they need    Call back#: [phone]

## 2025-08-19 ENCOUNTER — APPOINTMENT (OUTPATIENT)
Dept: LAB | Facility: HOSPITAL | Age: 70
End: 2025-08-19
Payer: COMMERCIAL

## 2025-08-19 DIAGNOSIS — M15.0 PRIMARY GENERALIZED HYPERTROPHIC OSTEOARTHROSIS: ICD-10-CM

## 2025-08-19 LAB
CRP SERPL QL: 4 MG/L
RHEUMATOID FACT SERPL-ACNC: <10 IU/ML

## 2025-08-19 PROCEDURE — 86200 CCP ANTIBODY: CPT

## 2025-08-19 PROCEDURE — 36415 COLL VENOUS BLD VENIPUNCTURE: CPT

## 2025-08-19 PROCEDURE — 86140 C-REACTIVE PROTEIN: CPT

## 2025-08-19 PROCEDURE — 86225 DNA ANTIBODY NATIVE: CPT

## 2025-08-19 PROCEDURE — 86038 ANTINUCLEAR ANTIBODIES: CPT

## 2025-08-19 PROCEDURE — 86431 RHEUMATOID FACTOR QUANT: CPT

## 2025-08-20 LAB — CCP AB SER IA-ACNC: 1 (ref ?–10)

## 2025-08-21 ENCOUNTER — OFFICE VISIT (OUTPATIENT)
Dept: PULMONOLOGY | Facility: CLINIC | Age: 70
End: 2025-08-21
Payer: COMMERCIAL

## 2025-08-21 VITALS
TEMPERATURE: 97.7 F | HEIGHT: 69 IN | SYSTOLIC BLOOD PRESSURE: 104 MMHG | DIASTOLIC BLOOD PRESSURE: 64 MMHG | BODY MASS INDEX: 32.88 KG/M2 | HEART RATE: 67 BPM | WEIGHT: 222 LBS | OXYGEN SATURATION: 99 %

## 2025-08-21 DIAGNOSIS — G47.33 OSA (OBSTRUCTIVE SLEEP APNEA): Primary | ICD-10-CM

## 2025-08-21 DIAGNOSIS — E66.811 CLASS 1 OBESITY: ICD-10-CM

## 2025-08-21 PROCEDURE — 99213 OFFICE O/P EST LOW 20 MIN: CPT

## 2025-08-22 LAB
DSDNA IGG SERPL IA-ACNC: 2.6 IU/ML (ref ?–15)
NUCLEAR IGG SER IA-RTO: 0.2 RATIO (ref ?–1)

## (undated) DEVICE — Device

## (undated) DEVICE — NEEDLE 18 G X 1 1/2

## (undated) DEVICE — TUBING SUCTION 5MM X 12 FT

## (undated) DEVICE — NEEDLE SPINAL 22G X 3.5IN  QUINCKE

## (undated) DEVICE — GLOVE SRG BIOGEL 8

## (undated) DEVICE — NEEDLE 25G X 1 1/2

## (undated) DEVICE — DISPOSABLE OR TOWEL: Brand: CARDINAL HEALTH

## (undated) DEVICE — SCD SEQUENTIAL COMPRESSION COMFORT SLEEVE MEDIUM KNEE LENGTH: Brand: KENDALL SCD

## (undated) DEVICE — FLEXIBLE ADHESIVE BANDAGE,X-LARGE: Brand: CURITY

## (undated) DEVICE — GAUZE SPONGES,16 PLY: Brand: CURITY

## (undated) DEVICE — THREE-QUARTER SHEET: Brand: CONVERTORS

## (undated) DEVICE — PAD GROUNDING IONIC RF DISP

## (undated) DEVICE — SYRINGE 3ML LL

## (undated) DEVICE — BAG URINE DRAINAGE 2000ML ANTI RFLX LF

## (undated) DEVICE — CHLORAPREP HI-LITE 10.5ML ORANGE

## (undated) DEVICE — SYRINGE 10ML LL

## (undated) DEVICE — ELECTRODE RF 15CM

## (undated) DEVICE — SYRINGE 5ML LL

## (undated) DEVICE — GLOVE SRG BIOGEL 6.5

## (undated) DEVICE — UROCATCH BAG

## (undated) DEVICE — GLOVE INDICATOR PI UNDERGLOVE SZ 6.5 BLUE

## (undated) DEVICE — GLOVE SRG BIOGEL 7

## (undated) DEVICE — PLASTIC ADHESIVE BANDAGE: Brand: CURITY

## (undated) DEVICE — GLOVE INDICATOR PI UNDERGLOVE SZ 8 BLUE